# Patient Record
Sex: FEMALE | Race: BLACK OR AFRICAN AMERICAN | Employment: OTHER | URBAN - METROPOLITAN AREA
[De-identification: names, ages, dates, MRNs, and addresses within clinical notes are randomized per-mention and may not be internally consistent; named-entity substitution may affect disease eponyms.]

---

## 2017-01-06 RX ORDER — OXYCODONE AND ACETAMINOPHEN 7.5; 325 MG/1; MG/1
TABLET ORAL
Qty: 120 TAB | Refills: 0 | Status: SHIPPED | OUTPATIENT
Start: 2017-01-06 | End: 2017-03-01 | Stop reason: SDUPTHER

## 2017-01-06 RX ORDER — ZOLPIDEM TARTRATE 10 MG/1
TABLET ORAL
Qty: 30 TAB | Refills: 0 | Status: SHIPPED | OUTPATIENT
Start: 2017-01-06 | End: 2017-02-08 | Stop reason: SDUPTHER

## 2017-01-11 ENCOUNTER — HOSPITAL ENCOUNTER (OUTPATIENT)
Dept: LAB | Age: 67
Discharge: HOME | End: 2017-01-11
Payer: MEDICARE

## 2017-01-11 ENCOUNTER — OFFICE VISIT (OUTPATIENT)
Dept: ONCOLOGY | Age: 67
End: 2017-01-11

## 2017-01-11 ENCOUNTER — HOSPITAL ENCOUNTER (OUTPATIENT)
Dept: ONCOLOGY | Age: 67
Discharge: HOME | End: 2017-01-11

## 2017-01-11 VITALS
TEMPERATURE: 97.6 F | HEART RATE: 59 BPM | WEIGHT: 155 LBS | BODY MASS INDEX: 24.91 KG/M2 | DIASTOLIC BLOOD PRESSURE: 79 MMHG | SYSTOLIC BLOOD PRESSURE: 130 MMHG | HEIGHT: 66 IN

## 2017-01-11 DIAGNOSIS — D69.3 CHRONIC ITP (IDIOPATHIC THROMBOCYTOPENIA) (HCC): ICD-10-CM

## 2017-01-11 DIAGNOSIS — E55.9 VITAMIN D DEFICIENCY: ICD-10-CM

## 2017-01-11 DIAGNOSIS — C50.912 BREAST CANCER METASTASIZED TO AXILLARY LYMPH NODE, LEFT (HCC): ICD-10-CM

## 2017-01-11 DIAGNOSIS — R29.898 MUSCULAR DECONDITIONING: ICD-10-CM

## 2017-01-11 DIAGNOSIS — C77.3 BREAST CANCER METASTASIZED TO AXILLARY LYMPH NODE, LEFT (HCC): ICD-10-CM

## 2017-01-11 DIAGNOSIS — D69.3 CHRONIC ITP (IDIOPATHIC THROMBOCYTOPENIA) (HCC): Primary | ICD-10-CM

## 2017-01-11 DIAGNOSIS — R64 CACHEXIA (HCC): ICD-10-CM

## 2017-01-11 DIAGNOSIS — D50.8 OTHER IRON DEFICIENCY ANEMIA: ICD-10-CM

## 2017-01-11 LAB
ALBUMIN SERPL BCP-MCNC: 3.7 G/DL (ref 3.4–5)
ALBUMIN/GLOB SERPL: 1.1 {RATIO} (ref 0.8–1.7)
ALP SERPL-CCNC: 67 U/L (ref 45–117)
ALT SERPL-CCNC: 24 U/L (ref 13–56)
ANION GAP BLD CALC-SCNC: 9 MMOL/L (ref 3–18)
AST SERPL W P-5'-P-CCNC: 23 U/L (ref 15–37)
BASO+EOS+MONOS # BLD AUTO: 0.9 K/UL (ref 0–2.3)
BASO+EOS+MONOS # BLD AUTO: 13 % (ref 0.1–17)
BILIRUB SERPL-MCNC: 0.4 MG/DL (ref 0.2–1)
BUN SERPL-MCNC: 26 MG/DL (ref 7–18)
BUN/CREAT SERPL: 32 (ref 12–20)
CALCIUM SERPL-MCNC: 9.5 MG/DL (ref 8.5–10.1)
CHLORIDE SERPL-SCNC: 102 MMOL/L (ref 100–108)
CO2 SERPL-SCNC: 29 MMOL/L (ref 21–32)
CREAT SERPL-MCNC: 0.81 MG/DL (ref 0.6–1.3)
DIFFERENTIAL METHOD BLD: ABNORMAL
ERYTHROCYTE [DISTWIDTH] IN BLOOD BY AUTOMATED COUNT: 16.4 % (ref 11.5–14.5)
FERRITIN SERPL-MCNC: 677 NG/ML (ref 8–388)
GLOBULIN SER CALC-MCNC: 3.5 G/DL (ref 2–4)
GLUCOSE SERPL-MCNC: 81 MG/DL (ref 74–99)
HCT VFR BLD AUTO: 30.9 % (ref 36–48)
HGB BLD-MCNC: 10.1 G/DL (ref 12–16)
IRON SATN MFR SERPL: 30 %
IRON SERPL-MCNC: 63 UG/DL (ref 50–175)
LYMPHOCYTES # BLD AUTO: 25 % (ref 14–44)
LYMPHOCYTES # BLD: 1.7 K/UL (ref 1.1–5.9)
MCH RBC QN AUTO: 28.6 PG (ref 25–35)
MCHC RBC AUTO-ENTMCNC: 32.7 G/DL (ref 31–37)
MCV RBC AUTO: 87.5 FL (ref 78–102)
NEUTS SEG # BLD: 4.3 K/UL (ref 1.8–9.5)
NEUTS SEG NFR BLD AUTO: 62 % (ref 40–70)
PLATELET # BLD AUTO: 27 K/UL (ref 135–420)
POTASSIUM SERPL-SCNC: 3.8 MMOL/L (ref 3.5–5.5)
PROT SERPL-MCNC: 7.2 G/DL (ref 6.4–8.2)
RBC # BLD AUTO: 3.53 M/UL (ref 4.1–5.1)
SODIUM SERPL-SCNC: 140 MMOL/L (ref 136–145)
TIBC SERPL-MCNC: 207 UG/DL (ref 250–450)
WBC # BLD AUTO: 6.9 K/UL (ref 4.5–13)

## 2017-01-11 PROCEDURE — 82728 ASSAY OF FERRITIN: CPT | Performed by: INTERNAL MEDICINE

## 2017-01-11 PROCEDURE — 80053 COMPREHEN METABOLIC PANEL: CPT | Performed by: INTERNAL MEDICINE

## 2017-01-11 PROCEDURE — 36415 COLL VENOUS BLD VENIPUNCTURE: CPT | Performed by: INTERNAL MEDICINE

## 2017-01-11 PROCEDURE — 83540 ASSAY OF IRON: CPT | Performed by: INTERNAL MEDICINE

## 2017-01-11 PROCEDURE — 86300 IMMUNOASSAY TUMOR CA 15-3: CPT | Performed by: INTERNAL MEDICINE

## 2017-01-11 RX ORDER — PREDNISONE 10 MG/1
20 TABLET ORAL 2 TIMES DAILY
Qty: 50 TAB | Refills: 0 | Status: ON HOLD | OUTPATIENT
Start: 2017-01-11 | End: 2017-06-14

## 2017-01-11 NOTE — PROGRESS NOTES
Hematology/Oncology  Progress Note    Name: Christophe Lama  Date: 2017  : 1950    PCP: Ashley Avery MD     Ms. Jefferson Huynh is a 61 y.o. female who was seen for management of her slowly progressive ITP and metastatic breast cancer with associated iron deficiency anemia. Current therapy: Active surveillance, no active therapy  Subjective:     Ms. Jefferson Huynh is a 79-year-old -American woman with history of metastatic breast cancer. The patient reports that she has been doing reasonably well. She has gained about 20 pounds weight over the past few months. She denies having any unexplained bruising or bleeding. She continues to have arthritic discomfort in her joints and is continuing to use her walker or cane for mobility support    Past medical history, family history, and social history: these were reviewed and remains unchanged. Past Medical History   Diagnosis Date    Antineoplastic chemotherapy induced anemia     Arthritis     [facility]     Cancer ([facility]      Breast    Chronic ITP (idiopathic thrombocytopenia) (HCC) 10/31/2016    Diabetes ([facility]     Esophageal cancer ([facility] [address]      treated with chemo     Past Surgical History   Procedure Laterality Date    Pr breast surgery procedure unlisted      Hx vascular access      Hx appendectomy      Hx orthopaedic      Colonoscopy N/A 2016     COLONOSCOPY performed by Deloris Dennis MD at [facility] ENDOSCOPY     Social History     Social History    Marital status:      Spouse name: N/A    Number of children: N/A    Years of education: N/A     Occupational History    Not on file. Social History Main Topics    Smoking status: Never Smoker    Smokeless tobacco: Not on file    Alcohol use No    Drug use: No    Sexual activity: Not on file     Other Topics Concern    Not on file     Social History Narrative     No family history on file.   Current Outpatient Prescriptions   Medication Sig Dispense Refill    predniSONE (DELTASONE) 10 mg tablet Take 2 Tabs by mouth two (2) times a day. Tapered dose over 3 weeks beginning 1/12/2016 50 Tab 0    oxyCODONE-acetaminophen (PERCOCET) 7.5-325 mg per tablet Take 1 tablet every 6 hours as needed for pain 120 Tab 0    zolpidem (AMBIEN) 10 mg tablet TAKE 1 TABLET BY MOUTH NIGHTLY AS NEEDED FOR SLEEP *MAX DAILY AMOUNT 10MG* 30 Tab 0    lidocaine-prilocaine (EMLA) topical cream APPLY OVER PORT 1 HOUR PRIOR TO CHEMOTHERAPY THAN COVER WITH SARAN WRAP 30 g 6    magic mouthwash solution Take 5 mL by mouth three (3) times daily as needed for Stomatitis. Magic mouth wash   Maalox  Lidocaine 2% viscous   Diphenhydramine oral solution     Pharmacy to mix equal portions of ingredients to a total volume as indicated in the dispense amount. 236 mL 0    celecoxib (CELEBREX) 200 mg capsule Take  by mouth two (2) times a day.  furosemide (LASIX) 40 mg tablet Take  by mouth daily.  dronabinol (MARINOL) 5 mg capsule Take 1 Cap by mouth two (2) times a day. 60 Cap 1    gabapentin (NEURONTIN) 300 mg capsule Take 300 mg by mouth three (3) times daily.  warfarin (COUMADIN) 1 mg tablet TAKE 1 TABLET (1 MG) BY ORAL ROUTE ONCE DAILY 30 Tab 6    KLOR-CON M20 20 mEq tablet TAKE ONE TABLET BY MOUTH ONCE A DAY 30 Tab 3    triamterene-hydrochlorothiazide (DYAZIDE) 37.5-25 mg per capsule TAKE 1 CAPSULE BY ORAL ROUTE ONCE DAILY 30 Cap 5    VITAMIN D2 50,000 unit capsule TAKE 1 CAPSULE BY MOUTH EVERY SEVEN (7) DAYS. 4 Cap 3    aluminum-magnesium hydroxide 200-200 mg/5 mL susp 5 mL, diphenhydrAMINE 12.5 mg/5 mL liqd 12.5 mg, lidocaine 2 % soln 5 mL 5 mL by Swish and Spit route two (2) times a day. Magic mouth wash   Maalox  Lidocaine 2% viscous   Diphenhydramine oral solution     Pharmacy to mix equal portions of ingredients to a total volume as indicated in the dispense amount. 240 mL 1    potassium chloride (K-DUR, KLOR-CON) 20 mEq tablet Take 2 Tabs by mouth daily.  30 Tab 3    albuterol (PROAIR HFA) 90 mcg/actuation inhaler 1-2 puffs every 4-6 hrs. 1 Inhaler 1    senna (SENNA) 8.6 mg tablet Take 1 Tab by mouth daily.  cyclobenzaprine (FLEXERIL) 5 mg tablet       nabumetone (RELAFEN) 750 mg tablet       ondansetron hcl (ZOFRAN) 8 mg tablet Take 1 Tab by mouth every eight (8) hours as needed for Nausea. 30 Tab 3    IRON AG&FUM/C/FA/MV CMB11/CA-T (PRUVATE 21-7 PO) Take  by mouth. Review of Systems  Constitutional: The patient has no acute distress or discomfort. HEENT: The patient denies recent head trauma, eye pain, blurred vision,  hearing deficit, oropharyngeal mucosal pain or lesions, and the patient denies throat pain or discomfort. Lymphatics: The patient denies palpable peripheral lymphadenopathy. Hematologic: The patient denies having bruising, bleeding, or progressive fatigue. Respiratory: Patient denies having shortness of breath, cough, sputum production, fever, or dyspnea on exertion. Cardiovascular: The patient denies having leg pain, leg swelling, heart palpitations, chest permit, chest pain, or lightheadedness. The patient denies having dyspnea on exertion. Gastrointestinal: The patient denies having nausea, emesis, or diarrhea. The patient denies having any hematemesis or blood in the stool. Genitourinary: Patient denies having urinary urgency, frequency, or dysuria. The patient denies having blood in the urine. Psychological: The patient denies having symptoms of nervousness, anxiety, depression, or thoughts of harming self. Skin: Patient denies having skin rashes, skin, ulcerations, or unexplained itching or pruritus. Musculoskeletal: The patient used to complain of arthritic discomfort in her joints particularly the knees, hips, and shoulders. Objective:     Visit Vitals    /79    Pulse (!) 59    Temp 97.6 °F (36.4 °C) (Oral)    Ht 5' 6\" (1.676 m)    Wt 70.3 kg (155 lb)    BMI 25.02 kg/m2     ECOG PS=1, pain score=0/10  Physical Exam:   Gen. Appearance: The patient is in no acute distress. Skin: There is no bruise or rash. HEENT: The exam is unremarkable. Neck: Supple without lymphadenopathy or thyromegaly. Lungs: Clear to auscultation and percussion; there are no wheezes or rhonchi. Heart: Regular rate and rhythm; there are no murmurs, gallops, or rubs. Abdomen: Bowel sounds are present and normal.  There is no guarding, tenderness, or hepatosplenomegaly. Extremities: There is no clubbing, cyanosis, or edema. Neurologic: There are no focal neurologic deficits. Lymphatics: There is no palpable peripheral lymphadenopathy. Musculoskeletal: The patient has full range of motion at all joints. There is no evidence of joint deformity or effusions. There is no focal joint tenderness. Psychological/psychiatric: There is no clinical evidence of anxiety, depression, or melancholy. Lab data:      Results for orders placed or performed during the hospital encounter of 01/11/17   CBC WITH 3 PART DIFF     Status: Abnormal   Result Value Ref Range Status    WBC 6.9 4.5 - 13.0 K/uL Final    RBC 3.53 (L) 4.10 - 5.10 M/uL Final    HGB 10.1 (L) 12.0 - 16.0 g/dL Final    HCT 30.9 (L) 36 - 48 % Final    MCV 87.5 78 - 102 FL Final    MCH 28.6 25.0 - 35.0 PG Final    MCHC 32.7 31 - 37 g/dL Final    RDW 16.4 (H) 11.5 - 14.5 % Final    NEUTROPHILS 62 40 - 70 % Final    MIXED CELLS 13 0.1 - 17 % Final    LYMPHOCYTES 25 14 - 44 % Final    ABS. NEUTROPHILS 4.3 1.8 - 9.5 K/UL Final    ABS. MIXED CELLS 0.9 0.0 - 2.3 K/uL Final    ABS. LYMPHOCYTES 1.7 1.1 - 5.9 K/UL Final     Comment: Test performed at [place] [address] Location. Results Reviewed by Medical Director. DF AUTOMATED   Final      The preliminary platelet count is 582,340. Assessment:     1. Chronic ITP (idiopathic thrombocytopenia) (HCC)    2. Breast cancer metastasized to axillary lymph node, left ([address]    3. Vitamin D deficiency    4.  Cachexia (HCC)    5. Other iron deficiency anemia 6. Muscular deconditioning          Plan:   Chronic ITP/thrombocytopenia (progression of disease): I have informed the patient that her CBC today shows that her preliminary platelet count is 48,557. At this time I will restart her on prednisone 20 mg twice daily with plans to taper her off the medication over 3 weeks. Subsequent to the completion of her prednisone she will start on gammaglobulin or Nplate an effort to avoid having her platelet counts decline to such low levels and we will check her CBC and platelet count weekly and if the platelet counts decline below 10,000 she will be offered a transfusion of platelets. I will see her back in clinic in 4 weeks. Vitamin D deficiency: The patient is currently on vitamin D 50,000 units weekly. The most recent vitamin D level from 10/31/2016 was 49.9 mg/mL. Iron deficiency anemia: The current CBC shows a WBC count of 6.9, the hemoglobin is 10.1 g/dL with hematocrit of 30.9%. Her most recent ferritin level from 10/31/2016 was 842 ng/mL. Metastatic breast cancer, left breast metastasized to lymph nodes and other sites: At this time I will check a CA-27-29 level. The most recent CA-27-29 level from 10/31/2016 was normal at 34.9 U/mL. Cachexia and muscular deconditioning: I am recommending that he continue physical therapy 4 times weekly. She will continue to use a walker or cane as needed for mobility support. I will see her back in clinic for complete assessment again in 4 weeks.   Orders Placed This Encounter    COMPLETE CBC & AUTO DIFF WBC    InHouse CBC ([employer])     Standing Status:   Future     Number of Occurrences:   1     Standing Expiration Date:   1/18/2017    FERRITIN     Standing Status:   Future     Number of Occurrences:   1     Standing Expiration Date:   5/24/2987    METABOLIC PANEL, COMPREHENSIVE     Standing Status:   Future     Number of Occurrences:   1     Standing Expiration Date:   1/12/2018    IRON PROFILE     Standing Status:   Future     Number of Occurrences:   1     Standing Expiration Date:   1/12/2018    CA 27.29     Standing Status:   Future     Number of Occurrences:   1     Standing Expiration Date:   1/12/2018    predniSONE (DELTASONE) 10 mg tablet     Sig: Take 2 Tabs by mouth two (2) times a day. Tapered dose over 3 weeks beginning 1/12/2016     Dispense:  50 Tab     Refill:  0       Davis Bunch MD  1/11/2017      Please note: This document has been produced using voice recognition software. Unrecognized errors in transcription may be present.

## 2017-01-12 LAB — CANCER AG27-29 SERPL-ACNC: 48.7 U/ML (ref 0–38.6)

## 2017-01-13 RX ORDER — SODIUM CHLORIDE 0.9 % (FLUSH) 0.9 %
10-40 SYRINGE (ML) INJECTION AS NEEDED
Status: DISCONTINUED | OUTPATIENT
Start: 2017-01-16 | End: 2017-01-20 | Stop reason: HOSPADM

## 2017-01-13 RX ORDER — HEPARIN SODIUM (PORCINE) LOCK FLUSH IV SOLN 100 UNIT/ML 100 UNIT/ML
500 SOLUTION INTRAVENOUS AS NEEDED
Status: DISCONTINUED | OUTPATIENT
Start: 2017-01-16 | End: 2017-01-20 | Stop reason: HOSPADM

## 2017-01-16 ENCOUNTER — HOSPITAL ENCOUNTER (OUTPATIENT)
Dept: INFUSION THERAPY | Age: 67
Discharge: HOME | End: 2017-01-16
Payer: MEDICARE

## 2017-01-16 LAB — ABO + RH BLD: NORMAL

## 2017-01-16 PROCEDURE — 36415 COLL VENOUS BLD VENIPUNCTURE: CPT

## 2017-01-16 PROCEDURE — 86900 BLOOD TYPING SEROLOGIC ABO: CPT | Performed by: INTERNAL MEDICINE

## 2017-01-16 RX ORDER — FAMOTIDINE 10 MG/ML
20 INJECTION INTRAVENOUS
Status: ACTIVE | OUTPATIENT
Start: 2017-01-16 | End: 2017-01-16

## 2017-01-16 RX ORDER — HEPARIN SODIUM (PORCINE) LOCK FLUSH IV SOLN 100 UNIT/ML 100 UNIT/ML
500 SOLUTION INTRAVENOUS AS NEEDED
Status: DISCONTINUED | OUTPATIENT
Start: 2017-01-17 | End: 2017-01-21 | Stop reason: HOSPADM

## 2017-01-16 RX ORDER — SODIUM CHLORIDE 0.9 % (FLUSH) 0.9 %
10-40 SYRINGE (ML) INJECTION AS NEEDED
Status: DISCONTINUED | OUTPATIENT
Start: 2017-01-17 | End: 2017-01-21 | Stop reason: HOSPADM

## 2017-01-16 RX ORDER — DIPHENHYDRAMINE HCL 25 MG
25 CAPSULE ORAL ONCE
Status: ACTIVE | OUTPATIENT
Start: 2017-01-16 | End: 2017-01-16

## 2017-01-16 RX ORDER — DIPHENHYDRAMINE HYDROCHLORIDE 50 MG/ML
25 INJECTION, SOLUTION INTRAMUSCULAR; INTRAVENOUS
Status: ACTIVE | OUTPATIENT
Start: 2017-01-16 | End: 2017-01-16

## 2017-01-16 RX ORDER — ACETAMINOPHEN 325 MG/1
650 TABLET ORAL ONCE
Status: ACTIVE | OUTPATIENT
Start: 2017-01-16 | End: 2017-01-16

## 2017-01-16 RX ORDER — SODIUM CHLORIDE 9 MG/ML
250 INJECTION, SOLUTION INTRAVENOUS AS NEEDED
Status: DISCONTINUED | OUTPATIENT
Start: 2017-01-16 | End: 2017-01-20 | Stop reason: HOSPADM

## 2017-01-16 RX ORDER — SODIUM CHLORIDE 9 MG/ML
250 INJECTION, SOLUTION INTRAVENOUS AS NEEDED
Status: DISCONTINUED | OUTPATIENT
Start: 2017-01-16 | End: 2017-01-21 | Stop reason: HOSPADM

## 2017-01-17 ENCOUNTER — HOSPITAL ENCOUNTER (OUTPATIENT)
Dept: INFUSION THERAPY | Age: 67
Discharge: HOME | End: 2017-01-17
Payer: MEDICARE

## 2017-01-17 VITALS
OXYGEN SATURATION: 100 % | HEART RATE: 73 BPM | DIASTOLIC BLOOD PRESSURE: 79 MMHG | SYSTOLIC BLOOD PRESSURE: 123 MMHG | TEMPERATURE: 97.4 F | RESPIRATION RATE: 18 BRPM

## 2017-01-17 LAB
BASO+EOS+MONOS # BLD AUTO: 1.2 K/UL (ref 0–2.3)
BASO+EOS+MONOS # BLD AUTO: 15 % (ref 0.1–17)
DIFFERENTIAL METHOD BLD: ABNORMAL
ERYTHROCYTE [DISTWIDTH] IN BLOOD BY AUTOMATED COUNT: 15.9 % (ref 11.5–14.5)
HCT VFR BLD AUTO: 30.6 % (ref 36–48)
HGB BLD-MCNC: 10.3 G/DL (ref 12–16)
LYMPHOCYTES # BLD AUTO: 36 % (ref 14–44)
LYMPHOCYTES # BLD: 2.9 K/UL (ref 1.1–5.9)
MCH RBC QN AUTO: 28.8 PG (ref 25–35)
MCHC RBC AUTO-ENTMCNC: 33.7 G/DL (ref 31–37)
MCV RBC AUTO: 85.5 FL (ref 78–102)
NEUTS SEG # BLD: 4 K/UL (ref 1.8–9.5)
NEUTS SEG NFR BLD AUTO: 49 % (ref 40–70)
PLATELET # BLD AUTO: 33 K/UL (ref 135–420)
RBC # BLD AUTO: 3.58 M/UL (ref 4.1–5.1)
WBC # BLD AUTO: 8.1 K/UL (ref 4.5–13)

## 2017-01-17 PROCEDURE — P9035 PLATELET PHERES LEUKOREDUCED: HCPCS | Performed by: INTERNAL MEDICINE

## 2017-01-17 PROCEDURE — 36430 TRANSFUSION BLD/BLD COMPNT: CPT

## 2017-01-17 PROCEDURE — 74011250636 HC RX REV CODE- 250/636

## 2017-01-17 PROCEDURE — 74011250637 HC RX REV CODE- 250/637: Performed by: INTERNAL MEDICINE

## 2017-01-17 PROCEDURE — 77030012965 HC NDL HUBR BBMI -A

## 2017-01-17 PROCEDURE — 85049 AUTOMATED PLATELET COUNT: CPT | Performed by: INTERNAL MEDICINE

## 2017-01-17 PROCEDURE — 85025 COMPLETE CBC W/AUTO DIFF WBC: CPT | Performed by: INTERNAL MEDICINE

## 2017-01-17 RX ORDER — HEPARIN SODIUM (PORCINE) LOCK FLUSH IV SOLN 100 UNIT/ML 100 UNIT/ML
SOLUTION INTRAVENOUS
Status: COMPLETED
Start: 2017-01-17 | End: 2017-01-17

## 2017-01-17 RX ORDER — SODIUM CHLORIDE 9 MG/ML
250 INJECTION, SOLUTION INTRAVENOUS AS NEEDED
Status: DISCONTINUED | OUTPATIENT
Start: 2017-01-17 | End: 2017-01-21 | Stop reason: HOSPADM

## 2017-01-17 RX ORDER — ACETAMINOPHEN 325 MG/1
650 TABLET ORAL ONCE
Status: COMPLETED | OUTPATIENT
Start: 2017-01-17 | End: 2017-01-17

## 2017-01-17 RX ORDER — DIPHENHYDRAMINE HCL 25 MG
25 CAPSULE ORAL ONCE
Status: COMPLETED | OUTPATIENT
Start: 2017-01-17 | End: 2017-01-17

## 2017-01-17 RX ORDER — DIPHENHYDRAMINE HYDROCHLORIDE 50 MG/ML
25 INJECTION, SOLUTION INTRAMUSCULAR; INTRAVENOUS
Status: ACTIVE | OUTPATIENT
Start: 2017-01-17 | End: 2017-01-17

## 2017-01-17 RX ORDER — FAMOTIDINE 10 MG/ML
20 INJECTION INTRAVENOUS
Status: ACTIVE | OUTPATIENT
Start: 2017-01-17 | End: 2017-01-17

## 2017-01-17 RX ADMIN — ACETAMINOPHEN 650 MG: 325 TABLET ORAL at 08:36

## 2017-01-17 RX ADMIN — DIPHENHYDRAMINE HYDROCHLORIDE 25 MG: 25 CAPSULE ORAL at 08:36

## 2017-01-17 RX ADMIN — HEPARIN SODIUM (PORCINE) LOCK FLUSH IV SOLN 100 UNIT/ML 500 UNITS: 100 SOLUTION at 11:05

## 2017-01-17 RX ADMIN — Medication 20 ML: at 11:00

## 2017-01-17 RX ADMIN — Medication 30 ML: at 08:30

## 2017-01-17 RX ADMIN — Medication 10 ML: at 11:04

## 2017-01-17 RX ADMIN — HEPARIN SODIUM (PORCINE) LOCK FLUSH IV SOLN 100 UNIT/ML 500 UNITS: 100 SOLUTION at 11:01

## 2017-01-17 NOTE — PROGRESS NOTES
[facility] Progress Note    Date: 2017    Name: Adriana Flores    MRN: [id number]         : 1950      Patient assessed and education was provided. Patient vitals were reviewed. Visit Vitals    /79 (BP 1 Location: Right arm, BP Patient Position: At rest)    Pulse 73    Temp 97.4 °F (36.3 °C)    Resp 18    SpO2 100%    Breastfeeding No       Lateral port to right chest accessed. Brisk flush/blood returns noted. Blood sample obtained for cbc      Lab results were obtained and reviewed. Recent Results (from the past 12 hour(s))   CBC WITH 3 PART DIFF    Collection Time: 17  8:30 AM   Result Value Ref Range    WBC 8.1 4.5 - 13.0 K/uL    RBC 3.58 (L) 4.10 - 5.10 M/uL    HGB 10.3 (L) 12.0 - 16 g/dL    HCT 30.6 (L) 36 - 48 %    MCV 85.5 78 - 102 FL    MCH 28.8 25.0 - 35.0 PG    MCHC 33.7 31 - 37 g/dL    RDW 15.9 (H) 11.5 - 14.5 %    NEUTROPHILS 49 40 - 70 %    MIXED CELLS 15 0.1 - 17 %    LYMPHOCYTES 36 14 - 44 %    ABS. NEUTROPHILS 4.0 1.8 - 9.5 K/UL    ABS. MIXED CELLS 1.2 0.0 - 2.3 K/uL    ABS. LYMPHOCYTES 2.9 1.1 - 5.9 K/UL    DF AUTOMATED         Procrit held per parameters      Pre-medications of tylenol and benadryl po were administered as ordered and platelet infusion was initiated. 2 units platelets was infused per protocol  . Patient  tolerated infusion, and had no complaints. Slightly sluggish flush/blood returns noted from lateral port after infusion. Heparinized per protocol, then de-accessed. Site s signs of infection or infiltration. Right chest Medial port accessed. Slightly sluggish flush/blood returns noted. Heparinized per protocol. Site s signs of infection.       Gauze/tegadern applied to sites    Patient Vitals for the past 8 hrs:   Temp Pulse Resp BP SpO2   17 1055 97.4 °F (36.3 °C) 73 18 123/79 100 %   17 1022 97.5 °F (36.4 °C) 80 18 115/80 -   17 1007 97.5 °F (36.4 °C) 71 18 111/72 -   17 0950 97.5 °F (36.4 °C) 65 18 111/78 -   01/17/17 0931 97.5 °F (36.4 °C) 74 18 123/81 -   01/17/17 0915 97.4 °F (36.3 °C) 64 18 110/75 -   01/17/17 0900 97.8 °F (36.6 °C) (!) 54 18 111/74 -   01/17/17 0816 97.6 °F (36.4 °C) 60 18 117/76 100 %          Patient was discharged from [place] [address] in stable condition at 1115. She is to return on 2/14/17 at 1000 am for her next cbc/procrit/port flush appointment.     Olya Samuel RN  January 17, 2017  1:45 PM

## 2017-01-18 ENCOUNTER — CLINICAL SUPPORT (OUTPATIENT)
Dept: ONCOLOGY | Age: 67
End: 2017-01-18

## 2017-01-18 DIAGNOSIS — D64.9 CHRONIC ANEMIA: Primary | ICD-10-CM

## 2017-01-18 LAB
BLD PROD TYP BPU: NORMAL
BLD PROD TYP BPU: NORMAL
BPU ID: NORMAL
BPU ID: NORMAL
STATUS OF UNIT,%ST: NORMAL
STATUS OF UNIT,%ST: NORMAL
UNIT DIVISION, %UDIV: 0
UNIT DIVISION, %UDIV: 0

## 2017-01-19 ENCOUNTER — APPOINTMENT (OUTPATIENT)
Dept: INFUSION THERAPY | Age: 67
End: 2017-01-19
Payer: MEDICARE

## 2017-02-01 ENCOUNTER — HOSPITAL ENCOUNTER (OUTPATIENT)
Dept: ONCOLOGY | Age: 67
Discharge: HOME | End: 2017-02-01

## 2017-02-01 ENCOUNTER — OFFICE VISIT (OUTPATIENT)
Dept: ONCOLOGY | Age: 67
End: 2017-02-01

## 2017-02-01 ENCOUNTER — CLINICAL SUPPORT (OUTPATIENT)
Dept: ONCOLOGY | Age: 67
End: 2017-02-01

## 2017-02-01 DIAGNOSIS — D64.9 CHRONIC ANEMIA: Primary | ICD-10-CM

## 2017-02-01 DIAGNOSIS — C50.912 BREAST CANCER METASTASIZED TO AXILLARY LYMPH NODE, LEFT (HCC): ICD-10-CM

## 2017-02-01 DIAGNOSIS — C77.3 BREAST CANCER METASTASIZED TO AXILLARY LYMPH NODE, LEFT (HCC): ICD-10-CM

## 2017-02-01 DIAGNOSIS — D64.9 CHRONIC ANEMIA: ICD-10-CM

## 2017-02-01 LAB
BASO+EOS+MONOS # BLD AUTO: 0.9 K/UL (ref 0–2.3)
BASO+EOS+MONOS # BLD AUTO: 13 % (ref 0.1–17)
DIFFERENTIAL METHOD BLD: ABNORMAL
ERYTHROCYTE [DISTWIDTH] IN BLOOD BY AUTOMATED COUNT: 15.8 % (ref 11.5–14.5)
HCT VFR BLD AUTO: 31.6 % (ref 36–48)
HGB BLD-MCNC: 10.3 G/DL (ref 12–16)
LYMPHOCYTES # BLD AUTO: 36 % (ref 14–44)
LYMPHOCYTES # BLD: 2.4 K/UL (ref 1.1–5.9)
MCH RBC QN AUTO: 28.3 PG (ref 25–35)
MCHC RBC AUTO-ENTMCNC: 32.6 G/DL (ref 31–37)
MCV RBC AUTO: 86.8 FL (ref 78–102)
NEUTS SEG # BLD: 3.4 K/UL (ref 1.8–9.5)
NEUTS SEG NFR BLD AUTO: 51 % (ref 40–70)
PLATELET # BLD AUTO: 39 K/UL (ref 135–420)
RBC # BLD AUTO: 3.64 M/UL (ref 4.1–5.1)
WBC # BLD AUTO: 6.7 K/UL (ref 4.5–13)

## 2017-02-08 ENCOUNTER — OFFICE VISIT (OUTPATIENT)
Dept: ONCOLOGY | Age: 67
End: 2017-02-08

## 2017-02-08 ENCOUNTER — HOSPITAL ENCOUNTER (OUTPATIENT)
Dept: ONCOLOGY | Age: 67
Discharge: HOME | End: 2017-02-08

## 2017-02-08 ENCOUNTER — HOSPITAL ENCOUNTER (OUTPATIENT)
Dept: LAB | Age: 67
Discharge: HOME | End: 2017-02-08
Payer: MEDICARE

## 2017-02-08 VITALS
TEMPERATURE: 97 F | HEART RATE: 61 BPM | SYSTOLIC BLOOD PRESSURE: 120 MMHG | WEIGHT: 152 LBS | DIASTOLIC BLOOD PRESSURE: 65 MMHG | BODY MASS INDEX: 24.43 KG/M2 | HEIGHT: 66 IN

## 2017-02-08 DIAGNOSIS — R29.898 MUSCULAR DECONDITIONING: ICD-10-CM

## 2017-02-08 DIAGNOSIS — D64.9 CHRONIC ANEMIA: ICD-10-CM

## 2017-02-08 DIAGNOSIS — F51.01 PRIMARY INSOMNIA: ICD-10-CM

## 2017-02-08 DIAGNOSIS — C77.3 BREAST CANCER METASTASIZED TO AXILLARY LYMPH NODE, RIGHT (HCC): ICD-10-CM

## 2017-02-08 DIAGNOSIS — C50.911 BREAST CANCER METASTASIZED TO AXILLARY LYMPH NODE, RIGHT (HCC): ICD-10-CM

## 2017-02-08 DIAGNOSIS — R64 CACHEXIA (HCC): ICD-10-CM

## 2017-02-08 DIAGNOSIS — D69.6 THROMBOCYTOPENIA (HCC): ICD-10-CM

## 2017-02-08 DIAGNOSIS — D69.3 CHRONIC ITP (IDIOPATHIC THROMBOCYTOPENIA) (HCC): ICD-10-CM

## 2017-02-08 DIAGNOSIS — E55.9 VITAMIN D DEFICIENCY: ICD-10-CM

## 2017-02-08 DIAGNOSIS — D64.9 CHRONIC ANEMIA: Primary | ICD-10-CM

## 2017-02-08 PROBLEM — G47.00 INSOMNIA: Status: ACTIVE | Noted: 2017-02-08

## 2017-02-08 LAB
ALBUMIN SERPL BCP-MCNC: 3.4 G/DL (ref 3.4–5)
ALBUMIN/GLOB SERPL: 1 {RATIO} (ref 0.8–1.7)
ALP SERPL-CCNC: 65 U/L (ref 45–117)
ALT SERPL-CCNC: 18 U/L (ref 13–56)
ANION GAP BLD CALC-SCNC: 11 MMOL/L (ref 3–18)
AST SERPL W P-5'-P-CCNC: 13 U/L (ref 15–37)
BASO+EOS+MONOS # BLD AUTO: 1.2 K/UL (ref 0–2.3)
BASO+EOS+MONOS # BLD AUTO: 20 % (ref 0.1–17)
BILIRUB SERPL-MCNC: 0.4 MG/DL (ref 0.2–1)
BUN SERPL-MCNC: 26 MG/DL (ref 7–18)
BUN/CREAT SERPL: 25 (ref 12–20)
CALCIUM SERPL-MCNC: 8.8 MG/DL (ref 8.5–10.1)
CHLORIDE SERPL-SCNC: 95 MMOL/L (ref 100–108)
CO2 SERPL-SCNC: 32 MMOL/L (ref 21–32)
CREAT SERPL-MCNC: 1.06 MG/DL (ref 0.6–1.3)
DIFFERENTIAL METHOD BLD: ABNORMAL
ERYTHROCYTE [DISTWIDTH] IN BLOOD BY AUTOMATED COUNT: 15.3 % (ref 11.5–14.5)
FERRITIN SERPL-MCNC: 589 NG/ML (ref 8–388)
GLOBULIN SER CALC-MCNC: 3.5 G/DL (ref 2–4)
GLUCOSE SERPL-MCNC: 83 MG/DL (ref 74–99)
HCT VFR BLD AUTO: 31.5 % (ref 36–48)
HGB BLD-MCNC: 10.8 G/DL (ref 12–16)
IRON SATN MFR SERPL: 30 %
IRON SERPL-MCNC: 63 UG/DL (ref 50–175)
LYMPHOCYTES # BLD AUTO: 26 % (ref 14–44)
LYMPHOCYTES # BLD: 1.6 K/UL (ref 1.1–5.9)
MCH RBC QN AUTO: 29.7 PG (ref 25–35)
MCHC RBC AUTO-ENTMCNC: 34.3 G/DL (ref 31–37)
MCV RBC AUTO: 86.5 FL (ref 78–102)
NEUTS SEG # BLD: 3.3 K/UL (ref 1.8–9.5)
NEUTS SEG NFR BLD AUTO: 55 % (ref 40–70)
PLATELET # BLD AUTO: 46 K/UL (ref 135–420)
POTASSIUM SERPL-SCNC: 3.1 MMOL/L (ref 3.5–5.5)
PROT SERPL-MCNC: 6.9 G/DL (ref 6.4–8.2)
RBC # BLD AUTO: 3.64 M/UL (ref 4.1–5.1)
SODIUM SERPL-SCNC: 138 MMOL/L (ref 136–145)
TIBC SERPL-MCNC: 210 UG/DL (ref 250–450)
WBC # BLD AUTO: 6.1 K/UL (ref 4.5–13)

## 2017-02-08 PROCEDURE — 36415 COLL VENOUS BLD VENIPUNCTURE: CPT | Performed by: INTERNAL MEDICINE

## 2017-02-08 PROCEDURE — 80053 COMPREHEN METABOLIC PANEL: CPT | Performed by: INTERNAL MEDICINE

## 2017-02-08 PROCEDURE — 86300 IMMUNOASSAY TUMOR CA 15-3: CPT | Performed by: INTERNAL MEDICINE

## 2017-02-08 PROCEDURE — 83540 ASSAY OF IRON: CPT | Performed by: INTERNAL MEDICINE

## 2017-02-08 PROCEDURE — 82728 ASSAY OF FERRITIN: CPT | Performed by: INTERNAL MEDICINE

## 2017-02-08 RX ORDER — ZOLPIDEM TARTRATE 10 MG/1
TABLET ORAL
Qty: 30 TAB | Refills: 3 | Status: SHIPPED | OUTPATIENT
Start: 2017-02-08 | End: 2017-05-25 | Stop reason: SDUPTHER

## 2017-02-08 NOTE — PROGRESS NOTES
Hematology/Oncology  Progress Note    Name: Wally Draft  Date: 2017  : 1950    PCP: Tejal Laureano MD     Ms. Ulysses Hudson is a 61 y.o. female who was seen for management of her slowly progressive ITP and metastatic breast cancer with associated iron deficiency anemia. Current therapy: Active surveillance, no active therapy  Subjective:     Ms. Ulysses Hudson is a 72-year-old -American woman with history of metastatic breast cancer. The patient reports that she has been doing reasonably well. She has gained about 20 pounds weight over the past few months, due to the steroid that she has to take for her ITP. Bethany Riser She denies having any unexplained bruising or bleeding. She continues to have arthritic discomfort in her joints and is continuing to use her cane for mobility support. Today she is requesting a new prescription for Ambien as a sleep aid. Past medical history, family history, and social history: these were reviewed and remains unchanged. Past Medical History   Diagnosis Date    Antineoplastic chemotherapy induced anemia     Arthritis     [facility]     Cancer ([facility]      Breast    Chronic ITP (idiopathic thrombocytopenia) (HCC) 10/31/2016    Diabetes ([facility]     Esophageal cancer ([facility] [address]      treated with chemo     Past Surgical History   Procedure Laterality Date    Pr breast surgery procedure unlisted      Hx vascular access      Hx appendectomy      Hx orthopaedic      Colonoscopy N/A 2016     COLONOSCOPY performed by Ankur Hayden MD at [facility] ENDOSCOPY     Social History     Social History    Marital status:      Spouse name: N/A    Number of children: N/A    Years of education: N/A     Occupational History    Not on file.      Social History Main Topics    Smoking status: Never Smoker    Smokeless tobacco: Not on file    Alcohol use No    Drug use: No    Sexual activity: Not on file     Other Topics Concern    Not on file     Social History Narrative No family history on file. Current Outpatient Prescriptions   Medication Sig Dispense Refill    zolpidem (AMBIEN) 10 mg tablet TAKE 1 TABLET BY MOUTH NIGHTLY AS NEEDED FOR SLEEP *MAX DAILY AMOUNT 10MG* 30 Tab 3    predniSONE (DELTASONE) 10 mg tablet Take 2 Tabs by mouth two (2) times a day. Tapered dose over 3 weeks beginning 1/12/2016 50 Tab 0    oxyCODONE-acetaminophen (PERCOCET) 7.5-325 mg per tablet Take 1 tablet every 6 hours as needed for pain 120 Tab 0    lidocaine-prilocaine (EMLA) topical cream APPLY OVER PORT 1 HOUR PRIOR TO CHEMOTHERAPY THAN COVER WITH SARAN WRAP 30 g 6    magic mouthwash solution Take 5 mL by mouth three (3) times daily as needed for Stomatitis. Magic mouth wash   Maalox  Lidocaine 2% viscous   Diphenhydramine oral solution     Pharmacy to mix equal portions of ingredients to a total volume as indicated in the dispense amount. 236 mL 0    celecoxib (CELEBREX) 200 mg capsule Take  by mouth two (2) times a day.  furosemide (LASIX) 40 mg tablet Take  by mouth daily.  dronabinol (MARINOL) 5 mg capsule Take 1 Cap by mouth two (2) times a day. 60 Cap 1    gabapentin (NEURONTIN) 300 mg capsule Take 300 mg by mouth three (3) times daily.  warfarin (COUMADIN) 1 mg tablet TAKE 1 TABLET (1 MG) BY ORAL ROUTE ONCE DAILY 30 Tab 6    KLOR-CON M20 20 mEq tablet TAKE ONE TABLET BY MOUTH ONCE A DAY 30 Tab 3    triamterene-hydrochlorothiazide (DYAZIDE) 37.5-25 mg per capsule TAKE 1 CAPSULE BY ORAL ROUTE ONCE DAILY 30 Cap 5    VITAMIN D2 50,000 unit capsule TAKE 1 CAPSULE BY MOUTH EVERY SEVEN (7) DAYS. 4 Cap 3    aluminum-magnesium hydroxide 200-200 mg/5 mL susp 5 mL, diphenhydrAMINE 12.5 mg/5 mL liqd 12.5 mg, lidocaine 2 % soln 5 mL 5 mL by Swish and Spit route two (2) times a day. Magic mouth wash   Maalox  Lidocaine 2% viscous   Diphenhydramine oral solution     Pharmacy to mix equal portions of ingredients to a total volume as indicated in the dispense amount.  240 mL 1    potassium chloride (K-DUR, KLOR-CON) 20 mEq tablet Take 2 Tabs by mouth daily. 30 Tab 3    albuterol (PROAIR HFA) 90 mcg/actuation inhaler 1-2 puffs every 4-6 hrs. 1 Inhaler 1    senna (SENNA) 8.6 mg tablet Take 1 Tab by mouth daily.  cyclobenzaprine (FLEXERIL) 5 mg tablet       nabumetone (RELAFEN) 750 mg tablet       ondansetron hcl (ZOFRAN) 8 mg tablet Take 1 Tab by mouth every eight (8) hours as needed for Nausea. 30 Tab 3    IRON AG&FUM/C/FA/MV CMB11/CA-T (PRUVATE 21-7 PO) Take  by mouth. Review of Systems  Constitutional: The patient has no acute distress or discomfort. HEENT: The patient denies recent head trauma, eye pain, blurred vision,  hearing deficit, oropharyngeal mucosal pain or lesions, and the patient denies throat pain or discomfort. Lymphatics: The patient denies palpable peripheral lymphadenopathy. Hematologic: The patient denies having bruising, bleeding, or progressive fatigue. Respiratory: Patient denies having shortness of breath, cough, sputum production, fever, or dyspnea on exertion. Cardiovascular: The patient denies having leg pain, leg swelling, heart palpitations, chest permit, chest pain, or lightheadedness. The patient denies having dyspnea on exertion. Gastrointestinal: The patient denies having nausea, emesis, or diarrhea. The patient denies having any hematemesis or blood in the stool. Genitourinary: Patient denies having urinary urgency, frequency, or dysuria. The patient denies having blood in the urine. Psychological: The patient denies having symptoms of nervousness, anxiety, depression, or thoughts of harming self. Skin: Patient denies having skin rashes, skin, ulcerations, or unexplained itching or pruritus. Musculoskeletal: The patient used to complain of arthritic discomfort in her joints particularly the knees, hips, and shoulders.       Objective:     Visit Vitals    /65    Pulse 61    Temp 97 °F (36.1 °C)    Ht 5' 6\" (1.676 m)    Wt 68.9 kg (152 lb)    BMI 24.53 kg/m2     ECOG PS=1, pain score=0/10  Physical Exam:   Gen. Appearance: The patient is in no acute distress. Skin: There is no bruise or rash. HEENT: The exam is unremarkable. Neck: Supple without lymphadenopathy or thyromegaly. Lungs: Clear to auscultation and percussion; there are no wheezes or rhonchi. Heart: Regular rate and rhythm; there are no murmurs, gallops, or rubs. Abdomen: Bowel sounds are present and normal.  There is no guarding, tenderness, or hepatosplenomegaly. Extremities: There is no clubbing, cyanosis, or edema. Neurologic: There are no focal neurologic deficits. Lymphatics: There is no palpable peripheral lymphadenopathy. Musculoskeletal: The patient has full range of motion at all joints. There is no evidence of joint deformity or effusions. There is no focal joint tenderness. Psychological/psychiatric: There is no clinical evidence of anxiety, depression, or melancholy. Lab data:      Results for orders placed or performed during the hospital encounter of 02/08/17   CBC WITH 3 PART DIFF     Status: Abnormal   Result Value Ref Range Status    WBC 6.1 4.5 - 13.0 K/uL Final    RBC 3.64 (L) 4.10 - 5.10 M/uL Final    HGB 10.8 (L) 12.0 - 16 g/dL Final    HCT 31.5 (L) 36 - 48 % Final    MCV 86.5 78 - 102 FL Final    MCH 29.7 25.0 - 35.0 PG Final    MCHC 34.3 31 - 37 g/dL Final    RDW 15.3 (H) 11.5 - 14.5 % Final    NEUTROPHILS 55 40 - 70 % Final    MIXED CELLS 20 (H) 0.1 - 17 % Final    LYMPHOCYTES 26 14 - 44 % Final    ABS. NEUTROPHILS 3.3 1.8 - 9.5 K/UL Final    ABS. MIXED CELLS 1.2 0.0 - 2.3 K/uL Final    ABS. LYMPHOCYTES 1.6 1.1 - 5.9 K/UL Final     Comment: Test performed at [place] [address] Location. Results Reviewed by Medical Director. DF AUTOMATED   Final      The preliminary platelet count is 03,460. Assessment:     1. Chronic anemia    2. Breast cancer metastasized to axillary lymph node, right ([address]    3.  Vitamin D deficiency    4. Cachexia ([address]    5. Chronic ITP (idiopathic thrombocytopenia) (HCC)    6. Muscular deconditioning    7. Thrombocytopenia ([address]    8. Primary insomnia          Plan:   Chronic ITP/thrombocytopenia (progression of disease): I have informed the patient that her CBC today shows that her preliminary platelet count is 32,564. I have explained to the patient that she has failed steroid therapy since her platelet counts have declined rapidly following her second round of high-dose prednisone. At this time I am recommending starting N-Plate at a dose of 1 mcg/kg subcutaneous weekly beginning tomorrow, 2/9/2017, at 2 PM.  I have reviewed the risk, benefits, and side effects of the medication with the patient and she has expressed her willingness to comply. A CBC will be checked weekly at the time of the therapeutic intervention. Vitamin D deficiency: The patient recently completed vitamin D 50,000 units for 12 weeks. At this time I will recheck a vitamin D level. If her vitamin D 25 is low she will be restarted on vitamin D 50,000 units weekly for an additional 12 weeks. Iron deficiency anemia: The current CBC shows a WBC count of 6.1, the hemoglobin is 10.8 g/dL, hematocrit is 31.5%. I have recommended that the patient continue taking the ferrous sulfate 1 tablet twice daily. At this time I will recheck her iron profile and ferritin levels. The most recent iron level from 1/11/2017 was 63 mcg/dL with an iron saturation of 30%. Her ferritin level was 677 ng/mL. Metastatic breast cancer, left breast metastasized to lymph nodes and other sites: At this time I will check a CA-27-29 level. The most recent CA-27-29 level from 1/11/2017 was  48.7 U/mL. Clinically the patient has no evidence of disease progression. .    Cachexia and muscular deconditioning: I am recommending that she continue physical therapy 4 times weekly.   She will continue to use a walker or cane as needed for mobility support. Primary insomnia: A new prescription for Ambien 10 mg at bedtime was provided. I will see her back in clinic for complete assessment again in 6 weeks. Orders Placed This Encounter    COMPLETE CBC & AUTO DIFF WBC    InHouse CBC ([employer])     Standing Status:   Future     Number of Occurrences:   1     Standing Expiration Date:   2/15/2017    FERRITIN     Standing Status:   Future     Standing Expiration Date:   5/3/3063    METABOLIC PANEL, COMPREHENSIVE     Standing Status:   Future     Standing Expiration Date:   2/9/2018    IRON PROFILE     Standing Status:   Future     Standing Expiration Date:   2/9/2018    CA 27.29     Standing Status:   Future     Standing Expiration Date:   2/9/2018    zolpidem (AMBIEN) 10 mg tablet     Sig: TAKE 1 TABLET BY MOUTH NIGHTLY AS NEEDED FOR SLEEP *MAX DAILY AMOUNT 10MG*     Dispense:  30 Tab     Refill:  3       Lenin Gonzalez MD  2/8/2017      Please note: This document has been produced using voice recognition software. Unrecognized errors in transcription may be present.

## 2017-02-09 LAB — CANCER AG27-29 SERPL-ACNC: 45.9 U/ML (ref 0–38.6)

## 2017-02-10 ENCOUNTER — HOSPITAL ENCOUNTER (OUTPATIENT)
Dept: INFUSION THERAPY | Age: 67
Discharge: HOME | End: 2017-02-10
Payer: MEDICARE

## 2017-02-10 VITALS
TEMPERATURE: 97.4 F | RESPIRATION RATE: 18 BRPM | HEART RATE: 90 BPM | SYSTOLIC BLOOD PRESSURE: 99 MMHG | DIASTOLIC BLOOD PRESSURE: 64 MMHG

## 2017-02-10 PROCEDURE — 74011250636 HC RX REV CODE- 250/636: Performed by: INTERNAL MEDICINE

## 2017-02-10 PROCEDURE — 74011250636 HC RX REV CODE- 250/636

## 2017-02-10 PROCEDURE — 96372 THER/PROPH/DIAG INJ SC/IM: CPT

## 2017-02-10 RX ADMIN — ROMIPLOSTIM 69 MCG: 250 INJECTION, POWDER, LYOPHILIZED, FOR SOLUTION SUBCUTANEOUS at 13:23

## 2017-02-10 NOTE — PROGRESS NOTES
[address] [facility] Progress Note    Date: February 10, 2017    Name: Mariama Kessler    MRN: [id number]         : 1950    Nplate     Ms. Pagan to [facility], ambulatory at 1310. Pt was assessed and education was provided. Printed care notes given to patient and signed. Ms. Renee Nance vitals were reviewed and patient was observed for 5 minutes prior to treatment. Visit Vitals    BP 99/64 (BP 1 Location: Right arm, BP Patient Position: Sitting)    Pulse 90    Temp 97.4 °F (36.3 °C)    Resp 18    Breastfeeding No     Lab results reviewed from 2017 and platelet count = 46. Nplate dose calculated to be 1 mcg/kg for initial weight of 69 kg. Nplate dose today is 69 mcg = 0.14 ML     Nplate 5.57 mL administered SQ in the back of the right arm. No irritation or bleeding noted at site. Gauze and coban applied. Pt was observed for 30 minutes post injection for reaction. No reaction noted. Ms. Jose Pemberton tolerated well, and had no complaints. Patient armband removed and shredded. Ms. Jose Pemberton was discharged from [place] [address] in stable condition at 1400. She is to return on 2017 at 1500 for her next port flush appointment.     Cristóbal Martinez RN  February 10, 2017

## 2017-02-16 ENCOUNTER — HOSPITAL ENCOUNTER (OUTPATIENT)
Dept: INFUSION THERAPY | Age: 67
Discharge: HOME | End: 2017-02-16
Payer: MEDICARE

## 2017-02-16 ENCOUNTER — APPOINTMENT (OUTPATIENT)
Dept: INFUSION THERAPY | Age: 67
End: 2017-02-16
Payer: MEDICARE

## 2017-02-16 VITALS
TEMPERATURE: 97.7 F | SYSTOLIC BLOOD PRESSURE: 107 MMHG | RESPIRATION RATE: 18 BRPM | DIASTOLIC BLOOD PRESSURE: 71 MMHG | HEART RATE: 75 BPM

## 2017-02-16 LAB
BASO+EOS+MONOS # BLD AUTO: 0.8 K/UL (ref 0–2.3)
BASO+EOS+MONOS # BLD AUTO: 15 % (ref 0.1–17)
DIFFERENTIAL METHOD BLD: ABNORMAL
ERYTHROCYTE [DISTWIDTH] IN BLOOD BY AUTOMATED COUNT: 15 % (ref 11.5–14.5)
HCT VFR BLD AUTO: 30.9 % (ref 36–48)
HGB BLD-MCNC: 10.1 G/DL (ref 12–16)
LYMPHOCYTES # BLD AUTO: 29 % (ref 14–44)
LYMPHOCYTES # BLD: 1.5 K/UL (ref 1.1–5.9)
MCH RBC QN AUTO: 28.3 PG (ref 25–35)
MCHC RBC AUTO-ENTMCNC: 32.7 G/DL (ref 31–37)
MCV RBC AUTO: 86.6 FL (ref 78–102)
NEUTS SEG # BLD: 2.9 K/UL (ref 1.8–9.5)
NEUTS SEG NFR BLD AUTO: 56 % (ref 40–70)
PLATELET # BLD AUTO: 47 K/UL (ref 135–420)
RBC # BLD AUTO: 3.57 M/UL (ref 4.1–5.1)
WBC # BLD AUTO: 5.2 K/UL (ref 4.5–13)

## 2017-02-16 PROCEDURE — 96372 THER/PROPH/DIAG INJ SC/IM: CPT

## 2017-02-16 PROCEDURE — 74011250636 HC RX REV CODE- 250/636: Performed by: INTERNAL MEDICINE

## 2017-02-16 PROCEDURE — 77030012965 HC NDL HUBR BBMI -A

## 2017-02-16 PROCEDURE — 74011250636 HC RX REV CODE- 250/636: Performed by: NURSE PRACTITIONER

## 2017-02-16 PROCEDURE — 85025 COMPLETE CBC W/AUTO DIFF WBC: CPT | Performed by: NURSE PRACTITIONER

## 2017-02-16 PROCEDURE — 85049 AUTOMATED PLATELET COUNT: CPT | Performed by: INTERNAL MEDICINE

## 2017-02-16 PROCEDURE — 74011250636 HC RX REV CODE- 250/636

## 2017-02-16 RX ORDER — SODIUM CHLORIDE 0.9 % (FLUSH) 0.9 %
10-40 SYRINGE (ML) INJECTION AS NEEDED
Status: DISCONTINUED | OUTPATIENT
Start: 2017-02-16 | End: 2017-02-20 | Stop reason: HOSPADM

## 2017-02-16 RX ORDER — HEPARIN SODIUM (PORCINE) LOCK FLUSH IV SOLN 100 UNIT/ML 100 UNIT/ML
500 SOLUTION INTRAVENOUS AS NEEDED
Status: DISPENSED | OUTPATIENT
Start: 2017-02-16 | End: 2017-02-17

## 2017-02-16 RX ADMIN — HEPARIN SODIUM (PORCINE) LOCK FLUSH IV SOLN 100 UNIT/ML 500 UNITS: 100 SOLUTION at 15:56

## 2017-02-16 RX ADMIN — ROMIPLOSTIM 138 MCG: 250 INJECTION, POWDER, LYOPHILIZED, FOR SOLUTION SUBCUTANEOUS at 16:22

## 2017-02-16 RX ADMIN — HEPARIN SODIUM (PORCINE) LOCK FLUSH IV SOLN 100 UNIT/ML 500 UNITS: 100 SOLUTION at 15:55

## 2017-02-16 RX ADMIN — Medication 40 ML: at 15:55

## 2017-02-16 NOTE — PROGRESS NOTES
[facility] Progress Note    Date: 2017    Name: Tima Silva    MRN: [id number]         : 1950      Ms. Josselyn Birmingham arrived to [facility] at 0410. Ms. Josselyn Birmingham was assessed and education was provided. Today is week #2 Nplate. Ms. Viki Vega vitals were reviewed. Visit Vitals    /71 (BP 1 Location: Right arm, BP Patient Position: Sitting)    Pulse 75    Temp 97.7 °F (36.5 °C)    Resp 18       Pt was observed for 5 minutes after obtaining vital signs prior to initiating treatment. Each lumen of patient's upper right double lumen chest port accessed without difficulty. Each lumen positive for blood return/ flushes without difficulty. Blood drawn for labs via lateral lumen. Flushed each lumen with normal saline followed by heparin per order. De-accessed each lumen and gauze/ tegaderm applied to site. Ms. Josselyn Birmingham tolerated well without complaints. Lab results obtained and reviewed. Recent Results (from the past 12 hour(s))   CBC WITH 3 PART DIFF    Collection Time: 17  3:55 PM   Result Value Ref Range    WBC 5.2 4.5 - 13.0 K/uL    RBC 3.57 (L) 4.10 - 5.10 M/uL    HGB 10.1 (L) 12.0 - 16 g/dL    HCT 30.9 (L) 36 - 48 %    MCV 86.6 78 - 102 FL    MCH 28.3 25.0 - 35.0 PG    MCHC 32.7 31 - 37 g/dL    RDW 15.0 (H) 11.5 - 14.5 %    NEUTROPHILS 56 40 - 70 %    MIXED CELLS 15 0.1 - 17 %    LYMPHOCYTES 29 14 - 44 %    ABS. NEUTROPHILS 2.9 1.8 - 9.5 K/UL    ABS. MIXED CELLS 0.8 0.0 - 2.3 K/uL    ABS. LYMPHOCYTES 1.5 1.1 - 5.9 K/UL    DF AUTOMATED         Procrit held at this time per order. Per Nplate protocol, since pt's platelet count is <05985 today, pt's dose will increase from 1mcg/kg to 2mcg/kg which is 2mcg x 69kg = 138mcg. Nplate 511KJS (7.08OS) administered as ordered SQ in patient's right upper arm. Ms. Josselyn Birmingham was discharged from [place] [address] in stable condition at 1625. She is to return on 17 at 1300 for her next appointment.     Kalen Yu RN  February 16, 2017

## 2017-02-24 ENCOUNTER — HOSPITAL ENCOUNTER (OUTPATIENT)
Dept: INFUSION THERAPY | Age: 67
Discharge: HOME | End: 2017-02-24
Payer: MEDICARE

## 2017-02-24 VITALS
RESPIRATION RATE: 18 BRPM | DIASTOLIC BLOOD PRESSURE: 73 MMHG | SYSTOLIC BLOOD PRESSURE: 115 MMHG | HEART RATE: 77 BPM | TEMPERATURE: 97.8 F

## 2017-02-24 LAB
BASO+EOS+MONOS # BLD AUTO: 0.7 K/UL (ref 0–2.3)
BASO+EOS+MONOS # BLD AUTO: 14 % (ref 0.1–17)
DIFFERENTIAL METHOD BLD: ABNORMAL
ERYTHROCYTE [DISTWIDTH] IN BLOOD BY AUTOMATED COUNT: 14.6 % (ref 11.5–14.5)
HCT VFR BLD AUTO: 32.9 % (ref 36–48)
HGB BLD-MCNC: 10.7 G/DL (ref 12–16)
LYMPHOCYTES # BLD AUTO: 28 % (ref 14–44)
LYMPHOCYTES # BLD: 1.5 K/UL (ref 1.1–5.9)
MCH RBC QN AUTO: 27.9 PG (ref 25–35)
MCHC RBC AUTO-ENTMCNC: 32.5 G/DL (ref 31–37)
MCV RBC AUTO: 85.7 FL (ref 78–102)
NEUTS SEG # BLD: 3.3 K/UL (ref 1.8–9.5)
NEUTS SEG NFR BLD AUTO: 59 % (ref 40–70)
PLATELET # BLD AUTO: 96 K/UL (ref 135–420)
RBC # BLD AUTO: 3.84 M/UL (ref 4.1–5.1)
WBC # BLD AUTO: 5.5 K/UL (ref 4.5–13)

## 2017-02-24 PROCEDURE — 85025 COMPLETE CBC W/AUTO DIFF WBC: CPT | Performed by: INTERNAL MEDICINE

## 2017-02-24 PROCEDURE — 96372 THER/PROPH/DIAG INJ SC/IM: CPT

## 2017-02-24 PROCEDURE — 85049 AUTOMATED PLATELET COUNT: CPT | Performed by: INTERNAL MEDICINE

## 2017-02-24 PROCEDURE — 36415 COLL VENOUS BLD VENIPUNCTURE: CPT

## 2017-02-24 PROCEDURE — 74011250636 HC RX REV CODE- 250/636

## 2017-02-24 NOTE — PROGRESS NOTES
[facility] Progress Note    Date: 2017    Name: Elsy Arango    MRN: [id number]         : 1950     Treatment: Nplate      Ms. Maria Teresa Padilla arrived to [facility] at [employer]. Ms. Maria Teresa Padilla was assessed and education was provided. Today is week #3 Nplate. Ms. Meryle Hawking vitals were reviewed. Visit Vitals    /73 (BP 1 Location: Right arm, BP Patient Position: Sitting)    Pulse 77    Temp 97.8 °F (36.6 °C)    Resp 18       Pt was observed for 5 minutes after obtaining vital signs prior to initiating treatment. Ms. Maria Teresa Padilla tolerated well without complaints. Lab results obtained and reviewed. Recent Results (from the past 12 hour(s))   CBC WITH 3 PART DIFF    Collection Time: 17  1:30 PM   Result Value Ref Range    WBC 5.5 4.5 - 13.0 K/uL    RBC 3.84 (L) 4.10 - 5.10 M/uL    HGB 10.7 (L) 12.0 - 16 g/dL    HCT 32.9 (L) 36 - 48 %    MCV 85.7 78 - 102 FL    MCH 27.9 25.0 - 35.0 PG    MCHC 32.5 31 - 37 g/dL    RDW 14.6 (H) 11.5 - 14.5 %    NEUTROPHILS 59 40 - 70 %    MIXED CELLS 14 0.1 - 17 %    LYMPHOCYTES 28 14 - 44 %    ABS. NEUTROPHILS 3.3 1.8 - 9.5 K/UL    ABS. MIXED CELLS 0.7 0.0 - 2.3 K/uL    ABS. LYMPHOCYTES 1.5 1.1 - 5.9 K/UL    DF AUTOMATED             Per Nplate protocol, since pt's platelet count is >35,768 today (PLT 85) pt's dose will remains the same as the previous week  which is 2mcg x 69kg = 138mcg. Nplate 724WOJ (0.47GL) administered as ordered SQ in patient's right upper arm. Ms. Maria Teresa Padilla was discharged from [place] [address] in stable condition at [phone]. She is to return on 3/1/17 at 1330 for her next appointment.     Lydia Shankar RN  2017

## 2017-03-01 NOTE — TELEPHONE ENCOUNTER
Pt requesting Rx for Oxycodone to be picked up at [facility] on Friday when she comes in for treatment.

## 2017-03-02 RX ORDER — OXYCODONE AND ACETAMINOPHEN 7.5; 325 MG/1; MG/1
TABLET ORAL
Qty: 120 TAB | Refills: 0 | Status: SHIPPED | OUTPATIENT
Start: 2017-03-02 | End: 2017-03-02 | Stop reason: SDUPTHER

## 2017-03-03 ENCOUNTER — HOSPITAL ENCOUNTER (OUTPATIENT)
Dept: INFUSION THERAPY | Age: 67
Discharge: HOME | End: 2017-03-03
Payer: MEDICARE

## 2017-03-03 VITALS
DIASTOLIC BLOOD PRESSURE: 70 MMHG | OXYGEN SATURATION: 100 % | TEMPERATURE: 97.7 F | SYSTOLIC BLOOD PRESSURE: 105 MMHG | HEART RATE: 84 BPM | RESPIRATION RATE: 18 BRPM

## 2017-03-03 LAB
BASO+EOS+MONOS # BLD AUTO: 1.1 K/UL (ref 0–2.3)
BASO+EOS+MONOS # BLD AUTO: 20 % (ref 0.1–17)
DIFFERENTIAL METHOD BLD: ABNORMAL
ERYTHROCYTE [DISTWIDTH] IN BLOOD BY AUTOMATED COUNT: 14.6 % (ref 11.5–14.5)
HCT VFR BLD AUTO: 32.5 % (ref 36–48)
HGB BLD-MCNC: 10.6 G/DL (ref 12–16)
LYMPHOCYTES # BLD AUTO: 29 % (ref 14–44)
LYMPHOCYTES # BLD: 1.5 K/UL (ref 1.1–5.9)
MCH RBC QN AUTO: 28 PG (ref 25–35)
MCHC RBC AUTO-ENTMCNC: 32.6 G/DL (ref 31–37)
MCV RBC AUTO: 85.8 FL (ref 78–102)
NEUTS SEG # BLD: 2.8 K/UL (ref 1.8–9.5)
NEUTS SEG NFR BLD AUTO: 51 % (ref 40–70)
PLATELET # BLD AUTO: 96 K/UL (ref 135–420)
RBC # BLD AUTO: 3.79 M/UL (ref 4.1–5.1)
WBC # BLD AUTO: 5.4 K/UL (ref 4.5–13)

## 2017-03-03 PROCEDURE — 74011250636 HC RX REV CODE- 250/636: Performed by: NURSE PRACTITIONER

## 2017-03-03 PROCEDURE — 36415 COLL VENOUS BLD VENIPUNCTURE: CPT

## 2017-03-03 PROCEDURE — 85025 COMPLETE CBC W/AUTO DIFF WBC: CPT | Performed by: NURSE PRACTITIONER

## 2017-03-03 PROCEDURE — 96372 THER/PROPH/DIAG INJ SC/IM: CPT

## 2017-03-03 PROCEDURE — 85049 AUTOMATED PLATELET COUNT: CPT | Performed by: INTERNAL MEDICINE

## 2017-03-03 PROCEDURE — 74011250636 HC RX REV CODE- 250/636

## 2017-03-03 RX ADMIN — ROMIPLOSTIM 138 MCG: 250 INJECTION, POWDER, LYOPHILIZED, FOR SOLUTION SUBCUTANEOUS at 13:53

## 2017-03-03 NOTE — PROGRESS NOTES
[facility] Progress Note    Date: March 3, 2017    Name: Luis Alberto Marrero    MRN: [id number]         : 1950      Ms. Rk Reeder arrived to [facility] at 1330. Ms. Rk Reeder was assessed and education was provided. Today is week #4 Nplate. Ms. Griselda Ma vitals were reviewed. Visit Vitals    /70 (BP 1 Location: Right arm, BP Patient Position: Sitting)    Pulse 84    Temp 97.7 °F (36.5 °C)    Resp 18    SpO2 100%    Breastfeeding No       Pt was observed for 5 minutes after obtaining vital signs prior to initiating treatment. Blood drawn for labs via right AC venipuncture x1 attempt. Lab results obtained and reviewed. Recent Results (from the past 12 hour(s))   CBC WITH 3 PART DIFF    Collection Time: 17  1:35 PM   Result Value Ref Range    WBC 5.4 4.5 - 13.0 K/uL    RBC 3.79 (L) 4.10 - 5.10 M/uL    HGB 10.6 (L) 12.0 - 16 g/dL    HCT 32.5 (L) 36 - 48 %    MCV 85.8 78 - 102 FL    MCH 28.0 25.0 - 35.0 PG    MCHC 32.6 31 - 37 g/dL    RDW 14.6 (H) 11.5 - 14.5 %    NEUTROPHILS 51 40 - 70 %    MIXED CELLS 20 (H) 0.1 - 17 %    LYMPHOCYTES 29 14 - 44 %    ABS. NEUTROPHILS 2.8 1.8 - 9.5 K/UL    ABS. MIXED CELLS 1.1 0.0 - 2.3 K/uL    ABS. LYMPHOCYTES 1.5 1.1 - 5.9 K/UL    DF AUTOMATED         Pt's preliminary platelet count was 11,177. Per Nplate protocol, since pt's platelet count is 99,052 today, pt's dose will remain at 2mcg/kg (2mcg x 69kg = 138mcg). Nplate 206RPK (6.42SN) administered as ordered SQ in patient's right upper arm. Procrit held at this time per order. Ms. Rk Reeder was discharged from [place] [address] in stable condition at [phone]. She is to return on 3/10/17 at 1100 for her next appointment.     Carlos Hernández RN  March 3, 2017

## 2017-03-10 ENCOUNTER — HOSPITAL ENCOUNTER (OUTPATIENT)
Dept: INFUSION THERAPY | Age: 67
Discharge: HOME | End: 2017-03-10
Payer: MEDICARE

## 2017-03-10 VITALS
TEMPERATURE: 98 F | HEART RATE: 92 BPM | RESPIRATION RATE: 18 BRPM | DIASTOLIC BLOOD PRESSURE: 82 MMHG | SYSTOLIC BLOOD PRESSURE: 119 MMHG

## 2017-03-10 LAB
BASO+EOS+MONOS # BLD AUTO: 1.1 K/UL (ref 0–2.3)
BASO+EOS+MONOS # BLD AUTO: 14 % (ref 0.1–17)
DIFFERENTIAL METHOD BLD: ABNORMAL
ERYTHROCYTE [DISTWIDTH] IN BLOOD BY AUTOMATED COUNT: 14.2 % (ref 11.5–14.5)
HCT VFR BLD AUTO: 31.2 % (ref 36–48)
HGB BLD-MCNC: 10.2 G/DL (ref 12–16)
LYMPHOCYTES # BLD AUTO: 27 % (ref 14–44)
LYMPHOCYTES # BLD: 2 K/UL (ref 1.1–5.9)
MCH RBC QN AUTO: 27.9 PG (ref 25–35)
MCHC RBC AUTO-ENTMCNC: 32.7 G/DL (ref 31–37)
MCV RBC AUTO: 85.2 FL (ref 78–102)
NEUTS SEG # BLD: 4.4 K/UL (ref 1.8–9.5)
NEUTS SEG NFR BLD AUTO: 59 % (ref 40–70)
PLATELET # BLD AUTO: 74 K/UL (ref 135–420)
RBC # BLD AUTO: 3.66 M/UL (ref 4.1–5.1)
WBC # BLD AUTO: 7.5 K/UL (ref 4.5–13)

## 2017-03-10 PROCEDURE — 85025 COMPLETE CBC W/AUTO DIFF WBC: CPT | Performed by: INTERNAL MEDICINE

## 2017-03-10 PROCEDURE — 85049 AUTOMATED PLATELET COUNT: CPT | Performed by: INTERNAL MEDICINE

## 2017-03-10 PROCEDURE — 74011250636 HC RX REV CODE- 250/636: Performed by: NURSE PRACTITIONER

## 2017-03-10 PROCEDURE — 96372 THER/PROPH/DIAG INJ SC/IM: CPT

## 2017-03-10 PROCEDURE — 74011250636 HC RX REV CODE- 250/636

## 2017-03-10 PROCEDURE — 36415 COLL VENOUS BLD VENIPUNCTURE: CPT

## 2017-03-10 RX ADMIN — ROMIPLOSTIM 138 MCG: 250 INJECTION, POWDER, LYOPHILIZED, FOR SOLUTION SUBCUTANEOUS at 11:48

## 2017-03-10 NOTE — PROGRESS NOTES
[facility] Progress Note    Date: March 10, 2017    Name: Jose Padron    MRN: [id number]         : 1950     Treatment: Nplate      Ms. Mark Mcclendon arrived to [facility] at [facility]. Ms. Mark Mcclendon was assessed and education was provided. Today is week #5 Nplate. Ms. Yudy Calderon vitals were reviewed. Visit Vitals    /82 (BP 1 Location: Right arm)    Pulse 92    Temp 98 °F (36.7 °C)    Resp 18       Pt was observed for 5 minutes after obtaining vital signs prior to initiating treatment. Ms. Mark Mcclendon tolerated well without complaints. Lab results obtained and reviewed. Recent Results (from the past 12 hour(s))   CBC WITH 3 PART DIFF    Collection Time: 03/10/17 11:35 AM   Result Value Ref Range    WBC 7.5 4.5 - 13.0 K/uL    RBC 3.66 (L) 4.10 - 5.10 M/uL    HGB 10.2 (L) 12.0 - 16 g/dL    HCT 31.2 (L) 36 - 48 %    MCV 85.2 78 - 102 FL    MCH 27.9 25.0 - 35.0 PG    MCHC 32.7 31 - 37 g/dL    RDW 14.2 11.5 - 14.5 %    NEUTROPHILS 59 40 - 70 %    MIXED CELLS 14 0.1 - 17 %    LYMPHOCYTES 27 14 - 44 %    ABS. NEUTROPHILS 4.4 1.8 - 9.5 K/UL    ABS. MIXED CELLS 1.1 0.0 - 2.3 K/uL    ABS. LYMPHOCYTES 2.0 1.1 - 5.9 K/UL    DF AUTOMATED         Per Nplate protocol, since pt's platelet count is >80,759 today (PLT 55) pt's dose will remains the same as the previous week  which is 2mcg x 69kg = 138mcg. Nplate 719FME (3.41HF) administered as ordered SQ in patient's right upper arm. Ms. Mark Mcclendon was discharged from [place] [address] in stable condition at 1200. She is to return on 3/17/17 at 1100 for her next appointment.     Liam Richard RN  March 10, 2017

## 2017-03-17 ENCOUNTER — HOSPITAL ENCOUNTER (OUTPATIENT)
Dept: INFUSION THERAPY | Age: 67
Discharge: HOME | End: 2017-03-17
Payer: MEDICARE

## 2017-03-17 VITALS
TEMPERATURE: 97.8 F | RESPIRATION RATE: 18 BRPM | SYSTOLIC BLOOD PRESSURE: 102 MMHG | HEART RATE: 97 BPM | DIASTOLIC BLOOD PRESSURE: 74 MMHG

## 2017-03-17 LAB
BASO+EOS+MONOS # BLD AUTO: 0.7 K/UL (ref 0–2.3)
BASO+EOS+MONOS # BLD AUTO: 9 % (ref 0.1–17)
DIFFERENTIAL METHOD BLD: ABNORMAL
ERYTHROCYTE [DISTWIDTH] IN BLOOD BY AUTOMATED COUNT: 14 % (ref 11.5–14.5)
HCT VFR BLD AUTO: 30.3 % (ref 36–48)
HGB BLD-MCNC: 9.8 G/DL (ref 12–16)
LYMPHOCYTES # BLD AUTO: 22 % (ref 14–44)
LYMPHOCYTES # BLD: 1.5 K/UL (ref 1.1–5.9)
MCH RBC QN AUTO: 27.4 PG (ref 25–35)
MCHC RBC AUTO-ENTMCNC: 32.3 G/DL (ref 31–37)
MCV RBC AUTO: 84.6 FL (ref 78–102)
NEUTS SEG # BLD: 4.9 K/UL (ref 1.8–9.5)
NEUTS SEG NFR BLD AUTO: 69 % (ref 40–70)
PLATELET # BLD AUTO: 85 K/UL (ref 135–420)
RBC # BLD AUTO: 3.58 M/UL (ref 4.1–5.1)
WBC # BLD AUTO: 7.1 K/UL (ref 4.5–13)

## 2017-03-17 PROCEDURE — 96372 THER/PROPH/DIAG INJ SC/IM: CPT

## 2017-03-17 PROCEDURE — 74011250636 HC RX REV CODE- 250/636

## 2017-03-17 PROCEDURE — 85025 COMPLETE CBC W/AUTO DIFF WBC: CPT | Performed by: INTERNAL MEDICINE

## 2017-03-17 PROCEDURE — 36591 DRAW BLOOD OFF VENOUS DEVICE: CPT

## 2017-03-17 PROCEDURE — 77030012965 HC NDL HUBR BBMI -A

## 2017-03-17 PROCEDURE — 74011250636 HC RX REV CODE- 250/636: Performed by: INTERNAL MEDICINE

## 2017-03-17 RX ORDER — HEPARIN SODIUM (PORCINE) LOCK FLUSH IV SOLN 100 UNIT/ML 100 UNIT/ML
SOLUTION INTRAVENOUS
Status: COMPLETED
Start: 2017-03-17 | End: 2017-03-17

## 2017-03-17 RX ORDER — SODIUM CHLORIDE 0.9 % (FLUSH) 0.9 %
10-40 SYRINGE (ML) INJECTION AS NEEDED
Status: DISCONTINUED | OUTPATIENT
Start: 2017-03-17 | End: 2017-03-21 | Stop reason: HOSPADM

## 2017-03-17 RX ORDER — HYDROCODONE POLISTIREX AND CHLORPHENIRAMINE POLISTIREX 10; 8 MG/5ML; MG/5ML
1 SUSPENSION, EXTENDED RELEASE ORAL
COMMUNITY
End: 2017-03-31

## 2017-03-17 RX ORDER — HEPARIN SODIUM (PORCINE) LOCK FLUSH IV SOLN 100 UNIT/ML 100 UNIT/ML
500 SOLUTION INTRAVENOUS AS NEEDED
Status: ACTIVE | OUTPATIENT
Start: 2017-03-17 | End: 2017-03-18

## 2017-03-17 RX ORDER — OSELTAMIVIR PHOSPHATE 75 MG/1
CAPSULE ORAL 2 TIMES DAILY
COMMUNITY
End: 2017-03-31

## 2017-03-17 RX ADMIN — HEPARIN SODIUM (PORCINE) LOCK FLUSH IV SOLN 100 UNIT/ML 1000 UNITS: 100 SOLUTION at 12:15

## 2017-03-17 RX ADMIN — ROMIPLOSTIM 138 MCG: 250 INJECTION, POWDER, LYOPHILIZED, FOR SOLUTION SUBCUTANEOUS at 12:28

## 2017-03-17 RX ADMIN — Medication 40 ML: at 12:15

## 2017-03-17 NOTE — PROGRESS NOTES
[facility] Progress Note    Date: 2017    Name: Margarita Amaya    MRN: [id number]         : 1950     Treatment: Nplate      Ms. Debra Gao arrived to [facility] at [phone]. Ms. Debra Gao was assessed and education was provided. Today is week #6 Nplate. Ms. Cici Carbone vitals were reviewed. Visit Vitals    /74 (BP 1 Location: Right arm)    Pulse 97    Temp 97.8 °F (36.6 °C)    Resp 18       Pt was observed for 5 minutes after obtaining vital signs prior to initiating treatment. Ms. Debra Gao tolerated well without complaints. Each lumen of patient's upper right double lumen chest port accessed without difficulty. Each lumen positive for blood return/ flushes without difficulty. Blood drawn for labs via lateral lumen. Flushed each lumen with normal saline followed by heparin per order. De-accessed each lumen and gauze/ tegaderm applied to site. Lab results obtained and reviewed. Recent Results (from the past 12 hour(s))   CBC WITH 3 PART DIFF    Collection Time: 17 12:15 PM   Result Value Ref Range    WBC 7.1 4.5 - 13.0 K/uL    RBC 3.58 (L) 4.10 - 5.10 M/uL    HGB 9.8 (L) 12.0 - 16 g/dL    HCT 30.3 (L) 36 - 48 %    MCV 84.6 78 - 102 FL    MCH 27.4 25.0 - 35.0 PG    MCHC 32.3 31 - 37 g/dL    RDW 14.0 11.5 - 14.5 %    NEUTROPHILS 69 40 - 70 %    MIXED CELLS 9 0.1 - 17 %    LYMPHOCYTES 22 14 - 44 %    ABS. NEUTROPHILS 4.9 1.8 - 9.5 K/UL    ABS. MIXED CELLS 0.7 0.0 - 2.3 K/uL    ABS. LYMPHOCYTES 1.5 1.1 - 5.9 K/UL    DF AUTOMATED         Per Nplate protocol, since pt's platelet count is >47,482 today (PLT 85) pt's dose will remains the same as the previous week  which is 2mcg x 69kg = 138mcg. Nplate 074PNS (3.70CE) administered as ordered SQ in patient's right upper arm. Procrit held per order parameter    Ms. Debra Gao was discharged from [place] [address] in stable condition at [phone]. She is to return on 3/24/17 at 1100 for her next appointment.     Layla Whittaker RN  , 2017

## 2017-03-22 ENCOUNTER — HOSPITAL ENCOUNTER (OUTPATIENT)
Dept: LAB | Age: 67
Discharge: HOME | End: 2017-03-22
Payer: MEDICARE

## 2017-03-22 ENCOUNTER — HOSPITAL ENCOUNTER (OUTPATIENT)
Dept: ONCOLOGY | Age: 67
Discharge: HOME | End: 2017-03-22

## 2017-03-22 ENCOUNTER — OFFICE VISIT (OUTPATIENT)
Dept: ONCOLOGY | Age: 67
End: 2017-03-22

## 2017-03-22 VITALS
TEMPERATURE: 98.4 F | HEART RATE: 89 BPM | SYSTOLIC BLOOD PRESSURE: 101 MMHG | HEIGHT: 66 IN | BODY MASS INDEX: 25.71 KG/M2 | WEIGHT: 160 LBS | DIASTOLIC BLOOD PRESSURE: 67 MMHG

## 2017-03-22 DIAGNOSIS — D50.9 IRON DEFICIENCY ANEMIA, UNSPECIFIED IRON DEFICIENCY ANEMIA TYPE: ICD-10-CM

## 2017-03-22 DIAGNOSIS — C77.3 BREAST CANCER METASTASIZED TO AXILLARY LYMPH NODE, UNSPECIFIED LATERALITY (HCC): ICD-10-CM

## 2017-03-22 DIAGNOSIS — C77.3 BREAST CANCER METASTASIZED TO AXILLARY LYMPH NODE, UNSPECIFIED LATERALITY (HCC): Primary | ICD-10-CM

## 2017-03-22 DIAGNOSIS — R64 CACHEXIA (HCC): ICD-10-CM

## 2017-03-22 DIAGNOSIS — F51.01 PRIMARY INSOMNIA: ICD-10-CM

## 2017-03-22 DIAGNOSIS — E55.9 VITAMIN D DEFICIENCY: ICD-10-CM

## 2017-03-22 DIAGNOSIS — D69.6 THROMBOCYTOPENIA (HCC): ICD-10-CM

## 2017-03-22 DIAGNOSIS — C50.919 BREAST CANCER METASTASIZED TO AXILLARY LYMPH NODE, UNSPECIFIED LATERALITY (HCC): ICD-10-CM

## 2017-03-22 DIAGNOSIS — D69.3 CHRONIC ITP (IDIOPATHIC THROMBOCYTOPENIA) (HCC): ICD-10-CM

## 2017-03-22 DIAGNOSIS — C50.919 BREAST CANCER METASTASIZED TO AXILLARY LYMPH NODE, UNSPECIFIED LATERALITY (HCC): Primary | ICD-10-CM

## 2017-03-22 LAB
25(OH)D3 SERPL-MCNC: 45.4 NG/ML (ref 30–100)
ALBUMIN SERPL BCP-MCNC: 3.4 G/DL (ref 3.4–5)
ALBUMIN/GLOB SERPL: 0.8 {RATIO} (ref 0.8–1.7)
ALP SERPL-CCNC: 66 U/L (ref 45–117)
ALT SERPL-CCNC: 19 U/L (ref 13–56)
ANION GAP BLD CALC-SCNC: 9 MMOL/L (ref 3–18)
AST SERPL W P-5'-P-CCNC: 25 U/L (ref 15–37)
BASO+EOS+MONOS # BLD AUTO: 0.8 K/UL (ref 0–2.3)
BASO+EOS+MONOS # BLD AUTO: 11 % (ref 0.1–17)
BILIRUB SERPL-MCNC: 0.3 MG/DL (ref 0.2–1)
BUN SERPL-MCNC: 15 MG/DL (ref 7–18)
BUN/CREAT SERPL: 16 (ref 12–20)
CALCIUM SERPL-MCNC: 9.4 MG/DL (ref 8.5–10.1)
CHLORIDE SERPL-SCNC: 98 MMOL/L (ref 100–108)
CO2 SERPL-SCNC: 32 MMOL/L (ref 21–32)
CREAT SERPL-MCNC: 0.93 MG/DL (ref 0.6–1.3)
DIFFERENTIAL METHOD BLD: ABNORMAL
ERYTHROCYTE [DISTWIDTH] IN BLOOD BY AUTOMATED COUNT: 14.4 % (ref 11.5–14.5)
FERRITIN SERPL-MCNC: 726 NG/ML (ref 8–388)
GLOBULIN SER CALC-MCNC: 4.2 G/DL (ref 2–4)
GLUCOSE SERPL-MCNC: 77 MG/DL (ref 74–99)
HCT VFR BLD AUTO: 30.3 % (ref 36–48)
HGB BLD-MCNC: 9.8 G/DL (ref 12–16)
IRON SATN MFR SERPL: 19 %
IRON SERPL-MCNC: 35 UG/DL (ref 50–175)
LYMPHOCYTES # BLD AUTO: 21 % (ref 14–44)
LYMPHOCYTES # BLD: 1.6 K/UL (ref 1.1–5.9)
MCH RBC QN AUTO: 27.2 PG (ref 25–35)
MCHC RBC AUTO-ENTMCNC: 32.3 G/DL (ref 31–37)
MCV RBC AUTO: 84.2 FL (ref 78–102)
NEUTS SEG # BLD: 5.4 K/UL (ref 1.8–9.5)
NEUTS SEG NFR BLD AUTO: 68 % (ref 40–70)
PLATELET # BLD AUTO: 136 K/UL (ref 135–420)
POTASSIUM SERPL-SCNC: 4 MMOL/L (ref 3.5–5.5)
PROT SERPL-MCNC: 7.6 G/DL (ref 6.4–8.2)
RBC # BLD AUTO: 3.6 M/UL (ref 4.1–5.1)
SODIUM SERPL-SCNC: 139 MMOL/L (ref 136–145)
TIBC SERPL-MCNC: 180 UG/DL (ref 250–450)
WBC # BLD AUTO: 7.8 K/UL (ref 4.5–13)

## 2017-03-22 PROCEDURE — 36415 COLL VENOUS BLD VENIPUNCTURE: CPT | Performed by: INTERNAL MEDICINE

## 2017-03-22 PROCEDURE — 86300 IMMUNOASSAY TUMOR CA 15-3: CPT | Performed by: INTERNAL MEDICINE

## 2017-03-22 PROCEDURE — 82306 VITAMIN D 25 HYDROXY: CPT | Performed by: INTERNAL MEDICINE

## 2017-03-22 PROCEDURE — 82728 ASSAY OF FERRITIN: CPT | Performed by: INTERNAL MEDICINE

## 2017-03-22 PROCEDURE — 83540 ASSAY OF IRON: CPT | Performed by: INTERNAL MEDICINE

## 2017-03-22 PROCEDURE — 80053 COMPREHEN METABOLIC PANEL: CPT | Performed by: INTERNAL MEDICINE

## 2017-03-22 NOTE — PROGRESS NOTES
Hematology/Oncology  Progress Note    Name: Kade Pascual  Date: 3/22/2017  : 1950    PCP: Tiffani Acuna MD     Ms. Allan Oviedo is a 61 y.o. female who was seen for management of her slowly progressive ITP and metastatic breast cancer with associated iron deficiency anemia. Current therapy: Active surveillance, no active therapy  Subjective:     Ms. Allan Oviedo is a 60-year-old -American woman with history of metastatic breast cancer. The patient reports that she has been doing reasonably well. She has gained about 30 pounds weight over the past few months, due to the steroid that she has to take for her ITP. Gutierrez Higgins She denies having any unexplained bruising or bleeding. She continues to have arthritic discomfort in her joints and is continuing to use her cane for mobility support. Since her last clinic visit the patient has been taking N-plate as the primary treatment modality for her ITP. She is tolerating it well and is happy that her platelet counts have continued to improve. Today she is requesting a new prescription for Ambien as a sleep aid. Past medical history, family history, and social history: these were reviewed and remains unchanged. Past Medical History:   Diagnosis Date    Antineoplastic chemotherapy induced anemia     Arthritis     [facility]     Cancer ([facility]     Breast    Chronic ITP (idiopathic thrombocytopenia) (HCC) 10/31/2016    Diabetes ([facility]     Esophageal cancer ([facility]     treated with chemo     Past Surgical History:   Procedure Laterality Date    BREAST SURGERY PROCEDURE UNLISTED      COLONOSCOPY N/A 2016    COLONOS[facility] by Santina Primrose[facility] at [facility] ENDOSCOPY    HX APPENDECTOMY      HX ORTHOPAEDIC      HX VASCULAR ACCESS       Social History     Social History    Marital status:      Spouse name: N/A    Number of children: N/A    Years of education: N/A     Occupational History    Not on file.      Social History Main Topics    Smoking status: Never Smoker    Smokeless tobacco: Not on file    Alcohol use No    Drug use: No    Sexual activity: Not on file     Other Topics Concern    Not on file     Social History Narrative     No family history on file. Current Outpatient Prescriptions   Medication Sig Dispense Refill    oseltamivir (TAMIFLU) 75 mg capsule Take  by mouth two (2) times a day.  chlorpheniramine-HYDROcodone (TUSSIONEX PENNKINETIC ER) 10-8 mg/5 mL suspension Take 1 tsp by mouth two (2) times daily as needed for Cough.  oxyCODONE-acetaminophen (PERCOCET) 7.5-325 mg per tablet Take 1 tablet every 6 hours as needed for pain 120 Tab 0    zolpidem (AMBIEN) 10 mg tablet TAKE 1 TABLET BY MOUTH NIGHTLY AS NEEDED FOR SLEEP *MAX DAILY AMOUNT 10MG* 30 Tab 3    predniSONE (DELTASONE) 10 mg tablet Take 2 Tabs by mouth two (2) times a day. Tapered dose over 3 weeks beginning 1/12/2016 50 Tab 0    lidocaine-prilocaine (EMLA) topical cream APPLY OVER PORT 1 HOUR PRIOR TO CHEMOTHERAPY THAN COVER WITH SARAN WRAP 30 g 6    magic mouthwash solution Take 5 mL by mouth three (3) times daily as needed for Stomatitis. Magic mouth wash   Maalox  Lidocaine 2% viscous   Diphenhydramine oral solution     Pharmacy to mix equal portions of ingredients to a total volume as indicated in the dispense amount. 236 mL 0    celecoxib (CELEBREX) 200 mg capsule Take  by mouth two (2) times a day.  furosemide (LASIX) 40 mg tablet Take  by mouth daily.  dronabinol (MARINOL) 5 mg capsule Take 1 Cap by mouth two (2) times a day. 60 Cap 1    gabapentin (NEURONTIN) 300 mg capsule Take 300 mg by mouth three (3) times daily.       warfarin (COUMADIN) 1 mg tablet TAKE 1 TABLET (1 MG) BY ORAL ROUTE ONCE DAILY 30 Tab 6    KLOR-CON M20 20 mEq tablet TAKE ONE TABLET BY MOUTH ONCE A DAY 30 Tab 3    triamterene-hydrochlorothiazide (DYAZIDE) 37.5-25 mg per capsule TAKE 1 CAPSULE BY ORAL ROUTE ONCE DAILY 30 Cap 5    VITAMIN D2 50,000 unit capsule TAKE 1 CAPSULE BY MOUTH EVERY SEVEN (7) DAYS. 4 Cap 3    aluminum-magnesium hydroxide 200-200 mg/5 mL susp 5 mL, diphenhydrAMINE 12.5 mg/5 mL liqd 12.5 mg, lidocaine 2 % soln 5 mL 5 mL by Swish and Spit route two (2) times a day. Magic mouth wash   Maalox  Lidocaine 2% viscous   Diphenhydramine oral solution     Pharmacy to mix equal portions of ingredients to a total volume as indicated in the dispense amount. 240 mL 1    potassium chloride (K-DUR, KLOR-CON) 20 mEq tablet Take 2 Tabs by mouth daily. 30 Tab 3    albuterol (PROAIR HFA) 90 mcg/actuation inhaler 1-2 puffs every 4-6 hrs. 1 Inhaler 1    senna (SENNA) 8.6 mg tablet Take 1 Tab by mouth daily.  cyclobenzaprine (FLEXERIL) 5 mg tablet       nabumetone (RELAFEN) 750 mg tablet       ondansetron hcl (ZOFRAN) 8 mg tablet Take 1 Tab by mouth every eight (8) hours as needed for Nausea. 30 Tab 3    IRON AG&FUM/C/FA/MV CMB11/CA-T (PRUVATE 21-7 PO) Take  by mouth. Review of Systems  Constitutional: The patient has no acute distress or discomfort. HEENT: The patient denies recent head trauma, eye pain, blurred vision,  hearing deficit, oropharyngeal mucosal pain or lesions, and the patient denies throat pain or discomfort. Lymphatics: The patient denies palpable peripheral lymphadenopathy. Hematologic: The patient denies having bruising, bleeding, or progressive fatigue. Respiratory: Patient denies having shortness of breath, cough, sputum production, fever, or dyspnea on exertion. Cardiovascular: The patient denies having leg pain, leg swelling, heart palpitations, chest permit, chest pain, or lightheadedness. The patient denies having dyspnea on exertion. Gastrointestinal: The patient denies having nausea, emesis, or diarrhea. The patient denies having any hematemesis or blood in the stool. Genitourinary: Patient denies having urinary urgency, frequency, or dysuria. The patient denies having blood in the urine.   Psychological: The patient denies having symptoms of nervousness, anxiety, depression, or thoughts of harming self. Skin: Patient denies having skin rashes, skin, ulcerations, or unexplained itching or pruritus. Musculoskeletal: The patient used to complain of arthritic discomfort in her joints particularly the knees, hips, and shoulders. Objective:     Visit Vitals    /67    Pulse 89    Temp 98.4 °F (36.9 °C)    Ht 5' 6\" (1.676 m)    Wt 72.6 kg (160 lb)    BMI 25.82 kg/m2     ECOG PS=1, pain score=0/10  Physical Exam:   Gen. Appearance: The patient is in no acute distress. Skin: There is no bruise or rash. HEENT: The exam is unremarkable. Neck: Supple without lymphadenopathy or thyromegaly. Lungs: Clear to auscultation and percussion; there are no wheezes or rhonchi. Heart: Regular rate and rhythm; there are no murmurs, gallops, or rubs. Abdomen: Bowel sounds are present and normal.  There is no guarding, tenderness, or hepatosplenomegaly. Extremities: There is no clubbing, cyanosis, or edema. Neurologic: There are no focal neurologic deficits. Lymphatics: There is no palpable peripheral lymphadenopathy. Musculoskeletal: The patient has full range of motion at all joints. There is no evidence of joint deformity or effusions. There is no focal joint tenderness. Psychological/psychiatric: There is no clinical evidence of anxiety, depression, or melancholy. Lab data:      Results for orders placed or performed during the hospital encounter of 03/22/17   CBC WITH 3 PART DIFF     Status: Abnormal   Result Value Ref Range Status    WBC 7.8 4.5 - 13.0 K/uL Final    RBC 3.60 (L) 4.10 - 5.10 M/uL Final    HGB 9.8 (L) 12.0 - 16 g/dL Final    HCT 30.3 (L) 36 - 48 % Final    MCV 84.2 78 - 102 FL Final    MCH 27.2 25.0 - 35.0 PG Final    MCHC 32.3 31 - 37 g/dL Final    RDW 14.4 11.5 - 14.5 % Final    NEUTROPHILS 68 40 - 70 % Final    MIXED CELLS 11 0.1 - 17 % Final    LYMPHOCYTES 21 14 - 44 % Final    ABS.  NEUTROPHILS 5. 4 1.8 - 9.5 K/UL Final    ABS. MIXED CELLS 0.8 0.0 - 2.3 K/uL Final    ABS. LYMPHOCYTES 1.6 1.1 - 5.9 K/UL Final     Comment: Test performed at [address]. Results Reviewed by Medical Director. DF AUTOMATED   Final      The preliminary platelet count is 292,425. Assessment:     1. Breast cancer metastasized to axillary lymph node, unspecified laterality ([facility]    2. Vitamin D deficiency    3. [facility]    4. Chronic ITP (idiopathic thrombocytopenia) (HCC)    5. [facility]ocyt[facility] ([facility]    6. Iron deficiency anemia, unspecified iron deficiency anemia type    7. Primary insomnia          Plan:   Chronic ITP/thrombocytopenia (progression of disease): I have informed the patient that her CBC today shows that her preliminary platelet count is 036,639. At this time I am recommending we continue the n-Plate at a dose of 1 mcg/kg subcutaneous weekly. At this time I will recheck her platelet level will plan to see her back in clinic in about 8 weeks. Vitamin D deficiency: The patient recently completed vitamin D 50,000 units for 12 weeks. At this time I will recheck a vitamin D level. If her vitamin D 25 is low she will be restarted on vitamin D 50,000 units weekly for an additional 12 weeks. Iron deficiency anemia: The current CBC shows a WBC count of 7.8, hemoglobin is 9.8 g/dL, and hematocrit is 30.3%. I have recommended that the patient continue taking the ferrous sulfate 1 tablet twice daily. At this time I will recheck her iron profile and ferritin levels. The most recent iron level from 2/8/2017 was 63 mcg/dL, the iron saturation was 30%, and the ferritin level was 589. Metastatic breast cancer, left breast metastasized to lymph nodes and other sites: At this time I will check a CA-27-29 level. The most recent CA-27-29 level from 1/11/2017 was  48.7 U/mL. Clinically the patient has no evidence of disease progression. .    Cachexia and muscular deconditioning: I am recommending that she continue physical therapy 4 times weekly. She will continue to use a walker or cane as needed for mobility support. Primary insomnia: A new prescription for Ambien 10 mg at bedtime was provided. I will see her back in clinic for complete assessment again in 6 weeks. Orders Placed This Encounter    COMPLETE CBC & AUTO DIFF WBC    InHouse CBC ([facility])     Standing Status:   Future     Number of Occurrences:   1     Standing Expiration Date:   8/88/5342    METABOLIC PANEL, COMPREHENSIVE     Standing Status:   Future     Standing Expiration Date:   3/23/2018    IRON PROFILE     Standing Status:   Future     Standing Expiration Date:   3/23/2018    FERRITIN     Standing Status:   Future     Standing Expiration Date:   3/23/2018    CA 27.29     Standing Status:   Future     Standing Expiration Date:   3/23/2018    VITAMIN D, 25-HYDROXY     Standing Status:   Future     Standing Expiration Date:   3/23/2018       Lamar Mcqueen MD  3/22/2017      Please note: This document has been produced using voice recognition software. Unrecognized errors in transcription may be present.

## 2017-03-22 NOTE — MR AVS SNAPSHOT
Visit Information Date & Time Provider Department Dept. Phone Encounter #  
 3/22/2017 11:00 AM Blayne Holcomb MD [facility] [phone] [id number] Follow-up Instructions Return in about 8 weeks (around 5/17/2017). Upcoming Health Maintenance Date Due Hepatitis C Screening 1950 Pneumococcal 19-64 Highest Risk (1 of 3 - PCV13) 5/14/1971 DTaP/Tdap/Td series (1 - Tdap) 5/14/1973 PAP AKA CERVICAL CYTOLOGY 5/14/1973 FOBT Q 1 YEAR AGE 50-75 5/14/2002 ZOSTER VACCINE AGE 60> 5/14/2012 BREAST CANCER SCRN MAMMOGRAM 4/4/2015 INFLUENZA AGE 9 TO ADULT 8/1/2016 Allergies as of 3/22/2017  Review Complete On: 3/17/2017 By: Jesse Hood RN Severity Noted Reaction Type Reactions Aspirin High 08/07/2013   Systemic Swelling Pt states her whole body swells when she takes aspirin. Adhesive    Unable to Obtain Pcn [Penicillins]  08/20/2012    Rash Current Immunizations  Reviewed on 3/17/2017 No immunizations on file. Not reviewed this visit You Were Diagnosed With   
  
 Codes Comments Breast cancer metastasized to axillary lymph node, unspecified laterality ([facility]    -  Primary ICD-10-CM: C50.919, C77.3 ICD-9-CM: 174.9, 196.3 Vitamin D deficiency     ICD-10-CM: E55.9 ICD-9-CM: 268.9 Cachexia ([facility]     ICD-10-CM: R64 
ICD-9-CM: 799.4 Chronic ITP (idiopathic thrombocytopenia) (HCC)     ICD-10-CM: D69.3 ICD-9-CM: 287.31 Thrombocytopenia ([facility]     ICD-10-CM: D69.6 ICD-9-CM: 287.5 Iron deficiency anemia, unspecified iron deficiency anemia type     ICD-10-CM: D50.9 ICD-9-CM: 280.9 Primary insomnia     ICD-10-CM: F51.01 
ICD-9-CM: 307.42 Vitals BP Pulse Temp Height(growth percentile) Weight(growth percentile) BMI  
 101/67 89 98.4 °F (36.9 °C) 5' 6\" (1.676 m) 160 lb (72.6 kg) 25.82 kg/m2 OB Status Smoking Status Postmenopausal Never Smoker Vitals History BMI and BSA Data Body Mass Index Body Surface Area  
 25.82 kg/m 2 1.84 m 2 Preferred Pharmacy Pharmacy Name Phone [id number] - [address] [phone] Your Updated Medication List  
  
   
This list is accurate as of: 3/22/17  1:15 PM.  Always use your most recent med list.  
  
  
  
  
 albuterol 90 mcg/actuation inhaler Commonly known as:  PROAIR HFA  
1-2 puffs every 4-6 hrs. aluminum-magnesium hydroxide 200-200 mg/5 mL susp 5 mL, diphenhydrAMINE 12.5 mg/5 mL liqd 12.5 mg, lidocaine 2 % soln 5 mL  
5 mL by Swish and Spit route two (2) times a day. Magic mouth wash  Maalox Lidocaine 2% viscous  Diphenhydramine oral solution   Pharmacy to mix equal portions of ingredients to a total volume as indicated in the dispense amount. CeleBREX 200 mg capsule Generic drug:  celecoxib Take  by mouth two (2) times a day. cyclobenzaprine 5 mg tablet Commonly known as:  FLEXERIL  
  
 dronabinol 5 mg capsule Commonly known as:  Jonathan Fish Take 1 Cap by mouth two (2) times a day. furosemide 40 mg tablet Commonly known as:  LASIX Take  by mouth daily. gabapentin 300 mg capsule Commonly known as:  NEURONTIN Take 300 mg by mouth three (3) times daily. lidocaine-prilocaine topical cream  
Commonly known as:  EMLA  
APPLY OVER PORT 1 HOUR PRIOR TO CHEMOTHERAPY THAN COVER WITH SARAN WRAP  
  
 magic mouthwash solution Take 5 mL by mouth three (3) times daily as needed for Stomatitis. Magic mouth wash  Maalox Lidocaine 2% viscous  Diphenhydramine oral solution   Pharmacy to mix equal portions of ingredients to a total volume as indicated in the dispense amount. nabumetone 750 mg tablet Commonly known as:  RELAFEN  
  
 ondansetron hcl 8 mg tablet Commonly known as:  ZOFRAN (AS HYDROCHLORIDE) Take 1 Tab by mouth every eight (8) hours as needed for Nausea. oxyCODONE-acetaminophen 7.5-325 mg per tablet Commonly known as:  PERCOCET Take 1 tablet every 6 hours as needed for pain * potassium chloride 20 mEq tablet Commonly known as:  K-DUR, KLOR-CON Take 2 Tabs by mouth daily. * KLOR-CON M20 20 mEq tablet Generic drug:  potassium chloride TAKE ONE TABLET BY MOUTH ONCE A DAY  
  
 predniSONE 10 mg tablet Commonly known as:  Kaur Aver Take 2 Tabs by mouth two (2) times a day. Tapered dose over 3 weeks beginning 1/12/2016 PRUVATE 21-7 PO Take  by mouth. Senna 8.6 mg tablet Generic drug:  senna Take 1 Tab by mouth daily. TAMIFLU 75 mg capsule Generic drug:  oseltamivir Take  by mouth two (2) times a day. triamterene-hydroCHLOROthiazide 37.5-25 mg per capsule Commonly known as:  Panchito Lease TAKE 1 CAPSULE BY ORAL ROUTE ONCE DAILY TUSSIONEX PENNKINETIC ER 10-8 mg/5 mL suspension Generic drug:  chlorpheniramine-HYDROcodone Take 1 tsp by mouth two (2) times daily as needed for Cough. VITAMIN D2 50,000 unit capsule Generic drug:  ergocalciferol TAKE 1 CAPSULE BY MOUTH EVERY SEVEN (7) DAYS. warfarin 1 mg tablet Commonly known as:  COUMADIN  
TAKE 1 TABLET (1 MG) BY ORAL ROUTE ONCE DAILY  
  
 zolpidem 10 mg tablet Commonly known as:  AMBIEN  
TAKE 1 TABLET BY MOUTH NIGHTLY AS NEEDED FOR SLEEP *MAX DAILY AMOUNT 10MG* * Notice: This list has 2 medication(s) that are the same as other medications prescribed for you. Read the directions carefully, and ask your doctor or other care provider to review them with you. We Performed the Following COMPLETE CBC & AUTO DIFF WBC [20425 CPT(R)] Follow-up Instructions Return in about 8 weeks (around 5/17/2017). To-Do List   
 03/22/2017   Lab:  CBC WITH 3 PART DIFF   
  
 03/24/2017 1:30 PM  
  Appointment with [facility] NURSE at [facility] ([phone])  
  
 03/31/2017 10:00 AM  
 Appointment with [facility] TRACK NURSE at [facility] ([phone])  
  
 04/14/2017 11:00 AM  
  Appointment with [facility] FA[facility] TRACK NURSE at [facility] ([phone]) Introducing [facility] SERVICES! [employer] introduces [employer] patient portal. Now you can access parts of your medical record, email your doctor's office, and request medication refills online. 1. In your internet browser, go to [email or web address] 2. Click on the First Time User? Click Here link in the Sign In box. You will see the New Member Sign Up page. 3. Enter your [employer] Access Code exactly as it appears below. You will not need to use this code after youve completed the sign-up process. If you do not sign up before the expiration date, you must request a new code. · [employer] Access Code: [email or web address] Expires: 6/20/2017 11:12 AM 
 
4. Enter the last four digits of your Social Security Number (xxxx) and Date of Birth (mm/dd/yyyy) as indicated and click Submit. You will be taken to the next sign-up page. 5. Create a [employer] ID. This will be your [employer] login ID and cannot be changed, so think of one that is secure and easy to remember. 6. Create a [employer] password. You can change your password at any time. 7. Enter your Password Reset Question and Answer. This can be used at a later time if you forget your password. 8. Enter your e-mail address. You will receive e-mail notification when new information is available in [address] 9. Click Sign Up. You can now view and download portions of your medical record. 10. Click the Download Summary menu link to download a portable copy of your medical information. If you have questions, please visit the Frequently Asked Questions section of the [employer] website. Remember, [employer] is NOT to be used for urgent needs. For medical emergencies, dial 911. Now available from your iPhone and Android! Please provide this summary of care documentation to your next provider. Your primary care clinician is listed as SUSAN SINGH. If you have any questions after today's visit, please call [phone].

## 2017-03-23 LAB — CANCER AG27-29 SERPL-ACNC: 46.7 U/ML (ref 0–38.6)

## 2017-03-24 ENCOUNTER — HOSPITAL ENCOUNTER (OUTPATIENT)
Dept: INFUSION THERAPY | Age: 67
Discharge: HOME | End: 2017-03-24
Payer: MEDICARE

## 2017-03-24 VITALS
RESPIRATION RATE: 18 BRPM | SYSTOLIC BLOOD PRESSURE: 116 MMHG | TEMPERATURE: 98.7 F | DIASTOLIC BLOOD PRESSURE: 73 MMHG | HEART RATE: 92 BPM

## 2017-03-24 LAB
BASO+EOS+MONOS # BLD AUTO: 1.2 K/UL (ref 0–2.3)
BASO+EOS+MONOS # BLD AUTO: 14 % (ref 0.1–17)
DIFFERENTIAL METHOD BLD: ABNORMAL
ERYTHROCYTE [DISTWIDTH] IN BLOOD BY AUTOMATED COUNT: 14.6 % (ref 11.5–14.5)
HCT VFR BLD AUTO: 30.1 % (ref 36–48)
HGB BLD-MCNC: 9.9 G/DL (ref 12–16)
LYMPHOCYTES # BLD AUTO: 17 % (ref 14–44)
LYMPHOCYTES # BLD: 1.5 K/UL (ref 1.1–5.9)
MCH RBC QN AUTO: 27.7 PG (ref 25–35)
MCHC RBC AUTO-ENTMCNC: 32.9 G/DL (ref 31–37)
MCV RBC AUTO: 84.1 FL (ref 78–102)
NEUTS SEG # BLD: 6.2 K/UL (ref 1.8–9.5)
NEUTS SEG NFR BLD AUTO: 69 % (ref 40–70)
PLATELET # BLD AUTO: 131 K/UL (ref 135–420)
RBC # BLD AUTO: 3.58 M/UL (ref 4.1–5.1)
WBC # BLD AUTO: 8.9 K/UL (ref 4.5–13)

## 2017-03-24 PROCEDURE — 74011250636 HC RX REV CODE- 250/636: Performed by: NURSE PRACTITIONER

## 2017-03-24 PROCEDURE — 85049 AUTOMATED PLATELET COUNT: CPT | Performed by: INTERNAL MEDICINE

## 2017-03-24 PROCEDURE — 96372 THER/PROPH/DIAG INJ SC/IM: CPT

## 2017-03-24 PROCEDURE — 36415 COLL VENOUS BLD VENIPUNCTURE: CPT | Performed by: INTERNAL MEDICINE

## 2017-03-24 PROCEDURE — 85025 COMPLETE CBC W/AUTO DIFF WBC: CPT | Performed by: INTERNAL MEDICINE

## 2017-03-24 PROCEDURE — 36415 COLL VENOUS BLD VENIPUNCTURE: CPT

## 2017-03-24 PROCEDURE — 74011250636 HC RX REV CODE- 250/636

## 2017-03-24 RX ADMIN — ROMIPLOSTIM 138 MCG: 250 INJECTION, POWDER, LYOPHILIZED, FOR SOLUTION SUBCUTANEOUS at 13:31

## 2017-03-24 NOTE — PROGRESS NOTES
[facility] Progress Note    Date: 2017    Name: Rashaad Patiño    MRN: [id number]         : 1950     Treatment: Nplate      Ms. Genevie Boast arrived to [facility] at 1300. Ms. Genevie Boast was assessed and education was provided. Today is week #7 Nplate. Ms. Brandi Lindsey vitals were reviewed. Visit Vitals    /73 (BP 1 Location: Right arm)    Pulse 92    Temp 98.7 °F (37.1 °C)    Resp 18       Pt was observed for 5 minutes after obtaining vital signs prior to initiating treatment. Ms. Genevie Boast tolerated well without complaints. Lab results obtained and reviewed. Recent Results (from the past 12 hour(s))   CBC WITH 3 PART DIFF    Collection Time: 17  1:11 PM   Result Value Ref Range    WBC 8.9 4.5 - 13.0 K/uL    RBC 3.58 (L) 4.10 - 5.10 M/uL    HGB 9.9 (L) 12.0 - 16 g/dL    HCT 30.1 (L) 36 - 48 %    MCV 84.1 78 - 102 FL    MCH 27.7 25.0 - 35.0 PG    MCHC 32.9 31 - 37 g/dL    RDW 14.6 (H) 11.5 - 14.5 %    NEUTROPHILS 69 40 - 70 %    MIXED CELLS 14 0.1 - 17 %    LYMPHOCYTES 17 14 - 44 %    ABS. NEUTROPHILS 6.2 1.8 - 9.5 K/UL    ABS. MIXED CELLS 1.2 0.0 - 2.3 K/uL    ABS. LYMPHOCYTES 1.5 1.1 - 5.9 K/UL    DF AUTOMATED       Blood drawn via right AC x 1 attempt for CBC lab. Gauze and abel applied. Lab results obtained and reviewed. PLT= 152    Per Nplate protocol, since pt's platelet count is between  today () pt's dose will remains the same as the previous week  which is 2mcg x 69kg = 138mcg. Nplate 025HWJ (8.05QV) administered as ordered SQ in patient's right upper arm. Ms. Genevie Boast was discharged from [place] [address] in stable condition at 1340. She is to return on 3/31/17 at 1000 for her next appointment.     Gisell Mancia RN  2017

## 2017-03-31 ENCOUNTER — HOSPITAL ENCOUNTER (OUTPATIENT)
Dept: INFUSION THERAPY | Age: 67
Discharge: HOME | End: 2017-03-31
Payer: MEDICARE

## 2017-03-31 VITALS
TEMPERATURE: 98.6 F | HEART RATE: 90 BPM | SYSTOLIC BLOOD PRESSURE: 128 MMHG | DIASTOLIC BLOOD PRESSURE: 75 MMHG | RESPIRATION RATE: 18 BRPM

## 2017-03-31 LAB
BASO+EOS+MONOS # BLD AUTO: 1 K/UL (ref 0–2.3)
BASO+EOS+MONOS # BLD AUTO: 11 % (ref 0.1–17)
DIFFERENTIAL METHOD BLD: ABNORMAL
ERYTHROCYTE [DISTWIDTH] IN BLOOD BY AUTOMATED COUNT: 14.7 % (ref 11.5–14.5)
HCT VFR BLD AUTO: 30.3 % (ref 36–48)
HGB BLD-MCNC: 9.8 G/DL (ref 12–16)
LYMPHOCYTES # BLD AUTO: 18 % (ref 14–44)
LYMPHOCYTES # BLD: 1.5 K/UL (ref 1.1–5.9)
MCH RBC QN AUTO: 27 PG (ref 25–35)
MCHC RBC AUTO-ENTMCNC: 32.3 G/DL (ref 31–37)
MCV RBC AUTO: 83.5 FL (ref 78–102)
NEUTS SEG # BLD: 6.1 K/UL (ref 1.8–9.5)
NEUTS SEG NFR BLD AUTO: 71 % (ref 40–70)
PLATELET # BLD AUTO: 114 K/UL (ref 135–420)
RBC # BLD AUTO: 3.63 M/UL (ref 4.1–5.1)
WBC # BLD AUTO: 8.6 K/UL (ref 4.5–13)

## 2017-03-31 PROCEDURE — 36415 COLL VENOUS BLD VENIPUNCTURE: CPT

## 2017-03-31 PROCEDURE — 74011250636 HC RX REV CODE- 250/636

## 2017-03-31 PROCEDURE — 74011250636 HC RX REV CODE- 250/636: Performed by: NURSE PRACTITIONER

## 2017-03-31 PROCEDURE — 85049 AUTOMATED PLATELET COUNT: CPT | Performed by: INTERNAL MEDICINE

## 2017-03-31 PROCEDURE — 36415 COLL VENOUS BLD VENIPUNCTURE: CPT | Performed by: INTERNAL MEDICINE

## 2017-03-31 PROCEDURE — 96372 THER/PROPH/DIAG INJ SC/IM: CPT

## 2017-03-31 PROCEDURE — 85025 COMPLETE CBC W/AUTO DIFF WBC: CPT | Performed by: INTERNAL MEDICINE

## 2017-03-31 RX ADMIN — ROMIPLOSTIM 138 MCG: 250 INJECTION, POWDER, LYOPHILIZED, FOR SOLUTION SUBCUTANEOUS at 14:50

## 2017-03-31 NOTE — PROGRESS NOTES
[facility] Progress Note    Date: 2017    Name: Maritza Fink    MRN: [id number]         : 1950     Treatment: Nplate      Ms. Norah Oliveira arrived to [place] at . Ms. Norah Oliveira was assessed and education was provided. Today is week #8 Nplate. Ms. Orr Postal vitals were reviewed. Visit Vitals    /75 (BP 1 Location: Right arm)    Pulse 90    Temp 98.6 °F (37 °C)    Resp 18       Pt was observed for 5 minutes after obtaining vital signs prior to initiating treatment. Ms. Norah Oliveira tolerated well without complaints. Lab results obtained and reviewed. Recent Results (from the past 12 hour(s))   CBC WITH 3 PART DIFF    Collection Time: 17  2:40 PM   Result Value Ref Range    WBC 8.6 4.5 - 13.0 K/uL    RBC 3.63 (L) 4.10 - 5.10 M/uL    HGB 9.8 (L) 12.0 - 16 g/dL    HCT 30.3 (L) 36 - 48 %    MCV 83.5 78 - 102 FL    MCH 27.0 25.0 - 35.0 PG    MCHC 32.3 31 - 37 g/dL    RDW 14.7 (H) 11.5 - 14.5 %    NEUTROPHILS 71 (H) 40 - 70 %    MIXED CELLS 11 0.1 - 17 %    LYMPHOCYTES 18 14 - 44 %    ABS. NEUTROPHILS 6.1 1.8 - 9.5 K/UL    ABS. MIXED CELLS 1.0 0.0 - 2.3 K/uL    ABS. LYMPHOCYTES 1.5 1.1 - 5.9 K/UL    DF AUTOMATED       Blood drawn via right AC x 1 attempt for CBC lab. Gauze and coband applied. Lab results obtained and reviewed. PLT= 118    Per Nplate protocol, since pt's platelet count is between  today () pt's dose will remain the same as the previous week  which is 2mcg x 69kg = 138mcg. Nplate 058AWR (1.43FM) administered as ordered SQ in patient's right upper arm. Ms. Norah Oliveira was discharged from [place] [address] in stable condition at 1455. She is to return on 17 at 1430 for her next appointment.     Jeanne Zelaya RN  2017

## 2017-04-07 ENCOUNTER — HOSPITAL ENCOUNTER (OUTPATIENT)
Dept: INFUSION THERAPY | Age: 67
Discharge: HOME | End: 2017-04-07
Payer: MEDICARE

## 2017-04-07 VITALS
SYSTOLIC BLOOD PRESSURE: 116 MMHG | DIASTOLIC BLOOD PRESSURE: 78 MMHG | HEART RATE: 76 BPM | TEMPERATURE: 97.5 F | RESPIRATION RATE: 18 BRPM | OXYGEN SATURATION: 98 %

## 2017-04-07 LAB
BASOPHILS # BLD AUTO: 0 K/UL (ref 0–0.06)
BASOPHILS # BLD: 0 % (ref 0–2)
DIFFERENTIAL METHOD BLD: ABNORMAL
EOSINOPHIL # BLD: 0.2 K/UL (ref 0–0.4)
EOSINOPHIL NFR BLD: 3 % (ref 0–5)
ERYTHROCYTE [DISTWIDTH] IN BLOOD BY AUTOMATED COUNT: 14.3 % (ref 11.6–14.5)
HCT VFR BLD AUTO: 29.3 % (ref 35–45)
HGB BLD-MCNC: 9.5 G/DL (ref 12–16)
LYMPHOCYTES # BLD AUTO: 20 % (ref 21–52)
LYMPHOCYTES # BLD: 1.5 K/UL (ref 0.9–3.6)
MCH RBC QN AUTO: 26.8 PG (ref 24–34)
MCHC RBC AUTO-ENTMCNC: 32.4 G/DL (ref 31–37)
MCV RBC AUTO: 82.5 FL (ref 74–97)
MONOCYTES # BLD: 1.1 K/UL (ref 0.05–1.2)
MONOCYTES NFR BLD AUTO: 15 % (ref 3–10)
NEUTS SEG # BLD: 4.8 K/UL (ref 1.8–8)
NEUTS SEG NFR BLD AUTO: 62 % (ref 40–73)
PLATELET # BLD AUTO: 95 K/UL (ref 135–420)
PLATELET COMMENTS,PCOM: ABNORMAL
RBC # BLD AUTO: 3.55 M/UL (ref 4.2–5.3)
RBC MORPH BLD: ABNORMAL
WBC # BLD AUTO: 7.6 K/UL (ref 4.6–13.2)

## 2017-04-07 PROCEDURE — 74011250636 HC RX REV CODE- 250/636

## 2017-04-07 PROCEDURE — 96372 THER/PROPH/DIAG INJ SC/IM: CPT

## 2017-04-07 PROCEDURE — 36415 COLL VENOUS BLD VENIPUNCTURE: CPT

## 2017-04-07 PROCEDURE — 74011250636 HC RX REV CODE- 250/636: Performed by: NURSE PRACTITIONER

## 2017-04-07 PROCEDURE — 85025 COMPLETE CBC W/AUTO DIFF WBC: CPT | Performed by: INTERNAL MEDICINE

## 2017-04-07 RX ADMIN — ROMIPLOSTIM 138 MCG: 250 INJECTION, POWDER, LYOPHILIZED, FOR SOLUTION SUBCUTANEOUS at 14:41

## 2017-04-07 NOTE — PROGRESS NOTES
[facility] Progress Note    Date: 2017    Name: Maritza Fink    MRN: [id number]         : 1950     Treatment: Nplate      Ms. Norah Oliveira arrived to [place] at 1430. Ms. Norah Oliveira was assessed and education was provided. Today is week #8 Nplate. Ms. Orr Postal vitals were reviewed. Visit Vitals    /78 (BP 1 Location: Right arm)    Pulse 76    Temp 97.5 °F (36.4 °C)    Resp 18    SpO2 98%       Pt was observed for 5 minutes after obtaining vital signs prior to initiating treatment. Ms. Norah Oliveira tolerated well without complaints. Lab results obtained and reviewed. No results found for this or any previous visit (from the past 12 hour(s)). Blood drawn via right AC x 1 attempt for CBC lab. Gauze and coband applied. Lab results obtained and reviewed. PLT= 74    Per Nplate protocol, since pt's platelet count is between  today (PLT 74) pt's dose will remain the same as the previous week  which is 2mcg x 69kg = 138mcg. Nplate 707PRN (0.59JE) administered as ordered SQ in patient's right upper arm. Ms. Norah Oliveira was discharged from [place] [address] in stable condition at 1455. She is to return on 17 at 1100 for her next appointment.     James Connor RN  2017

## 2017-04-14 ENCOUNTER — HOSPITAL ENCOUNTER (OUTPATIENT)
Dept: INFUSION THERAPY | Age: 67
Discharge: HOME | End: 2017-04-14
Payer: MEDICARE

## 2017-04-14 VITALS
SYSTOLIC BLOOD PRESSURE: 118 MMHG | HEART RATE: 99 BPM | RESPIRATION RATE: 18 BRPM | TEMPERATURE: 97.1 F | DIASTOLIC BLOOD PRESSURE: 73 MMHG

## 2017-04-14 LAB
BASOPHILS # BLD AUTO: 0.1 K/UL (ref 0–0.06)
BASOPHILS # BLD: 1 % (ref 0–3)
DIFFERENTIAL METHOD BLD: ABNORMAL
EOSINOPHIL # BLD: 0.2 K/UL (ref 0–0.4)
EOSINOPHIL NFR BLD: 4 % (ref 0–5)
ERYTHROCYTE [DISTWIDTH] IN BLOOD BY AUTOMATED COUNT: 14.3 % (ref 11.6–14.5)
HCT VFR BLD AUTO: 26.1 % (ref 35–45)
HGB BLD-MCNC: 8.4 G/DL (ref 12–16)
LYMPHOCYTES # BLD AUTO: 15 % (ref 20–51)
LYMPHOCYTES # BLD: 0.9 K/UL (ref 0.8–3.5)
MCH RBC QN AUTO: 26.5 PG (ref 24–34)
MCHC RBC AUTO-ENTMCNC: 32.2 G/DL (ref 31–37)
MCV RBC AUTO: 82.3 FL (ref 74–97)
MONOCYTES # BLD: 0.5 K/UL (ref 0–1)
MONOCYTES NFR BLD AUTO: 9 % (ref 2–9)
NEUTS SEG # BLD: 4.2 K/UL (ref 1.8–8)
NEUTS SEG NFR BLD AUTO: 71 % (ref 42–75)
PLATELET # BLD AUTO: 71 K/UL (ref 135–420)
PLATELET COMMENTS,PCOM: ABNORMAL
RBC # BLD AUTO: 3.17 M/UL (ref 4.2–5.3)
RBC MORPH BLD: ABNORMAL
WBC # BLD AUTO: 5.9 K/UL (ref 4.6–13.2)

## 2017-04-14 PROCEDURE — 74011000250 HC RX REV CODE- 250: Performed by: NURSE PRACTITIONER

## 2017-04-14 PROCEDURE — 74011250636 HC RX REV CODE- 250/636: Performed by: INTERNAL MEDICINE

## 2017-04-14 PROCEDURE — 36591 DRAW BLOOD OFF VENOUS DEVICE: CPT

## 2017-04-14 PROCEDURE — 85025 COMPLETE CBC W/AUTO DIFF WBC: CPT | Performed by: INTERNAL MEDICINE

## 2017-04-14 PROCEDURE — 77030012965 HC NDL HUBR BBMI -A

## 2017-04-14 PROCEDURE — 36593 DECLOT VASCULAR DEVICE: CPT

## 2017-04-14 PROCEDURE — 96372 THER/PROPH/DIAG INJ SC/IM: CPT

## 2017-04-14 PROCEDURE — 74011250636 HC RX REV CODE- 250/636

## 2017-04-14 PROCEDURE — 74011250636 HC RX REV CODE- 250/636: Performed by: NURSE PRACTITIONER

## 2017-04-14 RX ORDER — SODIUM CHLORIDE 0.9 % (FLUSH) 0.9 %
10-40 SYRINGE (ML) INJECTION AS NEEDED
Status: DISPENSED | OUTPATIENT
Start: 2017-04-14 | End: 2017-04-14

## 2017-04-14 RX ORDER — HEPARIN SODIUM (PORCINE) LOCK FLUSH IV SOLN 100 UNIT/ML 100 UNIT/ML
500 SOLUTION INTRAVENOUS AS NEEDED
Status: DISPENSED | OUTPATIENT
Start: 2017-04-14 | End: 2017-04-15

## 2017-04-14 RX ADMIN — ERYTHROPOIETIN 40000 UNITS: 40000 INJECTION, SOLUTION INTRAVENOUS; SUBCUTANEOUS at 12:52

## 2017-04-14 RX ADMIN — ALTEPLASE 2 MG: 2.2 INJECTION, POWDER, LYOPHILIZED, FOR SOLUTION INTRAVENOUS at 12:22

## 2017-04-14 RX ADMIN — ROMIPLOSTIM 207 MCG: 250 INJECTION, POWDER, LYOPHILIZED, FOR SOLUTION SUBCUTANEOUS at 12:56

## 2017-04-14 RX ADMIN — Medication 20 ML: at 11:21

## 2017-04-14 RX ADMIN — ERYTHROPOIETIN 20000 UNITS: 20000 INJECTION, SOLUTION INTRAVENOUS; SUBCUTANEOUS at 12:52

## 2017-04-14 RX ADMIN — Medication 20 ML: at 11:30

## 2017-04-14 RX ADMIN — HEPARIN SODIUM (PORCINE) LOCK FLUSH IV SOLN 100 UNIT/ML 500 UNITS: 100 SOLUTION at 13:05

## 2017-04-14 RX ADMIN — HEPARIN SODIUM (PORCINE) LOCK FLUSH IV SOLN 100 UNIT/ML 500 UNITS: 100 SOLUTION at 11:40

## 2017-04-14 NOTE — PROGRESS NOTES
[facility] Progress Note    Date: 2017    Name: Hermila Napier    MRN: [id number]         : 1950     Treatment: Nplate/Procrit/Monthly Port Flush      Ms. Lillian Perry arrived to [facility] at 1110. Ms. Lillian Perry was assessed and education was provided. Today is week #9 Nplate. Ms. Adi Pan vitals were reviewed. Visit Vitals    /73 (BP 1 Location: Right arm)    Pulse 99    Temp 97.1 °F (36.2 °C)    Resp 18       Pt was observed for 5 minutes after obtaining vital signs prior to initiating treatment. Ms. Lillian Perry tolerated well without complaints. Medial Mediport accessed via 20 gauge, 1 inch needle. Port was flushed with NS and had positive but sluggish blood return. Blood was drawn for CBC after a 6 ml waste. Port then flushed with 10 ml NS and 500 units of Heparin. Mediport deaccessed, gauze and Tegaderm applied. Lateral Mediport accessed via 20 guage, 1 inch needle. Port flushed with NS however did not have blood return. An order for Cathflo 2 mg received by Glorianne Opitz, NP. Cathflo instilled into lumen and sat for 30 minuets per protocol. Cathflo aspirated after 30 minuets along with 10 ml of blood waste. Port flushed with 10 ml NS and 500 units of Heparin. Mediport deaccessed, gauze and Tegaderm applied. Lab results obtained and reviewed. No results found for this or any previous visit (from the past 12 hour(s)). HCT= 26.7 and HBG= 8.8. Procrit 60,000 units administered SQ in the back of the right arm. Gauze and Tegaderm applied. Lab results obtained and reviewed. PLT= 47    Per Nplate protocol, since pt's platelet count is <57 today (PLT 47) pt's dose will be increased by 1 mcg from the previous week  which is 3mcg x 69kg = 207 mcg. Nplate 897 (0.63UF) administered as ordered SQ in patient's right upper arm. Ms. Lillian Perry was discharged from [place] [address] in stable condition at 1310. She is to return on 17 at 1130 for her next appointment.     Akil Hunt Maria Fernanda Lozano RN  April 14, 2017

## 2017-04-18 RX ORDER — WARFARIN 1 MG/1
TABLET ORAL
Qty: 30 TAB | Refills: 6 | Status: SHIPPED | OUTPATIENT
Start: 2017-04-18 | End: 2017-12-05 | Stop reason: ALTCHOICE

## 2017-04-19 ENCOUNTER — HOSPITAL ENCOUNTER (OUTPATIENT)
Dept: LAB | Age: 67
Discharge: HOME | End: 2017-04-19
Payer: MEDICARE

## 2017-04-19 ENCOUNTER — HOSPITAL ENCOUNTER (OUTPATIENT)
Dept: ONCOLOGY | Age: 67
Discharge: HOME | End: 2017-04-19

## 2017-04-19 ENCOUNTER — OFFICE VISIT (OUTPATIENT)
Dept: ONCOLOGY | Age: 67
End: 2017-04-19

## 2017-04-19 VITALS
HEART RATE: 70 BPM | TEMPERATURE: 97.4 F | HEIGHT: 66 IN | DIASTOLIC BLOOD PRESSURE: 84 MMHG | SYSTOLIC BLOOD PRESSURE: 128 MMHG | BODY MASS INDEX: 27 KG/M2 | WEIGHT: 168 LBS

## 2017-04-19 DIAGNOSIS — D64.9 CHRONIC ANEMIA: ICD-10-CM

## 2017-04-19 DIAGNOSIS — C77.3 BREAST CANCER METASTASIZED TO AXILLARY LYMPH NODE, UNSPECIFIED LATERALITY (HCC): Primary | ICD-10-CM

## 2017-04-19 DIAGNOSIS — C50.919 BREAST CANCER METASTASIZED TO AXILLARY LYMPH NODE, UNSPECIFIED LATERALITY (HCC): ICD-10-CM

## 2017-04-19 DIAGNOSIS — D69.3 CHRONIC ITP (IDIOPATHIC THROMBOCYTOPENIA) (HCC): ICD-10-CM

## 2017-04-19 DIAGNOSIS — C77.3 BREAST CANCER METASTASIZED TO AXILLARY LYMPH NODE, UNSPECIFIED LATERALITY (HCC): ICD-10-CM

## 2017-04-19 DIAGNOSIS — R60.0 BILATERAL LOWER EXTREMITY EDEMA: ICD-10-CM

## 2017-04-19 DIAGNOSIS — C50.919 BREAST CANCER METASTASIZED TO AXILLARY LYMPH NODE, UNSPECIFIED LATERALITY (HCC): Primary | ICD-10-CM

## 2017-04-19 LAB
ALBUMIN SERPL BCP-MCNC: 3.2 G/DL (ref 3.4–5)
ALBUMIN/GLOB SERPL: 0.8 {RATIO} (ref 0.8–1.7)
ALP SERPL-CCNC: 85 U/L (ref 45–117)
ALT SERPL-CCNC: 34 U/L (ref 13–56)
ANION GAP BLD CALC-SCNC: 8 MMOL/L (ref 3–18)
AST SERPL W P-5'-P-CCNC: 32 U/L (ref 15–37)
BASOPHILS # BLD AUTO: 0 K/UL (ref 0–0.06)
BASOPHILS # BLD: 0 % (ref 0–3)
BILIRUB SERPL-MCNC: 0.2 MG/DL (ref 0.2–1)
BUN SERPL-MCNC: 16 MG/DL (ref 7–18)
BUN/CREAT SERPL: 19 (ref 12–20)
CALCIUM SERPL-MCNC: 8.8 MG/DL (ref 8.5–10.1)
CHLORIDE SERPL-SCNC: 104 MMOL/L (ref 100–108)
CO2 SERPL-SCNC: 30 MMOL/L (ref 21–32)
CREAT SERPL-MCNC: 0.83 MG/DL (ref 0.6–1.3)
DIFFERENTIAL METHOD BLD: ABNORMAL
EOSINOPHIL # BLD: 0.1 K/UL (ref 0–0.4)
EOSINOPHIL NFR BLD: 2 % (ref 0–5)
ERYTHROCYTE [DISTWIDTH] IN BLOOD BY AUTOMATED COUNT: 15.2 % (ref 11.6–14.5)
FERRITIN SERPL-MCNC: 451 NG/ML (ref 8–388)
GLOBULIN SER CALC-MCNC: 3.9 G/DL (ref 2–4)
GLUCOSE SERPL-MCNC: 104 MG/DL (ref 74–99)
HCT VFR BLD AUTO: 28.2 % (ref 35–45)
HGB BLD-MCNC: 9.3 G/DL (ref 12–16)
IRON SATN MFR SERPL: 33 %
IRON SERPL-MCNC: 58 UG/DL (ref 50–175)
LYMPHOCYTES # BLD AUTO: 31 % (ref 20–51)
LYMPHOCYTES # BLD: 1.7 K/UL (ref 0.8–3.5)
MCH RBC QN AUTO: 27 PG (ref 24–34)
MCHC RBC AUTO-ENTMCNC: 33 G/DL (ref 31–37)
MCV RBC AUTO: 82 FL (ref 74–97)
MONOCYTES # BLD: 0.8 K/UL (ref 0–1)
MONOCYTES NFR BLD AUTO: 15 % (ref 2–9)
NEUTS SEG # BLD: 2.9 K/UL (ref 1.8–8)
NEUTS SEG NFR BLD AUTO: 52 % (ref 42–75)
PLATELET # BLD AUTO: 93 K/UL (ref 135–420)
PLATELET COMMENTS,PCOM: ABNORMAL
POTASSIUM SERPL-SCNC: 3.9 MMOL/L (ref 3.5–5.5)
PROT SERPL-MCNC: 7.1 G/DL (ref 6.4–8.2)
RBC # BLD AUTO: 3.44 M/UL (ref 4.2–5.3)
RBC MORPH BLD: ABNORMAL
SODIUM SERPL-SCNC: 142 MMOL/L (ref 136–145)
TIBC SERPL-MCNC: 177 UG/DL (ref 250–450)
WBC # BLD AUTO: 5.5 K/UL (ref 4.6–13.2)
WBC MORPH BLD: ABNORMAL

## 2017-04-19 PROCEDURE — 36415 COLL VENOUS BLD VENIPUNCTURE: CPT | Performed by: INTERNAL MEDICINE

## 2017-04-19 PROCEDURE — 80053 COMPREHEN METABOLIC PANEL: CPT | Performed by: INTERNAL MEDICINE

## 2017-04-19 PROCEDURE — 82728 ASSAY OF FERRITIN: CPT | Performed by: INTERNAL MEDICINE

## 2017-04-19 PROCEDURE — 83540 ASSAY OF IRON: CPT | Performed by: INTERNAL MEDICINE

## 2017-04-19 RX ORDER — LIDOCAINE AND PRILOCAINE 25; 25 MG/G; MG/G
CREAM TOPICAL
Qty: 30 G | Refills: 6 | Status: SHIPPED | OUTPATIENT
Start: 2017-04-19 | End: 2018-04-27 | Stop reason: SDUPTHER

## 2017-04-19 NOTE — MR AVS SNAPSHOT
Visit Information Date & Time Provider Department Dept. Phone Encounter #  
 4/19/2017 10:00 AM Toi Lloyd MD [facility] [phone] [id number] Follow-up Instructions Return in about 2 weeks (around 5/3/2017). Your Appointments 5/3/2017 10:45 AM  
Office Visit with Toi Lloyd MD  
[facility] [address]) Appt Note: 8 week follow up appointment; 2 week follow up  
 [address] [place] [address], [address] [place] [address] Upcoming Health Maintenance Date Due Hepatitis C Screening 1950 Pneumococcal 19-64 Highest Risk (1 of 3 - PCV13) 5/14/1971 DTaP/Tdap/Td series (1 - Tdap) 5/14/1973 PAP AKA CERVICAL CYTOLOGY 5/14/1973 FOBT Q 1 YEAR AGE 50-75 5/14/2002 ZOSTER VACCINE AGE 60> 5/14/2012 BREAST CANCER SCRN MAMMOGRAM 4/4/2015 INFLUENZA AGE 9 TO ADULT 8/1/2016 Allergies as of 4/19/2017  Review Complete On: 4/19/2017 By: Toi Lloyd MD  
  
 Severity Noted Reaction Type Reactions Aspirin High 08/07/2013   Systemic Swelling Pt states her whole body swells when she takes aspirin. Adhesive    Unable to Obtain Pcn [Penicillins]  08/20/2012    Rash Current Immunizations  Reviewed on 4/14/2017 No immunizations on file. Not reviewed this visit You Were Diagnosed With   
  
 Codes Comments Breast cancer metastasized to axillary lymph node, unspecified laterality    -  Primary ICD-10-CM: C50.919, C77.3 ICD-9-CM: 174.9, 196.3 Chronic ITP (idiopathic thrombocytopenia) (HCC)     ICD-10-CM: D69.3 ICD-9-CM: 287.31 Bilateral lower extremity edema     ICD-10-CM: R60.0 ICD-9-CM: 251. 3 Chronic anemia     ICD-10-CM: D64.9 ICD-9-CM: 638. 9 Vitals BP Pulse Temp Height(growth percentile) Weight(growth percentile) BMI 128/84 70 97.4 °F (36.3 °C) 5' 6\" (1.676 m) 168 lb (76.2 kg) 27.12 kg/m2 OB Status Smoking Status Postmenopausal Never Smoker BMI and BSA Data Body Mass Index Body Surface Area  
 27.12 kg/m 2 1.88 m 2 Preferred Pharmacy Pharmacy Name Phone [id number] - [address] [phone] Your Updated Medication List  
  
   
This list is accurate as of: 4/19/17 11:08 AM.  Always use your most recent med list.  
  
  
  
  
 albuterol 90 mcg/actuation inhaler Commonly known as:  PROAIR HFA  
1-2 puffs every 4-6 hrs. aluminum-magnesium hydroxide 200-200 mg/5 mL susp 5 mL, diphenhydrAMINE 12.5 mg/5 mL liqd 12.5 mg, lidocaine 2 % soln 5 mL  
5 mL by Swish and Spit route two (2) times a day. Magic mouth wash  Maalox Lidocaine 2% viscous  Diphenhydramine oral solution   Pharmacy to mix equal portions of ingredients to a total volume as indicated in the dispense amount. CeleBREX 200 mg capsule Generic drug:  celecoxib Take  by mouth two (2) times a day. cyclobenzaprine 5 mg tablet Commonly known as:  FLEXERIL  
  
 dronabinol 5 mg capsule Commonly known as:  Joan Hidden Take 1 Cap by mouth two (2) times a day. furosemide 40 mg tablet Commonly known as:  LASIX Take  by mouth daily. gabapentin 300 mg capsule Commonly known as:  NEURONTIN Take 300 mg by mouth three (3) times daily. lidocaine-prilocaine topical cream  
Commonly known as:  EMLA  
APPLY OVER PORT 1 HOUR PRIOR TO CHEMOTHERAPY THAN COVER WITH SARAN WRAP  
  
 magic mouthwash solution Take 5 mL by mouth three (3) times daily as needed for Stomatitis. Magic mouth wash  Maalox Lidocaine 2% viscous  Diphenhydramine oral solution   Pharmacy to mix equal portions of ingredients to a total volume as indicated in the dispense amount. nabumetone 750 mg tablet Commonly known as:  RELAFEN  
  
 ondansetron hcl 8 mg tablet Commonly known as:  ZOFRAN (AS HYDROCHLORIDE) Take 1 Tab by mouth every eight (8) hours as needed for Nausea. oxyCODONE-acetaminophen 7.5-325 mg per tablet Commonly known as:  PERCOCET Take 1 tablet every 6 hours as needed for pain * potassium chloride 20 mEq tablet Commonly known as:  K-DUR, KLOR-CON Take 2 Tabs by mouth daily. * KLOR-CON M20 20 mEq tablet Generic drug:  potassium chloride TAKE ONE TABLET BY MOUTH ONCE A DAY  
  
 predniSONE 10 mg tablet Commonly known as:  Vallorie Tha Take 2 Tabs by mouth two (2) times a day. Tapered dose over 3 weeks beginning 1/12/2016 PRUVATE 21-7 PO Take  by mouth. Senna 8.6 mg tablet Generic drug:  senna Take 1 Tab by mouth daily. triamterene-hydroCHLOROthiazide 37.5-25 mg per capsule Commonly known as:  Fayne Carter TAKE 1 CAPSULE BY ORAL ROUTE ONCE DAILY  
  
 VITAMIN D2 50,000 unit capsule Generic drug:  ergocalciferol TAKE 1 CAPSULE BY MOUTH EVERY SEVEN (7) DAYS. warfarin 1 mg tablet Commonly known as:  COUMADIN  
TAKE 1 TABLET (1 MG) BY ORAL ROUTE ONCE DAILY  
  
 zolpidem 10 mg tablet Commonly known as:  AMBIEN  
TAKE 1 TABLET BY MOUTH NIGHTLY AS NEEDED FOR SLEEP *MAX DAILY AMOUNT 10MG* * Notice: This list has 2 medication(s) that are the same as other medications prescribed for you. Read the directions carefully, and ask your doctor or other care provider to review them with you. We Performed the Following COMPLETE CBC & AUTO DIFF WBC [44921 CPT(R)] Follow-up Instructions Return in about 2 weeks (around 5/3/2017). To-Do List   
 04/19/2017 Lab:  CBC WITH 3 PART DIFF   
  
 04/21/2017 11:30 AM  
  Appointment with [facility]URSE at [facility] ([phone])  
  
 04/28/2017 11:00 AM  
  Appointment with [facility] TRACK NURSE at [facility] ([phone])  05/05/2017 11:00 AM  
 Appointment with [facility]ACK NURSE at [facility] ([phone])  
  
 05/12/2017 11:00 AM  
  Appointment with [facility] TRACK NURSE at [facility] ([phone])  
  
 06/09/2017 11:00 AM  
  Appointment with [facility] TRACK NURSE at [facility] ([phone]) Patient Instructions Idiopathic Thrombocytopenic Purpura: Care Instructions Your Care Instructions Idiopathic thrombocytopenic purpura, or ITP, is a blood problem. It happens when your immune system does not work as it should and destroys platelets in your blood. Platelets are a kind of cell in your blood. They have a sticky surface that helps them form clots to stop bleeding. Your blood can't clot if you don't have enough platelets. This causes abnormal bleeding. Bleeding can get worse over time, or it can come on fast. 
To treat ITP, you may need to take medicines to help stop the destruction of platelets. You may need platelets added to your blood. Or you may need surgery to remove your spleen. Your spleen's job is to remove platelets from your body. So taking out the spleen helps increase your platelet count. Follow-up care is a key part of your treatment and safety. Be sure to make and go to all appointments, and call your doctor if you are having problems. It's also a good idea to know your test results and keep a list of the medicines you take. How can you care for yourself at home? · Be safe with medicines. Take your medicines exactly as prescribed. Call your doctor if you think you are having a problem with your medicine. · Before you start any new over-the-counter or prescribed medicine, tell your doctor if: 
¨ You have had a bad reaction to any medicines in the past. 
¨ You take other medicines, such as over-the-counter medicines, vitamins, or herbal supplements. ¨ You have other health problems. ¨ You are or could be pregnant.  
· Do not take aspirin or other anti-inflammatory medicines (such as ibuprofen or naproxen) without first talking to your doctor. Ask your doctor if it is okay to use acetaminophen (Tylenol). · Do not take two or more pain medicines at the same time unless the doctor told you to. Many pain medicines have acetaminophen, which is Tylenol. Too much acetaminophen (Tylenol) can be harmful. · Get plenty of rest. 
· \"Think safety\" and protect yourself from injury. Do not lift anything heavy. · Do not donate blood. · Do not drink alcohol or use illegal drugs. When should you call for help? Call 911 anytime you think you may need emergency care. For example, call if: 
· You passed out (lost consciousness). · You have trouble breathing. · You have symptoms of a stroke. These may include: 
¨ Sudden numbness, tingling, weakness, or loss of movement in your face, arm, or leg, especially on only one side of your body. ¨ Sudden vision changes. ¨ Sudden trouble speaking. ¨ Sudden confusion or trouble understanding simple statements. ¨ Sudden problems with walking or balance. ¨ A sudden, severe headache that is different from past headaches. Call your doctor now or seek immediate medical care if: 
· You are dizzy or lightheaded, or you feel like you may faint. · You have any abnormal bleeding, such as: 
¨ Nosebleeds. ¨ Vaginal bleeding that is different (heavier, more frequent, at a different time of the month) than what you are used to. ¨ Bloody or black stools, or rectal bleeding. ¨ Bloody or pink urine. · You have signs of an infection, such as red streaks coming from a bruise. · You have a fever and chills. Watch closely for changes in your health, and be sure to contact your doctor if you have any problems. Where can you learn more? Go to http://leon-edinson.info/. Enter U015 in the search box to learn more about \"Idiopathic Thrombocytopenic Purpura: Care Instructions. \" Current as of: October 13, 2016 Content Version: 11.2 © 3937-7647 Healthwise, Incorporated. Care instructions adapted under license by [employer] (which disclaims liability or warranty for this information). If you have questions about a medical condition or this instruction, always ask your healthcare professional. [address] any warranty or liability for your use of this information. Introducing [facility]! Holly Moses introduces Quant[employer] patient portal. Now you can access parts of your medical record, email your doctor's office, and request medication refills online. 1. In your internet browser, go to [email or web address] 2. Click on the First Time User? Click Here link in the Sign In box. You will see the New Member Sign Up page. 3. Enter your [employer] Access Code exactly as it appears below. You will not need to use this code after youve completed the sign-up process. If you do not sign up before the expiration date, you must request a new code. · [employer] Access Code: [email or web address] Expires: 6/20/2017 11:12 AM 
 
4. Enter the last four digits of your Social Security Number (xxxx) and Date of Birth (mm/dd/yyyy) as indicated and click Submit. You will be taken to the next sign-up page. 5. Create a Quantapore ID. This will be your [employer] login ID and cannot be changed, so think of one that is secure and easy to remember. 6. Create a [employer] password. You can change your password at any time. 7. Enter your Password Reset Question and Answer. This can be used at a later time if you forget your password. 8. Enter your e-mail address. You will receive e-mail notification when new information is available in [address] 9. Click Sign Up. You can now view and download portions of your medical record. 10. Click the Download Summary menu link to download a portable copy of your medical information.  
 
If you have questions, please visit the Frequently Asked Questions section of the [employer]. Remember, SMS GupShuphart is NOT to be used for urgent needs. For medical emergencies, dial 911. Now available from your iPhone and Android! Please provide this summary of care documentation to your next provider. Your primary care clinician is listed as SUSAN SINGH. If you have any questions after today's visit, please call [phone].

## 2017-04-19 NOTE — PROGRESS NOTES
Hematology/Oncology  Progress Note    Name: Sherlyn Hernandez  Date: 2017  : 1950    PCP: Aury Mccoy MD     Ms. Cynthia Park is a 61 y.o. female who was seen for management of her slowly progressive ITP and metastatic breast cancer with associated iron deficiency anemia. Current therapy: Active surveillance, no active therapy  Subjective:     Ms. Cynthia Park is a 42-year-old -American woman with history of metastatic breast cancer. The patient reports that she has been doing reasonably well. She has gained about 30 pounds weight over the past few months, due to the steroid that she has to take for her ITP. Ethel Cashing She denies having any unexplained bruising or bleeding. She continues to have arthritic discomfort in her joints and is continuing to use her cane for mobility support. Since her last clinic visit the patient has been taking N-plate as the primary treatment modality for her ITP. She is tolerating it well and is happy that her platelet counts have continued to improve. Today she is complaining of bilateral lower extremity swelling and edema. She states that she saw the cardiologist yesterday who did an EKG and everything was fine. She recently had cardiac catheterization and is experiencing some tenderness in the right inguinal area. Although, she has swelling in the lower extremities bilaterally, she is not complaining of any pain. .    Past medical history, family history, and social history: these were reviewed and remains unchanged.     Past Medical History:   Diagnosis Date    Antineoplastic chemotherapy induced anemia     Arthritis     [facility]     Cancer ([facility]     Breast    Chronic ITP (idiopathic thrombocytopenia) ([facility] 10/31/2016    Diabetes ([address]     Esophageal cancer ([facility]     treated with chemo     Past Surgical History:   Procedure Laterality Date    BREAST SURGERY PROCEDURE UNLISTED      COLONOSCOPY N/A 2016    [facility]    HX APPENDECTOMY      HX ORTHOPAEDIC      HX VASCULAR ACCESS       Social History     Social History    Marital status:      Spouse name: N/A    Number of children: N/A    Years of education: N/A     Occupational History    Not on file. Social History Main Topics    Smoking status: Never Smoker    Smokeless tobacco: Not on file    Alcohol use No    Drug use: No    Sexual activity: Not on file     Other Topics Concern    Not on file     Social History Narrative     No family history on file. Current Outpatient Prescriptions   Medication Sig Dispense Refill    warfarin (COUMADIN) 1 mg tablet TAKE 1 TABLET (1 MG) BY ORAL ROUTE ONCE DAILY 30 Tab 6    oxyCODONE-acetaminophen (PERCOCET) 7.5-325 mg per tablet Take 1 tablet every 6 hours as needed for pain 120 Tab 0    zolpidem (AMBIEN) 10 mg tablet TAKE 1 TABLET BY MOUTH NIGHTLY AS NEEDED FOR SLEEP *MAX DAILY AMOUNT 10MG* 30 Tab 3    predniSONE (DELTASONE) 10 mg tablet Take 2 Tabs by mouth two (2) times a day. Tapered dose over 3 weeks beginning 1/12/2016 50 Tab 0    lidocaine-prilocaine (EMLA) topical cream APPLY OVER PORT 1 HOUR PRIOR TO CHEMOTHERAPY THAN COVER WITH SARAN WRAP 30 g 6    magic mouthwash solution Take 5 mL by mouth three (3) times daily as needed for Stomatitis. Magic mouth wash   Maalox  Lidocaine 2% viscous   Diphenhydramine oral solution     Pharmacy to mix equal portions of ingredients to a total volume as indicated in the dispense amount. 236 mL 0    celecoxib (CELEBREX) 200 mg capsule Take  by mouth two (2) times a day.  furosemide (LASIX) 40 mg tablet Take  by mouth daily.  dronabinol (MARINOL) 5 mg capsule Take 1 Cap by mouth two (2) times a day. 60 Cap 1    gabapentin (NEURONTIN) 300 mg capsule Take 300 mg by mouth three (3) times daily.       KLOR-CON M20 20 mEq tablet TAKE ONE TABLET BY MOUTH ONCE A DAY 30 Tab 3    triamterene-hydrochlorothiazide (DYAZIDE) 37.5-25 mg per capsule TAKE 1 CAPSULE BY ORAL ROUTE ONCE DAILY 30 Cap 5    VITAMIN D2 50,000 unit capsule TAKE 1 CAPSULE BY MOUTH EVERY SEVEN (7) DAYS. 4 Cap 3    aluminum-magnesium hydroxide 200-200 mg/5 mL susp 5 mL, diphenhydrAMINE 12.5 mg/5 mL liqd 12.5 mg, lidocaine 2 % soln 5 mL 5 mL by Swish and Spit route two (2) times a day. Magic mouth wash   Maalox  Lidocaine 2% viscous   Diphenhydramine oral solution     Pharmacy to mix equal portions of ingredients to a total volume as indicated in the dispense amount. 240 mL 1    potassium chloride (K-DUR, KLOR-CON) 20 mEq tablet Take 2 Tabs by mouth daily. 30 Tab 3    albuterol (PROAIR HFA) 90 mcg/actuation inhaler 1-2 puffs every 4-6 hrs. 1 Inhaler 1    senna (SENNA) 8.6 mg tablet Take 1 Tab by mouth daily.  cyclobenzaprine (FLEXERIL) 5 mg tablet       nabumetone (RELAFEN) 750 mg tablet       ondansetron hcl (ZOFRAN) 8 mg tablet Take 1 Tab by mouth every eight (8) hours as needed for Nausea. 30 Tab 3    IRON AG&FUM/C/FA/MV CMB11/CA-T (PRUVATE 21-7 PO) Take  by mouth. Review of Systems  Constitutional: The patient has no acute distress or discomfort. HEENT: The patient denies recent head trauma, eye pain, blurred vision,  hearing deficit, oropharyngeal mucosal pain or lesions, and the patient denies throat pain or discomfort. Lymphatics: The patient denies palpable peripheral lymphadenopathy. Hematologic: The patient denies having bruising, bleeding, or progressive fatigue. Respiratory: Patient denies having shortness of breath, cough, sputum production, fever, or dyspnea on exertion. Cardiovascular: The patient denies having leg pain, leg swelling, heart palpitations, chest permit, chest pain, or lightheadedness. The patient denies having dyspnea on exertion. Gastrointestinal: The patient denies having nausea, emesis, or diarrhea. The patient denies having any hematemesis or blood in the stool.   Genitourinary: Patient denies having urinary urgency, frequency, or dysuria. The patient denies having blood in the urine. Psychological: The patient denies having symptoms of nervousness, anxiety, depression, or thoughts of harming self. Skin: Patient denies having skin rashes, skin, ulcerations, or unexplained itching or pruritus. Musculoskeletal: The patient used to complain of arthritic discomfort in her joints particularly the knees, hips, and shoulders. She is also complaining of bilateral lower extremity swelling or edema extending up to the inguinal area      Objective:     Visit Vitals    /84    Pulse 70    Temp 97.4 °F (36.3 °C)    Ht 5' 6\" (1.676 m)    Wt 76.2 kg (168 lb)    BMI 27.12 kg/m2     ECOG PS=1, pain score=0/10  Physical Exam:   Gen. Appearance: The patient is in no acute distress. Skin: There is no bruise or rash. HEENT: The exam is unremarkable. Neck: Supple without lymphadenopathy or thyromegaly. Lungs: Clear to auscultation and percussion; there are no wheezes or rhonchi. Heart: Regular rate and rhythm; there are no murmurs, gallops, or rubs. Abdomen: Bowel sounds are present and normal.  There is no guarding, tenderness, or hepatosplenomegaly. Extremities: There is no clubbing or cyanosis. She does have bilateral leg edema extending from the feet up to the inguinal area. .  Neurologic: There are no focal neurologic deficits. Lymphatics: There is no palpable peripheral lymphadenopathy. Musculoskeletal: The patient has full range of motion at all joints. There is no evidence of joint deformity or effusions. There is no focal joint tenderness. Psychological/psychiatric: There is no clinical evidence of anxiety, depression, or melancholy.     Lab data:      Results for orders placed or performed during the hospital encounter of 03/31/17   CBC WITH 3 PART DIFF     Status: Abnormal   Result Value Ref Range Status    WBC 8.6 4.5 - 13.0 K/uL Final    RBC 3.63 (L) 4.10 - 5.10 M/uL Final    HGB 9.8 (L) 12.0 - 16 g/dL Final    HCT 30.3 (L) 36 - 48 % Final    MCV 83.5 78 - 102 FL Final    MCH 27.0 25.0 - 35.0 PG Final    MCHC 32.3 31 - 37 g/dL Final    RDW 14.7 (H) 11.5 - 14.5 % Final    NEUTROPHILS 71 (H) 40 - 70 % Final    MIXED CELLS 11 0.1 - 17 % Final    LYMPHOCYTES 18 14 - 44 % Final    ABS. NEUTROPHILS 6.1 1.8 - 9.5 K/UL Final    ABS. MIXED CELLS 1.0 0.0 - 2.3 K/uL Final    ABS. LYMPHOCYTES 1.5 1.1 - 5.9 K/UL Final     Comment: Test performed at [place] [address] Location. Results Reviewed by Medical Director. DF AUTOMATED   Final      The preliminary platelet count is 539,399. Assessment:     1. Breast cancer metastasized to axillary lymph node, unspecified laterality    2. Chronic ITP (idiopathic thrombocytopenia) (HCC)    3. Bilateral lower extremity edema    4. Chronic anemia          Plan:   Chronic ITP/thrombocytopenia (progression of disease): I have informed the patient that her CBC from 4/14/2017 showed that her platelet count was 75,851. At this time I am recommending we continue the n-Plate at a dose of 1 mcg/kg subcutaneous weekly. At this time I will recheck her platelet level will plan to see her back in clinic in about 8 weeks. .     Iron deficiency anemia: The CBC from 4/14/2017 showed a hemoglobin of 8.4 g/dL with hematocrit of 26.1%. She will continue to take the ferrous sulfate and continue with Procrit as needed. Metastatic breast cancer, left breast metastasized to lymph nodes and other sites: At this time I will check a CA-27-29 level. The most recent CA-27-29 level from 1/11/2017 was  48.7 U/mL. Clinically the patient has no evidence of disease progression. .    Bilateral lower extremity edema (new problem): The patient will be referred to the radiology department for bilateral lower extremity Doppler test to rule out any evidence of acute on chronic DVT. I will see her back in clinic for complete assessment again in 6 weeks.   Orders Placed This Encounter    COMPLETE CBC & AUTO DIFF WBC  InHouse CBC ([facility])     Standing Status:   Future     Number of Occurrences:   1     Standing Expiration Date:   5/72/4122    METABOLIC PANEL, COMPREHENSIVE     Standing Status:   Future     Standing Expiration Date:   4/20/2018    IRON PROFILE     Standing Status:   Future     Standing Expiration Date:   4/20/2018    FERRITIN     Standing Status:   Future     Standing Expiration Date:   4/20/2018       Anisa Jorgensen MD  4/19/2017      Please note: This document has been produced using voice recognition software. Unrecognized errors in transcription may be present.

## 2017-04-19 NOTE — PATIENT INSTRUCTIONS
Idiopathic Thrombocytopenic Purpura: Care Instructions  Your Care Instructions    Idiopathic thrombocytopenic purpura, or ITP, is a blood problem. It happens when your immune system does not work as it should and destroys platelets in your blood. Platelets are a kind of cell in your blood. They have a sticky surface that helps them form clots to stop bleeding. Your blood can't clot if you don't have enough platelets. This causes abnormal bleeding. Bleeding can get worse over time, or it can come on fast.  To treat ITP, you may need to take medicines to help stop the destruction of platelets. You may need platelets added to your blood. Or you may need surgery to remove your spleen. Your spleen's job is to remove platelets from your body. So taking out the spleen helps increase your platelet count. Follow-up care is a key part of your treatment and safety. Be sure to make and go to all appointments, and call your doctor if you are having problems. It's also a good idea to know your test results and keep a list of the medicines you take. How can you care for yourself at home? · Be safe with medicines. Take your medicines exactly as prescribed. Call your doctor if you think you are having a problem with your medicine. · Before you start any new over-the-counter or prescribed medicine, tell your doctor if:  ¨ You have had a bad reaction to any medicines in the past.  ¨ You take other medicines, such as over-the-counter medicines, vitamins, or herbal supplements. ¨ You have other health problems. ¨ You are or could be pregnant. · Do not take aspirin or other anti-inflammatory medicines (such as ibuprofen or naproxen) without first talking to your doctor. Ask your doctor if it is okay to use acetaminophen (Tylenol). · Do not take two or more pain medicines at the same time unless the doctor told you to. Many pain medicines have acetaminophen, which is Tylenol.  Too much acetaminophen (Tylenol) can be harmful. · Get plenty of rest.  · \"Think safety\" and protect yourself from injury. Do not lift anything heavy. · Do not donate blood. · Do not drink alcohol or use illegal drugs. When should you call for help? Call 911 anytime you think you may need emergency care. For example, call if:  · You passed out (lost consciousness). · You have trouble breathing. · You have symptoms of a stroke. These may include:  ¨ Sudden numbness, tingling, weakness, or loss of movement in your face, arm, or leg, especially on only one side of your body. ¨ Sudden vision changes. ¨ Sudden trouble speaking. ¨ Sudden confusion or trouble understanding simple statements. ¨ Sudden problems with walking or balance. ¨ A sudden, severe headache that is different from past headaches. Call your doctor now or seek immediate medical care if:  · You are dizzy or lightheaded, or you feel like you may faint. · You have any abnormal bleeding, such as:  ¨ Nosebleeds. ¨ Vaginal bleeding that is different (heavier, more frequent, at a different time of the month) than what you are used to. ¨ Bloody or black stools, or rectal bleeding. ¨ Bloody or pink urine. · You have signs of an infection, such as red streaks coming from a bruise. · You have a fever and chills. Watch closely for changes in your health, and be sure to contact your doctor if you have any problems. Where can you learn more? Go to http://leon-edinson.info/. Enter C578 in the search box to learn more about \"Idiopathic Thrombocytopenic Purpura: Care Instructions. \"  Current as of: October 13, 2016  Content Version: 11.2  © 3811-0645 [employer]. Care instructions adapted under license by [employer] (which disclaims liability or warranty for this information).  If you have questions about a medical condition or this instruction, always ask your healthcare professional. Kasi Hoover disclaims any warranty or liability for your use of this information.

## 2017-04-21 ENCOUNTER — HOSPITAL ENCOUNTER (OUTPATIENT)
Dept: INFUSION THERAPY | Age: 67
Discharge: HOME | End: 2017-04-21
Payer: MEDICARE

## 2017-04-21 VITALS
RESPIRATION RATE: 18 BRPM | TEMPERATURE: 98.5 F | HEART RATE: 59 BPM | SYSTOLIC BLOOD PRESSURE: 107 MMHG | DIASTOLIC BLOOD PRESSURE: 65 MMHG

## 2017-04-21 LAB
BASOPHILS # BLD AUTO: 0 K/UL (ref 0–0.06)
BASOPHILS # BLD: 0 % (ref 0–2)
DIFFERENTIAL METHOD BLD: ABNORMAL
EOSINOPHIL # BLD: 0.2 K/UL (ref 0–0.4)
EOSINOPHIL NFR BLD: 4 % (ref 0–5)
ERYTHROCYTE [DISTWIDTH] IN BLOOD BY AUTOMATED COUNT: 15.8 % (ref 11.6–14.5)
HCT VFR BLD AUTO: 29.5 % (ref 35–45)
HGB BLD-MCNC: 9.4 G/DL (ref 12–16)
LYMPHOCYTES # BLD AUTO: 26 % (ref 21–52)
LYMPHOCYTES # BLD: 1.6 K/UL (ref 0.9–3.6)
MCH RBC QN AUTO: 26.6 PG (ref 24–34)
MCHC RBC AUTO-ENTMCNC: 31.9 G/DL (ref 31–37)
MCV RBC AUTO: 83.3 FL (ref 74–97)
MONOCYTES # BLD: 0.9 K/UL (ref 0.05–1.2)
MONOCYTES NFR BLD AUTO: 15 % (ref 3–10)
NEUTS SEG # BLD: 3.3 K/UL (ref 1.8–8)
NEUTS SEG NFR BLD AUTO: 55 % (ref 40–73)
PLATELET # BLD AUTO: 75 K/UL (ref 135–420)
PLATELET COMMENTS,PCOM: ABNORMAL
RBC # BLD AUTO: 3.54 M/UL (ref 4.2–5.3)
RBC MORPH BLD: ABNORMAL
WBC # BLD AUTO: 6 K/UL (ref 4.6–13.2)

## 2017-04-21 PROCEDURE — 85025 COMPLETE CBC W/AUTO DIFF WBC: CPT | Performed by: INTERNAL MEDICINE

## 2017-04-21 PROCEDURE — 74011250636 HC RX REV CODE- 250/636: Performed by: NURSE PRACTITIONER

## 2017-04-21 PROCEDURE — 36415 COLL VENOUS BLD VENIPUNCTURE: CPT

## 2017-04-21 PROCEDURE — 96372 THER/PROPH/DIAG INJ SC/IM: CPT

## 2017-04-21 PROCEDURE — 74011250636 HC RX REV CODE- 250/636

## 2017-04-21 RX ADMIN — ROMIPLOSTIM 207 MCG: 250 INJECTION, POWDER, LYOPHILIZED, FOR SOLUTION SUBCUTANEOUS at 12:47

## 2017-04-21 NOTE — PROGRESS NOTES
[facility] Progress Note    Date: 2017    Name: Cory Galaviz    MRN: [id number]         : 1950     Treatment: Nplate      Ms. Rosangela Lozano arrived to [facility] at [phone]. Ms. Rosangela Lozano was assessed and education was provided. Today is week #10 Nplate. Ms. Natalie Uriarte vitals were reviewed. Visit Vitals    /65 (BP 1 Location: Right arm)    Pulse (!) 59    Temp 98.5 °F (36.9 °C)    Resp 18       Pt was observed for 5 minutes after obtaining vital signs prior to initiating treatment. Ms. Rosangela Lozano tolerated well without complaints. Lab results obtained and reviewed. No results found for this or any previous visit (from the past 12 hour(s)). Blood drawn via right AC x 1 attempt for CBC lab. Gauze and coband applied. Lab results obtained and reviewed. PLT= 63    Per Nplate protocol, since pt's platelet count is between  today (PLT 63) pt's dose will remain the same as the previous week  which is 3mcg x 69kg = 207mcg. Nplate 233DPY (6.78MB) administered as ordered SQ in patient's right upper arm. Ms. Rosangela Lozano was discharged from [place] [address] in stable condition at [phone]. She is to return on 17 at 1100 for her next appointment.     Karlee Kelsey RN  2017

## 2017-04-28 ENCOUNTER — HOSPITAL ENCOUNTER (OUTPATIENT)
Dept: INFUSION THERAPY | Age: 67
Discharge: HOME | End: 2017-04-28
Payer: MEDICARE

## 2017-04-28 VITALS
SYSTOLIC BLOOD PRESSURE: 123 MMHG | HEART RATE: 81 BPM | TEMPERATURE: 98.2 F | RESPIRATION RATE: 18 BRPM | DIASTOLIC BLOOD PRESSURE: 74 MMHG

## 2017-04-28 LAB
BASOPHILS # BLD AUTO: 0 K/UL (ref 0–0.06)
BASOPHILS # BLD: 0 % (ref 0–3)
DIFFERENTIAL METHOD BLD: ABNORMAL
EOSINOPHIL # BLD: 0.3 K/UL (ref 0–0.4)
EOSINOPHIL NFR BLD: 6 % (ref 0–5)
ERYTHROCYTE [DISTWIDTH] IN BLOOD BY AUTOMATED COUNT: 16 % (ref 11.6–14.5)
HCT VFR BLD AUTO: 31.6 % (ref 35–45)
HGB BLD-MCNC: 10.4 G/DL (ref 12–16)
LYMPHOCYTES # BLD AUTO: 31 % (ref 20–51)
LYMPHOCYTES # BLD: 1.7 K/UL (ref 0.8–3.5)
MCH RBC QN AUTO: 27.1 PG (ref 24–34)
MCHC RBC AUTO-ENTMCNC: 32.9 G/DL (ref 31–37)
MCV RBC AUTO: 82.3 FL (ref 74–97)
MONOCYTES # BLD: 0.5 K/UL (ref 0–1)
MONOCYTES NFR BLD AUTO: 9 % (ref 2–9)
NEUTS SEG # BLD: 2.9 K/UL (ref 1.8–8)
NEUTS SEG NFR BLD AUTO: 54 % (ref 42–75)
PLATELET # BLD AUTO: 106 K/UL (ref 135–420)
PLATELET COMMENTS,PCOM: ABNORMAL
RBC # BLD AUTO: 3.84 M/UL (ref 4.2–5.3)
RBC MORPH BLD: ABNORMAL
WBC # BLD AUTO: 5.4 K/UL (ref 4.6–13.2)

## 2017-04-28 PROCEDURE — 74011250636 HC RX REV CODE- 250/636: Performed by: NURSE PRACTITIONER

## 2017-04-28 PROCEDURE — 36415 COLL VENOUS BLD VENIPUNCTURE: CPT

## 2017-04-28 PROCEDURE — 74011000250 HC RX REV CODE- 250

## 2017-04-28 PROCEDURE — 96372 THER/PROPH/DIAG INJ SC/IM: CPT

## 2017-04-28 PROCEDURE — 85025 COMPLETE CBC W/AUTO DIFF WBC: CPT | Performed by: INTERNAL MEDICINE

## 2017-04-28 PROCEDURE — 74011250636 HC RX REV CODE- 250/636

## 2017-04-28 RX ORDER — WATER FOR INJECTION,STERILE
VIAL (ML) INJECTION
Status: COMPLETED
Start: 2017-04-28 | End: 2017-04-28

## 2017-04-28 RX ADMIN — WATER 0.72 ML: 1 INJECTION INTRAMUSCULAR; INTRAVENOUS; SUBCUTANEOUS at 11:36

## 2017-04-28 RX ADMIN — ROMIPLOSTIM 207 MCG: 250 INJECTION, POWDER, LYOPHILIZED, FOR SOLUTION SUBCUTANEOUS at 11:36

## 2017-04-28 NOTE — PROGRESS NOTES
[facility] Progress Note    Date: 2017    Name: Margarita Amaya    MRN: [id number]         : 1950      Ms. Debra Gao was assessed and education was provided. Ms. Cici Carbone vitals were reviewed and patient was observed for 5 minutes prior to treatment. Visit Vitals    /74 (BP 1 Location: Right arm)    Pulse 81    Temp 98.2 °F (36.8 °C)    Resp 18     CBC drawn from left AC X 1 attempt. Preliminary platelet count 510. No change in N plate dose at this time. N plate 811 mcg (0.36 ml)  was administered subcutaneous in  Left arm. .       Ms. Debra Gao tolerated well, and had no complaints. Patient armband removed and shredded. Ms. Debra Gao was discharged from [place] [address] in stable condition at 1140. She is to return on 2017 at 1100 for her next appointment.     Rachael Rahman RN  2017  11:52 AM

## 2017-05-03 ENCOUNTER — OFFICE VISIT (OUTPATIENT)
Dept: ONCOLOGY | Age: 67
End: 2017-05-03

## 2017-05-03 ENCOUNTER — HOSPITAL ENCOUNTER (OUTPATIENT)
Dept: ONCOLOGY | Age: 67
Discharge: HOME | End: 2017-05-03

## 2017-05-03 VITALS
HEART RATE: 58 BPM | TEMPERATURE: 97.6 F | SYSTOLIC BLOOD PRESSURE: 117 MMHG | HEIGHT: 66 IN | BODY MASS INDEX: 25.55 KG/M2 | WEIGHT: 159 LBS | DIASTOLIC BLOOD PRESSURE: 74 MMHG

## 2017-05-03 DIAGNOSIS — R60.0 BILATERAL LOWER EXTREMITY EDEMA: ICD-10-CM

## 2017-05-03 DIAGNOSIS — C77.3 BREAST CANCER METASTASIZED TO AXILLARY LYMPH NODE, UNSPECIFIED LATERALITY (HCC): ICD-10-CM

## 2017-05-03 DIAGNOSIS — C50.919 BREAST CANCER METASTASIZED TO AXILLARY LYMPH NODE, UNSPECIFIED LATERALITY (HCC): Primary | ICD-10-CM

## 2017-05-03 DIAGNOSIS — C77.3 BREAST CANCER METASTASIZED TO AXILLARY LYMPH NODE, UNSPECIFIED LATERALITY (HCC): Primary | ICD-10-CM

## 2017-05-03 DIAGNOSIS — C50.919 BREAST CANCER METASTASIZED TO AXILLARY LYMPH NODE, UNSPECIFIED LATERALITY (HCC): ICD-10-CM

## 2017-05-03 RX ORDER — OXYCODONE AND ACETAMINOPHEN 7.5; 325 MG/1; MG/1
TABLET ORAL
Qty: 120 TAB | Refills: 0 | Status: SHIPPED | OUTPATIENT
Start: 2017-05-03 | End: 2017-07-06 | Stop reason: SDUPTHER

## 2017-05-03 NOTE — PATIENT INSTRUCTIONS
Breast Cancer: Care Instructions  Your Care Instructions  Breast cancer occurs when abnormal cells grow out of control in the breast. These cancer cells can spread within the breast, to nearby lymph nodes and other tissues, and to other parts of the body. Being treated for cancer can weaken your body, and you may feel very tired. Get the rest your body needs so you can feel better. Finding out that you have cancer is scary. You may feel many emotions and may need some help coping. Seek out family, friends, and counselors for support. You also can do things at home to make yourself feel better while you go through treatment. Call the [facility] (7-369.639.5682) or visit its website at [email or web address] for more information. Follow-up care is a key part of your treatment and safety. Be sure to make and go to all appointments, and call your doctor if you are having problems. It's also a good idea to know your test results and keep a list of the medicines you take. How can you care for yourself at home? · Take your medicines exactly as prescribed. Call your doctor if you think you are having a problem with your medicine. You may get medicine for nausea and vomiting if you have these side effects. · Follow your doctor's instructions to relieve pain. Pain from cancer and surgery can almost always be controlled. Use pain medicine when you first notice pain, before it becomes severe. · Eat healthy food. If you do not feel like eating, try to eat food that has protein and extra calories to keep up your strength and prevent weight loss. Drink liquid meal replacements for extra calories and protein. Try to eat your main meal early. · Get some physical activity every day, but do not get too tired. Keep doing the hobbies you enjoy as your energy allows. · Do not smoke. Smoking can make your cancer worse. If you need help quitting, talk to your doctor about stop-smoking programs and medicines.  These can increase your chances of quitting for good. · Take steps to control your stress and workload. Learn relaxation techniques. ¨ Share your feelings. Stress and tension affect our emotions. By expressing your feelings to others, you may be able to understand and cope with them. ¨ Consider joining a support group. Talking about a problem with your spouse, a good friend, or other people with similar problems is a good way to reduce tension and stress. ¨ Express yourself through art. Try writing, crafts, dance, or art to relieve stress. Some dance, writing, or art groups may be available just for people who have cancer. ¨ Be kind to your body and mind. Getting enough sleep, eating a healthy diet, and taking time to do things you enjoy can contribute to an overall feeling of balance in your life and can help reduce stress. ¨ Get help if you need it. Discuss your concerns with your doctor or counselor. · If you are vomiting or have diarrhea:  ¨ Drink plenty of fluids (enough so that your urine is light yellow or clear like water) to prevent dehydration. Choose water and other caffeine-free clear liquids. If you have kidney, heart, or liver disease and have to limit fluids, talk with your doctor before you increase the amount of fluids you drink. ¨ When you are able to eat, try clear soups, mild foods, and liquids until all symptoms are gone for 12 to 48 hours. Other good choices include dry toast, crackers, cooked cereal, and gelatin dessert, such as Jell-O.  · If you have not already done so, prepare a list of advance directives. Advance directives are instructions to your doctor and family members about what kind of care you want if you become unable to speak or express yourself. When should you call for help? Call your doctor now or seek immediate medical care if:  · You have a fever. · Any part of your breast becomes red, tender, swollen, or hot.   · You have pain, redness, or swelling in the arm on the same side as your breast cancer. Watch closely for changes in your health, and be sure to contact your doctor if:  · You have pain that is not controlled by medicine. · You have nausea or vomiting. · You are constipated or have diarrhea. Where can you learn more? Go to http://leon-edinson.info/. Enter V321 in the search box to learn more about \"Breast Cancer: Care Instructions. \"  Current as of: July 26, 2016  Content Version: 11.2  © 0082-8513 [employer]. Care instructions adapted under license by [employer] (which disclaims liability or warranty for this information). If you have questions about a medical condition or this instruction, always ask your healthcare professional. [address] any warranty or liability for your use of this information.

## 2017-05-03 NOTE — PROGRESS NOTES
Hematology/medical oncology progress note    5/3/2017  Lashon Keys  YOB: 1950    PCP: Dipika Mishra MD    Diagnosis: Follicular non-Hodgkin's lymphoma    Mrs. Gigi York is a 79-year-old -American woman who has a history of metastatic breast cancer. She recently presented with complaints of bilateral lower extremity edema and swelling. I have explained to the patient that the PVL venous Doppler test that we did on her lower extremities bilaterally on 5/1/2017 showed no evidence of deep vein thrombosis in the bilateral lower extremities. Hemodynamics in the lower extremity veins are consistent with increased central venous pressure. There was no evidence of venous reflux. The patient is scheduled for additional follow-up therapy with a cardiologist which may include an ablation procedure for cardiac dysrhythmia. She is continue her diuretic therapy. No further diagnostics with regards to her leg edema is recommended from the hematologic standpoint. She will continue with her usual outpatient medications. She did request a reorder of her pain medication, Percocet 7.5 mg and this was provided. The patient had her questions answered to her satisfaction regarding the negative PVL studies of the lower extremities. Total time 25 minutes, greater than 50% of the time was in counseling and coordination of care. Zachary Kaur MD, [address]

## 2017-05-03 NOTE — MR AVS SNAPSHOT
Visit Information Date & Time Provider Department Dept. Phone Encounter #  
 5/3/2017 10:45 AM Woody Clark MD [address] [place] [address] Oncology [phone] [id number] Follow-up Instructions Return in about 3 months (around 8/3/2017). Upcoming Health Maintenance Date Due Hepatitis C Screening 1950 Pneumococcal 19-64 Highest Risk (1 of 3 - PCV13) 5/14/1971 DTaP/Tdap/Td series (1 - Tdap) 5/14/1973 PAP AKA CERVICAL CYTOLOGY 5/14/1973 FOBT Q 1 YEAR AGE 50-75 5/14/2002 ZOSTER VACCINE AGE 60> 5/14/2012 BREAST CANCER SCRN MAMMOGRAM 4/4/2015 INFLUENZA AGE 9 TO ADULT 8/1/2017 Allergies as of 5/3/2017  Review Complete On: 5/3/2017 By: Woody Clark MD  
  
 Severity Noted Reaction Type Reactions Aspirin High 08/07/2013   Systemic Swelling Pt states her whole body swells when she takes aspirin. Adhesive    Unable to Obtain Pcn [Penicillins]  08/20/2012    Rash Current Immunizations  Reviewed on 4/28/2017 No immunizations on file. Not reviewed this visit You Were Diagnosed With   
  
 Codes Comments Breast cancer metastasized to axillary lymph node, unspecified laterality    -  Primary ICD-10-CM: C50.919, C77.3 ICD-9-CM: 174.9, 196.3 Bilateral lower extremity edema     ICD-10-CM: R60.0 ICD-9-CM: 673. 3 Vitals BP Pulse Temp Height(growth percentile) Weight(growth percentile) BMI  
 117/74 (!) 58 97.6 °F (36.4 °C) 5' 6\" (1.676 m) 159 lb (72.1 kg) 25.66 kg/m2 OB Status Smoking Status Postmenopausal Never Smoker Vitals History BMI and BSA Data Body Mass Index Body Surface Area  
 25.66 kg/m 2 1.83 m 2 Preferred Pharmacy Pharmacy Name Phone FARM [id number] - [address] [phone] Your Updated Medication List  
  
   
This list is accurate as of: 5/3/17 11:33 AM.  Always use your most recent med list.  
  
  
 albuterol 90 mcg/actuation inhaler Commonly known as:  PROAIR HFA  
1-2 puffs every 4-6 hrs. aluminum-magnesium hydroxide 200-200 mg/5 mL susp 5 mL, diphenhydrAMINE 12.5 mg/5 mL liqd 12.5 mg, lidocaine 2 % soln 5 mL  
5 mL by Swish and Spit route two (2) times a day. Magic mouth wash  Maalox Lidocaine 2% viscous  Diphenhydramine oral solution   Pharmacy to mix equal portions of ingredients to a total volume as indicated in the dispense amount. CeleBREX 200 mg capsule Generic drug:  celecoxib Take  by mouth two (2) times a day. cyclobenzaprine 5 mg tablet Commonly known as:  FLEXERIL  
  
 dronabinol 5 mg capsule Commonly known as:  Yvette Picket Take 1 Cap by mouth two (2) times a day. furosemide 40 mg tablet Commonly known as:  LASIX Take  by mouth daily. gabapentin 300 mg capsule Commonly known as:  NEURONTIN Take 300 mg by mouth three (3) times daily. lidocaine-prilocaine topical cream  
Commonly known as:  EMLA  
APPLY OVER PORT 1 HOUR PRIOR TO CHEMOTHERAPY THAN COVER WITH SARAN WRAP  
  
 magic mouthwash solution Take 5 mL by mouth three (3) times daily as needed for Stomatitis. Magic mouth wash  Maalox Lidocaine 2% viscous  Diphenhydramine oral solution   Pharmacy to mix equal portions of ingredients to a total volume as indicated in the dispense amount. nabumetone 750 mg tablet Commonly known as:  RELAFEN  
  
 ondansetron hcl 8 mg tablet Commonly known as:  ZOFRAN (AS HYDROCHLORIDE) Take 1 Tab by mouth every eight (8) hours as needed for Nausea. oxyCODONE-acetaminophen 7.5-325 mg per tablet Commonly known as:  PERCOCET Take 1 tablet every 6 hours as needed for pain * potassium chloride 20 mEq tablet Commonly known as:  K-DUR, KLOR-CON Take 2 Tabs by mouth daily. * KLOR-CON M20 20 mEq tablet Generic drug:  potassium chloride TAKE ONE TABLET BY MOUTH ONCE A DAY  
  
 predniSONE 10 mg tablet Commonly known as:  Rolene Ply Take 2 Tabs by mouth two (2) times a day. Tapered dose over 3 weeks beginning 1/12/2016 PRUVATE 21-7 PO Take  by mouth. Senna 8.6 mg tablet Generic drug:  senna Take 1 Tab by mouth daily. triamterene-hydroCHLOROthiazide 37.5-25 mg per capsule Commonly known as:  Pelon Manuel TAKE 1 CAPSULE BY ORAL ROUTE ONCE DAILY  
  
 VITAMIN D2 50,000 unit capsule Generic drug:  ergocalciferol TAKE 1 CAPSULE BY MOUTH EVERY SEVEN (7) DAYS. warfarin 1 mg tablet Commonly known as:  COUMADIN  
TAKE 1 TABLET (1 MG) BY ORAL ROUTE ONCE DAILY  
  
 zolpidem 10 mg tablet Commonly known as:  AMBIEN  
TAKE 1 TABLET BY MOUTH NIGHTLY AS NEEDED FOR SLEEP *MAX DAILY AMOUNT 10MG* * Notice: This list has 2 medication(s) that are the same as other medications prescribed for you. Read the directions carefully, and ask your doctor or other care provider to review them with you. Prescriptions Printed Refills  
 oxyCODONE-acetaminophen (PERCOCET) 7.5-325 mg per tablet 0 Sig: Take 1 tablet every 6 hours as needed for pain  
 Class: Print We Performed the Following COMPLETE CBC & AUTO DIFF WBC [90413 CPT(R)] Follow-up Instructions Return in about 3 months (around 8/3/2017). To-Do List   
 05/03/2017 Lab:  CBC WITH 3 PART DIFF   
  
 05/05/2017 11:00 AM  
  Appointment with [facility] TRACK NURSE at [facility] ([phone])  
  
 05/12/2017 11:00 AM  
  Appointment with [facility] FA[facility] TRACK NURSE at [facility] ([phone])  
  
 05/19/2017 11:00 AM  
  Appointment with [facility] FA[facility] TRACK NURSE at [facility] ([phone])  
  
 06/09/2017 11:00 AM  
  Appointment with [facility] FA[facility] TRACK NURSE at [facility] ([phone]) Patient Instructions Breast Cancer: Care Instructions Your Care Instructions Breast cancer occurs when abnormal cells grow out of control in the breast. These cancer cells can spread within the breast, to nearby lymph nodes and other tissues, and to other parts of the body. Being treated for cancer can weaken your body, and you may feel very tired. Get the rest your body needs so you can feel better. Finding out that you have cancer is scary. You may feel many emotions and may need some help coping. Seek out family, friends, and counselors for support. You also can do things at home to make yourself feel better while you go through treatment. Call the [employer] (1-643.137.3556) or visit its website at [address]. SyMynd for more information. Follow-up care is a key part of your treatment and safety. Be sure to make and go to all appointments, and call your doctor if you are having problems. It's also a good idea to know your test results and keep a list of the medicines you take. How can you care for yourself at home? · Take your medicines exactly as prescribed. Call your doctor if you think you are having a problem with your medicine. You may get medicine for nausea and vomiting if you have these side effects. · Follow your doctor's instructions to relieve pain. Pain from cancer and surgery can almost always be controlled. Use pain medicine when you first notice pain, before it becomes severe. · Eat healthy food. If you do not feel like eating, try to eat food that has protein and extra calories to keep up your strength and prevent weight loss. Drink liquid meal replacements for extra calories and protein. Try to eat your main meal early. · Get some physical activity every day, but do not get too tired. Keep doing the hobbies you enjoy as your energy allows. · Do not smoke. Smoking can make your cancer worse. If you need help quitting, talk to your doctor about stop-smoking programs and medicines. These can increase your chances of quitting for good. · Take steps to control your stress and workload. Learn relaxation techniques. ¨ Share your feelings. Stress and tension affect our emotions. By expressing your feelings to others, you may be able to understand and cope with them. ¨ Consider joining a support group. Talking about a problem with your spouse, a good friend, or other people with similar problems is a good way to reduce tension and stress. ¨ Express yourself through art. Try writing, crafts, dance, or art to relieve stress. Some dance, writing, or art groups may be available just for people who have cancer. ¨ Be kind to your body and mind. Getting enough sleep, eating a healthy diet, and taking time to do things you enjoy can contribute to an overall feeling of balance in your life and can help reduce stress. ¨ Get help if you need it. Discuss your concerns with your doctor or counselor. · If you are vomiting or have diarrhea: ¨ Drink plenty of fluids (enough so that your urine is light yellow or clear like water) to prevent dehydration. Choose water and other caffeine-free clear liquids. If you have kidney, heart, or liver disease and have to limit fluids, talk with your doctor before you increase the amount of fluids you drink. ¨ When you are able to eat, try clear soups, mild foods, and liquids until all symptoms are gone for 12 to 48 hours. Other good choices include dry toast, crackers, cooked cereal, and gelatin dessert, such as Jell-O. · If you have not already done so, prepare a list of advance directives. Advance directives are instructions to your doctor and family members about what kind of care you want if you become unable to speak or express yourself. When should you call for help? Call your doctor now or seek immediate medical care if: 
· You have a fever. · Any part of your breast becomes red, tender, swollen, or hot. · You have pain, redness, or swelling in the arm on the same side as your breast cancer. Watch closely for changes in your health, and be sure to contact your doctor if: · You have pain that is not controlled by medicine. · You have nausea or vomiting. · You are constipated or have diarrhea. Where can you learn more? Go to http://leon-edinson.info/. Enter V321 in the search box to learn more about \"Breast Cancer: Care Instructions. \" Current as of: July 26, 2016 Content Version: 11.2 © 9525-1296 [employer]. Care instructions adapted under license by [employer] (which disclaims liability or warranty for this information). If you have questions about a medical condition or this instruction, always ask your healthcare professional. [address] any warranty or liability for your use of this information. Introducing \[facility] [place] [facility]\"" & HEALTH SERVICES! Azucena Wilson introduces [email or web address] patient portal. Now you can access parts of your medical record, email your doctor's office, and request medication refills online. 1. In your internet browser, go to [email or web address] 2. Click on the First Time User? Click Here link in the Sign In box. You will see the New Member Sign Up page. 3. Enter your [email or web address] Access Code exactly as it appears below. You will not need to use this code after youve completed the sign-up process. If you do not sign up before the expiration date, you must request a new code. · [email or web address] Access Code: [email or web address] Expires: 6/20/2017 11:12 AM 
 
4. Enter the last four digits of your Social Security Number (xxxx) and Date of Birth (mm/dd/yyyy) as indicated and click Submit. You will be taken to the next sign-up page. 5. Create a [email or web address] ID. This will be your [email or web address] login ID and cannot be changed, so think of one that is secure and easy to remember. 6. Create a [email or web address] password. You can change your password at any time. 7. Enter your Password Reset Question and Answer. This can be used at a later time if you forget your password. 8. Enter your e-mail address. You will receive e-mail notification when new information is available in [address] 9. Click Sign Up. You can now view and download portions of your medical record. 10. Click the Download Summary menu link to download a portable copy of your medical information. If you have questions, please visit the Frequently Asked Questions section of the [employer] website. Remember, [employer] is NOT to be used for urgent needs. For medical emergencies, dial 911. Now available from your iPhone and Android! Please provide this summary of care documentation to your next provider. Your primary care clinician is listed as SUSAN SINGH. If you have any questions after today's visit, please call [phone].

## 2017-05-05 ENCOUNTER — HOSPITAL ENCOUNTER (OUTPATIENT)
Dept: INFUSION THERAPY | Age: 67
Discharge: HOME | End: 2017-05-05
Payer: MEDICARE

## 2017-05-05 VITALS
SYSTOLIC BLOOD PRESSURE: 113 MMHG | TEMPERATURE: 97.6 F | DIASTOLIC BLOOD PRESSURE: 68 MMHG | HEART RATE: 75 BPM | RESPIRATION RATE: 18 BRPM | OXYGEN SATURATION: 95 %

## 2017-05-05 LAB
BASOPHILS # BLD AUTO: 0 K/UL (ref 0–0.06)
BASOPHILS # BLD: 0 % (ref 0–3)
DIFFERENTIAL METHOD BLD: ABNORMAL
EOSINOPHIL # BLD: 0.4 K/UL (ref 0–0.4)
EOSINOPHIL NFR BLD: 7 % (ref 0–5)
ERYTHROCYTE [DISTWIDTH] IN BLOOD BY AUTOMATED COUNT: 15.5 % (ref 11.6–14.5)
HCT VFR BLD AUTO: 30.6 % (ref 35–45)
HGB BLD-MCNC: 10.1 G/DL (ref 12–16)
LYMPHOCYTES # BLD AUTO: 33 % (ref 20–51)
LYMPHOCYTES # BLD: 1.7 K/UL (ref 0.8–3.5)
MCH RBC QN AUTO: 26.6 PG (ref 24–34)
MCHC RBC AUTO-ENTMCNC: 33 G/DL (ref 31–37)
MCV RBC AUTO: 80.7 FL (ref 74–97)
MONOCYTES # BLD: 0.9 K/UL (ref 0–1)
MONOCYTES NFR BLD AUTO: 17 % (ref 2–9)
NEUTS BAND NFR BLD MANUAL: 1 % (ref 0–5)
NEUTS SEG # BLD: 2.1 K/UL (ref 1.8–8)
NEUTS SEG NFR BLD AUTO: 42 % (ref 42–75)
PLATELET # BLD AUTO: 126 K/UL (ref 135–420)
PLATELET COMMENTS,PCOM: ABNORMAL
RBC # BLD AUTO: 3.79 M/UL (ref 4.2–5.3)
RBC MORPH BLD: ABNORMAL
WBC # BLD AUTO: 5.1 K/UL (ref 4.6–13.2)

## 2017-05-05 PROCEDURE — 85025 COMPLETE CBC W/AUTO DIFF WBC: CPT | Performed by: INTERNAL MEDICINE

## 2017-05-05 PROCEDURE — 96372 THER/PROPH/DIAG INJ SC/IM: CPT

## 2017-05-05 PROCEDURE — 74011250636 HC RX REV CODE- 250/636

## 2017-05-05 PROCEDURE — 74011000250 HC RX REV CODE- 250

## 2017-05-05 PROCEDURE — 74011250636 HC RX REV CODE- 250/636: Performed by: NURSE PRACTITIONER

## 2017-05-05 PROCEDURE — 36415 COLL VENOUS BLD VENIPUNCTURE: CPT

## 2017-05-05 RX ORDER — WATER FOR INJECTION,STERILE
VIAL (ML) INJECTION
Status: COMPLETED
Start: 2017-05-05 | End: 2017-05-05

## 2017-05-05 RX ADMIN — WATER 10 ML: 1 INJECTION INTRAMUSCULAR; INTRAVENOUS; SUBCUTANEOUS at 11:26

## 2017-05-05 RX ADMIN — ROMIPLOSTIM 207 MCG: 250 INJECTION, POWDER, LYOPHILIZED, FOR SOLUTION SUBCUTANEOUS at 11:26

## 2017-05-05 NOTE — PROGRESS NOTES
[facility] Progress Note    Date: May 5, 2017    Name: Charmian Duane    MRN: [id number]         : 1950     Treatment: Nplate      Ms. Albertina Epps arrived to [facility] at 1100. Ms. Albertina Epps was assessed and education was provided. Today is week #11 Nplate. Ms. Dee Mock vitals were reviewed. Visit Vitals    /68 (BP 1 Location: Right arm, BP Patient Position: Sitting)    Pulse 75    Temp 97.6 °F (36.4 °C)    Resp 18    SpO2 95%    Breastfeeding No       Pt was observed for 5 minutes after obtaining vital signs prior to initiating treatment. Blood drawn via right AC x 1 attempt for CBC lab. Gauze and coband applied. Ms. Albertina Epps tolerated well without complaints. Lab results obtained and reviewed. Scanned into media tab. No results found for this or any previous visit (from the past 12 hour(s)). Lab results obtained and reviewed. PLT= 96    Per Nplate protocol, since pt's platelet count is between  today (PLT 96) pt's dose will remain the same as the previous week  which is 3mcg x 69kg = 207mcg. Nplate 267LCE (3.47XG) administered as ordered SQ in patient's right upper arm. Ms. Albertina Epps was discharged from [place] [address] in stable condition at 1130. She is to return on 17 at 1000 for her next appointment.     Jayne Noland RN  May 5, 2017

## 2017-05-11 ENCOUNTER — HOSPITAL ENCOUNTER (OUTPATIENT)
Dept: INFUSION THERAPY | Age: 67
Discharge: HOME | End: 2017-05-11
Payer: MEDICARE

## 2017-05-11 VITALS
RESPIRATION RATE: 18 BRPM | WEIGHT: 152.12 LBS | HEART RATE: 58 BPM | DIASTOLIC BLOOD PRESSURE: 73 MMHG | HEIGHT: 66 IN | SYSTOLIC BLOOD PRESSURE: 134 MMHG | TEMPERATURE: 97.3 F | BODY MASS INDEX: 24.45 KG/M2

## 2017-05-11 LAB
BASOPHILS # BLD AUTO: 0.1 K/UL (ref 0–0.1)
BASOPHILS # BLD: 1 % (ref 0–2)
DIFFERENTIAL METHOD BLD: ABNORMAL
EOSINOPHIL # BLD: 0.4 K/UL (ref 0–0.4)
EOSINOPHIL NFR BLD: 6 % (ref 0–5)
ERYTHROCYTE [DISTWIDTH] IN BLOOD BY AUTOMATED COUNT: 15.8 % (ref 11.6–14.5)
HCT VFR BLD AUTO: 31.3 % (ref 35–45)
HGB BLD-MCNC: 10.4 G/DL (ref 12–16)
LYMPHOCYTES # BLD AUTO: 30 % (ref 21–52)
LYMPHOCYTES # BLD: 1.8 K/UL (ref 0.9–3.6)
MCH RBC QN AUTO: 26.9 PG (ref 24–34)
MCHC RBC AUTO-ENTMCNC: 33.2 G/DL (ref 31–37)
MCV RBC AUTO: 81.1 FL (ref 74–97)
MONOCYTES # BLD: 0.9 K/UL (ref 0.05–1.2)
MONOCYTES NFR BLD AUTO: 15 % (ref 3–10)
NEUTS SEG # BLD: 2.8 K/UL (ref 1.8–8)
NEUTS SEG NFR BLD AUTO: 48 % (ref 40–73)
PLATELET # BLD AUTO: 118 K/UL (ref 135–420)
PLATELET COMMENTS,PCOM: ABNORMAL
RBC # BLD AUTO: 3.86 M/UL (ref 4.2–5.3)
RBC MORPH BLD: ABNORMAL
RBC MORPH BLD: ABNORMAL
WBC # BLD AUTO: 6 K/UL (ref 4.6–13.2)

## 2017-05-11 PROCEDURE — 74011250636 HC RX REV CODE- 250/636

## 2017-05-11 PROCEDURE — 36415 COLL VENOUS BLD VENIPUNCTURE: CPT

## 2017-05-11 PROCEDURE — 74011250636 HC RX REV CODE- 250/636: Performed by: NURSE PRACTITIONER

## 2017-05-11 PROCEDURE — 85025 COMPLETE CBC W/AUTO DIFF WBC: CPT | Performed by: INTERNAL MEDICINE

## 2017-05-11 PROCEDURE — 96372 THER/PROPH/DIAG INJ SC/IM: CPT

## 2017-05-11 RX ORDER — HEPARIN SODIUM (PORCINE) LOCK FLUSH IV SOLN 100 UNIT/ML 100 UNIT/ML
500 SOLUTION INTRAVENOUS AS NEEDED
Status: DISPENSED | OUTPATIENT
Start: 2017-05-11 | End: 2017-05-12

## 2017-05-11 RX ORDER — WATER FOR INJECTION,STERILE
VIAL (ML) INJECTION
Status: DISPENSED
Start: 2017-05-11 | End: 2017-05-11

## 2017-05-11 RX ORDER — SODIUM CHLORIDE 0.9 % (FLUSH) 0.9 %
10-40 SYRINGE (ML) INJECTION AS NEEDED
Status: DISCONTINUED | OUTPATIENT
Start: 2017-05-11 | End: 2017-05-15 | Stop reason: HOSPADM

## 2017-05-11 RX ADMIN — ROMIPLOSTIM 207 MCG: 250 INJECTION, POWDER, LYOPHILIZED, FOR SOLUTION SUBCUTANEOUS at 10:57

## 2017-05-11 NOTE — PROGRESS NOTES
[facility] Progress Note    Date: May 11, 2017    Name: Ashley Fernandes    MRN: [id number]         : 1950      Ms. Tracy Garcia arrived to [facility] at 1020. Ms. Tracy Garcia was assessed and education was provided. Today is week #12 Nplate. Ms. Kaycee Jaeger vitals were reviewed. Visit Vitals    /73 (BP 1 Location: Right arm, BP Patient Position: Sitting)    Pulse (!) 58    Temp 97.3 °F (36.3 °C)    Resp 18    Ht 5' 6\" (1.676 m)    Wt 69 kg (152 lb 1.9 oz)    BMI 24.55 kg/m2       Pt was observed for 5 minutes after obtaining vital signs prior to initiating treatment. Pt states she was not aware she was due for her monthly port flush today. Stated she wasn't prepared and requested to reschedule port flush to next visit (next Friday). Blood drawn for labs via right AC venipuncture x1 attempt. Lab results obtained and reviewed. (Preliminary results scanned into media tab.)    Pt's preliminary platelet count was 68,233. Per Nplate protocol, since pt's platelet count is 38,026 today, pt's dose will remain at 3mcg/kg (3mcg x 69kg = 207mcg). Nplate 372HCS (5.86GZ) administered as ordered SQ in patient's right upper arm. Procrit held at this time per order. Ms. Tracy Garcia was discharged from [place] [address] in stable condition at 1100. She is to return on 17 at 1100 for her next appointment.     Carissa Lomax RN  May 11, 2017

## 2017-05-19 ENCOUNTER — HOSPITAL ENCOUNTER (OUTPATIENT)
Dept: INFUSION THERAPY | Age: 67
Discharge: HOME | End: 2017-05-19
Payer: MEDICARE

## 2017-05-19 VITALS
TEMPERATURE: 97.5 F | SYSTOLIC BLOOD PRESSURE: 108 MMHG | DIASTOLIC BLOOD PRESSURE: 71 MMHG | HEART RATE: 52 BPM | RESPIRATION RATE: 18 BRPM

## 2017-05-19 LAB
BASOPHILS # BLD AUTO: 0 K/UL (ref 0–0.06)
BASOPHILS # BLD: 0 % (ref 0–3)
DIFFERENTIAL METHOD BLD: ABNORMAL
EOSINOPHIL # BLD: 0.4 K/UL (ref 0–0.4)
EOSINOPHIL NFR BLD: 8 % (ref 0–5)
ERYTHROCYTE [DISTWIDTH] IN BLOOD BY AUTOMATED COUNT: 15.6 % (ref 11.6–14.5)
HCT VFR BLD AUTO: 30.5 % (ref 35–45)
HGB BLD-MCNC: 10.3 G/DL (ref 12–16)
LYMPHOCYTES # BLD AUTO: 28 % (ref 20–51)
LYMPHOCYTES # BLD: 1.5 K/UL (ref 0.8–3.5)
MCH RBC QN AUTO: 26.7 PG (ref 24–34)
MCHC RBC AUTO-ENTMCNC: 33.8 G/DL (ref 31–37)
MCV RBC AUTO: 79 FL (ref 74–97)
MONOCYTES # BLD: 0.7 K/UL (ref 0–1)
MONOCYTES NFR BLD AUTO: 14 % (ref 2–9)
NEUTS SEG # BLD: 2.6 K/UL (ref 1.8–8)
NEUTS SEG NFR BLD AUTO: 50 % (ref 42–75)
PLATELET # BLD AUTO: 115 K/UL (ref 135–420)
PLATELET COMMENTS,PCOM: ABNORMAL
RBC # BLD AUTO: 3.86 M/UL (ref 4.2–5.3)
RBC MORPH BLD: ABNORMAL
WBC # BLD AUTO: 5.2 K/UL (ref 4.6–13.2)
WBC MORPH BLD: ABNORMAL

## 2017-05-19 PROCEDURE — 74011250636 HC RX REV CODE- 250/636

## 2017-05-19 PROCEDURE — 96372 THER/PROPH/DIAG INJ SC/IM: CPT

## 2017-05-19 PROCEDURE — 74011000250 HC RX REV CODE- 250

## 2017-05-19 PROCEDURE — 74011250636 HC RX REV CODE- 250/636: Performed by: INTERNAL MEDICINE

## 2017-05-19 PROCEDURE — 36415 COLL VENOUS BLD VENIPUNCTURE: CPT

## 2017-05-19 PROCEDURE — 85025 COMPLETE CBC W/AUTO DIFF WBC: CPT | Performed by: INTERNAL MEDICINE

## 2017-05-19 RX ORDER — WATER FOR INJECTION,STERILE
VIAL (ML) INJECTION
Status: COMPLETED
Start: 2017-05-19 | End: 2017-05-19

## 2017-05-19 RX ORDER — HEPARIN SODIUM (PORCINE) LOCK FLUSH IV SOLN 100 UNIT/ML 100 UNIT/ML
500 SOLUTION INTRAVENOUS AS NEEDED
Status: ACTIVE | OUTPATIENT
Start: 2017-05-19 | End: 2017-05-20

## 2017-05-19 RX ORDER — SODIUM CHLORIDE 0.9 % (FLUSH) 0.9 %
10-40 SYRINGE (ML) INJECTION AS NEEDED
Status: DISCONTINUED | OUTPATIENT
Start: 2017-05-19 | End: 2017-05-23 | Stop reason: HOSPADM

## 2017-05-19 RX ADMIN — ROMIPLOSTIM 207 MCG: 250 INJECTION, POWDER, LYOPHILIZED, FOR SOLUTION SUBCUTANEOUS at 11:58

## 2017-05-19 RX ADMIN — WATER 10 ML: 1 INJECTION INTRAMUSCULAR; INTRAVENOUS; SUBCUTANEOUS at 11:58

## 2017-05-19 NOTE — PROGRESS NOTES
[facility] Progress Note    Date: May 19, 2017    Name: Rashaad Patiño    MRN: [id number]         : 1950      Ms. Genevie Boast arrived to [facility] at [phone]. Ms. Genevie Boast was assessed and education was provided. Nplate. Ms. Barndi Lindsey vitals were reviewed. Visit Vitals    /71 (BP 1 Location: Right arm, BP Patient Position: Sitting)    Pulse (!) 52    Temp 97.5 °F (36.4 °C)    Resp 18       Pt was observed for 5 minutes after obtaining vital signs prior to initiating treatment. Pt states she forgot to put her lidocaine cream on today. Stated she wasn't prepared and requested to reschedule port flush to next visit (next Friday). Blood drawn for labs via right AC venipuncture x1 attempt. Lab results obtained and reviewed. (Preliminary results scanned into media tab.)    Pt's preliminary platelet count was 27,710. Per Nplate protocol, since pt's platelet count is 85,720 today, pt's dose will remain at 3mcg/kg (3mcg x 69kg = 207mcg). Nplate 738VUY (1.16KH) administered as ordered SQ in patient's right upper arm. Ms. Genevie Boast was discharged from [place] [address] in stable condition at 1200. She is to return on 17 at 1100 for her next appointment.     Morris Cramer RN  May 19, 2017

## 2017-05-19 NOTE — PROGRESS NOTES
[facility] Progress Note    Date: May 19, 2017    Name: Luis Alberto Marrero    MRN: [id number]         : 1950     Treatment: Nplate      Ms. Rk Reeder arrived to [facility] at [phone]. Ms. Rk Reeder was assessed and education was provided. Today is week #** Nplate. Ms. Griselda Ma vitals were reviewed. Visit Vitals    /71 (BP 1 Location: Right arm, BP Patient Position: Sitting)    Pulse (!) 52    Temp 97.5 °F (36.4 °C)    Resp 18       Pt was observed for 5 minutes after obtaining vital signs prior to initiating treatment. Blood drawn via right AC x 1 attempt for CBC lab. Gauze and coband applied. Ms. Rk Reeder tolerated well without complaints. Lab results obtained and reviewed. Scanned into media tab. No results found for this or any previous visit (from the past 12 hour(s)). Lab results obtained and reviewed. PLT= 97    Per Nplate protocol, since pt's platelet count is between  today (PLT 97) pt's dose will remain the same as the previous week  which is 3mcg x 69kg = 207mcg. Nplate 136JEX (6.64RX) administered as ordered SQ in patient's right upper arm. Patient politely refused to have her port flushed today because she forgot her lidocaine cream.    Ms. Rk Reeder was discharged from [place] [address] in stable condition at ***. She is to return on 17 at 56 for her next appointment.     Debbie Late  May 19, 2017

## 2017-05-25 ENCOUNTER — HOSPITAL ENCOUNTER (OUTPATIENT)
Dept: INFUSION THERAPY | Age: 67
Discharge: HOME | End: 2017-05-25
Payer: MEDICARE

## 2017-05-25 VITALS
RESPIRATION RATE: 18 BRPM | TEMPERATURE: 97.4 F | HEART RATE: 58 BPM | DIASTOLIC BLOOD PRESSURE: 70 MMHG | SYSTOLIC BLOOD PRESSURE: 105 MMHG

## 2017-05-25 LAB
BASOPHILS # BLD AUTO: 0 K/UL (ref 0–0.1)
BASOPHILS # BLD: 0 % (ref 0–2)
DIFFERENTIAL METHOD BLD: ABNORMAL
EOSINOPHIL # BLD: 0.3 K/UL (ref 0–0.4)
EOSINOPHIL NFR BLD: 7 % (ref 0–5)
ERYTHROCYTE [DISTWIDTH] IN BLOOD BY AUTOMATED COUNT: 15.9 % (ref 11.6–14.5)
HCT VFR BLD AUTO: 27.7 % (ref 35–45)
HGB BLD-MCNC: 9.5 G/DL (ref 12–16)
LYMPHOCYTES # BLD AUTO: 31 % (ref 21–52)
LYMPHOCYTES # BLD: 1.4 K/UL (ref 0.9–3.6)
MCH RBC QN AUTO: 27.1 PG (ref 24–34)
MCHC RBC AUTO-ENTMCNC: 34.3 G/DL (ref 31–37)
MCV RBC AUTO: 78.9 FL (ref 74–97)
MONOCYTES # BLD: 0.6 K/UL (ref 0.05–1.2)
MONOCYTES NFR BLD AUTO: 13 % (ref 3–10)
NEUTS SEG # BLD: 2.2 K/UL (ref 1.8–8)
NEUTS SEG NFR BLD AUTO: 49 % (ref 40–73)
PLATELET # BLD AUTO: 89 K/UL (ref 135–420)
PLATELET COMMENTS,PCOM: ABNORMAL
RBC # BLD AUTO: 3.51 M/UL (ref 4.2–5.3)
RBC MORPH BLD: ABNORMAL
RBC MORPH BLD: ABNORMAL
WBC # BLD AUTO: 4.5 K/UL (ref 4.6–13.2)

## 2017-05-25 PROCEDURE — 74011250636 HC RX REV CODE- 250/636: Performed by: INTERNAL MEDICINE

## 2017-05-25 PROCEDURE — 77030012965 HC NDL HUBR BBMI -A

## 2017-05-25 PROCEDURE — 85025 COMPLETE CBC W/AUTO DIFF WBC: CPT | Performed by: INTERNAL MEDICINE

## 2017-05-25 PROCEDURE — 74011250636 HC RX REV CODE- 250/636

## 2017-05-25 PROCEDURE — 74011000250 HC RX REV CODE- 250

## 2017-05-25 PROCEDURE — 96523 IRRIG DRUG DELIVERY DEVICE: CPT

## 2017-05-25 PROCEDURE — 96372 THER/PROPH/DIAG INJ SC/IM: CPT

## 2017-05-25 RX ORDER — WATER FOR INJECTION,STERILE
VIAL (ML) INJECTION
Status: COMPLETED
Start: 2017-05-25 | End: 2017-05-25

## 2017-05-25 RX ORDER — HEPARIN SODIUM (PORCINE) LOCK FLUSH IV SOLN 100 UNIT/ML 100 UNIT/ML
500 SOLUTION INTRAVENOUS AS NEEDED
Status: ACTIVE | OUTPATIENT
Start: 2017-05-25 | End: 2017-05-26

## 2017-05-25 RX ORDER — SODIUM CHLORIDE 0.9 % (FLUSH) 0.9 %
10-40 SYRINGE (ML) INJECTION AS NEEDED
Status: DISCONTINUED | OUTPATIENT
Start: 2017-05-25 | End: 2017-05-29 | Stop reason: HOSPADM

## 2017-05-25 RX ORDER — ZOLPIDEM TARTRATE 10 MG/1
TABLET ORAL
Qty: 30 TAB | Refills: 3 | Status: SHIPPED | OUTPATIENT
Start: 2017-05-25 | End: 2017-12-18 | Stop reason: SDUPTHER

## 2017-05-25 RX ORDER — HEPARIN SODIUM (PORCINE) LOCK FLUSH IV SOLN 100 UNIT/ML 100 UNIT/ML
SOLUTION INTRAVENOUS
Status: COMPLETED
Start: 2017-05-25 | End: 2017-05-25

## 2017-05-25 RX ADMIN — ROMIPLOSTIM 207 MCG: 250 INJECTION, POWDER, LYOPHILIZED, FOR SOLUTION SUBCUTANEOUS at 08:53

## 2017-05-25 RX ADMIN — HEPARIN SODIUM (PORCINE) LOCK FLUSH IV SOLN 100 UNIT/ML 500 UNITS: 100 SOLUTION at 08:54

## 2017-05-25 RX ADMIN — WATER 10 ML: 1 INJECTION INTRAMUSCULAR; INTRAVENOUS; SUBCUTANEOUS at 09:01

## 2017-05-25 RX ADMIN — Medication 40 ML: at 08:38

## 2017-05-25 NOTE — PROGRESS NOTES
[facility] Progress Note    Date: May 25, 2017    Name: Caprice Jackson    MRN: [id number]         : 1950      Ms. Aster Castro arrived to [facility] at [employer]. Ms. Aster Castro was assessed and education was provided. Nplate. Ms. Roosevelt Valle vitals were reviewed. Visit Vitals    /70 (BP 1 Location: Right arm, BP Patient Position: Sitting)    Pulse (!) 58    Temp 97.4 °F (36.3 °C)    Resp 18       Pt was observed for 5 minutes after obtaining vital signs prior to initiating treatment. Right chest mediport accessed with 20 g 1 inch haas needle using sterile technique, medial and lateral port.      Port flushed easily and had brisk blood return. Blood drawn from medial port.     Ports flushed with 30 ml NS and 500 units of Heparin after blood draw for CBC per protocol then de-accessed.      No irritation or bleeding noted. Gauze and transparent adhesive applied to site. Lab results obtained and reviewed. (Preliminary results scanned into media tab.)    Pt's preliminary platelet count was 96,959. Per Nplate protocol, since pt's platelet count is 64,208 today, pt's dose will remain at 3mcg/kg (3mcg x 69kg = 207mcg). Nplate 771CHF (5.27MO) administered as ordered SQ in patient's right upper arm. Ms. Aster Castro was discharged from [place] [address] in stable condition at 0900. She is to return on 17 at 1100 for her next appointment.     Luzmaria Nieto RN  May 25, 2017

## 2017-06-02 ENCOUNTER — HOSPITAL ENCOUNTER (OUTPATIENT)
Dept: INFUSION THERAPY | Age: 67
Discharge: HOME | End: 2017-06-02
Payer: MEDICARE

## 2017-06-02 VITALS
RESPIRATION RATE: 18 BRPM | TEMPERATURE: 98.6 F | DIASTOLIC BLOOD PRESSURE: 67 MMHG | OXYGEN SATURATION: 100 % | SYSTOLIC BLOOD PRESSURE: 108 MMHG | HEART RATE: 65 BPM

## 2017-06-02 LAB
BASO+EOS+MONOS # BLD AUTO: 0.9 K/UL (ref 0–2.3)
BASO+EOS+MONOS # BLD AUTO: 15 % (ref 0.1–17)
DIFFERENTIAL METHOD BLD: ABNORMAL
ERYTHROCYTE [DISTWIDTH] IN BLOOD BY AUTOMATED COUNT: 16.8 % (ref 11.5–14.5)
HCT VFR BLD AUTO: 31.8 % (ref 36–48)
HGB BLD-MCNC: 10.4 G/DL (ref 12–16)
LYMPHOCYTES # BLD AUTO: 24 % (ref 14–44)
LYMPHOCYTES # BLD: 1.5 K/UL (ref 1.1–5.9)
MCH RBC QN AUTO: 27 PG (ref 25–35)
MCHC RBC AUTO-ENTMCNC: 32.7 G/DL (ref 31–37)
MCV RBC AUTO: 82.6 FL (ref 78–102)
NEUTS SEG # BLD: 3.6 K/UL (ref 1.8–9.5)
NEUTS SEG NFR BLD AUTO: 60 % (ref 40–70)
PLATELET # BLD AUTO: 66 K/UL (ref 135–420)
RBC # BLD AUTO: 3.85 M/UL (ref 4.1–5.1)
WBC # BLD AUTO: 6 K/UL (ref 4.5–13)

## 2017-06-02 PROCEDURE — 96372 THER/PROPH/DIAG INJ SC/IM: CPT

## 2017-06-02 PROCEDURE — 36415 COLL VENOUS BLD VENIPUNCTURE: CPT

## 2017-06-02 PROCEDURE — 85049 AUTOMATED PLATELET COUNT: CPT | Performed by: INTERNAL MEDICINE

## 2017-06-02 PROCEDURE — 85025 COMPLETE CBC W/AUTO DIFF WBC: CPT | Performed by: INTERNAL MEDICINE

## 2017-06-02 PROCEDURE — 74011250636 HC RX REV CODE- 250/636

## 2017-06-02 PROCEDURE — 74011250636 HC RX REV CODE- 250/636: Performed by: NURSE PRACTITIONER

## 2017-06-02 RX ORDER — WATER FOR INJECTION,STERILE
VIAL (ML) INJECTION
Status: DISPENSED
Start: 2017-06-02 | End: 2017-06-03

## 2017-06-02 RX ADMIN — ROMIPLOSTIM 207 MCG: 250 INJECTION, POWDER, LYOPHILIZED, FOR SOLUTION SUBCUTANEOUS at 14:17

## 2017-06-02 NOTE — PROGRESS NOTES
[facility] Progress Note    Date: 2017    Name: Geovanny Coleman    MRN: [id number]         : 1950      Ms. Angella Silva arrived to [place] at [phone]. Ms. Angella Silva was assessed and education was provided. Nplate. Ms. Bouchra Whitt vitals were reviewed. Visit Vitals    /67 (BP 1 Location: Right arm, BP Patient Position: Sitting)    Pulse 65    Temp 98.6 °F (37 °C)    Resp 18    SpO2 100%       Pt was observed for 5 minutes after obtaining vital signs prior to initiating treatment. Blood drawn for labs via right AC venipuncture x1 attempt. Lab results obtained and reviewed. (Preliminary results scanned into media tab.)    Pt's preliminary platelet count was 70,355. Per Nplate protocol, since pt's platelet count is 15,430 today, pt's dose will remain at 3mcg/kg (3mcg x 69kg = 207mcg). Nplate 271PSD (6.11GQ) administered as ordered SQ in patient's right upper arm. Ms. Angella Silva was discharged from [place] [address] in stable condition at [phone]. She is to return on 17 at 1100 for her next appointment.     Juancho Ruiz RN  2017

## 2017-06-06 ENCOUNTER — OFFICE VISIT (OUTPATIENT)
Dept: ORTHOPEDIC SURGERY | Age: 67
End: 2017-06-06

## 2017-06-06 VITALS
HEART RATE: 53 BPM | SYSTOLIC BLOOD PRESSURE: 125 MMHG | DIASTOLIC BLOOD PRESSURE: 68 MMHG | WEIGHT: 159 LBS | BODY MASS INDEX: 25.55 KG/M2 | HEIGHT: 66 IN

## 2017-06-06 DIAGNOSIS — M54.2 CERVICAL PAIN: Primary | ICD-10-CM

## 2017-06-06 DIAGNOSIS — M54.5 ACUTE LOW BACK PAIN, UNSPECIFIED BACK PAIN LATERALITY, WITH SCIATICA PRESENCE UNSPECIFIED: ICD-10-CM

## 2017-06-06 DIAGNOSIS — M54.6 ACUTE THORACIC BACK PAIN, UNSPECIFIED BACK PAIN LATERALITY: ICD-10-CM

## 2017-06-06 DIAGNOSIS — M46.1 SACROILIITIS (HCC): ICD-10-CM

## 2017-06-06 NOTE — PROGRESS NOTES
Jeannette Blanca [facility] [address].  [address], [address]  [place], [address] [place] [address] Street  Phone: [phone]  Fax: [phone]        Miri Hector  : 1950  PCP: Nati Dsuoza MD  2017    NEW PATIENT      ASSESSMENT AND PLAN     Laurita Law comes in to the office today c/o primarily pain in the right side of her lumbar region. She has a history of metastatic breast cancer without any obviously related mets. She has had chemotherapy and resultant neuropathy. Based upon the positive bilateral AAKASH test and SI tenderness I am lead to believe her pain is most likely due to sacroiliitis. There are no obvious lesions on her x-rays that would suggest bony mets. I referred her to PT for further evaluation and treatment. I also referred her to get bilateral SI joint injections. Pt will f/u in 3 weeks or sooner if needed. Serena was seen today for back pain. Diagnoses and all orders for this visit:    Cervical pain  -     [03267] C Spine 2-3V    Acute thoracic back pain, unspecified back pain laterality  -     [99917] T Spine 2V    Acute low back pain, unspecified back pain laterality, with sciatica presence unspecified  -     [30131] L/S 2-3 views    Sacroiliitis ([facility] [address]  -     REFERRAL TO PHYSICAL THERAPY  -     SCHEDULE SURGERY         Follow-up Disposition:  Return in about 3 weeks (around 2017), or if symptoms worsen or fail to improve. CHIEF COMPLAINT  Serena Gonzalez is seen today in consultation at the request of Nati Dsouza MD for complaints of pain in the lumbar region. HISTORY OF PRESENT ILLNESS  Laurita Law is a 74 y.o. female c/o pain in the cervical and lumbar region, bilateral hips and knees. Her pain is currently at a 10/10. She has issues with her balance while walking and bending over to tie her shoes. Sitting, lying, changing positions, lifting, and bending forward increase her pain. Standing and walking help relieve her pain.      Pt denies any fevers, chills, nausea, vomiting. Pt denies any chest pain and shortness of breath. Pt denies any ear, nose, and throat problems. Pt denies any fecal or urinary incontinence. She is currently not working. PAST MEDICAL HISTORY   Past Medical History:   Diagnosis Date    Antineoplastic chemotherapy induced anemia     Arthritis     Breast cancer ([facility] [address]     [facility]     Breast    Chronic [facility] thrombocytopenia) [facility] 10/31/2016    Diabetes [facility]     Esophageal cancer ([facility]     treated with chemo       Past Surgical History:   Procedure Laterality Date    BREAST SURGERY PROCEDURE UNLISTED      COLONOSCOPY N/A 7/27/2016    COLONOSCOPY performed [facility] Lemus [facility] ENDOSCOPY    HX APPENDECTOMY      HX ORTHOPAEDIC      HX VASCULAR ACCESS         MEDICATIONS      Current Outpatient Prescriptions   Medication Sig Dispense Refill    zolpidem (AMBIEN) 10 mg tablet TAKE 1 TABLET BY MOUTH NIGHTLY AS NEEDED FOR SLEEP *MAX DAILY AMOUNT 10MG* 30 Tab 3    oxyCODONE-acetaminophen (PERCOCET) 7.5-325 mg per tablet Take 1 tablet every 6 hours as needed for pain 120 Tab 0    lidocaine-prilocaine (EMLA) topical cream APPLY OVER PORT 1 HOUR PRIOR TO CHEMOTHERAPY THAN COVER WITH SARAN WRAP 30 g 6    magic mouthwash solution Take 5 mL by mouth three (3) times daily as needed for Stomatitis. Magic mouth wash   Maalox  Lidocaine 2% viscous   Diphenhydramine oral solution     Pharmacy to mix equal portions of ingredients to a total volume as indicated in the dispense amount. 236 mL 0    celecoxib (CELEBREX) 200 mg capsule Take  by mouth two (2) times a day.  furosemide (LASIX) 40 mg tablet Take  by mouth daily.  dronabinol (MARINOL) 5 mg capsule Take 1 Cap by mouth two (2) times a day. 60 Cap 1    gabapentin (NEURONTIN) 300 mg capsule Take 300 mg by mouth three (3) times daily.       KLOR-CON M20 20 mEq tablet TAKE ONE TABLET BY MOUTH ONCE A DAY 30 Tab 3    aluminum-magnesium hydroxide 200-200 mg/5 mL susp 5 mL, diphenhydrAMINE 12.5 mg/5 mL liqd 12.5 mg, lidocaine 2 % soln 5 mL 5 mL by Swish and Spit route two (2) times a day. Magic mouth wash   Maalox  Lidocaine 2% viscous   Diphenhydramine oral solution     Pharmacy to mix equal portions of ingredients to a total volume as indicated in the dispense amount. 240 mL 1    potassium chloride (K-DUR, KLOR-CON) 20 mEq tablet Take 2 Tabs by mouth daily. 30 Tab 3    albuterol (PROAIR HFA) 90 mcg/actuation inhaler 1-2 puffs every 4-6 hrs. 1 Inhaler 1    senna (SENNA) 8.6 mg tablet Take 1 Tab by mouth daily.  ondansetron hcl (ZOFRAN) 8 mg tablet Take 1 Tab by mouth every eight (8) hours as needed for Nausea. 30 Tab 3    IRON AG&FUM/C/FA/MV CMB11/CA-T (PRUVATE 21-7 PO) Take  by mouth.  warfarin (COUMADIN) 1 mg tablet TAKE 1 TABLET (1 MG) BY ORAL ROUTE ONCE DAILY 30 Tab 6    predniSONE (DELTASONE) 10 mg tablet Take 2 Tabs by mouth two (2) times a day. Tapered dose over 3 weeks beginning 1/12/2016 50 Tab 0    triamterene-hydrochlorothiazide (DYAZIDE) 37.5-25 mg per capsule TAKE 1 CAPSULE BY ORAL ROUTE ONCE DAILY 30 Cap 5    VITAMIN D2 50,000 unit capsule TAKE 1 CAPSULE BY MOUTH EVERY SEVEN (7) DAYS. 4 Cap 3    cyclobenzaprine (FLEXERIL) 5 mg tablet       nabumetone (RELAFEN) 750 mg tablet          ALLERGIES    Allergies   Allergen Reactions    Aspirin Swelling     Pt states her whole body swells when she takes aspirin.  Adhesive Unable to Obtain    Pcn [Penicillins] Rash          SOCIAL HISTORY    Social History     Social History    Marital status:      Spouse name: N/A    Number of children: N/A    Years of education: N/A     Social History Main Topics    Smoking status: Never Smoker    Smokeless tobacco: None    Alcohol use No    Drug use: No    Sexual activity: Not Asked     Other Topics Concern    None     Social History Narrative       FAMILY HISTORY  History reviewed. No pertinent family history.       REVIEW OF SYSTEMS  Review of Systems   Constitutional: Negative for chills, diaphoresis, fever, malaise/fatigue and weight loss. Respiratory: Negative for shortness of breath. Cardiovascular: Negative for chest pain and leg swelling. Gastrointestinal: Negative for constipation, nausea and vomiting. Neurological: Negative for dizziness, tingling, seizures, loss of consciousness and headaches. Psychiatric/Behavioral: The patient does not have insomnia. PHYSICAL EXAMINATION  Visit Vitals    /68    Pulse (!) 53    Ht 5' 6\" (1.676 m)    Wt 159 lb (72.1 kg)    BMI 25.66 kg/m2         Pain Assessment  6/6/2017   Location of Pain Back;Neck;Hip;Leg   Location Modifiers Left;Right   Severity of Pain 10   Quality of Pain Aching   Duration of Pain Persistent   Frequency of Pain Constant   Limiting Behavior Some   Relieving Factors Nothing   Result of Injury No         Constitutional:  Well developed, well nourished, in no acute distress. Psychiatric: Affect and mood are appropriate. HEENT: Normocephalic, atraumatic. Extraocular movements intact. Integumentary: No rashes or abrasions noted on exposed areas. Cardiovascular: Regular rate and rhythm. Pulmonary: Clear to auscultation bilaterally. SPINE/MUSCULOSKELETAL EXAM    Cervical spine:  Neck is midline. Normal muscle tone. No focal atrophy is noted. ROM pain free. Shoulder ROM intact. No tenderness to palpation. Negative Spurling's sign. Negative Tinel's sign. Negative Cottrell's sign. Sensation in the bilateral arms grossly intact to light touch. Lumbar spine:  No rash, ecchymosis, or gross obliquity. No fasciculations. No focal atrophy is noted. No pain with hip ROM. Full range of motion. No tenderness to palpation. No tenderness to palpation at the sciatic notch. Bilateral SI joints tender. Trochanters non tender. Pain with back extension.    Positive bilateral florence's test  Negative bilateral P4 test.       Sensation in the bilateral legs grossly intact to light touch. MOTOR:      Biceps  Triceps Deltoids Wrist Ext Wrist Flex Hand Intrin   Right 5/5 5/5 5/5 5/5 5/5 5/5   Left 5/5 5/5 5/5 5/5 5/5 5/5             Hip Flex  Quads Hamstrings Ankle DF EHL Ankle PF   Right 5/5 5/5 5/5 5/5 5/5 5/5   Left 5/5 5/5 5/5 5/5 5/5 5/5     DTRs are 2+ biceps, triceps, brachioradialis, patella, and Achilles. Negative Straight Leg raise. Squat not tested. No difficulty with tandem gait. Ambulation in a wheelchair. RADIOGRAPHS  Cervical, Thoracic, and Lumbar XR images taken on 06/06/2017 personally reviewed with patient:  1) Disc space narrowing   2) Facet sclerosis   3) Anterolisthesis   4) No obvious fractures      reviewed    Ms. Heidy Garcia has a reminder for a \"due or due soon\" health maintenance. I have asked that she contact her primary care provider for follow-up on this health maintenance. This plan was reviewed with the patient and patient agrees. All questions were answered. More than half of this visit today was spent on counseling. Written by Aleen Babinski, as dictated by Dr. Nicole Mehta. I, Dr. Nicole Mehta, confirm that all documentation is accurate.

## 2017-06-06 NOTE — PATIENT INSTRUCTIONS
AlgEvolve Activation    Thank you for requesting access to AlgEvolve. Please follow the instructions below to securely access and download your online medical record. AlgEvolve allows you to send messages to your doctor, view your test results, renew your prescriptions, schedule appointments, and more. How Do I Sign Up? 1. In your internet browser, go to [email or web address].[email or web address]  2. Click on the First Time User? Click Here link in the Sign In box. You will be redirect to the New Member Sign Up page. 3. Enter your AlgEvolve Access Code exactly as it appears below. You will not need to use this code after youve completed the sign-up process. If you do not sign up before the expiration date, you must request a new code. AlgEvolve Access Code: [email or web address]  Expires: 2017 11:12 AM (This is the date your AlgEvolve access code will )    4. Enter the last four digits of your Social Security Number (xxxx) and Date of Birth (mm/dd/yyyy) as indicated and click Submit. You will be taken to the next sign-up page. 5. Create a AlgEvolve ID. This will be your AlgEvolve login ID and cannot be changed, so think of one that is secure and easy to remember. 6. Create a AlgEvolve password. You can change your password at any time. 7. Enter your Password Reset Question and Answer. This can be used at a later time if you forget your password. 8. Enter your e-mail address. You will receive e-mail notification when new information is available in [address]. 9. Click Sign Up. You can now view and download portions of your medical record. 10. Click the Download Summary menu link to download a portable copy of your medical information. Additional Information    If you have questions, please visit the Frequently Asked Questions section of the AlgEvolve website at [email or web address] Remember, AlgEvolve is NOT to be used for urgent needs. For medical emergencies, dial 911.          Sacroiliac Pain: Exercises  Your Care Instructions  Here are some examples of typical rehabilitation exercises for your condition. Start each exercise slowly. Ease off the exercise if you start to have pain. Your doctor or physical therapist will tell you when you can start these exercises and which ones will work best for you. How to do the exercises  Knee-to-chest stretch    Do not do the knee-to-chest exercise if it causes or increases back or leg pain. 1. Lie on your back with your knees bent and your feet flat on the floor. You can put a small pillow under your head and neck if it is more comfortable. 2. Grasp your hands under one knee and bring the knee to your chest, keeping the other foot flat on the floor. 3. Keep your lower back pressed to the floor. Hold for at least 15 to 30 seconds. 4. Relax and lower the knee to the starting position. Repeat with the other leg. 5. Repeat 2 to 4 times with each leg. 6. To get more stretch, keep your other leg flat on the floor while pulling your knee to your chest.  Bridging    1. Lie on your back with both knees bent. Your knees should be bent about 90 degrees. 2. Tighten your belly muscles by pulling in your belly button toward your spine. Then push your feet into the floor, squeeze your buttocks, and lift your hips off the floor until your shoulders, hips, and knees are all in a straight line. 3. Hold for about 6 seconds as you continue to breathe normally, and then slowly lower your hips back down to the floor and rest for up to 10 seconds. 4. Repeat 8 to 12 times. Hip extension    1. Get down on your hands and knees on the floor. 2. Keeping your back and neck straight, lift one leg straight out behind you. When you lift your leg, keep your hips level. Don't let your back twist, and don't let your hip drop toward the floor. 3. Hold for 6 seconds. Repeat 8 to 12 times with each leg. 4. If you feel steady and strong when you do this exercise, you can make it more difficult.  To do this, when you lift your leg, also lift the opposite arm straight out in front of you. For example, lift the left leg and the right arm at the same time. (This is sometimes called the \"bird dog exercise. \") Hold for 6 seconds, and repeat 8 to 12 times on each side. Clamshell    1. Lie on your side with a pillow under your head. Keep your feet and knees together and your knees bent. 2. Raise your top knee, but keep your feet together. Do not let your hips roll back. Your legs should open up like a clamshell. 3. Hold for 6 seconds. 4. Slowly lower your knee back down. Rest for 10 seconds. 5. Repeat 8 to 12 times. 6. Switch to your other side and repeat steps 1 through 5. Hamstring wall stretch    1. Lie on your back in a doorway, with one leg through the open door. 2. Slide your affected leg up the wall to straighten your knee. You should feel a gentle stretch down the back of your leg. ¨ Do not arch your back. ¨ Do not bend either knee. ¨ Keep one heel touching the floor and the other heel touching the wall. Do not point your toes. 3. Hold the stretch for at least 1 minute to begin. Then try to lengthen the time you hold the stretch to as long as 6 minutes. 4. Switch legs, and repeat steps 1 through 3.  5. Repeat 2 to 4 times. If you do not have a place to do this exercise in a doorway, there is another way to do it:  1. Lie on your back, and bend one knee. 2. Loop a towel under the ball and toes of that foot, and hold the ends of the towel in your hands. 3. Straighten your knee, and slowly pull back on the towel. You should feel a gentle stretch down the back of your leg. 4. Switch legs, and repeat steps 1 through 3.  5. Repeat 2 to 4 times. Lower abdominal strengthening    1. Lie on your back with your knees bent and your feet flat on the floor. 2. Tighten your belly muscles by pulling your belly button in toward your spine.   3. Lift one foot off the floor and bring your knee toward your chest, so that your knee is straight above your hip and your leg is bent like the letter \"L. \"  4. Lift the other knee up to the same position. 5. Lower one leg at a time to the starting position. 6. Keep alternating legs until you have lifted each leg 8 to 12 times. 7. Be sure to keep your belly muscles tight and your back still as you are moving your legs. Be sure to breathe normally. Piriformis stretch    1. Lie on your back with your legs straight. 2. Lift your affected leg, and bend your knee. With your opposite hand, reach across your body, and then gently pull your knee toward your opposite shoulder. 3. Hold the stretch for 15 to 30 seconds. 4. Switch legs and repeat steps 1 through 3.  5. Repeat 2 to 4 times. Follow-up care is a key part of your treatment and safety. Be sure to make and go to all appointments, and call your doctor if you are having problems. It's also a good idea to know your test results and keep a list of the medicines you take. Where can you learn more? Go to http://leon-edinson.info/. Enter E444 in the search box to learn more about \"Sacroiliac Pain: Exercises. \"  Current as of: May 23, 2016  Content Version: 11.2  © 7539-2879 [employer]. Care instructions adapted under license by [employer] (which disclaims liability or warranty for this information). If you have questions about a medical condition or this instruction, always ask your healthcare professional. [place] [address] any warranty or liability for your use of this information. Neck: Exercises  Your Care Instructions  Here are some examples of typical rehabilitation exercises for your condition. Start each exercise slowly. Ease off the exercise if you start to have pain. Your doctor or physical therapist will tell you when you can start these exercises and which ones will work best for you.   How to do the exercises  Note: Stretching should make you feel a gentle stretch, but no pain. Stop any strengthening exercise that makes pain worse. Neck stretch    7. This stretch works best if you keep your shoulder down as you lean away from it. To help you remember to do this, start by relaxing your shoulders and lightly holding on to your thighs or your chair. 8. Tilt your head toward your shoulder and hold for 15 to 30 seconds. Let the weight of your head stretch your muscles. 9. If you would like a little added stretch, use your hand to gently and steadily pull your head toward your shoulder. For example, keeping your right shoulder down, lean your head to the left. 10. Repeat 2 to 4 times toward each shoulder. Diagonal neck stretch    5. Turn your head slightly toward the direction you will be stretching, and tilt your head diagonally toward your chest and hold for 15 to 30 seconds. 6. If you would like a little added stretch, use your hand to gently and steadily pull your head forward on the diagonal.  7. Repeat 2 to 4 times toward each side. Dorsal glide stretch    5. Sit or stand tall and look straight ahead. 6. Slowly tuck your chin as you glide your head backward over your body  7. Hold for a count of 6, and then relax for up to 10 seconds. 8. Repeat 8 to 12 times. Note: The dorsal glide stretches the back of the neck. If you feel pain, do not glide so far back. Some people find this exercise easier to do while lying on their backs with an ice pack on the neck. Chest and shoulder stretch    7. Sit or stand tall and glide your head backward as in the dorsal glide stretch. 8. Raise both arms so that your hands are next to your ears. 9. Take a deep breath, and as you breathe out, lower your elbows down and behind your back. You will feel your shoulder blades slide down and together, and at the same time you will feel a stretch across your chest and the front of your shoulders. 10. Hold for about 6 seconds, and then relax for up to 10 seconds.   11. Repeat 8 to 12 times.  Strengthening: Hands on head    1. Move your head backward, forward, and side to side against gentle pressure from your hands, holding each position for about 6 seconds. 2. Repeat 8 to 12 times. Follow-up care is a key part of your treatment and safety. Be sure to make and go to all appointments, and call your doctor if you are having problems. It's also a good idea to know your test results and keep a list of the medicines you take. Where can you learn more? Go to http://leon-edinson.info/. Enter P975 in the search box to learn more about \"Neck: Exercises. \"  Current as of: May 23, 2016  Content Version: 11.2  © 3862-7001 [employer]. Care instructions adapted under license by [employer] (which disclaims liability or warranty for this information). If you have questions about a medical condition or this instruction, always ask your healthcare professional. [address] any warranty or liability for your use of this information. Low Back Pain: Exercises  Your Care Instructions  Here are some examples of typical rehabilitation exercises for your condition. Start each exercise slowly. Ease off the exercise if you start to have pain. Your doctor or physical therapist will tell you when you can start these exercises and which ones will work best for you. How to do the exercises  Press-up    11. Lie on your stomach, supporting your body with your forearms. 12. Press your elbows down into the floor to raise your upper back. As you do this, relax your stomach muscles and allow your back to arch without using your back muscles. As your press up, do not let your hips or pelvis come off the floor. 13. Hold for 15 to 30 seconds, then relax. 14. Repeat 2 to 4 times. Alternate arm and leg (bird dog) exercise    Note: Do this exercise slowly.  Try to keep your body straight at all times, and do not let one hip drop lower than the other.  8. Start on the floor, on your hands and knees. 9. Tighten your belly muscles. 10. Raise one leg off the floor, and hold it straight out behind you. Be careful not to let your hip drop down, because that will twist your trunk. 11. Hold for about 6 seconds, then lower your leg and switch to the other leg. 12. Repeat 8 to 12 times on each leg. 13. Over time, work up to holding for 10 to 30 seconds each time. 14. If you feel stable and secure with your leg raised, try raising the opposite arm straight out in front of you at the same time. Knee-to-chest exercise    9. Lie on your back with your knees bent and your feet flat on the floor. 10. Bring one knee to your chest, keeping the other foot flat on the floor (or keeping the other leg straight, whichever feels better on your lower back). 11. Keep your lower back pressed to the floor. Hold for at least 15 to 30 seconds. 12. Relax, and lower the knee to the starting position. 13. Repeat with the other leg. Repeat 2 to 4 times with each leg. 14. To get more stretch, put your other leg flat on the floor while pulling your knee to your chest.  Curl-ups    12. Lie on the floor on your back with your knees bent at a 90-degree angle. Your feet should be flat on the floor, about 12 inches from your buttocks. 15. Cross your arms over your chest. If this bothers your neck, try putting your hands behind your neck (not your head), with your elbows spread apart. 14. Slowly tighten your belly muscles and raise your shoulder blades off the floor. 15. Keep your head in line with your body, and do not press your chin to your chest.  16. Hold this position for 1 or 2 seconds, then slowly lower yourself back down to the floor. 17. Repeat 8 to 12 times. Pelvic tilt exercise    3. Lie on your back with your knees bent. 4. \"Brace\" your stomach. This means to tighten your muscles by pulling in and imagining your belly button moving toward your spine.  You should feel like your back is pressing to the floor and your hips and pelvis are rocking back. 5. Hold for about 6 seconds while you breathe smoothly. 6. Repeat 8 to 12 times. Heel dig bridging    6. Lie on your back with both knees bent and your ankles bent so that only your heels are digging into the floor. Your knees should be bent about 90 degrees. 7. Then push your heels into the floor, squeeze your buttocks, and lift your hips off the floor until your shoulders, hips, and knees are all in a straight line. 8. Hold for about 6 seconds as you continue to breathe normally, and then slowly lower your hips back down to the floor and rest for up to 10 seconds. 9. Do 8 to 12 repetitions. Hamstring stretch in doorway    8. Lie on your back in a doorway, with one leg through the open door. 9. Slide your leg up the wall to straighten your knee. You should feel a gentle stretch down the back of your leg. 10. Hold the stretch for at least 15 to 30 seconds. Do not arch your back, point your toes, or bend either knee. Keep one heel touching the floor and the other heel touching the wall. 11. Repeat with your other leg. 12. Do 2 to 4 times for each leg. Hip flexor stretch    6. Kneel on the floor with one knee bent and one leg behind you. Place your forward knee over your foot. Keep your other knee touching the floor. 7. Slowly push your hips forward until you feel a stretch in the upper thigh of your rear leg. 8. Hold the stretch for at least 15 to 30 seconds. Repeat with your other leg. 9. Do 2 to 4 times on each side. Wall sit    1. Stand with your back 10 to 12 inches away from a wall. 2. Lean into the wall until your back is flat against it. 3. Slowly slide down until your knees are slightly bent, pressing your lower back into the wall. 4. Hold for about 6 seconds, then slide back up the wall. 5. Repeat 8 to 12 times. Follow-up care is a key part of your treatment and safety.  Be sure to make and go to all appointments, and call your doctor if you are having problems. It's also a good idea to know your test results and keep a list of the medicines you take. Where can you learn more? Go to http://leon-edinson.info/. Enter Y839 in the search box to learn more about \"Low Back Pain: Exercises. \"  Current as of: May 23, 2016  Content Version: 11.2  © 3737-0025 [employer]. Care instructions adapted under license by [employer] (which disclaims liability or warranty for this information). If you have questions about a medical condition or this instruction, always ask your healthcare professional. [address] any warranty or liability for your use of this information.

## 2017-06-09 ENCOUNTER — HOSPITAL ENCOUNTER (OUTPATIENT)
Dept: INFUSION THERAPY | Age: 67
Discharge: HOME | End: 2017-06-09
Payer: MEDICARE

## 2017-06-09 VITALS
RESPIRATION RATE: 18 BRPM | DIASTOLIC BLOOD PRESSURE: 76 MMHG | OXYGEN SATURATION: 95 % | SYSTOLIC BLOOD PRESSURE: 126 MMHG | HEART RATE: 61 BPM | TEMPERATURE: 98.1 F

## 2017-06-09 LAB
BASO+EOS+MONOS # BLD AUTO: 0.9 K/UL (ref 0–2.3)
BASO+EOS+MONOS # BLD AUTO: 13 % (ref 0.1–17)
DIFFERENTIAL METHOD BLD: ABNORMAL
ERYTHROCYTE [DISTWIDTH] IN BLOOD BY AUTOMATED COUNT: 16.7 % (ref 11.5–14.5)
HCT VFR BLD AUTO: 34.4 % (ref 36–48)
HGB BLD-MCNC: 11.4 G/DL (ref 12–16)
LYMPHOCYTES # BLD AUTO: 22 % (ref 14–44)
LYMPHOCYTES # BLD: 1.5 K/UL (ref 1.1–5.9)
MCH RBC QN AUTO: 27.2 PG (ref 25–35)
MCHC RBC AUTO-ENTMCNC: 33.1 G/DL (ref 31–37)
MCV RBC AUTO: 82.1 FL (ref 78–102)
NEUTS SEG # BLD: 4.7 K/UL (ref 1.8–9.5)
NEUTS SEG NFR BLD AUTO: 66 % (ref 40–70)
PLATELET # BLD AUTO: 88 K/UL (ref 135–420)
RBC # BLD AUTO: 4.19 M/UL (ref 4.1–5.1)
WBC # BLD AUTO: 7.1 K/UL (ref 4.5–13)

## 2017-06-09 PROCEDURE — 74011250636 HC RX REV CODE- 250/636

## 2017-06-09 PROCEDURE — 96372 THER/PROPH/DIAG INJ SC/IM: CPT

## 2017-06-09 PROCEDURE — 36415 COLL VENOUS BLD VENIPUNCTURE: CPT

## 2017-06-09 PROCEDURE — 74011250636 HC RX REV CODE- 250/636: Performed by: INTERNAL MEDICINE

## 2017-06-09 PROCEDURE — 85025 COMPLETE CBC W/AUTO DIFF WBC: CPT | Performed by: INTERNAL MEDICINE

## 2017-06-09 PROCEDURE — 85049 AUTOMATED PLATELET COUNT: CPT | Performed by: INTERNAL MEDICINE

## 2017-06-09 PROCEDURE — 74011000250 HC RX REV CODE- 250

## 2017-06-09 RX ORDER — WATER FOR INJECTION,STERILE
VIAL (ML) INJECTION
Status: COMPLETED
Start: 2017-06-09 | End: 2017-06-09

## 2017-06-09 RX ADMIN — ROMIPLOSTIM 207 MCG: 500 INJECTION, POWDER, LYOPHILIZED, FOR SOLUTION SUBCUTANEOUS at 11:39

## 2017-06-09 RX ADMIN — WATER 10 ML: 1 INJECTION INTRAMUSCULAR; INTRAVENOUS; SUBCUTANEOUS at 11:39

## 2017-06-09 NOTE — PROGRESS NOTES
[facility] Progress Note    Date: 2017    Name: Ana María Callahan    MRN: [id number]         : 1950      Ms. Izola Bamberger arrived to [facility] at [id number]. Ms. Izola Bamberger was assessed and education was provided. Nplate. Ms. Serafin Lopez vitals were reviewed. Visit Vitals    /76 (BP 1 Location: Right arm, BP Patient Position: At rest)    Pulse 61    Temp 98.1 °F (36.7 °C)    Resp 18    SpO2 95%       Pt was observed for 5 minutes after obtaining vital signs prior to initiating treatment. Blood drawn for labs via right AC venipuncture x1 attempt. Lab results obtained and reviewed. (Preliminary results scanned into media tab.)    Pt's preliminary platelet count was 22,305. Per Nplate protocol, since pt's platelet count is 35,582 today, pt's dose will remain at 3mcg/kg (3mcg x 69kg = 207mcg). Nplate 051FLT (8.58SZ) administered as ordered SQ in patient's right upper arm. Ms. Izola Bamberger was discharged from [place] [address] in stable condition at 1145. She is to return on 17 at 0800 for her next appointment.     Sondra Mehta RN  2017

## 2017-06-14 ENCOUNTER — APPOINTMENT (OUTPATIENT)
Dept: GENERAL RADIOLOGY | Age: 67
End: 2017-06-14
Attending: PHYSICAL MEDICINE & REHABILITATION
Payer: MEDICARE

## 2017-06-14 ENCOUNTER — HOSPITAL ENCOUNTER (OUTPATIENT)
Age: 67
Setting detail: OUTPATIENT SURGERY
Discharge: HOME | End: 2017-06-14
Attending: PHYSICAL MEDICINE & REHABILITATION | Admitting: PHYSICAL MEDICINE & REHABILITATION
Payer: MEDICARE

## 2017-06-14 VITALS
DIASTOLIC BLOOD PRESSURE: 69 MMHG | HEIGHT: 66 IN | RESPIRATION RATE: 16 BRPM | OXYGEN SATURATION: 100 % | HEART RATE: 77 BPM | WEIGHT: 159 LBS | BODY MASS INDEX: 25.55 KG/M2 | TEMPERATURE: 98.3 F | SYSTOLIC BLOOD PRESSURE: 124 MMHG

## 2017-06-14 PROCEDURE — 74011000250 HC RX REV CODE- 250

## 2017-06-14 PROCEDURE — 74011250636 HC RX REV CODE- 250/636

## 2017-06-14 PROCEDURE — 74011636320 HC RX REV CODE- 636/320

## 2017-06-14 PROCEDURE — 76010000009 HC PAIN MGT 0 TO 30 MIN PROC: Performed by: PHYSICAL MEDICINE & REHABILITATION

## 2017-06-14 PROCEDURE — 74011250637 HC RX REV CODE- 250/637: Performed by: PHYSICAL MEDICINE & REHABILITATION

## 2017-06-14 RX ORDER — DEXAMETHASONE SODIUM PHOSPHATE 100 MG/10ML
INJECTION INTRAMUSCULAR; INTRAVENOUS AS NEEDED
Status: DISCONTINUED | OUTPATIENT
Start: 2017-06-14 | End: 2017-06-14 | Stop reason: HOSPADM

## 2017-06-14 RX ORDER — DIAZEPAM 5 MG/1
5-20 TABLET ORAL ONCE
Status: COMPLETED | OUTPATIENT
Start: 2017-06-14 | End: 2017-06-14

## 2017-06-14 RX ORDER — LIDOCAINE HYDROCHLORIDE 10 MG/ML
INJECTION, SOLUTION EPIDURAL; INFILTRATION; INTRACAUDAL; PERINEURAL AS NEEDED
Status: DISCONTINUED | OUTPATIENT
Start: 2017-06-14 | End: 2017-06-14 | Stop reason: HOSPADM

## 2017-06-14 RX ORDER — SODIUM CHLORIDE 0.9 % (FLUSH) 0.9 %
5-10 SYRINGE (ML) INJECTION AS NEEDED
Status: DISCONTINUED | OUTPATIENT
Start: 2017-06-14 | End: 2017-06-14 | Stop reason: HOSPADM

## 2017-06-14 RX ADMIN — DIAZEPAM 10 MG: 5 TABLET ORAL at 12:38

## 2017-06-14 NOTE — DISCHARGE INSTRUCTIONS
[facility]      Post Procedures Instructions    *Resume Diet and Activity as tolerated. Rest for the remainder of the day. *You may fell worse before you feel better as the numbing medications wear off before the steroids take effect if used for your procedures. *Do not use affected extremity until numbness or loss of sensation has completely resolved without assistance. *DO NOT DRIVE, operate machinery/heavey equipment for 24 hours. *DO NOT DRINK ALCOHOL for 24 hours as it may interact with the sedation if you received it and also thins your blood and may cause you to bleed. *WAIT 24 hours before starting back ANY Blood thinning medications:   (Heparin, Coumadin, Warfarin, Lovenox, Plavix, Aggrenox)    *Resume Pre-Procedure Medications as prescribed except Blood Thinners unless directed by your Physician or Cardiologist.     *Avoid Hot tubs and Heating pad for 24 hours to prevent dissipation of medications, you may shower to remove bandages and remaining prep residue on the skin. * If you develop a Headache, drink plenty of fluids including beverages with caffeine (Coffee, Mt. Dew etc.) and rest.  If the headache persists longer than 24 hoursor intensifies - Please call [facility] ([facility]) [phone]      * If you are DIABETIC, check your blood sugar three times a day for the next three days, the steroids will increase your blood sugar. If your blood sugar is greater than 400 have someone drive you to the nearest [address]. * If you experience any of the following problems, call the [facility] [phone] between 8:00 am - 4:30pm or After Hours [phone].     Shortness of breath    Fever of 101 F or higher    Nausea / Vomiting (not normal to you)    Increasing stiffness in the neck    Weakness or numbness in the arms or legs that is not resolving    Prolonged and increasing pain > than 4 days    ANYTHING OUT of the ORDINARY TO YOU    If YOU are experiencing a severe reaction / complication that you have never had before post procedure, call 911 or go to the nearest emergency room! All patients must have a  for transportation [place] regardless if you do or do not receive sedation. DISCHARGE SUMMARY from Nurse      PATIENT INSTRUCTIONS:    After Oral  or intravenous sedation, for 24 hours or while taking prescription Narcotics:  · Limit your activities  · Do not drive and operate hazardous machinery  · Do not make important personal or business decisions  · Do  not drink alcoholic beverages  · If you have not urinated within 8 hours after discharge, please contact your surgeon on call. Report the following to your surgeon:  · Excessive pain, swelling, redness or odor of or around the surgical area  · Temperature over 101  · Nausea and vomiting lasting longer than 4 hours or if unable to take medications  · Any signs of decreased circulation or nerve impairment to extremity: change in color, persistent  numbness, tingling, coldness or increase pain  · Any questions        What to do at Home:  Recommended activity: Activity as tolerated, NO DRIVING FOR 24 Hours post injection          *  Please give a list of your current medications to your Primary Care Provider. *  Please update this list whenever your medications are discontinued, doses are      changed, or new medications (including over-the-counter products) are added. *  Please carry medication information at all times in case of emergency situations. These are general instructions for a healthy lifestyle:    No smoking/ No tobacco products/ Avoid exposure to second hand smoke    Surgeon General's Warning:  Quitting smoking now greatly reduces serious risk to your health.     Obesity, smoking, and sedentary lifestyle greatly increases your risk for illness    A healthy diet, regular physical exercise & weight monitoring are important for maintaining a healthy lifestyle    You may be retaining fluid if you have a history of heart failure or if you experience any of the following symptoms:  Weight gain of 3 pounds or more overnight or 5 pounds in a week, increased swelling in our hands or feet or shortness of breath while lying flat in bed. Please call your doctor as soon as you notice any of these symptoms; do not wait until your next office visit. Recognize signs and symptoms of STROKE:    F-face looks uneven    A-arms unable to move or move unevenly    S-speech slurred or non-existent    T-time-call 911 as soon as signs and symptoms begin-DO NOT go       Back to bed or wait to see if you get better-TIME IS BRAIN. [employer] Activation    Thank you for requesting access to [employer]. Please follow the instructions below to securely access and download your online medical record. [employer] allows you to send messages to your doctor, view your test results, renew your prescriptions, schedule appointments, and more. How Do I Sign Up? 1. In your internet browser, go to [email or web address]  2. Click on the First Time User? Click Here link in the Sign In box. You will be redirect to the New Member Sign Up page. 3. Enter your [employer] Access Code exactly as it appears below. You will not need to use this code after youve completed the sign-up process. If you do not sign up before the expiration date, you must request a new code. [employer] Access Code: [email or web address]  Expires: 2017 11:12 AM (This is the date your [employer] access code will )    4. Enter the last four digits of your Social Security Number (xxxx) and Date of Birth (mm/dd/yyyy) as indicated and click Submit. You will be taken to the next sign-up page. 5. Create a [employer] ID. This will be your [employer] login ID and cannot be changed, so think of one that is secure and easy to remember. 6. Create a [employer] password.  You can change your password at any time. 7. Enter your Password Reset Question and Answer. This can be used at a later time if you forget your password. 8. Enter your e-mail address. You will receive e-mail notification when new information is available in [address] 9. Click Sign Up. You can now view and download portions of your medical record. 10. Click the Download Summary menu link to download a portable copy of your medical information. Additional Information    If you have questions, please visit the Frequently Asked Questions section of the [employer] website at [email or web address] Remember, [employer] is NOT to be used for urgent needs. For medical emergencies, dial 911.

## 2017-06-14 NOTE — PROCEDURES
Bilateral Sacroiliac Joint Procedure Note    Patient Name: Courtney Mueller    Date of Procedure: June 14, 2017    Preoperative Diagnosis: Bilateral Sacroiliac Joint Pain    Post Operative Diagnosis: same    Procedure: SI Joint Injection bilateral      Consent: Informed consent was obtained prior to the procedure. The patient was given the opportunity to ask questions regarding the procedure and its associated risks. In addition to the potential risks associated with the procedure itself, the patient was informed both verbally and in writing of potential side effects of the use of glucocorticoids. The patient appeared to comprehend the informed consent and desired to have the procedure performed. Procedure: The patient was placed in the prone position on the flouroscopy table and the back was prepped and draped in the usual sterile manner. A #22 gauge spinal needle was then advanced to lie within the SI joint after local Lidocaine 1% injection. The procedure was repeated for the contralateral SI joint. A total of 30 mg of preservative free dexamethasone  and 5 ml of Lidocaine was introduced into SI joints. The injection area was cleaned and bandaids applied. No excessive bleeding was noted. Patient dressed and was discharged to home with instructions. Discussion:  (x) The patient tolerated the procedure well.     ( )       Dl Blanco MD  June 14, 2017

## 2017-06-14 NOTE — H&P
Date of Surgery Update:  Abilio Guerrero was seen and examined. History and physical has been reviewed. The patient has been examined. There have been no significant clinical changes since the completion of the last office visit.       Signed By: Abhishek Chau MD     June 14, 2017 12:45 PM

## 2017-06-16 ENCOUNTER — HOSPITAL ENCOUNTER (OUTPATIENT)
Dept: INFUSION THERAPY | Age: 67
Discharge: HOME | End: 2017-06-16
Payer: MEDICARE

## 2017-06-16 VITALS
RESPIRATION RATE: 18 BRPM | HEART RATE: 9 BPM | SYSTOLIC BLOOD PRESSURE: 109 MMHG | TEMPERATURE: 97.5 F | DIASTOLIC BLOOD PRESSURE: 71 MMHG

## 2017-06-16 LAB
BASOPHILS # BLD AUTO: 0 K/UL (ref 0–0.06)
BASOPHILS # BLD: 0 % (ref 0–3)
DIFFERENTIAL METHOD BLD: ABNORMAL
EOSINOPHIL # BLD: 0 K/UL (ref 0–0.4)
EOSINOPHIL NFR BLD: 0 % (ref 0–5)
ERYTHROCYTE [DISTWIDTH] IN BLOOD BY AUTOMATED COUNT: 16 % (ref 11.6–14.5)
HCT VFR BLD AUTO: 33 % (ref 35–45)
HGB BLD-MCNC: 11.5 G/DL (ref 12–16)
LYMPHOCYTES # BLD AUTO: 32 % (ref 20–51)
LYMPHOCYTES # BLD: 2.7 K/UL (ref 0.8–3.5)
MCH RBC QN AUTO: 27.1 PG (ref 24–34)
MCHC RBC AUTO-ENTMCNC: 34.8 G/DL (ref 31–37)
MCV RBC AUTO: 77.6 FL (ref 74–97)
MONOCYTES # BLD: 0.6 K/UL (ref 0–1)
MONOCYTES NFR BLD AUTO: 7 % (ref 2–9)
NEUTS BAND NFR BLD MANUAL: 1 % (ref 0–5)
NEUTS SEG # BLD: 5.2 K/UL (ref 1.8–8)
NEUTS SEG NFR BLD AUTO: 60 % (ref 42–75)
PLATELET # BLD AUTO: 98 K/UL (ref 135–420)
PLATELET COMMENTS,PCOM: ABNORMAL
RBC # BLD AUTO: 4.25 M/UL (ref 4.2–5.3)
RBC MORPH BLD: ABNORMAL
WBC # BLD AUTO: 8.5 K/UL (ref 4.6–13.2)

## 2017-06-16 PROCEDURE — 74011000250 HC RX REV CODE- 250

## 2017-06-16 PROCEDURE — 36415 COLL VENOUS BLD VENIPUNCTURE: CPT

## 2017-06-16 PROCEDURE — 85025 COMPLETE CBC W/AUTO DIFF WBC: CPT | Performed by: INTERNAL MEDICINE

## 2017-06-16 PROCEDURE — 74011250636 HC RX REV CODE- 250/636

## 2017-06-16 PROCEDURE — 96372 THER/PROPH/DIAG INJ SC/IM: CPT

## 2017-06-16 PROCEDURE — 74011250636 HC RX REV CODE- 250/636: Performed by: NURSE PRACTITIONER

## 2017-06-16 RX ORDER — WATER FOR INJECTION,STERILE
VIAL (ML) INJECTION
Status: COMPLETED
Start: 2017-06-16 | End: 2017-06-16

## 2017-06-16 RX ADMIN — ROMIPLOSTIM 207 MCG: 250 INJECTION, POWDER, LYOPHILIZED, FOR SOLUTION SUBCUTANEOUS at 08:31

## 2017-06-16 RX ADMIN — WATER 0.72 ML: 1 INJECTION INTRAMUSCULAR; INTRAVENOUS; SUBCUTANEOUS at 08:31

## 2017-06-16 NOTE — PROGRESS NOTES
[facility] Progress Note    Date: 2017    Name: Tima Silva    MRN: [id number]         : 1950      Ms. Josselyn Birmingham arrived to [facility] at 0101. Ms. Josselyn Birmingham was assessed and education was provided. Nplate. Ms. Viki Vega vitals were reviewed. Visit Vitals    /71 (BP 1 Location: Right arm, BP Patient Position: Sitting)    Pulse (!) 9    Temp 97.5 °F (36.4 °C)    Resp 18       Pt was observed for 5 minutes after obtaining vital signs prior to initiating treatment. Blood drawn for labs via right AC venipuncture x1 attempt. Lab results obtained and reviewed. (Preliminary results scanned into media tab.)    Pt's preliminary platelet count was 33,485. Per Nplate protocol, since pt's platelet count is 06,451 today, pt's dose will remain at 3mcg/kg (3mcg x 69kg = 207mcg). Nplate 377XMX (1.31AC) administered as ordered SQ in patient's right upper arm. Ms. Josselyn Birmingham was discharged from [place] [address] in stable condition at 0830. She is to return on 17 at 0800 for her next appointment.     Dorothy Linda RN  2017

## 2017-06-23 ENCOUNTER — HOSPITAL ENCOUNTER (OUTPATIENT)
Dept: INFUSION THERAPY | Age: 67
Discharge: HOME | End: 2017-06-23
Payer: MEDICARE

## 2017-06-23 VITALS
SYSTOLIC BLOOD PRESSURE: 107 MMHG | RESPIRATION RATE: 20 BRPM | HEART RATE: 57 BPM | DIASTOLIC BLOOD PRESSURE: 71 MMHG | TEMPERATURE: 98.4 F

## 2017-06-23 LAB
BASO+EOS+MONOS # BLD AUTO: 1.2 K/UL (ref 0–2.3)
BASO+EOS+MONOS # BLD AUTO: 22 % (ref 0.1–17)
DIFFERENTIAL METHOD BLD: ABNORMAL
ERYTHROCYTE [DISTWIDTH] IN BLOOD BY AUTOMATED COUNT: 16.8 % (ref 11.5–14.5)
HCT VFR BLD AUTO: 32.2 % (ref 36–48)
HGB BLD-MCNC: 10.6 G/DL (ref 12–16)
LYMPHOCYTES # BLD AUTO: 36 % (ref 14–44)
LYMPHOCYTES # BLD: 2.1 K/UL (ref 1.1–5.9)
MCH RBC QN AUTO: 26.9 PG (ref 25–35)
MCHC RBC AUTO-ENTMCNC: 32.9 G/DL (ref 31–37)
MCV RBC AUTO: 81.7 FL (ref 78–102)
NEUTS SEG # BLD: 2.4 K/UL (ref 1.8–9.5)
NEUTS SEG NFR BLD AUTO: 42 % (ref 40–70)
PLATELET # BLD AUTO: 132 K/UL (ref 135–420)
RBC # BLD AUTO: 3.94 M/UL (ref 4.1–5.1)
WBC # BLD AUTO: 5.7 K/UL (ref 4.5–13)

## 2017-06-23 PROCEDURE — 85025 COMPLETE CBC W/AUTO DIFF WBC: CPT | Performed by: INTERNAL MEDICINE

## 2017-06-23 PROCEDURE — 36415 COLL VENOUS BLD VENIPUNCTURE: CPT

## 2017-06-23 PROCEDURE — 74011250636 HC RX REV CODE- 250/636: Performed by: INTERNAL MEDICINE

## 2017-06-23 PROCEDURE — 74011000250 HC RX REV CODE- 250

## 2017-06-23 PROCEDURE — 96372 THER/PROPH/DIAG INJ SC/IM: CPT

## 2017-06-23 PROCEDURE — 85049 AUTOMATED PLATELET COUNT: CPT | Performed by: INTERNAL MEDICINE

## 2017-06-23 PROCEDURE — 74011250636 HC RX REV CODE- 250/636

## 2017-06-23 RX ORDER — WATER FOR INJECTION,STERILE
VIAL (ML) INJECTION
Status: COMPLETED
Start: 2017-06-23 | End: 2017-06-23

## 2017-06-23 RX ADMIN — ROMIPLOSTIM 207 MCG: 250 INJECTION, POWDER, LYOPHILIZED, FOR SOLUTION SUBCUTANEOUS at 09:12

## 2017-06-23 RX ADMIN — WATER 10 ML: 1 INJECTION INTRAMUSCULAR; INTRAVENOUS; SUBCUTANEOUS at 09:12

## 2017-06-23 NOTE — PROGRESS NOTES
[facility] Progress Note    Date: 2017    Name: Geovanny Coleman    MRN: [id number]         : 1950      Ms. Angella Silva arrived to [facility] at 9073. Ms. Angella Silva was assessed and education was provided. Nplate. Ms. Bouchra Whitt vitals were reviewed. Visit Vitals    /71 (BP 1 Location: Right arm, BP Patient Position: Sitting)    Pulse (!) 57    Temp 98.4 °F (36.9 °C)    Resp 20       Pt was observed for 5 minutes after obtaining vital signs prior to initiating treatment. Blood drawn for labs via right AC venipuncture x1 attempt. Lab results obtained and reviewed. (Preliminary results scanned into media tab.)    Pt's preliminary platelet count was 223,248. Per Nplate protocol, since pt's platelet count is 906,013 today, pt's dose will remain at 3mcg/kg (3mcg x 69kg = 207mcg). Nplate 412WNA (0.55AN) administered as ordered SQ in patient's right upper arm. Ms. Angella Silva was discharged from [place] [address] in stable condition at [facility].. She is to return on 17 at 0830 for her next appointment.     Mario Lizarraga RN  2017

## 2017-06-29 RX ORDER — HEPARIN SODIUM (PORCINE) LOCK FLUSH IV SOLN 100 UNIT/ML 100 UNIT/ML
500 SOLUTION INTRAVENOUS AS NEEDED
Status: DISCONTINUED | OUTPATIENT
Start: 2017-06-30 | End: 2017-07-04 | Stop reason: HOSPADM

## 2017-06-29 RX ORDER — SODIUM CHLORIDE 0.9 % (FLUSH) 0.9 %
10-40 SYRINGE (ML) INJECTION AS NEEDED
Status: DISCONTINUED | OUTPATIENT
Start: 2017-06-30 | End: 2017-07-04 | Stop reason: HOSPADM

## 2017-06-30 ENCOUNTER — HOSPITAL ENCOUNTER (OUTPATIENT)
Dept: INFUSION THERAPY | Age: 67
Discharge: HOME | End: 2017-06-30
Payer: MEDICARE

## 2017-06-30 VITALS — RESPIRATION RATE: 20 BRPM | DIASTOLIC BLOOD PRESSURE: 75 MMHG | TEMPERATURE: 97.5 F | SYSTOLIC BLOOD PRESSURE: 111 MMHG

## 2017-06-30 LAB
BASO+EOS+MONOS # BLD AUTO: 1 K/UL (ref 0–2.3)
BASO+EOS+MONOS # BLD AUTO: 15 % (ref 0.1–17)
DIFFERENTIAL METHOD BLD: ABNORMAL
ERYTHROCYTE [DISTWIDTH] IN BLOOD BY AUTOMATED COUNT: 17 % (ref 11.5–14.5)
HCT VFR BLD AUTO: 32.4 % (ref 36–48)
HGB BLD-MCNC: 10.7 G/DL (ref 12–16)
LYMPHOCYTES # BLD AUTO: 25 % (ref 14–44)
LYMPHOCYTES # BLD: 1.7 K/UL (ref 1.1–5.9)
MCH RBC QN AUTO: 27 PG (ref 25–35)
MCHC RBC AUTO-ENTMCNC: 33 G/DL (ref 31–37)
MCV RBC AUTO: 81.8 FL (ref 78–102)
NEUTS SEG # BLD: 4.2 K/UL (ref 1.8–9.5)
NEUTS SEG NFR BLD AUTO: 60 % (ref 40–70)
PLATELET # BLD AUTO: 145 K/UL (ref 135–420)
RBC # BLD AUTO: 3.96 M/UL (ref 4.1–5.1)
WBC # BLD AUTO: 6.9 K/UL (ref 4.5–13)

## 2017-06-30 PROCEDURE — 74011250636 HC RX REV CODE- 250/636: Performed by: INTERNAL MEDICINE

## 2017-06-30 PROCEDURE — 74011250636 HC RX REV CODE- 250/636

## 2017-06-30 PROCEDURE — 85025 COMPLETE CBC W/AUTO DIFF WBC: CPT | Performed by: INTERNAL MEDICINE

## 2017-06-30 PROCEDURE — 85049 AUTOMATED PLATELET COUNT: CPT | Performed by: INTERNAL MEDICINE

## 2017-06-30 PROCEDURE — 96372 THER/PROPH/DIAG INJ SC/IM: CPT

## 2017-06-30 PROCEDURE — 36415 COLL VENOUS BLD VENIPUNCTURE: CPT

## 2017-06-30 RX ORDER — WATER FOR INJECTION,STERILE
VIAL (ML) INJECTION
Status: DISPENSED
Start: 2017-06-30 | End: 2017-06-30

## 2017-06-30 RX ADMIN — ROMIPLOSTIM 207 MCG: 250 INJECTION, POWDER, LYOPHILIZED, FOR SOLUTION SUBCUTANEOUS at 09:42

## 2017-07-06 RX ORDER — OXYCODONE AND ACETAMINOPHEN 7.5; 325 MG/1; MG/1
TABLET ORAL
Qty: 120 TAB | Refills: 0 | Status: SHIPPED | OUTPATIENT
Start: 2017-07-06 | End: 2017-09-13 | Stop reason: SDUPTHER

## 2017-07-06 RX ORDER — OXYCODONE AND ACETAMINOPHEN 7.5; 325 MG/1; MG/1
TABLET ORAL
Qty: 120 TAB | Refills: 0 | Status: SHIPPED | OUTPATIENT
Start: 2017-07-06 | End: 2017-07-06 | Stop reason: SDUPTHER

## 2017-07-07 ENCOUNTER — OFFICE VISIT (OUTPATIENT)
Dept: ORTHOPEDIC SURGERY | Age: 67
End: 2017-07-07

## 2017-07-07 ENCOUNTER — HOSPITAL ENCOUNTER (OUTPATIENT)
Dept: INFUSION THERAPY | Age: 67
Discharge: HOME | End: 2017-07-07
Payer: MEDICARE

## 2017-07-07 VITALS
WEIGHT: 149 LBS | SYSTOLIC BLOOD PRESSURE: 111 MMHG | RESPIRATION RATE: 18 BRPM | TEMPERATURE: 98.1 F | HEART RATE: 94 BPM | BODY MASS INDEX: 23.95 KG/M2 | HEIGHT: 66 IN | DIASTOLIC BLOOD PRESSURE: 59 MMHG

## 2017-07-07 VITALS
SYSTOLIC BLOOD PRESSURE: 114 MMHG | TEMPERATURE: 97.6 F | RESPIRATION RATE: 20 BRPM | HEART RATE: 69 BPM | DIASTOLIC BLOOD PRESSURE: 70 MMHG

## 2017-07-07 DIAGNOSIS — M79.2 NEURITIS: ICD-10-CM

## 2017-07-07 DIAGNOSIS — R29.898 RIGHT LEG WEAKNESS: ICD-10-CM

## 2017-07-07 DIAGNOSIS — M62.830 MUSCLE SPASM OF BACK: ICD-10-CM

## 2017-07-07 DIAGNOSIS — M51.36 DDD (DEGENERATIVE DISC DISEASE), LUMBAR: Primary | ICD-10-CM

## 2017-07-07 LAB
BASO+EOS+MONOS # BLD AUTO: 0.4 K/UL (ref 0–2.3)
BASO+EOS+MONOS # BLD AUTO: 8 % (ref 0.1–17)
DIFFERENTIAL METHOD BLD: ABNORMAL
ERYTHROCYTE [DISTWIDTH] IN BLOOD BY AUTOMATED COUNT: 17 % (ref 11.5–14.5)
HCT VFR BLD AUTO: 31.4 % (ref 36–48)
HGB BLD-MCNC: 10 G/DL (ref 12–16)
LYMPHOCYTES # BLD AUTO: 29 % (ref 14–44)
LYMPHOCYTES # BLD: 1.5 K/UL (ref 1.1–5.9)
MCH RBC QN AUTO: 26.3 PG (ref 25–35)
MCHC RBC AUTO-ENTMCNC: 31.8 G/DL (ref 31–37)
MCV RBC AUTO: 82.6 FL (ref 78–102)
NEUTS SEG # BLD: 3.4 K/UL (ref 1.8–9.5)
NEUTS SEG NFR BLD AUTO: 63 % (ref 40–70)
PLATELET # BLD AUTO: 132 K/UL (ref 135–420)
RBC # BLD AUTO: 3.8 M/UL (ref 4.1–5.1)
WBC # BLD AUTO: 5.3 K/UL (ref 4.5–13)

## 2017-07-07 PROCEDURE — 96523 IRRIG DRUG DELIVERY DEVICE: CPT

## 2017-07-07 PROCEDURE — 74011250636 HC RX REV CODE- 250/636: Performed by: INTERNAL MEDICINE

## 2017-07-07 PROCEDURE — 85049 AUTOMATED PLATELET COUNT: CPT | Performed by: INTERNAL MEDICINE

## 2017-07-07 PROCEDURE — 77030012965 HC NDL HUBR BBMI -A

## 2017-07-07 PROCEDURE — 96372 THER/PROPH/DIAG INJ SC/IM: CPT

## 2017-07-07 PROCEDURE — 74011250636 HC RX REV CODE- 250/636

## 2017-07-07 PROCEDURE — 85025 COMPLETE CBC W/AUTO DIFF WBC: CPT | Performed by: INTERNAL MEDICINE

## 2017-07-07 PROCEDURE — 36415 COLL VENOUS BLD VENIPUNCTURE: CPT | Performed by: INTERNAL MEDICINE

## 2017-07-07 RX ORDER — GABAPENTIN 300 MG/1
CAPSULE ORAL
Qty: 90 CAP | Refills: 1 | Status: SHIPPED | OUTPATIENT
Start: 2017-07-07 | End: 2018-09-17 | Stop reason: SDUPTHER

## 2017-07-07 RX ORDER — CHOLECALCIFEROL (VITAMIN D3) 125 MCG
2000 CAPSULE ORAL DAILY
Status: ON HOLD | COMMUNITY
End: 2017-08-09

## 2017-07-07 RX ORDER — WATER FOR INJECTION,STERILE
VIAL (ML) INJECTION
Status: DISPENSED
Start: 2017-07-07 | End: 2017-07-07

## 2017-07-07 RX ORDER — HEPARIN SODIUM (PORCINE) LOCK FLUSH IV SOLN 100 UNIT/ML 100 UNIT/ML
500 SOLUTION INTRAVENOUS AS NEEDED
Status: ACTIVE | OUTPATIENT
Start: 2017-07-07 | End: 2017-07-08

## 2017-07-07 RX ORDER — SODIUM CHLORIDE 0.9 % (FLUSH) 0.9 %
10-40 SYRINGE (ML) INJECTION AS NEEDED
Status: DISCONTINUED | OUTPATIENT
Start: 2017-07-07 | End: 2017-07-11 | Stop reason: HOSPADM

## 2017-07-07 RX ORDER — HEPARIN SODIUM (PORCINE) LOCK FLUSH IV SOLN 100 UNIT/ML 100 UNIT/ML
SOLUTION INTRAVENOUS
Status: COMPLETED
Start: 2017-07-07 | End: 2017-07-07

## 2017-07-07 RX ADMIN — ROMIPLOSTIM 207 MCG: 250 INJECTION, POWDER, LYOPHILIZED, FOR SOLUTION SUBCUTANEOUS at 11:01

## 2017-07-07 RX ADMIN — HEPARIN SODIUM (PORCINE) LOCK FLUSH IV SOLN 100 UNIT/ML 500 UNITS: 100 SOLUTION at 11:01

## 2017-07-07 RX ADMIN — Medication 40 ML: at 11:01

## 2017-07-07 NOTE — PROGRESS NOTES
[facility]  [address] [place], [facility] [address]  Phone: [phone]  Fax: [phone]      Nacho Rashid was seen today for back pain. Diagnoses and all orders for this visit:    DDD (degenerative disc disease), lumbar  -     MRI LUMB SPINE W WO CONT; Future    Neuritis  -     MRI LUMB SPINE W WO CONT; Future    Right leg weakness  -     MRI LUMB SPINE W WO CONT; Future    Muscle spasm of back  -     MRI LUMB SPINE W WO CONT; Future    Other orders  -     gabapentin (NEURONTIN) 300 mg capsule; Take 1 po q am and 2 po q pm         IMPRESSION AND PLAN:  Franchesca Maya is a 74 y.o. female with history of lumbar pain. She tried a bilateral SI joint injection on 06/14/2017 and experienced relief of her left sided pain. Pt continues to experience pain in the right lower back and reports that her pain radiates down the anterior and posterior right thigh. 1) Pt was given information on lumbar arthritis and sacroiliac exercises. 2) An open lumbar MRI was ordered. She c/o progressive lower back pain with right radicular symptoms. She has weakness and has had prior surgery. 3) She will increase her Neurontin 300 mg from 1 tab QHS to 1 tab QAM and 2 tabs QHS, to better manage her symptoms. 4) Ms. Joey Hart has a reminder for a \"due or due soon\" health maintenance. I have asked that she contact her primary care provider, Sarah Najera MD, for follow-up on this health maintenance. 5)  demonstrated consistency with prescribing. 6) Pt will follow-up in 3-4 weeks. HISTORY OF PRESENT ILLNESS:  Franchesca Maya is a 74 y.o. female with history of lumbar pain. She tried a bilateral SI joint injection on 06/14/2017 and experienced relief of her left sided pain. Pt continues to experience pain in the right lower back and reports that her pain radiates down the anterior and posterior right thigh. She has had chemotherapy and resultant neuropathy.  Pt has been taking Neurontin 300 mg 1 tab QHS without sedation. She admits that she does not sleep well at night and also takes sleep medication. Pt at this time desires to proceed with a lumbar MRI and medication evaluation. Pain Scale: 8/10    PCP: Tenisha Raya MD       Past Medical History:   Diagnosis Date    Antineoplastic chemotherapy induced anemia     Arthritis     Breast cancer ([facility]     Cancer ([facility]     Breast    Chronic ITP (idiopathic thrombocytopenia) (HCC) 10/31/2016    Diabetes ([facility]     borderline, no meds    Esophageal cancer ([facility]     treated with chemo        Social History     Social History    Marital status:      Spouse name: N/A    Number of children: N/A    Years of education: N/A     Occupational History    Not on file. Social History Main Topics    Smoking status: Never Smoker    Smokeless tobacco: Never Used    Alcohol use No    Drug use: No    Sexual activity: Not on file     Other Topics Concern    Not on file     Social History Narrative       Current Outpatient Prescriptions   Medication Sig Dispense Refill    cholecalciferol, vitamin D3, (VITAMIN D3) 2,000 unit tab Take 2,000 Units by mouth daily.  gabapentin (NEURONTIN) 300 mg capsule Take 1 po q am and 2 po q pm 90 Cap 1    oxyCODONE-acetaminophen (PERCOCET) 7.5-325 mg per tablet Take 1 tablet every 6 hours as needed for pain 120 Tab 0    rivaroxaban (XARELTO) 10 mg tablet Take 10 mg by mouth daily.  zolpidem (AMBIEN) 10 mg tablet TAKE 1 TABLET BY MOUTH NIGHTLY AS NEEDED FOR SLEEP *MAX DAILY AMOUNT 10MG* 30 Tab 3    lidocaine-prilocaine (EMLA) topical cream APPLY OVER PORT 1 HOUR PRIOR TO CHEMOTHERAPY THAN COVER WITH SARAN WRAP 30 g 6    magic mouthwash solution Take 5 mL by mouth three (3) times daily as needed for Stomatitis.  Magic mouth wash   Maalox  Lidocaine 2% viscous   Diphenhydramine oral solution     Pharmacy to mix equal portions of ingredients to a total volume as indicated in the dispense amount. 236 mL 0    celecoxib (CELEBREX) 200 mg capsule Take  by mouth two (2) times a day.  furosemide (LASIX) 40 mg tablet Take  by mouth daily.  dronabinol (MARINOL) 5 mg capsule Take 1 Cap by mouth two (2) times a day. 60 Cap 1    gabapentin (NEURONTIN) 300 mg capsule Take 300 mg by mouth three (3) times daily.  KLOR-CON M20 20 mEq tablet TAKE ONE TABLET BY MOUTH ONCE A DAY 30 Tab 3    triamterene-hydrochlorothiazide (DYAZIDE) 37.5-25 mg per capsule TAKE 1 CAPSULE BY ORAL ROUTE ONCE DAILY 30 Cap 5    aluminum-magnesium hydroxide 200-200 mg/5 mL susp 5 mL, diphenhydrAMINE 12.5 mg/5 mL liqd 12.5 mg, lidocaine 2 % soln 5 mL 5 mL by Swish and Spit route two (2) times a day. Magic mouth wash   Maalox  Lidocaine 2% viscous   Diphenhydramine oral solution     Pharmacy to mix equal portions of ingredients to a total volume as indicated in the dispense amount. 240 mL 1    potassium chloride (K-DUR, KLOR-CON) 20 mEq tablet Take 2 Tabs by mouth daily. 30 Tab 3    albuterol (PROAIR HFA) 90 mcg/actuation inhaler 1-2 puffs every 4-6 hrs. 1 Inhaler 1    senna (SENNA) 8.6 mg tablet Take 1 Tab by mouth daily.  ondansetron hcl (ZOFRAN) 8 mg tablet Take 1 Tab by mouth every eight (8) hours as needed for Nausea. 30 Tab 3    IRON AG&FUM/C/FA/MV CMB11/CA-T (PRUVATE 21-7 PO) Take  by mouth.       warfarin (COUMADIN) 1 mg tablet TAKE 1 TABLET (1 MG) BY ORAL ROUTE ONCE DAILY 30 Tab 6    VITAMIN D2 50,000 unit capsule TAKE 1 CAPSULE BY MOUTH EVERY SEVEN (7) DAYS. 4 Cap 3    cyclobenzaprine (FLEXERIL) 5 mg tablet       nabumetone (RELAFEN) 750 mg tablet        Facility-Administered Medications Ordered in Other Visits   Medication Dose Route Frequency Provider Last Rate Last Dose    sodium chloride (NS) flush 10-40 mL  10-40 mL IntraVENous PRN Shraddha Jama MD   40 mL at 07/07/17 1101       Allergies   Allergen Reactions    Aspirin Swelling     Pt states her whole body swells when she takes aspirin.  Adhesive Unable to Obtain    Pcn [Penicillins] Rash         REVIEW OF SYSTEMS    Constitutional: Negative for fever, chills, or weight change. Respiratory: Negative for cough or shortness of breath. Cardiovascular: Negative for chest pain or palpitations. Gastrointestinal: Negative for acid reflux, change in bowel habits, or constipation. Genitourinary: Negative for dysuria and flank pain. Musculoskeletal: Positive for lumbar pain. Skin: Negative for rash. Neurological: Negative for headaches, dizziness, or numbness. Endo/Heme/Allergies: Negative for increased bruising. Psychiatric/Behavioral: Positive for difficulty with sleep. PHYSICAL EXAMINATION  Visit Vitals    /59    Pulse 94    Temp 98.1 °F (36.7 °C) (Oral)    Resp 18    Ht 5' 6\" (1.676 m)    Wt 149 lb (67.6 kg)    BMI 24.05 kg/m2       Constitutional: Awake, alert, and in no acute distress  Neurological: 1+ symmetrical DTRs in the lower extremities. Sensation to light touch is intact. Skin: warm, dry, and intact. Musculoskeletal: Tight across the upper trapezius, R>L. Tenderness to palpation in the lower lumbar region. Moderate pain with extension, lateral bending, and axial loading. Better with forward flexion. Pain and limited ROM with internal rotation of her hips, L>R. Positive straight leg raise on the right. Pt ambulates with the assistance of a single point cane. Hip Flex  Quads Hamstrings Ankle DF EHL Ankle PF   Right 4/5 4/5 4/5 +4/5 +4/5 +4/5   Left +4/5 +4/5 +4/5 +4/5 +4/5 +4/5     IMAGING:    Lumbar x-rays from 06/06/2017 were personally reviewed with the Pt and demonstrated:  1) Degenerative findings in the left hip. 2) Degenerative disc at L3 and L4. Written by Brittney Oneill, as dictated by Krystal Pino MD.  I, Dr. Krystal Pino confirm that all documentation is accurate.

## 2017-07-07 NOTE — PROGRESS NOTES
[facility] Progress Note    Date: 2017    Name: Aayush Reyes    MRN: [id number]         : 1950      Ms. Harry Ibarra arrived to [facility] at 1010. Ms. Harry Ibarra was assessed and education was provided. Nplate. Ms. Belita Curling vitals were reviewed. Visit Vitals    /70 (BP 1 Location: Right arm, BP Patient Position: Sitting)    Pulse 69    Temp 97.6 °F (36.4 °C)    Resp 20       Pt was observed for 5 minutes after obtaining vital signs prior to initiating treatment. Right chest mediport accessed with 20 g 1 inch haas needle using sterile technique, medial and lateral port.      Port flushed easily and had brisk blood return. Blood drawn from lateral port.     Ports flushed with 30 ml NS and 500 units of Heparin after blood draw for CBC per protocol then de-accessed.      No irritation or bleeding noted. Gauze and transparent adhesive applied to site. Lab results obtained and reviewed. (Preliminary results scanned into media tab.)    Pt's preliminary platelet count was 702,970. Per Nplate protocol, since pt's platelet count is 991,065 today, pt's dose will remain at 3mcg/kg (3mcg x 69kg = 207mcg). Nplate 897XVA (0.86TN) administered as ordered SQ in patient's right upper arm. Ms. Harry Ibarra was discharged from [place] [address] in stable condition at 12. She is to return on 17 at 1100 for her next appointment.     Adelfo Bowles RN  2017

## 2017-07-07 NOTE — MR AVS SNAPSHOT
Visit Information Date & Time Provider Department Dept. Phone Encounter #  
 7/7/2017  1:45 PM Kranthi Maria MD [facility] [id number] Follow-up Instructions Return in about 3 weeks (around 7/28/2017) for Medication follow up, Diagnostic Test follow up. Your Appointments 7/18/2017 11:30 AM  
Follow Up with Siddhartha Patterson MD  
[facility] [address] [facility]) Appt Note: 6 wk f/u  
 [address] [facility] [address]  
[phone]  
  
   
 [address] [facility],[address] [place] [address]  
  
    
 8/2/2017 10:15 AM  
Office Visit with Bela Talley MD  
[facility] [address]) Appt Note: 3 month [facility] [address] [facility] [address], [address] [place] [address] Upcoming Health Maintenance Date Due Hepatitis C Screening 1950 DTaP/Tdap/Td series (1 - Tdap) 5/14/1973 FOBT Q 1 YEAR AGE 50-75 5/14/2002 ZOSTER VACCINE AGE 60> 5/14/2012 BREAST CANCER SCRN MAMMOGRAM 4/4/2015 GLAUCOMA SCREENING Q2Y 5/14/2017 OSTEOPOROSIS SCREENING (DEXA) 5/14/2017 Pneumococcal 65+ High/Highest Risk (1 of 2 - PCV13) 5/14/2017 MEDICARE YEARLY EXAM 5/14/2017 INFLUENZA AGE 9 TO ADULT 8/1/2017 Allergies as of 7/7/2017  Review Complete On: 7/7/2017 By: Kranthi Maria MD  
  
 Severity Noted Reaction Type Reactions Aspirin High 08/07/2013   Systemic Swelling Pt states her whole body swells when she takes aspirin. Adhesive    Unable to Obtain Pcn [Penicillins]  08/20/2012    Rash Current Immunizations  Reviewed on 7/7/2017 No immunizations on file. Reviewed by Siddhartha Patterson RN on 7/7/2017 at 12:03 PM  
You Were Diagnosed With   
  
 Codes Comments DDD (degenerative disc disease), lumbar    -  Primary ICD-10-CM: M51.36 
ICD-9-CM: 722.52 Neuritis     ICD-10-CM: M79.2 ICD-9-CM: 729.2 Right leg weakness     ICD-10-CM: R29.898 ICD-9-CM: 729.89 Muscle spasm of back     ICD-10-CM: Z87.114 ICD-9-CM: 724.8 Vitals BP Pulse Temp Resp Height(growth percentile) Weight(growth percentile) 111/59 94 98.1 °F (36.7 °C) (Oral) 18 5' 6\" (1.676 m) 149 lb (67.6 kg) BMI OB Status Smoking Status 24.05 kg/m2 Postmenopausal Never Smoker BMI and BSA Data Body Mass Index Body Surface Area 24.05 kg/m 2 1.77 m 2 Preferred Pharmacy Pharmacy Name Phone [id number] - [address] [phone] Your Updated Medication List  
  
   
This list is accurate as of: 7/7/17  2:51 PM.  Always use your most recent med list.  
  
  
  
  
 albuterol 90 mcg/actuation inhaler Commonly known as:  PROAIR HFA  
1-2 puffs every 4-6 hrs. aluminum-magnesium hydroxide 200-200 mg/5 mL susp 5 mL, diphenhydrAMINE 12.5 mg/5 mL liqd 12.5 mg, lidocaine 2 % soln 5 mL  
5 mL by Swish and Spit route two (2) times a day. Magic mouth wash  Maalox Lidocaine 2% viscous  Diphenhydramine oral solution   Pharmacy to mix equal portions of ingredients to a total volume as indicated in the dispense amount. CeleBREX 200 mg capsule Generic drug:  celecoxib Take  by mouth two (2) times a day. cyclobenzaprine 5 mg tablet Commonly known as:  FLEXERIL  
  
 dronabinol 5 mg capsule Commonly known as:  Media Sonia Take 1 Cap by mouth two (2) times a day. furosemide 40 mg tablet Commonly known as:  LASIX Take  by mouth daily. * gabapentin 300 mg capsule Commonly known as:  NEURONTIN Take 300 mg by mouth three (3) times daily. * gabapentin 300 mg capsule Commonly known as:  NEURONTIN Take 1 po q am and 2 po q pm  
  
 lidocaine-prilocaine topical cream  
 Commonly known as:  EMLA  
APPLY OVER PORT 1 HOUR PRIOR TO CHEMOTHERAPY THAN COVER WITH SARAN WRAP  
  
 magic mouthwash solution Take 5 mL by mouth three (3) times daily as needed for Stomatitis. Magic mouth wash  Maalox Lidocaine 2% viscous  Diphenhydramine oral solution   Pharmacy to mix equal portions of ingredients to a total volume as indicated in the dispense amount. nabumetone 750 mg tablet Commonly known as:  RELAFEN  
  
 ondansetron hcl 8 mg tablet Commonly known as:  ZOFRAN (AS HYDROCHLORIDE) Take 1 Tab by mouth every eight (8) hours as needed for Nausea. oxyCODONE-acetaminophen 7.5-325 mg per tablet Commonly known as:  PERCOCET Take 1 tablet every 6 hours as needed for pain * potassium chloride 20 mEq tablet Commonly known as:  K-DUR, KLOR-CON Take 2 Tabs by mouth daily. * KLOR-CON M20 20 mEq tablet Generic drug:  potassium chloride TAKE ONE TABLET BY MOUTH ONCE A DAY  
  
 PRUVATE 21-7 PO Take  by mouth. Senna 8.6 mg tablet Generic drug:  senna Take 1 Tab by mouth daily. triamterene-hydroCHLOROthiazide 37.5-25 mg per capsule Commonly known as:  Velia Serene TAKE 1 CAPSULE BY ORAL ROUTE ONCE DAILY  
  
 VITAMIN D2 50,000 unit capsule Generic drug:  ergocalciferol TAKE 1 CAPSULE BY MOUTH EVERY SEVEN (7) DAYS. VITAMIN D3 2,000 unit Tab Generic drug:  cholecalciferol (vitamin D3) Take 2,000 Units by mouth daily. warfarin 1 mg tablet Commonly known as:  COUMADIN  
TAKE 1 TABLET (1 MG) BY ORAL ROUTE ONCE DAILY XARELTO 10 mg tablet Generic drug:  rivaroxaban Take 10 mg by mouth daily. zolpidem 10 mg tablet Commonly known as:  AMBIEN  
TAKE 1 TABLET BY MOUTH NIGHTLY AS NEEDED FOR SLEEP *MAX DAILY AMOUNT 10MG* * Notice: This list has 4 medication(s) that are the same as other medications prescribed for you.  Read the directions carefully, and ask your doctor or other care provider to review them with you. Prescriptions Sent to Pharmacy Refills  
 gabapentin (NEURONTIN) 300 mg capsule 1 Sig: Take 1 po q am and 2 po q pm  
 Class: Normal  
 Pharmacy: [facility], [address] [place] [address] #: [phone] Follow-up Instructions Return in about 3 weeks (around 7/28/2017) for Medication follow up, Diagnostic Test follow up. To-Do List   
 07/12/2017 9:00 PM  
  Appointment with [facility] MRI RM 2 at [address] [place] [address] ([phone]) GENERAL INSTRUCTIONS  Bring information (ID card) if you have any medically implanted devices. You will be required to lie still while the procedure is being performed. Remove any jewelry (including body piercing, hairpins) prior to MRI. If you have had a creatinine level drawn within the past 30 days, please bring most recent results to your appt. Bring any films, CD's, and reports related to your study with you on the day of your exam.  This only includes studies done outside of [address] [place] [address], [facility], [place], and [place]. Bring a complete list of all medications you are currently taking to include prescriptions, over-the-counter meds, herbals, vitamins & any dietary supplements. If you were given medications for claustrophobia or anxiety, please arrange to have someone drive you to your appointment. QUESTIONS  Notify the MRI Department if you have any questions concerning your study. [place] - [phone] [address] [phone]  
  
 07/14/2017 Imaging:  MRI LUMB SPINE W WO CONT   
  
 07/14/2017 8:00 AM  
  Appointment with [facility] NURSE at [facility] ([phone])  
  
 07/21/2017 8:00 AM  
  Appointment with [facility] FA[facility] TRACK NURSE at [facility] ([phone])  
  
 07/28/2017 8:00 AM  
  Appointment with [facility] FA[facility] TRACK NURSE at [facility] ([phone]) 2017 8:00 AM  
  Appointment with [facility] FA[facility] TRACK NURSE at [facility] ([phone]) Patient Instructions MyChart Activation Thank you for requesting access to [employer]. Please follow the instructions below to securely access and download your online medical record. [employer] allows you to send messages to your doctor, view your test results, renew your prescriptions, schedule appointments, and more. How Do I Sign Up? 1. In your internet browser, go to [email or web address] 
2. Click on the First Time User? Click Here link in the Sign In box. You will be redirect to the New Member Sign Up page. 3. Enter your [employer] Access Code exactly as it appears below. You will not need to use this code after youve completed the sign-up process. If you do not sign up before the expiration date, you must request a new code. [employer] Access Code: [email or web address] Expires: 2017  8:03 AM (This is the date your [employer] access code will ) 4. Enter the last four digits of your Social Security Number (xxxx) and Date of Birth (mm/dd/yyyy) as indicated and click Submit. You will be taken to the next sign-up page. 5. Create a [employer] Software ID. This will be your [employer] Software login ID and cannot be changed, so think of one that is secure and easy to remember. 6. Create a [employer] password. You can change your password at any time. 7. Enter your Password Reset Question and Answer. This can be used at a later time if you forget your password. 8. Enter your e-mail address. You will receive e-mail notification when new information is available in [address] 9. Click Sign Up. You can now view and download portions of your medical record. 10. Click the Download Summary menu link to download a portable copy of your medical information. Additional Information If you have questions, please visit the Frequently Asked Questions section of the [employer] website at [email or web address] Remember, MyChart is NOT to be used for urgent needs. For medical emergencies, dial 911. Low Back Arthritis: Exercises Your Care Instructions Here are some examples of typical rehabilitation exercises for your condition. Start each exercise slowly. Ease off the exercise if you start to have pain. Your doctor or physical therapist will tell you when you can start these exercises and which ones will work best for you. When you are not being active, find a comfortable position for rest. Some people are comfortable on the floor or a medium-firm bed with a small pillow under their head and another under their knees. Some people prefer to lie on their side with a pillow between their knees. Don't stay in one position for too long. Take short walks (10 to 20 minutes) every 2 to 3 hours. Avoid slopes, hills, and stairs until you feel better. Walk only distances you can manage without pain, especially leg pain. How to do the exercises Pelvic tilt 1. Lie on your back with your knees bent. 2. \"Brace\" your stomachtighten your muscles by pulling in and imagining your belly button moving toward your spine. 3. Press your lower back into the floor. You should feel your hips and pelvis rock back. 4. Hold for 6 seconds while breathing smoothly. 5. Relax and allow your pelvis and hips to rock forward. 6. Repeat 8 to 12 times. Back stretches 1. Get down on your hands and knees on the floor. 2. Relax your head and allow it to droop. Round your back up toward the ceiling until you feel a nice stretch in your upper, middle, and lower back. Hold this stretch for as long as it feels comfortable, or about 15 to 30 seconds. 3. Return to the starting position with a flat back while you are on your hands and knees. 4. Let your back sway by pressing your stomach toward the floor. Lift your buttocks toward the ceiling. 5. Hold this position for 15 to 30 seconds. 6. Repeat 2 to 4 times. Follow-up care is a key part of your treatment and safety. Be sure to make and go to all appointments, and call your doctor if you are having problems. It's also a good idea to know your test results and keep a list of the medicines you take. Where can you learn more? Go to http://leon-edinson.info/. Enter I492 in the search box to learn more about \"Low Back Arthritis: Exercises. \" Current as of: March 21, 2017 Content Version: 11.3 © 4869-7802 [employer]. Care instructions adapted under license by [employer] (which disclaims liability or warranty for this information). If you have questions about a medical condition or this instruction, always ask your healthcare professional. [address] any warranty or liability for your use of this information. Sacroiliac Pain: Exercises Your Care Instructions Here are some examples of typical rehabilitation exercises for your condition. Start each exercise slowly. Ease off the exercise if you start to have pain. Your doctor or physical therapist will tell you when you can start these exercises and which ones will work best for you. How to do the exercises Knee-to-chest stretch Do not do the knee-to-chest exercise if it causes or increases back or leg pain. 7. Lie on your back with your knees bent and your feet flat on the floor. You can put a small pillow under your head and neck if it is more comfortable. 8. Grasp your hands under one knee and bring the knee to your chest, keeping the other foot flat on the floor. 9. Keep your lower back pressed to the floor. Hold for at least 15 to 30 seconds. 10. Relax and lower the knee to the starting position. Repeat with the other leg. 11. Repeat 2 to 4 times with each leg. 12. To get more stretch, keep your other leg flat on the floor while pulling your knee to your chest. 
Bridging 7. Lie on your back with both knees bent. Your knees should be bent about 90 degrees. 8. Tighten your belly muscles by pulling in your belly button toward your spine. Then push your feet into the floor, squeeze your buttocks, and lift your hips off the floor until your shoulders, hips, and knees are all in a straight line. 9. Hold for about 6 seconds as you continue to breathe normally, and then slowly lower your hips back down to the floor and rest for up to 10 seconds. 10. Repeat 8 to 12 times. Hip extension 1. Get down on your hands and knees on the floor. 2. Keeping your back and neck straight, lift one leg straight out behind you. When you lift your leg, keep your hips level. Don't let your back twist, and don't let your hip drop toward the floor. 3. Hold for 6 seconds. Repeat 8 to 12 times with each leg. 4. If you feel steady and strong when you do this exercise, you can make it more difficult. To do this, when you lift your leg, also lift the opposite arm straight out in front of you. For example, lift the left leg and the right arm at the same time. (This is sometimes called the \"bird dog exercise. \") Hold for 6 seconds, and repeat 8 to 12 times on each side. Clamshell 1. Lie on your side with a pillow under your head. Keep your feet and knees together and your knees bent. 2. Raise your top knee, but keep your feet together. Do not let your hips roll back. Your legs should open up like a clamshell. 3. Hold for 6 seconds. 4. Slowly lower your knee back down. Rest for 10 seconds. 5. Repeat 8 to 12 times. 6. Switch to your other side and repeat steps 1 through 5. Hamstring wall stretch 1. Lie on your back in a doorway, with one leg through the open door. 2. Slide your affected leg up the wall to straighten your knee. You should feel a gentle stretch down the back of your leg. ¨ Do not arch your back. ¨ Do not bend either knee. ¨ Keep one heel touching the floor and the other heel touching the wall. Do not point your toes. 3. Hold the stretch for at least 1 minute to begin. Then try to lengthen the time you hold the stretch to as long as 6 minutes. 4. Switch legs, and repeat steps 1 through 3. 
5. Repeat 2 to 4 times. If you do not have a place to do this exercise in a doorway, there is another way to do it: 1. Lie on your back, and bend one knee. 2. Loop a towel under the ball and toes of that foot, and hold the ends of the towel in your hands. 3. Straighten your knee, and slowly pull back on the towel. You should feel a gentle stretch down the back of your leg. 4. Switch legs, and repeat steps 1 through 3. 
5. Repeat 2 to 4 times. Lower abdominal strengthening 1. Lie on your back with your knees bent and your feet flat on the floor. 2. Tighten your belly muscles by pulling your belly button in toward your spine. 3. Lift one foot off the floor and bring your knee toward your chest, so that your knee is straight above your hip and your leg is bent like the letter \"L. \" 
4. Lift the other knee up to the same position. 5. Lower one leg at a time to the starting position. 6. Keep alternating legs until you have lifted each leg 8 to 12 times. 7. Be sure to keep your belly muscles tight and your back still as you are moving your legs. Be sure to breathe normally. Piriformis stretch 1. Lie on your back with your legs straight. 2. Lift your affected leg, and bend your knee. With your opposite hand, reach across your body, and then gently pull your knee toward your opposite shoulder. 3. Hold the stretch for 15 to 30 seconds. 4. Switch legs and repeat steps 1 through 3. 
5. Repeat 2 to 4 times. Follow-up care is a key part of your treatment and safety. Be sure to make and go to all appointments, and call your doctor if you are having problems.  It's also a good idea to know your test results and keep a list of the medicines you take. Where can you learn more? Go to http://leon-edinson.info/. Enter T302 in the search box to learn more about \"Sacroiliac Pain: Exercises. \" Current as of: March 21, 2017 Content Version: 11.3 © 0962-9098 [employer]. Care instructions adapted under license by [employer] (which disclaims liability or warranty for this information). If you have questions about a medical condition or this instruction, always ask your healthcare professional. [address] any warranty or liability for your use of this information. Introducing [facility]! Heather Escalona introduces [employer] patient portal. Now you can access parts of your medical record, email your doctor's office, and request medication refills online. 1. In your internet browser, go to [email or web address] 2. Click on the First Time User? Click Here link in the Sign In box. You will see the New Member Sign Up page. 3. Enter your [employer] Access Code exactly as it appears below. You will not need to use this code after youve completed the sign-up process. If you do not sign up before the expiration date, you must request a new code. · [employer] Access Code: [email or web address] Expires: 9/21/2017  8:03 AM 
 
4. Enter the last four digits of your Social Security Number (xxxx) and Date of Birth (mm/dd/yyyy) as indicated and click Submit. You will be taken to the next sign-up page. 5. Create a [employer] ID. This will be your [employer] login ID and cannot be changed, so think of one that is secure and easy to remember. 6. Create a [employer] password. You can change your password at any time. 7. Enter your Password Reset Question and Answer. This can be used at a later time if you forget your password. 8. Enter your e-mail address. You will receive e-mail notification when new information is available in [address] 9. Click Sign Up. You can now view and download portions of your medical record. 10. Click the Download Summary menu link to download a portable copy of your medical information. If you have questions, please visit the Frequently Asked Questions section of the P[employer] website. Remember, [employer] is NOT to be used for urgent needs. For medical emergencies, dial 911. Now available from your iPhone and Android! Please provide this summary of care documentation to your next provider. Your primary care clinician is listed as SUSAN SINGH. If you have any questions after today's visit, please call [phone].

## 2017-07-07 NOTE — PATIENT INSTRUCTIONS
[facility] Activation    Thank you for requesting access to [facility]. Please follow the instructions below to securely access and download your online medical record. [facility] allows you to send messages to your doctor, view your test results, renew your prescriptions, schedule appointments, and more. How Do I Sign Up? 1. In your internet browser, go to [email or web address]  2. Click on the First Time User? Click Here link in the Sign In box. You will be redirect to the New Member Sign Up page. 3. Enter your [facility] [facility] Access Code exactly as it appears below. You will not need to use this code after youve completed the sign-up process. If you do not sign up before the expiration date, you must request a new code. [facility] Access Code: [email or web address]  Expires: 2017  8:03 AM (This is the date your [facility] access code will )    4. Enter the last four digits of your Social Security Number (xxxx) and Date of Birth (mm/dd/yyyy) as indicated and click Submit. You will be taken to the next sign-up page. 5. Create a [facility] ID. This will be your [facility] login ID and cannot be changed, so think of one that is secure and easy to remember. 6. Create a [facility] password. You can change your password at any time. 7. Enter your Password Reset Question and Answer. This can be used at a later time if you forget your password. 8. Enter your e-mail address. You will receive e-mail notification when new information is available in [address]. 9. Click Sign Up. You can now view and download portions of your medical record. 10. Click the Download Summary menu link to download a portable copy of your medical information. Additional Information    If you have questions, please visit the Frequently Asked Questions section of the Roving Planet website at [email or web address] Remember, R[facility] is NOT to be used for urgent needs. For medical emergencies, dial 911.          Low Back Arthritis: Exercises  Your Care Instructions  Here are some examples of typical rehabilitation exercises for your condition. Start each exercise slowly. Ease off the exercise if you start to have pain. Your doctor or physical therapist will tell you when you can start these exercises and which ones will work best for you. When you are not being active, find a comfortable position for rest. Some people are comfortable on the floor or a medium-firm bed with a small pillow under their head and another under their knees. Some people prefer to lie on their side with a pillow between their knees. Don't stay in one position for too long. Take short walks (10 to 20 minutes) every 2 to 3 hours. Avoid slopes, hills, and stairs until you feel better. Walk only distances you can manage without pain, especially leg pain. How to do the exercises  Pelvic tilt    1. Lie on your back with your knees bent. 2. \"Brace\" your stomach--tighten your muscles by pulling in and imagining your belly button moving toward your spine. 3. Press your lower back into the floor. You should feel your hips and pelvis rock back. 4. Hold for 6 seconds while breathing smoothly. 5. Relax and allow your pelvis and hips to rock forward. 6. Repeat 8 to 12 times. Back stretches    1. Get down on your hands and knees on the floor. 2. Relax your head and allow it to droop. Round your back up toward the ceiling until you feel a nice stretch in your upper, middle, and lower back. Hold this stretch for as long as it feels comfortable, or about 15 to 30 seconds. 3. Return to the starting position with a flat back while you are on your hands and knees. 4. Let your back sway by pressing your stomach toward the floor. Lift your buttocks toward the ceiling. 5. Hold this position for 15 to 30 seconds. 6. Repeat 2 to 4 times. Follow-up care is a key part of your treatment and safety.  Be sure to make and go to all appointments, and call your doctor if you are having problems. It's also a good idea to know your test results and keep a list of the medicines you take. Where can you learn more? Go to http://leon-edinson.info/. Enter M335 in the search box to learn more about \"Low Back Arthritis: Exercises. \"  Current as of: March 21, 2017  Content Version: 11.3  © 4683-1268 [employer]. Care instructions adapted under license by [employer] (which disclaims liability or warranty for this information). If you have questions about a medical condition or this instruction, always ask your healthcare professional. [place] [address] any warranty or liability for your use of this information. Sacroiliac Pain: Exercises  Your Care Instructions  Here are some examples of typical rehabilitation exercises for your condition. Start each exercise slowly. Ease off the exercise if you start to have pain. Your doctor or physical therapist will tell you when you can start these exercises and which ones will work best for you. How to do the exercises  Knee-to-chest stretch    Do not do the knee-to-chest exercise if it causes or increases back or leg pain. 7. Lie on your back with your knees bent and your feet flat on the floor. You can put a small pillow under your head and neck if it is more comfortable. 8. Grasp your hands under one knee and bring the knee to your chest, keeping the other foot flat on the floor. 9. Keep your lower back pressed to the floor. Hold for at least 15 to 30 seconds. 10. Relax and lower the knee to the starting position. Repeat with the other leg. 11. Repeat 2 to 4 times with each leg. 12. To get more stretch, keep your other leg flat on the floor while pulling your knee to your chest.  Bridging    7. Lie on your back with both knees bent. Your knees should be bent about 90 degrees. 8. Tighten your belly muscles by pulling in your belly button toward your spine.  Then push your feet into the floor, squeeze your buttocks, and lift your hips off the floor until your shoulders, hips, and knees are all in a straight line. 9. Hold for about 6 seconds as you continue to breathe normally, and then slowly lower your hips back down to the floor and rest for up to 10 seconds. 10. Repeat 8 to 12 times. Hip extension    1. Get down on your hands and knees on the floor. 2. Keeping your back and neck straight, lift one leg straight out behind you. When you lift your leg, keep your hips level. Don't let your back twist, and don't let your hip drop toward the floor. 3. Hold for 6 seconds. Repeat 8 to 12 times with each leg. 4. If you feel steady and strong when you do this exercise, you can make it more difficult. To do this, when you lift your leg, also lift the opposite arm straight out in front of you. For example, lift the left leg and the right arm at the same time. (This is sometimes called the \"bird dog exercise. \") Hold for 6 seconds, and repeat 8 to 12 times on each side. Clamshell    1. Lie on your side with a pillow under your head. Keep your feet and knees together and your knees bent. 2. Raise your top knee, but keep your feet together. Do not let your hips roll back. Your legs should open up like a clamshell. 3. Hold for 6 seconds. 4. Slowly lower your knee back down. Rest for 10 seconds. 5. Repeat 8 to 12 times. 6. Switch to your other side and repeat steps 1 through 5. Hamstring wall stretch    1. Lie on your back in a doorway, with one leg through the open door. 2. Slide your affected leg up the wall to straighten your knee. You should feel a gentle stretch down the back of your leg. ¨ Do not arch your back. ¨ Do not bend either knee. ¨ Keep one heel touching the floor and the other heel touching the wall. Do not point your toes. 3. Hold the stretch for at least 1 minute to begin. Then try to lengthen the time you hold the stretch to as long as 6 minutes.   4. Switch legs, and repeat steps 1 through 3.  5. Repeat 2 to 4 times. If you do not have a place to do this exercise in a doorway, there is another way to do it:  1. Lie on your back, and bend one knee. 2. Loop a towel under the ball and toes of that foot, and hold the ends of the towel in your hands. 3. Straighten your knee, and slowly pull back on the towel. You should feel a gentle stretch down the back of your leg. 4. Switch legs, and repeat steps 1 through 3.  5. Repeat 2 to 4 times. Lower abdominal strengthening    1. Lie on your back with your knees bent and your feet flat on the floor. 2. Tighten your belly muscles by pulling your belly button in toward your spine. 3. Lift one foot off the floor and bring your knee toward your chest, so that your knee is straight above your hip and your leg is bent like the letter \"L. \"  4. Lift the other knee up to the same position. 5. Lower one leg at a time to the starting position. 6. Keep alternating legs until you have lifted each leg 8 to 12 times. 7. Be sure to keep your belly muscles tight and your back still as you are moving your legs. Be sure to breathe normally. Piriformis stretch    1. Lie on your back with your legs straight. 2. Lift your affected leg, and bend your knee. With your opposite hand, reach across your body, and then gently pull your knee toward your opposite shoulder. 3. Hold the stretch for 15 to 30 seconds. 4. Switch legs and repeat steps 1 through 3.  5. Repeat 2 to 4 times. Follow-up care is a key part of your treatment and safety. Be sure to make and go to all appointments, and call your doctor if you are having problems. It's also a good idea to know your test results and keep a list of the medicines you take. Where can you learn more? Go to http://leon-edinson.info/. Enter K850 in the search box to learn more about \"Sacroiliac Pain: Exercises. \"  Current as of: March 21, 2017  Content Version: 11.3  © 3518-8126 [employer]. Care instructions adapted under license by [employer] (which disclaims liability or warranty for this information). If you have questions about a medical condition or this instruction, always ask your healthcare professional. [address] any warranty or liability for your use of this information.

## 2017-07-12 ENCOUNTER — HOSPITAL ENCOUNTER (OUTPATIENT)
Age: 67
Discharge: HOME | End: 2017-07-12
Attending: PHYSICAL MEDICINE & REHABILITATION
Payer: MEDICARE

## 2017-07-12 DIAGNOSIS — M62.830 MUSCLE SPASM OF BACK: ICD-10-CM

## 2017-07-12 DIAGNOSIS — R29.898 RIGHT LEG WEAKNESS: ICD-10-CM

## 2017-07-12 DIAGNOSIS — M79.2 NEURITIS: ICD-10-CM

## 2017-07-12 DIAGNOSIS — M51.36 DDD (DEGENERATIVE DISC DISEASE), LUMBAR: ICD-10-CM

## 2017-07-12 PROCEDURE — 72148 MRI LUMBAR SPINE W/O DYE: CPT

## 2017-07-14 ENCOUNTER — HOSPITAL ENCOUNTER (OUTPATIENT)
Dept: INFUSION THERAPY | Age: 67
Discharge: HOME | End: 2017-07-14
Payer: MEDICARE

## 2017-07-14 VITALS
RESPIRATION RATE: 18 BRPM | SYSTOLIC BLOOD PRESSURE: 117 MMHG | TEMPERATURE: 97.6 F | DIASTOLIC BLOOD PRESSURE: 70 MMHG | HEART RATE: 62 BPM

## 2017-07-14 LAB
BASO+EOS+MONOS # BLD AUTO: 0.8 K/UL (ref 0–2.3)
BASO+EOS+MONOS # BLD AUTO: 15 % (ref 0.1–17)
DIFFERENTIAL METHOD BLD: ABNORMAL
ERYTHROCYTE [DISTWIDTH] IN BLOOD BY AUTOMATED COUNT: 17.3 % (ref 11.5–14.5)
HCT VFR BLD AUTO: 32.6 % (ref 36–48)
HGB BLD-MCNC: 10.6 G/DL (ref 12–16)
LYMPHOCYTES # BLD AUTO: 35 % (ref 14–44)
LYMPHOCYTES # BLD: 1.8 K/UL (ref 1.1–5.9)
MCH RBC QN AUTO: 26.8 PG (ref 25–35)
MCHC RBC AUTO-ENTMCNC: 32.5 G/DL (ref 31–37)
MCV RBC AUTO: 82.3 FL (ref 78–102)
NEUTS SEG # BLD: 2.5 K/UL (ref 1.8–9.5)
NEUTS SEG NFR BLD AUTO: 50 % (ref 40–70)
PLATELET # BLD AUTO: 150 K/UL (ref 135–420)
RBC # BLD AUTO: 3.96 M/UL (ref 4.1–5.1)
WBC # BLD AUTO: 5.1 K/UL (ref 4.5–13)

## 2017-07-14 PROCEDURE — 85025 COMPLETE CBC W/AUTO DIFF WBC: CPT | Performed by: INTERNAL MEDICINE

## 2017-07-14 PROCEDURE — 74011250636 HC RX REV CODE- 250/636

## 2017-07-14 PROCEDURE — 85049 AUTOMATED PLATELET COUNT: CPT | Performed by: INTERNAL MEDICINE

## 2017-07-14 PROCEDURE — 36415 COLL VENOUS BLD VENIPUNCTURE: CPT

## 2017-07-14 PROCEDURE — 74011250636 HC RX REV CODE- 250/636: Performed by: INTERNAL MEDICINE

## 2017-07-14 PROCEDURE — 96372 THER/PROPH/DIAG INJ SC/IM: CPT

## 2017-07-14 RX ORDER — WATER FOR INJECTION,STERILE
VIAL (ML) INJECTION
Status: DISPENSED
Start: 2017-07-14 | End: 2017-07-14

## 2017-07-14 RX ADMIN — ROMIPLOSTIM 207 MCG: 250 INJECTION, POWDER, LYOPHILIZED, FOR SOLUTION SUBCUTANEOUS at 08:33

## 2017-07-14 NOTE — PROGRESS NOTES
[facility] Progress Note    Date: 2017    Name: Jaci Gomes    MRN: [id number]         : 1950      Ms. Tamia Gómez arrived to [facility] at 2759. Ms. Tamia Gómez was assessed and education was provided. Nplate. Ms. Nafisa Stinson vitals were reviewed. Visit Vitals    /70 (BP 1 Location: Right arm, BP Patient Position: Sitting)    Pulse 62    Temp 97.6 °F (36.4 °C)    Resp 18       Pt was observed for 5 minutes after obtaining vital signs prior to initiating treatment. Blood drawn for labs via right AC venipuncture x1 attempt. Lab results obtained and reviewed. (Preliminary results scanned into media tab.)    Pt's preliminary platelet count was [phone]. Per Nplate protocol, since pt's platelet count is [phone] today, pt's dose will remain at 3mcg/kg (3mcg x 69kg = 207mcg). Nplate 008WYE (2.20OP) administered as ordered SQ in patient's right upper arm. Ms. Tamia Gómez was discharged from [place] [address] in stable condition at [address] [facility]. She is to return on 17 at 0800 for her next appointment.     Ish Clemente RN  2017

## 2017-07-20 ENCOUNTER — TELEPHONE (OUTPATIENT)
Dept: ORTHOPEDIC SURGERY | Age: 67
End: 2017-07-20

## 2017-07-20 ENCOUNTER — OFFICE VISIT (OUTPATIENT)
Dept: ORTHOPEDIC SURGERY | Age: 67
End: 2017-07-20

## 2017-07-20 VITALS
WEIGHT: 154 LBS | BODY MASS INDEX: 24.75 KG/M2 | HEART RATE: 78 BPM | SYSTOLIC BLOOD PRESSURE: 92 MMHG | TEMPERATURE: 98.7 F | HEIGHT: 66 IN | DIASTOLIC BLOOD PRESSURE: 51 MMHG

## 2017-07-20 DIAGNOSIS — M48.061 LUMBAR STENOSIS: Primary | ICD-10-CM

## 2017-07-20 NOTE — TELEPHONE ENCOUNTER
Patient states she wants more information about what Dr. Denise Awan talked about in the visit. It was something to do with her spine. \"Something is mashing on something else\". I saw \"Facet Sclerosis\" but she said that didn't sound quite like it. With me not being clinical here is this message. She wants more information and she said it sounded like \"spinal difing-something\". Patient can be reached at [phone].

## 2017-07-20 NOTE — PATIENT INSTRUCTIONS
[email or web address] Activation    Thank you for requesting access to [email or web address]. Please follow the instructions below to securely access and download your online medical record. [email or web address] allows you to send messages to your doctor, view your test results, renew your prescriptions, schedule appointments, and more. How Do I Sign Up? 1. In your internet browser, go to [email or web address]  2. Click on the First Time User? Click Here link in the Sign In box. You will be redirect to the New Member Sign Up page. 3. Enter your [email or web address].com Access Code exactly as it appears below. You will not need to use this code after youve completed the sign-up process. If you do not sign up before the expiration date, you must request a new code. [email or web address].com Access Code: [email or web address]  Expires: 2017  8:03 AM (This is the date your [email or web address][employer]com access code will )    4. Enter the last four digits of your Social Security Number (xxxx) and Date of Birth (mm/dd/yyyy) as indicated and click Submit. You will be taken to the next sign-up page. 5. Create a [email or web address] ID. This will be your [email or web address] login ID and cannot be changed, so think of one that is secure and easy to remember. 6. Create a [email or web address].com password. You can change your password at any time. 7. Enter your Password Reset Question and Answer. This can be used at a later time if you forget your password. 8. Enter your e-mail address. You will receive e-mail notification when new information is available in [address]. 9. Click Sign Up. You can now view and download portions of your medical record. 10. Click the Download Summary menu link to download a portable copy of your medical information. Additional Information    If you have questions, please visit the Frequently Asked Questions section of the [email or web address] website at [email or web address] Remember, [email or web address] is NOT to be used for urgent needs. For medical emergencies, dial 911.

## 2017-07-20 NOTE — TELEPHONE ENCOUNTER
Call pt and tell her it is spinal stenosis where the canal where the nerves live are getting smaller and pressing on them which causes pain.   The lumbar epidural should help with that

## 2017-07-20 NOTE — MR AVS SNAPSHOT
Visit Information Date & Time Provider Department Dept. Phone Encounter #  
 7/20/2017  2:30 PM Lina Clements MD [facility] MAST [phone] [id number] Follow-up Instructions Return in about 3 weeks (around 8/10/2017), or if symptoms worsen or fail to improve. Follow-up and Disposition History Your Appointments 8/2/2017 10:15 AM  
Office Visit with Lisa Coronado MD  
[facility] [facility]  On[facility] [address] [place] [address] Road) Appt Note: 3 month follow up appointment [facility] 207, [place] 848 [facility] 250 [facility]  
  
   
 [facility], [address]  
  
    
 8/22/2017  2:15 PM  
DIAG TEST F/U with Orin Hamilton MD  
[address] [place] [address], [address] [facility] MAST ONE [address] [place], [place] [address]er Road) Appt Note: MRI F/U [facility] DISC  
 [address] [facility] [address]  
[phone]  
  
   
 [address] [facility],[address] [place]  
  
    
 9/25/2017  2:15 PM  
Follow Up with Orin Hamilton MD  
[address] [place] [address], [address] [facility] MAST ONE [address]) Appt Note: 1 MO MELITON MONK; VERN 8/9/17  
 [address] [facility] [address]  
[phone]  
  
   
 [address] [facility] [address] Upcoming Health Maintenance Date Due Hepatitis C Screening 1950 DTaP/Tdap/Td series (1 - Tdap) 5/14/1973 FOBT Q 1 YEAR AGE 50-75 5/14/2002 ZOSTER VACCINE AGE 60> 3/14/2012 BREAST CANCER SCRN MAMMOGRAM 4/4/2015 GLAUCOMA SCREENING Q2Y 5/14/2017 OSTEOPOROSIS SCREENING (DEXA) 5/14/2017 Pneumococcal 65+ High/Highest Risk (1 of 2 - PCV13) 5/14/2017 MEDICARE YEARLY EXAM 5/14/2017 INFLUENZA AGE 9 TO ADULT 8/1/2017 Allergies as of 7/20/2017  Review Complete On: 7/20/2017 By: Lina Clements MD  
  
 Severity Noted Reaction Type Reactions Aspirin High 08/07/2013   Systemic Swelling Pt states her whole body swells when she takes aspirin. Adhesive    Unable to Obtain Pcn [Penicillins]  08/20/2012    Rash Current Immunizations  Reviewed on 7/7/2017 No immunizations on file. Not reviewed this visit You Were Diagnosed With   
  
 Codes Comments Lumbar stenosis    -  Primary ICD-10-CM: M48.06 
ICD-9-CM: 724.02 Vitals BP Pulse Temp Height(growth percentile) Weight(growth percentile) BMI  
 92/51 78 98.7 °F (37.1 °C) (Oral) 5' 6\" (1.676 m) 154 lb (69.9 kg) 24.86 kg/m2 OB Status Smoking Status Postmenopausal Never Smoker BMI and BSA Data Body Mass Index Body Surface Area  
 24.86 kg/m 2 1.8 m 2 Preferred Pharmacy Pharmacy Name [id number] [place] [phone] Your Updated Medication List  
  
   
This list is accurate as of: 7/20/17  3:35 PM.  Always use your most recent med list.  
  
  
  
  
 albuterol 90 mcg/actuation inhaler Commonly known as:  PROAIR HFA  
1-2 puffs every 4-6 hrs. aluminum-magnesium hydroxide 200-200 mg/5 mL susp 5 mL, diphenhydrAMINE 12.5 mg/5 mL liqd 12.5 mg, lidocaine 2 % soln 5 mL  
5 mL by Swish and Spit route two (2) times a day. Magic mouth wash  Maalox Lidocaine 2% viscous  Diphenhydramine oral solution   Pharmacy to mix equal portions of ingredients to a total volume as indicated in the dispense amount. CeleBREX 200 mg capsule Generic drug:  celecoxib Take  by mouth two (2) times a day. cyclobenzaprine 5 mg tablet Commonly known as:  FLEXERIL  
  
 dronabinol 5 mg capsule Commonly known as:  Angelika Fulling Take 1 Cap by mouth two (2) times a day. furosemide 40 mg tablet Commonly known as:  LASIX Take  by mouth daily. * gabapentin 300 mg capsule Commonly known as:  NEURONTIN Take 300 mg by mouth three (3) times daily. * gabapentin 300 mg capsule Commonly known as:  NEURONTIN Take 1 po q am and 2 po q pm  
  
 lidocaine-prilocaine topical cream  
Commonly known as:  EMLA  
APPLY OVER PORT 1 HOUR PRIOR TO CHEMOTHERAPY THAN COVER WITH SARAN WRAP  
  
 magic mouthwash solution Take 5 mL by mouth three (3) times daily as needed for Stomatitis. Magic mouth wash  Maalox Lidocaine 2% viscous  Diphenhydramine oral solution   Pharmacy to mix equal portions of ingredients to a total volume as indicated in the dispense amount. nabumetone 750 mg tablet Commonly known as:  RELAFEN  
  
 ondansetron hcl 8 mg tablet Commonly known as:  ZOFRAN (AS HYDROCHLORIDE) Take 1 Tab by mouth every eight (8) hours as needed for Nausea. oxyCODONE-acetaminophen 7.5-325 mg per tablet Commonly known as:  PERCOCET Take 1 tablet every 6 hours as needed for pain * potassium chloride 20 mEq tablet Commonly known as:  K-DUR, KLOR-CON Take 2 Tabs by mouth daily. * KLOR-CON M20 20 mEq tablet Generic drug:  potassium chloride TAKE ONE TABLET BY MOUTH ONCE A DAY  
  
 PRUVATE 21-7 PO Take  by mouth. Senna 8.6 mg tablet Generic drug:  senna Take 1 Tab by mouth daily. triamterene-hydroCHLOROthiazide 37.5-25 mg per capsule Commonly known as:  Jessica Javedeling TAKE 1 CAPSULE BY ORAL ROUTE ONCE DAILY  
  
 VITAMIN D2 50,000 unit capsule Generic drug:  ergocalciferol TAKE 1 CAPSULE BY MOUTH EVERY SEVEN (7) DAYS. VITAMIN D3 2,000 unit Tab Generic drug:  cholecalciferol (vitamin D3) Take 2,000 Units by mouth daily. warfarin 1 mg tablet Commonly known as:  COUMADIN  
TAKE 1 TABLET (1 MG) BY ORAL ROUTE ONCE DAILY XARELTO 10 mg tablet Generic drug:  rivaroxaban Take 10 mg by mouth daily. zolpidem 10 mg tablet Commonly known as:  AMBIEN  
TAKE 1 TABLET BY MOUTH NIGHTLY AS NEEDED FOR SLEEP *MAX DAILY AMOUNT 10MG*  
  
 * Notice: This list has 4 medication(s) that are the same as other medications prescribed for you. Read the directions carefully, and ask your doctor or other care provider to review them with you. We Performed the Following SCHEDULE SURGERY [ERE6020 Custom] Follow-up Instructions Return in about 3 weeks (around 8/10/2017), or if symptoms worsen or fail to improve. To-Do List   
 2017  8:00 AM  
  Appointment with [facility] TR[facility] NURSE at [facility] ([phone])  
  
 2017 8:00 AM  
  Appointment with [facility] TRACK NURSE at [facility] ([phone])  
  
 2017 8:00 AM  
  Appointment with [facility] TRACK NURSE at [facility] ([phone]) Patient Instructions MyChart Activation Thank you for requesting access to [employer]. Please follow the instructions below to securely access and download your online medical record. [employer] allows you to send messages to your doctor, view your test results, renew your prescriptions, schedule appointments, and more. How Do I Sign Up? 1. In your internet browser, go to [email or web address] 
2. Click on the First Time User? Click Here link in the Sign In box. You will be redirect to the New Member Sign Up page. 3. Enter your [employer] Access Code exactly as it appears below. You will not need to use this code after youve completed the sign-up process. If you do not sign up before the expiration date, you must request a new code. [employer] Access Code: [email or web address] Expires: 2017  8:03 AM (This is the date your [employer] access code will ) 4. Enter the last four digits of your Social Security Number (xxxx) and Date of Birth (mm/dd/yyyy) as indicated and click Submit. You will be taken to the next sign-up page. 5. Create a [employer] ID. This will be your [employer] login ID and cannot be changed, so think of one that is secure and easy to remember. 6. Create a [employer] password. You can change your password at any time. 7. Enter your Password Reset Question and Answer. This can be used at a later time if you forget your password. 8. Enter your e-mail address. You will receive e-mail notification when new information is available in [address] 9. Click Sign Up. You can now view and download portions of your medical record. 10. Click the Download Summary menu link to download a portable copy of your medical information. Additional Information If you have questions, please visit the Frequently Asked Questions section of the [employer] website at [email or web address] Remember, [employer] is NOT to be used for urgent needs. For medical emergencies, dial 911. Introducing [facility] SERVICES! Lemus Kenn introduces [employer] patient portal. Now you can access parts of your medical record, email your doctor's office, and request medication refills online. 1. In your internet browser, go to [email or web address] 2. Click on the First Time User? Click Here link in the Sign In box. You will see the New Member Sign Up page. 3. Enter your [employer] Access Code exactly as it appears below. You will not need to use this code after youve completed the sign-up process. If you do not sign up before the expiration date, you must request a new code. · [employer] Access Code: [email or web address] Expires: 9/21/2017  8:03 AM 
 
4. Enter the last four digits of your Social Security Number (xxxx) and Date of Birth (mm/dd/yyyy) as indicated and click Submit. You will be taken to the next sign-up page. 5. Create a [employer] ID. This will be your [employer] login ID and cannot be changed, so think of one that is secure and easy to remember. 6. Create a [employer] password. You can change your password at any time. 7. Enter your Password Reset Question and Answer. This can be used at a later time if you forget your password. 8. Enter your e-mail address. You will receive e-mail notification when new information is available in [address] 9. Click Sign Up. You can now view and download portions of your medical record. 10. Click the Download Summary menu link to download a portable copy of your medical information. If you have questions, please visit the Frequently Asked Questions section of the [employer] website. Remember, [employer] is NOT to be used for urgent needs. For medical emergencies, dial 911. Now available from your iPhone and Android! Please provide this summary of care documentation to your next provider. Your primary care clinician is listed as SUSAN SINGH. If you have any questions after today's visit, please call [phone].

## 2017-07-20 NOTE — PROGRESS NOTES
[address]  [place] [address]  Phone: [phone]  Fax: [phone]        Ashley Barraza  : 1950  PCP: Dulce Mclain MD  2017    PROGRESS NOTE      ASSESSMENT AND PLAN    Nadia Kennedy comes in to the office today for a lumbar MRI, which showed spinal stenosis more prominent at L2-3. These findings correlate well with her current symptoms. She previously had bilateral SI joint injection, which improved her low back pain. She continues to have radicular symptoms in the lower extremity. She will have an L3-4 interlaminar injection. Pt will f/u in 3 weeks or sooner as needed. Serena was seen today for back pain. Diagnoses and all orders for this visit:    Lumbar stenosis  -     SCHEDULE SURGERY       Follow-up Disposition:  Return in about 3 weeks (around 8/10/2017), or if symptoms worsen or fail to improve. HISTORY OF PRESENT ILLNESS  Nadia Kennedy is a 74 y.o. female. A&P / HPI from 2017:  Nadia Kennedy comes in to the office today c/o primarily pain in the right side of her lumbar region. She has a history of metastatic breast cancer without any obviously related mets. She has had chemotherapy and resultant neuropathy. Based upon the positive bilateral AAKASH test and SI tenderness I am lead to believe her pain is most likely due to sacroiliitis. There are no obvious lesions on her x-rays that would suggest bony mets.      I referred her to PT for further evaluation and treatment. I also referred her to get bilateral SI joint injections. Updates from 17:  Pt presents for continued pain in the lumbar region.  At the last visit she was referred for a lumbar MRI, which showed significant evidence to prove lumbar stenosis that is more prominent at L2-3       PAST MEDICAL HISTORY   Past Medical History:   Diagnosis Date    Antineoplastic chemotherapy induced anemia     Arthritis     [facility]     [facility]     Breast    Chronic ITP (idiopathic thrombocytopenia) (HCC) 10/31/2016    Diabetes (HCC)     borderline, no meds    Esophageal cancer [facility]     treated with chemo       Past Surgical History:   Procedure Laterality Date    BREAST SURGERY PROCEDURE UNLISTED      COLONOSCOPY N/A 7/27/2016    COLON[facility] at [facility] END[facility]COPY    HX APPENDECTOMY      HX ORTHOPAEDIC      HX VASCULAR ACCESS     . MEDICATIONS      Current Outpatient Prescriptions   Medication Sig Dispense Refill    cholecalciferol, vitamin D3, (VITAMIN D3) 2,000 unit tab Take 2,000 Units by mouth daily.  gabapentin (NEURONTIN) 300 mg capsule Take 1 po q am and 2 po q pm 90 Cap 1    oxyCODONE-acetaminophen (PERCOCET) 7.5-325 mg per tablet Take 1 tablet every 6 hours as needed for pain 120 Tab 0    rivaroxaban (XARELTO) 10 mg tablet Take 10 mg by mouth daily.  zolpidem (AMBIEN) 10 mg tablet TAKE 1 TABLET BY MOUTH NIGHTLY AS NEEDED FOR SLEEP *MAX DAILY AMOUNT 10MG* 30 Tab 3    lidocaine-prilocaine (EMLA) topical cream APPLY OVER PORT 1 HOUR PRIOR TO CHEMOTHERAPY THAN COVER WITH SARAN WRAP 30 g 6    magic mouthwash solution Take 5 mL by mouth three (3) times daily as needed for Stomatitis. Magic mouth wash   Maalox  Lidocaine 2% viscous   Diphenhydramine oral solution     Pharmacy to mix equal portions of ingredients to a total volume as indicated in the dispense amount. 236 mL 0    celecoxib (CELEBREX) 200 mg capsule Take  by mouth two (2) times a day.  furosemide (LASIX) 40 mg tablet Take  by mouth daily.  dronabinol (MARINOL) 5 mg capsule Take 1 Cap by mouth two (2) times a day. 60 Cap 1    gabapentin (NEURONTIN) 300 mg capsule Take 300 mg by mouth three (3) times daily.       KLOR-CON M20 20 mEq tablet TAKE ONE TABLET BY MOUTH ONCE A DAY 30 Tab 3    triamterene-hydrochlorothiazide (DYAZIDE) 37.5-25 mg per capsule TAKE 1 CAPSULE BY ORAL ROUTE ONCE DAILY 30 Cap 5    VITAMIN D2 50,000 unit capsule TAKE 1 CAPSULE BY MOUTH EVERY SEVEN (7) DAYS. 4 Cap 3    aluminum-magnesium hydroxide 200-200 mg/5 mL susp 5 mL, diphenhydrAMINE 12.5 mg/5 mL liqd 12.5 mg, lidocaine 2 % soln 5 mL 5 mL by Swish and Spit route two (2) times a day. Magic mouth wash   Maalox  Lidocaine 2% viscous   Diphenhydramine oral solution     Pharmacy to mix equal portions of ingredients to a total volume as indicated in the dispense amount. 240 mL 1    potassium chloride (K-DUR, KLOR-CON) 20 mEq tablet Take 2 Tabs by mouth daily. 30 Tab 3    albuterol (PROAIR HFA) 90 mcg/actuation inhaler 1-2 puffs every 4-6 hrs. 1 Inhaler 1    senna (SENNA) 8.6 mg tablet Take 1 Tab by mouth daily.  cyclobenzaprine (FLEXERIL) 5 mg tablet       nabumetone (RELAFEN) 750 mg tablet       ondansetron hcl (ZOFRAN) 8 mg tablet Take 1 Tab by mouth every eight (8) hours as needed for Nausea. 30 Tab 3    IRON AG&FUM/C/FA/MV CMB11/CA-T (PRUVATE 21-7 PO) Take  by mouth.  warfarin (COUMADIN) 1 mg tablet TAKE 1 TABLET (1 MG) BY ORAL ROUTE ONCE DAILY 30 Tab 6        ALLERGIES    Allergies   Allergen Reactions    Aspirin Swelling     Pt states her whole body swells when she takes aspirin.  Adhesive Unable to Obtain    Pcn [Penicillins] Rash          SOCIAL HISTORY    Social History     Social History    Marital status:      Spouse name: N/A    Number of children: N/A    Years of education: N/A     Social History Main Topics    Smoking status: Never Smoker    Smokeless tobacco: Never Used    Alcohol use No    Drug use: No    Sexual activity: Not Asked     Other Topics Concern    None     Social History Narrative       FAMILY HISTORY  History reviewed. No pertinent family history. REVIEW OF SYSTEMS  Review of Systems   Constitutional: Negative for chills, diaphoresis, fever, malaise/fatigue and weight loss. Respiratory: Negative for shortness of breath.     Cardiovascular: Negative for chest pain and leg swelling. Gastrointestinal: Negative for constipation, nausea and vomiting. Neurological: Negative for dizziness, tingling, seizures, loss of consciousness, weakness and headaches. Psychiatric/Behavioral: The patient does not have insomnia. PHYSICAL EXAMINATION  Visit Vitals    BP 92/51    Pulse 78    Temp 98.7 °F (37.1 °C) (Oral)    Ht 5' 6\" (1.676 m)    Wt 154 lb (69.9 kg)    BMI 24.86 kg/m2       Pain Assessment  6/6/2017   Location of Pain Back;Neck;Hip;Leg   Location Modifiers Left;Right   Severity of Pain 10   Quality of Pain Aching   Duration of Pain Persistent   Frequency of Pain Constant   Limiting Behavior Some   Relieving Factors Nothing   Result of Injury No   Some recent data might be hidden           Constitutional:  Well developed, well nourished, in no acute distress. Psychiatric: Affect and mood are appropriate. Integumentary: No rashes or abrasions noted on exposed areas. SPINE/MUSCULOSKELETAL EXAM    Cervical spine:  Neck is midline. Normal muscle tone. No focal atrophy is noted. ROM pain free. Shoulder ROM intact. No tenderness to palpation. Negative Spurling's sign. Negative Tinel's sign. Negative Cottrell's sign.       Sensation in the bilateral arms grossly intact to light touch.      Lumbar spine:  No rash, ecchymosis, or gross obliquity. No fasciculations. No focal atrophy is noted. No pain with hip ROM. Full range of motion. No tenderness to palpation. No tenderness to palpation at the sciatic notch. Bilateral SI joints tender. Trochanters non tender. Pain with back extension.    Positive bilateral florence's test  Negative bilateral P4 test.        Sensation in the bilateral legs grossly intact to light touch.       MOTOR:      Biceps  Triceps Deltoids Wrist Ext Wrist Flex Hand Intrin   Right 5/5 5/5 5/5 5/5 5/5 5/5   Left 5/5 5/5 5/5 5/5 5/5 5/5             Hip Flex  Quads Hamstrings Ankle DF EHL Ankle PF   Right 5/5 5/5 5/5 5/5 5/5 5/5   Left 5/5 5/5 5/5 5/5 5/5 5/5     DTRs are 2+ biceps, triceps, brachioradialis, patella, and Achilles.     Negative Straight Leg raise. Squat not tested. No difficulty with tandem gait.      Ambulation in a wheelchair.       RADIOGRAPHS  Lumbar MRI images taken on 07/12/2017 personally reviewed with patient:     FINDINGS:     General: Vertebral body heights preserved. Advanced disc space loss at L3-L4 and  L4-L5 with chronic-appearing endplate changes. Endplate edema at J5-E6, likely  degenerative. Trace L3 on L4 anterolisthesis. Lumbar lordosis maintained. Conus  ends at level L1. No abnormal signal in the distal cord. No abnormal epidural  collection identified. No suspicious marrow signal.     Miscellaneous: Surrounding soft tissues unremarkable.          RADIOGRAPHS  Cervical, Thoracic, and Lumbar XR images taken on 06/06/2017 personally reviewed with patient:  1) Disc space narrowing   2) Facet sclerosis   3) Anterolisthesis   4) No obvious fractures       reviewed     Ms. Jessica Castaneda has a reminder for a \"due or due soon\" health maintenance. I have asked that she contact her primary care provider for follow-up on this health maintenance.      This plan was reviewed with the patient and patient agrees. All questions were answered. More than half of this visit today was spent on counseling. Cervical, Thoracic, and Lumbar XR images taken on 06/06/2017 personally reviewed with patient:  1) Disc space narrowing   2) Facet sclerosis   3) Anterolisthesis   4) No obvious fractures       reviewed     Ms. Jessica Castaneda has a reminder for a \"due or due soon\" health maintenance. I have asked that she contact her primary care provider for follow-up on this health maintenance.      This plan was reviewed with the patient and patient agrees. All questions were answered. More than half of this visit today was spent on counseling. Written by Faustino Mohr, as dictated by Dr. Jocelyne Leach. I, Dr. Ibeth Sharpe, confirm that all documentation is accurate.

## 2017-07-21 ENCOUNTER — HOSPITAL ENCOUNTER (OUTPATIENT)
Dept: INFUSION THERAPY | Age: 67
Discharge: HOME | End: 2017-07-21
Payer: MEDICARE

## 2017-07-21 VITALS
TEMPERATURE: 97.6 F | SYSTOLIC BLOOD PRESSURE: 113 MMHG | DIASTOLIC BLOOD PRESSURE: 70 MMHG | HEART RATE: 70 BPM | RESPIRATION RATE: 18 BRPM

## 2017-07-21 LAB
BASO+EOS+MONOS # BLD AUTO: 1 K/UL (ref 0–2.3)
BASO+EOS+MONOS # BLD AUTO: 17 % (ref 0.1–17)
DIFFERENTIAL METHOD BLD: ABNORMAL
ERYTHROCYTE [DISTWIDTH] IN BLOOD BY AUTOMATED COUNT: 17.4 % (ref 11.5–14.5)
HCT VFR BLD AUTO: 31 % (ref 36–48)
HGB BLD-MCNC: 10.2 G/DL (ref 12–16)
LYMPHOCYTES # BLD AUTO: 30 % (ref 14–44)
LYMPHOCYTES # BLD: 1.8 K/UL (ref 1.1–5.9)
MCH RBC QN AUTO: 27 PG (ref 25–35)
MCHC RBC AUTO-ENTMCNC: 32.9 G/DL (ref 31–37)
MCV RBC AUTO: 82 FL (ref 78–102)
NEUTS SEG # BLD: 3.3 K/UL (ref 1.8–9.5)
NEUTS SEG NFR BLD AUTO: 53 % (ref 40–70)
PLATELET # BLD AUTO: 117 K/UL (ref 135–420)
RBC # BLD AUTO: 3.78 M/UL (ref 4.1–5.1)
WBC # BLD AUTO: 6.1 K/UL (ref 4.5–13)

## 2017-07-21 PROCEDURE — 74011250636 HC RX REV CODE- 250/636: Performed by: NURSE PRACTITIONER

## 2017-07-21 PROCEDURE — 96372 THER/PROPH/DIAG INJ SC/IM: CPT

## 2017-07-21 PROCEDURE — 36415 COLL VENOUS BLD VENIPUNCTURE: CPT

## 2017-07-21 PROCEDURE — 74011000250 HC RX REV CODE- 250

## 2017-07-21 PROCEDURE — 85049 AUTOMATED PLATELET COUNT: CPT | Performed by: INTERNAL MEDICINE

## 2017-07-21 PROCEDURE — 85025 COMPLETE CBC W/AUTO DIFF WBC: CPT | Performed by: INTERNAL MEDICINE

## 2017-07-21 RX ORDER — WATER FOR INJECTION,STERILE
VIAL (ML) INJECTION
Status: COMPLETED
Start: 2017-07-21 | End: 2017-07-21

## 2017-07-21 RX ADMIN — WATER 10 ML: 1 INJECTION INTRAMUSCULAR; INTRAVENOUS; SUBCUTANEOUS at 09:05

## 2017-07-21 RX ADMIN — ROMIPLOSTIM 207 MCG: 250 INJECTION, POWDER, LYOPHILIZED, FOR SOLUTION SUBCUTANEOUS at 09:05

## 2017-07-21 NOTE — TELEPHONE ENCOUNTER
I called the patient and explained to her , patient stated she wanted to explain it to her children.

## 2017-07-21 NOTE — PROGRESS NOTES
[facility] Progress Note    Date: 2017    Name: Dennis Burnette    MRN: [id number]         : 1950      Ms. Boby Martinez arrived to [facility]. Ms. Boby Martinez was assessed and education was provided. Nplate. Ms. David Brown vitals were reviewed. Visit Vitals    /70 (BP 1 Location: Right arm, BP Patient Position: Sitting)    Pulse 70    Temp 97.6 °F (36.4 °C)    Resp 18       Pt was observed for 5 minutes after obtaining vital signs prior to initiating treatment. Blood drawn for labs via right hand x 4 attempts. Sent to lab for CBC. Lab results obtained and reviewed. (Preliminary results scanned into media tab.)    Pt's preliminary platelet count was 653,004. Per Nplate protocol, since pt's platelet count is 779,573 today, pt's dose will remain at 3mcg/kg (3mcg x 69kg = 207mcg). Nplate 105WXW (8.40IG) administered as ordered SQ in patient's right upper arm. Ms. Boby Martinez was discharged from [place] [address] in stable condition at 0910. She is to return on 17 at 0800 for her next appointment.     Eddie Barnhart RN  2017

## 2017-07-28 ENCOUNTER — HOSPITAL ENCOUNTER (OUTPATIENT)
Dept: INFUSION THERAPY | Age: 67
Discharge: HOME | End: 2017-07-28
Payer: MEDICARE

## 2017-07-28 VITALS
HEART RATE: 57 BPM | SYSTOLIC BLOOD PRESSURE: 116 MMHG | DIASTOLIC BLOOD PRESSURE: 70 MMHG | RESPIRATION RATE: 18 BRPM | TEMPERATURE: 97.7 F

## 2017-07-28 LAB
BASO+EOS+MONOS # BLD AUTO: 0.8 K/UL (ref 0–2.3)
BASO+EOS+MONOS # BLD AUTO: 14 % (ref 0.1–17)
DIFFERENTIAL METHOD BLD: ABNORMAL
ERYTHROCYTE [DISTWIDTH] IN BLOOD BY AUTOMATED COUNT: 17.1 % (ref 11.5–14.5)
HCT VFR BLD AUTO: 32.4 % (ref 36–48)
HGB BLD-MCNC: 10.6 G/DL (ref 12–16)
LYMPHOCYTES # BLD AUTO: 39 % (ref 14–44)
LYMPHOCYTES # BLD: 2.2 K/UL (ref 1.1–5.9)
MCH RBC QN AUTO: 27.1 PG (ref 25–35)
MCHC RBC AUTO-ENTMCNC: 32.7 G/DL (ref 31–37)
MCV RBC AUTO: 82.9 FL (ref 78–102)
NEUTS SEG # BLD: 2.6 K/UL (ref 1.8–9.5)
NEUTS SEG NFR BLD AUTO: 47 % (ref 40–70)
PLATELET # BLD AUTO: 178 K/UL (ref 135–420)
RBC # BLD AUTO: 3.91 M/UL (ref 4.1–5.1)
WBC # BLD AUTO: 5.6 K/UL (ref 4.5–13)

## 2017-07-28 PROCEDURE — 74011000250 HC RX REV CODE- 250

## 2017-07-28 PROCEDURE — 96372 THER/PROPH/DIAG INJ SC/IM: CPT

## 2017-07-28 PROCEDURE — 74011250636 HC RX REV CODE- 250/636

## 2017-07-28 PROCEDURE — 36415 COLL VENOUS BLD VENIPUNCTURE: CPT

## 2017-07-28 PROCEDURE — 85025 COMPLETE CBC W/AUTO DIFF WBC: CPT | Performed by: INTERNAL MEDICINE

## 2017-07-28 PROCEDURE — 74011250636 HC RX REV CODE- 250/636: Performed by: NURSE PRACTITIONER

## 2017-07-28 RX ORDER — WATER FOR INJECTION,STERILE
VIAL (ML) INJECTION
Status: COMPLETED
Start: 2017-07-28 | End: 2017-07-28

## 2017-07-28 RX ADMIN — ROMIPLOSTIM 207 MCG: 250 INJECTION, POWDER, LYOPHILIZED, FOR SOLUTION SUBCUTANEOUS at 08:53

## 2017-07-28 RX ADMIN — WATER 10 ML: 1 INJECTION INTRAMUSCULAR; INTRAVENOUS; SUBCUTANEOUS at 08:53

## 2017-07-28 NOTE — PROGRESS NOTES
[facility] Progress Note    Date: 2017    Name: Charli Amanda    MRN: [id number]         : 1950      Ms. Royer Harris arrived to [facility] at 0818. Ms. Royer Harris was assessed and education was provided. Nplate. Ms. Chase Reid vitals were reviewed. Visit Vitals    /70 (BP 1 Location: Right arm, BP Patient Position: Sitting)    Pulse (!) 57    Temp 97.7 °F (36.5 °C)    Resp 18       Pt was observed for 5 minutes after obtaining vital signs prior to initiating treatment. Blood drawn for labs via right hand x 1 attempt. Sent to lab for CBC. Lab results obtained and reviewed. (Preliminary results scanned into media tab.)    Pt's preliminary platelet count was 809,037. Per Nplate protocol, since pt's platelet count is 140,421 today, pt's dose will remain at 3mcg/kg (3mcg x 69kg = 207mcg). Nplate 476NOE (8.96AL) administered as ordered SQ in patient's right upper arm. Ms. Royer Harris was discharged from [place] [address] in stable condition at 0900. She is to return on 17 at 0800 for her next appointment.     Elda Mauricio RN  2017

## 2017-08-02 ENCOUNTER — HOSPITAL ENCOUNTER (OUTPATIENT)
Dept: LAB | Age: 67
Discharge: HOME | End: 2017-08-02
Payer: MEDICARE

## 2017-08-02 ENCOUNTER — OFFICE VISIT (OUTPATIENT)
Dept: ONCOLOGY | Age: 67
End: 2017-08-02

## 2017-08-02 ENCOUNTER — HOSPITAL ENCOUNTER (OUTPATIENT)
Dept: ONCOLOGY | Age: 67
Discharge: HOME | End: 2017-08-02

## 2017-08-02 VITALS
TEMPERATURE: 98 F | HEIGHT: 66 IN | WEIGHT: 151 LBS | HEART RATE: 67 BPM | SYSTOLIC BLOOD PRESSURE: 109 MMHG | DIASTOLIC BLOOD PRESSURE: 63 MMHG | BODY MASS INDEX: 24.27 KG/M2

## 2017-08-02 DIAGNOSIS — D50.8 IRON DEFICIENCY ANEMIA SECONDARY TO INADEQUATE DIETARY IRON INTAKE: ICD-10-CM

## 2017-08-02 DIAGNOSIS — R29.898 MUSCULAR DECONDITIONING: ICD-10-CM

## 2017-08-02 DIAGNOSIS — D69.6 THROMBOCYTOPENIA (HCC): ICD-10-CM

## 2017-08-02 DIAGNOSIS — E55.9 VITAMIN D DEFICIENCY: ICD-10-CM

## 2017-08-02 DIAGNOSIS — C50.911 BREAST CANCER METASTASIZED TO AXILLARY LYMPH NODE, RIGHT (HCC): ICD-10-CM

## 2017-08-02 DIAGNOSIS — C77.3 BREAST CANCER METASTASIZED TO AXILLARY LYMPH NODE, RIGHT (HCC): Primary | ICD-10-CM

## 2017-08-02 DIAGNOSIS — D69.3 CHRONIC ITP (IDIOPATHIC THROMBOCYTOPENIA) (HCC): ICD-10-CM

## 2017-08-02 DIAGNOSIS — R53.1 WEAKNESS: ICD-10-CM

## 2017-08-02 DIAGNOSIS — D64.9 CHRONIC ANEMIA: ICD-10-CM

## 2017-08-02 DIAGNOSIS — R64 CACHEXIA (HCC): ICD-10-CM

## 2017-08-02 DIAGNOSIS — R42 VERTIGO: ICD-10-CM

## 2017-08-02 DIAGNOSIS — R60.0 BILATERAL LOWER EXTREMITY EDEMA: ICD-10-CM

## 2017-08-02 DIAGNOSIS — C50.911 BREAST CANCER METASTASIZED TO AXILLARY LYMPH NODE, RIGHT (HCC): Primary | ICD-10-CM

## 2017-08-02 DIAGNOSIS — C77.3 BREAST CANCER METASTASIZED TO AXILLARY LYMPH NODE, RIGHT (HCC): ICD-10-CM

## 2017-08-02 LAB
ALBUMIN SERPL BCP-MCNC: 3.8 G/DL (ref 3.4–5)
ALBUMIN/GLOB SERPL: 1 {RATIO} (ref 0.8–1.7)
ALP SERPL-CCNC: 86 U/L (ref 45–117)
ALT SERPL-CCNC: 17 U/L (ref 13–56)
ANION GAP BLD CALC-SCNC: 8 MMOL/L (ref 3–18)
AST SERPL W P-5'-P-CCNC: 20 U/L (ref 15–37)
BASO+EOS+MONOS # BLD AUTO: 1.5 K/UL (ref 0–2.3)
BASO+EOS+MONOS # BLD AUTO: 21 % (ref 0.1–17)
BILIRUB SERPL-MCNC: 0.2 MG/DL (ref 0.2–1)
BUN SERPL-MCNC: 24 MG/DL (ref 7–18)
BUN/CREAT SERPL: 22 (ref 12–20)
CALCIUM SERPL-MCNC: 9.6 MG/DL (ref 8.5–10.1)
CHLORIDE SERPL-SCNC: 99 MMOL/L (ref 100–108)
CO2 SERPL-SCNC: 35 MMOL/L (ref 21–32)
CREAT SERPL-MCNC: 1.1 MG/DL (ref 0.6–1.3)
DIFFERENTIAL METHOD BLD: ABNORMAL
ERYTHROCYTE [DISTWIDTH] IN BLOOD BY AUTOMATED COUNT: 17.5 % (ref 11.5–14.5)
FERRITIN SERPL-MCNC: 623 NG/ML (ref 8–388)
GLOBULIN SER CALC-MCNC: 3.7 G/DL (ref 2–4)
GLUCOSE SERPL-MCNC: 82 MG/DL (ref 74–99)
HCT VFR BLD AUTO: 31.9 % (ref 36–48)
HGB BLD-MCNC: 10.5 G/DL (ref 12–16)
IRON SATN MFR SERPL: 39 %
IRON SERPL-MCNC: 70 UG/DL (ref 50–175)
LYMPHOCYTES # BLD AUTO: 21 % (ref 14–44)
LYMPHOCYTES # BLD: 1.5 K/UL (ref 1.1–5.9)
MCH RBC QN AUTO: 27.1 PG (ref 25–35)
MCHC RBC AUTO-ENTMCNC: 32.9 G/DL (ref 31–37)
MCV RBC AUTO: 82.4 FL (ref 78–102)
NEUTS SEG # BLD: 4.2 K/UL (ref 1.8–9.5)
NEUTS SEG NFR BLD AUTO: 58 % (ref 40–70)
PLATELET # BLD AUTO: 205 K/UL (ref 135–420)
POTASSIUM SERPL-SCNC: 4.3 MMOL/L (ref 3.5–5.5)
PROT SERPL-MCNC: 7.5 G/DL (ref 6.4–8.2)
RBC # BLD AUTO: 3.87 M/UL (ref 4.1–5.1)
SODIUM SERPL-SCNC: 142 MMOL/L (ref 136–145)
TIBC SERPL-MCNC: 179 UG/DL (ref 250–450)
WBC # BLD AUTO: 7.2 K/UL (ref 4.5–13)

## 2017-08-02 PROCEDURE — 83540 ASSAY OF IRON: CPT | Performed by: INTERNAL MEDICINE

## 2017-08-02 PROCEDURE — 80053 COMPREHEN METABOLIC PANEL: CPT | Performed by: INTERNAL MEDICINE

## 2017-08-02 PROCEDURE — 82306 VITAMIN D 25 HYDROXY: CPT | Performed by: INTERNAL MEDICINE

## 2017-08-02 PROCEDURE — 86300 IMMUNOASSAY TUMOR CA 15-3: CPT | Performed by: INTERNAL MEDICINE

## 2017-08-02 PROCEDURE — 36415 COLL VENOUS BLD VENIPUNCTURE: CPT | Performed by: INTERNAL MEDICINE

## 2017-08-02 PROCEDURE — 82728 ASSAY OF FERRITIN: CPT | Performed by: INTERNAL MEDICINE

## 2017-08-02 NOTE — PROGRESS NOTES
Hematology/Oncology  Progress Note    Name: Jorgito Guevara  Date: 2017  : 1950    PCP: Bridgette Cole MD     Ms. Jayson Rodriguez is a 74 y.o. female who was seen for management of her slowly progressive ITP and metastatic breast cancer with associated iron deficiency anemia. Current therapy: Active surveillance, no active therapy  Subjective:     Ms. Jayson Rodriguez is a 51-year-old -American woman with history of metastatic breast cancer. The patient reports that she has been doing reasonably well. She has gained about 30 pounds weight over the past few months, due to the steroid that she has to take for her ITP. She is now actively trying to lose some weight. She denies having any unexplained bruising or bleeding. She continues to have arthritic discomfort in her joints and is continuing to use her cane for mobility support. The patient informed me that she is currently seeing a spine specialist and may need to get steroid injections or subdural injection to control the pain. Since her last clinic visit the patient has been taking N-plate as the primary treatment modality for her ITP. She is tolerating it well and is happy that her platelet counts have continued to improve. Today she is complaining of some vertigo dizziness and is requesting a prescription for Antivert. Past medical history, family history, and social history: these were reviewed and remains unchanged.     Past Medical History:   Diagnosis Date    Antineoplastic chemotherapy induced anemia     Arthritis     [facility]     Cancer ([facility]     Breast    Chronic ITP (idiopathic thrombocytopenia) (HCC) 10/31/2016    Diabetes ([facility] [address]     borderline, no meds    Esophageal cancer ([facility] [address]     treated with chemo     Past Surgical History:   Procedure Laterality Date    BREAST SURGERY PROCEDURE UNLISTED      COLONOSCOPY N/A 2016    COLONOS[facility] performed by Galileo King [facility] at [facility] HX APPENDECTOMY      HX ORTHOPAEDIC      HX VASCULAR ACCESS       Social History     Social History    Marital status:      Spouse name: N/A    Number of children: N/A    Years of education: N/A     Occupational History    Not on file. Social History Main Topics    Smoking status: Never Smoker    Smokeless tobacco: Never Used    Alcohol use No    Drug use: No    Sexual activity: Not on file     Other Topics Concern    Not on file     Social History Narrative     No family history on file. Current Outpatient Prescriptions   Medication Sig Dispense Refill    cholecalciferol, vitamin D3, (VITAMIN D3) 2,000 unit tab Take 2,000 Units by mouth daily.  gabapentin (NEURONTIN) 300 mg capsule Take 1 po q am and 2 po q pm 90 Cap 1    oxyCODONE-acetaminophen (PERCOCET) 7.5-325 mg per tablet Take 1 tablet every 6 hours as needed for pain 120 Tab 0    rivaroxaban (XARELTO) 10 mg tablet Take 10 mg by mouth daily.  zolpidem (AMBIEN) 10 mg tablet TAKE 1 TABLET BY MOUTH NIGHTLY AS NEEDED FOR SLEEP *MAX DAILY AMOUNT 10MG* 30 Tab 3    lidocaine-prilocaine (EMLA) topical cream APPLY OVER PORT 1 HOUR PRIOR TO CHEMOTHERAPY THAN COVER WITH SARAN WRAP 30 g 6    warfarin (COUMADIN) 1 mg tablet TAKE 1 TABLET (1 MG) BY ORAL ROUTE ONCE DAILY 30 Tab 6    magic mouthwash solution Take 5 mL by mouth three (3) times daily as needed for Stomatitis. Magic mouth wash   Maalox  Lidocaine 2% viscous   Diphenhydramine oral solution     Pharmacy to mix equal portions of ingredients to a total volume as indicated in the dispense amount. 236 mL 0    celecoxib (CELEBREX) 200 mg capsule Take  by mouth two (2) times a day.  furosemide (LASIX) 40 mg tablet Take  by mouth daily.  dronabinol (MARINOL) 5 mg capsule Take 1 Cap by mouth two (2) times a day. 60 Cap 1    gabapentin (NEURONTIN) 300 mg capsule Take 300 mg by mouth three (3) times daily.       KLOR-CON M20 20 mEq tablet TAKE ONE TABLET BY MOUTH ONCE A DAY 30 Tab 3    triamterene-hydrochlorothiazide (DYAZIDE) 37.5-25 mg per capsule TAKE 1 CAPSULE BY ORAL ROUTE ONCE DAILY 30 Cap 5    VITAMIN D2 50,000 unit capsule TAKE 1 CAPSULE BY MOUTH EVERY SEVEN (7) DAYS. 4 Cap 3    aluminum-magnesium hydroxide 200-200 mg/5 mL susp 5 mL, diphenhydrAMINE 12.5 mg/5 mL liqd 12.5 mg, lidocaine 2 % soln 5 mL 5 mL by Swish and Spit route two (2) times a day. Magic mouth wash   Maalox  Lidocaine 2% viscous   Diphenhydramine oral solution     Pharmacy to mix equal portions of ingredients to a total volume as indicated in the dispense amount. 240 mL 1    potassium chloride (K-DUR, KLOR-CON) 20 mEq tablet Take 2 Tabs by mouth daily. 30 Tab 3    albuterol (PROAIR HFA) 90 mcg/actuation inhaler 1-2 puffs every 4-6 hrs. 1 Inhaler 1    senna (SENNA) 8.6 mg tablet Take 1 Tab by mouth daily.  cyclobenzaprine (FLEXERIL) 5 mg tablet       nabumetone (RELAFEN) 750 mg tablet       ondansetron hcl (ZOFRAN) 8 mg tablet Take 1 Tab by mouth every eight (8) hours as needed for Nausea. 30 Tab 3    IRON AG&FUM/C/FA/MV CMB11/CA-T (PRUVATE 21-7 PO) Take  by mouth. Review of Systems  Constitutional: The patient has no acute distress or discomfort. HEENT: The patient denies recent head trauma, eye pain, blurred vision,  hearing deficit, oropharyngeal mucosal pain or lesions, and the patient denies throat pain or discomfort. Lymphatics: The patient denies palpable peripheral lymphadenopathy. Hematologic: The patient denies having bruising, bleeding, or progressive fatigue. Respiratory: Patient denies having shortness of breath, cough, sputum production, fever, or dyspnea on exertion. Cardiovascular: The patient denies having leg pain, leg swelling, heart palpitations, chest permit, chest pain, or lightheadedness. The patient denies having dyspnea on exertion. Gastrointestinal: The patient denies having nausea, emesis, or diarrhea.  The patient denies having any hematemesis or blood in the stool.  Genitourinary: Patient denies having urinary urgency, frequency, or dysuria. The patient denies having blood in the urine. Psychological: The patient denies having symptoms of nervousness, anxiety, depression, or thoughts of harming self. Skin: Patient denies having skin rashes, skin, ulcerations, or unexplained itching or pruritus. Musculoskeletal: The patient used to complain of arthritic discomfort in her joints particularly the knees, hips, and shoulders. Objective:     Visit Vitals    /63 (BP 1 Location: Right arm, BP Patient Position: Sitting)    Pulse 67    Temp 98 °F (36.7 °C) (Oral)    Ht 5' 6\" (1.676 m)    Wt 68.5 kg (151 lb)    BMI 24.37 kg/m2     ECOG PS=1, pain score=0/10  Physical Exam:   Gen. Appearance: The patient is in no acute distress. Skin: There is no bruise or rash. HEENT: The exam is unremarkable. Neck: Supple without lymphadenopathy or thyromegaly. Lungs: Clear to auscultation and percussion; there are no wheezes or rhonchi. Heart: Regular rate and rhythm; there are no murmurs, gallops, or rubs. Abdomen: Bowel sounds are present and normal.  There is no guarding, tenderness, or hepatosplenomegaly. Extremities: There is no clubbing, cyanosis, or edema. Neurologic: There are no focal neurologic deficits. Lymphatics: There is no palpable peripheral lymphadenopathy. Musculoskeletal: The patient has full range of motion at all joints. There is no evidence of joint deformity or effusions. There is no focal joint tenderness. Psychological/psychiatric: There is no clinical evidence of anxiety, depression, or melancholy.     Lab data:      Results for orders placed or performed during the hospital encounter of 08/02/17   CBC WITH 3 PART DIFF     Status: Abnormal   Result Value Ref Range Status    WBC 7.2 4.5 - 13.0 K/uL Final    RBC 3.87 (L) 4.10 - 5.10 M/uL Final    HGB 10.5 (L) 12.0 - 16 g/dL Final    HCT 31.9 (L) 36 - 48 % Final    MCV 82.4 78 - 102 FL Final MCH 27.1 25.0 - 35.0 PG Final    MCHC 32.9 31 - 37 g/dL Final    RDW 17.5 (H) 11.5 - 14.5 % Final    NEUTROPHILS 58 40 - 70 % Final    MIXED CELLS 21 (H) 0.1 - 17 % Final    LYMPHOCYTES 21 14 - 44 % Final    ABS. NEUTROPHILS 4.2 1.8 - 9.5 K/UL Final    ABS. MIXED CELLS 1.5 0.0 - 2.3 K/uL Final    ABS. LYMPHOCYTES 1.5 1.1 - 5.9 K/UL Final     Comment: Test performed at [place] [id number] Location. Results Reviewed by Medical Director. DF AUTOMATED   Final      The preliminary platelet count is 204,550. Assessment:     1. Breast cancer metastasized to axillary lymph node, right ([address]    2. Vitamin D deficiency    3. Cachexia ([address]    4. Weakness    5. Bilateral lower extremity edema    6. Chronic ITP (idiopathic thrombocytopenia) (HCC)    7. Muscular deconditioning    8. Thrombocytopenia ([facility] [address]    9. Chronic anemia    10. Iron deficiency anemia secondary to inadequate dietary iron intake    11. Vertigo          Plan:   Chronic ITP/thrombocytopenia (progression of disease): I have informed the patient that her CBC today shows that her preliminary platelet count is [phone]. At this time I am recommending we continue the n-Plate at a dose of 1 mcg/kg subcutaneous weekly. At this time I will recheck her platelet level will plan to see her back in clinic in about 8 weeks. Vitamin D deficiency: The patient recently completed vitamin D 50,000 units for 12 weeks. At this time I will recheck a vitamin D level. If her vitamin D 25 is low she will be restarted on vitamin D 50,000 units weekly for an additional 12 weeks. Iron deficiency anemia/chronic anemia: The current CBC shows a WBC count of 7.2, hemoglobin is 10.5 g/dL with hematocrit of 31.9%. I have recommended that the patient continue taking the ferrous sulfate 1 tablet twice daily. At this time I will recheck her iron profile and ferritin levels. Bilateral lower extremity edema: The leg edema has significantly improved.     Metastatic breast cancer, left breast metastasized to lymph nodes and other sites: At this time I will check a CA-27-29 level. The most recent CA-27-29 level from from 3/23/2017 was 46.7 u/mL. Clinically the patient has no evidence of disease progression. .    Cachexia, weakness, and muscular deconditioning: I am recommending that she continue physical therapy 4 times weekly. She will continue to use a walker or cane as needed for mobility support. Primary insomnia: A new prescription for Ambien 10 mg at bedtime was provided. Vertigo (new problem): At this time I will order Antivert 12.5 mg every 8 hours. I have advised the patient to take this for 3-5 days as needed. I will see her back in clinic for complete assessment again in 12 weeks. Orders Placed This Encounter    COMPLETE CBC & AUTO DIFF WBC    METABOLIC PANEL, COMPREHENSIVE     Standing Status:   Future     Number of Occurrences:   1     Standing Expiration Date:   8/3/2018    IRON PROFILE     Standing Status:   Future     Number of Occurrences:   1     Standing Expiration Date:   8/3/2018    FERRITIN     Standing Status:   Future     Number of Occurrences:   1     Standing Expiration Date:   8/3/2018    CA 27.29     Standing Status:   Future     Number of Occurrences:   1     Standing Expiration Date:   8/3/2018    VITAMIN D, 25 HYDROXY     Standing Status:   Future     Number of Occurrences:   1     Standing Expiration Date:   8/3/2018    InHouse CBC ([employer])     Standing Status:   Future     Number of Occurrences:   1     Standing Expiration Date:   8/9/2017       Bela Talley MD  8/2/2017      Please note: This document has been produced using voice recognition software. Unrecognized errors in transcription may be present.

## 2017-08-02 NOTE — MR AVS SNAPSHOT
Visit Information Date & Time Provider Department Dept. Phone Encounter #  
 8/2/2017 10:15 AM Ho Amaya MD [facility] [phone] [id number] Follow-up Instructions Return in about 3 months (around 11/2/2017). Your Appointments 8/22/2017  2:15 PM  
DIAG TEST F/U with Gavin Russell MD  
[facility] [address] [place], [place] [address]) Appt Note: MRI F/U [facility] DISC  
 [address] [facility] [address]  
[phone]  
  
   
 [address] [facility],[address]  
  
    
 9/25/2017  2:15 PM  
Follow Up with Gavin Russell MD  
[facility] [address] [facility] [address]) Appt Note: 1 MO BLOCK FU; VERN 8/9/17  
 [address] [facility] [address]  
[phone]  
  
   
 [address] [facility],[address]  
  
    
 11/1/2017 11:00 AM  
Office Visit with Ho Amaya MD  
[place] [address] [facility] [address]) Appt Note: OV  
 [address] [facility] [address] [facility], [address] [place] [address] Upcoming Health Maintenance Date Due Hepatitis C Screening 1950 DTaP/Tdap/Td series (1 - Tdap) 5/14/1973 FOBT Q 1 YEAR AGE 50-75 5/14/2002 ZOSTER VACCINE AGE 60> 3/14/2012 BREAST CANCER SCRN MAMMOGRAM 4/4/2015 GLAUCOMA SCREENING Q2Y 5/14/2017 OSTEOPOROSIS SCREENING (DEXA) 5/14/2017 Pneumococcal 65+ High/Highest Risk (1 of 2 - PCV13) 5/14/2017 MEDICARE YEARLY EXAM 5/14/2017 INFLUENZA AGE 9 TO ADULT 8/1/2017 Allergies as of 8/2/2017  Review Complete On: 7/28/2017 By: Mitul Casiano RN Severity Noted Reaction Type Reactions Aspirin High 08/07/2013   Systemic Swelling Pt states her whole body swells when she takes aspirin. Adhesive    Unable to Obtain Pcn [Penicillins]  08/20/2012    Rash Current Immunizations  Reviewed on 7/7/2017 No immunizations on file. Not reviewed this visit You Were Diagnosed With   
  
 Codes Comments Breast cancer metastasized to axillary lymph node, right ([facility]    -  Primary ICD-10-CM: C50.911, C77.3 ICD-9-CM: 174.9, 196.3 Vitamin D deficiency     ICD-10-CM: E55.9 ICD-9-CM: 268.9 Cachexia ([facility].)     ICD-10-CM: R64 
ICD-9-CM: 799.4 Weakness     ICD-10-CM: R53.1 ICD-9-CM: 780.79 Bilateral lower extremity edema     ICD-10-CM: R60.0 ICD-9-CM: 976. 3 Chronic ITP (idiopathic thrombocytopenia) (HCC)     ICD-10-CM: D69.3 ICD-9-CM: 287.31 Muscular deconditioning     ICD-10-CM: R29.898 ICD-9-CM: 781.99 Thrombocytopenia ([facility]     ICD-10-CM: D69.6 ICD-9-CM: 287.5 Chronic anemia     ICD-10-CM: D64.9 ICD-9-CM: 285.9 Iron deficiency anemia secondary to inadequate dietary iron intake     ICD-10-CM: D50.8 ICD-9-CM: 280.1 Vitals BP Pulse Temp Height(growth percentile) Weight(growth percentile) BMI  
 109/63 (BP 1 Location: Right arm, BP Patient Position: Sitting) 67 98 °F (36.7 °C) (Oral) 5' 6\" (1.676 m) 151 lb (68.5 kg) 24.37 kg/m2 OB Status Smoking Status Postmenopausal Never Smoker BMI and BSA Data Body Mass Index Body Surface Area  
 24.37 kg/m 2 1.79 m 2 Preferred Pharmacy Pharmacy Name Phone [facility] #[id number] [address] [phone] Your Updated Medication List  
  
   
This list is accurate as of: 8/2/17 11:06 AM.  Always use your most recent med list.  
  
  
  
  
 albuterol 90 mcg/actuation inhaler Commonly known as:  PROAIR HFA  
1-2 puffs every 4-6 hrs. aluminum-magnesium hydroxide 200-200 mg/5 mL susp 5 mL, diphenhydrAMINE 12.5 mg/5 mL liqd 12.5 mg, lidocaine 2 % soln 5 mL  
5 mL by Swish and Spit route two (2) times a day.  Magic mouth wash  Maalox Lidocaine 2% viscous  Diphenhydramine oral solution   Pharmacy to mix equal portions of ingredients to a total volume as indicated in the dispense amount. CeleBREX 200 mg capsule Generic drug:  celecoxib Take  by mouth two (2) times a day. cyclobenzaprine 5 mg tablet Commonly known as:  FLEXERIL  
  
 dronabinol 5 mg capsule Commonly known as:  Juancarlos Fly Take 1 Cap by mouth two (2) times a day. furosemide 40 mg tablet Commonly known as:  LASIX Take  by mouth daily. * gabapentin 300 mg capsule Commonly known as:  NEURONTIN Take 300 mg by mouth three (3) times daily. * gabapentin 300 mg capsule Commonly known as:  NEURONTIN Take 1 po q am and 2 po q pm  
  
 lidocaine-prilocaine topical cream  
Commonly known as:  EMLA  
APPLY OVER PORT 1 HOUR PRIOR TO CHEMOTHERAPY THAN COVER WITH SARAN WRAP  
  
 magic mouthwash solution Take 5 mL by mouth three (3) times daily as needed for Stomatitis. Magic mouth wash  Maalox Lidocaine 2% viscous  Diphenhydramine oral solution   Pharmacy to mix equal portions of ingredients to a total volume as indicated in the dispense amount. nabumetone 750 mg tablet Commonly known as:  RELAFEN  
  
 ondansetron hcl 8 mg tablet Commonly known as:  ZOFRAN (AS HYDROCHLORIDE) Take 1 Tab by mouth every eight (8) hours as needed for Nausea. oxyCODONE-acetaminophen 7.5-325 mg per tablet Commonly known as:  PERCOCET Take 1 tablet every 6 hours as needed for pain * potassium chloride 20 mEq tablet Commonly known as:  K-DUR, KLOR-CON Take 2 Tabs by mouth daily. * KLOR-CON M20 20 mEq tablet Generic drug:  potassium chloride TAKE ONE TABLET BY MOUTH ONCE A DAY  
  
 PRUVATE 21-7 PO Take  by mouth. Senna 8.6 mg tablet Generic drug:  senna Take 1 Tab by mouth daily. triamterene-hydroCHLOROthiazide 37.5-25 mg per capsule Commonly known as:  [place]anks TAKE 1 CAPSULE BY ORAL ROUTE ONCE DAILY VITAMIN D2 50,000 unit capsule Generic drug:  ergocalciferol TAKE 1 CAPSULE BY MOUTH EVERY SEVEN (7) DAYS. VITAMIN D3 2,000 unit Tab Generic drug:  cholecalciferol (vitamin D3) Take 2,000 Units by mouth daily. warfarin 1 mg tablet Commonly known as:  COUMADIN  
TAKE 1 TABLET (1 MG) BY ORAL ROUTE ONCE DAILY XARELTO 10 mg tablet Generic drug:  rivaroxaban Take 10 mg by mouth daily. zolpidem 10 mg tablet Commonly known as:  AMBIEN  
TAKE 1 TABLET BY MOUTH NIGHTLY AS NEEDED FOR SLEEP *MAX DAILY AMOUNT 10MG* * Notice: This list has 4 medication(s) that are the same as other medications prescribed for you. Read the directions carefully, and ask your doctor or other care provider to review them with you. We Performed the Following CA 27.29 [33976 CPT(R)] COMPLETE CBC & AUTO DIFF WBC [41116 CPT(R)] FERRITIN [90818 CPT(R)] IRON PROFILE [id number] CPT(R)] METABOLIC PANEL, COMPREHENSIVE [87207 CPT(R)] VITAMIN D, 25 HYDROXY [id number] CPT(R)] Follow-up Instructions Return in about 3 months (around 11/2/2017). To-Do List   
 08/02/2017 Lab:  CBC WITH 3 PART DIFF   
  
 08/04/2017  8:00 AM  
  Appointment with [facility] NURSE at [facility] ([phone])  
  
 08/11/2017 8:00 AM  
  Appointment with [facility] TRACK NURSE at [facility] ([phone]) Patient Instructions Breast Cancer: Care Instructions Your Care Instructions Breast cancer occurs when abnormal cells grow out of control in the breast. These cancer cells can spread within the breast, to nearby lymph nodes and other tissues, and to other parts of the body. Being treated for cancer can weaken your body, and you may feel very tired. Get the rest your body needs so you can feel better. Finding out that you have cancer is scary. You may feel many emotions and may need some help coping.  Seek out family, friends, and counselors for support. You also can do things at home to make yourself feel better while you go through treatment. Call the [facility] ([phone]) or visit its website at 7059 e994. [employer] for more information. Follow-up care is a key part of your treatment and safety. Be sure to make and go to all appointments, and call your doctor if you are having problems. It's also a good idea to know your test results and keep a list of the medicines you take. How can you care for yourself at home? · Take your medicines exactly as prescribed. Call your doctor if you think you are having a problem with your medicine. You may get medicine for nausea and vomiting if you have these side effects. · Follow your doctor's instructions to relieve pain. Pain from cancer and surgery can almost always be controlled. Use pain medicine when you first notice pain, before it becomes severe. · Eat healthy food. If you do not feel like eating, try to eat food that has protein and extra calories to keep up your strength and prevent weight loss. Drink liquid meal replacements for extra calories and protein. Try to eat your main meal early. · Get some physical activity every day, but do not get too tired. Keep doing the hobbies you enjoy as your energy allows. · Do not smoke. Smoking can make your cancer worse. If you need help quitting, talk to your doctor about stop-smoking programs and medicines. These can increase your chances of quitting for good. · Take steps to control your stress and workload. Learn relaxation techniques. ¨ Share your feelings. Stress and tension affect our emotions. By expressing your feelings to others, you may be able to understand and cope with them. ¨ Consider joining a support group. Talking about a problem with your spouse, a good friend, or other people with similar problems is a good way to reduce tension and stress. ¨ Express yourself through art.  Try writing, crafts, dance, or art to relieve stress. Some dance, writing, or art groups may be available just for people who have cancer. ¨ Be kind to your body and mind. Getting enough sleep, eating a healthy diet, and taking time to do things you enjoy can contribute to an overall feeling of balance in your life and can help reduce stress. ¨ Get help if you need it. Discuss your concerns with your doctor or counselor. · If you are vomiting or have diarrhea: ¨ Drink plenty of fluids (enough so that your urine is light yellow or clear like water) to prevent dehydration. Choose water and other caffeine-free clear liquids. If you have kidney, heart, or liver disease and have to limit fluids, talk with your doctor before you increase the amount of fluids you drink. ¨ When you are able to eat, try clear soups, mild foods, and liquids until all symptoms are gone for 12 to 48 hours. Other good choices include dry toast, crackers, cooked cereal, and gelatin dessert, such as Jell-O. · If you have not already done so, prepare a list of advance directives. Advance directives are instructions to your doctor and family members about what kind of care you want if you become unable to speak or express yourself. When should you call for help? Call your doctor now or seek immediate medical care if: 
· You have a fever. · Any part of your breast becomes red, tender, swollen, or hot. · You have pain, redness, or swelling in the arm on the same side as your breast cancer. Watch closely for changes in your health, and be sure to contact your doctor if: 
· You have pain that is not controlled by medicine. · You have nausea or vomiting. · You are constipated or have diarrhea. Where can you learn more? Go to http://leon-edinson.info/. Enter V321 in the search box to learn more about \"Breast Cancer: Care Instructions. \" Current as of: July 26, 2016 Content Version: 11.3 © 9408-7900 [employer].  Care instructions adapted under license by [address] (which disclaims liability or warranty for this information). If you have questions about a medical condition or this instruction, always ask your healthcare professional. [address] any warranty or liability for your use of this information. Thrombocytopenia: Care Instructions Your Care Instructions Thrombocytopenia is a low number of platelets in the blood. Platelets are the cells that help blood clot. If you don't have enough of them, your blood cannot clot well. So it is harder to stop bleeding. You may have low platelets because your bone marrow does not make them. Or your body's defenses (immune system) may destroy them. Having an enlarged spleen can also reduce the number of platelets in your blood. This is because they can get trapped in the enlarged spleen. Some diseases or medicines may also cause low platelets. But platelets may go back to normal levels if the disease is treated or the medicine is stopped. You may not need treatment if your problem is mild. If you do need treatment, you may have platelets added to your blood. Or you may get medicine to stop the loss of platelets or help your body make them. Follow-up care is a key part of your treatment and safety. Be sure to make and go to all appointments, and call your doctor if you are having problems. It's also a good idea to know your test results and keep a list of the medicines you take. How can you care for yourself at home? · Be safe with medicines. Take your medicines exactly as prescribed. Call your doctor if you think you are having a problem with your medicine. · Do not take aspirin or anti-inflammatory medicines unless your doctor says it is okay. Examples are ibuprofen (Advil, Motrin) and naproxen (Aleve). They may increase the risk of bleeding. · Avoid contact sports or activities that could cause you to fall. When should you call for help? Call 911 anytime you think you may need emergency care. For example, call if: 
· You have symptoms of a stroke. These may include: 
¨ Sudden numbness, tingling, weakness, or loss of movement in your face, arm, or leg, especially on only one side of your body. ¨ Sudden vision changes. ¨ Sudden trouble speaking. ¨ Sudden confusion or trouble understanding simple statements. ¨ Sudden problems with walking or balance. ¨ A sudden, severe headache that is different from past headaches. Call your doctor now or seek immediate medical care if: 
· You have any abnormal bleeding, such as: 
¨ Nosebleeds. ¨ Vaginal bleeding that is different (heavier, more frequent, at a different time of the month) than what you are used to. ¨ Bloody or black stools, or rectal bleeding. ¨ Bloody or pink urine. · You have severe pain that does not get better. Watch closely for changes in your health, and be sure to contact your doctor if: 
· You do not get better as expected. Where can you learn more? Go to http://leon-edinson.info/. Enter V927 in the search box to learn more about \"Thrombocytopenia: Care Instructions. \" Current as of: October 13, 2016 Content Version: 11.3 © 4812-9288 [employer]. Care instructions adapted under license by [employer] (which disclaims liability or warranty for this information). If you have questions about a medical condition or this instruction, always ask your healthcare professional. [place] [address] any warranty or liability for your use of this information. Introducing [facility]! Heather Escalona introduces [employer] patient portal. Now you can access parts of your medical record, email your doctor's office, and request medication refills online. 1. In your internet browser, go to [email or web address] 2. Click on the First Time User? Click Here link in the Sign In box.  You will see the New Member Sign Up page. 3. Enter your Foldees Access Code exactly as it appears below. You will not need to use this code after youve completed the sign-up process. If you do not sign up before the expiration date, you must request a new code. · Foldees Access Code: [email or web address] Expires: 9/21/2017  8:03 AM 
 
4. Enter the last four digits of your Social Security Number (xxxx) and Date of Birth (mm/dd/yyyy) as indicated and click Submit. You will be taken to the next sign-up page. 5. Create a Foldees ID. This will be your Foldees login ID and cannot be changed, so think of one that is secure and easy to remember. 6. Create a Foldees password. You can change your password at any time. 7. Enter your Password Reset Question and Answer. This can be used at a later time if you forget your password. 8. Enter your e-mail address. You will receive e-mail notification when new information is available in [address]. 9. Click Sign Up. You can now view and download portions of your medical record. 10. Click the Download Summary menu link to download a portable copy of your medical information. If you have questions, please visit the Frequently Asked Questions section of the Foldees website. Remember, [employer]dees is NOT to be used for urgent needs. For medical emergencies, dial 911. Now available from your iPhone and Android! Please provide this summary of care documentation to your next provider. Your primary care clinician is listed as SUSAN SINGH. If you have any questions after today's visit, please call [phone].

## 2017-08-02 NOTE — PATIENT INSTRUCTIONS
Breast Cancer: Care Instructions  Your Care Instructions  Breast cancer occurs when abnormal cells grow out of control in the breast. These cancer cells can spread within the breast, to nearby lymph nodes and other tissues, and to other parts of the body. Being treated for cancer can weaken your body, and you may feel very tired. Get the rest your body needs so you can feel better. Finding out that you have cancer is scary. You may feel many emotions and may need some help coping. Seek out family, friends, and counselors for support. You also can do things at home to make yourself feel better while you go through treatment. Call the [employer] (2-771.210.1380) or visit its website at [employer] for more information. Follow-up care is a key part of your treatment and safety. Be sure to make and go to all appointments, and call your doctor if you are having problems. It's also a good idea to know your test results and keep a list of the medicines you take. How can you care for yourself at home? · Take your medicines exactly as prescribed. Call your doctor if you think you are having a problem with your medicine. You may get medicine for nausea and vomiting if you have these side effects. · Follow your doctor's instructions to relieve pain. Pain from cancer and surgery can almost always be controlled. Use pain medicine when you first notice pain, before it becomes severe. · Eat healthy food. If you do not feel like eating, try to eat food that has protein and extra calories to keep up your strength and prevent weight loss. Drink liquid meal replacements for extra calories and protein. Try to eat your main meal early. · Get some physical activity every day, but do not get too tired. Keep doing the hobbies you enjoy as your energy allows. · Do not smoke. Smoking can make your cancer worse. If you need help quitting, talk to your doctor about stop-smoking programs and medicines.  These can increase your chances of quitting for good. · Take steps to control your stress and workload. Learn relaxation techniques. ¨ Share your feelings. Stress and tension affect our emotions. By expressing your feelings to others, you may be able to understand and cope with them. ¨ Consider joining a support group. Talking about a problem with your spouse, a good friend, or other people with similar problems is a good way to reduce tension and stress. ¨ Express yourself through art. Try writing, crafts, dance, or art to relieve stress. Some dance, writing, or art groups may be available just for people who have cancer. ¨ Be kind to your body and mind. Getting enough sleep, eating a healthy diet, and taking time to do things you enjoy can contribute to an overall feeling of balance in your life and can help reduce stress. ¨ Get help if you need it. Discuss your concerns with your doctor or counselor. · If you are vomiting or have diarrhea:  ¨ Drink plenty of fluids (enough so that your urine is light yellow or clear like water) to prevent dehydration. Choose water and other caffeine-free clear liquids. If you have kidney, heart, or liver disease and have to limit fluids, talk with your doctor before you increase the amount of fluids you drink. ¨ When you are able to eat, try clear soups, mild foods, and liquids until all symptoms are gone for 12 to 48 hours. Other good choices include dry toast, crackers, cooked cereal, and gelatin dessert, such as Jell-O.  · If you have not already done so, prepare a list of advance directives. Advance directives are instructions to your doctor and family members about what kind of care you want if you become unable to speak or express yourself. When should you call for help? Call your doctor now or seek immediate medical care if:  · You have a fever. · Any part of your breast becomes red, tender, swollen, or hot.   · You have pain, redness, or swelling in the arm on the same side as your breast cancer. Watch closely for changes in your health, and be sure to contact your doctor if:  · You have pain that is not controlled by medicine. · You have nausea or vomiting. · You are constipated or have diarrhea. Where can you learn more? Go to http://leon-edinson.info/. Enter V321 in the search box to learn more about \"Breast Cancer: Care Instructions. \"  Current as of: July 26, 2016  Content Version: 11.3  © 7899-1302 [employer]. Care instructions adapted under license by [employer] (which disclaims liability or warranty for this information). If you have questions about a medical condition or this instruction, always ask your healthcare professional. [address] any warranty or liability for your use of this information. Thrombocytopenia: Care Instructions  Your Care Instructions    Thrombocytopenia is a low number of platelets in the blood. Platelets are the cells that help blood clot. If you don't have enough of them, your blood cannot clot well. So it is harder to stop bleeding. You may have low platelets because your bone marrow does not make them. Or your body's defenses (immune system) may destroy them. Having an enlarged spleen can also reduce the number of platelets in your blood. This is because they can get trapped in the enlarged spleen. Some diseases or medicines may also cause low platelets. But platelets may go back to normal levels if the disease is treated or the medicine is stopped. You may not need treatment if your problem is mild. If you do need treatment, you may have platelets added to your blood. Or you may get medicine to stop the loss of platelets or help your body make them. Follow-up care is a key part of your treatment and safety. Be sure to make and go to all appointments, and call your doctor if you are having problems.  It's also a good idea to know your test results and keep a list of the medicines you take. How can you care for yourself at home? · Be safe with medicines. Take your medicines exactly as prescribed. Call your doctor if you think you are having a problem with your medicine. · Do not take aspirin or anti-inflammatory medicines unless your doctor says it is okay. Examples are ibuprofen (Advil, Motrin) and naproxen (Aleve). They may increase the risk of bleeding. · Avoid contact sports or activities that could cause you to fall. When should you call for help? Call 911 anytime you think you may need emergency care. For example, call if:  · You have symptoms of a stroke. These may include:  ¨ Sudden numbness, tingling, weakness, or loss of movement in your face, arm, or leg, especially on only one side of your body. ¨ Sudden vision changes. ¨ Sudden trouble speaking. ¨ Sudden confusion or trouble understanding simple statements. ¨ Sudden problems with walking or balance. ¨ A sudden, severe headache that is different from past headaches. Call your doctor now or seek immediate medical care if:  · You have any abnormal bleeding, such as:  ¨ Nosebleeds. ¨ Vaginal bleeding that is different (heavier, more frequent, at a different time of the month) than what you are used to. ¨ Bloody or black stools, or rectal bleeding. ¨ Bloody or pink urine. · You have severe pain that does not get better. Watch closely for changes in your health, and be sure to contact your doctor if:  · You do not get better as expected. Where can you learn more? Go to http://leon-edinson.info/. Enter E159 in the search box to learn more about \"Thrombocytopenia: Care Instructions. \"  Current as of: October 13, 2016  Content Version: 11.3  © 9705-5868 [employer]. Care instructions adapted under license by [employer] (which disclaims liability or warranty for this information).  If you have questions about a medical condition or this instruction, always ask your healthcare professional. [place] [address] any warranty or liability for your use of this information.

## 2017-08-03 LAB
25(OH)D3 SERPL-MCNC: 31 NG/ML (ref 30–100)
CANCER AG27-29 SERPL-ACNC: 38.4 U/ML (ref 0–38.6)

## 2017-08-04 ENCOUNTER — HOSPITAL ENCOUNTER (OUTPATIENT)
Dept: INFUSION THERAPY | Age: 67
Discharge: HOME | End: 2017-08-04
Payer: MEDICARE

## 2017-08-04 VITALS
TEMPERATURE: 97.6 F | DIASTOLIC BLOOD PRESSURE: 64 MMHG | HEART RATE: 62 BPM | RESPIRATION RATE: 18 BRPM | SYSTOLIC BLOOD PRESSURE: 106 MMHG

## 2017-08-04 PROCEDURE — 96372 THER/PROPH/DIAG INJ SC/IM: CPT

## 2017-08-04 PROCEDURE — 74011250636 HC RX REV CODE- 250/636

## 2017-08-04 PROCEDURE — 74011250636 HC RX REV CODE- 250/636: Performed by: INTERNAL MEDICINE

## 2017-08-04 RX ORDER — HEPARIN SODIUM (PORCINE) LOCK FLUSH IV SOLN 100 UNIT/ML 100 UNIT/ML
SOLUTION INTRAVENOUS
Status: DISCONTINUED
Start: 2017-08-04 | End: 2017-08-04

## 2017-08-04 RX ORDER — HEPARIN SODIUM (PORCINE) LOCK FLUSH IV SOLN 100 UNIT/ML 100 UNIT/ML
500 SOLUTION INTRAVENOUS AS NEEDED
Status: DISCONTINUED | OUTPATIENT
Start: 2017-08-04 | End: 2017-08-04

## 2017-08-04 RX ORDER — MELOXICAM 7.5 MG/1
7.5 TABLET ORAL DAILY
COMMUNITY
End: 2018-01-01

## 2017-08-04 RX ORDER — LISINOPRIL 10 MG/1
10 TABLET ORAL DAILY
Status: ON HOLD | COMMUNITY
End: 2018-01-08

## 2017-08-04 RX ORDER — WATER FOR INJECTION,STERILE
VIAL (ML) INJECTION
Status: DISPENSED
Start: 2017-08-04 | End: 2017-08-04

## 2017-08-04 RX ORDER — AMIODARONE HYDROCHLORIDE 200 MG/1
200 TABLET ORAL DAILY
COMMUNITY
End: 2018-01-01

## 2017-08-04 RX ORDER — SODIUM CHLORIDE 0.9 % (FLUSH) 0.9 %
10-40 SYRINGE (ML) INJECTION AS NEEDED
Status: DISCONTINUED | OUTPATIENT
Start: 2017-08-04 | End: 2017-08-04

## 2017-08-04 RX ORDER — DIGOXIN 125 MCG
0.12 TABLET ORAL DAILY
COMMUNITY

## 2017-08-04 RX ORDER — CARVEDILOL 3.12 MG/1
3.12 TABLET ORAL 2 TIMES DAILY WITH MEALS
COMMUNITY
End: 2018-01-01

## 2017-08-04 RX ORDER — MECLIZINE HCL 12.5 MG 12.5 MG/1
12.5 TABLET ORAL
Qty: 30 TAB | Refills: 6 | Status: SHIPPED | OUTPATIENT
Start: 2017-08-04 | End: 2017-08-14

## 2017-08-04 RX ADMIN — ROMIPLOSTIM 207 MCG: 250 INJECTION, POWDER, LYOPHILIZED, FOR SOLUTION SUBCUTANEOUS at 08:26

## 2017-08-04 NOTE — PROGRESS NOTES
[facility] Progress Note    Date: 2017    Name: Celine Mcmahon    MRN: [id number]         : 1950     Treatment: Nplate      Ms. Donaldo Pak arrived to [place] at [facility]. Ms. Donaldo Pak was assessed and education was provided. Ms. Vesta Herman vitals were reviewed. Visit Vitals    /64 (BP 1 Location: Right arm, BP Patient Position: Sitting)    Pulse 62    Temp 97.6 °F (36.4 °C)    Resp 18    Breastfeeding No       Pt was observed for 5 minutes after obtaining vital signs prior to initiating treatment. Patient had labs drawn on 17. Patient refused port flush today. Ms. Donaldo Pak tolerated well without complaints. Lab results obtained and reviewed. No results found for this or any previous visit (from the past 12 hour(s)). Per Nplate protocol, since pt's platelet count is >542,676 today for the first week () pt's dose will remain at 3mcg/kg (3mcg x 69kg = 207mcg).    Nplate 431KFW (3.46QU) administered as ordered SQ in patient's right upper arm. Ms. Donaldo Pak was discharged from [place] [address] in stable condition at 0070. She is to return on 17 at 1330 for her next appointment.     Kirt Carl RN  2017

## 2017-08-09 ENCOUNTER — HOSPITAL ENCOUNTER (OUTPATIENT)
Age: 67
Setting detail: OUTPATIENT SURGERY
Discharge: HOME | End: 2017-08-09
Attending: PHYSICAL MEDICINE & REHABILITATION | Admitting: PHYSICAL MEDICINE & REHABILITATION
Payer: MEDICARE

## 2017-08-09 ENCOUNTER — APPOINTMENT (OUTPATIENT)
Dept: GENERAL RADIOLOGY | Age: 67
End: 2017-08-09
Attending: PHYSICAL MEDICINE & REHABILITATION
Payer: MEDICARE

## 2017-08-09 VITALS
WEIGHT: 151 LBS | DIASTOLIC BLOOD PRESSURE: 59 MMHG | TEMPERATURE: 98 F | RESPIRATION RATE: 18 BRPM | HEIGHT: 66 IN | BODY MASS INDEX: 24.27 KG/M2 | OXYGEN SATURATION: 100 % | HEART RATE: 100 BPM | SYSTOLIC BLOOD PRESSURE: 133 MMHG

## 2017-08-09 PROCEDURE — 74011250636 HC RX REV CODE- 250/636

## 2017-08-09 PROCEDURE — 74011250637 HC RX REV CODE- 250/637: Performed by: PHYSICAL MEDICINE & REHABILITATION

## 2017-08-09 PROCEDURE — 77030014124 HC TY EPDRL BBMI -A: Performed by: PHYSICAL MEDICINE & REHABILITATION

## 2017-08-09 PROCEDURE — 74011636320 HC RX REV CODE- 636/320

## 2017-08-09 PROCEDURE — 76010000009 HC PAIN MGT 0 TO 30 MIN PROC: Performed by: PHYSICAL MEDICINE & REHABILITATION

## 2017-08-09 PROCEDURE — 74011000250 HC RX REV CODE- 250

## 2017-08-09 RX ORDER — LIDOCAINE HYDROCHLORIDE 10 MG/ML
INJECTION, SOLUTION EPIDURAL; INFILTRATION; INTRACAUDAL; PERINEURAL AS NEEDED
Status: DISCONTINUED | OUTPATIENT
Start: 2017-08-09 | End: 2017-08-09 | Stop reason: HOSPADM

## 2017-08-09 RX ORDER — DEXAMETHASONE SODIUM PHOSPHATE 100 MG/10ML
INJECTION INTRAMUSCULAR; INTRAVENOUS AS NEEDED
Status: DISCONTINUED | OUTPATIENT
Start: 2017-08-09 | End: 2017-08-09 | Stop reason: HOSPADM

## 2017-08-09 RX ORDER — DIAZEPAM 5 MG/1
5-20 TABLET ORAL ONCE
Status: COMPLETED | OUTPATIENT
Start: 2017-08-09 | End: 2017-08-09

## 2017-08-09 RX ADMIN — DIAZEPAM 10 MG: 5 TABLET ORAL at 12:55

## 2017-08-09 NOTE — PROCEDURES
Intralaminar Epidural Steroid Block Procedure Note      Patient Name: Nadia Kennedy    Date of Procedure: August 9, 2017    Preoperative Diagnosis: Bilateral stenosis of lateral recess of lumbar spine [M48.06]    Post Operative Diagnosis: Bilateral stenosis of lateral recess of lumbar spine [M48.06]    Procedure: L3-L4 Epidural Block     PROCEDURE:  Lumbar Epidural Block    Consent:  Informed  Consent was obtained prior to the procedure. The patient was given the opportunity to ask questions regarding the procedure and its associated risks. In addition to the potential risks associated with the procedure itself, the patient was informed both verbally and in writing of potential side effects of the use glucocorticoids. The patient appeared to comprehend the informed consent and desired to have the procedure performed. The patient was placed in the prone position on the fluoroscopy table and the back prepped and draped in the usual sterile manner. The interlaminar space was identified using fluoroscopy and the skin anesthetized with 1% Lidocaine. A #18 Tuohy Epidural needle was advanced under fluoroscopic control from a paramedian approach to the  epidural space as noted above, using the loss of resistance to fluid technique. Then, 30 mg of preservative free Dexamethasone and 2 cc of Lidocaine was slowly injected. The patient tolerated the procedure well. The injection area was cleaned and band aids applied. No excessive bleeding was noted. Patient dressed and was discharged to home with instructions.

## 2017-08-09 NOTE — DISCHARGE INSTRUCTIONS
[facility]   ([place]ED[place])  Dr. Donnell Zeng, Dr. [facility], Dr. Travis Lower not drive a car, operate heavy machinery or dangerous equipment for 24 hours. * Activity as tolerated; rest for the remainder of the day. * Resume pre-block medications including those for your family doctor. * Do not drink alcoholic beverages for 24 hours. Alcohol and the medications you have received may interact and cause an adverse reaction. * You may feel better this evening and worse tomorrow, as the numbing medications wears off and the steroid has yet to begin to work. After 48 hrs the steroid should begin to release bringing you relief. * You may shower this evening and remove any bandages. * Avoid hot tubs and heating pads for 24 hours. You may use cold packs on the procedure site as tolerated for the first 24 hours. * If a headache develops, drink plenty of fluids and rest.  Take over the counter medications for headache if needed. If the headache continues longer than 24 hours, call MD at the [address] [place] [address]. [phone]    * Continue taking pain medications as needed. * You may resume your regular diet if tolerated. Otherwise, start with sips of water and advance slowly. * If Diabetic: check your blood sugar three times a day for the next 3 days. If your sugar is greater than 300 call your family doctor. If your sugar is greater than 400, have someone transport you to the nearest Emergency Room. * If you experience any of the following problems, Please Call the [address] [place] [address] at [phone].         * Shortness of Breath    * Fever of 101 or higher    * Nausea / Vomiting    * Severe Headache    * Weakness or numbness in arms or legs that is not      resolving    * Prolonged increase in pain greater than 4 days      DISCHARGE SUMMARY from Nurse      PATIENT INSTRUCTIONS:    After oral sedation, for 24 hours or while taking prescription Narcotics:  · Limit your activities  · Do not drive and operate hazardous machinery  · Do not make important personal or business decisions  · Do  not drink alcoholic beverages  · If you have not urinated within 8 hours after discharge, please contact your surgeon on call. Report the following to your surgeon:  · Excessive pain, swelling, redness or odor of or around the surgical area  · Temperature over 101  · Nausea and vomiting lasting longer than 4 hours or if unable to take medications  · Any signs of decreased circulation or nerve impairment to extremity: change in color, persistent  numbness, tingling, coldness or increase pain  · Any questions            What to do at Home:  Recommended activity: Activity as tolerated, NO DRIVING FOR 12 Hours post injection          *  Please give a list of your current medications to your Primary Care Provider. *  Please update this list whenever your medications are discontinued, doses are      changed, or new medications (including over-the-counter products) are added. *  Please carry medication information at all times in case of emergency situations. These are general instructions for a healthy lifestyle:    No smoking/ No tobacco products/ Avoid exposure to second hand smoke    Surgeon General's Warning:  Quitting smoking now greatly reduces serious risk to your health. Obesity, smoking, and sedentary lifestyle greatly increases your risk for illness    A healthy diet, regular physical exercise & weight monitoring are important for maintaining a healthy lifestyle    You may be retaining fluid if you have a history of heart failure or if you experience any of the following symptoms:  Weight gain of 3 pounds or more overnight or 5 pounds in a week, increased swelling in our hands or feet or shortness of breath while lying flat in bed.   Please call your doctor as soon as you notice any of these symptoms; do not wait until your next office visit. Recognize signs and symptoms of STROKE:    F-face looks uneven    A-arms unable to move or move unevenly    S-speech slurred or non-existent    T-time-call 911 as soon as signs and symptoms begin-DO NOT go       Back to bed or wait to see if you get better-TIME IS BRAIN.

## 2017-08-09 NOTE — H&P
Date of Surgery Update:  Edyta Rondon was seen and examined. History and physical has been reviewed. The patient has been examined. There have been no significant clinical changes since the completion of the last office visit.       Signed By: Poncho Ivan MD     August 9, 2017 1:04 PM

## 2017-08-11 ENCOUNTER — HOSPITAL ENCOUNTER (OUTPATIENT)
Dept: INFUSION THERAPY | Age: 67
Discharge: HOME | End: 2017-08-11
Payer: MEDICARE

## 2017-08-11 VITALS
HEART RATE: 61 BPM | SYSTOLIC BLOOD PRESSURE: 106 MMHG | DIASTOLIC BLOOD PRESSURE: 67 MMHG | RESPIRATION RATE: 18 BRPM | TEMPERATURE: 97.3 F

## 2017-08-11 LAB
BASO+EOS+MONOS # BLD AUTO: 1 K/UL (ref 0–2.3)
BASO+EOS+MONOS # BLD AUTO: 12 % (ref 0.1–17)
DIFFERENTIAL METHOD BLD: ABNORMAL
ERYTHROCYTE [DISTWIDTH] IN BLOOD BY AUTOMATED COUNT: 18 % (ref 11.5–14.5)
HCT VFR BLD AUTO: 30.9 % (ref 36–48)
HGB BLD-MCNC: 10.5 G/DL (ref 12–16)
LYMPHOCYTES # BLD AUTO: 33 % (ref 14–44)
LYMPHOCYTES # BLD: 2.8 K/UL (ref 1.1–5.9)
MCH RBC QN AUTO: 27.8 PG (ref 25–35)
MCHC RBC AUTO-ENTMCNC: 34 G/DL (ref 31–37)
MCV RBC AUTO: 81.7 FL (ref 78–102)
NEUTS SEG # BLD: 4.9 K/UL (ref 1.8–9.5)
NEUTS SEG NFR BLD AUTO: 55 % (ref 40–70)
PLATELET # BLD AUTO: 142 K/UL (ref 135–420)
RBC # BLD AUTO: 3.78 M/UL (ref 4.1–5.1)
WBC # BLD AUTO: 8.7 K/UL (ref 4.5–13)

## 2017-08-11 PROCEDURE — 74011250636 HC RX REV CODE- 250/636

## 2017-08-11 PROCEDURE — 36415 COLL VENOUS BLD VENIPUNCTURE: CPT

## 2017-08-11 PROCEDURE — 85049 AUTOMATED PLATELET COUNT: CPT | Performed by: INTERNAL MEDICINE

## 2017-08-11 PROCEDURE — 85025 COMPLETE CBC W/AUTO DIFF WBC: CPT | Performed by: INTERNAL MEDICINE

## 2017-08-11 PROCEDURE — 74011000250 HC RX REV CODE- 250

## 2017-08-11 PROCEDURE — 74011250636 HC RX REV CODE- 250/636: Performed by: INTERNAL MEDICINE

## 2017-08-11 PROCEDURE — 96372 THER/PROPH/DIAG INJ SC/IM: CPT

## 2017-08-11 RX ORDER — HEPARIN SODIUM (PORCINE) LOCK FLUSH IV SOLN 100 UNIT/ML 100 UNIT/ML
500 SOLUTION INTRAVENOUS AS NEEDED
Status: DISCONTINUED | OUTPATIENT
Start: 2017-08-11 | End: 2017-08-11

## 2017-08-11 RX ORDER — WATER FOR INJECTION,STERILE
VIAL (ML) INJECTION
Status: COMPLETED
Start: 2017-08-11 | End: 2017-08-11

## 2017-08-11 RX ORDER — SODIUM CHLORIDE 0.9 % (FLUSH) 0.9 %
10-40 SYRINGE (ML) INJECTION AS NEEDED
Status: DISCONTINUED | OUTPATIENT
Start: 2017-08-11 | End: 2017-08-11

## 2017-08-11 RX ADMIN — WATER: 1 INJECTION INTRAMUSCULAR; INTRAVENOUS; SUBCUTANEOUS at 08:57

## 2017-08-11 RX ADMIN — ROMIPLOSTIM 207 MCG: 250 INJECTION, POWDER, LYOPHILIZED, FOR SOLUTION SUBCUTANEOUS at 08:53

## 2017-08-11 NOTE — PROGRESS NOTES
[facility] Progress Note    Date: 2017    Name: Brooke Fatima    MRN: [id number]         : 1950      Ms. Camilo Almeida arrived to [facility] at [employer]. Ms. Camilo Almeida was assessed and education was provided. Nplate. Ms. Jacinto Mijares vitals were reviewed. Visit Vitals    /67 (BP 1 Location: Right arm, BP Patient Position: Sitting)    Pulse 61    Temp 97.3 °F (36.3 °C)    Resp 18       Pt was observed for 5 minutes after obtaining vital signs prior to initiating treatment. Blood drawn for labs via right AC venipuncture x1 attempt. Patient forgot she will be needing a port flush today and refused it because she didn't have her lidocaine cream on. Patient informed she will be getting her port flush at her next scheduled appointment. Full understanding noted. Lab results obtained and reviewed. Recent Results (from the past 12 hour(s))   CBC WITH 3 PART DIFF    Collection Time: 17  8:35 AM   Result Value Ref Range    WBC 8.7 4.5 - 13.0 K/uL    RBC 3.78 (L) 4.10 - 5.10 M/uL    HGB 10.5 (L) 12.0 - 16 g/dL    HCT 30.9 (L) 36 - 48 %    MCV 81.7 78 - 102 FL    MCH 27.8 25.0 - 35.0 PG    MCHC 34.0 31 - 37 g/dL    RDW 18.0 (H) 11.5 - 14.5 %    NEUTROPHILS 55 40 - 70 %    MIXED CELLS 12 0.1 - 17 %    LYMPHOCYTES 33 14 - 44 %    ABS. NEUTROPHILS 4.9 1.8 - 9.5 K/UL    ABS. MIXED CELLS 1.0 0.0 - 2.3 K/uL    ABS. LYMPHOCYTES 2.8 1.1 - 5.9 K/UL    DF AUTOMATED         Pt's preliminary platelet count was 850,974. Per Nplate protocol, since pt's platelet count is 923,277 today, pt's dose will remain at 3mcg/kg (3mcg x 69kg = 207mcg). Nplate 230KGZ (0.01SU) administered as ordered SQ in patient's right upper arm. Ms. Camilo Almeida was discharged from [address] in stable condition at 0900. She is to return on 17 at 0800 for her next appointment for Nplate and port flush.     Xochilt Ann  2017

## 2017-08-18 ENCOUNTER — HOSPITAL ENCOUNTER (OUTPATIENT)
Dept: INFUSION THERAPY | Age: 67
Discharge: HOME | End: 2017-08-18
Payer: MEDICARE

## 2017-08-18 VITALS
DIASTOLIC BLOOD PRESSURE: 77 MMHG | HEART RATE: 66 BPM | TEMPERATURE: 97.6 F | SYSTOLIC BLOOD PRESSURE: 116 MMHG | RESPIRATION RATE: 18 BRPM

## 2017-08-18 LAB
BASO+EOS+MONOS # BLD AUTO: 1.3 K/UL (ref 0–2.3)
BASO+EOS+MONOS # BLD AUTO: 20 % (ref 0.1–17)
DIFFERENTIAL METHOD BLD: ABNORMAL
ERYTHROCYTE [DISTWIDTH] IN BLOOD BY AUTOMATED COUNT: 17.7 % (ref 11.5–14.5)
HCT VFR BLD AUTO: 29.6 % (ref 36–48)
HGB BLD-MCNC: 10 G/DL (ref 12–16)
LYMPHOCYTES # BLD: 1.6 K/UL (ref 1.1–5.9)
LYMPHOCYTES NFR BLD: 25 % (ref 14–44)
MCH RBC QN AUTO: 27.9 PG (ref 25–35)
MCHC RBC AUTO-ENTMCNC: 33.8 G/DL (ref 31–37)
MCV RBC AUTO: 82.7 FL (ref 78–102)
NEUTS SEG # BLD: 3.6 K/UL (ref 1.8–9.5)
NEUTS SEG NFR BLD: 55 % (ref 40–70)
PLATELET # BLD AUTO: 131 K/UL (ref 135–420)
RBC # BLD AUTO: 3.58 M/UL (ref 4.1–5.1)
WBC # BLD AUTO: 6.5 K/UL (ref 4.5–13)

## 2017-08-18 PROCEDURE — 74011250636 HC RX REV CODE- 250/636: Performed by: NURSE PRACTITIONER

## 2017-08-18 PROCEDURE — 85049 AUTOMATED PLATELET COUNT: CPT | Performed by: INTERNAL MEDICINE

## 2017-08-18 PROCEDURE — 36591 DRAW BLOOD OFF VENOUS DEVICE: CPT

## 2017-08-18 PROCEDURE — 77030012965 HC NDL HUBR BBMI -A

## 2017-08-18 PROCEDURE — 36593 DECLOT VASCULAR DEVICE: CPT

## 2017-08-18 PROCEDURE — 74011000250 HC RX REV CODE- 250

## 2017-08-18 PROCEDURE — 96372 THER/PROPH/DIAG INJ SC/IM: CPT

## 2017-08-18 PROCEDURE — 74011250636 HC RX REV CODE- 250/636: Performed by: INTERNAL MEDICINE

## 2017-08-18 PROCEDURE — 74011000250 HC RX REV CODE- 250: Performed by: INTERNAL MEDICINE

## 2017-08-18 PROCEDURE — 85025 COMPLETE CBC W/AUTO DIFF WBC: CPT | Performed by: INTERNAL MEDICINE

## 2017-08-18 RX ORDER — WATER FOR INJECTION,STERILE
VIAL (ML) INJECTION
Status: COMPLETED
Start: 2017-08-18 | End: 2017-08-18

## 2017-08-18 RX ORDER — SODIUM CHLORIDE 0.9 % (FLUSH) 0.9 %
10-40 SYRINGE (ML) INJECTION AS NEEDED
Status: DISCONTINUED | OUTPATIENT
Start: 2017-08-18 | End: 2017-08-22 | Stop reason: HOSPADM

## 2017-08-18 RX ORDER — HEPARIN SODIUM (PORCINE) LOCK FLUSH IV SOLN 100 UNIT/ML 100 UNIT/ML
500 SOLUTION INTRAVENOUS AS NEEDED
Status: DISPENSED | OUTPATIENT
Start: 2017-08-18 | End: 2017-08-19

## 2017-08-18 RX ADMIN — ROMIPLOSTIM 207 MCG: 250 INJECTION, POWDER, LYOPHILIZED, FOR SOLUTION SUBCUTANEOUS at 09:02

## 2017-08-18 RX ADMIN — ALTEPLASE 2 MG: 2.2 INJECTION, POWDER, LYOPHILIZED, FOR SOLUTION INTRAVENOUS at 08:44

## 2017-08-18 RX ADMIN — WATER 10 ML: 1 INJECTION INTRAMUSCULAR; INTRAVENOUS; SUBCUTANEOUS at 09:02

## 2017-08-18 RX ADMIN — HEPARIN SODIUM (PORCINE) LOCK FLUSH IV SOLN 100 UNIT/ML 500 UNITS: 100 SOLUTION at 08:13

## 2017-08-18 RX ADMIN — Medication 40 ML: at 08:13

## 2017-08-18 NOTE — PROGRESS NOTES
[facility] Progress Note    Date: 2017    Name: Laurita Fuchs    MRN: [id number]         : 1950      Ms. Valentina Lyles arrived to [facility] at [address] [facility]. Ms. Valentina Lyles was assessed and education was provided. Nplate/Port flush. Ms. Araceli Wright vitals were reviewed. Visit Vitals    /77 (BP 1 Location: Right arm, BP Patient Position: At rest)    Pulse 66    Temp 97.6 °F (36.4 °C)    Resp 18       Pt was observed for 5 minutes after obtaining vital signs prior to initiating treatment. Right chest mediport accessed with 20 g 1 inch haas needle using sterile technique, medial and lateral port.      Medial port flushed easily and had brisk blood return. Blood drawn from lateral port. Port flushed with 30 ml NS and 500 units of Heparin after blood draw for CBC per protocol then de-accessed. Lateral port flushed sluggish with no blood return. Alteplase 2mg IV push administered. Port was then flushed with 10ml NS 30 minutes after Alteplase was pushed. Port gave brisk blood return at this time. Port was then flushed with 20ml NS and 500 Units Heparin then deaccessed.     No irritation or bleeding noted. Gauze and transparent adhesive applied to site. Lab results obtained and reviewed. (Preliminary results scanned into media tab.)    Pt's preliminary platelet count was 734,071. Per Nplate protocol, since pt's platelet count is 962,576 today, pt's dose will remain at 3mcg/kg (3mcg x 69kg = 207mcg). Nplate 463XHI (6.95NS) administered as ordered SQ in patient's right upper arm. Ms. Valentina Lyles was discharged from [place] [address] in stable condition at 7408. She is to return on 17 at 0800 for her next appointment.     Michelle Terrazas RN  2017

## 2017-08-22 ENCOUNTER — OFFICE VISIT (OUTPATIENT)
Dept: ORTHOPEDIC SURGERY | Age: 67
End: 2017-08-22

## 2017-08-22 VITALS
HEIGHT: 66 IN | WEIGHT: 150 LBS | BODY MASS INDEX: 24.11 KG/M2 | DIASTOLIC BLOOD PRESSURE: 52 MMHG | HEART RATE: 67 BPM | SYSTOLIC BLOOD PRESSURE: 94 MMHG

## 2017-08-22 DIAGNOSIS — M79.2 NEURITIS: ICD-10-CM

## 2017-08-22 DIAGNOSIS — R26.9 GAIT ABNORMALITY: ICD-10-CM

## 2017-08-22 DIAGNOSIS — M48.061 LUMBAR SPINAL STENOSIS: Primary | ICD-10-CM

## 2017-08-22 DIAGNOSIS — M47.816 LUMBAR FACET ARTHROPATHY: ICD-10-CM

## 2017-08-22 DIAGNOSIS — R29.898 RIGHT LEG WEAKNESS: ICD-10-CM

## 2017-08-22 NOTE — PATIENT INSTRUCTIONS

## 2017-08-22 NOTE — MR AVS SNAPSHOT
Visit Information Date & Time Provider Department Dept. Phone Encounter #  
 8/22/2017  2:15 PM Kranthi Maria MD [facility] [phone] [id number] Follow-up Instructions Return in about 1 month (around 9/22/2017) for Injection follow up. Your Appointments 9/25/2017  2:15 PM  
Follow Up with Kranthi Maria MD  
[facility] [address]) Appt Note: 1 MO BLOCK FU; VERN 8/9/17  
 [address] [facility] [address]  
[phone]  
  
   
 [address] [facility],[address]  35 [facility]  
  
    
 10/23/2017  2:45 PM  
Follow Up with Kranthi Maria MD  
[address] [facility] [address] [facility] [address]) Appt Note: 1MO BLOCK FU; Ted Mention 9/27/17  
 [address] [place] [address]  
[phone]  
  
    
 11/1/2017 11:00 AM  
Office Visit with Bela Talley MD  
[facility] [address]) Appt Note: OV  
 [address] [facility] Arlin Villasenor [address], [address] [place] [address] Upcoming Health Maintenance Date Due Hepatitis C Screening 1950 DTaP/Tdap/Td series (1 - Tdap) 5/14/1973 FOBT Q 1 YEAR AGE 50-75 5/14/2002 ZOSTER VACCINE AGE 60> 3/14/2012 BREAST CANCER SCRN MAMMOGRAM 4/4/2015 GLAUCOMA SCREENING Q2Y 5/14/2017 OSTEOPOROSIS SCREENING (DEXA) 5/14/2017 Pneumococcal 65+ High/Highest Risk (1 of 2 - PCV13) 5/14/2017 MEDICARE YEARLY EXAM 5/14/2017 INFLUENZA AGE 9 TO ADULT 8/1/2017 Allergies as of 8/22/2017  Review Complete On: 8/22/2017 By: Jl Jimenes Severity Noted Reaction Type Reactions Aspirin High 08/07/2013   Systemic Swelling Pt states her whole body swells when she takes aspirin. Adhesive    Unable to Obtain Pcn [Penicillins]  08/20/2012    Rash Current Immunizations  Reviewed on 8/4/2017 No immunizations on file. Not reviewed this visit You Were Diagnosed With   
  
 Codes Comments Lumbar spinal stenosis    -  Primary ICD-10-CM: M48.06 
ICD-9-CM: 724.02 Neuritis     ICD-10-CM: M79.2 ICD-9-CM: 729.2 Lumbar facet arthropathy     ICD-10-CM: M12.88 ICD-9-CM: 721.3 Gait abnormality     ICD-10-CM: R26.9 ICD-9-CM: 159. 2 Right leg weakness     ICD-10-CM: R29.898 ICD-9-CM: 729.89 Vitals BP Pulse Height(growth percentile) Weight(growth percentile) BMI OB Status 94/52 67 5' 6\" (1.676 m) 150 lb (68 kg) 24.21 kg/m2 Postmenopausal  
 Smoking Status Never Smoker Vitals History BMI and BSA Data Body Mass Index Body Surface Area  
 24.21 kg/m 2 1.78 m 2 Preferred Pharmacy Pharmacy Name Phone [id number] [address] [phone] Your Updated Medication List  
  
   
This list is accurate as of: 8/22/17  4:00 PM.  Always use your most recent med list.  
  
  
  
  
 albuterol 90 mcg/actuation inhaler Commonly known as:  PROAIR HFA  
1-2 puffs every 4-6 hrs. aluminum-magnesium hydroxide 200-200 mg/5 mL susp 5 mL, diphenhydrAMINE 12.5 mg/5 mL liqd 12.5 mg, lidocaine 2 % soln 5 mL  
5 mL by Swish and Spit route two (2) times a day. Magic mouth wash  Maalox Lidocaine 2% viscous  Diphenhydramine oral solution   Pharmacy to mix equal portions of ingredients to a total volume as indicated in the dispense amount. amiodarone 200 mg tablet Commonly known as:  CORDARONE Take 200 mg by mouth. CeleBREX 200 mg capsule Generic drug:  celecoxib Take  by mouth two (2) times a day. COREG 3.125 mg tablet Generic drug:  carvedilol Take 3.125 mg by mouth two (2) times daily (with meals). cyclobenzaprine 5 mg tablet Commonly known as:  FLEXERIL  
  
 digoxin 0.125 mg tablet Commonly known as:  LANOXIN  
 Take 0.125 mg by mouth daily. dronabinol 5 mg capsule Commonly known as:  Luisa Eth Take 1 Cap by mouth two (2) times a day. furosemide 40 mg tablet Commonly known as:  LASIX Take  by mouth daily. * gabapentin 300 mg capsule Commonly known as:  NEURONTIN Take 300 mg by mouth three (3) times daily. * gabapentin 300 mg capsule Commonly known as:  NEURONTIN Take 1 po q am and 2 po q pm  
  
 lidocaine-prilocaine topical cream  
Commonly known as:  EMLA  
APPLY OVER PORT 1 HOUR PRIOR TO CHEMOTHERAPY THAN COVER WITH SARAN WRAP  
  
 lisinopril 10 mg tablet Commonly known as:  Joycelyn Stevenson Take 10 mg by mouth daily. loratadine 10 mg Cap Take 10 mg by mouth daily. magic mouthwash solution Take 5 mL by mouth three (3) times daily as needed for Stomatitis. Magic mouth wash  Maalox Lidocaine 2% viscous  Diphenhydramine oral solution   Pharmacy to mix equal portions of ingredients to a total volume as indicated in the dispense amount. meloxicam 7.5 mg tablet Commonly known as:  MOBIC Take 7.5 mg by mouth daily. nabumetone 750 mg tablet Commonly known as:  RELAFEN  
  
 ondansetron hcl 8 mg tablet Commonly known as:  ZOFRAN (AS HYDROCHLORIDE) Take 1 Tab by mouth every eight (8) hours as needed for Nausea. oxyCODONE-acetaminophen 7.5-325 mg per tablet Commonly known as:  PERCOCET Take 1 tablet every 6 hours as needed for pain * potassium chloride 20 mEq tablet Commonly known as:  K-DUR, KLOR-CON Take 2 Tabs by mouth daily. * KLOR-CON M20 20 mEq tablet Generic drug:  potassium chloride TAKE ONE TABLET BY MOUTH ONCE A DAY  
  
 PRUVATE 21-7 PO Take  by mouth. Senna 8.6 mg tablet Generic drug:  senna Take 1 Tab by mouth daily. warfarin 1 mg tablet Commonly known as:  COUMADIN  
TAKE 1 TABLET (1 MG) BY ORAL ROUTE ONCE DAILY XARELTO 10 mg tablet Generic drug:  rivaroxaban Take 10 mg by mouth daily. zolpidem 10 mg tablet Commonly known as:  AMBIEN  
TAKE 1 TABLET BY MOUTH NIGHTLY AS NEEDED FOR SLEEP *MAX DAILY AMOUNT 10MG* * Notice: This list has 4 medication(s) that are the same as other medications prescribed for you. Read the directions carefully, and ask your doctor or other care provider to review them with you. We Performed the Following AMB SUPPLY ORDER [[id number] Custom] Comments:  
 ROLLING WALKER WITH A SEAT  
 SCHEDULE SURGERY [MQF7150 Custom] Follow-up Instructions Return in about 1 month (around 9/22/2017) for Injection follow up. To-Do List   
 08/25/2017  8:00 AM  
  Appointment with [facility] TRACK NURSE at [facility] ([phone])  
  
 09/01/2017 8:00 AM  
  Appointment with [facility] FA[facility] TRACK NURSE at [facility] ([phone])  
  
 09/08/2017 8:00 AM  
  Appointment with [facility] FA[facility] TRACK NURSE at [facility] ([phone])  
  
 09/15/2017 8:00 AM  
  Appointment with [facility] FA[facility] TRACK NURSE at [facility] ([phone])  
  
 09/22/2017 8:00 AM  
  Appointment with [facility] TRACK NURSE at [facility] ([phone])  
  
 09/29/2017 8:00 AM  
  Appointment with [facility] FA[facility] TRACK NURSE at [facility] ([phone]) 10/13/2017 8:00 AM  
  Appointment with [facility] FA[facility] TRACK NURSE at [facility] ([phone]) Patient Instructions Low Back Arthritis: Exercises Your Care Instructions Here are some examples of typical rehabilitation exercises for your condition. Start each exercise slowly. Ease off the exercise if you start to have pain. Your doctor or physical therapist will tell you when you can start these exercises and which ones will work best for you. When you are not being active, find a comfortable position for rest. Some people are comfortable on the floor or a medium-firm bed with a small pillow under their head and another under their knees.  Some people prefer to lie on their side with a pillow between their knees. Don't stay in one position for too long. Take short walks (10 to 20 minutes) every 2 to 3 hours. Avoid slopes, hills, and stairs until you feel better. Walk only distances you can manage without pain, especially leg pain. How to do the exercises Pelvic tilt 1. Lie on your back with your knees bent. 2. \"Brace\" your stomachtighten your muscles by pulling in and imagining your belly button moving toward your spine. 3. Press your lower back into the floor. You should feel your hips and pelvis rock back. 4. Hold for 6 seconds while breathing smoothly. 5. Relax and allow your pelvis and hips to rock forward. 6. Repeat 8 to 12 times. Back stretches 1. Get down on your hands and knees on the floor. 2. Relax your head and allow it to droop. Round your back up toward the ceiling until you feel a nice stretch in your upper, middle, and lower back. Hold this stretch for as long as it feels comfortable, or about 15 to 30 seconds. 3. Return to the starting position with a flat back while you are on your hands and knees. 4. Let your back sway by pressing your stomach toward the floor. Lift your buttocks toward the ceiling. 5. Hold this position for 15 to 30 seconds. 6. Repeat 2 to 4 times. Follow-up care is a key part of your treatment and safety. Be sure to make and go to all appointments, and call your doctor if you are having problems. It's also a good idea to know your test results and keep a list of the medicines you take. Where can you learn more? Go to http://leon-edinson.info/. Enter Z304 in the search box to learn more about \"Low Back Arthritis: Exercises. \" Current as of: March 21, 2017 Content Version: 11.3 © 5319-9182 [employer], [employer]. Care instructions adapted under license by [employer] (which disclaims liability or warranty for this information).  If you have questions about a medical condition or this instruction, always ask your healthcare professional. [address] any warranty or liability for your use of this information. Introducing \[facility]\"" & HEALTH SERVICES! [facility] introduces Last Size patient portal. Now you can access parts of your medical record, email your doctor's office, and request medication refills online. 1. In your internet browser, go to [email or web address] 2. Click on the First Time User? Click Here link in the Sign In box. You will see the New Member Sign Up page. 3. Enter your Last Size Access Code exactly as it appears below. You will not need to use this code after youve completed the sign-up process. If you do not sign up before the expiration date, you must request a new code. · Last Size Access Code: [email or web address] Expires: 9/21/2017  8:03 AM 
 
4. Enter the last four digits of your Social Security Number (xxxx) and Date of Birth (mm/dd/yyyy) as indicated and click Submit. You will be taken to the next sign-up page. 5. Create a Last Size ID. This will be your Last Size login ID and cannot be changed, so think of one that is secure and easy to remember. 6. Create a Last Size password. You can change your password at any time. 7. Enter your Password Reset Question and Answer. This can be used at a later time if you forget your password. 8. Enter your e-mail address. You will receive e-mail notification when new information is available in [address] 9. Click Sign Up. You can now view and download portions of your medical record. 10. Click the Download Summary menu link to download a portable copy of your medical information. If you have questions, please visit the Frequently Asked Questions section of the Last Size website. Remember, Last Size is NOT to be used for urgent needs. For medical emergencies, dial 911. Now available from your iPhone and Android! Please provide this summary of care documentation to your next provider. Your primary care clinician is listed as Maisha Perez. If you have any questions after today's visit, please call [phone].

## 2017-08-25 ENCOUNTER — HOSPITAL ENCOUNTER (OUTPATIENT)
Dept: INFUSION THERAPY | Age: 67
Discharge: HOME | End: 2017-08-25
Payer: MEDICARE

## 2017-08-25 VITALS
HEART RATE: 58 BPM | OXYGEN SATURATION: 100 % | RESPIRATION RATE: 18 BRPM | SYSTOLIC BLOOD PRESSURE: 100 MMHG | TEMPERATURE: 97.7 F | DIASTOLIC BLOOD PRESSURE: 65 MMHG

## 2017-08-25 LAB
BASO+EOS+MONOS # BLD AUTO: 1 K/UL (ref 0–2.3)
BASO+EOS+MONOS # BLD AUTO: 17 % (ref 0.1–17)
DIFFERENTIAL METHOD BLD: ABNORMAL
ERYTHROCYTE [DISTWIDTH] IN BLOOD BY AUTOMATED COUNT: 17.4 % (ref 11.5–14.5)
HCT VFR BLD AUTO: 30.2 % (ref 36–48)
HGB BLD-MCNC: 10.1 G/DL (ref 12–16)
LYMPHOCYTES # BLD: 1.7 K/UL (ref 1.1–5.9)
LYMPHOCYTES NFR BLD: 30 % (ref 14–44)
MCH RBC QN AUTO: 27.6 PG (ref 25–35)
MCHC RBC AUTO-ENTMCNC: 33.4 G/DL (ref 31–37)
MCV RBC AUTO: 82.5 FL (ref 78–102)
NEUTS SEG # BLD: 3 K/UL (ref 1.8–9.5)
NEUTS SEG NFR BLD: 52 % (ref 40–70)
PLATELET # BLD AUTO: 139 K/UL (ref 135–420)
RBC # BLD AUTO: 3.66 M/UL (ref 4.1–5.1)
WBC # BLD AUTO: 5.7 K/UL (ref 4.5–13)

## 2017-08-25 PROCEDURE — 96372 THER/PROPH/DIAG INJ SC/IM: CPT

## 2017-08-25 PROCEDURE — 74011250636 HC RX REV CODE- 250/636

## 2017-08-25 PROCEDURE — 36415 COLL VENOUS BLD VENIPUNCTURE: CPT

## 2017-08-25 PROCEDURE — 74011000250 HC RX REV CODE- 250

## 2017-08-25 PROCEDURE — 74011250636 HC RX REV CODE- 250/636: Performed by: NURSE PRACTITIONER

## 2017-08-25 PROCEDURE — 85025 COMPLETE CBC W/AUTO DIFF WBC: CPT | Performed by: INTERNAL MEDICINE

## 2017-08-25 PROCEDURE — 85049 AUTOMATED PLATELET COUNT: CPT | Performed by: INTERNAL MEDICINE

## 2017-08-25 RX ORDER — WATER FOR INJECTION,STERILE
VIAL (ML) INJECTION
Status: COMPLETED
Start: 2017-08-25 | End: 2017-08-25

## 2017-08-25 RX ADMIN — WATER 10 ML: 1 INJECTION INTRAMUSCULAR; INTRAVENOUS; SUBCUTANEOUS at 08:50

## 2017-08-25 RX ADMIN — ROMIPLOSTIM 207 MCG: 250 INJECTION, POWDER, LYOPHILIZED, FOR SOLUTION SUBCUTANEOUS at 08:49

## 2017-08-25 NOTE — PROGRESS NOTES
[facility] Progress Note    Date: 2017    Name: Dennis Burnette    MRN: [id number]         : 1950      Ms. Boby Martinez arrived to [facility] at 3205. Ms. Boby Martinez was assessed and education was provided. Nplate. Ms. David Brown vitals were reviewed. Visit Vitals    /65 (BP 1 Location: Right arm, BP Patient Position: Sitting)    Pulse (!) 58    Temp 97.7 °F (36.5 °C)    Resp 18    SpO2 100%       Pt was observed for 5 minutes after obtaining vital signs prior to initiating treatment. Blood drawn for labs via right a/c x 1 attempt. Sent to lab for CBC. Lab results obtained and reviewed. (Preliminary results scanned into media tab.)    Pt's preliminary platelet count was 591,375. Per Nplate protocol, since pt's platelet count is 751,576 today, pt's dose will remain at 3mcg/kg (3mcg x 69kg = 207mcg). Nplate 926ERE (8.11LL) administered as ordered SQ in patient's right upper arm. Ms. Boby Martinez was discharged from [place] [address] in stable condition at [id number]. She is to return on 17 at 0800 for her next appointment.     Jordan Low RN  2017

## 2017-09-01 ENCOUNTER — HOSPITAL ENCOUNTER (OUTPATIENT)
Dept: INFUSION THERAPY | Age: 67
Discharge: HOME | End: 2017-09-01
Payer: MEDICARE

## 2017-09-01 VITALS
SYSTOLIC BLOOD PRESSURE: 115 MMHG | DIASTOLIC BLOOD PRESSURE: 70 MMHG | HEART RATE: 62 BPM | TEMPERATURE: 97.8 F | RESPIRATION RATE: 18 BRPM

## 2017-09-01 LAB
BASO+EOS+MONOS # BLD AUTO: 0.9 K/UL (ref 0–2.3)
BASO+EOS+MONOS # BLD AUTO: 14 % (ref 0.1–17)
DIFFERENTIAL METHOD BLD: ABNORMAL
ERYTHROCYTE [DISTWIDTH] IN BLOOD BY AUTOMATED COUNT: 17.1 % (ref 11.5–14.5)
HCT VFR BLD AUTO: 29.4 % (ref 36–48)
HGB BLD-MCNC: 9.7 G/DL (ref 12–16)
LYMPHOCYTES # BLD: 1.7 K/UL (ref 1.1–5.9)
LYMPHOCYTES NFR BLD: 26 % (ref 14–44)
MCH RBC QN AUTO: 27.8 PG (ref 25–35)
MCHC RBC AUTO-ENTMCNC: 33 G/DL (ref 31–37)
MCV RBC AUTO: 84.2 FL (ref 78–102)
NEUTS SEG # BLD: 3.9 K/UL (ref 1.8–9.5)
NEUTS SEG NFR BLD: 60 % (ref 40–70)
PLATELET # BLD AUTO: 124 K/UL (ref 135–420)
RBC # BLD AUTO: 3.49 M/UL (ref 4.1–5.1)
WBC # BLD AUTO: 6.5 K/UL (ref 4.5–13)

## 2017-09-01 PROCEDURE — 74011250636 HC RX REV CODE- 250/636: Performed by: NURSE PRACTITIONER

## 2017-09-01 PROCEDURE — 96372 THER/PROPH/DIAG INJ SC/IM: CPT

## 2017-09-01 PROCEDURE — 74011000250 HC RX REV CODE- 250

## 2017-09-01 PROCEDURE — 36415 COLL VENOUS BLD VENIPUNCTURE: CPT

## 2017-09-01 PROCEDURE — 85049 AUTOMATED PLATELET COUNT: CPT | Performed by: INTERNAL MEDICINE

## 2017-09-01 PROCEDURE — 36415 COLL VENOUS BLD VENIPUNCTURE: CPT | Performed by: INTERNAL MEDICINE

## 2017-09-01 PROCEDURE — 74011250636 HC RX REV CODE- 250/636

## 2017-09-01 PROCEDURE — 85025 COMPLETE CBC W/AUTO DIFF WBC: CPT | Performed by: INTERNAL MEDICINE

## 2017-09-01 RX ORDER — WATER FOR INJECTION,STERILE
VIAL (ML) INJECTION
Status: COMPLETED
Start: 2017-09-01 | End: 2017-09-01

## 2017-09-01 RX ADMIN — ROMIPLOSTIM 207 MCG: 250 INJECTION, POWDER, LYOPHILIZED, FOR SOLUTION SUBCUTANEOUS at 14:15

## 2017-09-01 RX ADMIN — ERYTHROPOIETIN 20000 UNITS: 20000 INJECTION, SOLUTION INTRAVENOUS; SUBCUTANEOUS at 14:20

## 2017-09-01 RX ADMIN — ERYTHROPOIETIN 40000 UNITS: 40000 INJECTION, SOLUTION INTRAVENOUS; SUBCUTANEOUS at 14:20

## 2017-09-01 RX ADMIN — WATER 10 ML: 1 INJECTION INTRAMUSCULAR; INTRAVENOUS; SUBCUTANEOUS at 14:15

## 2017-09-01 NOTE — PROGRESS NOTES
[facility] Progress Note    Date: 2017    Name: Ward Larson    MRN: [id number]         : 1950      Nplate/Procrit Injection     Ms. Susanna Orourke arrived to [facility] at [phone]. Ms. Susanna Orourke was assessed and education was provided. Ms. Crystal Bills vitals were reviewed. Visit Vitals    /70 (BP 1 Location: Right arm, BP Patient Position: Sitting)    Pulse 62    Temp 97.8 °F (36.6 °C)    Resp 18    Breastfeeding No       Pt was observed for 5 minutes after obtaining vital signs prior to initiating treatment. Blood drawn for CBC via right a/c x 1 attempt per written orders. Guaze and coban applied to the site. Lab results obtained and reviewed. (Preliminary results scanned into media tab.)  Recent Results (from the past 12 hour(s))   CBC WITH 3 PART DIFF    Collection Time: 17  1:49 PM   Result Value Ref Range    WBC 6.5 4.5 - 13.0 K/uL    RBC 3.49 (L) 4.10 - 5.10 M/uL    HGB 9.7 (L) 12.0 - 16 g/dL    HCT 29.4 (L) 36 - 48 %    MCV 84.2 78 - 102 FL    MCH 27.8 25.0 - 35.0 PG    MCHC 33.0 31 - 37 g/dL    RDW 17.1 (H) 11.5 - 14.5 %    NEUTROPHILS 60 40 - 70 %    MIXED CELLS 14 0.1 - 17 %    LYMPHOCYTES 26 14 - 44 %    ABS. NEUTROPHILS 3.9 1.8 - 9.5 K/UL    ABS. MIXED CELLS 0.9 0.0 - 2.3 K/uL    ABS. LYMPHOCYTES 1.7 1.1 - 5.9 K/UL    DF AUTOMATED       Preliminary platelet count= 395,277. Per Nplate protocol, since pt's platelet count is 178,929 today, pt's dose will remain at 3mcg/kg (3mcg x 69kg = 207mcg). Nplate 827XJE (6.53OA) administered as ordered SQ in patient's right upper arm. No redness or bleeding noted. HGB= 9.7 and HCT= 29.4    Procrit 60,000 administered SQ in the back of the right arm per written order. No redness or bleeding noted. Ms. Susanna Orourke was discharged from [place] [address] in stable condition at 1430. She is to return on 17 at 0800 for her next appointment.     Jossue Florian RN  2017

## 2017-09-08 ENCOUNTER — HOSPITAL ENCOUNTER (OUTPATIENT)
Dept: INFUSION THERAPY | Age: 67
Discharge: HOME | End: 2017-09-08
Payer: MEDICARE

## 2017-09-08 VITALS
HEART RATE: 58 BPM | TEMPERATURE: 97.6 F | DIASTOLIC BLOOD PRESSURE: 72 MMHG | RESPIRATION RATE: 18 BRPM | SYSTOLIC BLOOD PRESSURE: 116 MMHG

## 2017-09-08 LAB
BASO+EOS+MONOS # BLD AUTO: 1 K/UL (ref 0–2.3)
BASO+EOS+MONOS # BLD AUTO: 17 % (ref 0.1–17)
DIFFERENTIAL METHOD BLD: ABNORMAL
ERYTHROCYTE [DISTWIDTH] IN BLOOD BY AUTOMATED COUNT: 18.8 % (ref 11.5–14.5)
HCT VFR BLD AUTO: 32 % (ref 36–48)
HGB BLD-MCNC: 10.5 G/DL (ref 12–16)
LYMPHOCYTES # BLD: 1.7 K/UL (ref 1.1–5.9)
LYMPHOCYTES NFR BLD: 28 % (ref 14–44)
MCH RBC QN AUTO: 28 PG (ref 25–35)
MCHC RBC AUTO-ENTMCNC: 32.8 G/DL (ref 31–37)
MCV RBC AUTO: 85.3 FL (ref 78–102)
NEUTS SEG # BLD: 3.2 K/UL (ref 1.8–9.5)
NEUTS SEG NFR BLD: 55 % (ref 40–70)
PLATELET # BLD AUTO: 81 K/UL (ref 135–420)
RBC # BLD AUTO: 3.75 M/UL (ref 4.1–5.1)
WBC # BLD AUTO: 5.9 K/UL (ref 4.5–13)

## 2017-09-08 PROCEDURE — 85025 COMPLETE CBC W/AUTO DIFF WBC: CPT | Performed by: INTERNAL MEDICINE

## 2017-09-08 PROCEDURE — 96372 THER/PROPH/DIAG INJ SC/IM: CPT

## 2017-09-08 PROCEDURE — 85049 AUTOMATED PLATELET COUNT: CPT | Performed by: INTERNAL MEDICINE

## 2017-09-08 PROCEDURE — 74011000250 HC RX REV CODE- 250

## 2017-09-08 PROCEDURE — 36415 COLL VENOUS BLD VENIPUNCTURE: CPT

## 2017-09-08 PROCEDURE — 74011250636 HC RX REV CODE- 250/636: Performed by: NURSE PRACTITIONER

## 2017-09-08 PROCEDURE — 74011250636 HC RX REV CODE- 250/636

## 2017-09-08 RX ORDER — WATER FOR INJECTION,STERILE
VIAL (ML) INJECTION
Status: COMPLETED
Start: 2017-09-08 | End: 2017-09-08

## 2017-09-08 RX ADMIN — WATER 10 ML: 1 INJECTION INTRAMUSCULAR; INTRAVENOUS; SUBCUTANEOUS at 09:35

## 2017-09-08 RX ADMIN — ROMIPLOSTIM 207 MCG: 250 INJECTION, POWDER, LYOPHILIZED, FOR SOLUTION SUBCUTANEOUS at 09:35

## 2017-09-08 NOTE — PROGRESS NOTES
[facility] Progress Note    Date: 2017    Name: Trista Sharpe    MRN: [id number]         : 1950      Nplate Injection     Ms. Enmanuel Vang arrived to [facility] at 0900. Ms. Enmanuel Vang was assessed and education was provided. Ms. Arminda Mancia vitals were reviewed. Visit Vitals    /72 (BP 1 Location: Right arm, BP Patient Position: Sitting)    Pulse (!) 58    Temp 97.6 °F (36.4 °C)    Resp 18       Pt was observed for 5 minutes after obtaining vital signs prior to initiating treatment. Blood drawn for CBC via right wrist x 1 attempt per written orders. Guaze and coban applied to the site. Lab results obtained and reviewed. (Preliminary results scanned into media tab.)  Recent Results (from the past 12 hour(s))   CBC WITH 3 PART DIFF    Collection Time: 17  9:10 AM   Result Value Ref Range    WBC 5.9 4.5 - 13.0 K/uL    RBC 3.75 (L) 4.10 - 5.10 M/uL    HGB 10.5 (L) 12.0 - 16 g/dL    HCT 32.0 (L) 36 - 48 %    MCV 85.3 78 - 102 FL    MCH 28.0 25.0 - 35.0 PG    MCHC 32.8 31 - 37 g/dL    RDW 18.8 (H) 11.5 - 14.5 %    NEUTROPHILS 55 40 - 70 %    MIXED CELLS 17 0.1 - 17 %    LYMPHOCYTES 28 14 - 44 %    ABS. NEUTROPHILS 3.2 1.8 - 9.5 K/UL    ABS. MIXED CELLS 1.0 0.0 - 2.3 K/uL    ABS. LYMPHOCYTES 1.7 1.1 - 5.9 K/UL    DF AUTOMATED       Preliminary platelet count= 04,740. Per Nplate protocol, since pt's platelet count is 21,755 today, pt's dose will remain at 3mcg/kg (3mcg x 69kg = 207mcg). Nplate 775BQM (5.67FA) administered as ordered SQ in patient's right upper arm. No redness or bleeding noted. Ms. Enmanuel Vang was discharged from [place] [address] in stable condition at [address] She is to return on 09/15/17 at 0800 for her next appointment.     Daren Saint, RN  2017

## 2017-09-14 RX ORDER — OXYCODONE AND ACETAMINOPHEN 7.5; 325 MG/1; MG/1
TABLET ORAL
Qty: 120 TAB | Refills: 0 | Status: SHIPPED | OUTPATIENT
Start: 2017-09-14 | End: 2017-12-18 | Stop reason: SDUPTHER

## 2017-09-15 ENCOUNTER — HOSPITAL ENCOUNTER (OUTPATIENT)
Dept: INFUSION THERAPY | Age: 67
Discharge: HOME | End: 2017-09-15
Payer: MEDICARE

## 2017-09-15 VITALS
TEMPERATURE: 98 F | HEART RATE: 69 BPM | SYSTOLIC BLOOD PRESSURE: 111 MMHG | DIASTOLIC BLOOD PRESSURE: 74 MMHG | RESPIRATION RATE: 18 BRPM

## 2017-09-15 LAB
BASOPHILS # BLD: 0 K/UL (ref 0–0.1)
BASOPHILS NFR BLD: 1 % (ref 0–3)
DIFFERENTIAL METHOD BLD: ABNORMAL
EOSINOPHIL # BLD: 0.4 K/UL (ref 0–0.4)
EOSINOPHIL NFR BLD: 9 % (ref 0–5)
ERYTHROCYTE [DISTWIDTH] IN BLOOD BY AUTOMATED COUNT: 17.9 % (ref 11.6–14.5)
HCT VFR BLD AUTO: 29.2 % (ref 35–45)
HGB BLD-MCNC: 9.8 G/DL (ref 12–16)
LYMPHOCYTES # BLD: 1.4 K/UL (ref 0.8–3.5)
LYMPHOCYTES NFR BLD: 30 % (ref 20–51)
MCH RBC QN AUTO: 27.6 PG (ref 24–34)
MCHC RBC AUTO-ENTMCNC: 33.6 G/DL (ref 31–37)
MCV RBC AUTO: 82.3 FL (ref 74–97)
MONOCYTES # BLD: 0.9 K/UL (ref 0–1)
MONOCYTES NFR BLD: 20 % (ref 2–9)
NEUTS SEG # BLD: 2 K/UL (ref 1.8–8)
NEUTS SEG NFR BLD: 40 % (ref 42–75)
PLATELET # BLD AUTO: 132 K/UL (ref 135–420)
PLATELET COMMENTS,PCOM: ABNORMAL
RBC # BLD AUTO: 3.55 M/UL (ref 4.2–5.3)
RBC MORPH BLD: ABNORMAL
WBC # BLD AUTO: 4.7 K/UL (ref 4.6–13.2)

## 2017-09-15 PROCEDURE — 36591 DRAW BLOOD OFF VENOUS DEVICE: CPT

## 2017-09-15 PROCEDURE — 74011250636 HC RX REV CODE- 250/636

## 2017-09-15 PROCEDURE — 77030012965 HC NDL HUBR BBMI -A

## 2017-09-15 PROCEDURE — 96372 THER/PROPH/DIAG INJ SC/IM: CPT

## 2017-09-15 PROCEDURE — 85025 COMPLETE CBC W/AUTO DIFF WBC: CPT | Performed by: INTERNAL MEDICINE

## 2017-09-15 PROCEDURE — 74011250636 HC RX REV CODE- 250/636: Performed by: INTERNAL MEDICINE

## 2017-09-15 RX ORDER — SODIUM CHLORIDE 0.9 % (FLUSH) 0.9 %
10-40 SYRINGE (ML) INJECTION AS NEEDED
Status: DISCONTINUED | OUTPATIENT
Start: 2017-09-15 | End: 2017-09-19 | Stop reason: HOSPADM

## 2017-09-15 RX ORDER — HEPARIN SODIUM (PORCINE) LOCK FLUSH IV SOLN 100 UNIT/ML 100 UNIT/ML
500 SOLUTION INTRAVENOUS AS NEEDED
Status: DISCONTINUED | OUTPATIENT
Start: 2017-09-15 | End: 2017-09-19 | Stop reason: HOSPADM

## 2017-09-15 RX ORDER — WATER FOR INJECTION,STERILE
VIAL (ML) INJECTION
Status: DISPENSED
Start: 2017-09-15 | End: 2017-09-15

## 2017-09-15 RX ADMIN — Medication 30 ML: at 08:40

## 2017-09-15 RX ADMIN — HEPARIN SODIUM (PORCINE) LOCK FLUSH IV SOLN 100 UNIT/ML 500 UNITS: 100 SOLUTION at 08:46

## 2017-09-15 RX ADMIN — ROMIPLOSTIM 207 MCG: 250 INJECTION, POWDER, LYOPHILIZED, FOR SOLUTION SUBCUTANEOUS at 09:11

## 2017-09-15 RX ADMIN — HEPARIN SODIUM (PORCINE) LOCK FLUSH IV SOLN 100 UNIT/ML 500 UNITS: 100 SOLUTION at 08:42

## 2017-09-15 RX ADMIN — Medication 20 ML: at 08:44

## 2017-09-15 NOTE — PROGRESS NOTES
[facility] Progress Note    Date: September 15, 2017    Name: Ana María Callahan    MRN: [id number]         : 1950      Ms. Izola Bamberger arrived to [facility] at [address] [place] [address]. Generalized pain 6/10. Took meds prior opic admit. Positioned for comfort and reassured. Ms. Izola Bamberger was assessed and education was provided. Nplate. Ms. Serafin Lopez vitals were reviewed. Visit Vitals    /74 (BP 1 Location: Left arm, BP Patient Position: Sitting)    Pulse 69    Temp 98 °F (36.7 °C)    Resp 18       Pt was observed for 5 minutes after obtaining vital signs prior to initiating treatment. Right chest mediport accessed with 20 g 1 inch haas needles using sterile technique to medial and lateral ports      Ports flushed with some difficulty, and brisk blood return from medial port noted. Blood drawn from medial port for cbc     Lateral port with small amt blood returns. both ports flushed with 20 ml NS and 500 units of Heparin each, then de-accessed.      No irritation or bleeding noted or infiltration noted at site. Gauze and transparent adhesive applied to site. Lab results obtained and reviewed. (Preliminary results scanned into media tab.)    Pt's preliminary platelet count was 64,473    Per Nplate protocol, since pt's platelet count is 67,064 today, pt's dose will remain at 3mcg/kg (3mcg x 69kg = 207mcg). Nplate 923YKJ (3.49WV) administered as ordered SQ in patient's right upper arm. Declined gauze at site. No bleeding or irritation noted. Ms. Izola Bamberger was discharged from [place] [address] in stable condition at [address] [place] [address]. She is to return on 17 at 0800 for her next appointment.     Gerber Mchugh RN  September 15, 2017

## 2017-09-22 ENCOUNTER — HOSPITAL ENCOUNTER (OUTPATIENT)
Dept: INFUSION THERAPY | Age: 67
Discharge: HOME | End: 2017-09-22
Payer: MEDICARE

## 2017-09-22 VITALS
OXYGEN SATURATION: 100 % | RESPIRATION RATE: 18 BRPM | HEART RATE: 60 BPM | SYSTOLIC BLOOD PRESSURE: 115 MMHG | TEMPERATURE: 98.1 F | DIASTOLIC BLOOD PRESSURE: 72 MMHG

## 2017-09-22 LAB
BASO+EOS+MONOS # BLD AUTO: 0.9 K/UL (ref 0–2.3)
BASO+EOS+MONOS # BLD AUTO: 23 % (ref 0.1–17)
DIFFERENTIAL METHOD BLD: ABNORMAL
ERYTHROCYTE [DISTWIDTH] IN BLOOD BY AUTOMATED COUNT: 16.7 % (ref 11.5–14.5)
HCT VFR BLD AUTO: 32.6 % (ref 36–48)
HGB BLD-MCNC: 10.3 G/DL (ref 12–16)
LYMPHOCYTES # BLD: 1.5 K/UL (ref 1.1–5.9)
LYMPHOCYTES NFR BLD: 38 % (ref 14–44)
MCH RBC QN AUTO: 27.5 PG (ref 25–35)
MCHC RBC AUTO-ENTMCNC: 31.6 G/DL (ref 31–37)
MCV RBC AUTO: 87.2 FL (ref 78–102)
NEUTS SEG # BLD: 1.6 K/UL (ref 1.8–9.5)
NEUTS SEG NFR BLD: 40 % (ref 40–70)
PLATELET # BLD AUTO: 127 K/UL (ref 135–420)
RBC # BLD AUTO: 3.74 M/UL (ref 4.1–5.1)
WBC # BLD AUTO: 4 K/UL (ref 4.5–13)

## 2017-09-22 PROCEDURE — 74011250636 HC RX REV CODE- 250/636: Performed by: INTERNAL MEDICINE

## 2017-09-22 PROCEDURE — 74011250636 HC RX REV CODE- 250/636

## 2017-09-22 PROCEDURE — 36415 COLL VENOUS BLD VENIPUNCTURE: CPT

## 2017-09-22 PROCEDURE — 85049 AUTOMATED PLATELET COUNT: CPT | Performed by: INTERNAL MEDICINE

## 2017-09-22 PROCEDURE — 85025 COMPLETE CBC W/AUTO DIFF WBC: CPT | Performed by: INTERNAL MEDICINE

## 2017-09-22 PROCEDURE — 96372 THER/PROPH/DIAG INJ SC/IM: CPT

## 2017-09-22 PROCEDURE — 74011000250 HC RX REV CODE- 250

## 2017-09-22 RX ORDER — WATER FOR INJECTION,STERILE
VIAL (ML) INJECTION
Status: COMPLETED
Start: 2017-09-22 | End: 2017-09-22

## 2017-09-22 RX ADMIN — WATER 0.72 ML: 1 INJECTION INTRAMUSCULAR; INTRAVENOUS; SUBCUTANEOUS at 09:06

## 2017-09-22 RX ADMIN — ROMIPLOSTIM 207 MCG: 250 INJECTION, POWDER, LYOPHILIZED, FOR SOLUTION SUBCUTANEOUS at 09:06

## 2017-09-22 NOTE — PROGRESS NOTES
[facility] Progress Note    Date: 2017    Name: Irineo Plascencia    MRN: [id number]         : 1950      Ms. Willim Brunner arrived to [facility] at [facility]. Ms. Willim Brunner was assessed and education was provided. States generalized pain 6/10. States she does not take pain meds because they do not help. Declined to have pain issues addressed. reassured and positioned for comfort. Nplate. Ms. Lashae Truong vitals were reviewed. Visit Vitals    /72 (BP 1 Location: Right arm, BP Patient Position: Sitting)    Pulse 60    Temp 98.1 °F (36.7 °C)    Resp 18    SpO2 100%       Pt was observed for 5 minutes after obtaining vital signs prior to initiating treatment. Blood drawn for cbc via right hand venipuncture on 1st attempt       Lab results obtained and reviewed. (Preliminary results scanned into media tab.)    Pt's preliminary platelet count was 462,552. Per Nplate protocol, since pt's platelet count is 764,985 today, pt's dose will remain at 3mcg/kg (3mcg x 69kg = 207mcg). Nplate 266NYM (9.59XH) administered as ordered SQ in patient's right upper arm. Declined bandaid. No irritation or bleeding at site          Ms. Willim Brunner was discharged from [place] [address] in stable condition at 1227. She is to return on 2017 at 0800 for her next appointment.     Noe Etienne RN  2017

## 2017-09-27 ENCOUNTER — HOSPITAL ENCOUNTER (OUTPATIENT)
Dept: LAB | Age: 67
Discharge: HOME | End: 2017-09-27
Payer: MEDICARE

## 2017-09-27 ENCOUNTER — OFFICE VISIT (OUTPATIENT)
Dept: ONCOLOGY | Age: 67
End: 2017-09-27

## 2017-09-27 ENCOUNTER — HOSPITAL ENCOUNTER (OUTPATIENT)
Dept: ONCOLOGY | Age: 67
Discharge: HOME | End: 2017-09-27

## 2017-09-27 VITALS
BODY MASS INDEX: 25.34 KG/M2 | WEIGHT: 157 LBS | DIASTOLIC BLOOD PRESSURE: 57 MMHG | TEMPERATURE: 96.6 F | HEART RATE: 65 BPM | SYSTOLIC BLOOD PRESSURE: 94 MMHG

## 2017-09-27 DIAGNOSIS — E55.9 VITAMIN D DEFICIENCY: ICD-10-CM

## 2017-09-27 DIAGNOSIS — D69.3 CHRONIC ITP (IDIOPATHIC THROMBOCYTOPENIA) (HCC): Primary | ICD-10-CM

## 2017-09-27 DIAGNOSIS — D64.9 CHRONIC ANEMIA: ICD-10-CM

## 2017-09-27 DIAGNOSIS — R60.0 BILATERAL LOWER EXTREMITY EDEMA: ICD-10-CM

## 2017-09-27 DIAGNOSIS — C50.919 BREAST CANCER METASTASIZED TO AXILLARY LYMPH NODE, UNSPECIFIED LATERALITY (HCC): ICD-10-CM

## 2017-09-27 DIAGNOSIS — C77.3 BREAST CANCER METASTASIZED TO AXILLARY LYMPH NODE, UNSPECIFIED LATERALITY (HCC): ICD-10-CM

## 2017-09-27 DIAGNOSIS — F51.01 PRIMARY INSOMNIA: ICD-10-CM

## 2017-09-27 DIAGNOSIS — D50.8 IRON DEFICIENCY ANEMIA SECONDARY TO INADEQUATE DIETARY IRON INTAKE: ICD-10-CM

## 2017-09-27 DIAGNOSIS — R42 VERTIGO: ICD-10-CM

## 2017-09-27 DIAGNOSIS — R64 CACHEXIA (HCC): ICD-10-CM

## 2017-09-27 LAB
ALBUMIN SERPL-MCNC: 3.8 G/DL (ref 3.4–5)
ALBUMIN/GLOB SERPL: 1.1 {RATIO} (ref 0.8–1.7)
ALP SERPL-CCNC: 95 U/L (ref 45–117)
ALT SERPL-CCNC: 18 U/L (ref 13–56)
ANION GAP SERPL CALC-SCNC: 6 MMOL/L (ref 3–18)
AST SERPL-CCNC: 24 U/L (ref 15–37)
BASOPHILS # BLD: 0 K/UL (ref 0–0.1)
BASOPHILS NFR BLD: 0 % (ref 0–2)
BILIRUB SERPL-MCNC: 0.2 MG/DL (ref 0.2–1)
BUN SERPL-MCNC: 29 MG/DL (ref 7–18)
BUN/CREAT SERPL: 25 (ref 12–20)
CALCIUM SERPL-MCNC: 9.2 MG/DL (ref 8.5–10.1)
CHLORIDE SERPL-SCNC: 103 MMOL/L (ref 100–108)
CO2 SERPL-SCNC: 33 MMOL/L (ref 21–32)
CREAT SERPL-MCNC: 1.14 MG/DL (ref 0.6–1.3)
DIFFERENTIAL METHOD BLD: ABNORMAL
EOSINOPHIL # BLD: 0.3 K/UL (ref 0–0.4)
EOSINOPHIL NFR BLD: 4 % (ref 0–5)
ERYTHROCYTE [DISTWIDTH] IN BLOOD BY AUTOMATED COUNT: 16.9 % (ref 11.6–14.5)
FERRITIN SERPL-MCNC: 506 NG/ML (ref 8–388)
GLOBULIN SER CALC-MCNC: 3.6 G/DL (ref 2–4)
GLUCOSE SERPL-MCNC: 79 MG/DL (ref 74–99)
HCT VFR BLD AUTO: 30.9 % (ref 35–45)
HGB BLD-MCNC: 10.1 G/DL (ref 12–16)
IRON SATN MFR SERPL: 37 %
IRON SERPL-MCNC: 71 UG/DL (ref 50–175)
LYMPHOCYTES # BLD: 1.5 K/UL (ref 0.9–3.6)
LYMPHOCYTES NFR BLD: 23 % (ref 21–52)
MCH RBC QN AUTO: 27.5 PG (ref 24–34)
MCHC RBC AUTO-ENTMCNC: 32.7 G/DL (ref 31–37)
MCV RBC AUTO: 84.2 FL (ref 74–97)
MONOCYTES # BLD: 0.9 K/UL (ref 0.05–1.2)
MONOCYTES NFR BLD: 14 % (ref 3–10)
NEUTS SEG # BLD: 3.7 K/UL (ref 1.8–8)
NEUTS SEG NFR BLD: 59 % (ref 40–73)
PLATELET # BLD AUTO: 133 K/UL (ref 135–420)
POTASSIUM SERPL-SCNC: 4.4 MMOL/L (ref 3.5–5.5)
PROT SERPL-MCNC: 7.4 G/DL (ref 6.4–8.2)
RBC # BLD AUTO: 3.67 M/UL (ref 4.2–5.3)
SODIUM SERPL-SCNC: 142 MMOL/L (ref 136–145)
TIBC SERPL-MCNC: 192 UG/DL (ref 250–450)
WBC # BLD AUTO: 6.3 K/UL (ref 4.6–13.2)

## 2017-09-27 PROCEDURE — 80053 COMPREHEN METABOLIC PANEL: CPT | Performed by: INTERNAL MEDICINE

## 2017-09-27 PROCEDURE — 86300 IMMUNOASSAY TUMOR CA 15-3: CPT | Performed by: INTERNAL MEDICINE

## 2017-09-27 PROCEDURE — 36415 COLL VENOUS BLD VENIPUNCTURE: CPT | Performed by: INTERNAL MEDICINE

## 2017-09-27 PROCEDURE — 82728 ASSAY OF FERRITIN: CPT | Performed by: INTERNAL MEDICINE

## 2017-09-27 PROCEDURE — 83540 ASSAY OF IRON: CPT | Performed by: INTERNAL MEDICINE

## 2017-09-27 NOTE — PATIENT INSTRUCTIONS
Anemia: Care Instructions  Your Care Instructions    Anemia is a low level of red blood cells, which carry oxygen throughout your body. Many things can cause anemia. Lack of iron is one of the most common causes. Your body needs iron to make hemoglobin, a substance in red blood cells that carries oxygen from the lungs to your body's cells. Without enough iron, the body produces fewer and smaller red blood cells. As a result, your body's cells do not get enough oxygen, and you feel tired and weak. And you may have trouble concentrating. Bleeding is the most common cause of a lack of iron. You may have heavy menstrual bleeding or bleeding caused by conditions such as ulcers, hemorrhoids, or cancer. Regular use of aspirin or other anti-inflammatory medicines (such as ibuprofen) also can cause bleeding in some people. A lack of iron in your diet also can cause anemia, especially at times when the body needs more iron, such as during pregnancy, infancy, and the teen years. Your doctor may have prescribed iron pills. It may take several months of treatment for your iron levels to return to normal. Your doctor also may suggest that you eat foods that are rich in iron, such as meat and beans. There are many other causes of anemia. It is not always due to a lack of iron. Finding the specific cause of your anemia will help your doctor find the right treatment for you. Follow-up care is a key part of your treatment and safety. Be sure to make and go to all appointments, and call your doctor if you are having problems. It's also a good idea to know your test results and keep a list of the medicines you take. How can you care for yourself at home? · Take your medicines exactly as prescribed. Call your doctor if you think you are having a problem with your medicine. · If your doctor recommends iron pills, take them as directed:  ¨ Try to take the pills on an empty stomach about 1 hour before or 2 hours after meals. But you may need to take iron with food to avoid an upset stomach. ¨ Do not take antacids or drink milk or caffeine drinks (such as coffee, tea, or cola) at the same time or within 2 hours of the time that you take your iron. They can make it hard for your body to absorb the iron. ¨ Vitamin C (from food or supplements) helps your body absorb iron. Try taking iron pills with a glass of orange juice or some other food that is high in vitamin C, such as citrus fruits. ¨ Iron pills may cause stomach problems, such as heartburn, nausea, diarrhea, constipation, and cramps. Be sure to drink plenty of fluids, and include fruits, vegetables, and fiber in your diet each day. Iron pills often make your bowel movements dark or green. ¨ If you forget to take an iron pill, do not take a double dose of iron the next time you take a pill. ¨ Keep iron pills out of the reach of small children. An overdose of iron can be very dangerous. · Follow your doctor's advice about eating iron-rich foods. These include red meat, shellfish, poultry, eggs, beans, raisins, whole-grain bread, and leafy green vegetables. · Steam vegetables to help them keep their iron content. When should you call for help? Call 911 anytime you think you may need emergency care. For example, call if:  · You have symptoms of a heart attack. These may include:  ¨ Chest pain or pressure, or a strange feeling in the chest.  ¨ Sweating. ¨ Shortness of breath. ¨ Nausea or vomiting. ¨ Pain, pressure, or a strange feeling in the back, neck, jaw, or upper belly or in one or both shoulders or arms. ¨ Lightheadedness or sudden weakness. ¨ A fast or irregular heartbeat. After you call 911, the  may tell you to chew 1 adult-strength or 2 to 4 low-dose aspirin. Wait for an ambulance. Do not try to drive yourself. · You passed out (lost consciousness).   Call your doctor now or seek immediate medical care if:  · You have new or increased shortness of breath. · You are dizzy or lightheaded, or you feel like you may faint. · Your fatigue and weakness continue or get worse. · You have any abnormal bleeding, such as:  ¨ Nosebleeds. ¨ Vaginal bleeding that is different (heavier, more frequent, at a different time of the month) than what you are used to. ¨ Bloody or black stools, or rectal bleeding. ¨ Bloody or pink urine. Watch closely for changes in your health, and be sure to contact your doctor if:  · You do not get better as expected. Where can you learn more? Go to http://leon-edinson.info/. Enter R301 in the search box to learn more about \"Anemia: Care Instructions. \"  Current as of: October 13, 2016  Content Version: 11.3  © 6671-6793 [employer]. Care instructions adapted under license by [employer] (which disclaims liability or warranty for this information). If you have questions about a medical condition or this instruction, always ask your healthcare professional. [place] [address] any warranty or liability for your use of this information. Breast Cancer: Care Instructions  Your Care Instructions  Breast cancer occurs when abnormal cells grow out of control in the breast. These cancer cells can spread within the breast, to nearby lymph nodes and other tissues, and to other parts of the body. Being treated for cancer can weaken your body, and you may feel very tired. Get the rest your body needs so you can feel better. Finding out that you have cancer is scary. You may feel many emotions and may need some help coping. Seek out family, friends, and counselors for support. You also can do things at home to make yourself feel better while you go through treatment. Call the [facility] ([phone]) or visit its website at [employer]. org for more information. Follow-up care is a key part of your treatment and safety.  Be sure to make and go to all appointments, and call your doctor if you are having problems. It's also a good idea to know your test results and keep a list of the medicines you take. How can you care for yourself at home? · Take your medicines exactly as prescribed. Call your doctor if you think you are having a problem with your medicine. You may get medicine for nausea and vomiting if you have these side effects. · Follow your doctor's instructions to relieve pain. Pain from cancer and surgery can almost always be controlled. Use pain medicine when you first notice pain, before it becomes severe. · Eat healthy food. If you do not feel like eating, try to eat food that has protein and extra calories to keep up your strength and prevent weight loss. Drink liquid meal replacements for extra calories and protein. Try to eat your main meal early. · Get some physical activity every day, but do not get too tired. Keep doing the hobbies you enjoy as your energy allows. · Do not smoke. Smoking can make your cancer worse. If you need help quitting, talk to your doctor about stop-smoking programs and medicines. These can increase your chances of quitting for good. · Take steps to control your stress and workload. Learn relaxation techniques. ¨ Share your feelings. Stress and tension affect our emotions. By expressing your feelings to others, you may be able to understand and cope with them. ¨ Consider joining a support group. Talking about a problem with your spouse, a good friend, or other people with similar problems is a good way to reduce tension and stress. ¨ Express yourself through art. Try writing, crafts, dance, or art to relieve stress. Some dance, writing, or art groups may be available just for people who have cancer. ¨ Be kind to your body and mind. Getting enough sleep, eating a healthy diet, and taking time to do things you enjoy can contribute to an overall feeling of balance in your life and can help reduce stress. ¨ Get help if you need it. Discuss your concerns with your doctor or counselor. · If you are vomiting or have diarrhea:  ¨ Drink plenty of fluids (enough so that your urine is light yellow or clear like water) to prevent dehydration. Choose water and other caffeine-free clear liquids. If you have kidney, heart, or liver disease and have to limit fluids, talk with your doctor before you increase the amount of fluids you drink. ¨ When you are able to eat, try clear soups, mild foods, and liquids until all symptoms are gone for 12 to 48 hours. Other good choices include dry toast, crackers, cooked cereal, and gelatin dessert, such as Jell-O.  · If you have not already done so, prepare a list of advance directives. Advance directives are instructions to your doctor and family members about what kind of care you want if you become unable to speak or express yourself. When should you call for help? Call your doctor now or seek immediate medical care if:  · You have a fever. · Any part of your breast becomes red, tender, swollen, or hot. · You have pain, redness, or swelling in the arm on the same side as your breast cancer. Watch closely for changes in your health, and be sure to contact your doctor if:  · You have pain that is not controlled by medicine. · You have nausea or vomiting. · You are constipated or have diarrhea. Where can you learn more? Go to http://leon-edinson.info/. Enter V321 in the search box to learn more about \"Breast Cancer: Care Instructions. \"  Current as of: July 26, 2016  Content Version: 11.3  © 7070-2193 [employer]. Care instructions adapted under license by [employer] (which disclaims liability or warranty for this information). If you have questions about a medical condition or this instruction, always ask your healthcare professional. [place] [address] any warranty or liability for your use of this information.        Idiopathic Thrombocytopenic Purpura: Care Instructions  Your Care Instructions    Idiopathic thrombocytopenic purpura, or ITP, is a blood problem. It happens when your immune system does not work as it should and destroys platelets in your blood. Platelets are a kind of cell in your blood. They have a sticky surface that helps them form clots to stop bleeding. Your blood can't clot if you don't have enough platelets. This causes abnormal bleeding. Bleeding can get worse over time, or it can come on fast.  To treat ITP, you may need to take medicines to help stop the destruction of platelets. You may need platelets added to your blood. Or you may need surgery to remove your spleen. Your spleen's job is to remove platelets from your body. So taking out the spleen helps increase your platelet count. Follow-up care is a key part of your treatment and safety. Be sure to make and go to all appointments, and call your doctor if you are having problems. It's also a good idea to know your test results and keep a list of the medicines you take. How can you care for yourself at home? · Be safe with medicines. Take your medicines exactly as prescribed. Call your doctor if you think you are having a problem with your medicine. · Before you start any new over-the-counter or prescribed medicine, tell your doctor if:  ¨ You have had a bad reaction to any medicines in the past.  ¨ You take other medicines, such as over-the-counter medicines, vitamins, or herbal supplements. ¨ You have other health problems. ¨ You are or could be pregnant. · Do not take aspirin or other anti-inflammatory medicines (such as ibuprofen or naproxen) without first talking to your doctor. Ask your doctor if it is okay to use acetaminophen (Tylenol). · Do not take two or more pain medicines at the same time unless the doctor told you to. Many pain medicines have acetaminophen, which is Tylenol. Too much acetaminophen (Tylenol) can be harmful.   · Get plenty of rest.  · \"Think safety\" and protect yourself from injury. Do not lift anything heavy. · Do not donate blood. · Do not drink alcohol or use illegal drugs. When should you call for help? Call 911 anytime you think you may need emergency care. For example, call if:  · You passed out (lost consciousness). · You have trouble breathing. · You have symptoms of a stroke. These may include:  ¨ Sudden numbness, tingling, weakness, or loss of movement in your face, arm, or leg, especially on only one side of your body. ¨ Sudden vision changes. ¨ Sudden trouble speaking. ¨ Sudden confusion or trouble understanding simple statements. ¨ Sudden problems with walking or balance. ¨ A sudden, severe headache that is different from past headaches. Call your doctor now or seek immediate medical care if:  · You are dizzy or lightheaded, or you feel like you may faint. · You have any abnormal bleeding, such as:  ¨ Nosebleeds. ¨ Vaginal bleeding that is different (heavier, more frequent, at a different time of the month) than what you are used to. ¨ Bloody or black stools, or rectal bleeding. ¨ Bloody or pink urine. · You have signs of an infection, such as red streaks coming from a bruise. · You have a fever and chills. Watch closely for changes in your health, and be sure to contact your doctor if you have any problems. Where can you learn more? Go to http://leon-edinson.info/. Enter H739 in the search box to learn more about \"Idiopathic Thrombocytopenic Purpura: Care Instructions. \"  Current as of: October 13, 2016  Content Version: 11.3  © 0996-2920 [employer]. Care instructions adapted under license by [employer] (which disclaims liability or warranty for this information). If you have questions about a medical condition or this instruction, always ask your healthcare professional. [address] any warranty or liability for your use of this information.

## 2017-09-27 NOTE — PROGRESS NOTES
Hematology/Oncology  Progress Note    Name: Nadia Kennedy  Date: 2017  : 1950    PCP: Dulce Mclain MD     Ms. Gemini Newell is a 74 y.o. female who was seen for management of her slowly progressive ITP and metastatic breast cancer with associated iron deficiency anemia. Current therapy: Active surveillance regarding breast cancer: N-plate as needed as a primary treatment for ITP  Subjective:     Ms. Gemini Newell is a 79-year-old -American woman with history of metastatic breast cancer. The patient reports that she has been doing reasonably well. She has gained about 30 pounds weight over the past few months, due to the steroid that she has to take for her ITP. She is now actively trying to lose some weight. She denies having any unexplained bruising or bleeding. She continues to have arthritic discomfort in her joints and is continuing to use her cane for mobility support. The patient informed me that she is currently seeing a spine specialist and may need to get steroid injections or subdural injection to control the pain. She is continuing to take the n-plate as the primary treatment modality for her ITP. She is tolerating it well and is happy that her platelet counts have continued to improve. At her last appointment she was complaining of dizziness or vertigo and got a very good response to the use of Antivert. Today she reports that she does have a bruise on her right lower leg. She cannot recall any trauma however. Past medical history, family history, and social history: these were reviewed and remains unchanged.     Past Medical History:   Diagnosis Date    Antineoplastic chemotherapy induced anemia     Arthritis     [facility] ([facility] [address]     Cancer ([facility] [address]     Breast    Chronic ITP (idiopathic thrombocytopenia) (HCC) 10/31/2016    Diabetes ([facility] [address]     borderline, no meds    Esophageal cancer ([facility] [address]     treated with chemo     Past Surgical History:   Procedure Laterality Date    BREAST SURGERY PROCEDURE UNLISTED      COLONOSCOPY N/A 7/27/2016    COLONOSCOPY performed by Karina Montilla [facility] ENDOSCOPY    HX APPENDECTOMY      HX ORTHOPAEDIC      HX VASCULAR ACCESS       Social History     Social History    Marital status:      Spouse name: N/A    Number of children: N/A    Years of education: N/A     Occupational History    Not on file. Social History Main Topics    Smoking status: Never Smoker    Smokeless tobacco: Never Used    Alcohol use No    Drug use: No    Sexual activity: Not on file     Other Topics Concern    Not on file     Social History Narrative     No family history on file. Current Outpatient Prescriptions   Medication Sig Dispense Refill    oxyCODONE-acetaminophen (PERCOCET) 7.5-325 mg per tablet Take 1 tablet every 6 hours as needed for pain 120 Tab 0    loratadine 10 mg cap Take 10 mg by mouth daily.  digoxin (LANOXIN) 0.125 mg tablet Take 0.125 mg by mouth daily.  carvedilol (COREG) 3.125 mg tablet Take 3.125 mg by mouth two (2) times daily (with meals).  meloxicam (MOBIC) 7.5 mg tablet Take 7.5 mg by mouth daily.  amiodarone (CORDARONE) 200 mg tablet Take 200 mg by mouth.  lisinopril (PRINIVIL, ZESTRIL) 10 mg tablet Take 10 mg by mouth daily.  gabapentin (NEURONTIN) 300 mg capsule Take 1 po q am and 2 po q pm 90 Cap 1    rivaroxaban (XARELTO) 10 mg tablet Take 10 mg by mouth daily.  zolpidem (AMBIEN) 10 mg tablet TAKE 1 TABLET BY MOUTH NIGHTLY AS NEEDED FOR SLEEP *MAX DAILY AMOUNT 10MG* 30 Tab 3    lidocaine-prilocaine (EMLA) topical cream APPLY OVER PORT 1 HOUR PRIOR TO CHEMOTHERAPY THAN COVER WITH SARAN WRAP 30 g 6    warfarin (COUMADIN) 1 mg tablet TAKE 1 TABLET (1 MG) BY ORAL ROUTE ONCE DAILY 30 Tab 6    magic mouthwash solution Take 5 mL by mouth three (3) times daily as needed for Stomatitis.  Magic mouth wash   Maalox  Lidocaine 2% viscous   Diphenhydramine oral solution     Pharmacy to mix equal portions of ingredients to a total volume as indicated in the dispense amount. 236 mL 0    celecoxib (CELEBREX) 200 mg capsule Take  by mouth two (2) times a day.  furosemide (LASIX) 40 mg tablet Take  by mouth daily.  dronabinol (MARINOL) 5 mg capsule Take 1 Cap by mouth two (2) times a day. 60 Cap 1    gabapentin (NEURONTIN) 300 mg capsule Take 300 mg by mouth three (3) times daily.  KLOR-CON M20 20 mEq tablet TAKE ONE TABLET BY MOUTH ONCE A DAY 30 Tab 3    aluminum-magnesium hydroxide 200-200 mg/5 mL susp 5 mL, diphenhydrAMINE 12.5 mg/5 mL liqd 12.5 mg, lidocaine 2 % soln 5 mL 5 mL by Swish and Spit route two (2) times a day. Magic mouth wash   Maalox  Lidocaine 2% viscous   Diphenhydramine oral solution     Pharmacy to mix equal portions of ingredients to a total volume as indicated in the dispense amount. 240 mL 1    potassium chloride (K-DUR, KLOR-CON) 20 mEq tablet Take 2 Tabs by mouth daily. 30 Tab 3    albuterol (PROAIR HFA) 90 mcg/actuation inhaler 1-2 puffs every 4-6 hrs. 1 Inhaler 1    senna (SENNA) 8.6 mg tablet Take 1 Tab by mouth daily.  cyclobenzaprine (FLEXERIL) 5 mg tablet       nabumetone (RELAFEN) 750 mg tablet       ondansetron hcl (ZOFRAN) 8 mg tablet Take 1 Tab by mouth every eight (8) hours as needed for Nausea. 30 Tab 3    IRON AG&FUM/C/FA/MV CMB11/CA-T (PRUVATE 21-7 PO) Take  by mouth. Review of Systems  Constitutional: The patient has no acute distress or discomfort. HEENT: The patient denies recent head trauma, eye pain, blurred vision,  hearing deficit, oropharyngeal mucosal pain or lesions, and the patient denies throat pain or discomfort. Lymphatics: The patient denies palpable peripheral lymphadenopathy. Hematologic: The patient denies having bruising, bleeding, or progressive fatigue. Respiratory: Patient denies having shortness of breath, cough, sputum production, fever, or dyspnea on exertion. Cardiovascular:  The patient denies having leg pain, leg swelling, heart palpitations, chest permit, chest pain, or lightheadedness. The patient denies having dyspnea on exertion. Gastrointestinal: The patient denies having nausea, emesis, or diarrhea. The patient denies having any hematemesis or blood in the stool. Genitourinary: Patient denies having urinary urgency, frequency, or dysuria. The patient denies having blood in the urine. Psychological: The patient denies having symptoms of nervousness, anxiety, depression, or thoughts of harming self. Skin: Patient denies having skin rashes, skin, ulcerations, or unexplained itching or pruritus. Musculoskeletal: The patient used to complain of arthritic discomfort in her joints particularly the knees, hips, and shoulders. The patient reports that she has a large bruise on her right lower extremity. Objective:     Visit Vitals    BP 94/57 (BP 1 Location: Right arm, BP Patient Position: Sitting)    Pulse 65    Temp 96.6 °F (35.9 °C) (Oral)    Wt 71.2 kg (157 lb)    BMI 25.34 kg/m2     ECOG PS=1, pain score=0/10  Physical Exam:   Gen. Appearance: The patient is in no acute distress. Skin: There is no bruise or rash. HEENT: The exam is unremarkable. Neck: Supple without lymphadenopathy or thyromegaly. Lungs: Clear to auscultation and percussion; there are no wheezes or rhonchi. Heart: Regular rate and rhythm; there are no murmurs, gallops, or rubs. Abdomen: Bowel sounds are present and normal.  There is no guarding, tenderness, or hepatosplenomegaly. Extremities: There is no clubbing, cyanosis, or edema. There is a 6 cm area of bruising on the right anterior lateral calf. There is no evidence of skin breakdown or ulceration. Neurologic: There are no focal neurologic deficits. Lymphatics: There is no palpable peripheral lymphadenopathy. Musculoskeletal: The patient has full range of motion at all joints. There is no evidence of joint deformity or effusions.   There is no focal joint tenderness. Psychological/psychiatric: There is no clinical evidence of anxiety, depression, or melancholy. Lab data:      Results for orders placed or performed during the hospital encounter of 09/22/17   CBC WITH 3 PART DIFF     Status: Abnormal   Result Value Ref Range Status    WBC 4.0 (L) 4.5 - 13.0 K/uL Final    RBC 3.74 (L) 4.10 - 5.10 M/uL Final    HGB 10.3 (L) 12.0 - 16 g/dL Final    HCT 32.6 (L) 36 - 48 % Final    MCV 87.2 78 - 102 FL Final    MCH 27.5 25.0 - 35.0 PG Final    MCHC 31.6 31 - 37 g/dL Final    RDW 16.7 (H) 11.5 - 14.5 % Final    NEUTROPHILS 40 40 - 70 % Final    MIXED CELLS 23 (H) 0.1 - 17 % Final    LYMPHOCYTES 38 14 - 44 % Final    ABS. NEUTROPHILS 1.6 (L) 1.8 - 9.5 K/UL Final    ABS. MIXED CELLS 0.9 0.0 - 2.3 K/uL Final    ABS. LYMPHOCYTES 1.5 1.1 - 5.9 K/UL Final     Comment: Test performed at [address] Results Reviewed by Medical Director. DF AUTOMATED   Final      The preliminary CBC from today, 9/27/2017, shows that her platelet count is currently 128,000. WBC is 6, hemoglobin is 10.1 g/dL, and hematocrit is 32.1%. .        Assessment:     1. Chronic ITP (idiopathic thrombocytopenia) (HCC)    2. Breast cancer metastasized to axillary lymph node, unspecified laterality ([facility] [address]    3. Vitamin D deficiency    4. Cachexia (HCC)    5. Vertigo    6. Bilateral lower extremity edema    7. Primary insomnia    8. Iron deficiency anemia secondary to inadequate dietary iron intake    9. Chronic anemia          Plan:   Chronic ITP/thrombocytopenia (progression of disease): I have informed the patient that her CBC today shows that her preliminary platelet count is [phone],647. At this time I am recommending we continue the n-Plate at a dose of 1 mcg/kg subcutaneous weekly. At this time I will recheck her platelet level will plan to see her back in clinic in about 8 weeks. Vitamin D deficiency: The patient recently completed vitamin D 50,000 units for 12 weeks.   At this time I will recheck a vitamin D level. If her vitamin D 25 is low she will be restarted on vitamin D 50,000 units weekly for an additional 12 weeks. Iron deficiency anemia/chronic anemia: The current CBC shows a WBC count of, the hemoglobin is 10.1 g/dL, and the hematocrit is 32.1%. I have recommended that the patient continue taking the ferrous sulfate 1 tablet twice daily. At this time I will recheck her iron profile and ferritin levels. Bilateral lower extremity edema: The leg edema has significantly improved. Metastatic breast cancer, left breast metastasized to lymph nodes and other sites: At this time I will check a CA-27-29 level. The most recent CA-35-27 level was normal.  Clinically the patient has no evidence of disease progression. .    Cachexia, weakness, and muscular deconditioning: I am recommending that she continue physical therapy 4 times weekly. She will continue to use a walker or cane as needed for mobility support. Today, she is using a cane and is not reporting any instability. Primary insomnia: A new prescription for Ambien 10 mg at bedtime was provided. Vertigo (new problem): At this time I will order Antivert 12.5 mg every 8 hours. I have advised the patient to take this for 3-5 days as needed. I will see her back in clinic for complete assessment again in 12 weeks.   Orders Placed This Encounter    COMPLETE CBC & AUTO DIFF WBC    InHouse CBC ([employer])     Standing Status:   Future     Number of Occurrences:   1     Standing Expiration Date:   75/5/2646    METABOLIC PANEL, COMPREHENSIVE     Standing Status:   Future     Standing Expiration Date:   9/28/2018    IRON PROFILE     Standing Status:   Future     Standing Expiration Date:   9/28/2018    FERRITIN     Standing Status:   Future     Standing Expiration Date:   9/28/2018    CA 27.29     Standing Status:   Future     Standing Expiration Date:   9/28/2018       Yogi Mg MD  9/27/2017      Please note: This document has been produced using voice recognition software. Unrecognized errors in transcription may be present.

## 2017-09-27 NOTE — MR AVS SNAPSHOT
Visit Information Date & Time Provider Department Dept. Phone Encounter #  
 9/27/2017  9:00 AM Dorsey Sandhoff, MD [facility]cology [phone] [id number] Follow-up Instructions Return in about 3 months (around 12/27/2017). Your Appointments 10/23/2017  2:45 PM  
Follow Up with Goerge Donaldson MD  
[facility]) Appt Note: 1MO BLOCK FU; Sally Achilles 9/27/17  
 [address] [facility] [id number] [phone]  
  
   
 [address] [facility] [address] Upcoming Health Maintenance Date Due Hepatitis C Screening 1950 DTaP/Tdap/Td series (1 - Tdap) 5/14/1973 FOBT Q 1 YEAR AGE 50-75 5/14/2002 ZOSTER VACCINE AGE 60> 3/14/2012 BREAST CANCER SCRN MAMMOGRAM 4/4/2015 GLAUCOMA SCREENING Q2Y 5/14/2017 OSTEOPOROSIS SCREENING (DEXA) 5/14/2017 Pneumococcal 65+ High/Highest Risk (1 of 2 - PCV13) 5/14/2017 MEDICARE YEARLY EXAM 5/14/2017 INFLUENZA AGE 9 TO ADULT 8/1/2017 Allergies as of 9/27/2017  Review Complete On: 9/22/2017 By: Hunter Tatum RN Severity Noted Reaction Type Reactions Aspirin High 08/07/2013   Systemic Swelling Pt states her whole body swells when she takes aspirin. Adhesive    Unable to Obtain Pcn [Penicillins]  08/20/2012    Rash Current Immunizations  Reviewed on 9/22/2017 No immunizations on file. Not reviewed this visit You Were Diagnosed With   
  
 Codes Comments Chronic ITP (idiopathic thrombocytopenia) (HCC)    -  Primary ICD-10-CM: U01.9 ICD-9-CM: 287.31 Breast cancer metastasized to axillary lymph node, unspecified laterality ([facility] [address]     ICD-10-CM: C50.919, C77.3 ICD-9-CM: 174.9, 196.3 Vitamin D deficiency     ICD-10-CM: E55.9 ICD-9-CM: 268.9 Cachexia ([facility] [address]     ICD-10-CM: R64 
ICD-9-CM: 799.4 Vertigo     ICD-10-CM: A61 ICD-9-CM: 780.4 Bilateral lower extremity edema     ICD-10-CM: R60.0 ICD-9-CM: 782.3 Primary insomnia     ICD-10-CM: F51.01 
ICD-9-CM: 307.42 Iron deficiency anemia secondary to inadequate dietary iron intake     ICD-10-CM: D50.8 ICD-9-CM: 280. 1 Chronic anemia     ICD-10-CM: D64.9 ICD-9-CM: 289. 9 Vitals BP Pulse Temp Weight(growth percentile) BMI OB Status 94/57 (BP 1 Location: Right arm, BP Patient Position: Sitting) 65 96.6 °F (35.9 °C) (Oral) 157 lb (71.2 kg) 25.34 kg/m2 Postmenopausal  
 Smoking Status Never Smoker BMI and BSA Data Body Mass Index Body Surface Area  
 25.34 kg/m 2 1.82 m 2 Preferred Pharmacy Pharmacy Name [id number] [place] [phone] Your Updated Medication List  
  
   
This list is accurate as of: 9/27/17  9:53 AM.  Always use your most recent med list.  
  
  
  
  
 albuterol 90 mcg/actuation inhaler Commonly known as:  PROAIR HFA  
1-2 puffs every 4-6 hrs. aluminum-magnesium hydroxide 200-200 mg/5 mL susp 5 mL, diphenhydrAMINE 12.5 mg/5 mL liqd 12.5 mg, lidocaine 2 % soln 5 mL  
5 mL by Swish and Spit route two (2) times a day. Magic mouth wash  Maalox Lidocaine 2% viscous  Diphenhydramine oral solution   Pharmacy to mix equal portions of ingredients to a total volume as indicated in the dispense amount. amiodarone 200 mg tablet Commonly known as:  CORDARONE Take 200 mg by mouth. CeleBREX 200 mg capsule Generic drug:  celecoxib Take  by mouth two (2) times a day. COREG 3.125 mg tablet Generic drug:  carvedilol Take 3.125 mg by mouth two (2) times daily (with meals). cyclobenzaprine 5 mg tablet Commonly known as:  FLEXERIL  
  
 digoxin 0.125 mg tablet Commonly known as:  LANOXIN Take 0.125 mg by mouth daily. dronabinol 5 mg capsule Commonly known as:  Xiomara Litten Take 1 Cap by mouth two (2) times a day. furosemide 40 mg tablet Commonly known as:  LASIX Take  by mouth daily. * gabapentin 300 mg capsule Commonly known as:  NEURONTIN Take 300 mg by mouth three (3) times daily. * gabapentin 300 mg capsule Commonly known as:  NEURONTIN Take 1 po q am and 2 po q pm  
  
 lidocaine-prilocaine topical cream  
Commonly known as:  EMLA  
APPLY OVER PORT 1 HOUR PRIOR TO CHEMOTHERAPY THAN COVER WITH SARAN WRAP  
  
 lisinopril 10 mg tablet Commonly known as:  [place] Elizabeth Take 10 mg by mouth daily. loratadine 10 mg Cap Take 10 mg by mouth daily. magic mouthwash solution Take 5 mL by mouth three (3) times daily as needed for Stomatitis. Magic mouth wash  Maalox Lidocaine 2% viscous  Diphenhydramine oral solution   Pharmacy to mix equal portions of ingredients to a total volume as indicated in the dispense amount. meloxicam 7.5 mg tablet Commonly known as:  MOBIC Take 7.5 mg by mouth daily. nabumetone 750 mg tablet Commonly known as:  RELAFEN  
  
 ondansetron hcl 8 mg tablet Commonly known as:  ZOFRAN (AS HYDROCHLORIDE) Take 1 Tab by mouth every eight (8) hours as needed for Nausea. oxyCODONE-acetaminophen 7.5-325 mg per tablet Commonly known as:  PERCOCET Take 1 tablet every 6 hours as needed for pain * potassium chloride 20 mEq tablet Commonly known as:  K-DUR, KLOR-CON Take 2 Tabs by mouth daily. * KLOR-CON M20 20 mEq tablet Generic drug:  potassium chloride TAKE ONE TABLET BY MOUTH ONCE A DAY  
  
 PRUVATE 21-7 PO Take  by mouth. Senna 8.6 mg tablet Generic drug:  senna Take 1 Tab by mouth daily. warfarin 1 mg tablet Commonly known as:  COUMADIN  
TAKE 1 TABLET (1 MG) BY ORAL ROUTE ONCE DAILY XARELTO 10 mg tablet Generic drug:  rivaroxaban Take 10 mg by mouth daily. zolpidem 10 mg tablet Commonly known as:  AMBIEN  
 TAKE 1 TABLET BY MOUTH NIGHTLY AS NEEDED FOR SLEEP *MAX DAILY AMOUNT 10MG* * Notice: This list has 4 medication(s) that are the same as other medications prescribed for you. Read the directions carefully, and ask your doctor or other care provider to review them with you. We Performed the Following COMPLETE CBC & AUTO DIFF WBC [02921 CPT(R)] Follow-up Instructions Return in about 3 months (around 12/27/2017). To-Do List   
 09/27/2017 Lab:  CBC WITH 3 PART DIFF   
  
 09/29/2017  8:00 AM  
  Appointment with [facility] TR[facility] NURSE at [facility] ([phone]) 10/13/2017 8:00 AM  
  Appointment with [facility] TRACK NURSE at [facility] ([phone]) Patient Instructions Anemia: Care Instructions Your Care Instructions Anemia is a low level of red blood cells, which carry oxygen throughout your body. Many things can cause anemia. Lack of iron is one of the most common causes. Your body needs iron to make hemoglobin, a substance in red blood cells that carries oxygen from the lungs to your body's cells. Without enough iron, the body produces fewer and smaller red blood cells. As a result, your body's cells do not get enough oxygen, and you feel tired and weak. And you may have trouble concentrating. Bleeding is the most common cause of a lack of iron. You may have heavy menstrual bleeding or bleeding caused by conditions such as ulcers, hemorrhoids, or cancer. Regular use of aspirin or other anti-inflammatory medicines (such as ibuprofen) also can cause bleeding in some people. A lack of iron in your diet also can cause anemia, especially at times when the body needs more iron, such as during pregnancy, infancy, and the teen years. Your doctor may have prescribed iron pills.  It may take several months of treatment for your iron levels to return to normal. Your doctor also may suggest that you eat foods that are rich in iron, such as meat and beans. There are many other causes of anemia. It is not always due to a lack of iron. Finding the specific cause of your anemia will help your doctor find the right treatment for you. Follow-up care is a key part of your treatment and safety. Be sure to make and go to all appointments, and call your doctor if you are having problems. It's also a good idea to know your test results and keep a list of the medicines you take. How can you care for yourself at home? · Take your medicines exactly as prescribed. Call your doctor if you think you are having a problem with your medicine. · If your doctor recommends iron pills, take them as directed: ¨ Try to take the pills on an empty stomach about 1 hour before or 2 hours after meals. But you may need to take iron with food to avoid an upset stomach. ¨ Do not take antacids or drink milk or caffeine drinks (such as coffee, tea, or cola) at the same time or within 2 hours of the time that you take your iron. They can make it hard for your body to absorb the iron. ¨ Vitamin C (from food or supplements) helps your body absorb iron. Try taking iron pills with a glass of orange juice or some other food that is high in vitamin C, such as citrus fruits. ¨ Iron pills may cause stomach problems, such as heartburn, nausea, diarrhea, constipation, and cramps. Be sure to drink plenty of fluids, and include fruits, vegetables, and fiber in your diet each day. Iron pills often make your bowel movements dark or green. ¨ If you forget to take an iron pill, do not take a double dose of iron the next time you take a pill. ¨ Keep iron pills out of the reach of small children. An overdose of iron can be very dangerous. · Follow your doctor's advice about eating iron-rich foods. These include red meat, shellfish, poultry, eggs, beans, raisins, whole-grain bread, and leafy green vegetables. · Steam vegetables to help them keep their iron content. When should you call for help? Call 911 anytime you think you may need emergency care. For example, call if: 
· You have symptoms of a heart attack. These may include: ¨ Chest pain or pressure, or a strange feeling in the chest. 
¨ Sweating. ¨ Shortness of breath. ¨ Nausea or vomiting. ¨ Pain, pressure, or a strange feeling in the back, neck, jaw, or upper belly or in one or both shoulders or arms. ¨ Lightheadedness or sudden weakness. ¨ A fast or irregular heartbeat. After you call 911, the  may tell you to chew 1 adult-strength or 2 to 4 low-dose aspirin. Wait for an ambulance. Do not try to drive yourself. · You passed out (lost consciousness). Call your doctor now or seek immediate medical care if: 
· You have new or increased shortness of breath. · You are dizzy or lightheaded, or you feel like you may faint. · Your fatigue and weakness continue or get worse. · You have any abnormal bleeding, such as: 
¨ Nosebleeds. ¨ Vaginal bleeding that is different (heavier, more frequent, at a different time of the month) than what you are used to. ¨ Bloody or black stools, or rectal bleeding. ¨ Bloody or pink urine. Watch closely for changes in your health, and be sure to contact your doctor if: 
· You do not get better as expected. Where can you learn more? Go to http://leon-edinson.info/. Enter R301 in the search box to learn more about \"Anemia: Care Instructions. \" Current as of: October 13, 2016 Content Version: 11.3 © 2204-7508 [employer]. Care instructions adapted under license by [employer] (which disclaims liability or warranty for this information). If you have questions about a medical condition or this instruction, always ask your healthcare professional. [place] [address] any warranty or liability for your use of this information. Breast Cancer: Care Instructions Your Care Instructions Breast cancer occurs when abnormal cells grow out of control in the breast. These cancer cells can spread within the breast, to nearby lymph nodes and other tissues, and to other parts of the body. Being treated for cancer can weaken your body, and you may feel very tired. Get the rest your body needs so you can feel better. Finding out that you have cancer is scary. You may feel many emotions and may need some help coping. Seek out family, friends, and counselors for support. You also can do things at home to make yourself feel better while you go through treatment. Call the [employer]. (2-347.944.9259) or visit its website at [employer] for more information. Follow-up care is a key part of your treatment and safety. Be sure to make and go to all appointments, and call your doctor if you are having problems. It's also a good idea to know your test results and keep a list of the medicines you take. How can you care for yourself at home? · Take your medicines exactly as prescribed. Call your doctor if you think you are having a problem with your medicine. You may get medicine for nausea and vomiting if you have these side effects. · Follow your doctor's instructions to relieve pain. Pain from cancer and surgery can almost always be controlled. Use pain medicine when you first notice pain, before it becomes severe. · Eat healthy food. If you do not feel like eating, try to eat food that has protein and extra calories to keep up your strength and prevent weight loss. Drink liquid meal replacements for extra calories and protein. Try to eat your main meal early. · Get some physical activity every day, but do not get too tired. Keep doing the hobbies you enjoy as your energy allows. · Do not smoke. Smoking can make your cancer worse. If you need help quitting, talk to your doctor about stop-smoking programs and medicines. These can increase your chances of quitting for good. · Take steps to control your stress and workload. Learn relaxation techniques. ¨ Share your feelings. Stress and tension affect our emotions. By expressing your feelings to others, you may be able to understand and cope with them. ¨ Consider joining a support group. Talking about a problem with your spouse, a good friend, or other people with similar problems is a good way to reduce tension and stress. ¨ Express yourself through art. Try writing, crafts, dance, or art to relieve stress. Some dance, writing, or art groups may be available just for people who have cancer. ¨ Be kind to your body and mind. Getting enough sleep, eating a healthy diet, and taking time to do things you enjoy can contribute to an overall feeling of balance in your life and can help reduce stress. ¨ Get help if you need it. Discuss your concerns with your doctor or counselor. · If you are vomiting or have diarrhea: ¨ Drink plenty of fluids (enough so that your urine is light yellow or clear like water) to prevent dehydration. Choose water and other caffeine-free clear liquids. If you have kidney, heart, or liver disease and have to limit fluids, talk with your doctor before you increase the amount of fluids you drink. ¨ When you are able to eat, try clear soups, mild foods, and liquids until all symptoms are gone for 12 to 48 hours. Other good choices include dry toast, crackers, cooked cereal, and gelatin dessert, such as Jell-O. · If you have not already done so, prepare a list of advance directives. Advance directives are instructions to your doctor and family members about what kind of care you want if you become unable to speak or express yourself. When should you call for help? Call your doctor now or seek immediate medical care if: 
· You have a fever. · Any part of your breast becomes red, tender, swollen, or hot. · You have pain, redness, or swelling in the arm on the same side as your breast cancer. Watch closely for changes in your health, and be sure to contact your doctor if: 
· You have pain that is not controlled by medicine. · You have nausea or vomiting. · You are constipated or have diarrhea. Where can you learn more? Go to http://leon-edinson.info/. Enter V321 in the search box to learn more about \"Breast Cancer: Care Instructions. \" Current as of: July 26, 2016 Content Version: 11.3 © 6611-4033 [employer]. Care instructions adapted under license by [email or web address] (which disclaims liability or warranty for this information). If you have questions about a medical condition or this instruction, always ask your healthcare professional. [address] any warranty or liability for your use of this information. Idiopathic Thrombocytopenic Purpura: Care Instructions Your Care Instructions Idiopathic thrombocytopenic purpura, or ITP, is a blood problem. It happens when your immune system does not work as it should and destroys platelets in your blood. Platelets are a kind of cell in your blood. They have a sticky surface that helps them form clots to stop bleeding. Your blood can't clot if you don't have enough platelets. This causes abnormal bleeding. Bleeding can get worse over time, or it can come on fast. 
To treat ITP, you may need to take medicines to help stop the destruction of platelets. You may need platelets added to your blood. Or you may need surgery to remove your spleen. Your spleen's job is to remove platelets from your body. So taking out the spleen helps increase your platelet count. Follow-up care is a key part of your treatment and safety. Be sure to make and go to all appointments, and call your doctor if you are having problems. It's also a good idea to know your test results and keep a list of the medicines you take. How can you care for yourself at home? · Be safe with medicines. Take your medicines exactly as prescribed. Call your doctor if you think you are having a problem with your medicine. · Before you start any new over-the-counter or prescribed medicine, tell your doctor if: 
¨ You have had a bad reaction to any medicines in the past. 
¨ You take other medicines, such as over-the-counter medicines, vitamins, or herbal supplements. ¨ You have other health problems. ¨ You are or could be pregnant. · Do not take aspirin or other anti-inflammatory medicines (such as ibuprofen or naproxen) without first talking to your doctor. Ask your doctor if it is okay to use acetaminophen (Tylenol). · Do not take two or more pain medicines at the same time unless the doctor told you to. Many pain medicines have acetaminophen, which is Tylenol. Too much acetaminophen (Tylenol) can be harmful. · Get plenty of rest. 
· \"Think safety\" and protect yourself from injury. Do not lift anything heavy. · Do not donate blood. · Do not drink alcohol or use illegal drugs. When should you call for help? Call 911 anytime you think you may need emergency care. For example, call if: 
· You passed out (lost consciousness). · You have trouble breathing. · You have symptoms of a stroke. These may include: 
¨ Sudden numbness, tingling, weakness, or loss of movement in your face, arm, or leg, especially on only one side of your body. ¨ Sudden vision changes. ¨ Sudden trouble speaking. ¨ Sudden confusion or trouble understanding simple statements. ¨ Sudden problems with walking or balance. ¨ A sudden, severe headache that is different from past headaches. Call your doctor now or seek immediate medical care if: 
· You are dizzy or lightheaded, or you feel like you may faint. · You have any abnormal bleeding, such as: 
¨ Nosebleeds.  
¨ Vaginal bleeding that is different (heavier, more frequent, at a different time of the month) than what you are used to. ¨ Bloody or black stools, or rectal bleeding. ¨ Bloody or pink urine. · You have signs of an infection, such as red streaks coming from a bruise. · You have a fever and chills. Watch closely for changes in your health, and be sure to contact your doctor if you have any problems. Where can you learn more? Go to http://leon-edinson.info/. Enter M134 in the search box to learn more about \"Idiopathic Thrombocytopenic Purpura: Care Instructions. \" Current as of: October 13, 2016 Content Version: 11.3 © 3253-7887 [employer]. Care instructions adapted under license by [employer] (which disclaims liability or warranty for this information). If you have questions about a medical condition or this instruction, always ask your healthcare professional. [address] any warranty or liability for your use of this information. Introducing [facility]! Carolyn Chowdhury introduces Evince patient portal. Now you can access parts of your medical record, email your doctor's office, and request medication refills online. 1. In your internet browser, go to [email or web address] 2. Click on the First Time User? Click Here link in the Sign In box. You will see the New Member Sign Up page. 3. Enter your Evince Access Code exactly as it appears below. You will not need to use this code after youve completed the sign-up process. If you do not sign up before the expiration date, you must request a new code. · Evince Access Code: [email or web address] Expires: 12/20/2017 10:36 AM 
 
4. Enter the last four digits of your Social Security Number (xxxx) and Date of Birth (mm/dd/yyyy) as indicated and click Submit. You will be taken to the next sign-up page. 5. Create a Evince ID.  This will be your Evince login ID and cannot be changed, so think of one that is secure and easy to remember. 6. Create a [email or web address] password. You can change your password at any time. 7. Enter your Password Reset Question and Answer. This can be used at a later time if you forget your password. 8. Enter your e-mail address. You will receive e-mail notification when new information is available in [address] 9. Click Sign Up. You can now view and download portions of your medical record. 10. Click the Download Summary menu link to download a portable copy of your medical information. If you have questions, please visit the Frequently Asked Questions section of the [email or web address] website. Remember, [email or web address] is NOT to be used for urgent needs. For medical emergencies, dial 911. Now available from your iPhone and Android! Please provide this summary of care documentation to your next provider. Your primary care clinician is listed as Laura Harris. If you have any questions after today's visit, please call [phone].

## 2017-09-28 LAB — CANCER AG27-29 SERPL-ACNC: 34.1 U/ML (ref 0–38.6)

## 2017-09-29 ENCOUNTER — HOSPITAL ENCOUNTER (OUTPATIENT)
Dept: INFUSION THERAPY | Age: 67
Discharge: HOME | End: 2017-09-29
Payer: MEDICARE

## 2017-09-29 VITALS
DIASTOLIC BLOOD PRESSURE: 69 MMHG | RESPIRATION RATE: 18 BRPM | TEMPERATURE: 97.6 F | SYSTOLIC BLOOD PRESSURE: 110 MMHG | HEART RATE: 61 BPM

## 2017-09-29 LAB
BASO+EOS+MONOS # BLD AUTO: 1 K/UL (ref 0–2.3)
BASO+EOS+MONOS # BLD AUTO: 19 % (ref 0.1–17)
DIFFERENTIAL METHOD BLD: ABNORMAL
ERYTHROCYTE [DISTWIDTH] IN BLOOD BY AUTOMATED COUNT: 16.5 % (ref 11.5–14.5)
HCT VFR BLD AUTO: 32.5 % (ref 36–48)
HGB BLD-MCNC: 10.5 G/DL (ref 12–16)
LYMPHOCYTES # BLD: 1.7 K/UL (ref 1.1–5.9)
LYMPHOCYTES NFR BLD: 33 % (ref 14–44)
MCH RBC QN AUTO: 28.2 PG (ref 25–35)
MCHC RBC AUTO-ENTMCNC: 32.3 G/DL (ref 31–37)
MCV RBC AUTO: 87.4 FL (ref 78–102)
NEUTS SEG # BLD: 2.6 K/UL (ref 1.8–9.5)
NEUTS SEG NFR BLD: 48 % (ref 40–70)
PLATELET # BLD AUTO: 130 K/UL (ref 135–420)
RBC # BLD AUTO: 3.72 M/UL (ref 4.1–5.1)
WBC # BLD AUTO: 5.3 K/UL (ref 4.5–13)

## 2017-09-29 PROCEDURE — 85049 AUTOMATED PLATELET COUNT: CPT | Performed by: INTERNAL MEDICINE

## 2017-09-29 PROCEDURE — 74011250636 HC RX REV CODE- 250/636

## 2017-09-29 PROCEDURE — 74011000250 HC RX REV CODE- 250

## 2017-09-29 PROCEDURE — 85025 COMPLETE CBC W/AUTO DIFF WBC: CPT | Performed by: INTERNAL MEDICINE

## 2017-09-29 PROCEDURE — 96372 THER/PROPH/DIAG INJ SC/IM: CPT

## 2017-09-29 PROCEDURE — 36415 COLL VENOUS BLD VENIPUNCTURE: CPT

## 2017-09-29 RX ORDER — WATER FOR INJECTION,STERILE
VIAL (ML) INJECTION
Status: COMPLETED
Start: 2017-09-29 | End: 2017-09-29

## 2017-09-29 RX ORDER — SODIUM CHLORIDE 0.9 % (FLUSH) 0.9 %
10-40 SYRINGE (ML) INJECTION AS NEEDED
Status: DISCONTINUED | OUTPATIENT
Start: 2017-09-29 | End: 2017-09-29

## 2017-09-29 RX ORDER — HEPARIN SODIUM (PORCINE) LOCK FLUSH IV SOLN 100 UNIT/ML 100 UNIT/ML
500 SOLUTION INTRAVENOUS AS NEEDED
Status: DISCONTINUED | OUTPATIENT
Start: 2017-09-29 | End: 2017-09-29

## 2017-09-29 RX ADMIN — WATER 1.2 ML: 1 INJECTION INTRAMUSCULAR; INTRAVENOUS; SUBCUTANEOUS at 09:08

## 2017-09-29 NOTE — PROGRESS NOTES
[facility] Progress Note    Date: 2017    Name: Jose Padron    MRN: [id number]         : 1950     Nplate injection weekly/Procrti injection every 4 weeks      Ms. Mark Mcclendon was assessed and education was provided. States that she is doing well at this time. Ms. Yudy Calderon vitals were reviewed and patient was observed for 5 minutes prior to treatment. Visit Vitals    /69 (BP 1 Location: Left arm, BP Patient Position: Sitting)    Pulse 61    Temp 97.6 °F (36.4 °C)    Resp 18     Blood drawn for labs with butterfly needle by JUAN Sanford RN from right wrist.  No irritation noted at site, 2x2 gauge and Coban applied. Lab results were obtained and reviewed. Recent Results (from the past 12 hour(s))   CBC WITH 3 PART DIFF    Collection Time: 17  8:45 AM   Result Value Ref Range    WBC 5.3 4.5 - 13.0 K/uL    RBC 3.72 (L) 4.10 - 5.10 M/uL    HGB 10.5 (L) 12.0 - 16 g/dL    HCT 32.5 (L) 36 - 48 %    MCV 87.4 78 - 102 FL    MCH 28.2 25.0 - 35.0 PG    MCHC 32.3 31 - 37 g/dL    RDW 16.5 (H) 11.5 - 14.5 %    NEUTROPHILS 48 40 - 70 %    MIXED CELLS 19 (H) 0.1 - 17 %    LYMPHOCYTES 33 14 - 44 %    ABS. NEUTROPHILS 2.6 1.8 - 9.5 K/UL    ABS. MIXED CELLS 1.0 0.0 - 2.3 K/uL    ABS. LYMPHOCYTES 1.7 1.1 - 5.9 K/UL    DF AUTOMATED     PLATELET COUNT    Collection Time: 17  8:45 AM   Result Value Ref Range    PLATELET 988 (L) 677 - 420 K/uL       Nplate 549 mg (diluted with 1.2 ml Sterile water  was administered subcutaneous in posterior left upper arm. No irritation noted at site, patient refused band aid. Procrit parameters are to hold if Hgb is greater than 10 or Hct is greater than 30. Procrit held for Hgb 10.5/ Hct 32.5. Ms. Mark Mcclendon tolerated well, and had no complaints. Patient armband removed and shredded. Ms. Mark Mcclendon was discharged from [place] [address] in stable condition at [address] [place] [address].  She is to return on 10/6/2017 at 0830 for her next appointment for weekly Nplate.     Bela Momin RN  September 29, 2017  11:54 AM

## 2017-10-06 ENCOUNTER — HOSPITAL ENCOUNTER (OUTPATIENT)
Dept: INFUSION THERAPY | Age: 67
Discharge: HOME | End: 2017-10-06
Payer: MEDICARE

## 2017-10-06 VITALS
HEART RATE: 61 BPM | SYSTOLIC BLOOD PRESSURE: 119 MMHG | RESPIRATION RATE: 18 BRPM | TEMPERATURE: 97.6 F | DIASTOLIC BLOOD PRESSURE: 77 MMHG

## 2017-10-06 LAB
BASO+EOS+MONOS # BLD AUTO: 1.2 K/UL (ref 0–2.3)
BASO+EOS+MONOS # BLD AUTO: 18 % (ref 0.1–17)
DIFFERENTIAL METHOD BLD: ABNORMAL
ERYTHROCYTE [DISTWIDTH] IN BLOOD BY AUTOMATED COUNT: 16.3 % (ref 11.5–14.5)
HCT VFR BLD AUTO: 31.7 % (ref 36–48)
HGB BLD-MCNC: 10.1 G/DL (ref 12–16)
LYMPHOCYTES # BLD: 1.4 K/UL (ref 1.1–5.9)
LYMPHOCYTES NFR BLD: 22 % (ref 14–44)
MCH RBC QN AUTO: 27.7 PG (ref 25–35)
MCHC RBC AUTO-ENTMCNC: 31.9 G/DL (ref 31–37)
MCV RBC AUTO: 86.8 FL (ref 78–102)
NEUTS SEG # BLD: 4 K/UL (ref 1.8–9.5)
NEUTS SEG NFR BLD: 60 % (ref 40–70)
PLATELET # BLD AUTO: 126 K/UL (ref 135–420)
RBC # BLD AUTO: 3.65 M/UL (ref 4.1–5.1)
WBC # BLD AUTO: 6.6 K/UL (ref 4.5–13)

## 2017-10-06 PROCEDURE — 96372 THER/PROPH/DIAG INJ SC/IM: CPT

## 2017-10-06 PROCEDURE — 74011250636 HC RX REV CODE- 250/636

## 2017-10-06 PROCEDURE — 85049 AUTOMATED PLATELET COUNT: CPT | Performed by: INTERNAL MEDICINE

## 2017-10-06 PROCEDURE — 74011000250 HC RX REV CODE- 250

## 2017-10-06 PROCEDURE — 85025 COMPLETE CBC W/AUTO DIFF WBC: CPT | Performed by: INTERNAL MEDICINE

## 2017-10-06 PROCEDURE — 74011250636 HC RX REV CODE- 250/636: Performed by: NURSE PRACTITIONER

## 2017-10-06 PROCEDURE — 36415 COLL VENOUS BLD VENIPUNCTURE: CPT

## 2017-10-06 RX ORDER — WATER FOR INJECTION,STERILE
VIAL (ML) INJECTION
Status: COMPLETED
Start: 2017-10-06 | End: 2017-10-06

## 2017-10-06 RX ADMIN — ROMIPLOSTIM 207 MCG: 250 INJECTION, POWDER, LYOPHILIZED, FOR SOLUTION SUBCUTANEOUS at 09:09

## 2017-10-06 RX ADMIN — WATER 10 ML: 1 INJECTION INTRAMUSCULAR; INTRAVENOUS; SUBCUTANEOUS at 09:09

## 2017-10-06 NOTE — PROGRESS NOTES
[facility] Progress Note    Date: 2017    Name: Jose Padron    MRN: [id number]         : 1950      Nplate Injection     Ms. Mark Mcclendon arrived to [facility] at 6235. Ms. Mark Mcclendon was assessed and education was provided. Ms. Yudy Calderon vitals were reviewed. Visit Vitals    /77 (BP 1 Location: Right arm, BP Patient Position: Sitting)    Pulse 61    Temp 97.6 °F (36.4 °C)    Resp 18    Breastfeeding No       Pt was observed for 5 minutes after obtaining vital signs prior to initiating treatment. Blood drawn for CBC via right AC x 1 attempt per written orders. Guaze and coban applied to the site. Lab results obtained and reviewed. (Preliminary results scanned into media tab.)  Recent Results (from the past 12 hour(s))   CBC WITH 3 PART DIFF    Collection Time: 10/06/17  8:46 AM   Result Value Ref Range    WBC 6.6 4.5 - 13.0 K/uL    RBC 3.65 (L) 4.10 - 5.10 M/uL    HGB 10.1 (L) 12.0 - 16 g/dL    HCT 31.7 (L) 36 - 48 %    MCV 86.8 78 - 102 FL    MCH 27.7 25.0 - 35.0 PG    MCHC 31.9 31 - 37 g/dL    RDW 16.3 (H) 11.5 - 14.5 %    NEUTROPHILS 60 40 - 70 %    MIXED CELLS 18 (H) 0.1 - 17 %    LYMPHOCYTES 22 14 - 44 %    ABS. NEUTROPHILS 4.0 1.8 - 9.5 K/UL    ABS. MIXED CELLS 1.2 0.0 - 2.3 K/uL    ABS. LYMPHOCYTES 1.4 1.1 - 5.9 K/UL    DF AUTOMATED       Preliminary platelet count= 252,871. Preliminary CBC results scanned into the media tab. Per Nplate protocol, since pt's platelet count is 932,696 today, pt's dose will remain at 3mcg/kg (3mcg x 69kg = 207mcg). Nplate 925CRK (3.57YZ) administered as ordered SQ in patient's right upper arm. No redness or bleeding noted. Ms. Mark Mcclendon was discharged from [place] [address] in stable condition at [address] [place] [address]. She is to return on 10/13/2017 at 0800 for her next appointment.     Aidee Fonseca RN  2017

## 2017-10-13 ENCOUNTER — HOSPITAL ENCOUNTER (OUTPATIENT)
Dept: INFUSION THERAPY | Age: 67
Discharge: HOME | End: 2017-10-13
Payer: MEDICARE

## 2017-10-13 ENCOUNTER — HOSPITAL ENCOUNTER (OUTPATIENT)
Dept: INFUSION THERAPY | Age: 67
End: 2017-10-13
Payer: MEDICARE

## 2017-10-13 VITALS
HEART RATE: 66 BPM | SYSTOLIC BLOOD PRESSURE: 114 MMHG | DIASTOLIC BLOOD PRESSURE: 74 MMHG | RESPIRATION RATE: 18 BRPM | TEMPERATURE: 97.8 F

## 2017-10-13 LAB
BASOPHILS # BLD: 0.1 K/UL (ref 0–0.1)
BASOPHILS NFR BLD: 1 % (ref 0–2)
DIFFERENTIAL METHOD BLD: ABNORMAL
EOSINOPHIL # BLD: 0.4 K/UL (ref 0–0.4)
EOSINOPHIL NFR BLD: 8 % (ref 0–5)
ERYTHROCYTE [DISTWIDTH] IN BLOOD BY AUTOMATED COUNT: 16 % (ref 11.6–14.5)
HCT VFR BLD AUTO: 29.8 % (ref 35–45)
HGB BLD-MCNC: 10 G/DL (ref 12–16)
LYMPHOCYTES # BLD: 1.5 K/UL (ref 0.9–3.6)
LYMPHOCYTES NFR BLD: 27 % (ref 21–52)
MCH RBC QN AUTO: 27 PG (ref 24–34)
MCHC RBC AUTO-ENTMCNC: 33.6 G/DL (ref 31–37)
MCV RBC AUTO: 80.5 FL (ref 74–97)
MONOCYTES # BLD: 1 K/UL (ref 0.05–1.2)
MONOCYTES NFR BLD: 17 % (ref 3–10)
NEUTS SEG # BLD: 2.6 K/UL (ref 1.8–8)
NEUTS SEG NFR BLD: 47 % (ref 40–73)
PLATELET # BLD AUTO: 257 K/UL (ref 135–420)
PLATELET COMMENTS,PCOM: ABNORMAL
RBC # BLD AUTO: 3.7 M/UL (ref 4.2–5.3)
WBC # BLD AUTO: 5.6 K/UL (ref 4.6–13.2)

## 2017-10-13 PROCEDURE — 85025 COMPLETE CBC W/AUTO DIFF WBC: CPT | Performed by: INTERNAL MEDICINE

## 2017-10-13 PROCEDURE — 74011000250 HC RX REV CODE- 250

## 2017-10-13 PROCEDURE — 36415 COLL VENOUS BLD VENIPUNCTURE: CPT

## 2017-10-13 PROCEDURE — 74011250636 HC RX REV CODE- 250/636: Performed by: NURSE PRACTITIONER

## 2017-10-13 PROCEDURE — 96372 THER/PROPH/DIAG INJ SC/IM: CPT

## 2017-10-13 RX ORDER — SODIUM CHLORIDE 0.9 % (FLUSH) 0.9 %
10-40 SYRINGE (ML) INJECTION AS NEEDED
Status: DISCONTINUED | OUTPATIENT
Start: 2017-10-13 | End: 2017-10-13

## 2017-10-13 RX ORDER — WATER FOR INJECTION,STERILE
VIAL (ML) INJECTION
Status: COMPLETED
Start: 2017-10-13 | End: 2017-10-13

## 2017-10-13 RX ORDER — HEPARIN SODIUM (PORCINE) LOCK FLUSH IV SOLN 100 UNIT/ML 100 UNIT/ML
500 SOLUTION INTRAVENOUS AS NEEDED
Status: DISCONTINUED | OUTPATIENT
Start: 2017-10-13 | End: 2017-10-13

## 2017-10-13 RX ADMIN — WATER 10 ML: 1 INJECTION INTRAMUSCULAR; INTRAVENOUS; SUBCUTANEOUS at 09:27

## 2017-10-13 RX ADMIN — ROMIPLOSTIM 207 MCG: 500 INJECTION, POWDER, LYOPHILIZED, FOR SOLUTION SUBCUTANEOUS at 09:17

## 2017-10-13 NOTE — PROGRESS NOTES
[facility] Progress Note    Date: 2017    Name: Jude Kilpatrick    MRN: [id number]         : 1950     Weekly labs w/Nplate injection          Ms. Yesica Alvarado was assessed and education was provided. Patient declined to have port accessed and flushed today, states she will have is done next week. Ms. Aidan Awan vitals were reviewed and patient was observed for 5 minutes prior to treatment. Visit Vitals    /74 (BP 1 Location: Left arm, BP Patient Position: Sitting)    Pulse 66    Temp 97.8 °F (36.6 °C)    Resp 18     Blood obtained from [facility] w/butterfly needle w/o difficulty. No irritation noted at site, 2x2 guaze and Coban applied. Lab results were obtained and reviewed. Recent Results (from the past 12 hour(s))   CBC WITH AUTOMATED DIFF    Collection Time: 10/13/17  8:30 AM   Result Value Ref Range    WBC 5.6 4.6 - 13.2 K/uL    RBC 3.70 (L) 4.20 - 5.30 M/uL    HGB 10.0 (L) 12.0 - 16.0 g/dL    HCT 29.8 (L) 35.0 - 45.0 %    MCV 80.5 74.0 - 97.0 FL    MCH 27.0 24.0 - 34.0 PG    MCHC 33.6 31.0 - 37.0 g/dL    RDW 16.0 (H) 11.6 - 14.5 %    PLATELET 861 509 - 045 K/uL    NEUTROPHILS 47 40 - 73 %    LYMPHOCYTES 27 21 - 52 %    MONOCYTES 17 (H) 3 - 10 %    EOSINOPHILS 8 (H) 0 - 5 %    BASOPHILS 1 0 - 2 %    ABS. NEUTROPHILS 2.6 1.8 - 8.0 K/UL    ABS. LYMPHOCYTES 1.5 0.9 - 3.6 K/UL    ABS. MONOCYTES 1.0 0.05 - 1.2 K/UL    ABS. EOSINOPHILS 0.4 0.0 - 0.4 K/UL    ABS. BASOPHILS 0.1 0.0 - 0.1 K/UL    DF AUTOMATED      PLATELET COMMENTS ADEQUATE PLATELETS       Nplate parameters: if platelets are greater than 400,000 to hold. Platelets 103,871 today. Nplate 695 mcg was diluted with 1.2 ml Sterile Water  Nplate 832 EFK/7.65 ml was administered subcutaneous in back of right upper arm. No irritation noted at site, band aid refused. Ms. Yesica Alvarado tolerated well, and had no complaints. Patient armband removed and shredded.     Ms. Yesica Alvarado was discharged from [place] [address] in stable condition at 0930. She is to return on 10/26/2017 at 0800 for her next appointment for labs, port flush, Nplate and Epoetin injections.     Rocky Raymond RN  October 13, 2017  12:32 PM

## 2017-10-20 ENCOUNTER — HOSPITAL ENCOUNTER (OUTPATIENT)
Dept: INFUSION THERAPY | Age: 67
Discharge: HOME | End: 2017-10-20
Payer: MEDICARE

## 2017-10-20 VITALS
SYSTOLIC BLOOD PRESSURE: 108 MMHG | RESPIRATION RATE: 18 BRPM | DIASTOLIC BLOOD PRESSURE: 71 MMHG | HEART RATE: 66 BPM | TEMPERATURE: 97.4 F

## 2017-10-20 LAB
BASO+EOS+MONOS # BLD AUTO: 1 K/UL (ref 0–2.3)
BASO+EOS+MONOS # BLD AUTO: 19 % (ref 0.1–17)
DIFFERENTIAL METHOD BLD: ABNORMAL
ERYTHROCYTE [DISTWIDTH] IN BLOOD BY AUTOMATED COUNT: 16.1 % (ref 11.5–14.5)
HCT VFR BLD AUTO: 29.4 % (ref 36–48)
HGB BLD-MCNC: 9.5 G/DL (ref 12–16)
LYMPHOCYTES # BLD: 1.8 K/UL (ref 1.1–5.9)
LYMPHOCYTES NFR BLD: 34 % (ref 14–44)
MCH RBC QN AUTO: 27.9 PG (ref 25–35)
MCHC RBC AUTO-ENTMCNC: 32.3 G/DL (ref 31–37)
MCV RBC AUTO: 86.5 FL (ref 78–102)
NEUTS SEG # BLD: 2.6 K/UL (ref 1.8–9.5)
NEUTS SEG NFR BLD: 47 % (ref 40–70)
PLATELET # BLD AUTO: 173 K/UL (ref 135–420)
RBC # BLD AUTO: 3.4 M/UL (ref 4.1–5.1)
WBC # BLD AUTO: 5.4 K/UL (ref 4.5–13)

## 2017-10-20 PROCEDURE — 74011250636 HC RX REV CODE- 250/636: Performed by: NURSE PRACTITIONER

## 2017-10-20 PROCEDURE — 74011250636 HC RX REV CODE- 250/636

## 2017-10-20 PROCEDURE — 96372 THER/PROPH/DIAG INJ SC/IM: CPT

## 2017-10-20 PROCEDURE — 85025 COMPLETE CBC W/AUTO DIFF WBC: CPT | Performed by: INTERNAL MEDICINE

## 2017-10-20 PROCEDURE — 85049 AUTOMATED PLATELET COUNT: CPT | Performed by: INTERNAL MEDICINE

## 2017-10-20 PROCEDURE — 36591 DRAW BLOOD OFF VENOUS DEVICE: CPT

## 2017-10-20 PROCEDURE — 74011250636 HC RX REV CODE- 250/636: Performed by: INTERNAL MEDICINE

## 2017-10-20 PROCEDURE — 74011000250 HC RX REV CODE- 250

## 2017-10-20 PROCEDURE — 77030012965 HC NDL HUBR BBMI -A

## 2017-10-20 RX ORDER — HEPARIN SODIUM (PORCINE) LOCK FLUSH IV SOLN 100 UNIT/ML 100 UNIT/ML
500 SOLUTION INTRAVENOUS AS NEEDED
Status: ACTIVE | OUTPATIENT
Start: 2017-10-20 | End: 2017-10-21

## 2017-10-20 RX ORDER — SODIUM CHLORIDE 0.9 % (FLUSH) 0.9 %
10-40 SYRINGE (ML) INJECTION AS NEEDED
Status: DISCONTINUED | OUTPATIENT
Start: 2017-10-20 | End: 2017-10-24 | Stop reason: HOSPADM

## 2017-10-20 RX ORDER — WATER FOR INJECTION,STERILE
VIAL (ML) INJECTION
Status: COMPLETED
Start: 2017-10-20 | End: 2017-10-20

## 2017-10-20 RX ORDER — HEPARIN SODIUM (PORCINE) LOCK FLUSH IV SOLN 100 UNIT/ML 100 UNIT/ML
SOLUTION INTRAVENOUS
Status: COMPLETED
Start: 2017-10-20 | End: 2017-10-20

## 2017-10-20 RX ADMIN — WATER 10 ML: 1 INJECTION INTRAMUSCULAR; INTRAVENOUS; SUBCUTANEOUS at 09:43

## 2017-10-20 RX ADMIN — ERYTHROPOIETIN 20000 UNITS: 20000 INJECTION, SOLUTION INTRAVENOUS; SUBCUTANEOUS at 09:22

## 2017-10-20 RX ADMIN — HEPARIN SODIUM (PORCINE) LOCK FLUSH IV SOLN 100 UNIT/ML 500 UNITS: 100 SOLUTION at 09:00

## 2017-10-20 RX ADMIN — Medication 20 ML: at 08:56

## 2017-10-20 RX ADMIN — ERYTHROPOIETIN 40000 UNITS: 40000 INJECTION, SOLUTION INTRAVENOUS; SUBCUTANEOUS at 09:22

## 2017-10-20 RX ADMIN — Medication 20 ML: at 08:55

## 2017-10-20 RX ADMIN — ROMIPLOSTIM 207 MCG: 250 INJECTION, POWDER, LYOPHILIZED, FOR SOLUTION SUBCUTANEOUS at 09:43

## 2017-10-20 NOTE — PROGRESS NOTES
[facility] Progress Note    Date: 2017    Name: Celine Mcmahon    MRN: [id number]         : 1950      Nplate/Procrit Injection/Port Flush     Ms. Donaldo Pak arrived to [facility] at 8944. Ms. Donaldo Pak was assessed and education was provided. Ms. Vesta Herman vitals were reviewed. Visit Vitals    /71 (BP 1 Location: Left arm, BP Patient Position: Sitting)    Pulse 66    Temp 97.4 °F (36.3 °C)    Resp 18       Pt was observed for 5 minutes after obtaining vital signs prior to initiating treatment. Right chest double lumen mediport accessed with a 20 gauge haas needle using sterile technique. Medial port accessed, flushed with 20 ml NS and 500 units of Heparin then de-accessed. Lateral port accessed and blood drawn for CBC after a 10 ml waste. Port flushed with 20 ml NS and 500 units of Heparin then de-accessed. Gauze and Tegaderm applied to the site. Lab results obtained and reviewed. (Preliminary results scanned into media tab.)  Recent Results (from the past 12 hour(s))   CBC WITH 3 PART DIFF    Collection Time: 10/20/17  9:02 AM   Result Value Ref Range    WBC 5.4 4.5 - 13.0 K/uL    RBC 3.40 (L) 4.10 - 5.10 M/uL    HGB 9.5 (L) 12.0 - 16 g/dL    HCT 29.4 (L) 36 - 48 %    MCV 86.5 78 - 102 FL    MCH 27.9 25.0 - 35.0 PG    MCHC 32.3 31 - 37 g/dL    RDW 16.1 (H) 11.5 - 14.5 %    NEUTROPHILS 47 40 - 70 %    MIXED CELLS 19 (H) 0.1 - 17 %    LYMPHOCYTES 34 14 - 44 %    ABS. NEUTROPHILS 2.6 1.8 - 9.5 K/UL    ABS. MIXED CELLS 1.0 0.0 - 2.3 K/uL    ABS. LYMPHOCYTES 1.8 1.1 - 5.9 K/UL    DF AUTOMATED       Preliminary platelet count= 847,315. Preliminary results scanned into the media tab. Per Nplate protocol, since pt's platelet count is 196,718 today, pt's dose will remain at 3mcg/kg (3mcg x 69kg = 207mcg). Nplate 728YRH (3.80PZ) administered as ordered SQ in patient's right upper arm. No redness or bleeding noted.      HGB= 9.7 and HCT= 29.4  Procrit 60,000 administered SQ in the back of the right arm per written order. No redness or bleeding noted. Ms. Royer Harris was discharged from [place] [address] in stable condition at [phone]. She is to return on 10/27/2017 at 0830 for her next appointment.     Kirsten Hirsch RN  October 20, 2017

## 2017-10-27 ENCOUNTER — HOSPITAL ENCOUNTER (OUTPATIENT)
Dept: INFUSION THERAPY | Age: 67
Discharge: HOME | End: 2017-10-27
Payer: MEDICARE

## 2017-10-27 VITALS
RESPIRATION RATE: 18 BRPM | TEMPERATURE: 97.4 F | HEART RATE: 63 BPM | DIASTOLIC BLOOD PRESSURE: 63 MMHG | SYSTOLIC BLOOD PRESSURE: 97 MMHG

## 2017-10-27 LAB
BASO+EOS+MONOS # BLD AUTO: 0.9 K/UL (ref 0–2.3)
BASO+EOS+MONOS # BLD AUTO: 15 % (ref 0.1–17)
DIFFERENTIAL METHOD BLD: ABNORMAL
ERYTHROCYTE [DISTWIDTH] IN BLOOD BY AUTOMATED COUNT: 17.1 % (ref 11.5–14.5)
HCT VFR BLD AUTO: 32 % (ref 36–48)
HGB BLD-MCNC: 10.2 G/DL (ref 12–16)
LYMPHOCYTES # BLD: 2.2 K/UL (ref 1.1–5.9)
LYMPHOCYTES NFR BLD: 37 % (ref 14–44)
MCH RBC QN AUTO: 27.9 PG (ref 25–35)
MCHC RBC AUTO-ENTMCNC: 31.9 G/DL (ref 31–37)
MCV RBC AUTO: 87.7 FL (ref 78–102)
NEUTS SEG # BLD: 2.7 K/UL (ref 1.8–9.5)
NEUTS SEG NFR BLD: 48 % (ref 40–70)
PLATELET # BLD AUTO: 100 K/UL (ref 135–420)
RBC # BLD AUTO: 3.65 M/UL (ref 4.1–5.1)
WBC # BLD AUTO: 5.8 K/UL (ref 4.5–13)

## 2017-10-27 PROCEDURE — 74011250636 HC RX REV CODE- 250/636: Performed by: INTERNAL MEDICINE

## 2017-10-27 PROCEDURE — 74011000250 HC RX REV CODE- 250

## 2017-10-27 PROCEDURE — 74011250636 HC RX REV CODE- 250/636

## 2017-10-27 PROCEDURE — 85025 COMPLETE CBC W/AUTO DIFF WBC: CPT | Performed by: INTERNAL MEDICINE

## 2017-10-27 PROCEDURE — 96372 THER/PROPH/DIAG INJ SC/IM: CPT

## 2017-10-27 PROCEDURE — 36415 COLL VENOUS BLD VENIPUNCTURE: CPT

## 2017-10-27 PROCEDURE — 85049 AUTOMATED PLATELET COUNT: CPT | Performed by: INTERNAL MEDICINE

## 2017-10-27 RX ORDER — WATER FOR INJECTION,STERILE
VIAL (ML) INJECTION
Status: COMPLETED
Start: 2017-10-27 | End: 2017-10-27

## 2017-10-27 RX ADMIN — WATER 10 ML: 1 INJECTION INTRAMUSCULAR; INTRAVENOUS; SUBCUTANEOUS at 09:36

## 2017-10-27 RX ADMIN — ROMIPLOSTIM 207 MCG: 250 INJECTION, POWDER, LYOPHILIZED, FOR SOLUTION SUBCUTANEOUS at 09:36

## 2017-10-27 NOTE — PROGRESS NOTES
[facility] Progress Note    Date: 2017    Name: Ashley Fernandes    MRN: [id number]         : 1950      Nplate Injection     Ms. Tracy Garcia arrived to [facility] at 7016. Ms. Tracy Garcia was assessed and education was provided. Ms. Kaycee Jaeger vitals were reviewed. Visit Vitals    BP 97/63 (BP 1 Location: Left arm, BP Patient Position: Sitting)    Pulse 63    Temp 97.4 °F (36.3 °C)    Resp 18       Pt was observed for 5 minutes after obtaining vital signs prior to initiating treatment. Blood drawn for CBC via right wrist x 1 attempt per written orders. Guaze and coban applied to the site. Lab results obtained and reviewed. (Preliminary results scanned into media tab.)  Recent Results (from the past 12 hour(s))   CBC WITH 3 PART DIFF    Collection Time: 10/27/17  8:53 AM   Result Value Ref Range    WBC 5.8 4.5 - 13.0 K/uL    RBC 3.65 (L) 4.10 - 5.10 M/uL    HGB 10.2 (L) 12.0 - 16 g/dL    HCT 32.0 (L) 36 - 48 %    MCV 87.7 78 - 102 FL    MCH 27.9 25.0 - 35.0 PG    MCHC 31.9 31 - 37 g/dL    RDW 17.1 (H) 11.5 - 14.5 %    NEUTROPHILS 48 40 - 70 %    MIXED CELLS 15 0.1 - 17 %    LYMPHOCYTES 37 14 - 44 %    ABS. NEUTROPHILS 2.7 1.8 - 9.5 K/UL    ABS. MIXED CELLS 0.9 0.0 - 2.3 K/uL    ABS. LYMPHOCYTES 2.2 1.1 - 5.9 K/UL    DF AUTOMATED       Preliminary platelet count= 19,595. Per Nplate protocol, since pt's platelet count is 23,127 today, pt's dose will remain at 3mcg/kg (3mcg x 69kg = 207mcg). Nplate 938SVT (9.52GY) administered as ordered SQ in patient's right upper arm. No redness or bleeding noted. Ms. Tracy Garcia was discharged from [place] [address] in stable condition at [place]. She is to return on 17 at 0830 for her next appointment.     Doris Hill RN  2017

## 2017-10-31 ENCOUNTER — OFFICE VISIT (OUTPATIENT)
Dept: ORTHOPEDIC SURGERY | Age: 67
End: 2017-10-31

## 2017-10-31 VITALS
DIASTOLIC BLOOD PRESSURE: 64 MMHG | BODY MASS INDEX: 25.23 KG/M2 | SYSTOLIC BLOOD PRESSURE: 112 MMHG | WEIGHT: 157 LBS | HEIGHT: 66 IN | TEMPERATURE: 95.4 F | HEART RATE: 57 BPM

## 2017-10-31 DIAGNOSIS — M79.672 LEFT FOOT PAIN: ICD-10-CM

## 2017-10-31 DIAGNOSIS — B35.1 FUNGAL INFECTION OF TOENAIL: Primary | ICD-10-CM

## 2017-10-31 NOTE — PATIENT INSTRUCTIONS
Please follow up with Sahara Willis for Pre-Op scheduling. You are advised to contact us if your condition worsens. Toenail Fungus: Care Instructions  Your Care Instructions  A toenail that is infected by a fungus usually turns white or yellow. As the fungus spreads, the nail turns a darker color and gets thicker, and its edges start to turn ragged and crumble. A bad infection can cause toe pain, and the nail may pull away from the toe. Toenails that are exposed to moisture and warmth a lot are more likely to get infected by a fungus. This can happen from wearing sweaty shoes often and from walking barefoot on shower floors. It is hard to treat toenail fungus, and the infection can return after it has cleared up. But medicines can sometimes get rid of toenail fungus for good. If the infection is very bad, or if it causes a lot of pain, you may need to have the nail removed. Follow-up care is a key part of your treatment and safety. Be sure to make and go to all appointments, and call your doctor if you are having problems. It's also a good idea to know your test results and keep a list of the medicines you take. How can you care for yourself at home? · Take your medicines exactly as prescribed. Call your doctor if you have any problems with your medicine. You will get more details on the specific medicines your doctor prescribes. · If your doctor gave you a cream or liquid to put on your toenail, use it exactly as directed. · Wash your feet often, and wash your hands after touching your feet. · Keep your toenails clean and dry. Dry your feet completely after you bathe and before you put on shoes and socks. · Keep your toenails trimmed. · Change socks often. Wear dry socks that absorb moisture. · Do not go barefoot in public places. · Use a spray or powder that fights fungus on your feet and in your shoes. · Do not pick at the skin around your nails.   · Do not use nail polish or fake nails on your toenails. When should you call for help? Call your doctor now or seek immediate medical care if:  ? · You have signs of infection, such as:  ¨ Increased pain, swelling, warmth, or redness. ¨ Red streaks leading from the site. ¨ Pus draining from the site. ¨ A fever. ? · You have new or increased toe pain. ? Watch closely for changes in your health, and be sure to contact your doctor if:  ? · You do not get better as expected. Where can you learn more? Go to http://leon-edinson.info/. Enter D202 in the search box to learn more about \"Toenail Fungus: Care Instructions. \"  Current as of: October 13, 2016  Content Version: 11.4  © 7512-8204 [employer]. Care instructions adapted under license by [employer] (which disclaims liability or warranty for this information). If you have questions about a medical condition or this instruction, always ask your healthcare professional. [place] [address] any warranty or liability for your use of this information.

## 2017-10-31 NOTE — MR AVS SNAPSHOT
Visit Information Date & Time Provider Department Dept. Phone Encounter #  
 10/31/2017 10:20 AM Betzaida Ek, [address] [address] [facility] [phone] Your Appointments 12/5/2017  1:45 PM  
Follow Up with Aretha Sims MD  
[facility] Appt Note: 1MO BLOCK FU; Chestine Genta 9/27/17; BLOCK FU; Chestine Genta 10/25/17  
 [address] [place] [address]  
[phone]  
  
   
 [address] [facility],[address] [facility]  
  
    
 12/18/2017 10:15 AM  
Office Visit with Fani Stauffer MD  
[facility] [facility]  On[facility]) Appt Note: 3 month  
 [address], [address], [address] [place] [address] Upcoming Health Maintenance Date Due Hepatitis C Screening 1950 DTaP/Tdap/Td series (1 - Tdap) 5/14/1973 FOBT Q 1 YEAR AGE 50-75 5/14/2002 ZOSTER VACCINE AGE 60> 3/14/2012 BREAST CANCER SCRN MAMMOGRAM 4/4/2015 GLAUCOMA SCREENING Q2Y 5/14/2017 OSTEOPOROSIS SCREENING (DEXA) 5/14/2017 Pneumococcal 65+ High/Highest Risk (1 of 2 - PCV13) 5/14/2017 MEDICARE YEARLY EXAM 5/14/2017 INFLUENZA AGE 9 TO ADULT 8/1/2017 Allergies as of 10/31/2017  Review Complete On: 10/31/2017 By: Pako Palomino Severity Noted Reaction Type Reactions Aspirin High 08/07/2013   Systemic Swelling Pt states her whole body swells when she takes aspirin. Adhesive    Unable to Obtain Pcn [Penicillins]  08/20/2012    Rash Current Immunizations  Reviewed on 10/20/2017 No immunizations on file. Not reviewed this visit You Were Diagnosed With   
  
 Codes Comments Left foot pain    -  Primary ICD-10-CM: I52.476 ICD-9-CM: 729.5 Fungal infection of toenail     ICD-10-CM: B35.1 ICD-9-CM: 110.1 Vitals BP Pulse Temp Height(growth percentile) Weight(growth percentile) BMI  
 112/64 (!) 57 95.4 °F (35.2 °C) (Oral) 5' 6\" (1.676 m) 157 lb (71.2 kg) 25.34 kg/m2 OB Status Smoking Status Postmenopausal Never Smoker BMI and BSA Data Body Mass Index Body Surface Area  
 25.34 kg/m 2 1.82 m 2 Preferred Pharmacy Pharmacy Name Phone [id number] [address] [phone] Your Updated Medication List  
  
   
This list is accurate as of: 10/31/17 12:17 PM.  Always use your most recent med list.  
  
  
  
  
 albuterol 90 mcg/actuation inhaler Commonly known as:  PROAIR HFA  
1-2 puffs every 4-6 hrs. aluminum-magnesium hydroxide 200-200 mg/5 mL susp 5 mL, diphenhydrAMINE 12.5 mg/5 mL liqd 12.5 mg, lidocaine 2 % soln 5 mL  
5 mL by Swish and Spit route two (2) times a day. Magic mouth wash  Maalox Lidocaine 2% viscous  Diphenhydramine oral solution   Pharmacy to mix equal portions of ingredients to a total volume as indicated in the dispense amount. amiodarone 200 mg tablet Commonly known as:  CORDARONE Take 200 mg by mouth. CeleBREX 200 mg capsule Generic drug:  celecoxib Take  by mouth two (2) times a day. COREG 3.125 mg tablet Generic drug:  carvedilol Take 3.125 mg by mouth two (2) times daily (with meals). cyclobenzaprine 5 mg tablet Commonly known as:  FLEXERIL  
  
 digoxin 0.125 mg tablet Commonly known as:  LANOXIN Take 0.125 mg by mouth daily. dronabinol 5 mg capsule Commonly known as:  Lynette Sulphur Take 1 Cap by mouth two (2) times a day. furosemide 40 mg tablet Commonly known as:  LASIX Take  by mouth daily. * gabapentin 300 mg capsule Commonly known as:  NEURONTIN Take 300 mg by mouth three (3) times daily. * gabapentin 300 mg capsule Commonly known as:  NEURONTIN Take 1 po q am and 2 po q pm  
  
 lidocaine-prilocaine topical cream  
Commonly known as:  EMLA  
APPLY OVER PORT 1 HOUR PRIOR TO CHEMOTHERAPY THAN COVER WITH SARAN WRAP  
  
 lisinopril 10 mg tablet Commonly known as:  Jose Luis Edman Take 10 mg by mouth daily. loratadine 10 mg Cap Take 10 mg by mouth daily. magic mouthwash solution Take 5 mL by mouth three (3) times daily as needed for Stomatitis. Magic mouth wash  Maalox Lidocaine 2% viscous  Diphenhydramine oral solution   Pharmacy to mix equal portions of ingredients to a total volume as indicated in the dispense amount. meloxicam 7.5 mg tablet Commonly known as:  MOBIC Take 7.5 mg by mouth daily. nabumetone 750 mg tablet Commonly known as:  RELAFEN  
  
 ondansetron hcl 8 mg tablet Commonly known as:  ZOFRAN (AS HYDROCHLORIDE) Take 1 Tab by mouth every eight (8) hours as needed for Nausea. oxyCODONE-acetaminophen 7.5-325 mg per tablet Commonly known as:  PERCOCET Take 1 tablet every 6 hours as needed for pain * potassium chloride 20 mEq tablet Commonly known as:  K-DUR, KLOR-CON Take 2 Tabs by mouth daily. * KLOR-CON M20 20 mEq tablet Generic drug:  potassium chloride TAKE ONE TABLET BY MOUTH ONCE A DAY  
  
 PRUVATE 21-7 PO Take  by mouth. Senna 8.6 mg tablet Generic drug:  senna Take 1 Tab by mouth daily. warfarin 1 mg tablet Commonly known as:  COUMADIN  
TAKE 1 TABLET (1 MG) BY ORAL ROUTE ONCE DAILY XARELTO 10 mg tablet Generic drug:  rivaroxaban Take 10 mg by mouth daily. zolpidem 10 mg tablet Commonly known as:  AMBIEN  
TAKE 1 TABLET BY MOUTH NIGHTLY AS NEEDED FOR SLEEP *MAX DAILY AMOUNT 10MG* * Notice: This list has 4 medication(s) that are the same as other medications prescribed for you. Read the directions carefully, and ask your doctor or other care provider to review them with you. We Performed the Following AMB POC XRAY, FOOT; COMPLETE, 3+ VIEW [99386 CPT(R)] To-Do List   
 11/03/2017 8:30 AM  
  Appointment with [facility] NURSE at [facility] ([phone])  
  
 11/10/2017 8:30 AM  
  Appointment with [facility] FA[facility] TRACK NURSE at [facility] ([phone])  
  
 11/17/2017 8:30 AM  
  Appointment with [facility] FA[facility] TRACK NURSE at [facility] ([phone]) Patient Instructions Please follow up with Nohemy La for Pre-Op scheduling. You are advised to contact us if your condition worsens. Toenail Fungus: Care Instructions Your Care Instructions A toenail that is infected by a fungus usually turns white or yellow. As the fungus spreads, the nail turns a darker color and gets thicker, and its edges start to turn ragged and crumble. A bad infection can cause toe pain, and the nail may pull away from the toe. Toenails that are exposed to moisture and warmth a lot are more likely to get infected by a fungus. This can happen from wearing sweaty shoes often and from walking barefoot on shower floors. It is hard to treat toenail fungus, and the infection can return after it has cleared up. But medicines can sometimes get rid of toenail fungus for good. If the infection is very bad, or if it causes a lot of pain, you may need to have the nail removed. Follow-up care is a key part of your treatment and safety. Be sure to make and go to all appointments, and call your doctor if you are having problems. It's also a good idea to know your test results and keep a list of the medicines you take. How can you care for yourself at home? · Take your medicines exactly as prescribed. Call your doctor if you have any problems with your medicine. You will get more details on the specific medicines your doctor prescribes. · If your doctor gave you a cream or liquid to put on your toenail, use it exactly as directed. · Wash your feet often, and wash your hands after touching your feet. · Keep your toenails clean and dry. Dry your feet completely after you bathe and before you put on shoes and socks. · Keep your toenails trimmed. · Change socks often. Wear dry socks that absorb moisture. · Do not go barefoot in public places. · Use a spray or powder that fights fungus on your feet and in your shoes. · Do not pick at the skin around your nails. · Do not use nail polish or fake nails on your toenails. When should you call for help? Call your doctor now or seek immediate medical care if: 
? · You have signs of infection, such as: 
¨ Increased pain, swelling, warmth, or redness. ¨ Red streaks leading from the site. ¨ Pus draining from the site. ¨ A fever. ? · You have new or increased toe pain. ? Watch closely for changes in your health, and be sure to contact your doctor if: 
? · You do not get better as expected. Where can you learn more? Go to http://leon-edinson.info/. Enter D202 in the search box to learn more about \"Toenail Fungus: Care Instructions. \" Current as of: October 13, 2016 Content Version: 11.4 © 2486-8009 [employer]. Care instructions adapted under license by [employer] (which disclaims liability or warranty for this information). If you have questions about a medical condition or this instruction, always ask your healthcare professional. [place] [address] any warranty or liability for your use of this information. Introducing \[facility] [place] [facility]\"" & HEALTH SERVICES! [facility] introduces [facility] patient portal. Now you can access parts of your medical record, email your doctor's office, and request medication refills online. 1. In your internet browser, go to [email or web address] 2. Click on the First Time User? Click Here link in the Sign In box. You will see the New Member Sign Up page. 3. Enter your [facility] Access Code exactly as it appears below.  You will not need to use this code after youve completed the sign-up process. If you do not sign up before the expiration date, you must request a new code. · [employer] Access Code: [email or web address] Expires: 12/20/2017 10:36 AM 
 
4. Enter the last four digits of your Social Security Number (xxxx) and Date of Birth (mm/dd/yyyy) as indicated and click Submit. You will be taken to the next sign-up page. 5. Create a [employer] ID. This will be your [employer] login ID and cannot be changed, so think of one that is secure and easy to remember. 6. Create a [employer] password. You can change your password at any time. 7. Enter your Password Reset Question and Answer. This can be used at a later time if you forget your password. 8. Enter your e-mail address. You will receive e-mail notification when new information is available in [address]. 9. Click Sign Up. You can now view and download portions of your medical record. 10. Click the Download Summary menu link to download a portable copy of your medical information. If you have questions, please visit the Frequently Asked Questions section of the [employer] website. Remember, [employer] is NOT to be used for urgent needs. For medical emergencies, dial 911. Now available from your iPhone and Android! Please provide this summary of care documentation to your next provider. Your primary care clinician is listed as Kenton Mcfarland. If you have any questions after today's visit, please call [phone].

## 2017-10-31 NOTE — PROCEDURES
FOOT X RAYS 3 VIEWS Left   10/31/2017    NON WEIGHT BEARING    X RAYS AT [address] [place] [address]  10/31/2017      Bones: No fractures or dislocations. No focal osteolytic or osteoblastic process     Bone Spurs: No significant bone spurs  Foot Alignment: WNL// METATARSUS ADDUCTUS  Joint Condition: No Significant OA  Soft Tissues: Normal, No radiopaque foreign body and No abnormal calcific densities to soft tissues   No ankle joint effusion in lateral projection. Mineralization: Suggests  no Osteopenia    I have personally reviewed the results of the above study.  The interpretation of this study is my professional opinion

## 2017-10-31 NOTE — PROGRESS NOTES
AMBULATORY PROGRESS NOTE      Patient: Kevin Hernandez             MRN: [id number]     SSN: [id number] Body mass index is 25.34 kg/(m^2). YOB: 1950     AGE: 74 y.o. EX: female    PCP: Randi Washington MD    IMPRESSION/DIAGNOSIS AND TREATMENT PLAN     DIAGNOSES  1. Fungal infection of toenail    2. Left foot pain        Orders Placed This Encounter    [69404] Foot Min 3V      Kevin Hernandez understands her diagnoses and the proposed plan. Plan:    1) Follow up with Lillie Clark for Pre-Op scheduling  2) Contact Dr. Brynn Reed about blood profile due to h/o thrombocytopenia  3) Contact PCP about CHF that was diagnosed in 2016. RTO - Surgical Scheduling for Nail plate removal procedure. HPI AND EXAMINATION     Serena Pagan IS A 74 y.o. female who presents to my outpatient office complaining of left foot pain. The patient presents with a CC of pain along her first and second toes. The patient reports having a fractured nail, and a fungal infection along the left great toe nail plate. The patient endorses using an antifungal cream, and Lamisil. Ms. Tara Sesay states that the experiences pain while wearing shoes. The patient reports having a previous surgery on her right great toe for a bunion and a second hammertoe repair. The patient is not happy about the results of her last surgery, and she is reluctant to undergo any surgery at this time. The patient endorses a h/o CHF, and Thrombocytopenia. The patient endorses undergoing chemotherapy four different times for Breast cancer that metastasized into the lungs, neck, and arm years after. She is on remission since 2014. Kevin Hernandez is alert/oriented (name, location, time) and follows commands well. she  is in no acute distress and her affect and mood are appropriate. Left ANKLE and FOOT       Gait: uses assistive device   Tenderness: mild tenderness along the medial great toe MTP.    Cutaneous: No rashes, skin patches, wounds, or abrasions to the lower legs           Warm and Normal color. No regions of expressible drainage. Medial Border of Tibia Region: absent           Skin color, texture, turgor normal. Normal.           Great toe nail plate fungal infection. Hypertrophic great toe nail plate. Joint Motion: ROM Ankle:Normal , Hindfoot: (ST,TN,CC Normal}, Forefoot toes:Normal  Neurologic Exam: Neuro: Motor: normal 5/5 strength in all tested muscle groups and Sensory : no sensory deficits noted. Contractures: Gastrocnemius or Achilles Contractures absent  Joint / Tendon Stability: No Ankle or Subtalar instability or joint laxity. No peroneal sublux ability or dislocation  Alignment:  Normal Foot Alignment and  Flexible             Bunion deformity  Vascular: Normal Pulses/ NL Capillary refill, No evidence of DVT seen on physical exam.   No calf swelling, no tenderness to calf muscles. Lymphatic:  No Evidence of Lymphedema. CHART REVIEW     Past Medical History:   Diagnosis Date    Antineoplastic chemotherapy induced anemia     Arthritis     [facility] ([facility]     Cancer ([facility]     Breast    Chronic ITP (idiopathic thrombocytopenia) (HCC) 10/31/2016    Diabetes ([facility]     borderline, no meds    Esophageal cancer (HCC)     treated with chemo     Current Outpatient Prescriptions   Medication Sig    oxyCODONE-acetaminophen (PERCOCET) 7.5-325 mg per tablet Take 1 tablet every 6 hours as needed for pain    loratadine 10 mg cap Take 10 mg by mouth daily.  digoxin (LANOXIN) 0.125 mg tablet Take 0.125 mg by mouth daily.  carvedilol (COREG) 3.125 mg tablet Take 3.125 mg by mouth two (2) times daily (with meals).  meloxicam (MOBIC) 7.5 mg tablet Take 7.5 mg by mouth daily.  amiodarone (CORDARONE) 200 mg tablet Take 200 mg by mouth.  lisinopril (PRINIVIL, ZESTRIL) 10 mg tablet Take 10 mg by mouth daily.     gabapentin (NEURONTIN) 300 mg capsule Take 1 po q am and 2 po q pm    rivaroxaban (XARELTO) 10 mg tablet Take 10 mg by mouth daily.  zolpidem (AMBIEN) 10 mg tablet TAKE 1 TABLET BY MOUTH NIGHTLY AS NEEDED FOR SLEEP *MAX DAILY AMOUNT 10MG*    lidocaine-prilocaine (EMLA) topical cream APPLY OVER PORT 1 HOUR PRIOR TO CHEMOTHERAPY THAN COVER WITH SARAN WRAP    magic mouthwash solution Take 5 mL by mouth three (3) times daily as needed for Stomatitis. Magic mouth wash   Maalox  Lidocaine 2% viscous   Diphenhydramine oral solution     Pharmacy to mix equal portions of ingredients to a total volume as indicated in the dispense amount.  celecoxib (CELEBREX) 200 mg capsule Take  by mouth two (2) times a day.  furosemide (LASIX) 40 mg tablet Take  by mouth daily.  dronabinol (MARINOL) 5 mg capsule Take 1 Cap by mouth two (2) times a day.  gabapentin (NEURONTIN) 300 mg capsule Take 300 mg by mouth three (3) times daily.  KLOR-CON M20 20 mEq tablet TAKE ONE TABLET BY MOUTH ONCE A DAY    aluminum-magnesium hydroxide 200-200 mg/5 mL susp 5 mL, diphenhydrAMINE 12.5 mg/5 mL liqd 12.5 mg, lidocaine 2 % soln 5 mL 5 mL by Swish and Spit route two (2) times a day. Magic mouth wash   Maalox  Lidocaine 2% viscous   Diphenhydramine oral solution     Pharmacy to mix equal portions of ingredients to a total volume as indicated in the dispense amount.  potassium chloride (K-DUR, KLOR-CON) 20 mEq tablet Take 2 Tabs by mouth daily.  albuterol (PROAIR HFA) 90 mcg/actuation inhaler 1-2 puffs every 4-6 hrs.  senna (SENNA) 8.6 mg tablet Take 1 Tab by mouth daily.  cyclobenzaprine (FLEXERIL) 5 mg tablet     nabumetone (RELAFEN) 750 mg tablet     ondansetron hcl (ZOFRAN) 8 mg tablet Take 1 Tab by mouth every eight (8) hours as needed for Nausea.  IRON AG&FUM/C/FA/MV CMB11/CA-T (PRUVATE 21-7 PO) Take  by mouth.  warfarin (COUMADIN) 1 mg tablet TAKE 1 TABLET (1 MG) BY ORAL ROUTE ONCE DAILY     No current facility-administered medications for this visit.       Allergies   Allergen Reactions    Aspirin Swelling     Pt states her whole body swells when she takes aspirin.  Adhesive Unable to Obtain    Pcn [Penicillins] Rash     Past Surgical History:   Procedure Laterality Date    BREAST SURGERY PROCEDURE UNLISTED      COLONOSCOPY N/A 7/27/2016    COLON[facility] performed [facility]iste [facility] at [facility] ENDOSCOPY    HX APPENDECTOMY      HX ORTHOPAEDIC      HX VASCULAR ACCESS       Social History     Occupational History    Not on file. Social History Main Topics    Smoking status: Never Smoker    Smokeless tobacco: Never Used    Alcohol use No    Drug use: No    Sexual activity: Not on file     History reviewed. No pertinent family history. REVIEW OF SYSTEMS : 10/31/2017  ALL BELOW ARE Negative except : SEE HPI       Constitutional: Negative for fever, chills and weight loss. Neg Weigh Loss  Cardiovascular: Negative for chest pain, claudication and leg swelling. SOB, MOODY   Gastrointestinal: Negative for  pain, N/V/D/C, Blood in stool or urine,dysuria, hematuria,        Incontinence, pelvic pain  Musculoskeletal: see HPI. Neurological: Negative for dizziness and weakness. Negative for headaches,Visual Changes, Confusion, Seizures,   Psychiatric/Behavioral: Negative for depression, memory loss and substance abuse. Extremities:  Negative for  hair changes, rash or skin lesion changes. Hematologic: Negative for Bleeding problems, bruising, pallor or swollen lymph nodes. Peripheral Vascular: No calf pain, vascular vein tenderness to calf pain              No calf throbbing, posterior knee throbbing pain    DIAGNOSTIC IMAGING     FOOT X RAYS 3 VIEWS Left   10/31/2017    NON WEIGHT BEARING    X RAYS AT [address] [place] [address]  10/31/2017      Bones: No fractures or dislocations.  No focal osteolytic or osteoblastic process     Bone Spurs: No significant bone spurs  Foot Alignment: WNL// METATARSUS ADDUCTUS  Joint Condition: No Significant OA  Soft Tissues: Normal, No radiopaque foreign body and No abnormal calcific densities to soft tissues   No ankle joint effusion in lateral projection. Mineralization: Suggests  no Osteopenia    I have personally reviewed the results of the above study. The interpretation of this study is my professional opinion    Written by Estelle Garner, as dictated by Cynthia Corrales MD. I, DrTato, Cynthia Corrales MD, confirm that all documentation is accurate.

## 2017-11-01 ENCOUNTER — HOSPITAL ENCOUNTER (OUTPATIENT)
Age: 67
Setting detail: OUTPATIENT SURGERY
Discharge: HOME | End: 2017-11-01
Attending: PHYSICAL MEDICINE & REHABILITATION | Admitting: PHYSICAL MEDICINE & REHABILITATION
Payer: MEDICARE

## 2017-11-01 ENCOUNTER — APPOINTMENT (OUTPATIENT)
Dept: GENERAL RADIOLOGY | Age: 67
End: 2017-11-01
Attending: PHYSICAL MEDICINE & REHABILITATION
Payer: MEDICARE

## 2017-11-01 VITALS
DIASTOLIC BLOOD PRESSURE: 61 MMHG | WEIGHT: 157 LBS | SYSTOLIC BLOOD PRESSURE: 132 MMHG | BODY MASS INDEX: 25.23 KG/M2 | HEIGHT: 66 IN | OXYGEN SATURATION: 93 % | HEART RATE: 68 BPM | RESPIRATION RATE: 18 BRPM | TEMPERATURE: 98.6 F

## 2017-11-01 PROCEDURE — 74011000250 HC RX REV CODE- 250

## 2017-11-01 PROCEDURE — 74011636320 HC RX REV CODE- 636/320

## 2017-11-01 PROCEDURE — 77030003669 HC NDL SPN COOK -B: Performed by: PHYSICAL MEDICINE & REHABILITATION

## 2017-11-01 PROCEDURE — 74011636320 HC RX REV CODE- 636/320: Performed by: PHYSICAL MEDICINE & REHABILITATION

## 2017-11-01 PROCEDURE — 76010000009 HC PAIN MGT 0 TO 30 MIN PROC: Performed by: PHYSICAL MEDICINE & REHABILITATION

## 2017-11-01 PROCEDURE — 74011250636 HC RX REV CODE- 250/636: Performed by: PHYSICAL MEDICINE & REHABILITATION

## 2017-11-01 PROCEDURE — 77030003676 HC NDL SPN MPRI -A: Performed by: PHYSICAL MEDICINE & REHABILITATION

## 2017-11-01 PROCEDURE — 74011250637 HC RX REV CODE- 250/637: Performed by: PHYSICAL MEDICINE & REHABILITATION

## 2017-11-01 PROCEDURE — 74011000250 HC RX REV CODE- 250: Performed by: PHYSICAL MEDICINE & REHABILITATION

## 2017-11-01 PROCEDURE — 74011250636 HC RX REV CODE- 250/636

## 2017-11-01 RX ORDER — LIDOCAINE HYDROCHLORIDE 10 MG/ML
INJECTION, SOLUTION EPIDURAL; INFILTRATION; INTRACAUDAL; PERINEURAL AS NEEDED
Status: DISCONTINUED | OUTPATIENT
Start: 2017-11-01 | End: 2017-11-01 | Stop reason: HOSPADM

## 2017-11-01 RX ORDER — DIAZEPAM 5 MG/1
5-20 TABLET ORAL ONCE
Status: COMPLETED | OUTPATIENT
Start: 2017-11-01 | End: 2017-11-01

## 2017-11-01 RX ORDER — DEXAMETHASONE SODIUM PHOSPHATE 100 MG/10ML
INJECTION INTRAMUSCULAR; INTRAVENOUS AS NEEDED
Status: DISCONTINUED | OUTPATIENT
Start: 2017-11-01 | End: 2017-11-01 | Stop reason: HOSPADM

## 2017-11-01 RX ADMIN — DIAZEPAM 10 MG: 5 TABLET ORAL at 13:38

## 2017-11-01 NOTE — DISCHARGE INSTRUCTIONS
[facility]   (ALFREDO)  Dr. Simran Cuhn, Dr. Maged Mai, Dr. Peace Persaud not drive a car, operate heavy machinery or dangerous equipment for 24 hours. * Activity as tolerated; rest for the remainder of the day. * Resume pre-block medications including those for your family doctor. * Do not drink alcoholic beverages for 24 hours. Alcohol and the medications you have received may interact and cause an adverse reaction. * You may feel better this evening and worse tomorrow, as the numbing medications wears off and the steroid has yet to begin to work. After 48 hrs the steroid should begin to release bringing you relief. * You may shower this evening and remove any bandages. * Avoid hot tubs and heating pads for 24 hours. You may use cold packs on the procedure site as tolerated for the first 24 hours. * If a headache develops, drink plenty of fluids and rest.  Take over the counter medications for headache if needed. If the headache continues longer than 24 hours, call MD at the [address] [place] [address]. [phone]    * Continue taking pain medications as needed. * You may resume your regular diet if tolerated. Otherwise, start with sips of water and advance slowly. * If Diabetic: check your blood sugar three times a day for the next 3 days. If your sugar is greater than 300 call your family doctor. If your sugar is greater than 400, have someone transport you to the nearest Emergency Room. * If you experience any of the following problems, Please Call the [address] [place] [address] at [phone].         * Shortness of Breath    * Fever of 101 or higher    * Nausea / Vomiting    * Severe Headache    * Weakness or numbness in arms or legs that is not      resolving    * Prolonged increase in pain greater than 4 days      DISCHARGE SUMMARY from Nurse      PATIENT INSTRUCTIONS:    After oral sedation, for 24 hours or while taking prescription Narcotics:  · Limit your activities  · Do not drive and operate hazardous machinery  · Do not make important personal or business decisions  · Do  not drink alcoholic beverages  · If you have not urinated within 8 hours after discharge, please contact your surgeon on call. Report the following to your surgeon:  · Excessive pain, swelling, redness or odor of or around the surgical area  · Temperature over 101  · Nausea and vomiting lasting longer than 4 hours or if unable to take medications  · Any signs of decreased circulation or nerve impairment to extremity: change in color, persistent  numbness, tingling, coldness or increase pain  · Any questions            What to do at Home:  Recommended activity: Activity as tolerated, NO DRIVING FOR 12 Hours post injection          *  Please give a list of your current medications to your Primary Care Provider. *  Please update this list whenever your medications are discontinued, doses are      changed, or new medications (including over-the-counter products) are added. *  Please carry medication information at all times in case of emergency situations. These are general instructions for a healthy lifestyle:    No smoking/ No tobacco products/ Avoid exposure to second hand smoke    Surgeon General's Warning:  Quitting smoking now greatly reduces serious risk to your health. Obesity, smoking, and sedentary lifestyle greatly increases your risk for illness    A healthy diet, regular physical exercise & weight monitoring are important for maintaining a healthy lifestyle    You may be retaining fluid if you have a history of heart failure or if you experience any of the following symptoms:  Weight gain of 3 pounds or more overnight or 5 pounds in a week, increased swelling in our hands or feet or shortness of breath while lying flat in bed.   Please call your doctor as soon as you notice any of these symptoms; do not wait until your next office visit. Recognize signs and symptoms of STROKE:    F-face looks uneven    A-arms unable to move or move unevenly    S-speech slurred or non-existent    T-time-call 911 as soon as signs and symptoms begin-DO NOT go       Back to bed or wait to see if you get better-TIME IS BRAIN. MyChart Activation    Thank you for requesting access to [employer]. Please follow the instructions below to securely access and download your online medical record. [employer]ma allows you to send messages to your doctor, view your test results, renew your prescriptions, schedule appointments, and more. How Do I Sign Up? 1. In your internet browser, go to [email or web address]  2. Click on the First Time User? Click Here link in the Sign In box. You will be redirect to the New Member Sign Up page. 3. Enter your App[employer]ma Access Code exactly as it appears below. You will not need to use this code after youve completed the sign-up process. If you do not sign up before the expiration date, you must request a new code. Apprema Access Code: [email or web address]  Expires: 2017 10:36 AM (This is the date your Apprema access code will )    4. Enter the last four digits of your Social Security Number (xxxx) and Date of Birth (mm/dd/yyyy) as indicated and click Submit. You will be taken to the next sign-up page. 5. Create a Apprema ID. This will be your Apprema login ID and cannot be changed, so think of one that is secure and easy to remember. 6. Create a App[employer]ma password. You can change your password at any time. 7. Enter your Password Reset Question and Answer. This can be used at a later time if you forget your password. 8. Enter your e-mail address. You will receive e-mail notification when new information is available in [address] 9. Click Sign Up. You can now view and download portions of your medical record.   10. Click the Download Summary menu link to download a portable copy of your medical information. Additional Information    If you have questions, please visit the Frequently Asked Questions section of the [employer] website at [email or web address] Remember, [employer]out is NOT to be used for urgent needs. For medical emergencies, dial 911.

## 2017-11-01 NOTE — PROCEDURES
PROCEDURE NOTE      Patient Name: Chin Murphy    Date of Procedure: November 1, 2017    Preoperative Diagnosis:  Lumbar spinal stenosis [M48.061]    Post Operative Diagnosis:  Lumbar spinal stenosis [M48.061]    Procedure :    right L3 Selective Nerve Root Block    Consent:  Informed consent was obtained prior to the procedure. The patient was given the opportunity to ask questions regarding the procedure and its associated risks. In addition to the potential risks associated with the procedure itself, the patient was informed both verbally and in writing of the potential side effects of the use of glucocorticoid. The patient appeared to comprehend the informed consent and desired to have the procedure performed. Procedure: The patient was placed in the prone position on the fluoroscopy table and the back was prepped and draped in the usual sterile manner. The exact spinal level was  identified using fluoroscopy, and Lidocaine 1 % was injected locally, a # 22 gauge spinal needle was passed to the transverse process. The depth was noted and the needle redirected to pass inferior and approximately one cm anterior to the transverse process. YES  1 cc of Isovue M-200 was used to verify positioning in the epidural and paravertebral space and outlined the course of the spinal nerve into the epidural space. The same procedure was repeated at each spinal level indicated above. A total of 30 mg of preservative free Dexamethasone and 5 cc of Lidocaine was slowly injected. The patient tolerated the procedure well. The injection area was cleaned and bandaids applied. Not excessive bleeding was noted. Patient dressed and discharged to home with instructions. Discussion: The patient tolerated the procedure well.                                               Fatemeh Lopez MD  November 1, 2017

## 2017-11-01 NOTE — H&P
Date of Surgery Update:  Wally Javier was seen and examined. History and physical has been reviewed. The patient has been examined. There have been no significant clinical changes since the completion of the last office visit.       Signed By: Odilon Rutherford MD     November 1, 2017 1:26 PM

## 2017-11-03 ENCOUNTER — HOSPITAL ENCOUNTER (OUTPATIENT)
Dept: INFUSION THERAPY | Age: 67
End: 2017-11-03
Payer: MEDICARE

## 2017-11-03 ENCOUNTER — HOSPITAL ENCOUNTER (OUTPATIENT)
Dept: INFUSION THERAPY | Age: 67
Discharge: HOME | End: 2017-11-03
Payer: MEDICARE

## 2017-11-03 VITALS
RESPIRATION RATE: 18 BRPM | HEART RATE: 60 BPM | DIASTOLIC BLOOD PRESSURE: 67 MMHG | SYSTOLIC BLOOD PRESSURE: 103 MMHG | TEMPERATURE: 97.7 F

## 2017-11-03 LAB
BASO+EOS+MONOS # BLD AUTO: 1.2 K/UL (ref 0–2.3)
BASO+EOS+MONOS # BLD AUTO: 14 % (ref 0.1–17)
DIFFERENTIAL METHOD BLD: ABNORMAL
ERYTHROCYTE [DISTWIDTH] IN BLOOD BY AUTOMATED COUNT: 17.8 % (ref 11.5–14.5)
HCT VFR BLD AUTO: 34.4 % (ref 36–48)
HGB BLD-MCNC: 11.4 G/DL (ref 12–16)
LYMPHOCYTES # BLD: 2.2 K/UL (ref 1.1–5.9)
LYMPHOCYTES NFR BLD: 28 % (ref 14–44)
MCH RBC QN AUTO: 28.9 PG (ref 25–35)
MCHC RBC AUTO-ENTMCNC: 33.1 G/DL (ref 31–37)
MCV RBC AUTO: 87.1 FL (ref 78–102)
NEUTS SEG # BLD: 4.6 K/UL (ref 1.8–9.5)
NEUTS SEG NFR BLD: 58 % (ref 40–70)
PLATELET # BLD AUTO: 179 K/UL (ref 135–420)
RBC # BLD AUTO: 3.95 M/UL (ref 4.1–5.1)
WBC # BLD AUTO: 8 K/UL (ref 4.5–13)

## 2017-11-03 PROCEDURE — 74011000250 HC RX REV CODE- 250

## 2017-11-03 PROCEDURE — 74011250636 HC RX REV CODE- 250/636: Performed by: NURSE PRACTITIONER

## 2017-11-03 PROCEDURE — 85025 COMPLETE CBC W/AUTO DIFF WBC: CPT | Performed by: INTERNAL MEDICINE

## 2017-11-03 PROCEDURE — 74011250636 HC RX REV CODE- 250/636

## 2017-11-03 PROCEDURE — 85049 AUTOMATED PLATELET COUNT: CPT | Performed by: INTERNAL MEDICINE

## 2017-11-03 PROCEDURE — 36415 COLL VENOUS BLD VENIPUNCTURE: CPT

## 2017-11-03 PROCEDURE — 36415 COLL VENOUS BLD VENIPUNCTURE: CPT | Performed by: INTERNAL MEDICINE

## 2017-11-03 PROCEDURE — 96372 THER/PROPH/DIAG INJ SC/IM: CPT

## 2017-11-03 RX ORDER — WATER FOR INJECTION,STERILE
VIAL (ML) INJECTION
Status: COMPLETED
Start: 2017-11-03 | End: 2017-11-03

## 2017-11-03 RX ADMIN — ROMIPLOSTIM 207 MCG: 250 INJECTION, POWDER, LYOPHILIZED, FOR SOLUTION SUBCUTANEOUS at 12:45

## 2017-11-03 RX ADMIN — WATER 10 ML: 1 INJECTION INTRAMUSCULAR; INTRAVENOUS; SUBCUTANEOUS at 12:45

## 2017-11-03 NOTE — PROGRESS NOTES
[facility] Progress Note    Date: November 3, 2017    Name: Hermila Napier    MRN: [id number]         : 1950      Nplate Injection     Ms. Lillian Perry arrived to [facility] at [phone]. Ms. Lillian Perry was assessed and education was provided. Ms. Adi Pan vitals were reviewed. Visit Vitals    /67 (BP 1 Location: Right arm, BP Patient Position: At rest;Sitting)    Pulse 60    Temp 97.7 °F (36.5 °C)    Resp 18    Breastfeeding No       Pt was observed for 5 minutes after obtaining vital signs prior to initiating treatment. Blood drawn for CBC via right AC x 1 attempt per written orders. Guaze and coban applied to the site. Lab results obtained and reviewed. (Preliminary results scanned into media tab.)  Recent Results (from the past 12 hour(s))   CBC WITH 3 PART DIFF    Collection Time: 17 12:25 PM   Result Value Ref Range    WBC 8.0 4.5 - 13.0 K/uL    RBC 3.95 (L) 4.10 - 5.10 M/uL    HGB 11.4 (L) 12.0 - 16 g/dL    HCT 34.4 (L) 36 - 48 %    MCV 87.1 78 - 102 FL    MCH 28.9 25.0 - 35.0 PG    MCHC 33.1 31 - 37 g/dL    RDW 17.8 (H) 11.5 - 14.5 %    NEUTROPHILS 58 40 - 70 %    MIXED CELLS 14 0.1 - 17 %    LYMPHOCYTES 28 14 - 44 %    ABS. NEUTROPHILS 4.6 1.8 - 9.5 K/UL    ABS. MIXED CELLS 1.2 0.0 - 2.3 K/uL    ABS. LYMPHOCYTES 2.2 1.1 - 5.9 K/UL    DF AUTOMATED       Preliminary platelet TZJHJ=027,705. Per Nplate protocol, since pt's platelet count is 241,273 today, pt's dose will remain at 3mcg/kg (3mcg x 69kg = 207mcg). Nplate 874WIA (6.37XV) administered as ordered SQ in patient's right upper arm. No redness or bleeding noted. Ms. Lillian Perry was discharged from [place] [address] in stable condition at 1250. She is to return on 11/10/17 at 0830 for her next appointment.     Carly Lama RN  November 3, 2017   [id number]

## 2017-11-10 ENCOUNTER — HOSPITAL ENCOUNTER (OUTPATIENT)
Dept: INFUSION THERAPY | Age: 67
Discharge: HOME | End: 2017-11-10
Payer: MEDICARE

## 2017-11-10 VITALS — DIASTOLIC BLOOD PRESSURE: 61 MMHG | RESPIRATION RATE: 18 BRPM | TEMPERATURE: 97.5 F | SYSTOLIC BLOOD PRESSURE: 93 MMHG

## 2017-11-10 LAB
BASO+EOS+MONOS # BLD AUTO: 1.1 K/UL (ref 0–2.3)
BASO+EOS+MONOS # BLD AUTO: 24 % (ref 0.1–17)
DIFFERENTIAL METHOD BLD: ABNORMAL
ERYTHROCYTE [DISTWIDTH] IN BLOOD BY AUTOMATED COUNT: 17.1 % (ref 11.5–14.5)
HCT VFR BLD AUTO: 31.5 % (ref 36–48)
HGB BLD-MCNC: 10.4 G/DL (ref 12–16)
LYMPHOCYTES # BLD: 1.4 K/UL (ref 1.1–5.9)
LYMPHOCYTES NFR BLD: 32 % (ref 14–44)
MCH RBC QN AUTO: 28.7 PG (ref 25–35)
MCHC RBC AUTO-ENTMCNC: 33 G/DL (ref 31–37)
MCV RBC AUTO: 86.8 FL (ref 78–102)
NEUTS SEG # BLD: 2 K/UL (ref 1.8–9.5)
NEUTS SEG NFR BLD: 45 % (ref 40–70)
PLATELET # BLD AUTO: 169 K/UL (ref 135–420)
RBC # BLD AUTO: 3.63 M/UL (ref 4.1–5.1)
WBC # BLD AUTO: 4.5 K/UL (ref 4.5–13)

## 2017-11-10 PROCEDURE — 74011250636 HC RX REV CODE- 250/636

## 2017-11-10 PROCEDURE — 36415 COLL VENOUS BLD VENIPUNCTURE: CPT

## 2017-11-10 PROCEDURE — 85049 AUTOMATED PLATELET COUNT: CPT | Performed by: INTERNAL MEDICINE

## 2017-11-10 PROCEDURE — 74011250636 HC RX REV CODE- 250/636: Performed by: INTERNAL MEDICINE

## 2017-11-10 PROCEDURE — 96372 THER/PROPH/DIAG INJ SC/IM: CPT

## 2017-11-10 PROCEDURE — 85025 COMPLETE CBC W/AUTO DIFF WBC: CPT | Performed by: INTERNAL MEDICINE

## 2017-11-10 RX ORDER — WATER FOR INJECTION,STERILE
VIAL (ML) INJECTION
Status: DISPENSED
Start: 2017-11-10 | End: 2017-11-10

## 2017-11-10 RX ADMIN — ROMIPLOSTIM 207 MCG: 250 INJECTION, POWDER, LYOPHILIZED, FOR SOLUTION SUBCUTANEOUS at 09:08

## 2017-11-10 NOTE — PROGRESS NOTES
[facility] Progress Note    Date: November 10, 2017    Name: Ashley Fernandes    MRN: [id number]         : 1950      Ms. Tracy Garcia arrived to [facility] at [facility]. Ms. Tracy Garcia was assessed and education was provided. States generalized pain 8/10. States she does not take pain meds because they do not help. Declined to have pain issues addressed. reassured and positioned for comfort. Nplate. Ms. Kaycee Jaeger vitals were reviewed. Visit Vitals    BP 93/61 (BP 1 Location: Right arm, BP Patient Position: Sitting)    Temp 97.5 °F (36.4 °C)    Resp 18       Pt was observed for 5 minutes after obtaining vital signs prior to initiating treatment. Blood drawn for cbc via left hand venipuncture on 1st attempt       Lab results obtained and reviewed. (Preliminary results scanned into media tab.)    Pt's preliminary platelet count was 952,052. Per Nplate protocol, since pt's platelet count is 403,833 today, pt's dose will remain at 3mcg/kg (3mcg x 69kg = 207mcg). Nplate 642IOG (8.56FJ) administered as ordered SQ in patient's right upper arm. Declined bandaid. No irritation or bleeding at site      Ms. Tracy Garcia was discharged from [place] [address] in stable condition at 4238. She is to return on 2017 at 0830 for her next appointment.     Niharika Tucker RN  November 10, 2017

## 2017-11-17 ENCOUNTER — HOSPITAL ENCOUNTER (OUTPATIENT)
Dept: INFUSION THERAPY | Age: 67
Discharge: HOME | End: 2017-11-17
Payer: MEDICARE

## 2017-11-17 VITALS
OXYGEN SATURATION: 100 % | DIASTOLIC BLOOD PRESSURE: 61 MMHG | HEART RATE: 62 BPM | RESPIRATION RATE: 18 BRPM | SYSTOLIC BLOOD PRESSURE: 97 MMHG

## 2017-11-17 LAB
BASO+EOS+MONOS # BLD AUTO: 0.9 K/UL (ref 0–2.3)
BASO+EOS+MONOS # BLD AUTO: 20 % (ref 0.1–17)
DIFFERENTIAL METHOD BLD: ABNORMAL
ERYTHROCYTE [DISTWIDTH] IN BLOOD BY AUTOMATED COUNT: 16.8 % (ref 11.5–14.5)
HCT VFR BLD AUTO: 32.5 % (ref 36–48)
HGB BLD-MCNC: 10.7 G/DL (ref 12–16)
LYMPHOCYTES # BLD: 1.6 K/UL (ref 1.1–5.9)
LYMPHOCYTES NFR BLD: 35 % (ref 14–44)
MCH RBC QN AUTO: 28.8 PG (ref 25–35)
MCHC RBC AUTO-ENTMCNC: 32.9 G/DL (ref 31–37)
MCV RBC AUTO: 87.4 FL (ref 78–102)
NEUTS SEG # BLD: 2.1 K/UL (ref 1.8–9.5)
NEUTS SEG NFR BLD: 45 % (ref 40–70)
PLATELET # BLD AUTO: 113 K/UL (ref 135–420)
RBC # BLD AUTO: 3.72 M/UL (ref 4.1–5.1)
WBC # BLD AUTO: 4.6 K/UL (ref 4.5–13)

## 2017-11-17 PROCEDURE — 74011250636 HC RX REV CODE- 250/636: Performed by: INTERNAL MEDICINE

## 2017-11-17 PROCEDURE — 74011250636 HC RX REV CODE- 250/636

## 2017-11-17 PROCEDURE — 85025 COMPLETE CBC W/AUTO DIFF WBC: CPT | Performed by: INTERNAL MEDICINE

## 2017-11-17 PROCEDURE — 85049 AUTOMATED PLATELET COUNT: CPT | Performed by: INTERNAL MEDICINE

## 2017-11-17 PROCEDURE — 96372 THER/PROPH/DIAG INJ SC/IM: CPT

## 2017-11-17 PROCEDURE — 74011000250 HC RX REV CODE- 250

## 2017-11-17 PROCEDURE — 36415 COLL VENOUS BLD VENIPUNCTURE: CPT

## 2017-11-17 RX ORDER — SODIUM CHLORIDE 0.9 % (FLUSH) 0.9 %
10-40 SYRINGE (ML) INJECTION AS NEEDED
Status: DISCONTINUED | OUTPATIENT
Start: 2017-11-17 | End: 2017-11-21 | Stop reason: HOSPADM

## 2017-11-17 RX ORDER — HEPARIN SODIUM (PORCINE) LOCK FLUSH IV SOLN 100 UNIT/ML 100 UNIT/ML
500 SOLUTION INTRAVENOUS AS NEEDED
Status: ACTIVE | OUTPATIENT
Start: 2017-11-17 | End: 2017-11-18

## 2017-11-17 RX ORDER — WATER FOR INJECTION,STERILE
VIAL (ML) INJECTION
Status: COMPLETED
Start: 2017-11-17 | End: 2017-11-17

## 2017-11-17 RX ORDER — HEPARIN SODIUM (PORCINE) LOCK FLUSH IV SOLN 100 UNIT/ML 100 UNIT/ML
SOLUTION INTRAVENOUS
Status: DISPENSED
Start: 2017-11-17 | End: 2017-11-17

## 2017-11-17 RX ADMIN — ROMIPLOSTIM 207 MCG: 250 INJECTION, POWDER, LYOPHILIZED, FOR SOLUTION SUBCUTANEOUS at 09:01

## 2017-11-17 RX ADMIN — WATER 10 ML: 1 INJECTION INTRAMUSCULAR; INTRAVENOUS; SUBCUTANEOUS at 09:01

## 2017-11-17 NOTE — PROGRESS NOTES
[facility] Progress Note    Date: 2017    Name: Cory Galaviz    MRN: [id number]         : 1950      Nplate Injection     Ms. Rosangela Lozano arrived to [facility] at [facility]. Patient refused to have port flush today because she did not use her numbing cream.    Ms. Rosangela Lozano was assessed and education was provided. Ms. Natalie Uriarte vitals were reviewed. Visit Vitals    BP 97/61 (BP 1 Location: Right arm, BP Patient Position: Sitting)    Pulse 62    Resp 18    SpO2 100%       Pt was observed for 5 minutes after obtaining vital signs prior to initiating treatment. Blood drawn for CBC via right wrist x 2 attempt per written orders. Guaze and coban applied to the site. Lab results obtained and reviewed. (Preliminary results scanned into media tab.)  Recent Results (from the past 12 hour(s))   CBC WITH 3 PART DIFF    Collection Time: 17  8:45 AM   Result Value Ref Range    WBC 4.6 4.5 - 13.0 K/uL    RBC 3.72 (L) 4.10 - 5.10 M/uL    HGB 10.7 (L) 12.0 - 16 g/dL    HCT 32.5 (L) 36 - 48 %    MCV 87.4 78 - 102 FL    MCH 28.8 25.0 - 35.0 PG    MCHC 32.9 31 - 37 g/dL    RDW 16.8 (H) 11.5 - 14.5 %    NEUTROPHILS 45 40 - 70 %    MIXED CELLS 20 (H) 0.1 - 17 %    LYMPHOCYTES 35 14 - 44 %    ABS. NEUTROPHILS 2.1 1.8 - 9.5 K/UL    ABS. MIXED CELLS 0.9 0.0 - 2.3 K/uL    ABS. LYMPHOCYTES 1.6 1.1 - 5.9 K/UL    DF AUTOMATED       Preliminary platelet HMEAG=270,642. Per Nplate protocol, since pt's platelet count is 439,601 today, pt's dose will remain at 3mcg/kg (3mcg x 69kg = 207mcg). Nplate 459VJR (6.21DE) administered as ordered SQ in patient's right upper arm. No redness or bleeding noted. Ms. Rosangela Lozano was discharged from [place] [address] in stable condition at 0910. She is to return on 17 at 1300 for her next appointment.     Tino Padilla RN  2017

## 2017-11-27 ENCOUNTER — HOSPITAL ENCOUNTER (OUTPATIENT)
Dept: INFUSION THERAPY | Age: 67
Discharge: HOME | End: 2017-11-27
Payer: MEDICARE

## 2017-11-27 VITALS
DIASTOLIC BLOOD PRESSURE: 67 MMHG | TEMPERATURE: 97.2 F | SYSTOLIC BLOOD PRESSURE: 105 MMHG | HEART RATE: 67 BPM | RESPIRATION RATE: 18 BRPM

## 2017-11-27 LAB
BASO+EOS+MONOS # BLD AUTO: 0.5 K/UL (ref 0–2.3)
BASO+EOS+MONOS # BLD AUTO: 8 % (ref 0.1–17)
DIFFERENTIAL METHOD BLD: ABNORMAL
ERYTHROCYTE [DISTWIDTH] IN BLOOD BY AUTOMATED COUNT: 16.7 % (ref 11.5–14.5)
HCT VFR BLD AUTO: 30.7 % (ref 36–48)
HGB BLD-MCNC: 10.4 G/DL (ref 12–16)
LYMPHOCYTES # BLD: 1.3 K/UL (ref 1.1–5.9)
LYMPHOCYTES NFR BLD: 20 % (ref 14–44)
MCH RBC QN AUTO: 29.3 PG (ref 25–35)
MCHC RBC AUTO-ENTMCNC: 33.9 G/DL (ref 31–37)
MCV RBC AUTO: 86.5 FL (ref 78–102)
NEUTS SEG # BLD: 4.7 K/UL (ref 1.8–9.5)
NEUTS SEG NFR BLD: 72 % (ref 40–70)
PLATELET # BLD AUTO: 140 K/UL (ref 135–420)
RBC # BLD AUTO: 3.55 M/UL (ref 4.1–5.1)
WBC # BLD AUTO: 6.5 K/UL (ref 4.5–13)

## 2017-11-27 PROCEDURE — 96523 IRRIG DRUG DELIVERY DEVICE: CPT

## 2017-11-27 PROCEDURE — 74011250636 HC RX REV CODE- 250/636

## 2017-11-27 PROCEDURE — 77030012965 HC NDL HUBR BBMI -A

## 2017-11-27 PROCEDURE — 85049 AUTOMATED PLATELET COUNT: CPT | Performed by: INTERNAL MEDICINE

## 2017-11-27 PROCEDURE — 74011250636 HC RX REV CODE- 250/636: Performed by: INTERNAL MEDICINE

## 2017-11-27 PROCEDURE — 74011000250 HC RX REV CODE- 250

## 2017-11-27 PROCEDURE — 96372 THER/PROPH/DIAG INJ SC/IM: CPT

## 2017-11-27 PROCEDURE — 85025 COMPLETE CBC W/AUTO DIFF WBC: CPT | Performed by: INTERNAL MEDICINE

## 2017-11-27 RX ORDER — WATER FOR INJECTION,STERILE
VIAL (ML) INJECTION
Status: COMPLETED
Start: 2017-11-27 | End: 2017-11-27

## 2017-11-27 RX ORDER — SODIUM CHLORIDE 0.9 % (FLUSH) 0.9 %
10-40 SYRINGE (ML) INJECTION AS NEEDED
Status: DISCONTINUED | OUTPATIENT
Start: 2017-11-27 | End: 2017-12-01 | Stop reason: HOSPADM

## 2017-11-27 RX ORDER — HEPARIN SODIUM (PORCINE) LOCK FLUSH IV SOLN 100 UNIT/ML 100 UNIT/ML
500 SOLUTION INTRAVENOUS AS NEEDED
Status: DISPENSED | OUTPATIENT
Start: 2017-11-27 | End: 2017-11-28

## 2017-11-27 RX ADMIN — HEPARIN SODIUM (PORCINE) LOCK FLUSH IV SOLN 100 UNIT/ML 500 UNITS: 100 SOLUTION at 13:52

## 2017-11-27 RX ADMIN — ROMIPLOSTIM 207 MCG: 250 INJECTION, POWDER, LYOPHILIZED, FOR SOLUTION SUBCUTANEOUS at 13:53

## 2017-11-27 RX ADMIN — Medication 40 ML: at 13:54

## 2017-11-27 RX ADMIN — WATER 10 ML: 1 INJECTION INTRAMUSCULAR; INTRAVENOUS; SUBCUTANEOUS at 13:53

## 2017-11-27 RX ADMIN — HEPARIN SODIUM (PORCINE) LOCK FLUSH IV SOLN 100 UNIT/ML 500 UNITS: 100 SOLUTION at 13:53

## 2017-11-27 NOTE — PROGRESS NOTES
[facility] Progress Note    Date: 2017    Name: Margarita Amaya    MRN: [id number]         : 1950      Nplate/Procrit Injection/Port Flush     Ms. Debra Gao arrived to [facility] at . Ms. Debra Gao was assessed and education was provided. Ms. Cici Carbone vitals were reviewed. Visit Vitals    /67 (BP 1 Location: Right arm, BP Patient Position: Sitting)    Pulse 67    Temp 97.2 °F (36.2 °C)    Resp 18       Pt was observed for 5 minutes after obtaining vital signs prior to initiating treatment. Right chest double lumen mediport accessed with a 20 gauge haas needle using sterile technique. Medial port accessed, flushed with 20 ml NS and 500 units of Heparin then de-accessed. Medial port appeared to be tilted inward upon palpation. Lateral port accessed and blood drawn for CBC after a 10 ml waste. Port flushed with 20 ml NS and 500 units of Heparin then de-accessed. Gauze and Tegaderm applied to the site. Lab results obtained and reviewed. (Preliminary results scanned into media tab.)  Recent Results (from the past 12 hour(s))   CBC WITH 3 PART DIFF    Collection Time: 17  1:35 PM   Result Value Ref Range    WBC 6.5 4.5 - 13.0 K/uL    RBC 3.55 (L) 4.10 - 5.10 M/uL    HGB 10.4 (L) 12.0 - 16 g/dL    HCT 30.7 (L) 36 - 48 %    MCV 86.5 78 - 102 FL    MCH 29.3 25.0 - 35.0 PG    MCHC 33.9 31 - 37 g/dL    RDW 16.7 (H) 11.5 - 14.5 %    NEUTROPHILS 72 (H) 40 - 70 %    MIXED CELLS 8 0.1 - 17 %    LYMPHOCYTES 20 14 - 44 %    ABS. NEUTROPHILS 4.7 1.8 - 9.5 K/UL    ABS. MIXED CELLS 0.5 0.0 - 2.3 K/uL    ABS. LYMPHOCYTES 1.3 1.1 - 5.9 K/UL    DF AUTOMATED       Preliminary platelet count= 884,024. Preliminary results scanned into the media tab. Per Nplate protocol, since pt's platelet count is 411,794 today, pt's dose will remain at 3mcg/kg (3mcg x 69kg = 207mcg). Nplate 191KLG (5.76HL) administered as ordered SQ in patient's right upper arm. No redness or bleeding noted.      Ms. Harry Ibarra was discharged from [place] [address] in stable condition at 1400. She is to return on 12/08/2017 at 0830 for her next appointment.     Adelfo Bowles RN  November 27, 2017

## 2017-12-05 ENCOUNTER — OFFICE VISIT (OUTPATIENT)
Dept: ORTHOPEDIC SURGERY | Age: 67
End: 2017-12-05

## 2017-12-05 VITALS
RESPIRATION RATE: 18 BRPM | SYSTOLIC BLOOD PRESSURE: 96 MMHG | TEMPERATURE: 97.7 F | HEIGHT: 66 IN | HEART RATE: 59 BPM | DIASTOLIC BLOOD PRESSURE: 52 MMHG

## 2017-12-05 DIAGNOSIS — C50.919 CARCINOMA OF BREAST METASTATIC TO AXILLARY LYMPH NODE, UNSPECIFIED LATERALITY (HCC): ICD-10-CM

## 2017-12-05 DIAGNOSIS — R29.898 WEAKNESS OF BOTH LEGS: ICD-10-CM

## 2017-12-05 DIAGNOSIS — Z79.01 CHRONIC ANTICOAGULATION: ICD-10-CM

## 2017-12-05 DIAGNOSIS — M79.2 NEURITIS: ICD-10-CM

## 2017-12-05 DIAGNOSIS — D69.3 CHRONIC ITP (IDIOPATHIC THROMBOCYTOPENIA) (HCC): ICD-10-CM

## 2017-12-05 DIAGNOSIS — C77.3 CARCINOMA OF BREAST METASTATIC TO AXILLARY LYMPH NODE, UNSPECIFIED LATERALITY (HCC): ICD-10-CM

## 2017-12-05 DIAGNOSIS — M48.062 SPINAL STENOSIS OF LUMBAR REGION WITH NEUROGENIC CLAUDICATION: Primary | ICD-10-CM

## 2017-12-05 NOTE — PATIENT INSTRUCTIONS
Low Back Arthritis: Exercises  Your Care Instructions  Here are some examples of typical rehabilitation exercises for your condition. Start each exercise slowly. Ease off the exercise if you start to have pain. Your doctor or physical therapist will tell you when you can start these exercises and which ones will work best for you. When you are not being active, find a comfortable position for rest. Some people are comfortable on the floor or a medium-firm bed with a small pillow under their head and another under their knees. Some people prefer to lie on their side with a pillow between their knees. Don't stay in one position for too long. Take short walks (10 to 20 minutes) every 2 to 3 hours. Avoid slopes, hills, and stairs until you feel better. Walk only distances you can manage without pain, especially leg pain. How to do the exercises  Pelvic tilt    1. Lie on your back with your knees bent. 2. \"Brace\" your stomach-tighten your muscles by pulling in and imagining your belly button moving toward your spine. 3. Press your lower back into the floor. You should feel your hips and pelvis rock back. 4. Hold for 6 seconds while breathing smoothly. 5. Relax and allow your pelvis and hips to rock forward. 6. Repeat 8 to 12 times. Back stretches    1. Get down on your hands and knees on the floor. 2. Relax your head and allow it to droop. Round your back up toward the ceiling until you feel a nice stretch in your upper, middle, and lower back. Hold this stretch for as long as it feels comfortable, or about 15 to 30 seconds. 3. Return to the starting position with a flat back while you are on your hands and knees. 4. Let your back sway by pressing your stomach toward the floor. Lift your buttocks toward the ceiling. 5. Hold this position for 15 to 30 seconds. 6. Repeat 2 to 4 times. Follow-up care is a key part of your treatment and safety.  Be sure to make and go to all appointments, and call your doctor if you are having problems. It's also a good idea to know your test results and keep a list of the medicines you take. Where can you learn more? Go to http://leon-edinson.info/. Enter C919 in the search box to learn more about \"Low Back Arthritis: Exercises. \"  Current as of: March 21, 2017  Content Version: 11.4  © 9841-5015 [employer]. Care instructions adapted under license by [employer] (which disclaims liability or warranty for this information). If you have questions about a medical condition or this instruction, always ask your healthcare professional. [place] [address] any warranty or liability for your use of this information. Deciding About Surgery for Lumbar Spinal Stenosis  Deciding About Surgery for Lumbar Spinal Stenosis  What is lumbar spinal stenosis? Lumbar spinal stenosis is the narrowing of the spinal canal in the lower back. This occurs when bone and other tissues grow inside the openings in the spinal bones. This can squeeze the nerves that branch out from the spinal cord. The squeezing can cause pain, numbness, or weakness. It happens most often in the legs, feet, or buttocks. You may choose to have surgery to ease your symptoms. Or you may try other treatments instead. These include medicines, exercise, and physical therapy. What are key points about this decision? · If your symptoms are mild to moderate, then medicine, physical therapy, and exercise may be all you need. · You may want surgery if you have tried other treatment for a while and your symptoms are still so bad that you can't do your normal activities. · Your symptoms may come back after a few years. You may need surgery again. · Surgery will most likely help leg pain. But it may not help back pain as much. Why might you choose to have surgery? · Surgery can relieve pain. It can also improve how well you can walk.   · You have tried other treatment for a while and your symptoms are still so bad that you can't do your normal activities. · Without surgery, your symptoms may still bother you. If symptoms are very painful, they most often will not improve on their own. · Your doctor may advise surgery if you can't control your bladder or bowels as well as you used to. Surgery is also an option if you notice sudden changes in how well you can walk or you are more clumsy than before. Why might you choose not to have surgery? · You may try other treatments to help your symptoms. These include medicine, exercise, and physical therapy. These other treatments work best for people with mild to moderate symptoms. · Risks from surgery include nerve injury and tissue tears. And you could have chronic pain, trouble passing urine, and a spine that is not stable. · All surgery has some risks. These include bleeding, infection, and risks from anesthesia. · You may not be able to return to all of your normal activities for many months. · Your symptoms may come back in a few years. You may need surgery again. Your decision  Thinking about the facts and your feelings can help you make a decision that is right for you. Be sure you understand the benefits and risks of your options, and think about what else you need to do before you make the decision. Where can you learn more? Go to http://leon-edinson.info/. Enter K111 in the search box to learn more about \"Deciding About Surgery for Lumbar Spinal Stenosis. \"  Current as of: March 21, 2017  Content Version: 11.4  © 0969-6061 Healthwise, Incorporated. Care instructions adapted under license by [employer] (which disclaims liability or warranty for this information). If you have questions about a medical condition or this instruction, always ask your healthcare professional. [place] [address] any warranty or liability for your use of this information.

## 2017-12-05 NOTE — MR AVS SNAPSHOT
Visit Information Date & Time Provider Department Dept. Phone Encounter #  
 12/5/2017  1:45 PM Yennifer Whitfield MD [facility] [phone] [id number] Follow-up Instructions Return in about 2 weeks (around 12/19/2017) for surgical evaluation with Dr. Pedro Pablo Rubalcava. Your Appointments 12/8/2017  9:30 AM  
SURGERY CONSULT with Amor Obrien MD  
[facility] ONE [address] [place] [address]) Appt Note: ref from dr. Nii Casas. severe lumbar stenosis [address] [place] [id number]  
[phone]  
  
   
 [address] [facility],[address] [facility]  
  
    
 12/18/2017 10:15 AM  
Office Visit with Vinicius Alejandra MD  
[facility]  On[facility]) Appt Note: 3 month  
 [address], [address] [place] [address] Upcoming Health Maintenance Date Due Hepatitis C Screening 1950 DTaP/Tdap/Td series (1 - Tdap) 5/14/1973 FOBT Q 1 YEAR AGE 50-75 5/14/2002 ZOSTER VACCINE AGE 60> 3/14/2012 BREAST CANCER SCRN MAMMOGRAM 4/4/2015 GLAUCOMA SCREENING Q2Y 5/14/2017 OSTEOPOROSIS SCREENING (DEXA) 5/14/2017 Pneumococcal 65+ High/Highest Risk (1 of 2 - PCV13) 5/14/2017 MEDICARE YEARLY EXAM 5/14/2017 Influenza Age 5 to Adult 8/1/2017 Allergies as of 12/5/2017  Review Complete On: 12/5/2017 By: Yennifer Whitfield MD  
  
 Severity Noted Reaction Type Reactions Aspirin High 08/07/2013   Systemic Swelling Pt states her whole body swells when she takes aspirin. Adhesive    Unable to Obtain Pcn [Penicillins]  08/20/2012    Rash Current Immunizations  Reviewed on 11/27/2017 No immunizations on file. Not reviewed this visit You Were Diagnosed With   
  
 Codes Comments Spinal stenosis of lumbar region with neurogenic claudication    -  Primary ICD-10-CM: R18.242 
ICD-9-CM: 724.03 Weakness of both legs     ICD-10-CM: R29.898 ICD-9-CM: 729.89 Chronic anticoagulation     ICD-10-CM: Z79.01 
ICD-9-CM: V58.61 Neuritis     ICD-10-CM: M79.2 ICD-9-CM: 729.2 Vitals BP Pulse Temp Resp Height(growth percentile) OB Status 96/52 (!) 59 97.7 °F (36.5 °C) (Oral) 18 5' 6\" (1.676 m) Postmenopausal  
 Smoking Status Never Smoker Vitals History Preferred Pharmacy Pharmacy Name Phone [id number] [address] [phone] Your Updated Medication List  
  
   
This list is accurate as of: 12/5/17  2:08 PM.  Always use your most recent med list.  
  
  
  
  
 albuterol 90 mcg/actuation inhaler Commonly known as:  PROAIR HFA  
1-2 puffs every 4-6 hrs. aluminum-magnesium hydroxide 200-200 mg/5 mL susp 5 mL, diphenhydrAMINE 12.5 mg/5 mL liqd 12.5 mg, lidocaine 2 % soln 5 mL  
5 mL by Swish and Spit route two (2) times a day. Magic mouth wash  Maalox Lidocaine 2% viscous  Diphenhydramine oral solution   Pharmacy to mix equal portions of ingredients to a total volume as indicated in the dispense amount. amiodarone 200 mg tablet Commonly known as:  CORDARONE Take 200 mg by mouth. CeleBREX 200 mg capsule Generic drug:  celecoxib Take  by mouth two (2) times a day. COREG 3.125 mg tablet Generic drug:  carvedilol Take 3.125 mg by mouth two (2) times daily (with meals). cyclobenzaprine 5 mg tablet Commonly known as:  FLEXERIL  
  
 digoxin 0.125 mg tablet Commonly known as:  LANOXIN Take 0.125 mg by mouth daily. dronabinol 5 mg capsule Commonly known as:  Merrianne Artie Take 1 Cap by mouth two (2) times a day. furosemide 40 mg tablet Commonly known as:  LASIX Take  by mouth daily. * gabapentin 300 mg capsule Commonly known as:  NEURONTIN Take 300 mg by mouth three (3) times daily. * gabapentin 300 mg capsule Commonly known as:  NEURONTIN Take 1 po q am and 2 po q pm  
  
 lidocaine-prilocaine topical cream  
Commonly known as:  EMLA  
APPLY OVER PORT 1 HOUR PRIOR TO CHEMOTHERAPY THAN COVER WITH SARAN WRAP  
  
 lisinopril 10 mg tablet Commonly known as:  Jackson Bridges Take 10 mg by mouth daily. loratadine 10 mg Cap Take 10 mg by mouth daily. magic mouthwash solution Take 5 mL by mouth three (3) times daily as needed for Stomatitis. Magic mouth wash  Maalox Lidocaine 2% viscous  Diphenhydramine oral solution   Pharmacy to mix equal portions of ingredients to a total volume as indicated in the dispense amount. meloxicam 7.5 mg tablet Commonly known as:  MOBIC Take 7.5 mg by mouth daily. nabumetone 750 mg tablet Commonly known as:  RELAFEN  
  
 ondansetron hcl 8 mg tablet Commonly known as:  ZOFRAN (AS HYDROCHLORIDE) Take 1 Tab by mouth every eight (8) hours as needed for Nausea. oxyCODONE-acetaminophen 7.5-325 mg per tablet Commonly known as:  PERCOCET Take 1 tablet every 6 hours as needed for pain * potassium chloride 20 mEq tablet Commonly known as:  K-DUR, KLOR-CON Take 2 Tabs by mouth daily. * KLOR-CON M20 20 mEq tablet Generic drug:  potassium chloride TAKE ONE TABLET BY MOUTH ONCE A DAY  
  
 PRUVATE 21-7 PO Take  by mouth. Senna 8.6 mg tablet Generic drug:  senna Take 1 Tab by mouth daily. XARELTO 10 mg tablet Generic drug:  rivaroxaban Take 10 mg by mouth daily. zolpidem 10 mg tablet Commonly known as:  AMBIEN  
TAKE 1 TABLET BY MOUTH NIGHTLY AS NEEDED FOR SLEEP *MAX DAILY AMOUNT 10MG* * Notice: This list has 4 medication(s) that are the same as other medications prescribed for you. Read the directions carefully, and ask your doctor or other care provider to review them with you. We Performed the Following AMB SUPPLY ORDER [[id number] Custom] Comments:  
 ROLLING WALKER WITH A SEAT Follow-up Instructions Return in about 2 weeks (around 12/19/2017) for surgical evaluation with Dr. Andreina Milan. To-Do List   
 12/08/2017  8:30 AM  
  Appointment with [facility] at [facility] ([phone])  
  
 12/15/2017 8:30 AM  
  Appointment with [facility] NURSE at [facility] ([phone]) Patient Instructions Low Back Arthritis: Exercises Your Care Instructions Here are some examples of typical rehabilitation exercises for your condition. Start each exercise slowly. Ease off the exercise if you start to have pain. Your doctor or physical therapist will tell you when you can start these exercises and which ones will work best for you. When you are not being active, find a comfortable position for rest. Some people are comfortable on the floor or a medium-firm bed with a small pillow under their head and another under their knees. Some people prefer to lie on their side with a pillow between their knees. Don't stay in one position for too long. Take short walks (10 to 20 minutes) every 2 to 3 hours. Avoid slopes, hills, and stairs until you feel better. Walk only distances you can manage without pain, especially leg pain. How to do the exercises Pelvic tilt 1. Lie on your back with your knees bent. 2. \"Brace\" your stomach-tighten your muscles by pulling in and imagining your belly button moving toward your spine. 3. Press your lower back into the floor. You should feel your hips and pelvis rock back. 4. Hold for 6 seconds while breathing smoothly. 5. Relax and allow your pelvis and hips to rock forward. 6. Repeat 8 to 12 times. Back stretches 1. Get down on your hands and knees on the floor. 2. Relax your head and allow it to droop.  Round your back up toward the ceiling until you feel a nice stretch in your upper, middle, and lower back. Hold this stretch for as long as it feels comfortable, or about 15 to 30 seconds. 3. Return to the starting position with a flat back while you are on your hands and knees. 4. Let your back sway by pressing your stomach toward the floor. Lift your buttocks toward the ceiling. 5. Hold this position for 15 to 30 seconds. 6. Repeat 2 to 4 times. Follow-up care is a key part of your treatment and safety. Be sure to make and go to all appointments, and call your doctor if you are having problems. It's also a good idea to know your test results and keep a list of the medicines you take. Where can you learn more? Go to http://leon-edinson.info/. Enter A171 in the search box to learn more about \"Low Back Arthritis: Exercises. \" Current as of: March 21, 2017 Content Version: 11.4 © 6488-0699 [employer]. Care instructions adapted under license by [employer] (which disclaims liability or warranty for this information). If you have questions about a medical condition or this instruction, always ask your healthcare professional. [address] any warranty or liability for your use of this information. Deciding About Surgery for Lumbar Spinal Stenosis Deciding About Surgery for Lumbar Spinal Stenosis What is lumbar spinal stenosis? Lumbar spinal stenosis is the narrowing of the spinal canal in the lower back. This occurs when bone and other tissues grow inside the openings in the spinal bones. This can squeeze the nerves that branch out from the spinal cord. The squeezing can cause pain, numbness, or weakness. It happens most often in the legs, feet, or buttocks. You may choose to have surgery to ease your symptoms. Or you may try other treatments instead. These include medicines, exercise, and physical therapy. What are key points about this decision? · If your symptoms are mild to moderate, then medicine, physical therapy, and exercise may be all you need. · You may want surgery if you have tried other treatment for a while and your symptoms are still so bad that you can't do your normal activities. · Your symptoms may come back after a few years. You may need surgery again. · Surgery will most likely help leg pain. But it may not help back pain as much. Why might you choose to have surgery? · Surgery can relieve pain. It can also improve how well you can walk. · You have tried other treatment for a while and your symptoms are still so bad that you can't do your normal activities. · Without surgery, your symptoms may still bother you. If symptoms are very painful, they most often will not improve on their own. · Your doctor may advise surgery if you can't control your bladder or bowels as well as you used to. Surgery is also an option if you notice sudden changes in how well you can walk or you are more clumsy than before. Why might you choose not to have surgery? · You may try other treatments to help your symptoms. These include medicine, exercise, and physical therapy. These other treatments work best for people with mild to moderate symptoms. · Risks from surgery include nerve injury and tissue tears. And you could have chronic pain, trouble passing urine, and a spine that is not stable. · All surgery has some risks. These include bleeding, infection, and risks from anesthesia. · You may not be able to return to all of your normal activities for many months. · Your symptoms may come back in a few years. You may need surgery again. Your decision Thinking about the facts and your feelings can help you make a decision that is right for you. Be sure you understand the benefits and risks of your options, and think about what else you need to do before you make the decision. Where can you learn more? Go to http://leon-edinson.info/. Enter Z329 in the search box to learn more about \"Deciding About Surgery for Lumbar Spinal Stenosis. \" Current as of: March 21, 2017 Content Version: 11.4 © 2853-7146 Healthwise, Incorporated. Care instructions adapted under license by [employer] (which disclaims liability or warranty for this information). If you have questions about a medical condition or this instruction, always ask your healthcare professional. [address] any warranty or liability for your use of this information. Introducing [facility]! Sarita Andre introduces [facility] patient portal. Now you can access parts of your medical record, email your doctor's office, and request medication refills online. 1. In your internet browser, go to [email or web address]. [email or web address] 2. Click on the First Time User? Click Here link in the Sign In box. You will see the New Member Sign Up page. 3. Enter your [facility] [facility] Access Code exactly as it appears below. You will not need to use this code after youve completed the sign-up process. If you do not sign up before the expiration date, you must request a new code. · [facility] [facility] Access Code: [email or web address] Expires: 12/20/2017  9:36 AM 
 
4. Enter the last four digits of your Social Security Number (xxxx) and Date of Birth (mm/dd/yyyy) as indicated and click Submit. You will be taken to the next sign-up page. 5. Create a [facility] [facility] ID. This will be your [facility] [facility] login ID and cannot be changed, so think of one that is secure and easy to remember. 6. Create a [facility] [facility] password. You can change your password at any time. 7. Enter your Password Reset Question and Answer. This can be used at a later time if you forget your password. 8. Enter your e-mail address. You will receive e-mail notification when new information is available in [address] 9. Click Sign Up. You can now view and download portions of your medical record. 10. Click the Download Summary menu link to download a portable copy of your medical information. If you have questions, please visit the Frequently Asked Questions section of the [employer] website. Remember, [employer] is NOT to be used for urgent needs. For medical emergencies, dial 911. Now available from your iPhone and Android! Please provide this summary of care documentation to your next provider. Your primary care clinician is listed as Palomo Schmidt. If you have any questions after today's visit, please call [phone].

## 2017-12-05 NOTE — PROGRESS NOTES
[facility]  [address] [place], [facility] [address]  Phone: [phone]  Fax: [phone]      ASSESSMENT   Diagnoses and all orders for this visit:    1. Spinal stenosis of lumbar region with neurogenic claudication  -     Generic Supply Order    2. Weakness of both legs  -     Generic Supply Order    3. Chronic anticoagulation  -     Generic Supply Order    4. Neuritis  -     Generic Supply Order    5. Chronic ITP (idiopathic thrombocytopenia) (HCC)    6. Carcinoma of breast metastatic to axillary lymph node, unspecified laterality ([facility] [address]         IMPRESSION AND PLAN:  Kevin Hernandez is a 74 y.o. female with history of lumbar pain. She complains of pain in the lower back that extends to the right hip, warps around the anterior groin, and extends down the anterior right thigh. Pt experiences weakness in the legs, has tried a right L3 SNRB, epidural injections, and sacroiliac injection with slight relief. Pt continues to take Neurontin 300 mg 1 tab QAM and 2 tabs QHS. 1) Pt was given information on lumbar arthritis exercises. 2) She was given information on lumbar spinal stenosis and deciding about surgery. 3) Pt will follow up with Dr. Cristo Guardado for surgical evaluation regarding her lumbar spinal stenosis. 4) She was prescribed a rollator. 5) Ms. Tara Sesay has a reminder for a \"due or due soon\" health maintenance. I have asked that she contact her primary care provider, Randi Washington MD, for follow-up on this health maintenance. 6)  demonstrated consistency with prescribing. 7) Pt will follow-up with Dr. Cristo Guardado. HISTORY OF PRESENT ILLNESS:  Kevin Hernandez is a 74 y.o. female with history of lumbar pain. She complains of pain in the lower back that extends to the right hip, warps around the anterior groin, and extends down the anterior right thigh. Pt reports that occasionally she experiences severe burning pain in the right anterior thigh.  She admits to weakness in the right leg and difficulty standing and walking. Pt tried a right L3 SNRB on 11/01/2017 and experienced moderate relief lasting a few days. Of note, she experienced slight improvement with a previous lumbar epidural injection and a sacroiliac joint injection. Pt has been prescribed Neurontin 300 mg and takes 1 tab QAM and 2 tabs QHS. She denies any sedation with the Neurontin and also takes Percocet (as prescribed by Dr. Alin Bosch) as her pain warrants. Of note, she is currently on Xarelto. Pt states that she discontinued Celebrex when she started the Neurontin and she no longer takes Relafen. She has a history of breast cancer and has not received any active treatments since 2014. Pt continues to have regular mammograms and reports they have all demonstrated normal results. Of note, she is followed by Dr. Alin Bosch and states that she has not had a recent PET scan. Pt at this time desires to proceed with surgical evaluation with Dr. Dayanna Phillips regarding her lumbar spinal stenosis. Pain Scale: 10 - Worst pain ever/10    PCP: Noah Ash MD       Past Medical History:   Diagnosis Date    Antineoplastic chemotherapy induced anemia     Arthritis     Breast cancer ([facility] [address]     Cancer ([facility] [address]     Breast    Chronic ITP (idiopathic thrombocytopenia) (HCC) 10/31/2016    Diabetes ([facility] [address]     borderline, no meds    Esophageal cancer ([facility] [address]     treated with chemo        Social History     Social History    Marital status:      Spouse name: N/A    Number of children: N/A    Years of education: N/A     Occupational History    Not on file.      Social History Main Topics    Smoking status: Never Smoker    Smokeless tobacco: Never Used    Alcohol use No    Drug use: No    Sexual activity: Not on file     Other Topics Concern    Not on file     Social History Narrative       Current Outpatient Prescriptions   Medication Sig Dispense Refill    oxyCODONE-acetaminophen (PERCOCET) 7.5-325 mg per tablet Take 1 tablet every 6 hours as needed for pain 120 Tab 0    loratadine 10 mg cap Take 10 mg by mouth daily.  digoxin (LANOXIN) 0.125 mg tablet Take 0.125 mg by mouth daily.  carvedilol (COREG) 3.125 mg tablet Take 3.125 mg by mouth two (2) times daily (with meals).  meloxicam (MOBIC) 7.5 mg tablet Take 7.5 mg by mouth daily.  amiodarone (CORDARONE) 200 mg tablet Take 200 mg by mouth.  lisinopril (PRINIVIL, ZESTRIL) 10 mg tablet Take 10 mg by mouth daily.  gabapentin (NEURONTIN) 300 mg capsule Take 1 po q am and 2 po q pm 90 Cap 1    rivaroxaban (XARELTO) 10 mg tablet Take 10 mg by mouth daily.  zolpidem (AMBIEN) 10 mg tablet TAKE 1 TABLET BY MOUTH NIGHTLY AS NEEDED FOR SLEEP *MAX DAILY AMOUNT 10MG* 30 Tab 3    lidocaine-prilocaine (EMLA) topical cream APPLY OVER PORT 1 HOUR PRIOR TO CHEMOTHERAPY THAN COVER WITH SARAN WRAP 30 g 6    magic mouthwash solution Take 5 mL by mouth three (3) times daily as needed for Stomatitis. Magic mouth wash   Maalox  Lidocaine 2% viscous   Diphenhydramine oral solution     Pharmacy to mix equal portions of ingredients to a total volume as indicated in the dispense amount. 236 mL 0    celecoxib (CELEBREX) 200 mg capsule Take  by mouth two (2) times a day.  furosemide (LASIX) 40 mg tablet Take  by mouth daily.  dronabinol (MARINOL) 5 mg capsule Take 1 Cap by mouth two (2) times a day. 60 Cap 1    gabapentin (NEURONTIN) 300 mg capsule Take 300 mg by mouth three (3) times daily.  KLOR-CON M20 20 mEq tablet TAKE ONE TABLET BY MOUTH ONCE A DAY 30 Tab 3    aluminum-magnesium hydroxide 200-200 mg/5 mL susp 5 mL, diphenhydrAMINE 12.5 mg/5 mL liqd 12.5 mg, lidocaine 2 % soln 5 mL 5 mL by Swish and Spit route two (2) times a day. Magic mouth wash   Maalox  Lidocaine 2% viscous   Diphenhydramine oral solution     Pharmacy to mix equal portions of ingredients to a total volume as indicated in the dispense amount.  240 mL 1    potassium chloride (K-DUR, KLOR-CON) 20 mEq tablet Take 2 Tabs by mouth daily. 30 Tab 3    albuterol (PROAIR HFA) 90 mcg/actuation inhaler 1-2 puffs every 4-6 hrs. 1 Inhaler 1    senna (SENNA) 8.6 mg tablet Take 1 Tab by mouth daily.  cyclobenzaprine (FLEXERIL) 5 mg tablet       nabumetone (RELAFEN) 750 mg tablet       ondansetron hcl (ZOFRAN) 8 mg tablet Take 1 Tab by mouth every eight (8) hours as needed for Nausea. 30 Tab 3    IRON AG&FUM/C/FA/MV CMB11/CA-T (PRUVATE 21-7 PO) Take  by mouth. Allergies   Allergen Reactions    Aspirin Swelling     Pt states her whole body swells when she takes aspirin.  Adhesive Unable to Obtain    Pcn [Penicillins] Rash       REVIEW OF SYSTEMS    Constitutional: Negative for fever, chills, or weight change. Respiratory: Negative for cough or shortness of breath. Cardiovascular: Negative for chest pain or palpitations. Gastrointestinal: Negative for acid reflux, change in bowel habits, or constipation. Genitourinary: Negative for dysuria and flank pain. Musculoskeletal: Positive for lumbar pain and bilateral leg weakness. Skin: Negative for rash. Neurological: Negative for headaches, dizziness, or numbness. Endo/Heme/Allergies: Negative for increased bruising. Psychiatric/Behavioral: Negative for difficulty with sleep. PHYSICAL EXAMINATION  Visit Vitals    BP 96/52    Pulse (!) 59    Temp 97.7 °F (36.5 °C) (Oral)    Resp 18    Ht 5' 6\" (1.676 m)       Constitutional: Awake, alert, and in no acute distress. Neurological: 1+ symmetrical DTRs in the lower extremities. Sensation to light touch is intact. Skin: warm, dry, and intact. Musculoskeletal: Very tight across the upper trapezius. No pain with extension, axial loading, or forward flexion. No pain with internal or external rotation of her hips. Negative straight leg raise bilaterally. Patient ambulates with the assistance of a single point cane.      Hip Flex  Quads Hamstrings Ankle DF EHL Ankle PF   Right +3/5 +3/5 +3/5 +4/5 +4/5 +4/5   Left -4/5 -4/5 -4/5 +4/5 +4/5 +4/5     IMAGING:    Lumbar spine MRI from 07/12/2017 was personally reviewed with the patient and demonstrated:    Results from [place] encounter on 07/12/17   MRI LUMB SPINE WO CONT   Narrative EXAMINATION: MRI lumbar spine without contrast    INDICATION: Worsening lumbar pain, right leg pain, weakness    COMPARISON: None    TECHNIQUE: Sagittal and axial multiecho sequences of the lumbar spine performed  without contrast.    FINDINGS:    General: Vertebral body heights preserved. Advanced disc space loss at L3-L4 and  L4-L5 with chronic-appearing endplate changes. Endplate edema at R8-Y3, likely  degenerative. Trace L3 on L4 anterolisthesis. Lumbar lordosis maintained. Conus  ends at level L1. No abnormal signal in the distal cord. No abnormal epidural  collection identified. No suspicious marrow signal.    Miscellaneous: Surrounding soft tissues unremarkable. Levels:    T10-T11, T11-T12, T12-L1: Evaluated sagittal plane only. No significant  stenosis. Minimal disc bulge at T11-T12. L1-L2: No significant degenerative change or stenosis. L2-L3: Mild disc bulge. Mild facet arthropathy. Mild spinal canal stenosis with  perhaps mild abutment upon the right L3 nerve root in the lateral recess. Mild  bilateral foraminal stenoses. L3-L4: Grade 1 anterolisthesis with uncovering of disc and moderate disc bulge. Moderate facet arthropathy with ligamentum flavum bulge. Severe spinal canal  stenosis. Severe right and mild left foraminal stenosis. L4-L5: Disc space loss with mild disc bulge. Suggestion of laminotomy and  partial solid facet fusion. Spinal canal patent. Mild bilateral foraminal  stenoses. L5-S1: Minimal disc bulge. Mild facet arthropathy. Spinal canal patent. Minimal  foraminal narrowing bilaterally. Imaged sacrum: Unremarkable. Impression IMPRESSION:      1.  L3-L4 degenerative changes characterized by anterolisthesis, disc bulge, and  facet arthropathy results in severe spinal canal stenosis and severe right  foraminal stenosis. -L2-L3 mild spinal canal stenosis and mild foraminal stenoses. -L4-L5 postsurgical changes with patent spinal canal and mild foraminal  stenoses.    -Lesser degree degenerative changes and stenosis elsewhere. See level by level  details described above. Written by Kathleen Abdi, as dictated by Danette Officer, MD.  I, Dr. Samaniego Officer confirm that all documentation is accurate.

## 2017-12-08 ENCOUNTER — OFFICE VISIT (OUTPATIENT)
Dept: ORTHOPEDIC SURGERY | Age: 67
End: 2017-12-08

## 2017-12-08 ENCOUNTER — HOSPITAL ENCOUNTER (OUTPATIENT)
Dept: INFUSION THERAPY | Age: 67
Discharge: HOME | End: 2017-12-08
Payer: MEDICARE

## 2017-12-08 VITALS
HEIGHT: 66 IN | DIASTOLIC BLOOD PRESSURE: 55 MMHG | TEMPERATURE: 97.4 F | HEART RATE: 64 BPM | RESPIRATION RATE: 17 BRPM | SYSTOLIC BLOOD PRESSURE: 110 MMHG | OXYGEN SATURATION: 87 % | WEIGHT: 167.6 LBS | BODY MASS INDEX: 26.93 KG/M2

## 2017-12-08 VITALS
TEMPERATURE: 97.9 F | RESPIRATION RATE: 18 BRPM | SYSTOLIC BLOOD PRESSURE: 124 MMHG | DIASTOLIC BLOOD PRESSURE: 72 MMHG | HEART RATE: 68 BPM

## 2017-12-08 DIAGNOSIS — M48.062 SPINAL STENOSIS OF LUMBAR REGION WITH NEUROGENIC CLAUDICATION: Primary | ICD-10-CM

## 2017-12-08 LAB
BASO+EOS+MONOS # BLD AUTO: 0.7 K/UL (ref 0–2.3)
BASO+EOS+MONOS # BLD AUTO: 14 % (ref 0.1–17)
DIFFERENTIAL METHOD BLD: ABNORMAL
ERYTHROCYTE [DISTWIDTH] IN BLOOD BY AUTOMATED COUNT: 16.4 % (ref 11.5–14.5)
HCT VFR BLD AUTO: 32.9 % (ref 36–48)
HGB BLD-MCNC: 11 G/DL (ref 12–16)
LYMPHOCYTES # BLD: 1.6 K/UL (ref 1.1–5.9)
LYMPHOCYTES NFR BLD: 33 % (ref 14–44)
MCH RBC QN AUTO: 28.6 PG (ref 25–35)
MCHC RBC AUTO-ENTMCNC: 33.4 G/DL (ref 31–37)
MCV RBC AUTO: 85.5 FL (ref 78–102)
NEUTS SEG # BLD: 2.5 K/UL (ref 1.8–9.5)
NEUTS SEG NFR BLD: 53 % (ref 40–70)
PLATELET # BLD AUTO: 132 K/UL (ref 135–420)
RBC # BLD AUTO: 3.85 M/UL (ref 4.1–5.1)
WBC # BLD AUTO: 4.8 K/UL (ref 4.5–13)

## 2017-12-08 PROCEDURE — 85025 COMPLETE CBC W/AUTO DIFF WBC: CPT | Performed by: INTERNAL MEDICINE

## 2017-12-08 PROCEDURE — 74011250636 HC RX REV CODE- 250/636: Performed by: NURSE PRACTITIONER

## 2017-12-08 PROCEDURE — 74011000258 HC RX REV CODE- 258

## 2017-12-08 PROCEDURE — 85049 AUTOMATED PLATELET COUNT: CPT | Performed by: INTERNAL MEDICINE

## 2017-12-08 PROCEDURE — 36415 COLL VENOUS BLD VENIPUNCTURE: CPT

## 2017-12-08 PROCEDURE — 74011250636 HC RX REV CODE- 250/636

## 2017-12-08 PROCEDURE — 74011000250 HC RX REV CODE- 250

## 2017-12-08 PROCEDURE — 96372 THER/PROPH/DIAG INJ SC/IM: CPT

## 2017-12-08 RX ORDER — WATER FOR INJECTION,STERILE
VIAL (ML) INJECTION
Status: COMPLETED
Start: 2017-12-08 | End: 2017-12-08

## 2017-12-08 RX ADMIN — WATER 1 ML: 1 INJECTION INTRAMUSCULAR; INTRAVENOUS; SUBCUTANEOUS at 08:46

## 2017-12-08 RX ADMIN — ROMIPLOSTIM 207 MCG: 250 INJECTION, POWDER, LYOPHILIZED, FOR SOLUTION SUBCUTANEOUS at 08:45

## 2017-12-08 NOTE — PROGRESS NOTES
[facility] Progress Note    Date: 2017    Name: Noa Ortega    MRN: [id number]         : 1950      Nplate Injection     Ms. Matthew Green arrived to [facility] at 5856. Ms. Matthew Green was assessed and education was provided. Ms. Eulalio Bernal vitals were reviewed. Visit Vitals    /72 (BP 1 Location: Right arm, BP Patient Position: Sitting)    Pulse 68    Temp 97.9 °F (36.6 °C)    Resp 18    Breastfeeding No       Pt was observed for 5 minutes after obtaining vital signs prior to initiating treatment. Blood drawn for CBC via right wrist x 1 attempt per written orders. Guaze and coban applied to the site. Lab results obtained and reviewed. (Preliminary results scanned into media tab.)  Recent Results (from the past 12 hour(s))   CBC WITH 3 PART DIFF    Collection Time: 17  8:28 AM   Result Value Ref Range    WBC 4.8 4.5 - 13.0 K/uL    RBC 3.85 (L) 4.10 - 5.10 M/uL    HGB 11.0 (L) 12.0 - 16 g/dL    HCT 32.9 (L) 36 - 48 %    MCV 85.5 78 - 102 FL    MCH 28.6 25.0 - 35.0 PG    MCHC 33.4 31 - 37 g/dL    RDW 16.4 (H) 11.5 - 14.5 %    NEUTROPHILS 53 40 - 70 %    MIXED CELLS 14 0.1 - 17 %    LYMPHOCYTES 33 14 - 44 %    ABS. NEUTROPHILS 2.5 1.8 - 9.5 K/UL    ABS. MIXED CELLS 0.7 0.0 - 2.3 K/uL    ABS. LYMPHOCYTES 1.6 1.1 - 5.9 K/UL    DF AUTOMATED       Preliminary platelet count= 182,377. Per Nplate protocol, since pt's platelet count is [phone],637 today, pt's dose will remain at 3mcg/kg (3mcg x 69kg = 207mcg). Nplate 631TTS (2.69CN) administered as ordered SQ in patient's right upper arm. No redness or bleeding noted. Ms. Matthew Green was discharged from [place] [address] in stable condition at [phone]. She is to return on 12/15/17 at 0830 for her next appointment.     Jamir Florian RN  2017

## 2017-12-08 NOTE — PROGRESS NOTES
[address] [facility]   Pre-Surgical Worksheet    Patient: Bernice Bee                         MRN: [id number]     Age:  74 y.o.,      Sex: female    YOB: 1950           TASHA: December 8, 2017  PCP: Huseyin Thornton MD    Allergies   Allergen Reactions    Aspirin Swelling     Pt states her whole body swells when she takes aspirin.  Adhesive Unable to Obtain    Pcn [Penicillins] Rash         ICD-10-CM ICD-9-CM    1. Spinal stenosis of lumbar region with neurogenic claudication M48.062 724.03 AMB POC XRAY, SPINE, LUMBOSACRAL; 4+      NM BONE SCAN [facility] BODY       Surgery: L3/4 XLIF, Perc Screws. Pain Assessment   Pain Assessment  12/8/2017   Location of Pain Back   Location Modifiers -   Severity of Pain 10   Quality of Pain Aching   Quality of Pain Comment burning, numbness, tingling   Duration of Pain -   Frequency of Pain Constant   Aggravating Factors (No Data)   Aggravating Factors Comment ongoing    Limiting Behavior -   Relieving Factors (No Data)   Relieving Factors Comment gabapentin and percocet   Result of Injury -   Some recent data might be hidden       Visit Vitals    /55    Pulse 64    Temp 97.4 °F (36.3 °C) (Oral)    Resp 17    Ht 5' 6\" (1.676 m)    Wt 167 lb 9.6 oz (76 kg)    SpO2 (!) 87%    BMI 27.05 kg/m2       ADL Limits: Bathing, Dressing and Cane, Patient states that her current condition limits her walking long distances and standing to long. Spine Surgery?: Yes When 25 years ago    Spinal Injections?: Yes  When 2017. Where: [facility]    Physical Therapy?: No    NSAID's?: No    Pain Medications?: Yes  Type: Lidocaine Patches    In Pain Management: No    Current Outpatient Prescriptions   Medication Sig    oxyCODONE-acetaminophen (PERCOCET) 7.5-325 mg per tablet Take 1 tablet every 6 hours as needed for pain    loratadine 10 mg cap Take 10 mg by mouth daily.  digoxin (LANOXIN) 0.125 mg tablet Take 0.125 mg by mouth daily.     carvedilol (COREG) 3.125 mg tablet Take 3.125 mg by mouth two (2) times daily (with meals).  meloxicam (MOBIC) 7.5 mg tablet Take 7.5 mg by mouth daily.  amiodarone (CORDARONE) 200 mg tablet Take 200 mg by mouth.  lisinopril (PRINIVIL, ZESTRIL) 10 mg tablet Take 10 mg by mouth daily.  gabapentin (NEURONTIN) 300 mg capsule Take 1 po q am and 2 po q pm    rivaroxaban (XARELTO) 10 mg tablet Take 10 mg by mouth daily.  zolpidem (AMBIEN) 10 mg tablet TAKE 1 TABLET BY MOUTH NIGHTLY AS NEEDED FOR SLEEP *MAX DAILY AMOUNT 10MG*    lidocaine-prilocaine (EMLA) topical cream APPLY OVER PORT 1 HOUR PRIOR TO CHEMOTHERAPY THAN COVER WITH SARAN WRAP    magic mouthwash solution Take 5 mL by mouth three (3) times daily as needed for Stomatitis. Magic mouth wash   Maalox  Lidocaine 2% viscous   Diphenhydramine oral solution     Pharmacy to mix equal portions of ingredients to a total volume as indicated in the dispense amount.  celecoxib (CELEBREX) 200 mg capsule Take  by mouth two (2) times a day.  furosemide (LASIX) 40 mg tablet Take  by mouth daily.  dronabinol (MARINOL) 5 mg capsule Take 1 Cap by mouth two (2) times a day.  gabapentin (NEURONTIN) 300 mg capsule Take 300 mg by mouth three (3) times daily.  KLOR-CON M20 20 mEq tablet TAKE ONE TABLET BY MOUTH ONCE A DAY    aluminum-magnesium hydroxide 200-200 mg/5 mL susp 5 mL, diphenhydrAMINE 12.5 mg/5 mL liqd 12.5 mg, lidocaine 2 % soln 5 mL 5 mL by Swish and Spit route two (2) times a day. Magic mouth wash   Maalox  Lidocaine 2% viscous   Diphenhydramine oral solution     Pharmacy to mix equal portions of ingredients to a total volume as indicated in the dispense amount.  potassium chloride (K-DUR, KLOR-CON) 20 mEq tablet Take 2 Tabs by mouth daily.  albuterol (PROAIR HFA) 90 mcg/actuation inhaler 1-2 puffs every 4-6 hrs.  senna (SENNA) 8.6 mg tablet Take 1 Tab by mouth daily.     cyclobenzaprine (FLEXERIL) 5 mg tablet     nabumetone (RELAFEN) 750 mg tablet     ondansetron hcl (ZOFRAN) 8 mg tablet Take 1 Tab by mouth every eight (8) hours as needed for Nausea.  IRON AG&FUM/C/FA/MV CMB11/CA-T (PRUVATE 21-7 PO) Take  by mouth. No current facility-administered medications for this visit.         Past Medical History:   Diagnosis Date    Antineoplastic chemotherapy induced anemia     Arthritis     [facility] ([facility]     Cancer ([facility]     Breast    Chronic ITP (idiopathic thrombocytopenia) (HCC) 10/31/2016    Diabetes (HCC)     borderline, no meds    Esophageal cancer ([facility]     treated with chemo       Past Surgical History:   Procedure Laterality Date    BREAST SURGERY PROCEDURE UNLISTED      COLONOSCOPY N/A 7/27/2016    COLONOSCOPY performed [facility] Wally [facility]ina [facility] at [facility] ENDOSCOPY    HX APPENDECTOMY      HX ORTHOPAEDIC      HX VASCULAR ACCESS         Social History     Social History    Marital status:      Spouse name: N/A    Number of children: N/A    Years of education: N/A     Social History Main Topics    Smoking status: Never Smoker    Smokeless tobacco: Never Used    Alcohol use No    Drug use: No    Sexual activity: Not Asked     Other Topics Concern    None     Social History Narrative

## 2017-12-08 NOTE — PROGRESS NOTES
[address]  [address], [address]  [place], [address] [place] [address] Street  Phone: [phone]  Fax: [phone]  INITIAL CONSULTATION  Patient: Olimpia Luevano                MRN: [id number]       SSN: [id number]  YOB: 1950        AGE: 74 y.o. SEX: female  Body mass index is 27.05 kg/(m^2). PCP: Clarence Almanza MD  12/08/17    Chief Complaint   Patient presents with    Back Pain     SC per [facility]         HISTORY OF PRESENT ILLNESS, RADIOGRAPHS, and PLAN:         HISTORY OF PRESENT ILLNESS:  Ms. Tim Holguin is seen today at the request of Dr. Niels Jacob and Dr. Wojciech Lester. Ms. Tim Holguin is a 24-year-old female, cancer survivor with breast cancer in remission 2014. She had lumbar surgery 25 years ago. She has had two years of progressive back pain with six months of really severely progressive symptoms with back and right-sided anterior thigh and leg pain. She has had progressive quadriceps weakness, numbness, and radicular pain. She denies bowel or bladder dysfunction, fever, chills, or night sweats. X-rays demonstrate a progressive degenerative spondylolisthesis at L3-4 with spinal stenosis, disc protrusion, severe bilateral foraminal stenosis, right worse than left due to asymmetric disc space collapse on the right. The patient cannot ambulate outside the household anymore having to use a wheelchair. She has failed conservative treatments. She has been through numerous therapy, injections, medications, and the like. PHYSICAL EXAMINATION:  Her exam, again, is significant for dramatic weakness, 3/5 of her right quadriceps and mild weakness of her right iliopsoas, and numbness to the right anterior thigh, mild hip antalgia. She has 2+ pulses and soft abdomen. She has antalgic range of motion of her lumbar spine.        RADIOGRAPHS:  X-rays demonstrate, over the past six months, progression of an L3-4 degenerative spondylolisthesis with right greater than left disc space collapse. MRI demonstrates spinal stenosis at L3-4. ASSESSMENT/PLAN: We have a patient with this progressive degenerative spondylolisthesis at L3-4, spinal stenosis and spondylolisthesis with progressive neurology. My recommendation would be a decompression and fusion of this segment. Given the totality of her medical history and especially the asymptomatic disc space collapse with severity of the foraminal stenosis and the level of symptoms, I think she would be an excellent candidate for an interbody fusion with percutaneous screws to indirectly reduce her stenosis, restore alignment and provide fixation in a minimally invasive fashion. I fear in her situation, to decompress her would require a total facetectomy on the right, discectomy and even that, she may have pedicle on pedicle type stenosis from the asymptomatic disc space collapse requiring interbody fusion. On the whole, this could all be addressed much better from an XLIF, which would provide both decompression, restoration of disc space height, resolve the foraminal stenosis, and allow us just to do percutaneous screws and resolve all the issues present. I discussed with her the risks, benefits, complications, and alternatives to surgery. The patient wishes to proceed. We will proceed with a decompression and fusion once the appropriate approvals and clearances take place. cc: Stephanie Haywood M.D. Past Medical History:   Diagnosis Date    Antineoplastic chemotherapy induced anemia     Arthritis     [facility] [address]     Cancer ([facility] [address]     Breast    Chronic ITP (idiopathic thrombocytopenia) ([facility] [address] 10/31/2016    Diabetes ([facility] [address]     borderline, no meds    Esophageal cancer ([facility] [address]     treated with chemo       History reviewed. No pertinent family history.     Current Outpatient Prescriptions   Medication Sig Dispense Refill    oxyCODONE-acetaminophen (PERCOCET) 7.5-325 mg per tablet Take 1 tablet every 6 hours as needed for pain 120 Tab 0    loratadine 10 mg cap Take 10 mg by mouth daily.  digoxin (LANOXIN) 0.125 mg tablet Take 0.125 mg by mouth daily.  carvedilol (COREG) 3.125 mg tablet Take 3.125 mg by mouth two (2) times daily (with meals).  meloxicam (MOBIC) 7.5 mg tablet Take 7.5 mg by mouth daily.  amiodarone (CORDARONE) 200 mg tablet Take 200 mg by mouth.  lisinopril (PRINIVIL, ZESTRIL) 10 mg tablet Take 10 mg by mouth daily.  gabapentin (NEURONTIN) 300 mg capsule Take 1 po q am and 2 po q pm 90 Cap 1    rivaroxaban (XARELTO) 10 mg tablet Take 10 mg by mouth daily.  zolpidem (AMBIEN) 10 mg tablet TAKE 1 TABLET BY MOUTH NIGHTLY AS NEEDED FOR SLEEP *MAX DAILY AMOUNT 10MG* 30 Tab 3    lidocaine-prilocaine (EMLA) topical cream APPLY OVER PORT 1 HOUR PRIOR TO CHEMOTHERAPY THAN COVER WITH SARAN WRAP 30 g 6    magic mouthwash solution Take 5 mL by mouth three (3) times daily as needed for Stomatitis. Magic mouth wash   Maalox  Lidocaine 2% viscous   Diphenhydramine oral solution     Pharmacy to mix equal portions of ingredients to a total volume as indicated in the dispense amount. 236 mL 0    celecoxib (CELEBREX) 200 mg capsule Take  by mouth two (2) times a day.  furosemide (LASIX) 40 mg tablet Take  by mouth daily.  dronabinol (MARINOL) 5 mg capsule Take 1 Cap by mouth two (2) times a day. 60 Cap 1    gabapentin (NEURONTIN) 300 mg capsule Take 300 mg by mouth three (3) times daily.  KLOR-CON M20 20 mEq tablet TAKE ONE TABLET BY MOUTH ONCE A DAY 30 Tab 3    aluminum-magnesium hydroxide 200-200 mg/5 mL susp 5 mL, diphenhydrAMINE 12.5 mg/5 mL liqd 12.5 mg, lidocaine 2 % soln 5 mL 5 mL by Swish and Spit route two (2) times a day. Magic mouth wash   Maalox  Lidocaine 2% viscous   Diphenhydramine oral solution     Pharmacy to mix equal portions of ingredients to a total volume as indicated in the dispense amount.  240 mL 1    potassium chloride (K-DUR, KLOR-CON) 20 mEq tablet Take 2 Tabs by mouth daily. 30 Tab 3    albuterol (PROAIR HFA) 90 mcg/actuation inhaler 1-2 puffs every 4-6 hrs. 1 Inhaler 1    senna (SENNA) 8.6 mg tablet Take 1 Tab by mouth daily.  cyclobenzaprine (FLEXERIL) 5 mg tablet       nabumetone (RELAFEN) 750 mg tablet       ondansetron hcl (ZOFRAN) 8 mg tablet Take 1 Tab by mouth every eight (8) hours as needed for Nausea. 30 Tab 3    IRON AG&FUM/C/FA/MV CMB11/CA-T (PRUVATE 21-7 PO) Take  by mouth. Allergies   Allergen Reactions    Aspirin Swelling     Pt states her whole body swells when she takes aspirin.  Adhesive Unable to Obtain    Pcn [Penicillins] Rash       Past Surgical History:   Procedure Laterality Date    BREAST SURGERY PROCEDURE UNLISTED      COLONOSCOPY N/A 7/27/2016    COLONOSCOPY performed [facility] Suzy [facility] at [facility]    HX APPENDECTOMY      HX ORTHOPAEDIC      HX VASCULAR ACCESS         Past Medical History:   Diagnosis Date    Antineoplastic chemotherapy induced anemia     Arthritis     [facility]     Cancer ([facility]     Breast    Chronic ITP (idiopathic thrombocytopenia) ([facility] [address] 10/31/2016    Diabetes ([facility] [address]     borderline, no meds    Esophageal cancer ([facility] [address] [facility]     treated with chemo       Social History     Social History    Marital status:      Spouse name: N/A    Number of children: N/A    Years of education: N/A     Occupational History    Not on file. Social History Main Topics    Smoking status: Never Smoker    Smokeless tobacco: Never Used    Alcohol use No    Drug use: No    Sexual activity: Not on file     Other Topics Concern    Not on file     Social History Narrative           REVIEW OF SYSTEMS:   CONSTITUTIONAL SYMPTOMS:  Negative. EYES:  Negative. EARS, NOSE, THROAT AND MOUTH:  Negative. CARDIOVASCULAR:  Negative. RESPIRATORY:  Negative. GENITOURINARY: Negative. GASTROINTESTINAL:  Negative.    INTEGUMENTARY (SKIN AND/OR BREAST): Negative. MUSCULOSKELETAL: Per HPI.   ENDOCRINE/RHEUMATOLOGIC:  Negative. NEUROLOGICAL:  Per HPI. HEMATOLOGIC/LYMPHATIC:  Negative. ALLERGIC/IMMUNOLOGIC:  Negative. PSYCHIATRIC:  Negative. PHYSICAL EXAMINATION:   Visit Vitals    /55    Pulse 64    Temp 97.4 °F (36.3 °C) (Oral)    Resp 17    Ht 5' 6\" (1.676 m)    Wt 167 lb 9.6 oz (76 kg)    SpO2 (!) 87%    BMI 27.05 kg/m2    PAIN SCALE: 10 - Worst pain ever/10    CONSTITUTIONAL: The patient is in no apparent distress and is alert and oriented x 3. HEENT: Normocephalic. Hearing grossly intact. NECK: Supple and symmetric. no tenderness, or masses were felt. RESPIRATORY: No labored breathing. CARDIOVASCULAR: The carotid pulses were normal. Peripheral pulses were 2+. CHEST: Normal AP diameter and normal contour without any kyphoscoliosis. LYMPHATIC: No lymphadenopathy was appreciated in the neck, axillae or groin. SKIN:  Negative for scars, rashes, lesions, or ulcers on the right upper, right lower, left upper, left lower and trunk. NEUROLOGICAL: Alert and oriented x 3. WC bound. EXTREMITIES:  See musculoskeletal.  MUSCULOSKELETAL:   Head and Neck:  Negative for misalignment, asymmetry, crepitation, defects, tenderness masses or effusions.  Left Upper Extremity: Inspection, percussion and palpation preformed. Cottrells sign is negative.  Right Upper Extremity: Inspection, percussion and palpation preformed. Cottrells sign is negative.  Spine, Ribs and Pelvis: Back pain with radiating RLE pain. Difficulty with standing and walking. Inspection, percussion and palpation preformed. Negative for misalignment, asymmetry, crepitation, defects, tenderness masses or effusions.  Left Lower Extremity: Inspection, percussion and palpation preformed. Negative straight leg raise.  Right Lower Extremity: Radiating pain. Weakness. Inspection, percussion and palpation preformed. Negative straight leg raise.       SPINE EXAM: Lumbar spine: No rash, ecchymosis, or gross obliquity. No focal atrophy is noted. ASSESSMENT    ICD-10-CM ICD-9-CM    1. Spinal stenosis of lumbar region with neurogenic claudication M48.062 724.03 AMB POC XRAY, SPINE, LUMBOSACRAL; 4+       Written by Jessica Joshua, as dictated by Meliza Siddiqui MD.    I, Dr. Meliza Siddiqui MD, confirm that all documentation is accurate.

## 2017-12-13 ENCOUNTER — HOSPITAL ENCOUNTER (OUTPATIENT)
Dept: NUCLEAR MEDICINE | Age: 67
Discharge: HOME | End: 2017-12-13
Attending: ORTHOPAEDIC SURGERY
Payer: MEDICARE

## 2017-12-13 DIAGNOSIS — M48.062 SPINAL STENOSIS OF LUMBAR REGION WITH NEUROGENIC CLAUDICATION: ICD-10-CM

## 2017-12-13 PROCEDURE — 78306 BONE IMAGING WHOLE BODY: CPT

## 2017-12-15 ENCOUNTER — HOSPITAL ENCOUNTER (OUTPATIENT)
Dept: INFUSION THERAPY | Age: 67
Discharge: HOME | End: 2017-12-15
Payer: MEDICARE

## 2017-12-15 VITALS
DIASTOLIC BLOOD PRESSURE: 76 MMHG | HEART RATE: 68 BPM | OXYGEN SATURATION: 100 % | SYSTOLIC BLOOD PRESSURE: 122 MMHG | RESPIRATION RATE: 18 BRPM | TEMPERATURE: 97.6 F

## 2017-12-15 LAB
BASO+EOS+MONOS # BLD AUTO: 1.3 K/UL (ref 0–2.3)
BASO+EOS+MONOS # BLD AUTO: 17 % (ref 0.1–17)
DIFFERENTIAL METHOD BLD: ABNORMAL
ERYTHROCYTE [DISTWIDTH] IN BLOOD BY AUTOMATED COUNT: 16.5 % (ref 11.5–14.5)
HCT VFR BLD AUTO: 33.7 % (ref 36–48)
HGB BLD-MCNC: 10.8 G/DL (ref 12–16)
LYMPHOCYTES # BLD: 2 K/UL (ref 1.1–5.9)
LYMPHOCYTES NFR BLD: 25 % (ref 14–44)
MCH RBC QN AUTO: 28.3 PG (ref 25–35)
MCHC RBC AUTO-ENTMCNC: 32 G/DL (ref 31–37)
MCV RBC AUTO: 88.2 FL (ref 78–102)
NEUTS SEG # BLD: 4.5 K/UL (ref 1.8–9.5)
NEUTS SEG NFR BLD: 58 % (ref 40–70)
PLATELET # BLD AUTO: 78 K/UL (ref 135–420)
RBC # BLD AUTO: 3.82 M/UL (ref 4.1–5.1)
WBC # BLD AUTO: 7.8 K/UL (ref 4.5–13)

## 2017-12-15 PROCEDURE — 74011250636 HC RX REV CODE- 250/636

## 2017-12-15 PROCEDURE — 85025 COMPLETE CBC W/AUTO DIFF WBC: CPT | Performed by: INTERNAL MEDICINE

## 2017-12-15 PROCEDURE — 85049 AUTOMATED PLATELET COUNT: CPT | Performed by: INTERNAL MEDICINE

## 2017-12-15 PROCEDURE — 74011250636 HC RX REV CODE- 250/636: Performed by: INTERNAL MEDICINE

## 2017-12-15 PROCEDURE — 36415 COLL VENOUS BLD VENIPUNCTURE: CPT

## 2017-12-15 PROCEDURE — 96372 THER/PROPH/DIAG INJ SC/IM: CPT

## 2017-12-15 RX ORDER — WATER FOR INJECTION,STERILE
VIAL (ML) INJECTION
Status: DISPENSED
Start: 2017-12-15 | End: 2017-12-15

## 2017-12-15 RX ADMIN — ROMIPLOSTIM 207 MCG: 250 INJECTION, POWDER, LYOPHILIZED, FOR SOLUTION SUBCUTANEOUS at 08:51

## 2017-12-15 NOTE — PROGRESS NOTES
[facility] Progress Note    Date: December 15, 2017    Name: Deepika Yang    MRN: [id number]         : 1950      Nplate Injection     Ms. Brennen Barrera arrived to [facility] at [employer] Ms. Brennen Barrera was assessed and education was provided. Ms. Carlo Fermin vitals were reviewed. Visit Vitals    /76 (BP 1 Location: Right arm, BP Patient Position: Sitting)    Pulse 68    Temp 97.6 °F (36.4 °C)    Resp 18    SpO2 100%       Pt was observed for 5 minutes after obtaining vital signs prior to initiating treatment. Blood drawn for CBC via right wrist x 1 attempt per written orders. Guaze and coban applied to the site. Lab results obtained and reviewed. (Preliminary results scanned into media tab.)  Recent Results (from the past 12 hour(s))   CBC WITH 3 PART DIFF    Collection Time: 12/15/17  8:36 AM   Result Value Ref Range    WBC 7.8 4.5 - 13.0 K/uL    RBC 3.82 (L) 4.10 - 5.10 M/uL    HGB 10.8 (L) 12.0 - 16 g/dL    HCT 33.7 (L) 36 - 48 %    MCV 88.2 78 - 102 FL    MCH 28.3 25.0 - 35.0 PG    MCHC 32.0 31 - 37 g/dL    RDW 16.5 (H) 11.5 - 14.5 %    NEUTROPHILS 58 40 - 70 %    MIXED CELLS 17 0.1 - 17 %    LYMPHOCYTES 25 14 - 44 %    ABS. NEUTROPHILS 4.5 1.8 - 9.5 K/UL    ABS. MIXED CELLS 1.3 0.0 - 2.3 K/uL    ABS. LYMPHOCYTES 2.0 1.1 - 5.9 K/UL    DF AUTOMATED       Preliminary platelet count= 20,976. Per Nplate protocol, since pt's platelet count is 70,102 today, pt's dose will remain at 3mcg/kg (3mcg x 69kg = 207mcg). Nplate 618ENK (7.27LD) administered as ordered SQ in patient's right upper arm. No redness or bleeding noted. Ms. Brennen Barrera was discharged from [place] [address] in stable condition at 0900. She is to return on 17 at 0830 for her next appointment.     Maria A Buenrostro RN  December 15, 2017

## 2017-12-18 ENCOUNTER — HOSPITAL ENCOUNTER (OUTPATIENT)
Dept: ONCOLOGY | Age: 67
Discharge: HOME | End: 2017-12-18

## 2017-12-18 ENCOUNTER — HOSPITAL ENCOUNTER (OUTPATIENT)
Dept: LAB | Age: 67
Discharge: HOME | End: 2017-12-18
Payer: MEDICARE

## 2017-12-18 ENCOUNTER — OFFICE VISIT (OUTPATIENT)
Dept: ONCOLOGY | Age: 67
End: 2017-12-18

## 2017-12-18 VITALS
WEIGHT: 165.8 LBS | DIASTOLIC BLOOD PRESSURE: 69 MMHG | TEMPERATURE: 97.1 F | HEART RATE: 74 BPM | BODY MASS INDEX: 26.76 KG/M2 | SYSTOLIC BLOOD PRESSURE: 116 MMHG

## 2017-12-18 DIAGNOSIS — E55.9 VITAMIN D DEFICIENCY: ICD-10-CM

## 2017-12-18 DIAGNOSIS — D69.3 CHRONIC ITP (IDIOPATHIC THROMBOCYTOPENIA) (HCC): Primary | ICD-10-CM

## 2017-12-18 DIAGNOSIS — F51.01 PRIMARY INSOMNIA: ICD-10-CM

## 2017-12-18 DIAGNOSIS — C50.912 CARCINOMA OF LEFT BREAST METASTATIC TO AXILLARY LYMPH NODE (HCC): ICD-10-CM

## 2017-12-18 DIAGNOSIS — D69.3 CHRONIC ITP (IDIOPATHIC THROMBOCYTOPENIA) (HCC): ICD-10-CM

## 2017-12-18 DIAGNOSIS — R60.0 BILATERAL LOWER EXTREMITY EDEMA: ICD-10-CM

## 2017-12-18 DIAGNOSIS — D50.8 IRON DEFICIENCY ANEMIA SECONDARY TO INADEQUATE DIETARY IRON INTAKE: ICD-10-CM

## 2017-12-18 DIAGNOSIS — R53.1 WEAKNESS: ICD-10-CM

## 2017-12-18 DIAGNOSIS — R64 CACHEXIA (HCC): ICD-10-CM

## 2017-12-18 DIAGNOSIS — D64.9 CHRONIC ANEMIA: ICD-10-CM

## 2017-12-18 DIAGNOSIS — C77.3 CARCINOMA OF LEFT BREAST METASTATIC TO AXILLARY LYMPH NODE (HCC): ICD-10-CM

## 2017-12-18 LAB
ALBUMIN SERPL-MCNC: 3.6 G/DL (ref 3.4–5)
ALBUMIN/GLOB SERPL: 1 {RATIO} (ref 0.8–1.7)
ALP SERPL-CCNC: 94 U/L (ref 45–117)
ALT SERPL-CCNC: 16 U/L (ref 13–56)
ANION GAP SERPL CALC-SCNC: 9 MMOL/L (ref 3–18)
AST SERPL-CCNC: 24 U/L (ref 15–37)
BASO+EOS+MONOS # BLD AUTO: 1.2 K/UL (ref 0–2.3)
BASO+EOS+MONOS # BLD AUTO: 17 % (ref 0.1–17)
BILIRUB SERPL-MCNC: 0.3 MG/DL (ref 0.2–1)
BUN SERPL-MCNC: 22 MG/DL (ref 7–18)
BUN/CREAT SERPL: 24 (ref 12–20)
CALCIUM SERPL-MCNC: 8.6 MG/DL (ref 8.5–10.1)
CHLORIDE SERPL-SCNC: 104 MMOL/L (ref 100–108)
CO2 SERPL-SCNC: 28 MMOL/L (ref 21–32)
CREAT SERPL-MCNC: 0.9 MG/DL (ref 0.6–1.3)
DIFFERENTIAL METHOD BLD: ABNORMAL
ERYTHROCYTE [DISTWIDTH] IN BLOOD BY AUTOMATED COUNT: 16.6 % (ref 11.5–14.5)
FERRITIN SERPL-MCNC: 425 NG/ML (ref 8–388)
GLOBULIN SER CALC-MCNC: 3.7 G/DL (ref 2–4)
GLUCOSE SERPL-MCNC: 74 MG/DL (ref 74–99)
HCT VFR BLD AUTO: 32.3 % (ref 36–48)
HGB BLD-MCNC: 10.3 G/DL (ref 12–16)
IRON SATN MFR SERPL: 40 %
IRON SERPL-MCNC: 75 UG/DL (ref 50–175)
LYMPHOCYTES # BLD: 1.5 K/UL (ref 1.1–5.9)
LYMPHOCYTES NFR BLD: 23 % (ref 14–44)
MCH RBC QN AUTO: 27.8 PG (ref 25–35)
MCHC RBC AUTO-ENTMCNC: 31.9 G/DL (ref 31–37)
MCV RBC AUTO: 87.3 FL (ref 78–102)
NEUTS SEG # BLD: 4.1 K/UL (ref 1.8–9.5)
NEUTS SEG NFR BLD: 60 % (ref 40–70)
PLATELET # BLD AUTO: 105 K/UL (ref 135–420)
POTASSIUM SERPL-SCNC: 3.7 MMOL/L (ref 3.5–5.5)
PROT SERPL-MCNC: 7.3 G/DL (ref 6.4–8.2)
RBC # BLD AUTO: 3.7 M/UL (ref 4.1–5.1)
SODIUM SERPL-SCNC: 141 MMOL/L (ref 136–145)
TIBC SERPL-MCNC: 186 UG/DL (ref 250–450)
WBC # BLD AUTO: 6.8 K/UL (ref 4.5–13)

## 2017-12-18 PROCEDURE — 80053 COMPREHEN METABOLIC PANEL: CPT | Performed by: INTERNAL MEDICINE

## 2017-12-18 PROCEDURE — 82306 VITAMIN D 25 HYDROXY: CPT | Performed by: INTERNAL MEDICINE

## 2017-12-18 PROCEDURE — 83540 ASSAY OF IRON: CPT | Performed by: INTERNAL MEDICINE

## 2017-12-18 PROCEDURE — 82728 ASSAY OF FERRITIN: CPT | Performed by: INTERNAL MEDICINE

## 2017-12-18 PROCEDURE — 86300 IMMUNOASSAY TUMOR CA 15-3: CPT | Performed by: INTERNAL MEDICINE

## 2017-12-18 RX ORDER — OXYCODONE AND ACETAMINOPHEN 7.5; 325 MG/1; MG/1
TABLET ORAL
Qty: 120 TAB | Refills: 0 | Status: SHIPPED | OUTPATIENT
Start: 2017-12-18 | End: 2018-01-13

## 2017-12-18 NOTE — PATIENT INSTRUCTIONS
Breast Cancer: Care Instructions  Your Care Instructions    Breast cancer occurs when abnormal cells grow out of control in the breast. These cancer cells can spread within the breast, to nearby lymph nodes and other tissues, and to other parts of the body. Being treated for cancer can weaken your body, and you may feel very tired. Get the rest your body needs so you can feel better. Finding out that you have cancer is scary. You may feel many emotions and may need some help coping. Seek out family, friends, and counselors for support. You also can do things at home to make yourself feel better while you go through treatment. Call the [employer] (4-518.973.5500) or visit its website at [employer] for more information. Follow-up care is a key part of your treatment and safety. Be sure to make and go to all appointments, and call your doctor if you are having problems. It's also a good idea to know your test results and keep a list of the medicines you take. How can you care for yourself at home? · Take your medicines exactly as prescribed. Call your doctor if you think you are having a problem with your medicine. You may get medicine for nausea and vomiting if you have these side effects. · Follow your doctor's instructions to relieve pain. Pain from cancer and surgery can almost always be controlled. Use pain medicine when you first notice pain, before it becomes severe. · Eat healthy food. If you do not feel like eating, try to eat food that has protein and extra calories to keep up your strength and prevent weight loss. Drink liquid meal replacements for extra calories and protein. Try to eat your main meal early. · Get some physical activity every day, but do not get too tired. Keep doing the hobbies you enjoy as your energy allows. · Do not smoke. Smoking can make your cancer worse. If you need help quitting, talk to your doctor about stop-smoking programs and medicines.  These can increase your chances of quitting for good. · Take steps to control your stress and workload. Learn relaxation techniques. ¨ Share your feelings. Stress and tension affect our emotions. By expressing your feelings to others, you may be able to understand and cope with them. ¨ Consider joining a support group. Talking about a problem with your spouse, a good friend, or other people with similar problems is a good way to reduce tension and stress. ¨ Express yourself through art. Try writing, crafts, dance, or art to relieve stress. Some dance, writing, or art groups may be available just for people who have cancer. ¨ Be kind to your body and mind. Getting enough sleep, eating a healthy diet, and taking time to do things you enjoy can contribute to an overall feeling of balance in your life and can help reduce stress. ¨ Get help if you need it. Discuss your concerns with your doctor or counselor. · If you are vomiting or have diarrhea:  ¨ Drink plenty of fluids (enough so that your urine is light yellow or clear like water) to prevent dehydration. Choose water and other caffeine-free clear liquids. If you have kidney, heart, or liver disease and have to limit fluids, talk with your doctor before you increase the amount of fluids you drink. ¨ When you are able to eat, try clear soups, mild foods, and liquids until all symptoms are gone for 12 to 48 hours. Other good choices include dry toast, crackers, cooked cereal, and gelatin dessert, such as Jell-O.  · If you have not already done so, prepare a list of advance directives. Advance directives are instructions to your doctor and family members about what kind of care you want if you become unable to speak or express yourself. When should you call for help? Call 911 anytime you think you may need emergency care. For example, call if:  ? · You passed out (lost consciousness). ?Call your doctor now or seek immediate medical care if:  ? · You have a fever. ? · You have abnormal bleeding. ? · You think you have an infection. ? · You have new or worse pain. ? · You have new symptoms, such as a cough, belly pain, vomiting, diarrhea, or a rash. ? Watch closely for changes in your health, and be sure to contact your doctor if:  ? · You are much more tired than usual.   ? · You have swollen glands in your armpits, groin, or neck. ? · You do not get better as expected. Where can you learn more? Go to http://leon-edinson.info/. Enter V321 in the search box to learn more about \"Breast Cancer: Care Instructions. \"  Current as of: May 12, 2017  Content Version: 11.4  © 1099-8852 [employer]. Care instructions adapted under license by [employer] (which disclaims liability or warranty for this information). If you have questions about a medical condition or this instruction, always ask your healthcare professional. [address] any warranty or liability for your use of this information.

## 2017-12-18 NOTE — PROGRESS NOTES
Hematology/Oncology  Progress Note    Name: Favian Rutherford  Date: 2017  : 1950    PCP: Talha Oquendo MD     Ms. Meggan Ramos is a 74 y.o. female who was seen for management of her slowly progressive ITP and metastatic breast cancer with associated iron deficiency anemia. Current therapy: Active surveillance regarding breast cancer: N-plate as needed as a primary treatment for ITP  Subjective:     Ms. Meggan Ramos is a 62-year-old -American woman with history of metastatic breast cancer. The patient reports that she has been doing reasonably well. She has gained about 30 pounds weight over the past few months, due to the steroid that she has to take for her ITP. She is now actively trying to lose some weight. She denies having any unexplained bruising or bleeding. She continues to have arthritic discomfort in her joints and is continuing to use her cane for mobility support. The patient informed me that she is currently seeing a spine specialist and may need to get steroid injections or subdural injection to control the pain. She is continuing to take the n-plate as the primary treatment modality for her ITP. She is tolerating it well and is happy that her platelet counts have continued to improve. At her last appointment she was complaining of dizziness or vertigo and got a very good response to the use of Antivert. She continues to use the Antivert on a as needed basis. Today she reports that she does have a bruise on her right lower leg. She cannot recall any trauma however. Past medical history, family history, and social history: these were reviewed and remains unchanged.     Past Medical History:   Diagnosis Date    Antineoplastic chemotherapy induced anemia     Arthritis     [facility] ([facility]     Cancer ([facility]     Breast    Chronic ITP (idiopathic thrombocytopenia) ([facility] 10/31/2016    Diabetes ([facility] [facility]     borderline, no meds    Esophageal cancer ([facility] [address] [facility]     treated with chemo Past Surgical History:   Procedure Laterality Date    BREAST SURGERY PROCEDURE UNLISTED      COLONOSCOPY N/A 7/27/2016    COLONOSCOPY performed by Alisa Carl MD at [facility]    HX APPENDECTOMY      HX ORTHOPAEDIC      HX VASCULAR ACCESS       Social History     Social History    Marital status:      Spouse name: N/A    Number of children: N/A    Years of education: N/A     Occupational History    Not on file. Social History Main Topics    Smoking status: Never Smoker    Smokeless tobacco: Never Used    Alcohol use No    Drug use: No    Sexual activity: Not on file     Other Topics Concern    Not on file     Social History Narrative     No family history on file. Current Outpatient Prescriptions   Medication Sig Dispense Refill    oxyCODONE-acetaminophen (PERCOCET) 7.5-325 mg per tablet Take 1 tablet every 6 hours as needed for pain 120 Tab 0    zolpidem (AMBIEN) 10 mg tablet TAKE 1 TABLET BY MOUTH NIGHTLY AS NEEDED FOR SLEEP. MAX DAILY AMOUNT 10 MG 30 Tab 2    loratadine 10 mg cap Take 10 mg by mouth daily.  digoxin (LANOXIN) 0.125 mg tablet Take 0.125 mg by mouth daily.  carvedilol (COREG) 3.125 mg tablet Take 3.125 mg by mouth two (2) times daily (with meals).  meloxicam (MOBIC) 7.5 mg tablet Take 7.5 mg by mouth daily.  amiodarone (CORDARONE) 200 mg tablet Take 200 mg by mouth.  lisinopril (PRINIVIL, ZESTRIL) 10 mg tablet Take 10 mg by mouth daily.  gabapentin (NEURONTIN) 300 mg capsule Take 1 po q am and 2 po q pm 90 Cap 1    rivaroxaban (XARELTO) 10 mg tablet Take 10 mg by mouth daily.  lidocaine-prilocaine (EMLA) topical cream APPLY OVER PORT 1 HOUR PRIOR TO CHEMOTHERAPY THAN COVER WITH SARAN WRAP 30 g 6    magic mouthwash solution Take 5 mL by mouth three (3) times daily as needed for Stomatitis.  Magic mouth wash   Maalox  Lidocaine 2% viscous   Diphenhydramine oral solution     Pharmacy to mix equal portions of ingredients to a total volume as indicated in the dispense amount. 236 mL 0    celecoxib (CELEBREX) 200 mg capsule Take  by mouth two (2) times a day.  furosemide (LASIX) 40 mg tablet Take  by mouth daily.  dronabinol (MARINOL) 5 mg capsule Take 1 Cap by mouth two (2) times a day. 60 Cap 1    gabapentin (NEURONTIN) 300 mg capsule Take 300 mg by mouth three (3) times daily.  KLOR-CON M20 20 mEq tablet TAKE ONE TABLET BY MOUTH ONCE A DAY 30 Tab 3    aluminum-magnesium hydroxide 200-200 mg/5 mL susp 5 mL, diphenhydrAMINE 12.5 mg/5 mL liqd 12.5 mg, lidocaine 2 % soln 5 mL 5 mL by Swish and Spit route two (2) times a day. Magic mouth wash   Maalox  Lidocaine 2% viscous   Diphenhydramine oral solution     Pharmacy to mix equal portions of ingredients to a total volume as indicated in the dispense amount. 240 mL 1    potassium chloride (K-DUR, KLOR-CON) 20 mEq tablet Take 2 Tabs by mouth daily. 30 Tab 3    albuterol (PROAIR HFA) 90 mcg/actuation inhaler 1-2 puffs every 4-6 hrs. 1 Inhaler 1    senna (SENNA) 8.6 mg tablet Take 1 Tab by mouth daily.  cyclobenzaprine (FLEXERIL) 5 mg tablet       nabumetone (RELAFEN) 750 mg tablet       ondansetron hcl (ZOFRAN) 8 mg tablet Take 1 Tab by mouth every eight (8) hours as needed for Nausea. 30 Tab 3    IRON AG&FUM/C/FA/MV CMB11/CA-T (PRUVATE 21-7 PO) Take  by mouth. Review of Systems  Constitutional: The patient has no acute distress or discomfort. HEENT: The patient denies recent head trauma, eye pain, blurred vision,  hearing deficit, oropharyngeal mucosal pain or lesions, and the patient denies throat pain or discomfort. Lymphatics: The patient denies palpable peripheral lymphadenopathy. Hematologic: The patient denies having bruising, bleeding, or progressive fatigue. Respiratory: Patient denies having shortness of breath, cough, sputum production, fever, or dyspnea on exertion. Cardiovascular:  The patient denies having leg pain, leg swelling, heart palpitations, chest permit, chest pain, or lightheadedness. The patient denies having dyspnea on exertion. Gastrointestinal: The patient denies having nausea, emesis, or diarrhea. The patient denies having any hematemesis or blood in the stool. Genitourinary: Patient denies having urinary urgency, frequency, or dysuria. The patient denies having blood in the urine. Psychological: The patient denies having symptoms of nervousness, anxiety, depression, or thoughts of harming self. Skin: Patient denies having skin rashes, skin, ulcerations, or unexplained itching or pruritus. Musculoskeletal: The patient used to complain of arthritic discomfort in her joints particularly the knees, hips, and shoulders. The patient reports that she has a large bruise on her right lower extremity. Objective:     Visit Vitals    /69 (BP 1 Location: Right arm, BP Patient Position: Sitting)    Pulse 74    Temp 97.1 °F (36.2 °C) (Oral)    Wt 75.2 kg (165 lb 12.8 oz)    BMI 26.76 kg/m2     ECOG PS=1, pain score=0/10  Physical Exam:   Gen. Appearance: The patient is in no acute distress. Skin: There is no bruise or rash. HEENT: The exam is unremarkable. Neck: Supple without lymphadenopathy or thyromegaly. Lungs: Clear to auscultation and percussion; there are no wheezes or rhonchi. Heart: Regular rate and rhythm; there are no murmurs, gallops, or rubs. Abdomen: Bowel sounds are present and normal.  There is no guarding, tenderness, or hepatosplenomegaly. Extremities: There is no clubbing, cyanosis, or edema. There is a 6 cm area of bruising on the right anterior lateral calf. There is no evidence of skin breakdown or ulceration. Neurologic: There are no focal neurologic deficits. Lymphatics: There is no palpable peripheral lymphadenopathy. Musculoskeletal: The patient has full range of motion at all joints. There is no evidence of joint deformity or effusions.   There is no focal joint tenderness. Psychological/psychiatric: There is no clinical evidence of anxiety, depression, or melancholy. Lab data:      Results for orders placed or performed during the hospital encounter of 12/18/17   CBC WITH 3 PART DIFF     Status: Abnormal   Result Value Ref Range Status    WBC 6.8 4.5 - 13.0 K/uL Final    RBC 3.70 (L) 4.10 - 5.10 M/uL Final    HGB 10.3 (L) 12.0 - 16 g/dL Final    HCT 32.3 (L) 36 - 48 % Final    MCV 87.3 78 - 102 FL Final    MCH 27.8 25.0 - 35.0 PG Final    MCHC 31.9 31 - 37 g/dL Final    RDW 16.6 (H) 11.5 - 14.5 % Final    NEUTROPHILS 60 40 - 70 % Final    MIXED CELLS 17 0.1 - 17 % Final    LYMPHOCYTES 23 14 - 44 % Final    ABS. NEUTROPHILS 4.1 1.8 - 9.5 K/UL Final    ABS. MIXED CELLS 1.2 0.0 - 2.3 K/uL Final    ABS. LYMPHOCYTES 1.5 1.1 - 5.9 K/UL Final     Comment: Test performed at [address] Results Reviewed by Medical Director. DF AUTOMATED   Final      The preliminary CBC from today, 12/18/2017, shows that her WBC count is 6.8, hemoglobin is 10.3 g/dL, hematocrit 32.3%, and the platelet count is 03,318. Assessment:     1. Chronic ITP (idiopathic thrombocytopenia) (HCC)    2. Carcinoma of left breast metastatic to axillary lymph node ([facility] [address]    3. Vitamin D deficiency    4. Cachexia ([address]    5. Weakness    6. Bilateral lower extremity edema    7. Iron deficiency anemia secondary to inadequate dietary iron intake    8. Chronic anemia    9. Primary insomnia          Plan:   Chronic ITP/thrombocytopenia (progression of disease): I have informed the patient that her CBC today shows that her preliminary platelet count is 65,262. At this time I am recommending we continue the n-Plate at a dose of 1 mcg/kg subcutaneous weekly. At this time I will recheck her platelet level will plan to see her back in clinic in about 8 weeks. Vitamin D deficiency: The patient recently completed vitamin D 50,000 units for 12 weeks.   At this time I will recheck a vitamin D level. If her vitamin D 25 is low she will be restarted on vitamin D 50,000 units weekly for an additional 12 weeks. Iron deficiency anemia/chronic anemia: The current CBC shows a WBC count of 6.8, the hemoglobin is 10.3 g/dL, and the hematocrit is 32.3%. I have recommended that the patient continue taking the ferrous sulfate 1 tablet twice daily. At this time I will recheck her iron profile and ferritin levels. Bilateral lower extremity edema: The leg edema has significantly improved. Metastatic breast cancer, left breast metastasized to lymph nodes and other sites: At this time I will check a CA-27-29 level. The most recent CA-35-27 level was normal.  Clinically the patient has no evidence of disease progression. .    Cachexia, weakness, and muscular deconditioning: I am recommending that she continue physical therapy 4 times weekly. She will continue to use a walker or cane as needed for mobility support. Today, she is using a cane and is not reporting any instability. Primary insomnia: The patient was instructed to continue to use the Ambien 10 mg at bedtime. Vertigo (controlled problem): I have recommended that she continue to use the Antivert 12.5 mg every 8 hours, as needed. .  I have advised the patient to take this for 3-5 days as needed, whenever she develops vertigo or dizziness. .    I will see her back in clinic for complete assessment again in 12 weeks.   Orders Placed This Encounter    COMPLETE CBC & AUTO DIFF WBC    InHouse CBC ([place] [place])     Standing Status:   Future     Number of Occurrences:   1     Standing Expiration Date:   60/02/9423    METABOLIC PANEL, COMPREHENSIVE     Standing Status:   Future     Number of Occurrences:   1     Standing Expiration Date:   12/19/2018    IRON PROFILE     Standing Status:   Future     Number of Occurrences:   1     Standing Expiration Date:   12/19/2018    FERRITIN     Standing Status:   Future     Number of Occurrences:   1 Standing Expiration Date:   12/19/2018    CA 27.29     Standing Status:   Future     Number of Occurrences:   1     Standing Expiration Date:   12/19/2018    VITAMIN D, 25 HYDROXY     Standing Status:   Future     Number of Occurrences:   1     Standing Expiration Date:   12/19/2018    oxyCODONE-acetaminophen (PERCOCET) 7.5-325 mg per tablet     Sig: Take 1 tablet every 6 hours as needed for pain     Dispense:  120 Tab     Refill:  0       Shanel Ray MD  12/18/2017      Please note: This document has been produced using voice recognition software. Unrecognized errors in transcription may be present.

## 2017-12-19 LAB
25(OH)D3 SERPL-MCNC: 24.9 NG/ML (ref 30–100)
CANCER AG27-29 SERPL-ACNC: 34.7 U/ML (ref 0–38.6)

## 2017-12-19 RX ORDER — ZOLPIDEM TARTRATE 10 MG/1
TABLET ORAL
Qty: 30 TAB | Refills: 2 | Status: SHIPPED | OUTPATIENT
Start: 2017-12-19 | End: 2017-12-21 | Stop reason: SDUPTHER

## 2017-12-21 DIAGNOSIS — F40.9 INSOMNIA DUE TO ANXIETY AND FEAR: Primary | ICD-10-CM

## 2017-12-21 DIAGNOSIS — F51.05 INSOMNIA DUE TO ANXIETY AND FEAR: Primary | ICD-10-CM

## 2017-12-21 RX ORDER — ZOLPIDEM TARTRATE 10 MG/1
TABLET ORAL
Qty: 30 TAB | Refills: 2 | Status: SHIPPED | OUTPATIENT
Start: 2017-12-21 | End: 2018-05-08 | Stop reason: SDUPTHER

## 2017-12-22 ENCOUNTER — HOSPITAL ENCOUNTER (OUTPATIENT)
Dept: INFUSION THERAPY | Age: 67
Discharge: HOME | End: 2017-12-22
Payer: MEDICARE

## 2017-12-22 VITALS
TEMPERATURE: 97.8 F | DIASTOLIC BLOOD PRESSURE: 66 MMHG | SYSTOLIC BLOOD PRESSURE: 98 MMHG | RESPIRATION RATE: 18 BRPM | HEART RATE: 76 BPM | OXYGEN SATURATION: 98 %

## 2017-12-22 LAB
BASO+EOS+MONOS # BLD AUTO: 1 K/UL (ref 0–2.3)
BASO+EOS+MONOS # BLD AUTO: 16 % (ref 0.1–17)
DIFFERENTIAL METHOD BLD: ABNORMAL
ERYTHROCYTE [DISTWIDTH] IN BLOOD BY AUTOMATED COUNT: 16.6 % (ref 11.5–14.5)
HCT VFR BLD AUTO: 33.3 % (ref 36–48)
HGB BLD-MCNC: 10.8 G/DL (ref 12–16)
LYMPHOCYTES # BLD: 1.7 K/UL (ref 1.1–5.9)
LYMPHOCYTES NFR BLD: 29 % (ref 14–44)
MCH RBC QN AUTO: 28.3 PG (ref 25–35)
MCHC RBC AUTO-ENTMCNC: 32.4 G/DL (ref 31–37)
MCV RBC AUTO: 87.4 FL (ref 78–102)
NEUTS SEG # BLD: 3.4 K/UL (ref 1.8–9.5)
NEUTS SEG NFR BLD: 56 % (ref 40–70)
PLATELET # BLD AUTO: 141 K/UL (ref 135–420)
RBC # BLD AUTO: 3.81 M/UL (ref 4.1–5.1)
WBC # BLD AUTO: 6.1 K/UL (ref 4.5–13)

## 2017-12-22 PROCEDURE — 74011250636 HC RX REV CODE- 250/636: Performed by: NURSE PRACTITIONER

## 2017-12-22 PROCEDURE — 96372 THER/PROPH/DIAG INJ SC/IM: CPT

## 2017-12-22 PROCEDURE — 85049 AUTOMATED PLATELET COUNT: CPT | Performed by: INTERNAL MEDICINE

## 2017-12-22 PROCEDURE — 85025 COMPLETE CBC W/AUTO DIFF WBC: CPT | Performed by: INTERNAL MEDICINE

## 2017-12-22 PROCEDURE — 36415 COLL VENOUS BLD VENIPUNCTURE: CPT

## 2017-12-22 RX ORDER — WATER FOR INJECTION,STERILE
VIAL (ML) INJECTION
Status: DISPENSED
Start: 2017-12-22 | End: 2017-12-22

## 2017-12-22 RX ADMIN — ROMIPLOSTIM 207 MCG: 250 INJECTION, POWDER, LYOPHILIZED, FOR SOLUTION SUBCUTANEOUS at 09:00

## 2017-12-22 NOTE — PROGRESS NOTES
[facility] Progress Note    Date: 2017    Name: Elsy Arango    MRN: [id number]         : 1950      Nplate Injection     Ms. Maria Teresa Padilla arrived to [facility] at [facility]. Ms. Maria Teresa Padilla was assessed and education was provided. Ms. Meryle Hawking vitals were reviewed. Visit Vitals    BP 98/66 (BP 1 Location: Right arm, BP Patient Position: Sitting)    Pulse 76    Temp 97.8 °F (36.6 °C)    Resp 18    SpO2 98%       Pt was observed for 5 minutes after obtaining vital signs prior to initiating treatment. Blood drawn for CBC via right AC x 1 attempt per written orders. Guaze and coban applied to the site. Lab results obtained and reviewed. (Preliminary results scanned into media tab.)  Recent Results (from the past 12 hour(s))   CBC WITH 3 PART DIFF    Collection Time: 17  8:43 AM   Result Value Ref Range    WBC 6.1 4.5 - 13.0 K/uL    RBC 3.81 (L) 4.10 - 5.10 M/uL    HGB 10.8 (L) 12.0 - 16 g/dL    HCT 33.3 (L) 36 - 48 %    MCV 87.4 78 - 102 FL    MCH 28.3 25.0 - 35.0 PG    MCHC 32.4 31 - 37 g/dL    RDW 16.6 (H) 11.5 - 14.5 %    NEUTROPHILS 56 40 - 70 %    MIXED CELLS 16 0.1 - 17 %    LYMPHOCYTES 29 14 - 44 %    ABS. NEUTROPHILS 3.4 1.8 - 9.5 K/UL    ABS. MIXED CELLS 1.0 0.0 - 2.3 K/uL    ABS. LYMPHOCYTES 1.7 1.1 - 5.9 K/UL    DF AUTOMATED       Preliminary platelet count= 780,687. Per Nplate protocol, since pt's platelet count is 02,186 today, pt's dose will remain at 3mcg/kg (3mcg x 69kg = 207mcg). Nplate 037QDV (0.16LR) administered as ordered SQ in patient's right upper arm. No redness or bleeding noted. Ms. Maria Teresa Padilla was discharged from [place] [id number] in stable condition at 7757. She is to return on 17 at 0830 for her next appointment.     Isabell Farris RN  2017

## 2017-12-28 ENCOUNTER — HOSPITAL ENCOUNTER (OUTPATIENT)
Dept: PREADMISSION TESTING | Age: 67
Discharge: HOME | End: 2017-12-28
Payer: MEDICARE

## 2017-12-28 ENCOUNTER — HOSPITAL ENCOUNTER (OUTPATIENT)
Dept: GENERAL RADIOLOGY | Age: 67
Discharge: HOME | End: 2017-12-28
Payer: MEDICARE

## 2017-12-28 DIAGNOSIS — M48.062 SPINAL STENOSIS OF LUMBAR REGION WITH NEUROGENIC CLAUDICATION: ICD-10-CM

## 2017-12-28 LAB
ABO + RH BLD: NORMAL
ALBUMIN SERPL-MCNC: 3.6 G/DL (ref 3.4–5)
ALBUMIN/GLOB SERPL: 0.9 {RATIO} (ref 0.8–1.7)
ALP SERPL-CCNC: 92 U/L (ref 45–117)
ALT SERPL-CCNC: 26 U/L (ref 13–56)
ANION GAP SERPL CALC-SCNC: 5 MMOL/L (ref 3–18)
AST SERPL-CCNC: 30 U/L (ref 15–37)
BILIRUB SERPL-MCNC: 0.3 MG/DL (ref 0.2–1)
BLOOD GROUP ANTIBODIES SERPL: NORMAL
BUN SERPL-MCNC: 25 MG/DL (ref 7–18)
BUN/CREAT SERPL: 26 (ref 12–20)
CALCIUM SERPL-MCNC: 9.1 MG/DL (ref 8.5–10.1)
CHLORIDE SERPL-SCNC: 102 MMOL/L (ref 100–108)
CO2 SERPL-SCNC: 34 MMOL/L (ref 21–32)
CREAT SERPL-MCNC: 0.97 MG/DL (ref 0.6–1.3)
ERYTHROCYTE [DISTWIDTH] IN BLOOD BY AUTOMATED COUNT: 16.3 % (ref 11.6–14.5)
GLOBULIN SER CALC-MCNC: 4.1 G/DL (ref 2–4)
GLUCOSE SERPL-MCNC: 86 MG/DL (ref 74–99)
HCT VFR BLD AUTO: 33.3 % (ref 35–45)
HGB BLD-MCNC: 10.8 G/DL (ref 12–16)
MCH RBC QN AUTO: 27 PG (ref 24–34)
MCHC RBC AUTO-ENTMCNC: 32.4 G/DL (ref 31–37)
MCV RBC AUTO: 83.3 FL (ref 74–97)
PLATELET # BLD AUTO: 140 K/UL (ref 135–420)
POTASSIUM SERPL-SCNC: 4 MMOL/L (ref 3.5–5.5)
PROT SERPL-MCNC: 7.7 G/DL (ref 6.4–8.2)
RBC # BLD AUTO: 4 M/UL (ref 4.2–5.3)
SODIUM SERPL-SCNC: 141 MMOL/L (ref 136–145)
SPECIMEN EXP DATE BLD: NORMAL
WBC # BLD AUTO: 6 K/UL (ref 4.6–13.2)

## 2017-12-28 PROCEDURE — 85027 COMPLETE CBC AUTOMATED: CPT | Performed by: ORTHOPAEDIC SURGERY

## 2017-12-28 PROCEDURE — 36415 COLL VENOUS BLD VENIPUNCTURE: CPT | Performed by: ORTHOPAEDIC SURGERY

## 2017-12-28 PROCEDURE — 71020 XR CHEST PA LAT: CPT

## 2017-12-28 PROCEDURE — 80053 COMPREHEN METABOLIC PANEL: CPT | Performed by: ORTHOPAEDIC SURGERY

## 2017-12-28 PROCEDURE — 93005 ELECTROCARDIOGRAM TRACING: CPT

## 2017-12-28 PROCEDURE — 86900 BLOOD TYPING SEROLOGIC ABO: CPT | Performed by: ORTHOPAEDIC SURGERY

## 2017-12-29 ENCOUNTER — HOSPITAL ENCOUNTER (OUTPATIENT)
Dept: INFUSION THERAPY | Age: 67
Discharge: HOME | End: 2017-12-29
Payer: MEDICARE

## 2017-12-29 VITALS
HEART RATE: 72 BPM | DIASTOLIC BLOOD PRESSURE: 67 MMHG | SYSTOLIC BLOOD PRESSURE: 107 MMHG | TEMPERATURE: 98.5 F | RESPIRATION RATE: 18 BRPM

## 2017-12-29 LAB
ATRIAL RATE: 60 BPM
BASO+EOS+MONOS # BLD AUTO: 0.8 K/UL (ref 0–2.3)
BASO+EOS+MONOS # BLD AUTO: 12 % (ref 0.1–17)
CALCULATED P AXIS, ECG09: 76 DEGREES
CALCULATED R AXIS, ECG10: 55 DEGREES
CALCULATED T AXIS, ECG11: 75 DEGREES
DIAGNOSIS, 93000: NORMAL
DIFFERENTIAL METHOD BLD: ABNORMAL
ERYTHROCYTE [DISTWIDTH] IN BLOOD BY AUTOMATED COUNT: 16.7 % (ref 11.5–14.5)
HCT VFR BLD AUTO: 32.1 % (ref 36–48)
HGB BLD-MCNC: 10.4 G/DL (ref 12–16)
LYMPHOCYTES # BLD: 1.5 K/UL (ref 1.1–5.9)
LYMPHOCYTES NFR BLD: 22 % (ref 14–44)
MCH RBC QN AUTO: 27.6 PG (ref 25–35)
MCHC RBC AUTO-ENTMCNC: 32.4 G/DL (ref 31–37)
MCV RBC AUTO: 85.1 FL (ref 78–102)
NEUTS SEG # BLD: 4.6 K/UL (ref 1.8–9.5)
NEUTS SEG NFR BLD: 66 % (ref 40–70)
P-R INTERVAL, ECG05: 214 MS
PLATELET # BLD AUTO: 169 K/UL (ref 135–420)
Q-T INTERVAL, ECG07: 432 MS
QRS DURATION, ECG06: 110 MS
QTC CALCULATION (BEZET), ECG08: 432 MS
RBC # BLD AUTO: 3.77 M/UL (ref 4.1–5.1)
VENTRICULAR RATE, ECG03: 60 BPM
WBC # BLD AUTO: 6.9 K/UL (ref 4.5–13)

## 2017-12-29 PROCEDURE — 85025 COMPLETE CBC W/AUTO DIFF WBC: CPT | Performed by: INTERNAL MEDICINE

## 2017-12-29 PROCEDURE — 96372 THER/PROPH/DIAG INJ SC/IM: CPT

## 2017-12-29 PROCEDURE — 74011250636 HC RX REV CODE- 250/636: Performed by: NURSE PRACTITIONER

## 2017-12-29 PROCEDURE — 74011000250 HC RX REV CODE- 250

## 2017-12-29 PROCEDURE — 36591 DRAW BLOOD OFF VENOUS DEVICE: CPT

## 2017-12-29 PROCEDURE — 85049 AUTOMATED PLATELET COUNT: CPT | Performed by: INTERNAL MEDICINE

## 2017-12-29 PROCEDURE — 77030012965 HC NDL HUBR BBMI -A

## 2017-12-29 PROCEDURE — 74011250636 HC RX REV CODE- 250/636

## 2017-12-29 PROCEDURE — 74011250636 HC RX REV CODE- 250/636: Performed by: INTERNAL MEDICINE

## 2017-12-29 RX ORDER — HEPARIN SODIUM (PORCINE) LOCK FLUSH IV SOLN 100 UNIT/ML 100 UNIT/ML
500 SOLUTION INTRAVENOUS AS NEEDED
Status: ACTIVE | OUTPATIENT
Start: 2017-12-29 | End: 2017-12-30

## 2017-12-29 RX ORDER — WATER FOR INJECTION,STERILE
VIAL (ML) INJECTION
Status: COMPLETED
Start: 2017-12-29 | End: 2017-12-29

## 2017-12-29 RX ORDER — HEPARIN SODIUM (PORCINE) LOCK FLUSH IV SOLN 100 UNIT/ML 100 UNIT/ML
SOLUTION INTRAVENOUS
Status: COMPLETED
Start: 2017-12-29 | End: 2017-12-29

## 2017-12-29 RX ORDER — SODIUM CHLORIDE 0.9 % (FLUSH) 0.9 %
10-40 SYRINGE (ML) INJECTION AS NEEDED
Status: DISCONTINUED | OUTPATIENT
Start: 2017-12-29 | End: 2018-01-02 | Stop reason: HOSPADM

## 2017-12-29 RX ADMIN — Medication 40 ML: at 13:47

## 2017-12-29 RX ADMIN — ROMIPLOSTIM 207 MCG: 250 INJECTION, POWDER, LYOPHILIZED, FOR SOLUTION SUBCUTANEOUS at 13:58

## 2017-12-29 RX ADMIN — HEPARIN SODIUM (PORCINE) LOCK FLUSH IV SOLN 100 UNIT/ML 500 UNITS: 100 SOLUTION at 13:47

## 2017-12-29 RX ADMIN — WATER 10 ML: 1 INJECTION INTRAMUSCULAR; INTRAVENOUS; SUBCUTANEOUS at 13:58

## 2017-12-29 NOTE — PROGRESS NOTES
[facility] Progress Note    Date: 2017    Name: Aayush Reyes    MRN: [id number]         : 1950      Nplate/Procrit Injection/Port Flush     Ms. Harry Ibarra arrived to [facility] at 1320. Ms. Harry Ibarra was assessed and education was provided. Ms. Belita Curling vitals were reviewed. Visit Vitals    /67 (BP 1 Location: Right arm, BP Patient Position: Sitting)    Pulse 72    Temp 98.5 °F (36.9 °C)    Resp 18       Pt was observed for 5 minutes after obtaining vital signs prior to initiating treatment. Right chest double lumen mediport accessed with a 20 gauge haas needle using sterile technique. Medial port accessed, flushed with 20 ml NS and 500 units of Heparin then de-accessed. Lateral port accessed and blood drawn for CBC after a 10 ml waste. Port flushed with 20 ml NS and 500 units of Heparin then de-accessed. Gauze and Tegaderm applied to the site. Lab results obtained and reviewed. (Preliminary results scanned into media tab.)  Recent Results (from the past 12 hour(s))   CBC WITH 3 PART DIFF    Collection Time: 17  1:40 PM   Result Value Ref Range    WBC 6.9 4.5 - 13.0 K/uL    RBC 3.77 (L) 4.10 - 5.10 M/uL    HGB 10.4 (L) 12.0 - 16 g/dL    HCT 32.1 (L) 36 - 48 %    MCV 85.1 78 - 102 FL    MCH 27.6 25.0 - 35.0 PG    MCHC 32.4 31 - 37 g/dL    RDW 16.7 (H) 11.5 - 14.5 %    NEUTROPHILS 66 40 - 70 %    MIXED CELLS 12 0.1 - 17 %    LYMPHOCYTES 22 14 - 44 %    ABS. NEUTROPHILS 4.6 1.8 - 9.5 K/UL    ABS. MIXED CELLS 0.8 0.0 - 2.3 K/uL    ABS. LYMPHOCYTES 1.5 1.1 - 5.9 K/UL    DF AUTOMATED       Preliminary platelet count= 653,545. Preliminary results scanned into the media tab. Per Nplate protocol, since pt's platelet count is 942,664 today, pt's dose will remain at 3mcg/kg (3mcg x 69kg = 207mcg). Nplate 379ZBY (2.79UY) administered as ordered SQ in patient's right upper arm. No redness or bleeding noted. Procrit held today per parameters.      [facility] 1405. She is to return on 1/12/18 at 0830 for her next appointment.     Liam Richard RN  December 29, 2017

## 2018-01-01 ENCOUNTER — OFFICE VISIT (OUTPATIENT)
Dept: ONCOLOGY | Age: 68
End: 2018-01-01

## 2018-01-01 ENCOUNTER — HOME CARE VISIT (OUTPATIENT)
Dept: HOME HEALTH SERVICES | Facility: HOME HEALTH | Age: 68
End: 2018-01-01
Payer: MEDICARE

## 2018-01-01 ENCOUNTER — APPOINTMENT (OUTPATIENT)
Dept: GENERAL RADIOLOGY | Age: 68
DRG: 683 | End: 2018-01-01
Attending: INTERNAL MEDICINE
Payer: MEDICARE

## 2018-01-01 ENCOUNTER — APPOINTMENT (OUTPATIENT)
Dept: CT IMAGING | Age: 68
DRG: 683 | End: 2018-01-01
Attending: INTERNAL MEDICINE
Payer: MEDICARE

## 2018-01-01 ENCOUNTER — HOME CARE VISIT (OUTPATIENT)
Dept: HOME HEALTH SERVICES | Facility: HOME HEALTH | Age: 68
End: 2018-01-01

## 2018-01-01 ENCOUNTER — HOSPITAL ENCOUNTER (OUTPATIENT)
Dept: INFUSION THERAPY | Age: 68
End: 2018-01-01
Payer: MEDICARE

## 2018-01-01 ENCOUNTER — HOME HEALTH ADMISSION (OUTPATIENT)
Dept: HOME HEALTH SERVICES | Facility: HOME HEALTH | Age: 68
End: 2018-01-01
Payer: MEDICARE

## 2018-01-01 ENCOUNTER — HOSPITAL ENCOUNTER (OUTPATIENT)
Dept: INFUSION THERAPY | Age: 68
Discharge: HOME | End: 2018-12-21
Payer: MEDICARE

## 2018-01-01 ENCOUNTER — HOME CARE VISIT (OUTPATIENT)
Dept: SCHEDULING | Facility: HOME HEALTH | Age: 68
End: 2018-01-01
Payer: MEDICARE

## 2018-01-01 ENCOUNTER — APPOINTMENT (OUTPATIENT)
Dept: INFUSION THERAPY | Age: 68
End: 2018-01-01
Payer: MEDICARE

## 2018-01-01 ENCOUNTER — HOSPITAL ENCOUNTER (OUTPATIENT)
Dept: INFUSION THERAPY | Age: 68
Discharge: HOME | End: 2018-12-14
Payer: MEDICARE

## 2018-01-01 ENCOUNTER — HOSPITAL ENCOUNTER (OUTPATIENT)
Dept: INFUSION THERAPY | Age: 68
Discharge: HOME | End: 2018-12-31
Payer: MEDICARE

## 2018-01-01 ENCOUNTER — HOSPITAL ENCOUNTER (OUTPATIENT)
Dept: INFUSION THERAPY | Age: 68
Discharge: HOME | End: 2018-11-30
Payer: MEDICARE

## 2018-01-01 ENCOUNTER — HOSPITAL ENCOUNTER (OUTPATIENT)
Dept: ONCOLOGY | Age: 68
Discharge: HOME | End: 2018-11-30

## 2018-01-01 ENCOUNTER — HOSPITAL ENCOUNTER (OUTPATIENT)
Dept: INFUSION THERAPY | Age: 68
Discharge: HOME | End: 2018-11-20
Payer: MEDICARE

## 2018-01-01 ENCOUNTER — HOSPITAL ENCOUNTER (OUTPATIENT)
Dept: INFUSION THERAPY | Age: 68
Discharge: HOME | End: 2018-12-07
Payer: MEDICARE

## 2018-01-01 ENCOUNTER — HOSPITAL ENCOUNTER (OUTPATIENT)
Dept: INFUSION THERAPY | Age: 68
Discharge: HOME | End: 2018-11-19
Payer: MEDICARE

## 2018-01-01 ENCOUNTER — HOSPITAL ENCOUNTER (OUTPATIENT)
Dept: LAB | Age: 68
Discharge: HOME | End: 2018-11-30
Payer: MEDICARE

## 2018-01-01 VITALS
TEMPERATURE: 98.4 F | HEART RATE: 84 BPM | RESPIRATION RATE: 20 BRPM | SYSTOLIC BLOOD PRESSURE: 116 MMHG | BODY MASS INDEX: 27.27 KG/M2 | HEIGHT: 66 IN | DIASTOLIC BLOOD PRESSURE: 79 MMHG | OXYGEN SATURATION: 97 % | WEIGHT: 169.7 LBS

## 2018-01-01 VITALS — TEMPERATURE: 98.3 F | DIASTOLIC BLOOD PRESSURE: 80 MMHG | RESPIRATION RATE: 18 BRPM | SYSTOLIC BLOOD PRESSURE: 125 MMHG

## 2018-01-01 VITALS
SYSTOLIC BLOOD PRESSURE: 100 MMHG | DIASTOLIC BLOOD PRESSURE: 80 MMHG | TEMPERATURE: 98.2 F | HEART RATE: 90 BPM | RESPIRATION RATE: 18 BRPM

## 2018-01-01 VITALS
TEMPERATURE: 98.1 F | DIASTOLIC BLOOD PRESSURE: 65 MMHG | BODY MASS INDEX: 25.39 KG/M2 | HEIGHT: 66 IN | RESPIRATION RATE: 16 BRPM | OXYGEN SATURATION: 100 % | HEART RATE: 75 BPM | WEIGHT: 158 LBS | SYSTOLIC BLOOD PRESSURE: 97 MMHG

## 2018-01-01 VITALS
DIASTOLIC BLOOD PRESSURE: 69 MMHG | OXYGEN SATURATION: 99 % | SYSTOLIC BLOOD PRESSURE: 105 MMHG | HEART RATE: 82 BPM | TEMPERATURE: 98.9 F | RESPIRATION RATE: 18 BRPM

## 2018-01-01 VITALS
DIASTOLIC BLOOD PRESSURE: 68 MMHG | HEART RATE: 61 BPM | SYSTOLIC BLOOD PRESSURE: 117 MMHG | RESPIRATION RATE: 18 BRPM | TEMPERATURE: 97.5 F

## 2018-01-01 VITALS
DIASTOLIC BLOOD PRESSURE: 60 MMHG | TEMPERATURE: 98.3 F | HEART RATE: 50 BPM | SYSTOLIC BLOOD PRESSURE: 98 MMHG | RESPIRATION RATE: 16 BRPM

## 2018-01-01 VITALS — TEMPERATURE: 97.6 F | DIASTOLIC BLOOD PRESSURE: 59 MMHG | SYSTOLIC BLOOD PRESSURE: 98 MMHG

## 2018-01-01 VITALS
DIASTOLIC BLOOD PRESSURE: 72 MMHG | SYSTOLIC BLOOD PRESSURE: 103 MMHG | TEMPERATURE: 97.7 F | HEART RATE: 72 BPM | HEIGHT: 66 IN | BODY MASS INDEX: 24.11 KG/M2 | WEIGHT: 150 LBS | RESPIRATION RATE: 18 BRPM

## 2018-01-01 VITALS
HEART RATE: 96 BPM | RESPIRATION RATE: 18 BRPM | DIASTOLIC BLOOD PRESSURE: 63 MMHG | TEMPERATURE: 97.8 F | SYSTOLIC BLOOD PRESSURE: 96 MMHG

## 2018-01-01 VITALS
RESPIRATION RATE: 18 BRPM | SYSTOLIC BLOOD PRESSURE: 96 MMHG | DIASTOLIC BLOOD PRESSURE: 67 MMHG | HEART RATE: 84 BPM | TEMPERATURE: 97.5 F

## 2018-01-01 VITALS — SYSTOLIC BLOOD PRESSURE: 115 MMHG | DIASTOLIC BLOOD PRESSURE: 70 MMHG

## 2018-01-01 VITALS — SYSTOLIC BLOOD PRESSURE: 104 MMHG | HEART RATE: 88 BPM | DIASTOLIC BLOOD PRESSURE: 70 MMHG | TEMPERATURE: 98.2 F

## 2018-01-01 VITALS
SYSTOLIC BLOOD PRESSURE: 93 MMHG | WEIGHT: 157.5 LBS | BODY MASS INDEX: 25.42 KG/M2 | DIASTOLIC BLOOD PRESSURE: 73 MMHG | HEART RATE: 94 BPM

## 2018-01-01 VITALS — SYSTOLIC BLOOD PRESSURE: 112 MMHG | DIASTOLIC BLOOD PRESSURE: 78 MMHG | TEMPERATURE: 99.9 F

## 2018-01-01 VITALS
DIASTOLIC BLOOD PRESSURE: 65 MMHG | RESPIRATION RATE: 18 BRPM | SYSTOLIC BLOOD PRESSURE: 101 MMHG | TEMPERATURE: 98 F | HEART RATE: 81 BPM

## 2018-01-01 VITALS — SYSTOLIC BLOOD PRESSURE: 98 MMHG | HEART RATE: 50 BPM | DIASTOLIC BLOOD PRESSURE: 60 MMHG | TEMPERATURE: 98.3 F

## 2018-01-01 VITALS
TEMPERATURE: 100.7 F | SYSTOLIC BLOOD PRESSURE: 100 MMHG | DIASTOLIC BLOOD PRESSURE: 60 MMHG | RESPIRATION RATE: 16 BRPM | HEART RATE: 68 BPM

## 2018-01-01 VITALS — TEMPERATURE: 97.8 F | HEART RATE: 72 BPM

## 2018-01-01 VITALS — BODY MASS INDEX: 25.71 KG/M2 | TEMPERATURE: 98.4 F | WEIGHT: 160 LBS | HEIGHT: 66 IN | RESPIRATION RATE: 18 BRPM

## 2018-01-01 DIAGNOSIS — C50.919 CARCINOMA OF BREAST METASTATIC TO AXILLARY LYMPH NODE, UNSPECIFIED LATERALITY (HCC): ICD-10-CM

## 2018-01-01 DIAGNOSIS — D64.9 ANEMIA, UNSPECIFIED TYPE: ICD-10-CM

## 2018-01-01 DIAGNOSIS — C77.3 CARCINOMA OF BREAST METASTATIC TO AXILLARY LYMPH NODE, UNSPECIFIED LATERALITY (HCC): ICD-10-CM

## 2018-01-01 DIAGNOSIS — G89.3 CANCER ASSOCIATED PAIN: ICD-10-CM

## 2018-01-01 DIAGNOSIS — F51.01 PRIMARY INSOMNIA: ICD-10-CM

## 2018-01-01 DIAGNOSIS — E55.9 VITAMIN D DEFICIENCY: ICD-10-CM

## 2018-01-01 DIAGNOSIS — R60.0 BILATERAL LOWER EXTREMITY EDEMA: ICD-10-CM

## 2018-01-01 DIAGNOSIS — D64.9 CHRONIC ANEMIA: ICD-10-CM

## 2018-01-01 DIAGNOSIS — C50.919 CARCINOMA OF BREAST METASTATIC TO AXILLARY LYMPH NODE, UNSPECIFIED LATERALITY (HCC): Primary | ICD-10-CM

## 2018-01-01 DIAGNOSIS — R64 CACHEXIA (HCC): ICD-10-CM

## 2018-01-01 DIAGNOSIS — C77.3 CARCINOMA OF BREAST METASTATIC TO AXILLARY LYMPH NODE, UNSPECIFIED LATERALITY (HCC): Primary | ICD-10-CM

## 2018-01-01 DIAGNOSIS — C77.3 CARCINOMA OF RIGHT BREAST METASTATIC TO AXILLARY LYMPH NODE (HCC): ICD-10-CM

## 2018-01-01 DIAGNOSIS — C50.911 CARCINOMA OF RIGHT BREAST METASTATIC TO AXILLARY LYMPH NODE (HCC): ICD-10-CM

## 2018-01-01 DIAGNOSIS — C50.919 METASTATIC BREAST CANCER (HCC): ICD-10-CM

## 2018-01-01 DIAGNOSIS — D69.3 CHRONIC ITP (IDIOPATHIC THROMBOCYTOPENIA) (HCC): ICD-10-CM

## 2018-01-01 LAB
ABO + RH BLD: NORMAL
ALBUMIN SERPL-MCNC: 3.7 G/DL (ref 3.4–5)
ALBUMIN/GLOB SERPL: 0.9 {RATIO} (ref 0.8–1.7)
ALP SERPL-CCNC: 78 U/L (ref 45–117)
ALT SERPL-CCNC: 30 U/L (ref 13–56)
ANION GAP SERPL CALC-SCNC: 10 MMOL/L (ref 3–18)
ANION GAP SERPL CALC-SCNC: 5 MMOL/L (ref 3–18)
ANION GAP SERPL CALC-SCNC: 7 MMOL/L (ref 3–18)
ANION GAP SERPL CALC-SCNC: 9 MMOL/L (ref 3–18)
AST SERPL-CCNC: 47 U/L (ref 15–37)
BACTERIA SPEC CULT: ABNORMAL
BACTERIA SPEC CULT: ABNORMAL
BACTERIA SPEC CULT: NORMAL
BACTERIA SPEC CULT: NORMAL
BASO+EOS+MONOS # BLD AUTO: 0.4 K/UL (ref 0–2.3)
BASO+EOS+MONOS # BLD AUTO: 0.9 K/UL (ref 0–2.3)
BASO+EOS+MONOS # BLD AUTO: 1.3 K/UL (ref 0–2.3)
BASO+EOS+MONOS # BLD AUTO: 1.4 K/UL (ref 0–2.3)
BASO+EOS+MONOS # BLD AUTO: 1.5 K/UL (ref 0–2.3)
BASO+EOS+MONOS # BLD AUTO: 10 % (ref 0.1–17)
BASO+EOS+MONOS # BLD AUTO: 19 % (ref 0.1–17)
BASO+EOS+MONOS # BLD AUTO: 19 % (ref 0.1–17)
BASO+EOS+MONOS # BLD AUTO: 21 % (ref 0.1–17)
BASO+EOS+MONOS NFR BLD AUTO: 20 % (ref 0.1–17)
BASOPHILS # BLD: 0 K/UL (ref 0–0.1)
BASOPHILS # BLD: 0 K/UL (ref 0–0.1)
BASOPHILS # BLD: 0.1 K/UL (ref 0–0.06)
BASOPHILS # BLD: 0.1 K/UL (ref 0–0.1)
BASOPHILS NFR BLD: 0 % (ref 0–2)
BASOPHILS NFR BLD: 1 % (ref 0–2)
BASOPHILS NFR BLD: 1 % (ref 0–2)
BASOPHILS NFR BLD: 1 % (ref 0–3)
BILIRUB SERPL-MCNC: 0.4 MG/DL (ref 0.2–1)
BLD PROD TYP BPU: NORMAL
BLD PROD TYP BPU: NORMAL
BPU ID: NORMAL
BPU ID: NORMAL
BUN SERPL-MCNC: 13 MG/DL (ref 7–18)
BUN SERPL-MCNC: 19 MG/DL (ref 7–18)
BUN SERPL-MCNC: 20 MG/DL (ref 7–18)
BUN SERPL-MCNC: 26 MG/DL (ref 7–18)
BUN/CREAT SERPL: 15 (ref 12–20)
BUN/CREAT SERPL: 20 (ref 12–20)
BUN/CREAT SERPL: 22 (ref 12–20)
BUN/CREAT SERPL: 29 (ref 12–20)
CALCIUM SERPL-MCNC: 8.8 MG/DL (ref 8.5–10.1)
CALCIUM SERPL-MCNC: 9 MG/DL (ref 8.5–10.1)
CALCIUM SERPL-MCNC: 9 MG/DL (ref 8.5–10.1)
CALCIUM SERPL-MCNC: 9.2 MG/DL (ref 8.5–10.1)
CANCER AG27-29 SERPL-ACNC: 33.7 U/ML (ref 0–38.6)
CHLORIDE SERPL-SCNC: 104 MMOL/L (ref 100–108)
CHLORIDE SERPL-SCNC: 106 MMOL/L (ref 100–108)
CHLORIDE SERPL-SCNC: 107 MMOL/L (ref 100–108)
CHLORIDE SERPL-SCNC: 108 MMOL/L (ref 100–108)
CO2 SERPL-SCNC: 26 MMOL/L (ref 21–32)
CO2 SERPL-SCNC: 26 MMOL/L (ref 21–32)
CO2 SERPL-SCNC: 27 MMOL/L (ref 21–32)
CO2 SERPL-SCNC: 30 MMOL/L (ref 21–32)
CREAT SERPL-MCNC: 0.89 MG/DL (ref 0.6–1.3)
CREAT SERPL-MCNC: 0.89 MG/DL (ref 0.6–1.3)
CREAT SERPL-MCNC: 0.9 MG/DL (ref 0.6–1.3)
CREAT SERPL-MCNC: 0.93 MG/DL (ref 0.6–1.3)
DIFFERENTIAL METHOD BLD: ABNORMAL
EOSINOPHIL # BLD: 0.1 K/UL (ref 0–0.4)
EOSINOPHIL # BLD: 0.2 K/UL (ref 0–0.4)
EOSINOPHIL # BLD: 0.3 K/UL (ref 0–0.4)
EOSINOPHIL # BLD: 0.3 K/UL (ref 0–0.4)
EOSINOPHIL NFR BLD: 2 % (ref 0–5)
EOSINOPHIL NFR BLD: 3 % (ref 0–5)
EOSINOPHIL NFR BLD: 6 % (ref 0–5)
EOSINOPHIL NFR BLD: 6 % (ref 0–5)
ERYTHROCYTE [DISTWIDTH] IN BLOOD BY AUTOMATED COUNT: 14.6 % (ref 11.6–14.5)
ERYTHROCYTE [DISTWIDTH] IN BLOOD BY AUTOMATED COUNT: 14.8 % (ref 11.6–14.5)
ERYTHROCYTE [DISTWIDTH] IN BLOOD BY AUTOMATED COUNT: 15.3 % (ref 11.6–14.5)
ERYTHROCYTE [DISTWIDTH] IN BLOOD BY AUTOMATED COUNT: 15.3 % (ref 11.6–14.5)
ERYTHROCYTE [DISTWIDTH] IN BLOOD BY AUTOMATED COUNT: 15.4 % (ref 11.5–14.5)
ERYTHROCYTE [DISTWIDTH] IN BLOOD BY AUTOMATED COUNT: 15.5 % (ref 11.5–14.5)
ERYTHROCYTE [DISTWIDTH] IN BLOOD BY AUTOMATED COUNT: 15.6 % (ref 11.5–14.5)
ERYTHROCYTE [DISTWIDTH] IN BLOOD BY AUTOMATED COUNT: 15.6 % (ref 11.5–14.5)
ERYTHROCYTE [DISTWIDTH] IN BLOOD BY AUTOMATED COUNT: 16.3 % (ref 11.5–14.5)
FERRITIN SERPL-MCNC: 446 NG/ML (ref 8–388)
GLOBULIN SER CALC-MCNC: 4.1 G/DL (ref 2–4)
GLUCOSE SERPL-MCNC: 78 MG/DL (ref 74–99)
GLUCOSE SERPL-MCNC: 79 MG/DL (ref 74–99)
GLUCOSE SERPL-MCNC: 79 MG/DL (ref 74–99)
GLUCOSE SERPL-MCNC: 80 MG/DL (ref 74–99)
GRAM STN SPEC: ABNORMAL
HCT VFR BLD AUTO: 27.8 % (ref 35–45)
HCT VFR BLD AUTO: 28.7 % (ref 35–45)
HCT VFR BLD AUTO: 28.8 % (ref 35–45)
HCT VFR BLD AUTO: 30.6 % (ref 36–48)
HCT VFR BLD AUTO: 30.9 % (ref 35–45)
HCT VFR BLD AUTO: 31.2 % (ref 35–45)
HCT VFR BLD AUTO: 32.1 % (ref 36–48)
HCT VFR BLD AUTO: 33.2 % (ref 36–48)
HCT VFR BLD AUTO: 34 % (ref 36–48)
HCT VFR BLD AUTO: 34.8 % (ref 36–48)
HGB BLD-MCNC: 10 G/DL (ref 12–16)
HGB BLD-MCNC: 10.5 G/DL (ref 12–16)
HGB BLD-MCNC: 10.5 G/DL (ref 12–16)
HGB BLD-MCNC: 10.6 G/DL (ref 12–16)
HGB BLD-MCNC: 11.1 G/DL (ref 12–16)
HGB BLD-MCNC: 11.3 G/DL (ref 12–16)
HGB BLD-MCNC: 11.6 G/DL (ref 12–16)
HGB BLD-MCNC: 9.3 G/DL (ref 12–16)
HGB BLD-MCNC: 9.5 G/DL (ref 12–16)
HGB BLD-MCNC: 9.7 G/DL (ref 12–16)
IRON SATN MFR SERPL: 21 %
IRON SERPL-MCNC: 38 UG/DL (ref 50–175)
LYMPHOCYTES # BLD: 0.7 K/UL (ref 0.8–3.5)
LYMPHOCYTES # BLD: 0.9 K/UL (ref 1.1–5.9)
LYMPHOCYTES # BLD: 1.2 K/UL (ref 0.9–3.6)
LYMPHOCYTES # BLD: 1.3 K/UL (ref 1.1–5.9)
LYMPHOCYTES # BLD: 1.5 K/UL (ref 0.9–3.6)
LYMPHOCYTES # BLD: 1.5 K/UL (ref 1.1–5.9)
LYMPHOCYTES # BLD: 1.5 K/UL (ref 1.1–5.9)
LYMPHOCYTES # BLD: 1.6 K/UL (ref 0.9–3.6)
LYMPHOCYTES # BLD: 1.7 K/UL (ref 1.1–5.9)
LYMPHOCYTES NFR BLD: 11 % (ref 20–51)
LYMPHOCYTES NFR BLD: 18 % (ref 14–44)
LYMPHOCYTES NFR BLD: 20 % (ref 14–44)
LYMPHOCYTES NFR BLD: 23 % (ref 14–44)
LYMPHOCYTES NFR BLD: 24 % (ref 14–44)
LYMPHOCYTES NFR BLD: 26 % (ref 21–52)
LYMPHOCYTES NFR BLD: 29 % (ref 21–52)
LYMPHOCYTES NFR BLD: 31 % (ref 21–52)
LYMPHOCYTES NFR BLD: 34 % (ref 14–44)
MCH RBC QN AUTO: 26 PG (ref 24–34)
MCH RBC QN AUTO: 26.2 PG (ref 24–34)
MCH RBC QN AUTO: 26.4 PG (ref 24–34)
MCH RBC QN AUTO: 26.4 PG (ref 24–34)
MCH RBC QN AUTO: 27.1 PG (ref 25–35)
MCH RBC QN AUTO: 27.2 PG (ref 25–35)
MCH RBC QN AUTO: 27.4 PG (ref 25–35)
MCH RBC QN AUTO: 27.5 PG (ref 25–35)
MCH RBC QN AUTO: 27.9 PG (ref 25–35)
MCHC RBC AUTO-ENTMCNC: 32.7 G/DL (ref 31–37)
MCHC RBC AUTO-ENTMCNC: 33 G/DL (ref 31–37)
MCHC RBC AUTO-ENTMCNC: 33.1 G/DL (ref 31–37)
MCHC RBC AUTO-ENTMCNC: 33.2 G/DL (ref 31–37)
MCHC RBC AUTO-ENTMCNC: 33.3 G/DL (ref 31–37)
MCHC RBC AUTO-ENTMCNC: 33.4 G/DL (ref 31–37)
MCHC RBC AUTO-ENTMCNC: 33.5 G/DL (ref 31–37)
MCHC RBC AUTO-ENTMCNC: 33.7 G/DL (ref 31–37)
MCHC RBC AUTO-ENTMCNC: 34 G/DL (ref 31–37)
MCV RBC AUTO: 77.8 FL (ref 74–97)
MCV RBC AUTO: 77.8 FL (ref 74–97)
MCV RBC AUTO: 78.6 FL (ref 74–97)
MCV RBC AUTO: 79 FL (ref 74–97)
MCV RBC AUTO: 82.1 FL (ref 78–102)
MCV RBC AUTO: 82.3 FL (ref 78–102)
MCV RBC AUTO: 82.5 FL (ref 78–102)
MCV RBC AUTO: 83.4 FL (ref 78–102)
MCV RBC AUTO: 83.4 FL (ref 78–102)
METAMYELOCYTES NFR BLD MANUAL: 1 %
MONOCYTES # BLD: 0.7 K/UL (ref 0.05–1.2)
MONOCYTES # BLD: 0.8 K/UL (ref 0.05–1.2)
MONOCYTES # BLD: 0.8 K/UL (ref 0–1)
MONOCYTES # BLD: 0.9 K/UL (ref 0.05–1.2)
MONOCYTES NFR BLD: 12 % (ref 2–9)
MONOCYTES NFR BLD: 12 % (ref 3–10)
MONOCYTES NFR BLD: 16 % (ref 3–10)
MONOCYTES NFR BLD: 19 % (ref 3–10)
MYELOCYTES NFR BLD MANUAL: 2 %
NEUTS BAND NFR BLD MANUAL: 6 % (ref 0–5)
NEUTS SEG # BLD: 2 K/UL (ref 1.8–9.5)
NEUTS SEG # BLD: 2.5 K/UL (ref 1.8–8)
NEUTS SEG # BLD: 2.6 K/UL (ref 1.8–8)
NEUTS SEG # BLD: 2.7 K/UL (ref 1.8–8)
NEUTS SEG # BLD: 3.2 K/UL (ref 1.8–9.5)
NEUTS SEG # BLD: 4 K/UL (ref 1.8–9.5)
NEUTS SEG # BLD: 4.3 K/UL (ref 1.8–9.5)
NEUTS SEG # BLD: 4.4 K/UL (ref 1.8–8)
NEUTS SEG # BLD: 4.4 K/UL (ref 1.8–9.5)
NEUTS SEG NFR BLD: 46 % (ref 40–70)
NEUTS SEG NFR BLD: 48 % (ref 40–73)
NEUTS SEG NFR BLD: 51 % (ref 40–73)
NEUTS SEG NFR BLD: 52 % (ref 40–73)
NEUTS SEG NFR BLD: 57 % (ref 40–70)
NEUTS SEG NFR BLD: 58 % (ref 40–70)
NEUTS SEG NFR BLD: 61 % (ref 40–70)
NEUTS SEG NFR BLD: 61 % (ref 42–75)
NEUTS SEG NFR BLD: 71 % (ref 40–70)
OTHER CELLS NFR BLD MANUAL: 3 %
PLATELET # BLD AUTO: 153 K/UL (ref 135–420)
PLATELET # BLD AUTO: 203 K/UL (ref 135–420)
PLATELET # BLD AUTO: 208 K/UL (ref 135–420)
PLATELET # BLD AUTO: 267 K/UL (ref 135–420)
PLATELET # BLD AUTO: 304 K/UL (ref 135–420)
PLATELET # BLD AUTO: 308 K/UL (ref 135–420)
PLATELET # BLD AUTO: 308 K/UL (ref 135–420)
PLATELET # BLD AUTO: 36 K/UL (ref 135–420)
PLATELET # BLD AUTO: 469 K/UL (ref 135–420)
PLATELET COMMENTS,PCOM: ABNORMAL
PLATELET COMMENTS,PCOM: ABNORMAL
PMV BLD AUTO: 12.8 FL (ref 9.2–11.8)
POTASSIUM SERPL-SCNC: 3.8 MMOL/L (ref 3.5–5.5)
POTASSIUM SERPL-SCNC: 3.9 MMOL/L (ref 3.5–5.5)
POTASSIUM SERPL-SCNC: 4 MMOL/L (ref 3.5–5.5)
POTASSIUM SERPL-SCNC: 4.1 MMOL/L (ref 3.5–5.5)
PROT SERPL-MCNC: 7.8 G/DL (ref 6.4–8.2)
RBC # BLD AUTO: 3.52 M/UL (ref 4.2–5.3)
RBC # BLD AUTO: 3.65 M/UL (ref 4.2–5.3)
RBC # BLD AUTO: 3.67 M/UL (ref 4.1–5.1)
RBC # BLD AUTO: 3.91 M/UL (ref 4.1–5.1)
RBC # BLD AUTO: 3.97 M/UL (ref 4.2–5.3)
RBC # BLD AUTO: 3.98 M/UL (ref 4.1–5.1)
RBC # BLD AUTO: 4.01 M/UL (ref 4.2–5.3)
RBC # BLD AUTO: 4.13 M/UL (ref 4.1–5.1)
RBC # BLD AUTO: 4.22 M/UL (ref 4.1–5.1)
RBC MORPH BLD: ABNORMAL
SERVICE CMNT-IMP: ABNORMAL
SERVICE CMNT-IMP: ABNORMAL
SERVICE CMNT-IMP: NORMAL
SERVICE CMNT-IMP: NORMAL
SODIUM SERPL-SCNC: 139 MMOL/L (ref 136–145)
SODIUM SERPL-SCNC: 141 MMOL/L (ref 136–145)
SODIUM SERPL-SCNC: 142 MMOL/L (ref 136–145)
SODIUM SERPL-SCNC: 143 MMOL/L (ref 136–145)
STATUS OF UNIT,%ST: NORMAL
STATUS OF UNIT,%ST: NORMAL
TIBC SERPL-MCNC: 184 UG/DL (ref 250–450)
UNIT DIVISION, %UDIV: 0
UNIT DIVISION, %UDIV: 0
URATE SERPL-MCNC: 9 MG/DL (ref 2.6–7.2)
WBC # BLD AUTO: 4.4 K/UL (ref 4.5–13)
WBC # BLD AUTO: 4.5 K/UL (ref 4.5–13)
WBC # BLD AUTO: 4.6 K/UL (ref 4.6–13.2)
WBC # BLD AUTO: 5.3 K/UL (ref 4.6–13.2)
WBC # BLD AUTO: 5.3 K/UL (ref 4.6–13.2)
WBC # BLD AUTO: 6.5 K/UL (ref 4.6–13.2)
WBC # BLD AUTO: 6.8 K/UL (ref 4.5–13)
WBC # BLD AUTO: 7.2 K/UL (ref 4.5–13)
WBC # BLD AUTO: 7.4 K/UL (ref 4.5–13)

## 2018-01-01 PROCEDURE — 3331090001 HH PPS REVENUE CREDIT

## 2018-01-01 PROCEDURE — 3331090002 HH PPS REVENUE DEBIT

## 2018-01-01 PROCEDURE — 74011000250 HC RX REV CODE- 250: Performed by: INTERNAL MEDICINE

## 2018-01-01 PROCEDURE — 74011250636 HC RX REV CODE- 250/636

## 2018-01-01 PROCEDURE — 85018 HEMOGLOBIN: CPT | Performed by: INTERNAL MEDICINE

## 2018-01-01 PROCEDURE — G0495 RN CARE TRAIN/EDU IN HH: HCPCS

## 2018-01-01 PROCEDURE — 84550 ASSAY OF BLOOD/URIC ACID: CPT | Performed by: INTERNAL MEDICINE

## 2018-01-01 PROCEDURE — 74011636320 HC RX REV CODE- 636/320: Performed by: INTERNAL MEDICINE

## 2018-01-01 PROCEDURE — 85049 AUTOMATED PLATELET COUNT: CPT

## 2018-01-01 PROCEDURE — P9035 PLATELET PHERES LEUKOREDUCED: HCPCS

## 2018-01-01 PROCEDURE — 74011250636 HC RX REV CODE- 250/636: Performed by: INTERNAL MEDICINE

## 2018-01-01 PROCEDURE — 85025 COMPLETE CBC W/AUTO DIFF WBC: CPT

## 2018-01-01 PROCEDURE — 86900 BLOOD TYPING SEROLOGIC ABO: CPT

## 2018-01-01 PROCEDURE — 80048 BASIC METABOLIC PNL TOTAL CA: CPT | Performed by: INTERNAL MEDICINE

## 2018-01-01 PROCEDURE — 36415 COLL VENOUS BLD VENIPUNCTURE: CPT | Performed by: INTERNAL MEDICINE

## 2018-01-01 PROCEDURE — 96372 THER/PROPH/DIAG INJ SC/IM: CPT

## 2018-01-01 PROCEDURE — 74011250637 HC RX REV CODE- 250/637: Performed by: INTERNAL MEDICINE

## 2018-01-01 PROCEDURE — 36415 COLL VENOUS BLD VENIPUNCTURE: CPT

## 2018-01-01 PROCEDURE — G0157 HHC PT ASSISTANT EA 15: HCPCS

## 2018-01-01 PROCEDURE — 94640 AIRWAY INHALATION TREATMENT: CPT

## 2018-01-01 PROCEDURE — 83540 ASSAY OF IRON: CPT

## 2018-01-01 PROCEDURE — 36430 TRANSFUSION BLD/BLD COMPNT: CPT

## 2018-01-01 PROCEDURE — 97110 THERAPEUTIC EXERCISES: CPT

## 2018-01-01 PROCEDURE — 97116 GAIT TRAINING THERAPY: CPT

## 2018-01-01 PROCEDURE — 77030012890

## 2018-01-01 PROCEDURE — 3331090003 HH PPS REVENUE ADJ

## 2018-01-01 PROCEDURE — G0299 HHS/HOSPICE OF RN EA 15 MIN: HCPCS

## 2018-01-01 PROCEDURE — G0151 HHCP-SERV OF PT,EA 15 MIN: HCPCS

## 2018-01-01 PROCEDURE — 74011250636 HC RX REV CODE- 250/636: Performed by: NURSE PRACTITIONER

## 2018-01-01 PROCEDURE — 77030038269 HC DRN EXT URIN PURWCK BARD -A

## 2018-01-01 PROCEDURE — 97530 THERAPEUTIC ACTIVITIES: CPT

## 2018-01-01 PROCEDURE — 80048 BASIC METABOLIC PNL TOTAL CA: CPT

## 2018-01-01 PROCEDURE — 97166 OT EVAL MOD COMPLEX 45 MIN: CPT

## 2018-01-01 PROCEDURE — 86300 IMMUNOASSAY TUMOR CA 15-3: CPT

## 2018-01-01 PROCEDURE — 77030012965 HC NDL HUBR BBMI -A

## 2018-01-01 PROCEDURE — 74177 CT ABD & PELVIS W/CONTRAST: CPT

## 2018-01-01 PROCEDURE — 87040 BLOOD CULTURE FOR BACTERIA: CPT | Performed by: INTERNAL MEDICINE

## 2018-01-01 PROCEDURE — 73610 X-RAY EXAM OF ANKLE: CPT

## 2018-01-01 PROCEDURE — 74011250637 HC RX REV CODE- 250/637: Performed by: NURSE PRACTITIONER

## 2018-01-01 PROCEDURE — 82728 ASSAY OF FERRITIN: CPT

## 2018-01-01 PROCEDURE — 80053 COMPREHEN METABOLIC PANEL: CPT

## 2018-01-01 PROCEDURE — 94762 N-INVAS EAR/PLS OXIMTRY CONT: CPT

## 2018-01-01 PROCEDURE — 85025 COMPLETE CBC W/AUTO DIFF WBC: CPT | Performed by: INTERNAL MEDICINE

## 2018-01-01 PROCEDURE — 65660000004 HC RM CVT STEPDOWN

## 2018-01-01 PROCEDURE — 36591 DRAW BLOOD OFF VENOUS DEVICE: CPT

## 2018-01-01 PROCEDURE — G0300 HHS/HOSPICE OF LPN EA 15 MIN: HCPCS

## 2018-01-01 PROCEDURE — 97535 SELF CARE MNGMENT TRAINING: CPT

## 2018-01-01 PROCEDURE — 77030013169 SET IV BLD ICUM -A

## 2018-01-01 PROCEDURE — 94760 N-INVAS EAR/PLS OXIMETRY 1: CPT

## 2018-01-01 PROCEDURE — 74011000250 HC RX REV CODE- 250: Performed by: NURSE PRACTITIONER

## 2018-01-01 PROCEDURE — 71275 CT ANGIOGRAPHY CHEST: CPT

## 2018-01-01 PROCEDURE — 400013 HH SOC

## 2018-01-01 PROCEDURE — 99211 OFF/OP EST MAY X REQ PHY/QHP: CPT

## 2018-01-01 RX ORDER — HEPARIN 100 UNIT/ML
500 SYRINGE INTRAVENOUS AS NEEDED
Status: DISCONTINUED | OUTPATIENT
Start: 2018-01-01 | End: 2019-01-01 | Stop reason: HOSPADM

## 2018-01-01 RX ORDER — SODIUM CHLORIDE 0.9 % (FLUSH) 0.9 %
10-40 SYRINGE (ML) INJECTION AS NEEDED
Status: DISCONTINUED | OUTPATIENT
Start: 2018-01-01 | End: 2019-01-01 | Stop reason: HOSPADM

## 2018-01-01 RX ORDER — DICLOFENAC SODIUM 10 MG/G
2 GEL TOPICAL
Qty: 100 G | Refills: 0 | Status: SHIPPED | OUTPATIENT
Start: 2018-01-01 | End: 2019-01-01

## 2018-01-01 RX ORDER — WATER FOR INJECTION,STERILE
VIAL (ML) INJECTION ONCE
Status: COMPLETED | OUTPATIENT
Start: 2018-01-01 | End: 2018-01-01

## 2018-01-01 RX ORDER — DIPHENHYDRAMINE HCL 25 MG
25 CAPSULE ORAL ONCE
Status: COMPLETED | OUTPATIENT
Start: 2018-01-01 | End: 2018-01-01

## 2018-01-01 RX ORDER — SODIUM CHLORIDE 9 MG/ML
250 INJECTION, SOLUTION INTRAVENOUS AS NEEDED
Status: DISCONTINUED | OUTPATIENT
Start: 2018-01-01 | End: 2018-01-01 | Stop reason: HOSPADM

## 2018-01-01 RX ORDER — LORAZEPAM 1 MG/1
1 TABLET ORAL
Qty: 30 TAB | Refills: 3 | Status: SHIPPED | OUTPATIENT
Start: 2018-01-01 | End: 2019-01-01

## 2018-01-01 RX ORDER — ACETAMINOPHEN 325 MG/1
650 TABLET ORAL ONCE
Status: COMPLETED | OUTPATIENT
Start: 2018-01-01 | End: 2018-01-01

## 2018-01-01 RX ORDER — HEPARIN 100 UNIT/ML
500 SYRINGE INTRAVENOUS ONCE
Status: DISCONTINUED | OUTPATIENT
Start: 2018-01-01 | End: 2018-01-01 | Stop reason: HOSPADM

## 2018-01-01 RX ORDER — MIDODRINE HYDROCHLORIDE 2.5 MG/1
2.5 TABLET ORAL 2 TIMES DAILY WITH MEALS
Qty: 30 TAB | Refills: 0 | Status: SHIPPED | OUTPATIENT
Start: 2018-01-01 | End: 2018-01-01

## 2018-01-01 RX ORDER — MIDODRINE HYDROCHLORIDE 2.5 MG/1
2.5 TABLET ORAL 2 TIMES DAILY WITH MEALS
Status: DISCONTINUED | OUTPATIENT
Start: 2018-01-01 | End: 2018-01-01 | Stop reason: HOSPADM

## 2018-01-01 RX ORDER — HEPARIN SODIUM (PORCINE) LOCK FLUSH IV SOLN 100 UNIT/ML 100 UNIT/ML
500 SOLUTION INTRAVENOUS ONCE
Status: DISCONTINUED | OUTPATIENT
Start: 2018-01-01 | End: 2018-01-01

## 2018-01-01 RX ORDER — HYDROXYZINE HYDROCHLORIDE 10 MG/1
10 TABLET, FILM COATED ORAL
Status: DISCONTINUED | OUTPATIENT
Start: 2018-01-01 | End: 2018-01-01 | Stop reason: HOSPADM

## 2018-01-01 RX ORDER — WATER FOR INJECTION,STERILE
VIAL (ML) INJECTION
Status: DISPENSED
Start: 2018-01-01 | End: 2018-01-01

## 2018-01-01 RX ORDER — HEPARIN 100 UNIT/ML
500 SYRINGE INTRAVENOUS ONCE
Status: COMPLETED | OUTPATIENT
Start: 2018-01-01 | End: 2018-01-01

## 2018-01-01 RX ORDER — DIGOXIN 125 MCG
0.12 TABLET ORAL DAILY
Status: DISCONTINUED | OUTPATIENT
Start: 2018-01-01 | End: 2018-01-01 | Stop reason: HOSPADM

## 2018-01-01 RX ORDER — COLCHICINE 0.6 MG/1
0.6 CAPSULE ORAL DAILY
Status: DISCONTINUED | OUTPATIENT
Start: 2018-01-01 | End: 2018-01-01 | Stop reason: HOSPADM

## 2018-01-01 RX ORDER — ALLOPURINOL 100 MG/1
100 TABLET ORAL DAILY
Qty: 30 TAB | Refills: 0 | Status: ON HOLD | OUTPATIENT
Start: 2018-01-01 | End: 2019-01-01

## 2018-01-01 RX ORDER — COLCHICINE 0.6 MG/1
0.6 CAPSULE ORAL DAILY
Qty: 14 CAP | Refills: 0 | Status: ON HOLD | OUTPATIENT
Start: 2018-01-01 | End: 2019-01-01

## 2018-01-01 RX ORDER — SODIUM CHLORIDE 0.9 % (FLUSH) 0.9 %
10-40 SYRINGE (ML) INJECTION AS NEEDED
Status: COMPLETED | OUTPATIENT
Start: 2018-01-01 | End: 2018-01-01

## 2018-01-01 RX ORDER — WATER FOR INJECTION,STERILE
VIAL (ML) INJECTION ONCE
Status: DISPENSED | OUTPATIENT
Start: 2018-01-01 | End: 2018-01-01

## 2018-01-01 RX ORDER — SODIUM CHLORIDE 9 MG/ML
50 INJECTION, SOLUTION INTRAVENOUS CONTINUOUS
Status: DISPENSED | OUTPATIENT
Start: 2018-01-01 | End: 2018-01-01

## 2018-01-01 RX ORDER — ALLOPURINOL 100 MG/1
100 TABLET ORAL DAILY
Status: DISCONTINUED | OUTPATIENT
Start: 2018-01-01 | End: 2018-01-01 | Stop reason: HOSPADM

## 2018-01-01 RX ORDER — DIGOXIN 125 MCG
0.12 TABLET ORAL DAILY
Status: DISCONTINUED | OUTPATIENT
Start: 2018-01-01 | End: 2018-01-01

## 2018-01-01 RX ORDER — OXYCODONE AND ACETAMINOPHEN 10; 325 MG/1; MG/1
1 TABLET ORAL
Qty: 120 TAB | Refills: 0 | Status: SHIPPED | OUTPATIENT
Start: 2018-01-01 | End: 2019-01-01

## 2018-01-01 RX ORDER — DOCUSATE SODIUM 100 MG/1
100 CAPSULE, LIQUID FILLED ORAL DAILY
Qty: 30 CAP | Refills: 0 | Status: SHIPPED | OUTPATIENT
Start: 2018-01-01 | End: 2018-01-01

## 2018-01-01 RX ADMIN — SODIUM CHLORIDE, PRESERVATIVE FREE 500 UNITS: 5 INJECTION INTRAVENOUS at 09:46

## 2018-01-01 RX ADMIN — MELATONIN TAB 3 MG 3 MG: 3 TAB at 21:16

## 2018-01-01 RX ADMIN — Medication 10 ML: at 21:26

## 2018-01-01 RX ADMIN — DOCUSATE SODIUM 100 MG: 100 CAPSULE, LIQUID FILLED ORAL at 09:40

## 2018-01-01 RX ADMIN — DIPHENHYDRAMINE HYDROCHLORIDE 25 MG: 25 CAPSULE ORAL at 11:34

## 2018-01-01 RX ADMIN — LORATADINE 10 MG: 10 TABLET ORAL at 08:01

## 2018-01-01 RX ADMIN — RIVAROXABAN 20 MG: 10 TABLET, FILM COATED ORAL at 11:19

## 2018-01-01 RX ADMIN — ACETAMINOPHEN 650 MG: 325 TABLET ORAL at 21:56

## 2018-01-01 RX ADMIN — IOPAMIDOL 100 ML: 612 INJECTION, SOLUTION INTRAVENOUS at 14:09

## 2018-01-01 RX ADMIN — LORATADINE 10 MG: 10 TABLET ORAL at 09:40

## 2018-01-01 RX ADMIN — Medication 10 ML: at 05:22

## 2018-01-01 RX ADMIN — ALBUTEROL SULFATE 2.5 MG: 2.5 SOLUTION RESPIRATORY (INHALATION) at 08:38

## 2018-01-01 RX ADMIN — IOPAMIDOL 80 ML: 755 INJECTION, SOLUTION INTRAVENOUS at 16:40

## 2018-01-01 RX ADMIN — ALBUTEROL SULFATE 2.5 MG: 2.5 SOLUTION RESPIRATORY (INHALATION) at 08:52

## 2018-01-01 RX ADMIN — DOCUSATE SODIUM 100 MG: 100 CAPSULE, LIQUID FILLED ORAL at 08:53

## 2018-01-01 RX ADMIN — COLCHICINE 0.6 MG: 0.6 CAPSULE ORAL at 09:00

## 2018-01-01 RX ADMIN — ACETAMINOPHEN 650 MG: 325 TABLET ORAL at 11:34

## 2018-01-01 RX ADMIN — Medication 10 ML: at 06:00

## 2018-01-01 RX ADMIN — ALBUTEROL SULFATE 2.5 MG: 2.5 SOLUTION RESPIRATORY (INHALATION) at 22:20

## 2018-01-01 RX ADMIN — TOPIRAMATE 50 MG: 25 TABLET, FILM COATED ORAL at 09:40

## 2018-01-01 RX ADMIN — MULTIPLE VITAMINS W/ MINERALS TAB 1 TABLET: TAB at 08:53

## 2018-01-01 RX ADMIN — DOCUSATE SODIUM 100 MG: 100 CAPSULE, LIQUID FILLED ORAL at 08:00

## 2018-01-01 RX ADMIN — ACETAMINOPHEN 650 MG: 325 TABLET ORAL at 21:10

## 2018-01-01 RX ADMIN — ROMIPLOSTIM 345 MCG: 250 INJECTION, POWDER, LYOPHILIZED, FOR SOLUTION SUBCUTANEOUS at 14:27

## 2018-01-01 RX ADMIN — ALBUTEROL SULFATE 2.5 MG: 2.5 SOLUTION RESPIRATORY (INHALATION) at 20:03

## 2018-01-01 RX ADMIN — ROMIPLOSTIM 345 MCG: 250 INJECTION, POWDER, LYOPHILIZED, FOR SOLUTION SUBCUTANEOUS at 15:53

## 2018-01-01 RX ADMIN — LINACLOTIDE 145 MCG: 145 CAPSULE, GELATIN COATED ORAL at 07:30

## 2018-01-01 RX ADMIN — MIDODRINE HYDROCHLORIDE 2.5 MG: 5 TABLET ORAL at 18:05

## 2018-01-01 RX ADMIN — MULTIPLE VITAMINS W/ MINERALS TAB 1 TABLET: TAB at 08:01

## 2018-01-01 RX ADMIN — ROMIPLOSTIM 138 MCG: 250 INJECTION, POWDER, LYOPHILIZED, FOR SOLUTION SUBCUTANEOUS at 10:21

## 2018-01-01 RX ADMIN — LINACLOTIDE 145 MCG: 145 CAPSULE, GELATIN COATED ORAL at 08:02

## 2018-01-01 RX ADMIN — HYDROXYZINE HYDROCHLORIDE 10 MG: 10 TABLET, FILM COATED ORAL at 08:01

## 2018-01-01 RX ADMIN — ALLOPURINOL 100 MG: 100 TABLET ORAL at 08:52

## 2018-01-01 RX ADMIN — TOPIRAMATE 50 MG: 25 TABLET, FILM COATED ORAL at 08:01

## 2018-01-01 RX ADMIN — RIVAROXABAN 20 MG: 10 TABLET, FILM COATED ORAL at 08:01

## 2018-01-01 RX ADMIN — ACETAMINOPHEN 650 MG: 325 TABLET ORAL at 06:44

## 2018-01-01 RX ADMIN — COLCHICINE 0.6 MG: 0.6 CAPSULE ORAL at 13:12

## 2018-01-01 RX ADMIN — MELATONIN TAB 3 MG 3 MG: 3 TAB at 21:10

## 2018-01-01 RX ADMIN — ONDANSETRON 4 MG: 2 INJECTION INTRAMUSCULAR; INTRAVENOUS at 15:44

## 2018-01-01 RX ADMIN — ALTEPLASE 2 MG: 2.2 INJECTION, POWDER, LYOPHILIZED, FOR SOLUTION INTRAVENOUS at 11:50

## 2018-01-01 RX ADMIN — ALBUTEROL SULFATE 2.5 MG: 2.5 SOLUTION RESPIRATORY (INHALATION) at 19:47

## 2018-01-01 RX ADMIN — ACETAMINOPHEN 650 MG: 325 TABLET ORAL at 11:00

## 2018-01-01 RX ADMIN — ONDANSETRON 4 MG: 2 INJECTION INTRAMUSCULAR; INTRAVENOUS at 06:54

## 2018-01-01 RX ADMIN — RIVAROXABAN 20 MG: 10 TABLET, FILM COATED ORAL at 08:52

## 2018-01-01 RX ADMIN — DIGOXIN 0.12 MG: 125 TABLET ORAL at 16:03

## 2018-01-01 RX ADMIN — Medication 40 ML: at 09:45

## 2018-01-01 RX ADMIN — LORATADINE 10 MG: 10 TABLET ORAL at 11:20

## 2018-01-01 RX ADMIN — GABAPENTIN 200 MG: 100 CAPSULE ORAL at 11:19

## 2018-01-01 RX ADMIN — DOCUSATE SODIUM 100 MG: 100 CAPSULE, LIQUID FILLED ORAL at 11:19

## 2018-01-01 RX ADMIN — TOPIRAMATE 50 MG: 25 TABLET, FILM COATED ORAL at 08:52

## 2018-01-01 RX ADMIN — ROMIPLOSTIM 276 MCG: 250 INJECTION, POWDER, LYOPHILIZED, FOR SOLUTION SUBCUTANEOUS at 08:37

## 2018-01-01 RX ADMIN — RIVAROXABAN 20 MG: 10 TABLET, FILM COATED ORAL at 09:40

## 2018-01-01 RX ADMIN — DICLOFENAC SODIUM 2 G: 10 GEL TOPICAL at 21:16

## 2018-01-01 RX ADMIN — ALBUTEROL SULFATE 2.5 MG: 2.5 SOLUTION RESPIRATORY (INHALATION) at 15:43

## 2018-01-01 RX ADMIN — GABAPENTIN 200 MG: 100 CAPSULE ORAL at 21:16

## 2018-01-01 RX ADMIN — GABAPENTIN 200 MG: 100 CAPSULE ORAL at 08:52

## 2018-01-01 RX ADMIN — Medication 10 ML: at 21:11

## 2018-01-01 RX ADMIN — ACETAMINOPHEN 650 MG: 325 TABLET ORAL at 21:16

## 2018-01-01 RX ADMIN — TOPIRAMATE 50 MG: 25 TABLET, FILM COATED ORAL at 11:19

## 2018-01-01 RX ADMIN — MIDODRINE HYDROCHLORIDE 2.5 MG: 5 TABLET ORAL at 08:01

## 2018-01-01 RX ADMIN — SODIUM CHLORIDE, PRESERVATIVE FREE 500 UNITS: 5 INJECTION INTRAVENOUS at 09:45

## 2018-01-01 RX ADMIN — Medication 10 ML: at 14:00

## 2018-01-01 RX ADMIN — WATER 0.28 ML: 1 INJECTION INTRAMUSCULAR; INTRAVENOUS; SUBCUTANEOUS at 10:21

## 2018-01-01 RX ADMIN — GABAPENTIN 200 MG: 100 CAPSULE ORAL at 21:49

## 2018-01-01 RX ADMIN — SODIUM CHLORIDE 50 ML/HR: 900 INJECTION, SOLUTION INTRAVENOUS at 17:58

## 2018-01-01 RX ADMIN — GABAPENTIN 200 MG: 100 CAPSULE ORAL at 21:10

## 2018-01-01 RX ADMIN — DICLOFENAC SODIUM 2 G: 10 GEL TOPICAL at 17:56

## 2018-01-01 RX ADMIN — GABAPENTIN 200 MG: 100 CAPSULE ORAL at 08:00

## 2018-01-01 RX ADMIN — GABAPENTIN 200 MG: 100 CAPSULE ORAL at 09:40

## 2018-01-01 RX ADMIN — WATER: 1 INJECTION INTRAMUSCULAR; INTRAVENOUS; SUBCUTANEOUS at 08:45

## 2018-01-01 RX ADMIN — HEPARIN 500 UNITS: 100 SYRINGE at 13:42

## 2018-01-01 RX ADMIN — Medication 10 ML: at 18:05

## 2018-01-01 RX ADMIN — LORATADINE 10 MG: 10 TABLET ORAL at 08:53

## 2018-01-01 RX ADMIN — GABAPENTIN 200 MG: 100 CAPSULE ORAL at 18:03

## 2018-01-01 RX ADMIN — ALBUTEROL SULFATE 2.5 MG: 2.5 SOLUTION RESPIRATORY (INHALATION) at 14:00

## 2018-01-01 RX ADMIN — ROMIPLOSTIM 207 MCG: 250 INJECTION, POWDER, LYOPHILIZED, FOR SOLUTION SUBCUTANEOUS at 08:45

## 2018-01-01 RX ADMIN — HEPARIN 500 UNITS: 100 SYRINGE at 13:43

## 2018-01-01 RX ADMIN — ALLOPURINOL 100 MG: 100 TABLET ORAL at 13:12

## 2018-01-01 RX ADMIN — ACETAMINOPHEN 650 MG: 325 TABLET ORAL at 11:19

## 2018-01-01 RX ADMIN — DIGOXIN 0.12 MG: 125 TABLET ORAL at 08:01

## 2018-01-01 RX ADMIN — Medication 20 ML: at 13:43

## 2018-01-01 RX ADMIN — MULTIPLE VITAMINS W/ MINERALS TAB 1 TABLET: TAB at 11:19

## 2018-01-01 RX ADMIN — ALBUTEROL SULFATE 2.5 MG: 2.5 SOLUTION RESPIRATORY (INHALATION) at 08:00

## 2018-01-01 RX ADMIN — COLCHICINE 0.6 MG: 0.6 CAPSULE ORAL at 08:01

## 2018-01-01 RX ADMIN — Medication 10 ML: at 15:44

## 2018-01-01 RX ADMIN — GABAPENTIN 200 MG: 100 CAPSULE ORAL at 15:44

## 2018-01-01 RX ADMIN — Medication 10 ML: at 18:06

## 2018-01-01 RX ADMIN — MULTIPLE VITAMINS W/ MINERALS TAB 1 TABLET: TAB at 09:40

## 2018-01-01 RX ADMIN — GABAPENTIN 200 MG: 100 CAPSULE ORAL at 17:49

## 2018-01-01 RX ADMIN — ALLOPURINOL 100 MG: 100 TABLET ORAL at 08:01

## 2018-01-01 RX ADMIN — Medication 10 ML: at 21:53

## 2018-01-04 NOTE — H&P
Pre-Admission History and Physical    Patient: Delfin Crook   MRN: [id number]   SSN: [id number]   YOB: 1950   Age: 74 y.o. Sex: female     Patient scheduled for: L3/4 XLIF, Perc screws  Date of surgery: 01/08/18  [facility] Surgeon: Ricki Figueroa MD    HPI:  Delfin Crook is a 74 y.o. female cancer survivor with breast cancer in remission 2014. She had lumbar surgery 25 years ago. She has had two years of progressive back pain with six months of really severely progressive symptoms with back and right-sided anterior thigh and leg pain. She has had progressive quadriceps weakness, numbness, and radicular pain. She denies bowel or bladder dysfunction, fever, chills, or night sweats. X-rays demonstrate a progressive degenerative spondylolisthesis at L3-4 with spinal stenosis, disc protrusion, severe bilateral foraminal stenosis, right worse than left due to asymmetric disc space collapse on the right. MRI demonstrates spinal stenosis at L3-4. She reports a pain level of 10/10. This patient has failed the presurgical conservative treatments  including physical therapy, spinal block injections and medications. Pain has impacted the patient's functional ability to ambulate outside the household (she hast to use a wheelchair), dress, bathe, and perform ADLs and she is being admitted for surgical intervention.          Past Medical History:   Diagnosis Date    Antineoplastic chemotherapy induced anemia     Arrhythmia     Atrial Fib    Arthritis     [facility]     Cancer ([facility] 1999    Breast    Cardiomyopathy ([address]     Chronic ITP (idiopathic thrombocytopenia) (HCC) 10/31/2016    Secondary to Anemia from Chemo    Diabetes ([address]     borderline, no meds    Esophageal cancer ([address] 2005    treated with chemo    Heart failure ([address]      Social History     Social History    Marital status:      Spouse name: N/A    Number of children: N/A    Years of education: N/A     Social History Main Topics    Smoking status: Never Smoker    Smokeless tobacco: Never Used    Alcohol use No    Drug use: No    Sexual activity: Not on file     Other Topics Concern    Not on file     Social History Narrative     Past Surgical History:   Procedure Laterality Date    BREAST SURGERY PROCEDURE UNLISTED      COLONOSCOPY N/A 7/27/2016    COLONOSCOPY performed by Ban Mcfarland MD at [facility] ENDOSCOPY    HX APPENDECTOMY      HX HEENT      Tonsillectomy    HX ORTHOPAEDIC      HX TUBAL LIGATION      HX VASCULAR ACCESS      NEUROLOGICAL PROCEDURE UNLISTED      Lumbar sx     No family history on file. Allergies   Allergen Reactions    Aspirin Swelling     Pt states her whole body swells when she takes aspirin.  Adhesive Unable to Obtain    Pcn [Penicillins] Rash     Current Outpatient Prescriptions   Medication Sig Dispense Refill    digoxin (LANOXIN) 0.125 mg tablet Take 0.125 mg by mouth daily.  carvedilol (COREG) 3.125 mg tablet Take 3.125 mg by mouth two (2) times daily (with meals).  amiodarone (CORDARONE) 200 mg tablet Take 200 mg by mouth.  furosemide (LASIX) 40 mg tablet Take  by mouth daily.  zolpidem (AMBIEN) 10 mg tablet TAKE 1 TABLET BY MOUTH NIGHTLY AS NEEDED FOR SLEEP. MAX DAILY AMOUNT 10 MG 30 Tab 2    oxyCODONE-acetaminophen (PERCOCET) 7.5-325 mg per tablet Take 1 tablet every 6 hours as needed for pain 120 Tab 0    loratadine 10 mg cap Take 10 mg by mouth daily.  meloxicam (MOBIC) 7.5 mg tablet Take 7.5 mg by mouth daily.  lisinopril (PRINIVIL, ZESTRIL) 10 mg tablet Take 10 mg by mouth daily.  gabapentin (NEURONTIN) 300 mg capsule Take 1 po q am and 2 po q pm 90 Cap 1    rivaroxaban (XARELTO) 10 mg tablet Take 10 mg by mouth daily.       lidocaine-prilocaine (EMLA) topical cream APPLY OVER PORT 1 HOUR PRIOR TO CHEMOTHERAPY THAN COVER WITH SARAN WRAP 30 g 6    magic mouthwash solution Take 5 mL by mouth three (3) times daily as needed for Stomatitis. Magic mouth wash   Maalox  Lidocaine 2% viscous   Diphenhydramine oral solution     Pharmacy to mix equal portions of ingredients to a total volume as indicated in the dispense amount. 236 mL 0    celecoxib (CELEBREX) 200 mg capsule Take  by mouth two (2) times a day.  dronabinol (MARINOL) 5 mg capsule Take 1 Cap by mouth two (2) times a day. 60 Cap 1    gabapentin (NEURONTIN) 300 mg capsule Take 300 mg by mouth three (3) times daily.  KLOR-CON M20 20 mEq tablet TAKE ONE TABLET BY MOUTH ONCE A DAY 30 Tab 3    aluminum-magnesium hydroxide 200-200 mg/5 mL susp 5 mL, diphenhydrAMINE 12.5 mg/5 mL liqd 12.5 mg, lidocaine 2 % soln 5 mL 5 mL by Swish and Spit route two (2) times a day. Magic mouth wash   Maalox  Lidocaine 2% viscous   Diphenhydramine oral solution     Pharmacy to mix equal portions of ingredients to a total volume as indicated in the dispense amount. 240 mL 1    potassium chloride (K-DUR, KLOR-CON) 20 mEq tablet Take 2 Tabs by mouth daily. 30 Tab 3    albuterol (PROAIR HFA) 90 mcg/actuation inhaler 1-2 puffs every 4-6 hrs. 1 Inhaler 1    senna (SENNA) 8.6 mg tablet Take 1 Tab by mouth daily.  cyclobenzaprine (FLEXERIL) 5 mg tablet       nabumetone (RELAFEN) 750 mg tablet       ondansetron hcl (ZOFRAN) 8 mg tablet Take 1 Tab by mouth every eight (8) hours as needed for Nausea. 30 Tab 3    IRON AG&FUM/C/FA/MV CMB11/CA-T (PRUVATE 21-7 PO) Take  by mouth. ROS:  Denies chills, fever,night sweats,  bowel or bladder dysfunction, unexplained weight loss/weight gain, chest pain, sob or anxiety.     Physical Examination    Gen: Well developed, well nourished 74 y.o. female   Visit Vitals    /55    Pulse 64    Temp 97.4 °F (36.3 °C) (Oral)    Resp 17    Ht 5' 6\" (1.676 m)    Wt 167 lb 9.6 oz (76 kg)    SpO2 (!) 87%    BMI 27.05 kg/m2    PAIN SCALE: 10 - Worst pain ever/10     CONSTITUTIONAL: The patient is in no apparent distress and is alert and oriented x 3. HEENT: Normocephalic. Hearing grossly intact. NECK: Supple and symmetric. no tenderness, or masses were felt. RESPIRATORY: No labored breathing. CARDIOVASCULAR: The carotid pulses were normal. Peripheral pulses were 2+. CHEST: Normal AP diameter and normal contour without any kyphoscoliosis. LYMPHATIC: No lymphadenopathy was appreciated in the neck, axillae or groin. SKIN:  Negative for scars, rashes, lesions, or ulcers on the right upper, right lower, left upper, left lower and trunk. NEUROLOGICAL: Alert and oriented x 3. WC bound. EXTREMITIES:  See musculoskeletal.  MUSCULOSKELETAL:  · Head and Neck:  Negative for misalignment, asymmetry, crepitation, defects, tenderness masses or effusions. · Left Upper Extremity: Inspection, percussion and palpation preformed. Cottrells sign is negative. · Right Upper Extremity: Inspection, percussion and palpation preformed. Cottrells sign is negative. · Spine, Ribs and Pelvis: Back pain with radiating RLE pain. Difficulty with standing and walking. Inspection, percussion and palpation preformed. Negative for misalignment, asymmetry, crepitation, defects, tenderness masses or effusions. · Left Lower Extremity: Inspection, percussion and palpation preformed. Negative straight leg raise. · Right Lower Extremity: Radiating pain. Weakness. Inspection, percussion and palpation preformed. Negative straight leg raise.        SPINE EXAM:         Lumbar spine: No rash, ecchymosis, or gross obliquity. No focal atrophy is noted      Assessment and Plan    Due to the pt's persistent symptoms unrelieved by conservative measure Serena Headley is being admitted [facility] to undergo surgical intervention. The post-operative plan of care consists of physical therapy, home health and a 2 week f/u office visit. We are pending medical clearance by Dr. Rosa Amos and Dr. Arely Hines, cardiology.   The risks, benefits, complications and alternatives to surgery have been discussed in detail with the patient. The patient understands and agrees to proceed.      Thom Rodriguez NP-BC dictating for Elvie Quintanilla MD

## 2018-01-06 ENCOUNTER — ANESTHESIA EVENT (OUTPATIENT)
Dept: SURGERY | Age: 68
DRG: 460 | End: 2018-01-06
Payer: MEDICARE

## 2018-01-08 ENCOUNTER — ANESTHESIA (OUTPATIENT)
Dept: SURGERY | Age: 68
DRG: 460 | End: 2018-01-08
Payer: MEDICARE

## 2018-01-08 ENCOUNTER — APPOINTMENT (OUTPATIENT)
Dept: GENERAL RADIOLOGY | Age: 68
DRG: 460 | End: 2018-01-08
Attending: ORTHOPAEDIC SURGERY
Payer: MEDICARE

## 2018-01-08 ENCOUNTER — HOSPITAL ENCOUNTER (INPATIENT)
Age: 68
LOS: 5 days | Discharge: HOME HEALTH | DRG: 460 | End: 2018-01-13
Attending: ORTHOPAEDIC SURGERY | Admitting: ORTHOPAEDIC SURGERY
Payer: MEDICARE

## 2018-01-08 DIAGNOSIS — M43.16 SPONDYLOLISTHESIS OF LUMBAR REGION: ICD-10-CM

## 2018-01-08 DIAGNOSIS — M48.062 SPINAL STENOSIS OF LUMBAR REGION WITH NEUROGENIC CLAUDICATION: Primary | ICD-10-CM

## 2018-01-08 PROBLEM — M43.10 SPONDYLISTHESIS: Status: ACTIVE | Noted: 2018-01-08

## 2018-01-08 PROBLEM — M48.061 LUMBAR STENOSIS: Status: ACTIVE | Noted: 2018-01-08

## 2018-01-08 LAB
GLUCOSE BLD STRIP.AUTO-MCNC: 105 MG/DL (ref 70–110)
GLUCOSE BLD STRIP.AUTO-MCNC: 121 MG/DL (ref 70–110)
GLUCOSE BLD STRIP.AUTO-MCNC: 76 MG/DL (ref 70–110)
GLUCOSE BLD STRIP.AUTO-MCNC: 81 MG/DL (ref 70–110)

## 2018-01-08 PROCEDURE — 77030020782 HC GWN BAIR PAWS FLX 3M -B: Performed by: ORTHOPAEDIC SURGERY

## 2018-01-08 PROCEDURE — 77030003029 HC SUT VCRL J&J -B: Performed by: ORTHOPAEDIC SURGERY

## 2018-01-08 PROCEDURE — 76210000017 HC OR PH I REC 1.5 TO 2 HR: Performed by: ORTHOPAEDIC SURGERY

## 2018-01-08 PROCEDURE — 77030010507 HC ADH SKN DERMBND J&J -B: Performed by: ORTHOPAEDIC SURGERY

## 2018-01-08 PROCEDURE — 99218 HC RM OBSERVATION: CPT

## 2018-01-08 PROCEDURE — 77030008683 HC TU ET CUF COVD -A: Performed by: ANESTHESIOLOGY

## 2018-01-08 PROCEDURE — 77030012893: Performed by: ORTHOPAEDIC SURGERY

## 2018-01-08 PROCEDURE — 77030032490 HC SLV COMPR SCD KNE COVD -B: Performed by: ORTHOPAEDIC SURGERY

## 2018-01-08 PROCEDURE — 77030031878 HC NDL SPN ACC SYS I-PAS NUVA -D: Performed by: ORTHOPAEDIC SURGERY

## 2018-01-08 PROCEDURE — 77030019938 HC TBNG IV PCA ICUM -A: Performed by: ORTHOPAEDIC SURGERY

## 2018-01-08 PROCEDURE — C1763 CONN TISS, NON-HUMAN: HCPCS | Performed by: ORTHOPAEDIC SURGERY

## 2018-01-08 PROCEDURE — 74011250636 HC RX REV CODE- 250/636: Performed by: NURSE ANESTHETIST, CERTIFIED REGISTERED

## 2018-01-08 PROCEDURE — 4A1034G MONITORING OF CENTRAL NERVOUS ELECTRICAL ACTIVITY, INTRAOPERATIVE, PERCUTANEOUS APPROACH: ICD-10-PCS | Performed by: ORTHOPAEDIC SURGERY

## 2018-01-08 PROCEDURE — 74011250636 HC RX REV CODE- 250/636

## 2018-01-08 PROCEDURE — 0ST20ZZ RESECTION OF LUMBAR VERTEBRAL DISC, OPEN APPROACH: ICD-10-PCS | Performed by: ORTHOPAEDIC SURGERY

## 2018-01-08 PROCEDURE — 77030002933 HC SUT MCRYL J&J -A: Performed by: ORTHOPAEDIC SURGERY

## 2018-01-08 PROCEDURE — 77030031898 HC WRE K DISP NUVA -B: Performed by: ORTHOPAEDIC SURGERY

## 2018-01-08 PROCEDURE — 74011250636 HC RX REV CODE- 250/636: Performed by: ANESTHESIOLOGY

## 2018-01-08 PROCEDURE — 74011250637 HC RX REV CODE- 250/637: Performed by: ANESTHESIOLOGY

## 2018-01-08 PROCEDURE — 77030034475 HC MISC IMPL SPN: Performed by: ORTHOPAEDIC SURGERY

## 2018-01-08 PROCEDURE — 77030034850: Performed by: ORTHOPAEDIC SURGERY

## 2018-01-08 PROCEDURE — 0SH034Z INSERTION OF INTERNAL FIXATION DEVICE INTO LUMBAR VERTEBRAL JOINT, PERCUTANEOUS APPROACH: ICD-10-PCS | Performed by: ORTHOPAEDIC SURGERY

## 2018-01-08 PROCEDURE — 77030018836 HC SOL IRR NACL ICUM -A: Performed by: ORTHOPAEDIC SURGERY

## 2018-01-08 PROCEDURE — 77030030409 HC DIL SPN KT M5 DISP NUVA -G: Performed by: ORTHOPAEDIC SURGERY

## 2018-01-08 PROCEDURE — 74011250637 HC RX REV CODE- 250/637: Performed by: ORTHOPAEDIC SURGERY

## 2018-01-08 PROCEDURE — 74011000272 HC RX REV CODE- 272: Performed by: ORTHOPAEDIC SURGERY

## 2018-01-08 PROCEDURE — C1713 ANCHOR/SCREW BN/BN,TIS/BN: HCPCS | Performed by: ORTHOPAEDIC SURGERY

## 2018-01-08 PROCEDURE — 74011000250 HC RX REV CODE- 250

## 2018-01-08 PROCEDURE — 77030027703 HC MAXCESS 4 KT DISP NUVA -H: Performed by: ORTHOPAEDIC SURGERY

## 2018-01-08 PROCEDURE — 74011000250 HC RX REV CODE- 250: Performed by: ORTHOPAEDIC SURGERY

## 2018-01-08 PROCEDURE — 77030019908 HC STETH ESOPH SIMS -A: Performed by: ANESTHESIOLOGY

## 2018-01-08 PROCEDURE — 76010000134 HC OR TIME 3.5 TO 4 HR: Performed by: ORTHOPAEDIC SURGERY

## 2018-01-08 PROCEDURE — 74011250636 HC RX REV CODE- 250/636: Performed by: ORTHOPAEDIC SURGERY

## 2018-01-08 PROCEDURE — 77030007115 HC CGE SPN PEK CRNT NUVA -I3: Performed by: ORTHOPAEDIC SURGERY

## 2018-01-08 PROCEDURE — 76060000038 HC ANESTHESIA 3.5 TO 4 HR: Performed by: ORTHOPAEDIC SURGERY

## 2018-01-08 PROCEDURE — 77030011640 HC PAD GRND REM COVD -A: Performed by: ORTHOPAEDIC SURGERY

## 2018-01-08 PROCEDURE — 0SG00AJ FUSION OF LUMBAR VERTEBRAL JOINT WITH INTERBODY FUSION DEVICE, POSTERIOR APPROACH, ANTERIOR COLUMN, OPEN APPROACH: ICD-10-PCS | Performed by: ORTHOPAEDIC SURGERY

## 2018-01-08 PROCEDURE — 74011250636 HC RX REV CODE- 250/636: Performed by: NURSE PRACTITIONER

## 2018-01-08 PROCEDURE — 77030013079 HC BLNKT BAIR HGGR 3M -A: Performed by: ANESTHESIOLOGY

## 2018-01-08 PROCEDURE — 77030027138 HC INCENT SPIROMETER -A

## 2018-01-08 PROCEDURE — 77030014005 HC SPNG HEMSTAT GEL CARD -B: Performed by: ORTHOPAEDIC SURGERY

## 2018-01-08 PROCEDURE — 82962 GLUCOSE BLOOD TEST: CPT

## 2018-01-08 DEVICE — K WIRE FIX NIT BVL BLNT TIP PRECEPT: Type: IMPLANTABLE DEVICE | Site: SPINE LUMBAR | Status: FUNCTIONAL

## 2018-01-08 DEVICE — IMPLANTABLE DEVICE: Type: IMPLANTABLE DEVICE | Site: SPINE LUMBAR | Status: FUNCTIONAL

## 2018-01-08 DEVICE — GRAFT BONE SUB 5ML PUTTY CA PHOS SYNTH GRAN BIOABSRB ATTRAX: Type: IMPLANTABLE DEVICE | Site: SPINE LUMBAR | Status: FUNCTIONAL

## 2018-01-08 DEVICE — ROD SPNL LORDTC 5.5X40 MM TI RELINE-O: Type: IMPLANTABLE DEVICE | Site: SPINE LUMBAR | Status: FUNCTIONAL

## 2018-01-08 DEVICE — SCREW SPNL DIA5.5MM OPN TULIP LOK RELINE: Type: IMPLANTABLE DEVICE | Site: SPINE LUMBAR | Status: FUNCTIONAL

## 2018-01-08 RX ORDER — INSULIN LISPRO 100 [IU]/ML
INJECTION, SOLUTION INTRAVENOUS; SUBCUTANEOUS
Status: DISCONTINUED | OUTPATIENT
Start: 2018-01-08 | End: 2018-01-14 | Stop reason: HOSPADM

## 2018-01-08 RX ORDER — SODIUM CHLORIDE, SODIUM LACTATE, POTASSIUM CHLORIDE, CALCIUM CHLORIDE 600; 310; 30; 20 MG/100ML; MG/100ML; MG/100ML; MG/100ML
75 INJECTION, SOLUTION INTRAVENOUS CONTINUOUS
Status: DISCONTINUED | OUTPATIENT
Start: 2018-01-08 | End: 2018-01-08 | Stop reason: HOSPADM

## 2018-01-08 RX ORDER — PROPOFOL 10 MG/ML
INJECTION, EMULSION INTRAVENOUS AS NEEDED
Status: DISCONTINUED | OUTPATIENT
Start: 2018-01-08 | End: 2018-01-08 | Stop reason: HOSPADM

## 2018-01-08 RX ORDER — SODIUM CHLORIDE 0.9 % (FLUSH) 0.9 %
5-10 SYRINGE (ML) INJECTION AS NEEDED
Status: DISCONTINUED | OUTPATIENT
Start: 2018-01-08 | End: 2018-01-08 | Stop reason: HOSPADM

## 2018-01-08 RX ORDER — BISACODYL 5 MG
10 TABLET, DELAYED RELEASE (ENTERIC COATED) ORAL DAILY
Status: DISCONTINUED | OUTPATIENT
Start: 2018-01-09 | End: 2018-01-14 | Stop reason: HOSPADM

## 2018-01-08 RX ORDER — LORAZEPAM 2 MG/ML
1 INJECTION INTRAMUSCULAR
Status: DISCONTINUED | OUTPATIENT
Start: 2018-01-08 | End: 2018-01-14 | Stop reason: HOSPADM

## 2018-01-08 RX ORDER — SODIUM CHLORIDE 0.9 % (FLUSH) 0.9 %
5-10 SYRINGE (ML) INJECTION AS NEEDED
Status: DISCONTINUED | OUTPATIENT
Start: 2018-01-08 | End: 2018-01-14 | Stop reason: HOSPADM

## 2018-01-08 RX ORDER — VANCOMYCIN HYDROCHLORIDE 1 G/20ML
INJECTION, POWDER, LYOPHILIZED, FOR SOLUTION INTRAVENOUS AS NEEDED
Status: DISCONTINUED | OUTPATIENT
Start: 2018-01-08 | End: 2018-01-08 | Stop reason: HOSPADM

## 2018-01-08 RX ORDER — NALOXONE HYDROCHLORIDE 0.4 MG/ML
0.4 INJECTION, SOLUTION INTRAMUSCULAR; INTRAVENOUS; SUBCUTANEOUS AS NEEDED
Status: DISCONTINUED | OUTPATIENT
Start: 2018-01-08 | End: 2018-01-14 | Stop reason: HOSPADM

## 2018-01-08 RX ORDER — OXYCODONE AND ACETAMINOPHEN 10; 325 MG/1; MG/1
1 TABLET ORAL
Status: DISCONTINUED | OUTPATIENT
Start: 2018-01-08 | End: 2018-01-14 | Stop reason: HOSPADM

## 2018-01-08 RX ORDER — DEXAMETHASONE SODIUM PHOSPHATE 4 MG/ML
INJECTION, SOLUTION INTRA-ARTICULAR; INTRALESIONAL; INTRAMUSCULAR; INTRAVENOUS; SOFT TISSUE AS NEEDED
Status: DISCONTINUED | OUTPATIENT
Start: 2018-01-08 | End: 2018-01-08 | Stop reason: HOSPADM

## 2018-01-08 RX ORDER — ALBUTEROL SULFATE 90 UG/1
1 AEROSOL, METERED RESPIRATORY (INHALATION)
Status: DISCONTINUED | OUTPATIENT
Start: 2018-01-08 | End: 2018-01-08 | Stop reason: CLARIF

## 2018-01-08 RX ORDER — LIDOCAINE HYDROCHLORIDE 20 MG/ML
INJECTION, SOLUTION EPIDURAL; INFILTRATION; INTRACAUDAL; PERINEURAL AS NEEDED
Status: DISCONTINUED | OUTPATIENT
Start: 2018-01-08 | End: 2018-01-08 | Stop reason: HOSPADM

## 2018-01-08 RX ORDER — DIPHENHYDRAMINE HYDROCHLORIDE 50 MG/ML
12.5 INJECTION, SOLUTION INTRAMUSCULAR; INTRAVENOUS
Status: DISCONTINUED | OUTPATIENT
Start: 2018-01-08 | End: 2018-01-14 | Stop reason: HOSPADM

## 2018-01-08 RX ORDER — HYDROMORPHONE HYDROCHLORIDE 2 MG/ML
0.5 INJECTION, SOLUTION INTRAMUSCULAR; INTRAVENOUS; SUBCUTANEOUS
Status: COMPLETED | OUTPATIENT
Start: 2018-01-08 | End: 2018-01-08

## 2018-01-08 RX ORDER — POTASSIUM CHLORIDE 20 MEQ/1
20 TABLET, EXTENDED RELEASE ORAL DAILY
Status: DISCONTINUED | OUTPATIENT
Start: 2018-01-09 | End: 2018-01-14 | Stop reason: HOSPADM

## 2018-01-08 RX ORDER — GLYCOPYRROLATE 0.2 MG/ML
INJECTION INTRAMUSCULAR; INTRAVENOUS AS NEEDED
Status: DISCONTINUED | OUTPATIENT
Start: 2018-01-08 | End: 2018-01-08 | Stop reason: HOSPADM

## 2018-01-08 RX ORDER — DEXTROSE 50 % IN WATER (D50W) INTRAVENOUS SYRINGE
25-50 AS NEEDED
Status: DISCONTINUED | OUTPATIENT
Start: 2018-01-08 | End: 2018-01-08 | Stop reason: HOSPADM

## 2018-01-08 RX ORDER — MAGNESIUM SULFATE 100 %
4 CRYSTALS MISCELLANEOUS AS NEEDED
Status: DISCONTINUED | OUTPATIENT
Start: 2018-01-08 | End: 2018-01-14 | Stop reason: HOSPADM

## 2018-01-08 RX ORDER — SODIUM CHLORIDE 0.9 % (FLUSH) 0.9 %
5-10 SYRINGE (ML) INJECTION EVERY 8 HOURS
Status: DISCONTINUED | OUTPATIENT
Start: 2018-01-08 | End: 2018-01-14 | Stop reason: HOSPADM

## 2018-01-08 RX ORDER — INSULIN LISPRO 100 [IU]/ML
INJECTION, SOLUTION INTRAVENOUS; SUBCUTANEOUS ONCE
Status: DISCONTINUED | OUTPATIENT
Start: 2018-01-08 | End: 2018-01-08 | Stop reason: HOSPADM

## 2018-01-08 RX ORDER — ACETAMINOPHEN 500 MG
1000 TABLET ORAL ONCE
Status: COMPLETED | OUTPATIENT
Start: 2018-01-08 | End: 2018-01-08

## 2018-01-08 RX ORDER — FUROSEMIDE 40 MG/1
40 TABLET ORAL DAILY
Status: DISCONTINUED | OUTPATIENT
Start: 2018-01-09 | End: 2018-01-14 | Stop reason: HOSPADM

## 2018-01-08 RX ORDER — MIDAZOLAM HYDROCHLORIDE 1 MG/ML
INJECTION, SOLUTION INTRAMUSCULAR; INTRAVENOUS AS NEEDED
Status: DISCONTINUED | OUTPATIENT
Start: 2018-01-08 | End: 2018-01-08 | Stop reason: HOSPADM

## 2018-01-08 RX ORDER — DIPHENHYDRAMINE HYDROCHLORIDE 50 MG/ML
12.5 INJECTION, SOLUTION INTRAMUSCULAR; INTRAVENOUS
Status: DISCONTINUED | OUTPATIENT
Start: 2018-01-08 | End: 2018-01-08 | Stop reason: SDUPTHER

## 2018-01-08 RX ORDER — ONDANSETRON 2 MG/ML
4 INJECTION INTRAMUSCULAR; INTRAVENOUS AS NEEDED
Status: DISCONTINUED | OUTPATIENT
Start: 2018-01-08 | End: 2018-01-08 | Stop reason: SDUPTHER

## 2018-01-08 RX ORDER — MAGNESIUM SULFATE 100 %
4 CRYSTALS MISCELLANEOUS AS NEEDED
Status: DISCONTINUED | OUTPATIENT
Start: 2018-01-08 | End: 2018-01-08 | Stop reason: HOSPADM

## 2018-01-08 RX ORDER — DIGOXIN 250 MCG
0.12 TABLET ORAL DAILY
Status: DISCONTINUED | OUTPATIENT
Start: 2018-01-09 | End: 2018-01-14 | Stop reason: HOSPADM

## 2018-01-08 RX ORDER — ALBUTEROL SULFATE 0.83 MG/ML
2.5 SOLUTION RESPIRATORY (INHALATION) AS NEEDED
Status: DISCONTINUED | OUTPATIENT
Start: 2018-01-08 | End: 2018-01-08

## 2018-01-08 RX ORDER — INSULIN LISPRO 100 [IU]/ML
INJECTION, SOLUTION INTRAVENOUS; SUBCUTANEOUS EVERY 6 HOURS
Status: DISCONTINUED | OUTPATIENT
Start: 2018-01-08 | End: 2018-01-08

## 2018-01-08 RX ORDER — FAMOTIDINE 20 MG/1
20 TABLET, FILM COATED ORAL ONCE
Status: COMPLETED | OUTPATIENT
Start: 2018-01-08 | End: 2018-01-08

## 2018-01-08 RX ORDER — ONDANSETRON 2 MG/ML
4 INJECTION INTRAMUSCULAR; INTRAVENOUS ONCE
Status: DISCONTINUED | OUTPATIENT
Start: 2018-01-08 | End: 2018-01-08 | Stop reason: HOSPADM

## 2018-01-08 RX ORDER — HYDROMORPHONE HYDROCHLORIDE 1 MG/ML
1 INJECTION, SOLUTION INTRAMUSCULAR; INTRAVENOUS; SUBCUTANEOUS
Status: DISCONTINUED | OUTPATIENT
Start: 2018-01-08 | End: 2018-01-14 | Stop reason: HOSPADM

## 2018-01-08 RX ORDER — PREGABALIN 75 MG/1
75 CAPSULE ORAL ONCE
Status: COMPLETED | OUTPATIENT
Start: 2018-01-08 | End: 2018-01-08

## 2018-01-08 RX ORDER — ALBUTEROL SULFATE 0.83 MG/ML
2.5 SOLUTION RESPIRATORY (INHALATION) AS NEEDED
Status: DISCONTINUED | OUTPATIENT
Start: 2018-01-08 | End: 2018-01-08 | Stop reason: HOSPADM

## 2018-01-08 RX ORDER — SODIUM CHLORIDE 9 MG/ML
100 INJECTION, SOLUTION INTRAVENOUS CONTINUOUS
Status: DISCONTINUED | OUTPATIENT
Start: 2018-01-08 | End: 2018-01-09

## 2018-01-08 RX ORDER — ZOLPIDEM TARTRATE 5 MG/1
10 TABLET ORAL
Status: DISCONTINUED | OUTPATIENT
Start: 2018-01-08 | End: 2018-01-14 | Stop reason: HOSPADM

## 2018-01-08 RX ORDER — DEXTROSE 50 % IN WATER (D50W) INTRAVENOUS SYRINGE
25-50 AS NEEDED
Status: DISCONTINUED | OUTPATIENT
Start: 2018-01-08 | End: 2018-01-14 | Stop reason: HOSPADM

## 2018-01-08 RX ORDER — NALOXONE HYDROCHLORIDE 0.4 MG/ML
0.1 INJECTION, SOLUTION INTRAMUSCULAR; INTRAVENOUS; SUBCUTANEOUS AS NEEDED
Status: DISCONTINUED | OUTPATIENT
Start: 2018-01-08 | End: 2018-01-14 | Stop reason: HOSPADM

## 2018-01-08 RX ORDER — DIAZEPAM 5 MG/1
5 TABLET ORAL
Status: DISCONTINUED | OUTPATIENT
Start: 2018-01-08 | End: 2018-01-14 | Stop reason: HOSPADM

## 2018-01-08 RX ORDER — NALOXONE HYDROCHLORIDE 0.4 MG/ML
0.04 INJECTION, SOLUTION INTRAMUSCULAR; INTRAVENOUS; SUBCUTANEOUS AS NEEDED
Status: DISCONTINUED | OUTPATIENT
Start: 2018-01-08 | End: 2018-01-08 | Stop reason: HOSPADM

## 2018-01-08 RX ORDER — SODIUM CHLORIDE 0.9 % (FLUSH) 0.9 %
5-10 SYRINGE (ML) INJECTION EVERY 8 HOURS
Status: DISCONTINUED | OUTPATIENT
Start: 2018-01-08 | End: 2018-01-08 | Stop reason: HOSPADM

## 2018-01-08 RX ORDER — ONDANSETRON 2 MG/ML
INJECTION INTRAMUSCULAR; INTRAVENOUS AS NEEDED
Status: DISCONTINUED | OUTPATIENT
Start: 2018-01-08 | End: 2018-01-08 | Stop reason: HOSPADM

## 2018-01-08 RX ORDER — ALBUTEROL SULFATE 0.83 MG/ML
2.5 SOLUTION RESPIRATORY (INHALATION)
Status: DISCONTINUED | OUTPATIENT
Start: 2018-01-08 | End: 2018-01-14 | Stop reason: HOSPADM

## 2018-01-08 RX ORDER — DIPHENHYDRAMINE HYDROCHLORIDE 50 MG/ML
12.5 INJECTION, SOLUTION INTRAMUSCULAR; INTRAVENOUS
Status: DISCONTINUED | OUTPATIENT
Start: 2018-01-08 | End: 2018-01-08 | Stop reason: HOSPADM

## 2018-01-08 RX ORDER — GABAPENTIN 100 MG/1
300 CAPSULE ORAL 3 TIMES DAILY
Status: DISCONTINUED | OUTPATIENT
Start: 2018-01-08 | End: 2018-01-14 | Stop reason: HOSPADM

## 2018-01-08 RX ORDER — HYDROMORPHONE HYDROCHLORIDE 1 MG/ML
INJECTION, SOLUTION INTRAMUSCULAR; INTRAVENOUS; SUBCUTANEOUS AS NEEDED
Status: DISCONTINUED | OUTPATIENT
Start: 2018-01-08 | End: 2018-01-08 | Stop reason: HOSPADM

## 2018-01-08 RX ORDER — AMIODARONE HYDROCHLORIDE 200 MG/1
200 TABLET ORAL DAILY
Status: DISCONTINUED | OUTPATIENT
Start: 2018-01-09 | End: 2018-01-14 | Stop reason: HOSPADM

## 2018-01-08 RX ORDER — SODIUM CHLORIDE, SODIUM LACTATE, POTASSIUM CHLORIDE, CALCIUM CHLORIDE 600; 310; 30; 20 MG/100ML; MG/100ML; MG/100ML; MG/100ML
50 INJECTION, SOLUTION INTRAVENOUS CONTINUOUS
Status: DISCONTINUED | OUTPATIENT
Start: 2018-01-08 | End: 2018-01-08 | Stop reason: HOSPADM

## 2018-01-08 RX ORDER — LIDOCAINE HYDROCHLORIDE 10 MG/ML
0.1 INJECTION, SOLUTION EPIDURAL; INFILTRATION; INTRACAUDAL; PERINEURAL AS NEEDED
Status: DISCONTINUED | OUTPATIENT
Start: 2018-01-08 | End: 2018-01-08 | Stop reason: HOSPADM

## 2018-01-08 RX ORDER — FENTANYL CITRATE 50 UG/ML
INJECTION, SOLUTION INTRAMUSCULAR; INTRAVENOUS AS NEEDED
Status: DISCONTINUED | OUTPATIENT
Start: 2018-01-08 | End: 2018-01-08 | Stop reason: ALTCHOICE

## 2018-01-08 RX ORDER — ONDANSETRON 4 MG/1
8 TABLET, FILM COATED ORAL
Status: DISCONTINUED | OUTPATIENT
Start: 2018-01-08 | End: 2018-01-14 | Stop reason: HOSPADM

## 2018-01-08 RX ORDER — ONDANSETRON 2 MG/ML
4 INJECTION INTRAMUSCULAR; INTRAVENOUS
Status: DISCONTINUED | OUTPATIENT
Start: 2018-01-08 | End: 2018-01-14 | Stop reason: HOSPADM

## 2018-01-08 RX ORDER — SUCCINYLCHOLINE CHLORIDE 20 MG/ML
INJECTION INTRAMUSCULAR; INTRAVENOUS AS NEEDED
Status: DISCONTINUED | OUTPATIENT
Start: 2018-01-08 | End: 2018-01-08 | Stop reason: HOSPADM

## 2018-01-08 RX ORDER — CARVEDILOL 6.25 MG/1
3.12 TABLET ORAL 2 TIMES DAILY WITH MEALS
Status: DISCONTINUED | OUTPATIENT
Start: 2018-01-08 | End: 2018-01-12

## 2018-01-08 RX ORDER — SODIUM CHLORIDE, SODIUM LACTATE, POTASSIUM CHLORIDE, CALCIUM CHLORIDE 600; 310; 30; 20 MG/100ML; MG/100ML; MG/100ML; MG/100ML
INJECTION, SOLUTION INTRAVENOUS
Status: DISCONTINUED | OUTPATIENT
Start: 2018-01-08 | End: 2018-01-08

## 2018-01-08 RX ADMIN — Medication 10 ML: at 21:36

## 2018-01-08 RX ADMIN — Medication 10 ML: at 15:00

## 2018-01-08 RX ADMIN — FAMOTIDINE 20 MG: 20 TABLET, FILM COATED ORAL at 07:18

## 2018-01-08 RX ADMIN — GABAPENTIN 300 MG: 100 CAPSULE ORAL at 23:06

## 2018-01-08 RX ADMIN — ACETAMINOPHEN 1000 MG: 500 TABLET ORAL at 07:18

## 2018-01-08 RX ADMIN — SODIUM CHLORIDE, SODIUM LACTATE, POTASSIUM CHLORIDE, AND CALCIUM CHLORIDE: 600; 310; 30; 20 INJECTION, SOLUTION INTRAVENOUS at 10:32

## 2018-01-08 RX ADMIN — HYDROMORPHONE HYDROCHLORIDE: 10 INJECTION, SOLUTION INTRAMUSCULAR; INTRAVENOUS; SUBCUTANEOUS at 12:11

## 2018-01-08 RX ADMIN — PROPOFOL 160 MG: 10 INJECTION, EMULSION INTRAVENOUS at 07:40

## 2018-01-08 RX ADMIN — HYDROMORPHONE HYDROCHLORIDE 0.5 MG: 1 INJECTION, SOLUTION INTRAMUSCULAR; INTRAVENOUS; SUBCUTANEOUS at 09:51

## 2018-01-08 RX ADMIN — MIDAZOLAM HYDROCHLORIDE 2 MG: 1 INJECTION, SOLUTION INTRAMUSCULAR; INTRAVENOUS at 07:36

## 2018-01-08 RX ADMIN — ONDANSETRON 4 MG: 2 INJECTION INTRAMUSCULAR; INTRAVENOUS at 21:36

## 2018-01-08 RX ADMIN — CARVEDILOL 3.12 MG: 6.25 TABLET, FILM COATED ORAL at 17:16

## 2018-01-08 RX ADMIN — PREGABALIN 75 MG: 75 CAPSULE ORAL at 07:18

## 2018-01-08 RX ADMIN — SODIUM CHLORIDE, SODIUM LACTATE, POTASSIUM CHLORIDE, AND CALCIUM CHLORIDE: 600; 310; 30; 20 INJECTION, SOLUTION INTRAVENOUS at 09:00

## 2018-01-08 RX ADMIN — HYDROMORPHONE HYDROCHLORIDE 0.5 MG: 2 INJECTION, SOLUTION INTRAMUSCULAR; INTRAVENOUS; SUBCUTANEOUS at 11:35

## 2018-01-08 RX ADMIN — DIPHENHYDRAMINE HYDROCHLORIDE 12.5 MG: 50 INJECTION, SOLUTION INTRAMUSCULAR; INTRAVENOUS at 23:06

## 2018-01-08 RX ADMIN — DEXAMETHASONE SODIUM PHOSPHATE 8 MG: 4 INJECTION, SOLUTION INTRA-ARTICULAR; INTRALESIONAL; INTRAMUSCULAR; INTRAVENOUS; SOFT TISSUE at 07:40

## 2018-01-08 RX ADMIN — FENTANYL CITRATE 100 MCG: 50 INJECTION, SOLUTION INTRAMUSCULAR; INTRAVENOUS at 07:40

## 2018-01-08 RX ADMIN — GLYCOPYRROLATE 0.2 MG: 0.2 INJECTION INTRAMUSCULAR; INTRAVENOUS at 07:36

## 2018-01-08 RX ADMIN — FENTANYL CITRATE 100 MCG: 50 INJECTION, SOLUTION INTRAMUSCULAR; INTRAVENOUS at 09:00

## 2018-01-08 RX ADMIN — HYDROMORPHONE HYDROCHLORIDE 0.5 MG: 2 INJECTION, SOLUTION INTRAMUSCULAR; INTRAVENOUS; SUBCUTANEOUS at 12:00

## 2018-01-08 RX ADMIN — SUCCINYLCHOLINE CHLORIDE 140 MG: 20 INJECTION INTRAMUSCULAR; INTRAVENOUS at 07:40

## 2018-01-08 RX ADMIN — SODIUM CHLORIDE, SODIUM LACTATE, POTASSIUM CHLORIDE, AND CALCIUM CHLORIDE 75 ML/HR: 600; 310; 30; 20 INJECTION, SOLUTION INTRAVENOUS at 07:21

## 2018-01-08 RX ADMIN — HYDROMORPHONE HYDROCHLORIDE 0.5 MG: 2 INJECTION, SOLUTION INTRAMUSCULAR; INTRAVENOUS; SUBCUTANEOUS at 11:48

## 2018-01-08 RX ADMIN — SODIUM CHLORIDE 100 ML/HR: 900 INJECTION, SOLUTION INTRAVENOUS at 14:47

## 2018-01-08 RX ADMIN — ONDANSETRON 4 MG: 2 INJECTION INTRAMUSCULAR; INTRAVENOUS at 07:36

## 2018-01-08 RX ADMIN — HYDROMORPHONE HYDROCHLORIDE 0.5 MG: 1 INJECTION, SOLUTION INTRAMUSCULAR; INTRAVENOUS; SUBCUTANEOUS at 11:12

## 2018-01-08 RX ADMIN — SODIUM CHLORIDE 1000 MG: 900 INJECTION, SOLUTION INTRAVENOUS at 07:21

## 2018-01-08 RX ADMIN — HYDROMORPHONE HYDROCHLORIDE 0.5 MG: 2 INJECTION, SOLUTION INTRAMUSCULAR; INTRAVENOUS; SUBCUTANEOUS at 11:26

## 2018-01-08 RX ADMIN — GABAPENTIN 300 MG: 100 CAPSULE ORAL at 17:16

## 2018-01-08 RX ADMIN — LIDOCAINE HYDROCHLORIDE 80 MG: 20 INJECTION, SOLUTION EPIDURAL; INFILTRATION; INTRACAUDAL; PERINEURAL at 07:40

## 2018-01-08 NOTE — PROGRESS NOTES
OR today  1. Extreme lateral interbody fusion, L3-L4, PEEK cage, demineralized bone matrix. 2.  Posterior instrumentation, L3-L4, with percutaneously placed instrumentation, NuVasive type.   3.  Intraoperative monitoring, with intraoperative EMGs, NeuroVision type.   VSS  Yanes with clear yellow urine   U/O = 800 cc  DM orders done, FSBS 105  Dressings to right flank and lumbar bilateral paraspinal dry and intact  Moving extremites  Sleepy, but easily aroused and answers questions appropriately  C/o incisional pain, will adjust the dilaudid PCA as pt states it is not helping her pain    Jasmin Hudson, NP

## 2018-01-08 NOTE — BRIEF OP NOTE
BRIEF OPERATIVE NOTE    Date of Procedure: 1/8/2018   Preoperative Diagnosis: M48.062 SPINAL STENOSIS/SPONDYLOSIS L3/4  Postoperative Diagnosis: M48.062 SPINAL STENOSIS/SPONDYLOSIS L3/4    Procedure(s):  L3/4 EXTREMEN LATERAL INTERBODY FUSION (XLIF)/PERCUTANEOUS SCREWS  Surgeon(s) and Role:     * Rene Arredondo MD - Primary         Assistant Staff:       Surgical Staff:  Circ-1: Gloria Lamas RN  Radiology Technician: Aparna Velasquez  Scrub Tech-1: Saman Spry  Surg Asst-1: Bertrand Humjanina  Event Time In   Incision Start 5580   Incision Close      Anesthesia: General   Estimated Blood Loss: 50  Specimens: * No specimens in log *   Findings: spondy, stenosis, poor bone left l4 pedicle   Complications: 0  Implants:   Implant Name Type Inv.  Item Serial No.  Lot No. LRB No. Used Action   GRAFT PTTY BNE SYNTH 5CC -- ATTRAX - [id number]  GRAFT PTTY BNE SYNTH 5CC -- ATTRAX  NUVASIVE [id number] N/A 1 Implanted   SPACER PEEK XL L [id number] -- COROENT - [id number]  SPACER PEEK XL L [id number] -- COROENT  NUVASIVE NA N/A 1 Implanted   Reduction Screw 6.5 x 40 Screw   NUVASIVE NA N/A 4 Implanted   SCR LCK OPEN TULIP 5.5MM -- RELINE - [id number]   SCR LCK OPEN TULIP 5.5MM -- RELINE   NUVASIVE NA N/A 4 Implanted

## 2018-01-08 NOTE — PROGRESS NOTES
Per nurse, Mey Reid RN, patient remains very lethargic, not appropriate for initiation of PT at present time. Nurse asked PT to re-attempt tomorrow.

## 2018-01-08 NOTE — OP NOTES
[address] [place] [address]   OPERATIVE REPORT    Alvin Ash  MR#: [id number]  : 1950  ACCOUNT #: [de-identified]   DATE OF SERVICE: 2018    PREOPERATIVE DIAGNOSIS:  Degenerative spondylolisthesis, L3-L4, post-laminectomy syndrome lumbar and spinal stenosis. POSTOPERATIVE DIAGNOSIS:  Degenerative spondylolisthesis, L3-L4, post-laminectomy syndrome lumbar and spinal stenosis. PROCEDURES PERFORMED:  1. Extreme lateral interbody fusion, L3-L4, PEEK cage, demineralized bone matrix. 2.  Posterior instrumentation, L3-L4, with percutaneously placed instrumentation, NuVasive type. 3.  Intraoperative monitoring, with intraoperative EMGs, NeuroVision type. SURGEON:  Alonso Walsh MD    ANESTHESIA:  General endotracheal.    ESTIMATED BLOOD LOSS:  Maximal blood loss was probably 50 mL from bleeding skin edges and the like. SPECIMENS REMOVED:  None. COMPLICATIONS:  None. FINDINGS:  We were able to restore disk space height and anatomic alignment of the L3-L4 segment. There is still mild scoliotic curvature at the level, though restoration of disk space height and foraminal size. The listhesis was also resolved. There was only fair bone quality present, and dramatically poor quality of the left L4 pedicle. The other pedicles were reasonable bone quality for instrumentation, but the left L4 pedicle was marginal.  We were able to place a screw in it, but it was demonstrably of lesser quality than the other pedicles. DESCRIPTION OF PROCEDURE:  Following induction of general endotracheal anesthesia, the patient turned in the lateral decubitus position, after placing a neuromonitoring leads. Clean runs were found, good responses. The patient was placed right side up, affixed to the table, bony prominences well padded. Retroperitoneal access was obtained percutaneously overlying the L3-L4 disk space. Initial guidewire was placed with initial dilation tube over the L3-L4 disk space.   Nerve stimulation demonstrated clean runs. Max access retractor was placed. Visualization of the disk space was done with the psoas muscle visualized. No nerve bundle was visualized, and the region stimulated with positive stimulation of any nerve irritability. The small incision area was dilated, full disk space exposed. Again, retested after the retractor was well docked to the spine. An annulotomy done, radical diskectomy accomplished to the contralateral side, the endplates preserved. Bone quality was clearly fair. Care was taken to maintain endplate integrity. After debridement of the disk space, mobilization of the disk space, various trials were utilized. Ultimately, the 10 mm lordotic implant 18 mm in width was selected to restore disk space height, segmental lordosis, and provide anterior column support. It fit well and snugly. The actual device was filled with demineralized bone matrix and tamped firmly into place with excellent bony apposition. It restored disk space height, segmental lordosis, resolved much of the segmental scoliosis and resolved the segmental spondylolisthesis. Excellent [id number] alignment and fit. There was essentially no bleeding involved. Vancomycin powder was instilled into the incision. Clean neurological remnants were found throughout the procedure, without any nerve irritability or stimulation throughout the case. The incision was closed with 2-0 Vicryl and a 4-0 Monocryl subcuticular suture and Dermabond. The patient was then turned on a prone position on a spinal frame. Inspection radiographically at this point demonstrated resolution of the patient's spondylolisthesis, near complete normalization of the segmental scoliosis and resolution improvement of the segmental lordosis. The patient was prepped and draped in the usual fashion for placement of percutaneous instrumentation for stabilization of this segment.   Stab incisions were made overlying the pedicles of L3 and L4.  Jamshidi needles hooked up to neuromonitoring were inserted, first at the pedicles, 3 placed with AP and lateral imaging transpedicularly, with clean neuromonitoring runs. This was followed by guidewires, again confirmed with AP and lateral imaging. Then, 6.5 x 40 mm pedicle screws were then placed over these guidewires transpedicularly, with again clean neuromonitoring runs and good fixation. Bone quality, I would say, was fair to moderate, with good insertional resistance. Attention then turned to the L4 pedicles. First, I placed the screw on the right at L4. This was done without issue. On the left at L4, the bone quality on placement of the Jamshidi needle was unusually poor and soft. I met a little insertional resistance. Placement of this screw, one had to be careful not to overdrive the pedicle screw. I tried different trajectories for the pedicle screw placement, but it was just the poorness of the bone in this area. Different trajectories did not change the bone quality on placement of the Jamshidi needle and subsequent guidewire. The ultimate screw had at best fair insertional torque. It seemed to have good pullout strength, and was radiographically well placed. Lordosed rods were placed within the screw heads bilaterally. Final tightening and torquing was done. Vancomycin powder was instilled in these stab incisions. They were also closed with 2-0 Vicryl, a 4-0 Monocryl subcuticular suture and Dermabond. From both procedures, maximal blood loss was probably 50 mL from bleeding skin edges and   the like. There were no complications and no specimens. The patient maintained hemodynamic stability throughout the procedure. The patient was brought to the recovery room in good and stable condition.       Alex Dick MD       [address]  D: 01/08/2018 10:53     T: 01/08/2018 12:20  JOB #: [id number]

## 2018-01-08 NOTE — IP AVS SNAPSHOT
[address] [id number] [facility] 1501 [facility] Patient: Laura Greenfield MRN: [id number] FIN:[id number] About your hospitalization You were admitted on:  January 8, 2018 You last received care in the:  [facility] [address] [facility] You were discharged on:  January 13, 2018 Why you were hospitalized Your primary diagnosis was:  Not on File Your diagnoses also included:  Spinal Stenosis Of Lumbar Region With Neurogenic Claudication, Spondylisthesis, Lumbar Stenosis, Lumbar Stenosis With Neurogenic Claudication Follow-up Information Follow up With Details Comments Contact Info Mustapha Umanzor NP On 1/22/2018 Appointment at 10:30 am [address] [facility] 
[phone] Keenan Barnes MD On 1/19/2018 Appointment at 1:30 pm [address] [facility] 
[phone] Your Scheduled Appointments Sunday January 14, 2018 To Be Determined PT ADMISSION with Brittany Mai PT  
[facility] CARE SCHEDULING/INTAKE ([facility]) [address] [place] [address] CARE SCHEDULING/INTAKE ( HOME HEALTH/ HOSPICE) Monday January 22, 2018 10:30 AM EST  
POST OP with Mustapha Umanzor NP  
[facility] ([facility]) [address] [facility] [address] [phone] Tuesday February 20, 2018 10:15 AM EST  
POST OP with Monica Mace MD  
4 [facility], [address] [facility]) [address] [facility] [address]  
[phone] Discharge Orders None A check sierra indicates which time of day the medication should be taken. My Medications START taking these medications Instructions Each Dose to Equal  
 Morning Noon Evening Bedtime oxyCODONE-acetaminophen  mg per tablet Commonly known as:  PERCOCET Replaces:  oxyCODONE-acetaminophen 7.5-325 mg per tablet Your last dose was: Your next dose is: Take 1 Tab by mouth every four (4) hours as needed for Pain. Max Daily Amount: 6 Tabs. Indications: Pain, ACUTE POST OP PAIN  
 1 Tab CONTINUE taking these medications Instructions Each Dose to Equal  
 Morning Noon Evening Bedtime  
 albuterol 90 mcg/actuation inhaler Commonly known as:  PROAIR HFA Your last dose was: Your next dose is:    
   
   
 1-2 puffs every 4-6 hrs. aluminum-magnesium hydroxide 200-200 mg/5 mL susp 5 mL, diphenhydrAMINE 12.5 mg/5 mL liqd 12.5 mg, lidocaine 2 % soln 5 mL Your last dose was: Your next dose is:    
   
   
 5 mL by Swish and Spit route two (2) times a day. Magic mouth wash  Maalox Lidocaine 2% viscous  Diphenhydramine oral solution   Pharmacy to mix equal portions of ingredients to a total volume as indicated in the dispense amount. 5 mL  
    
   
   
   
  
 amiodarone 200 mg tablet Commonly known as:  CORDARONE Your last dose was: Your next dose is: Take 200 mg by mouth. 200 mg COREG 3.125 mg tablet Generic drug:  carvedilol Your last dose was: Your next dose is: Take 3.125 mg by mouth two (2) times daily (with meals). 3.125 mg  
    
   
   
   
  
 cyclobenzaprine 5 mg tablet Commonly known as:  FLEXERIL Your last dose was: Your next dose is:    
   
   
      
   
   
   
  
 digoxin 0.125 mg tablet Commonly known as:  LANOXIN Your last dose was: Your next dose is: Take 0.125 mg by mouth daily. 0.125 mg  
    
   
   
   
  
 dronabinol 5 mg capsule Commonly known as:  Veria Alu Your last dose was: Your next dose is: Take 1 Cap by mouth two (2) times a day. 5 mg  
    
   
   
   
  
 furosemide 40 mg tablet Commonly known as:  LASIX Your last dose was: Your next dose is: Take  by mouth daily. * gabapentin 300 mg capsule Commonly known as:  NEURONTIN Your last dose was: Your next dose is: Take 300 mg by mouth three (3) times daily. 300 mg  
    
   
   
   
  
 * gabapentin 300 mg capsule Commonly known as:  NEURONTIN Your last dose was: Your next dose is: Take 1 po q am and 2 po q pm  
     
   
   
   
  
 lidocaine-prilocaine topical cream  
Commonly known as:  EMLA Your last dose was: Your next dose is:    
   
   
 APPLY OVER PORT 1 HOUR PRIOR TO CHEMOTHERAPY THAN COVER WITH SARAN WRAP  
     
   
   
   
  
 loratadine 10 mg Cap Your last dose was: Your next dose is: Take 10 mg by mouth daily. 10 mg  
    
   
   
   
  
 magic mouthwash solution Your last dose was: Your next dose is: Take 5 mL by mouth three (3) times daily as needed for Stomatitis. Magic mouth wash  Maalox Lidocaine 2% viscous  Diphenhydramine oral solution   Pharmacy to mix equal portions of ingredients to a total volume as indicated in the dispense amount. 5 mL  
    
   
   
   
  
 meloxicam 7.5 mg tablet Commonly known as:  MOBIC Your last dose was: Your next dose is: Take 7.5 mg by mouth daily. 7.5 mg  
    
   
   
   
  
 nabumetone 750 mg tablet Commonly known as:  RELAFEN Your last dose was: Your next dose is:    
   
   
      
   
   
   
  
 ondansetron hcl 8 mg tablet Commonly known as:  ZOFRAN (AS HYDROCHLORIDE) Your last dose was: Your next dose is: Take 1 Tab by mouth every eight (8) hours as needed for Nausea. 8 mg * potassium chloride 20 mEq tablet Commonly known as:  K-DUR, KLOR-CON Your last dose was: Your next dose is: Take 2 Tabs by mouth daily. 40 mEq * KLOR-CON M20 20 mEq tablet Generic drug:  potassium chloride Your last dose was: Your next dose is: TAKE ONE TABLET BY MOUTH ONCE A DAY  
     
   
   
   
  
 PRUVATE 21-7 PO Your last dose was: Your next dose is: Take  by mouth. Senna 8.6 mg tablet Generic drug:  senna Your last dose was: Your next dose is: Take 1 Tab by mouth daily. 1 Tab XARELTO 10 mg tablet Generic drug:  rivaroxaban Your last dose was: Your next dose is: Take 10 mg by mouth daily. 10 mg  
    
   
   
   
  
 zolpidem 10 mg tablet Commonly known as:  AMBIEN Your last dose was: Your next dose is: TAKE 1 TABLET BY MOUTH NIGHTLY AS NEEDED FOR SLEEP. MAX DAILY AMOUNT 10 MG  
     
   
   
   
  
 * Notice: This list has 4 medication(s) that are the same as other medications prescribed for you. Read the directions carefully, and ask your doctor or other care provider to review them with you. STOP taking these medications CeleBREX 200 mg capsule Generic drug:  celecoxib  
   
  
 oxyCODONE-acetaminophen 7.5-325 mg per tablet Commonly known as:  PERCOCET Replaced by:  oxyCODONE-acetaminophen  mg per tablet Where to Get Your Medications Information on where to get these meds will be given to you by the nurse or doctor. ! Ask your nurse or doctor about these medications  
  oxyCODONE-acetaminophen  mg per tablet Discharge Instructions Spondylolysis and Spondylolisthesis: Exercises Your Care Instructions Here are some examples of typical rehabilitation exercises for your condition. Start each exercise slowly. Ease off the exercise if you start to have pain. Your doctor or physical therapist will tell you when you can start these exercises and which ones will work best for you. How to do the exercises Single knee-to-chest 
 
1. Lie on your back with your knees bent and your feet flat on the floor. You can put a small pillow under your head and neck if it is more comfortable. 2. Bring one knee to your chest, keeping the other foot flat on the floor. 3. Keep your lower back pressed to the floor. Hold for 15 to 30 seconds. 4. Relax, and lower the knee to the starting position. 5. Repeat with the other leg. Repeat 2 to 4 times with each leg. 6. To get more stretch, put your other leg flat on the floor while pulling your knee to your chest. 
Double knee-to-chest 
 
1. Lie on your back with your knees bent and your feet flat on the floor. You can put a small pillow under your head and neck if it is more comfortable. 2. Bring both knees to your chest. 
3. Keep your lower back pressed to the floor. Hold for 15 to 30 seconds. 4. Relax, and lower your knees to the starting position. 5. Repeat 2 to 4 times. Alternate arm and leg (bird dog) exercise Do this exercise slowly. Try to keep your body straight at all times. 1. Start on the floor, on your hands and knees. 2. Tighten your belly muscles by pulling your belly button in toward your spine. Be sure you continue to breathe normally and do not hold your breath. 3. Raise one arm off the floor, and hold it straight out in front of you. Be careful not to let your shoulder drop down, because that will twist your trunk. 4. Hold for about 6 seconds, then lower your arm and switch to your other arm. 5. Repeat 8 to 12 times on each arm. 6. When you can do this exercise with ease and no pain, repeat steps 1 through 5.  But this time do it with one leg raised off the floor, holding your leg straight out behind you. Be careful not to let your hip drop down, because that will twist your trunk. 7. When holding your leg straight out becomes easier, try raising your opposite arm at the same time, and repeat steps 1 through 5. Bridging 1. Lie on your back with both knees bent. Your knees should be bent about 90 degrees. 2. Then push your feet into the floor, squeeze your buttocks, and lift your hips off the floor until your shoulders, hips, and knees are all in a straight line. 3. Hold for about 6 seconds as you continue to breathe normally, and then slowly lower your hips back down to the floor and rest for up to 10 seconds. 4. Repeat 8 to 12 times. Curl-ups 1. Lie on the floor on your back with your knees bent at a 90-degree angle. Your feet should be flat on the floor, about 12 inches from your buttocks. 2. Cross your arms over your chest. If this bothers your neck, try putting your hands behind your neck (not your head), with your elbows spread apart. 3. Slowly tighten your belly muscles and raise your shoulder blades off the floor. 4. Keep your head in line with your body, and do not press your chin to your chest. 
5. Hold this position for 1 or 2 seconds, then slowly lower yourself back down to the floor. 6. Repeat 8 to 12 times. Monica Mike Do this exercise slowly. Try to keep your body straight at all times, and do not let one hip drop lower than the other. 1. Lie on your stomach, resting your upper body on your forearms. 2. Tighten your belly muscles by pulling your belly button in toward your spine. 3. Keeping your knees on the floor, press down with your forearms to lift your upper body off the floor. 4. Hold for about 6 seconds, then lower your body to the floor. Rest for up to 10 seconds. 5. Repeat 8 to 12 times. 6. Over time, work up to holding for 15 to 30 seconds each time.  
7. If this exercise is easy to do with your knees on the floor, try doing this exercise with your knees and legs straight, supported by your toes on the floor. Follow-up care is a key part of your treatment and safety. Be sure to make and go to all appointments, and call your doctor if you are having problems. It's also a good idea to know your test results and keep a list of the medicines you take. Where can you learn more? Go to http://elon-edinson.info/. Enter 536-851-359 in the search box to learn more about \"Spondylolysis and Spondylolisthesis: Exercises. \" Current as of: March 21, 2017 Content Version: 11.4 © 7583-5673 [employer]. Care instructions adapted under license by [employer] (which disclaims liability or warranty for this information). If you have questions about a medical condition or this instruction, always ask your healthcare professional. [address] [address] any warranty or liability for your use of this information. Post-Operative Discharge Instructions after Spine Surgery [facility] 
[phone] Spondylolysis and Spondylolisthesis: Exercises Your Care Instructions Here are some examples of typical rehabilitation exercises for your condition. Start each exercise slowly. Ease off the exercise if you start to have pain. Your doctor or physical therapist will tell you when you can start these exercises and which ones will work best for you. How to do the exercises Single knee-to-chest 
 
7. Lie on your back with your knees bent and your feet flat on the floor. You can put a small pillow under your head and neck if it is more comfortable. 8. Bring one knee to your chest, keeping the other foot flat on the floor. 9. Keep your lower back pressed to the floor. Hold for 15 to 30 seconds. 10. Relax, and lower the knee to the starting position. 11. Repeat with the other leg. Repeat 2 to 4 times with each leg. 12. To get more stretch, put your other leg flat on the floor while pulling your knee to your chest. 
Double knee-to-chest 
 
6. Lie on your back with your knees bent and your feet flat on the floor. You can put a small pillow under your head and neck if it is more comfortable. 7. Bring both knees to your chest. 
8. Keep your lower back pressed to the floor. Hold for 15 to 30 seconds. 9. Relax, and lower your knees to the starting position. 10. Repeat 2 to 4 times. Alternate arm and leg (bird dog) exercise Do this exercise slowly. Try to keep your body straight at all times. 8. Start on the floor, on your hands and knees. 9. Tighten your belly muscles by pulling your belly button in toward your spine. Be sure you continue to breathe normally and do not hold your breath. 10. Raise one arm off the floor, and hold it straight out in front of you. Be careful not to let your shoulder drop down, because that will twist your trunk. 11. Hold for about 6 seconds, then lower your arm and switch to your other arm. 12. Repeat 8 to 12 times on each arm. 13. When you can do this exercise with ease and no pain, repeat steps 1 through 5. But this time do it with one leg raised off the floor, holding your leg straight out behind you. Be careful not to let your hip drop down, because that will twist your trunk. 14. When holding your leg straight out becomes easier, try raising your opposite arm at the same time, and repeat steps 1 through 5. Bridging 5. Lie on your back with both knees bent. Your knees should be bent about 90 degrees. 6. Then push your feet into the floor, squeeze your buttocks, and lift your hips off the floor until your shoulders, hips, and knees are all in a straight line. 7. Hold for about 6 seconds as you continue to breathe normally, and then slowly lower your hips back down to the floor and rest for up to 10 seconds. 8. Repeat 8 to 12 times. Curl-ups 7. Lie on the floor on your back with your knees bent at a 90-degree angle. Your feet should be flat on the floor, about 12 inches from your buttocks. 8. Cross your arms over your chest. If this bothers your neck, try putting your hands behind your neck (not your head), with your elbows spread apart. 9. Slowly tighten your belly muscles and raise your shoulder blades off the floor. 10. Keep your head in line with your body, and do not press your chin to your chest. 
11. Hold this position for 1 or 2 seconds, then slowly lower yourself back down to the floor. 12. Repeat 8 to 12 times. Toña Balbuena Do this exercise slowly. Try to keep your body straight at all times, and do not let one hip drop lower than the other. 8. Lie on your stomach, resting your upper body on your forearms. 9. Tighten your belly muscles by pulling your belly button in toward your spine. 10. Keeping your knees on the floor, press down with your forearms to lift your upper body off the floor. 11. Hold for about 6 seconds, then lower your body to the floor. Rest for up to 10 seconds. 12. Repeat 8 to 12 times. 13. Over time, work up to holding for 15 to 30 seconds each time. 14. If this exercise is easy to do with your knees on the floor, try doing this exercise with your knees and legs straight, supported by your toes on the floor. Follow-up care is a key part of your treatment and safety. Be sure to make and go to all appointments, and call your doctor if you are having problems. It's also a good idea to know your test results and keep a list of the medicines you take. Where can you learn more? Go to http://leon-edinson.info/. Enter 284-175-303 in the search box to learn more about \"Spondylolysis and Spondylolisthesis: Exercises. \" Current as of: March 21, 2017 Content Version: 11.4 © 2360-4743 [employer]wise[employer].  Care instructions adapted under license by [address] (which disclaims liability or warranty for this information). If you have questions about a medical condition or this instruction, always ask your healthcare professional. [address] any warranty or liability for your use of this information. Patient armband removed and shredded Spondylolysis and Spondylolisthesis: Exercises Your Care Instructions Here are some examples of typical rehabilitation exercises for your condition. Start each exercise slowly. Ease off the exercise if you start to have pain. Your doctor or physical therapist will tell you when you can start these exercises and which ones will work best for you. How to do the exercises Single knee-to-chest 
 
13. Lie on your back with your knees bent and your feet flat on the floor. You can put a small pillow under your head and neck if it is more comfortable. 14. Bring one knee to your chest, keeping the other foot flat on the floor. 15. Keep your lower back pressed to the floor. Hold for 15 to 30 seconds. 16. Relax, and lower the knee to the starting position. 17. Repeat with the other leg. Repeat 2 to 4 times with each leg. 18. To get more stretch, put your other leg flat on the floor while pulling your knee to your chest. 
Double knee-to-chest 
 
11. Lie on your back with your knees bent and your feet flat on the floor. You can put a small pillow under your head and neck if it is more comfortable. 12. Bring both knees to your chest. 
13. Keep your lower back pressed to the floor. Hold for 15 to 30 seconds. 14. Relax, and lower your knees to the starting position. 15. Repeat 2 to 4 times. Alternate arm and leg (bird dog) exercise Do this exercise slowly. Try to keep your body straight at all times. 15. Start on the floor, on your hands and knees.  
12. Tighten your belly muscles by pulling your belly button in toward your spine. Be sure you continue to breathe normally and do not hold your breath. 17. Raise one arm off the floor, and hold it straight out in front of you. Be careful not to let your shoulder drop down, because that will twist your trunk. 18. Hold for about 6 seconds, then lower your arm and switch to your other arm. 19. Repeat 8 to 12 times on each arm. 20. When you can do this exercise with ease and no pain, repeat steps 1 through 5. But this time do it with one leg raised off the floor, holding your leg straight out behind you. Be careful not to let your hip drop down, because that will twist your trunk. 21. When holding your leg straight out becomes easier, try raising your opposite arm at the same time, and repeat steps 1 through 5. Bridging 9. Lie on your back with both knees bent. Your knees should be bent about 90 degrees. 10. Then push your feet into the floor, squeeze your buttocks, and lift your hips off the floor until your shoulders, hips, and knees are all in a straight line. 11. Hold for about 6 seconds as you continue to breathe normally, and then slowly lower your hips back down to the floor and rest for up to 10 seconds. 12. Repeat 8 to 12 times. Curl-ups 13. Lie on the floor on your back with your knees bent at a 90-degree angle. Your feet should be flat on the floor, about 12 inches from your buttocks. 15. Cross your arms over your chest. If this bothers your neck, try putting your hands behind your neck (not your head), with your elbows spread apart. 15. Slowly tighten your belly muscles and raise your shoulder blades off the floor. 16. Keep your head in line with your body, and do not press your chin to your chest. 
17. Hold this position for 1 or 2 seconds, then slowly lower yourself back down to the floor. 18. Repeat 8 to 12 times. Mychal De La Garza Do this exercise slowly. Try to keep your body straight at all times, and do not let one hip drop lower than the other. 13. Lie on your stomach, resting your upper body on your forearms. 12. Tighten your belly muscles by pulling your belly button in toward your spine. 17. Keeping your knees on the floor, press down with your forearms to lift your upper body off the floor. 18. Hold for about 6 seconds, then lower your body to the floor. Rest for up to 10 seconds. 19. Repeat 8 to 12 times. 20. Over time, work up to holding for 15 to 30 seconds each time. 21. If this exercise is easy to do with your knees on the floor, try doing this exercise with your knees and legs straight, supported by your toes on the floor. Follow-up care is a key part of your treatment and safety. Be sure to make and go to all appointments, and call your doctor if you are having problems. It's also a good idea to know your test results and keep a list of the medicines you take. Where can you learn more? Go to http://leon-edinson.info/. Enter 479-211-783 in the search box to learn more about \"Spondylolysis and Spondylolisthesis: Exercises. \" Current as of: March 21, 2017 Content Version: 11.4 © 0783-6047 [employer]. Care instructions adapted under license by [employer] (which disclaims liability or warranty for this information). If you have questions about a medical condition or this instruction, always ask your healthcare professional. [place] [address] any warranty or liability for your use of this information. DISCHARGE SUMMARY from Nurse PATIENT INSTRUCTIONS: 
 
 
F-face looks uneven A-arms unable to move or move unevenly S-speech slurred or non-existent T-time-call 911 as soon as signs and symptoms begin-DO NOT go  
 Back to bed or wait to see if you get better-TIME IS BRAIN. Warning Signs of HEART ATTACK Call 911 if you have these symptoms: 
? Chest discomfort. Most heart attacks involve discomfort in the center of the chest that lasts more than a few minutes, or that goes away and comes back. It can feel like uncomfortable pressure, squeezing, fullness, or pain. ? Discomfort in other areas of the upper body. Symptoms can include pain or discomfort in one or both arms, the back, neck, jaw, or stomach. ? Shortness of breath with or without chest discomfort. ? Other signs may include breaking out in a cold sweat, nausea, or lightheadedness. Don't wait more than five minutes to call [address]! Fast action can save your life. Calling 911 is almost always the fastest way to get lifesaving treatment. Emergency Medical Services staff can begin treatment when they arrive  up to an hour sooner than if someone gets to the hospital by car. The discharge information has been reviewed with the patient. The patient verbalized understanding. Discharge medications reviewed with the patient and appropriate educational materials and side effects teaching were provided. ___________________________________________________________________________________________________________________________________ At your 2-week post-operative visit we will remove any skin staples or sutures, if present. (Appointment was made at the time you scheduled your surgery) Call office or physician on-call for any of the following: · Fever greater than 100.5 · Uncontrollable chills · Drainage from incision · Pain not controlled by pain medication · New pain · New weakness or progressive weakness · Food sticking in throat or excessive coughing when swallowing Stop all anti-inflammatories (arthritis medication) such as Ibuprofen, Motrin, Aleve, Voltaren, Relafen, Naproxen, Arthrotec, etc. for 12 weeks ONLY if you had a neck or back Fusion. You may take Tylenol or acetaminophen over the counter. May remove EZ stockings when discharged from the hospital. 
 
May shower on the third day after surgery. Leave dressing on while you shower; the dressing is waterproof as long as the edges are sealed. Change dressing after 1 week, or sooner if saturated with drainage. Replace with a gauze dressing. Abstain from driving until your first post-operative visit. If you must drive, do not take pain medications that may alter your abilities. Lumbar braces and neck collars are for comfort only. Walking is the best therapy after back surgery. Avoid extremes of bending, twisting, or lifting. All post-operative surgical patients should function within the range of the pain. \"If it hurts - do not do it. \" 
 
 
 
Patient armband removed and shredded DISCHARGE SUMMARY from Nurse PATIENT INSTRUCTIONS: 
 
 
F-face looks uneven A-arms unable to move or move unevenly S-speech slurred or non-existent T-time-call 911 as soon as signs and symptoms begin-DO NOT go Back to bed or wait to see if you get better-TIME IS BRAIN. Warning Signs of HEART ATTACK Call 911 if you have these symptoms: 
? Chest discomfort. Most heart attacks involve discomfort in the center of the chest that lasts more than a few minutes, or that goes away and comes back. It can feel like uncomfortable pressure, squeezing, fullness, or pain. ? Discomfort in other areas of the upper body. Symptoms can include pain or discomfort in one or both arms, the back, neck, jaw, or stomach. ? Shortness of breath with or without chest discomfort. ? Other signs may include breaking out in a cold sweat, nausea, or lightheadedness. Don't wait more than five minutes to call [address]! Fast action can save your life. Calling 911 is almost always the fastest way to get lifesaving treatment. Emergency Medical Services staff can begin treatment when they arrive  up to an hour sooner than if someone gets to the hospital by car. The discharge information has been reviewed with the patient. The patient verbalized understanding. Discharge medications reviewed with the patient and appropriate educational materials and side effects teaching were provided. ___________________________________________________________________________________________________________________________________ Unresulted Labs-Please follow up with your PCP about these lab tests Order Current Status [place] [facility] TECHNOLOGIST SERVICE In process Providers Seen During Your Hospitalization Provider Specialty Primary office phone Gwen Irizarry MD Orthopedic Surgery [phone] Your Primary Care Physician (PCP) Primary Care Physician Office Phone Office Fax [place] [facility] [phone] You are allergic to the following Allergen Reactions Aspirin Swelling Pt states her whole body swells when she takes aspirin. Adhesive Unable to Obtain Pcn (Penicillins) Rash Recent Documentation Height Weight BMI OB Status Smoking Status 1.676 m 79.3 kg 28.22 kg/m2 Postmenopausal Never Smoker Emergency Contacts Name Discharge Info Relation Home Work Mobile [address] CAREGIVER [3] Daughter [21] [phone] Corby Pagan DISCHARGE CAREGIVER [3] Son [22] [phone] Jerrod Pagan DISCHARGE CAREGIVER [3] Spouse [3] [phone] Luciano Men  Daughter [21] [phone] Patient Belongings The following personal items are in your possession at time of discharge: 
  Dental Appliances: None  Visual Aid: Glasses      Home Medications: Sent home   Jewelry: None  Clothing: At bedside    Other Valuables: Cell Phone Discharge Instructions Attachments/References OXYCODONE/ACETAMINOPHEN (BY MOUTH) (ENGLISH) Patient Handouts Oxycodone/Acetaminophen (By mouth) Acetaminophen (l-dgmm-x-MIN-oh-fen), Oxycodone Hydrochloride (bf-v-JHZ-done carloz-droe-KLOR-ayde) Treats moderate to moderately severe pain. This medicine is a narcotic pain reliever. Brand Name(s): Endocet, Percocet, Primlev, Xartemis XR There may be other brand names for this medicine. When This Medicine Should Not Be Used: This medicine is not right for everyone. Do not use it if you had an allergic reaction to acetaminophen or oxycodone, or if you have serious breathing problems or paralytic ileus. How to Use This Medicine:  
Capsule, Liquid, Tablet, Long Acting Tablet · Your doctor will tell you how much medicine to use. Do not use more than directed. · An overdose can be dangerous. Follow directions carefully so you do not get too much medicine at one time. · Oral liquid: Measure the oral liquid medicine with a marked measuring spoon, oral syringe, or medicine cup. · Swallow the extended-release tablet whole. Do not crush, break, or chew it. Do not lick or wet the tablet before placing it in your mouth. Do not give this medicine through a feeding tube. · This medicine should come with a Medication Guide. Ask your pharmacist for a copy if you do not have one. · Missed dose: If you miss a dose of this medicine, skip the missed dose and go back to your regular dosing schedule. Do not double doses. · Store the medicine in a closed container at room temperature, away from heat, moisture, and direct light. Ask your pharmacist about the best way to dispose of medicine you do not use. Drugs and Foods to Avoid: Ask your doctor or pharmacist before using any other medicine, including over-the-counter medicines, vitamins, and herbal products. · Do not use Xartemis XR if you are using or have used an MAO inhibitor in the past 14 days. · Some medicines can affect how this medicine works. Tell your doctor if you are using any of the following: ¨ Carbamazepine, erythromycin, ketoconazole, lamotrigine, mirtazapine, naltrexone, phenytoin, propranolol, rifampin, ritonavir, tramadol, trazodone, or zidovudine ¨ Birth control pills ¨ Diuretic (water pill) ¨ Medicine to treat depression ¨ Phenothiazine medicine ¨ Triptan medicine to treat migraine headaches · Do not drink alcohol while you are using this medicine. Acetaminophen can damage your liver, and alcohol can increase this risk. Do not take acetaminophen without asking your doctor if you have 3 or more drinks of alcohol every day. · Tell your doctor if you use anything else that makes you sleepy. Some examples are allergy medicine, narcotic pain medicine, and alcohol. Tell your doctor if you are using buprenorphine, butorphanol, nalbuphine, pentazocine, a benzodiazepine, or a muscle relaxer. Warnings While Using This Medicine: · Tell your doctor if you are pregnant or breastfeeding, or if you have kidney disease, liver disease, heart disease, low blood pressure, breathing problems or lung disease (such as asthma, COPD), thyroid problems, Colo disease, pancreas or gallbladder problems, prostate problems, trouble urinating, or a stomach problems, or a history of head injury or brain damage, seizures, or alcohol or drug abuse. Tell your doctor if you are allergic to codeine. · This medicine may cause the following problems: 
¨ High risk of overdose, which can lead to death ¨ Respiratory depression (serious breathing problem that can be life-threatening) ¨ Liver problems ¨ Serious skin reactions ¨ Serotonin syndrome (when used with certain medicines) · This medicine may make you dizzy or drowsy. Do not drive or do anything that could be dangerous until you know how this medicine affects you. Sit or lie down if you feel dizzy. Stand up carefully. · This medicine contains acetaminophen. Read the labels of all other medicines you are using to see if they also contain acetaminophen, or ask your doctor or pharmacist. Radha Fajardo not use more than 4 grams (4,000 milligrams) total of acetaminophen in one day. · This medicine can be habit-forming. Do not use more than your prescribed dose. Call your doctor if you think your medicine is not working. · Do not stop using this medicine suddenly. Your doctor will need to slowly decrease your dose before you stop it completely. · This medicine could cause infertility. Talk with your doctor before using this medicine if you plan to have children. · This medicine may cause constipation, especially with long-term use. Ask your doctor if you should use a laxative to prevent and treat constipation. · Keep all medicine out of the reach of children. Never share your medicine with anyone. Possible Side Effects While Using This Medicine:  
Call your doctor right away if you notice any of these side effects: · Allergic reaction: Itching or hives, swelling in your face or hands, swelling or tingling in your mouth or throat, chest tightness, trouble breathing · Anxiety, restlessness, fast heartbeat, fever, muscle spasms, twitching, diarrhea, seeing or hearing things that are not there · Blistering, peeling, red skin rash · Blue lips, fingernails, or skin · Dark urine or pale stools, loss of appetite, stomach pain, yellow skin or eyes · Extreme weakness, shallow breathing, uneven heartbeat, seizures, sweating, or cold or clammy skin · Severe confusion, lightheadedness, dizziness, or fainting · Severe constipation, nausea, or vomiting · Trouble breathing or slow breathing If you notice these less serious side effects, talk with your doctor:  
· Headache · Mild constipation, nausea, or vomiting · Mild sleepiness or drowsiness If you notice other side effects that you think are caused by this medicine, tell your doctor. Call your doctor for medical advice about side effects. You may report side effects to FDA at 6-386-FDA-3995 © 2017 [address] Information is for End User's use only and may not be sold, redistributed or otherwise used for commercial purposes. The above information is an  only. It is not intended as medical advice for individual conditions or treatments. Talk to your doctor, nurse or pharmacist before following any medical regimen to see if it is safe and effective for you. Please provide this summary of care documentation to your next provider. Signatures-by signing, you are acknowledging that this After Visit Summary has been reviewed with you and you have received a copy. Patient Signature:  ____________________________________________________________ Date:  ____________________________________________________________  
  
Francois Ernst Provider Signature:  ____________________________________________________________ Date:  ____________________________________________________________

## 2018-01-08 NOTE — ANESTHESIA PREPROCEDURE EVALUATION
Anesthetic History               Review of Systems / Medical History  Patient summary reviewed and pertinent labs reviewed    Pulmonary                   Neuro/Psych              Cardiovascular            Dysrhythmias            GI/Hepatic/Renal                Endo/Other    Diabetes: well controlled, type 2    Arthritis     Other Findings   Comments:   Risk Factors for Postoperative nausea/vomiting:       History of postoperative nausea/vomiting? NO       Female? YES       Motion sickness? NO       Intended opioid administration for postoperative analgesia? YES      Smoking Abstinence  Current Smoker? NO  Elective Surgery? YES  Seen preoperatively by anesthesiologist or proxy prior to day of surgery? YES  Pt abstained from smoking 24 hours prior to anesthesia?  N/A         Physical Exam    Airway  Mallampati: III  TM Distance: > 6 cm  Neck ROM: normal range of motion   Mouth opening: Normal     Cardiovascular    Rhythm: regular  Rate: normal         Dental  No notable dental hx       Pulmonary  Breath sounds clear to auscultation               Abdominal  GI exam deferred       Other Findings            Anesthetic Plan    ASA: 3  Anesthesia type: general          Induction: Intravenous  Anesthetic plan and risks discussed with: Patient

## 2018-01-08 NOTE — PROGRESS NOTES
Orders received, chart reviewed, PT evaluation. Patient is extremely lethargic at present time. Not appropriate to initiate PT at present time. Will re-attempt later this afternoon.

## 2018-01-08 NOTE — PERIOP NOTES
TRANSFER - OUT REPORT:    Verbal report given to Jasbir Bush on [employer]  being transferred to  for routine post - op       Report consisted of patients Situation, Background, Assessment and   Recommendations(SBAR). Information from the following report(s) SBAR and MAR was reviewed with the receiving nurse. Lines:   Peripheral IV 01/08/18 Right Wrist (Active)   Site Assessment Clean, dry, & intact 1/8/2018 11:15 AM   Phlebitis Assessment 0 1/8/2018 11:15 AM   Infiltration Assessment 0 1/8/2018 11:15 AM   Dressing Status Clean, dry, & intact 1/8/2018 11:15 AM   Dressing Type Tape;Transparent 1/8/2018 11:15 AM   Hub Color/Line Status Pink; Infusing 1/8/2018 11:15 AM        Opportunity for questions and clarification was provided.       Patient transported with:   O2 @ 2 liters

## 2018-01-08 NOTE — ANESTHESIA POSTPROCEDURE EVALUATION
Post-Anesthesia Evaluation and Assessment    Patient: Charlee Floyd MRN: [id number]  SSN: [id number]    YOB: 1950  Age: 74 y.o. Sex: female       Cardiovascular Function/Vital Signs  Visit Vitals    /70    Pulse 69    Temp 35.8 °C (96.4 °F)    Resp 14    Ht 5' 6\" (1.676 m)    Wt 73.9 kg (163 lb)    SpO2 100%    BMI 26.31 kg/m2       Patient is status post general anesthesia for Procedure(s):  L3/4 EXTREMEN LATERAL INTERBODY FUSION (XLIF)/PERCUTANEOUS SCREWS. Nausea/Vomiting: None    Postoperative hydration reviewed and adequate. Pain:  Pain Scale 1: Visual (01/08/18 1245)  Pain Intensity 1: 0 (01/08/18 1245)   Managed    Neurological Status:   Neuro (WDL): Within Defined Limits (01/08/18 1115)  Neuro  RLE Motor Response: Weak (01/08/18 2659)   At baseline    Mental Status and Level of Consciousness: Arousable    Pulmonary Status:   O2 Device: Nasal cannula (01/08/18 1126)   Adequate oxygenation and airway patent    Complications related to anesthesia: None    Post-anesthesia assessment completed.  No concerns    Signed By: Yanyc Almonte MD     January 8, 2018

## 2018-01-08 NOTE — IP AVS SNAPSHOT
[address] Patient: Prateek Vang MRN: [id number] HYK:[id number] A check sierra indicates which time of day the medication should be taken. My Medications START taking these medications Instructions Each Dose to Equal  
 Morning Noon Evening Bedtime  
 oxyCODONE-acetaminophen  mg per tablet Commonly known as:  PERCOCET Replaces:  oxyCODONE-acetaminophen 7.5-325 mg per tablet Your last dose was: Your next dose is: Take 1 Tab by mouth every four (4) hours as needed for Pain. Max Daily Amount: 6 Tabs. Indications: Pain, ACUTE POST OP PAIN  
 1 Tab CONTINUE taking these medications Instructions Each Dose to Equal  
 Morning Noon Evening Bedtime  
 albuterol 90 mcg/actuation inhaler Commonly known as:  PROAIR HFA Your last dose was: Your next dose is:    
   
   
 1-2 puffs every 4-6 hrs. aluminum-magnesium hydroxide 200-200 mg/5 mL susp 5 mL, diphenhydrAMINE 12.5 mg/5 mL liqd 12.5 mg, lidocaine 2 % soln 5 mL Your last dose was: Your next dose is:    
   
   
 5 mL by Swish and Spit route two (2) times a day. Magic mouth wash  Maalox Lidocaine 2% viscous  Diphenhydramine oral solution   Pharmacy to mix equal portions of ingredients to a total volume as indicated in the dispense amount. 5 mL  
    
   
   
   
  
 amiodarone 200 mg tablet Commonly known as:  CORDARONE Your last dose was: Your next dose is: Take 200 mg by mouth. 200 mg COREG 3.125 mg tablet Generic drug:  carvedilol Your last dose was: Your next dose is: Take 3.125 mg by mouth two (2) times daily (with meals). 3.125 mg  
    
   
   
   
  
 cyclobenzaprine 5 mg tablet Commonly known as:  FLEXERIL Your last dose was: Your next dose is:    
   
   
      
   
   
   
  
 digoxin 0.125 mg tablet Commonly known as:  LANOXIN Your last dose was: Your next dose is: Take 0.125 mg by mouth daily. 0.125 mg  
    
   
   
   
  
 dronabinol 5 mg capsule Commonly known as:  Sophy Echolsond Your last dose was: Your next dose is: Take 1 Cap by mouth two (2) times a day. 5 mg  
    
   
   
   
  
 furosemide 40 mg tablet Commonly known as:  LASIX Your last dose was: Your next dose is: Take  by mouth daily. * gabapentin 300 mg capsule Commonly known as:  NEURONTIN Your last dose was: Your next dose is: Take 300 mg by mouth three (3) times daily. 300 mg  
    
   
   
   
  
 * gabapentin 300 mg capsule Commonly known as:  NEURONTIN Your last dose was: Your next dose is: Take 1 po q am and 2 po q pm  
     
   
   
   
  
 lidocaine-prilocaine topical cream  
Commonly known as:  EMLA Your last dose was: Your next dose is:    
   
   
 APPLY OVER PORT 1 HOUR PRIOR TO CHEMOTHERAPY THAN COVER WITH SARAN WRAP  
     
   
   
   
  
 loratadine 10 mg Cap Your last dose was: Your next dose is: Take 10 mg by mouth daily. 10 mg  
    
   
   
   
  
 magic mouthwash solution Your last dose was: Your next dose is: Take 5 mL by mouth three (3) times daily as needed for Stomatitis. Magic mouth wash  Maalox Lidocaine 2% viscous  Diphenhydramine oral solution   Pharmacy to mix equal portions of ingredients to a total volume as indicated in the dispense amount. 5 mL  
    
   
   
   
  
 meloxicam 7.5 mg tablet Commonly known as:  MOBIC Your last dose was: Your next dose is: Take 7.5 mg by mouth daily. 7.5 mg  
    
   
   
   
  
 nabumetone 750 mg tablet Commonly known as:  RELAFEN Your last dose was: Your next dose is:    
   
   
      
   
   
   
  
 ondansetron hcl 8 mg tablet Commonly known as:  ZOFRAN (AS HYDROCHLORIDE) Your last dose was: Your next dose is: Take 1 Tab by mouth every eight (8) hours as needed for Nausea. 8 mg * potassium chloride 20 mEq tablet Commonly known as:  K-DUR, KLOR-CON Your last dose was: Your next dose is: Take 2 Tabs by mouth daily. 40 mEq * KLOR-CON M20 20 mEq tablet Generic drug:  potassium chloride Your last dose was: Your next dose is: TAKE ONE TABLET BY MOUTH ONCE A DAY  
     
   
   
   
  
 PRUVATE 21-7 PO Your last dose was: Your next dose is: Take  by mouth. Senna 8.6 mg tablet Generic drug:  senna Your last dose was: Your next dose is: Take 1 Tab by mouth daily. 1 Tab XARELTO 10 mg tablet Generic drug:  rivaroxaban Your last dose was: Your next dose is: Take 10 mg by mouth daily. 10 mg  
    
   
   
   
  
 zolpidem 10 mg tablet Commonly known as:  AMBIEN Your last dose was: Your next dose is: TAKE 1 TABLET BY MOUTH NIGHTLY AS NEEDED FOR SLEEP. MAX DAILY AMOUNT 10 MG  
     
   
   
   
  
 * Notice: This list has 4 medication(s) that are the same as other medications prescribed for you. Read the directions carefully, and ask your doctor or other care provider to review them with you. STOP taking these medications CeleBREX 200 mg capsule Generic drug:  celecoxib  
   
  
 oxyCODONE-acetaminophen 7.5-325 mg per tablet Commonly known as:  PERCOCET Replaced by:  oxyCODONE-acetaminophen  mg per tablet Where to Get Your Medications Information on where to get these meds will be given to you by the nurse or doctor. ! Ask your nurse or doctor about these medications  
  oxyCODONE-acetaminophen  mg per tablet

## 2018-01-09 ENCOUNTER — TELEPHONE (OUTPATIENT)
Dept: ORTHOPEDIC SURGERY | Age: 68
End: 2018-01-09

## 2018-01-09 ENCOUNTER — HOME HEALTH ADMISSION (OUTPATIENT)
Dept: HOME HEALTH SERVICES | Facility: HOME HEALTH | Age: 68
End: 2018-01-09
Payer: MEDICARE

## 2018-01-09 PROBLEM — M48.062 LUMBAR STENOSIS WITH NEUROGENIC CLAUDICATION: Status: ACTIVE | Noted: 2018-01-09

## 2018-01-09 LAB
ANION GAP SERPL CALC-SCNC: 8 MMOL/L (ref 3–18)
BUN SERPL-MCNC: 17 MG/DL (ref 7–18)
BUN/CREAT SERPL: 18 (ref 12–20)
CALCIUM SERPL-MCNC: 8.7 MG/DL (ref 8.5–10.1)
CHLORIDE SERPL-SCNC: 106 MMOL/L (ref 100–108)
CO2 SERPL-SCNC: 26 MMOL/L (ref 21–32)
CREAT SERPL-MCNC: 0.97 MG/DL (ref 0.6–1.3)
ERYTHROCYTE [DISTWIDTH] IN BLOOD BY AUTOMATED COUNT: 16.6 % (ref 11.6–14.5)
GLUCOSE BLD STRIP.AUTO-MCNC: 72 MG/DL (ref 70–110)
GLUCOSE BLD STRIP.AUTO-MCNC: 84 MG/DL (ref 70–110)
GLUCOSE BLD STRIP.AUTO-MCNC: 87 MG/DL (ref 70–110)
GLUCOSE BLD STRIP.AUTO-MCNC: 93 MG/DL (ref 70–110)
GLUCOSE SERPL-MCNC: 87 MG/DL (ref 74–99)
HCT VFR BLD AUTO: 32.8 % (ref 35–45)
HGB BLD-MCNC: 10.5 G/DL (ref 12–16)
MCH RBC QN AUTO: 26.6 PG (ref 24–34)
MCHC RBC AUTO-ENTMCNC: 32 G/DL (ref 31–37)
MCV RBC AUTO: 83.2 FL (ref 74–97)
PLATELET # BLD AUTO: 100 K/UL (ref 135–420)
POTASSIUM SERPL-SCNC: 4.3 MMOL/L (ref 3.5–5.5)
RBC # BLD AUTO: 3.94 M/UL (ref 4.2–5.3)
SODIUM SERPL-SCNC: 140 MMOL/L (ref 136–145)
WBC # BLD AUTO: 9.4 K/UL (ref 4.6–13.2)

## 2018-01-09 PROCEDURE — 99218 HC RM OBSERVATION: CPT

## 2018-01-09 PROCEDURE — 82962 GLUCOSE BLOOD TEST: CPT

## 2018-01-09 PROCEDURE — G8979 MOBILITY GOAL STATUS: HCPCS

## 2018-01-09 PROCEDURE — 36415 COLL VENOUS BLD VENIPUNCTURE: CPT | Performed by: ORTHOPAEDIC SURGERY

## 2018-01-09 PROCEDURE — 74011250636 HC RX REV CODE- 250/636: Performed by: ORTHOPAEDIC SURGERY

## 2018-01-09 PROCEDURE — 74011250637 HC RX REV CODE- 250/637: Performed by: ORTHOPAEDIC SURGERY

## 2018-01-09 PROCEDURE — 97161 PT EVAL LOW COMPLEX 20 MIN: CPT

## 2018-01-09 PROCEDURE — G8978 MOBILITY CURRENT STATUS: HCPCS

## 2018-01-09 PROCEDURE — 97166 OT EVAL MOD COMPLEX 45 MIN: CPT

## 2018-01-09 PROCEDURE — 80048 BASIC METABOLIC PNL TOTAL CA: CPT | Performed by: ORTHOPAEDIC SURGERY

## 2018-01-09 PROCEDURE — G8988 SELF CARE GOAL STATUS: HCPCS

## 2018-01-09 PROCEDURE — 77030037134 HC WRAP COMPR BACK THER SOLM -B

## 2018-01-09 PROCEDURE — 74011250637 HC RX REV CODE- 250/637: Performed by: INTERNAL MEDICINE

## 2018-01-09 PROCEDURE — 85027 COMPLETE CBC AUTOMATED: CPT | Performed by: ORTHOPAEDIC SURGERY

## 2018-01-09 PROCEDURE — 97116 GAIT TRAINING THERAPY: CPT

## 2018-01-09 PROCEDURE — G8987 SELF CARE CURRENT STATUS: HCPCS

## 2018-01-09 PROCEDURE — 65270000029 HC RM PRIVATE

## 2018-01-09 RX ORDER — SODIUM CHLORIDE 9 MG/ML
250 INJECTION, SOLUTION INTRAVENOUS ONCE
Status: COMPLETED | OUTPATIENT
Start: 2018-01-09 | End: 2018-01-09

## 2018-01-09 RX ADMIN — CARVEDILOL 3.12 MG: 6.25 TABLET, FILM COATED ORAL at 17:43

## 2018-01-09 RX ADMIN — Medication 10 ML: at 04:22

## 2018-01-09 RX ADMIN — AMIODARONE HYDROCHLORIDE 200 MG: 200 TABLET ORAL at 11:00

## 2018-01-09 RX ADMIN — SODIUM CHLORIDE 100 ML/HR: 900 INJECTION, SOLUTION INTRAVENOUS at 04:21

## 2018-01-09 RX ADMIN — OXYCODONE HYDROCHLORIDE AND ACETAMINOPHEN 1 TABLET: 10; 325 TABLET ORAL at 15:31

## 2018-01-09 RX ADMIN — VANCOMYCIN HYDROCHLORIDE 1250 MG: 10 INJECTION, POWDER, LYOPHILIZED, FOR SOLUTION INTRAVENOUS at 02:17

## 2018-01-09 RX ADMIN — POTASSIUM CHLORIDE 20 MEQ: 20 TABLET, EXTENDED RELEASE ORAL at 11:01

## 2018-01-09 RX ADMIN — GABAPENTIN 300 MG: 100 CAPSULE ORAL at 15:31

## 2018-01-09 RX ADMIN — Medication 10 ML: at 22:32

## 2018-01-09 RX ADMIN — HYDROMORPHONE HYDROCHLORIDE 1 MG: 1 INJECTION, SOLUTION INTRAMUSCULAR; INTRAVENOUS; SUBCUTANEOUS at 17:43

## 2018-01-09 RX ADMIN — GABAPENTIN 300 MG: 100 CAPSULE ORAL at 22:30

## 2018-01-09 RX ADMIN — SODIUM CHLORIDE 250 ML: 900 INJECTION, SOLUTION INTRAVENOUS at 08:00

## 2018-01-09 RX ADMIN — DIGOXIN 0.12 MG: 250 TABLET ORAL at 11:01

## 2018-01-09 RX ADMIN — Medication 5 ML: at 22:00

## 2018-01-09 RX ADMIN — BISACODYL 10 MG: 5 TABLET, COATED ORAL at 10:59

## 2018-01-09 NOTE — PROGRESS NOTES
Problem: Mobility Impaired (Adult and Pediatric)  Goal: *Acute Goals and Plan of Care (Insert Text)  Physical Therapy Goals  Initiated 1/9/2018 and to be accomplished within 7 day(s)  1. Patient will move from supine to sit and sit to supine, scoot up and down and roll side to side in bed with supervision/set-up. 2.  Patient will transfer from bed to chair and chair to bed with supervision/set-up using the least restrictive device. 3.  Patient will perform sit to stand with supervision/set-up. 4.  Patient will ambulate with supervision/set-up for >150 feet with the least restrictive device. 5.  Patient will ascend/descend3 stairs with 1 handrail(s) with minimal assistance/contact guard assist.  physical Therapy EVALUATION    Patient: Mauricio Brito (57 y.o. female)  Date: 1/9/2018  Primary Diagnosis: M48.062 SPINAL STENOSIS/SPONDYLOSIS L3/4  Spinal stenosis of lumbar region with neurogenic claudication  Spondylisthesis  Lumbar stenosis  Spondylisthesis  Lumbar stenosis  Procedure(s) (LRB):  L3/4 EXTREMEN LATERAL INTERBODY FUSION (XLIF)/PERCUTANEOUS SCREWS (N/A) 1 Day Post-Op   Precautions:   Fall, Back     ASSESSMENT :  Patient is 75 yo F admitted to hospital for L3-4 XLIF and presents today alert and agreeable to therapy. Patient was educated on spinal precautions. Patient was given demo with instruction on sit <> stand transfer and gait training and transferred to standing with Glynn and ambulated 15ft with RW and decreased step length bilaterally. Required cues for keeping eyes open and looking straight forward. Patient demonstrated good compliance with precautions throughout session. At conclusion of session patient transferred to sitting in recliner/supine in bed and was left resting with call bell by the side and SCDs donned. Patient instructed to call for assistance if they needed to get up for any reason and denied need for further assistance.  Patient demonstrates decreased strength, mobility, and endurance and will benefit from skilled intervention to address the above impairments. Patients rehabilitation potential is considered to be Good  Factors which may influence rehabilitation potential include:   []         None noted  []         Mental ability/status  []         Medical condition  []         Home/family situation and support systems  []         Safety awareness  []         Pain tolerance/management  []         Other:      PLAN :  Recommendations and Planned Interventions:  [x]           Bed Mobility Training             [x]    Neuromuscular Re-Education  [x]           Transfer Training                   []    Orthotic/Prosthetic Training  [x]           Gait Training                          []    Modalities  [x]           Therapeutic Exercises          []    Edema Management/Control  [x]           Therapeutic Activities            [x]    Patient and Family Training/Education  []           Other (comment):    Frequency/Duration: Patient will be followed by physical therapy 1-2 times per day/4-7 days per week to address goals. Discharge Recommendations: Home Health vs SNF  Further Equipment Recommendations for Discharge: N/A     G-CODES     Mobility  Current  CJ= 20-39%   Goal  CI= 1-19%. The severity rating is based on the Level of Assistance required for Functional Mobility and ADLs.        G-CODES     Eval Complexity: History: MEDIUM  Complexity : 1-2 comorbidities / personal factors will impact the outcome/ POC Exam:LOW Complexity : 1-2 Standardized tests and measures addressing body structure, function, activity limitation and / or participation in recreation  Presentation: LOW Complexity : Stable, uncomplicated  Clinical Decision Making:Low Complexity   Overall Complexity:LOW     SUBJECTIVE:   Patient stated I feel okay right now.     OBJECTIVE DATA SUMMARY:     Past Medical History:   Diagnosis Date    Antineoplastic chemotherapy induced anemia     Arrhythmia     Atrial Fib    Arthritis     [facility]     Cancer ([facility] 1999    Breast    Cardiomyopathy ([facility]     Chronic ITP (idiopathic thrombocytopenia) (HCC) 10/31/2016    Secondary to Anemia from Chemo    Diabetes ([facility]     borderline, no meds    Esophageal cancer ([facility] 2005    treated with chemo    Heart failure [facility])      Past Surgical History:   Procedure Laterality Date    BREAST SURGERY PROCEDURE UNLISTED      COLONOSCOPY N/A 7/27/2016    COL[facility] ENDOSCOPY    HX APPENDECTOMY      HX HEENT      Tonsillectomy    HX ORTHOPAEDIC      HX TUBAL LIGATION      HX VASCULAR ACCESS      NEUROLOGICAL PROCEDURE UNLISTED      Lumbar sx     Barriers to Learning/Limitations: None  Compensate with: N/A  Prior Level of Function/Home Situation: Patient lives in 2 story home with 3STE and lives with son who works during the day. Patient has no AD/DME at home. Home Situation  Home Environment: Private residence  # Steps to Enter: 3  One/Two Story Residence: One story  Living Alone: No  Support Systems: Child(alexandria), [employer]  Patient Expects to be Discharged to[de-identified] Private residence  Current DME Used/Available at Home: Cane, straight  Tub or Shower Type: Tub/Shower combination  Critical Behavior:  Neurologic State: Alert  Orientation Level: Oriented X4  Cognition: Follows commands     Strength:    Strength: Generally decreased, functional (BLE)   Tone & Sensation:   Tone: Normal (BLE)   Sensation: Impaired (RLE diminished to LT)   Range Of Motion:  AROM: Within functional limits (BLE)   Functional Mobility:  Bed Mobility:   Scooting: Minimum assistance  Transfers:  Sit to Stand: Minimum assistance  Stand to Sit: Minimum assistance    Balance:   Sitting: Intact  Standing: Impaired; With support  Standing - Static: Fair  Standing - Dynamic : Fair  Ambulation/Gait Training:  Distance (ft): 15 Feet (ft)  Assistive Device: Walker, rolling  Ambulation - Level of Assistance: Contact guard assistance;Minimal assistance   Base of Support: Narrowed  Speed/Gege: Shuffled; Slow  Interventions: Verbal cues; Visual/Demos               Pain:  Pt reports 7/10 pain or discomfort prior to treatment.    Pt reports 7/10 pain or discomfort post treatment. Activity Tolerance:   Patient demosntrated decreased tolerance to activity 2/2 fatigue and pain. Please refer to the flowsheet for vital signs taken during this treatment. After treatment:   [x]         Patient left in no apparent distress sitting up in chair  []         Patient left in no apparent distress in bed  [x]         Call bell left within reach  [x]         Nursing notified Aramis Stinson)  []         Caregiver present  []         Bed alarm activated  []         SCDs in place to B LE     COMMUNICATION/EDUCATION:   [x]         Fall prevention education was provided and the patient/caregiver indicated understanding. [x]         Patient/family have participated as able in goal setting and plan of care. [x]         Patient/family agree to work toward stated goals and plan of care. []         Patient understands intent and goals of therapy, but is neutral about his/her participation. []         Patient is unable to participate in goal setting and plan of care.     Thank you for this referral.  Ai Alex, PT   Time Calculation: 32 mins

## 2018-01-09 NOTE — PROGRESS NOTES
Mobility Intervention: Patient assisted up to chair by staff        [] Pt dangled at edge of bed    [] Pt assisted OOB to bedside commode    [] Pt assisted OOB to chair    [] Pt ambulated to bathroom    [] Patient was ambulated in room/hallway    Assistive Device Utilized:       [x] Rolling walker   [] Crutches   [] Straight Cane   [] Knee immobilizer   [] IV pole    After Mobilization:     [] Patient left in no apparent distress sitting up in chair  [x] Patient left in no apparent distress in bed  [x] Call bell left within reach  [x] SCDs on & machine turned on  [x] Ice applied  [] RN notified  [] Caregiver present  [] Bed alarm activated    Reason patient not mobilized:      [] Patient refused   [] Nausea/vomiting   [] Low blood pressure   [] Drowsy/lethargic    Pain Rating:     [] 0  [] 1  Assistive Device:        [x] 2  [] 3  [] 4  [] 5  [] 6  Assistive Device:        [] 7  [] 8  [] 9  [] 10    Comments:Rounded on patient. Reinforced importance of getting OOB for all meals, going to bathroom to help prevent blood clots. Reviewed pain medications patient is taking and the importance of keeping pain under control to help with getting OOB and therapy. Discussed the importance of keeping ice on surgery site when in bed or chair to decrease swelling. .. Encouraged patient monitor for constipation and to take a stool softner/laxative while recovering on pain medication. Also reviewed how to use incentive spirometer with return demonstration by patient. Discussed pain medication, bowel medication with patient. Finally, educated patient on the importance of eating three well balanced meals a day with protein to promote bone/muscle healing. Reminded patient to drink lots of fluids to protect kidneys from all the medications being taken currently with recovery. Patient verbalizing understanding. Patient given CHG wash to use in hospital and at home.   Patient reminded to put on clean clothes and use a clean towel daily.     Dressing to surgical site dry and intact. Patient instructed not to take dressing off at home. Ice in place per protocol. Call light in reach. Patient reminded to call for help to get OOB or when leaving bathroom for safety. Patient given the opportunity for asking questions. Asked patient if there is anything they need.      Orthopedic

## 2018-01-09 NOTE — PROGRESS NOTES
PT eval order received and chart reviewed. Patient requesting to eat her breakfast prior to eval. Will follow up as patient schedule allows. Thank you for this referral. Karlos Rosen, PT, DPT.

## 2018-01-09 NOTE — PROGRESS NOTES
Mobility Intervention:       [] Pt dangled at edge of bed    [] Pt assisted OOB to bedside commode    [] Pt assisted OOB to chair    [] Pt ambulated to bathroom    [] Patient was ambulated in room/hallway    Assistive Device Utilized:       [] Rolling walker   [] Crutches   [] Straight Cane   [] Knee immobilizer   [] IV pole    After Mobilization:     [] Patient left in no apparent distress sitting up in chair  [] Patient left in no apparent distress in bed  [] Call bell left within reach  [] SCDs on & machine turned on  [] Ice applied  [] RN notified  [] Caregiver present  [] Bed alarm activated    Reason patient not mobilized:      [] Patient refused   [] Nausea/vomiting   [] Low blood pressure   [x] Drowsy/lethargic    Pain Rating:     [] 0  [] 1  Assistive Device:        [] 2  [] 3  [] 4  [] 5  [] 6  Assistive Device:        [] 7  [] 8  [] 9  [] 10    Comments:

## 2018-01-09 NOTE — TELEPHONE ENCOUNTER
Felice Stock from [facility] Utilization Review is requesting orders to get this patient upgraded from observation to in patient. Felice Stock can be reached at [phone].

## 2018-01-09 NOTE — PROGRESS NOTES
PO day #1  Hypotensive this am 86/50  Responded to 250 cc fluid blous 116/52  B/p now 96/52  HR 62  Have consulted [facility] for medical management  Dr. Elham Martinez to see today and has held the coreg for now  Denies chest pain, SOB or dizziness  Labs okay  Pt is up in chair without complaints  Dressings to right flank and bilateral paraspinal lumbar area dry and intact  Ambulated 15 feet with PT  States bilateral leg pain improved  Neuro intact    Tiffani Drew, NP

## 2018-01-09 NOTE — PROGRESS NOTES
Care Management Interventions  PCP Verified by CM: Yes  Last Visit to PCP: 01/03/18  Transition of Care Consult (CM Consult): [address]: Yes  Physical Therapy Consult: Yes  Occupational Therapy Consult: Yes  Current Support Network: Family Lives Nearby  Confirm Follow Up Transport: Family  Plan discussed with Pt/Family/Caregiver: Yes  Freedom of Choice Offered: Yes (For home health)  Discharge Location  Discharge Placement: Home with home health     Pt is a 74year old female admitted for spinal stenosis. Pt is alert and oriented in room. Pt states that her son Raffaele Gilbert resides with her and her plan is to return home at discharge. Pt indicates being independent with ADLs and reports that she uses a cane or sometimes a walker to assist with ambulation. Pt's daughter Leela Byers ([phone]) is her POC. Pt's children will be assisting her with transportation home at discharge. FOC presented and pt chooses [facility]. Agency contacted and informed. Referral submitted. Pt provided with [facility] and OBS letter. Pt mentions that she will need to read further when she is more alert.

## 2018-01-09 NOTE — PROGRESS NOTES
Problem: Falls - Risk of  Goal: *Absence of Falls  Document [place] Fall Risk and appropriate interventions in the flowsheet.    Outcome: Progressing Towards Goal  Fall Risk Interventions:  Mobility Interventions: Patient to call before getting OOB         Medication Interventions: Teach patient to arise slowly, Patient to call before getting OOB    Elimination Interventions: Call light in reach, Patient to call for help with toileting needs

## 2018-01-09 NOTE — PROGRESS NOTES
Bedside and Verbal shift change report given to Mere Fabian RN (oncoming nurse) by aDi Torres RN (offgoing nurse). Report included the following information SBAR, Kardex, MAR and Recent Results.     SITUATION:    Code Status: Full Code   Reason for Admission: M48.062 SPINAL STENOSIS/SPONDYLOSIS L3/4   Spinal stenosis of lumbar region with neurogenic claudication   Spondylisthesis   Lumbar stenosis   Spondylisthesis   Lumbar stenosis    [facility] day: 1   Problem List:       Hospital Problems  Date Reviewed: 12/8/2017          Codes Class Noted POA    Spondylisthesis ICD-10-CM: M43.10  ICD-9-CM: 756.12  1/8/2018 Unknown        Lumbar stenosis ICD-10-CM: M48.061  ICD-9-CM: 724.02  1/8/2018 Unknown        Spinal stenosis of lumbar region with neurogenic claudication ICD-10-CM: M48.062  ICD-9-CM: 724.03  12/8/2017 Unknown              BACKGROUND:    Past Medical History:   Past Medical History:   Diagnosis Date    Antineoplastic chemotherapy induced anemia     Arrhythmia     Atrial Fib    Arthritis     [facility] ([facility]     Cancer ([facility] 1999    Breast    Cardiomyopathy ([facility]     Chronic ITP (idiopathic thrombocytopenia) ([facility] 10/31/2016    Secondary to Anemia from Chemo    Diabetes ([facility]     borderline, no meds    Esophageal cancer ([facility] 2005    treated with chemo    Heart failure ([facility]          Patient taking anticoagulants no     ASSESSMENT:    Changes in Assessment Throughout Shift: none     Patient has Central Line: no Reasons if yes:      Patient has Yanes Cath: no Reasons if yes:          Last Vitals:     Vitals:    01/09/18 0438 01/09/18 0636 01/09/18 0732 01/09/18 0759   BP: 98/46   (!) 86/50   Pulse: 68  (!) 51 88   Resp: 17  12    Temp: 97.9 °F (36.6 °C)  96.6 °F (35.9 °C)    SpO2: 100%  98%    Weight:  77.4 kg (170 lb 9.6 oz)     Height:            IV and DRAINS (will only show if present)   Peripheral IV 01/08/18 Right Wrist-Site Assessment: Clean, dry, & intact     WOUND (if present)   Wound Type:  Surgical   Dressing present Dressing Present : Yes   Wound Concerns/Notes:  none     PAIN    Pain Assessment    Pain Intensity 1: 0 (01/09/18 0530)    Pain Location 1: Back    Pain Intervention(s) 1: Encouraged PCA    Patient Stated Pain Goal: 4  o Interventions for Pain:  medication  o Intervention effective: yes  o Time of last intervention: see mar   o Reassessment Completed: yes      Last 3 Weights:  Last 3 Recorded Weights in this Encounter    01/03/18 1308 01/08/18 0707 01/09/18 0636   Weight: 73 kg (161 lb) 73.9 kg (163 lb) 77.4 kg (170 lb 9.6 oz)     Weight change:      INTAKE/OUPUT    Current Shift:      Last three shifts: 01/07 1901 - 01/09 0700  In: 2324 [P.O.:200; I.V.:3650]  Out: 1425 [Urine:1175]     LAB RESULTS     Recent Labs      01/09/18   0441   WBC  9.4   HGB  10.5*   HCT  32.8*   PLT  100*        Recent Labs      01/09/18   0441   NA  140   K  4.3   GLU  87   BUN  17   CREA  0.97   CA  8.7       RECOMMENDATIONS AND DISCHARGE PLANNING     1. Pending tests/procedures/ Plan of Care or Other Needs: pain management, PT/OT     2. Discharge plan for patient and Needs/Barriers:     3. Estimated Discharge Date: 1/10/18 Posted on Whiteboard in [facility] Room: yes      4. The patient's care plan was reviewed with the oncoming nurse. \"HEALS\" SAFETY CHECK      Fall Risk    Total Score: 3    Safety Measures: Safety Measures: Bed/Chair-Wheels locked, Bed in low position, Call light within reach, Fall prevention (comment), Gripper socks    A safety check occurred in the patient's room between off going nurse and oncoming nurse listed above.     The safety check included the below items  Area Items   H  High Alert Medications - Verify all high alert medication drips (heparin, PCA, etc.)   E  Equipment - Suction is set up for ALL patients (with yanker)  - Red plugs utilized for all equipment (IV pumps, etc.)  - WOWs wiped down at end of shift.  - Room stocked with oxygen, suction, and other unit-specific supplies   A  Alarms - Bed alarm is set for fall risk patients  - Ensure chair alarm is in place and activated if patient is up in a chair   L  Lines - Check IV for any infiltration  - Yanes bag is empty if patient has a Yanes   - Tubing and IV bags are labeled   S  Safety   - Room is clean, patient is clean, and equipment is clean. - Hallways are clear from equipment besides carts. - Fall bracelet on for fall risk patients  - Ensure room is clear and free of clutter  - Suction is set up for ALL patients (with rusty)  - Hallways are clear from equipment besides carts.    - Isolation precautions followed, supplies available outside room, sign posted     Fantasma Durant RN

## 2018-01-09 NOTE — HOME CARE
Received HH referral, [facility] will follow for PT/ [place] protocol, pt states she already has a RW and cane, [facility] will follow. Pt states that her daughter and son will be helping and checking on her at home. MAGALY SARGENT.

## 2018-01-09 NOTE — PHYSICIAN ADVISORY
Letter of Admission Status Determination: Upgrade to Inpatient     Mauricio Brito was hospitalized as observation status on 1/8/2018. Her hospital stay has already crossed one medically necessary midnight for postoperative management. On postop day 1, ongoing hospitalization is warranted as the patient is hypotension requiring IV fluids and further evaluation. Since Ms. Mauricio Brito' hospital care is expected to span an additional midnight of medically necessary care, she will meet the benchmark for Inpatient Admission status in accordance with CMS regulation Section 43 .3. Based on the documented clinical condition and care plan, with anticipated continued hospital care today, we recommend upgrading patient's hospitalization status from Observation to INPATIENT status. The final decision regarding the patient's hospitalization status depends on the attending physician's clinical judgment.        Emili Benson MD, JOANN, [address] [facility]. [facility]  Physician Tad Raza.  [phone]    January 9, 2018   2:57 PM

## 2018-01-09 NOTE — PROGRESS NOTES
Mobility Intervention:       [x] Pt dangled at edge of bed    [] Pt assisted OOB to bedside commode    [x] Pt assisted OOB to chair    [] Pt ambulated to bathroom    [] Patient was ambulated in room/hallway    Assistive Device Utilized:       [x] Rolling walker   [] Crutches   [] Straight Cane   [] Knee immobilizer   [] IV pole    After Mobilization:     [x] Patient left in no apparent distress sitting up in chair  [] Patient left in no apparent distress in bed  [x] Call bell left within reach  [x] SCDs on & machine turned on  [x] Ice applied  [] RN notified  [] Caregiver present  [] Bed alarm activated    Reason patient not mobilized:      [] Patient refused   [] Nausea/vomiting   [] Low blood pressure   [] Drowsy/lethargic    Pain Rating:     [] 0  [] 1  Assistive Device:        [] 2  [] 3  [] 4  [] 5  [] 6  Assistive Device:        [] 7  [x] 8  [] 9  [] 10    Comments:   PCA

## 2018-01-09 NOTE — PROGRESS NOTES
2124  Received pt in stable condition,   General: lying in bed in supine, not apparent distress  Neuro: Alert, able to wiggle toes to bilateral extremities  Cardio: pedal pulses palpable, denies chest pain  Respiratory:  denies shortness of breath  Skin: Dressing to back clean, dry and intact  GI: koehler, clear, yellow urine, no odor  : denies nausea and vomiting  Mus: weakness to RLE; not new  Bed in low position and call bell within reach. 0006  Alert, stable, NAD, no changes in condition. 0451  AOx3, NAD, stable, assisted pt to transfer from bed to chair and tolerated well.     0758  Alertrodo dc'd, pt tolerated well.

## 2018-01-09 NOTE — CONSULTS
[facility]. Pulmonary, Critical Care, and Sleep Medicine    Initial Patient Consult    Name: Yosi Michelle MRN: [id number]   : 1950 Hospital: [facility]   Date: 2018        CONSULT FOR MEDICAL MANAGEMENT    IMPRESSION:   · 1. Spinal stenosis s/p L3/4 EXTREMEN LATERAL INTERBODY FUSION (XLIF)/PERCUTANEOUS SCREWS- POD 1, pain well controlled, no intraop- post op issues  · 2. H/O breast cancer in , in remission  · 3. H/O chronic back pain  · 4. H/O CHF (cardiomyopathy- as per H and P and notes and patient's hx- no echo or cardiology note on file)- on coreg, digoxin , amiodarone, well compensated  · 5. H/o A.fib on xarelto for stroke prevention  · 6. H/O esophageal cancer  · 7. H/o chronic Anemia      RECOMMENDATIONS:   · 1. Pain Mx by ortho (primary team)  · 2. Ok to hold coreg for now for Soft BP, likely due to NPO for Sx and PCA opioid pump with pain meds. Accept lower BP as far as mental status and UO are good. Goal SBP > 95. Ok to hold lasix for today and resume tomorrow. · 3. 12 lead EKG for baseline  · 4. Cont other home meds- amiodarone, digoxin, xarelto  · 5. DVT Ppx as per primary team  · 6. PT/OT and early ambulation as per primary team.  · 7. ISB q1 hrs while awake     Subjective:     Patient is a 74 y.o. female who is s/p elective ortho Sx, POD 1. Except for back pain from Sx she has no other complaints. Denies SOB/CP/fever. BP is soft with SBP of 94 mm Hg but denies light headedness with good U.O.       Patient Active Problem List   Diagnosis Code    Hypokalemia E87.6    Breast cancer metastasized to axillary lymph node (HCC) C50.919, C77.3    Anemia D64.9    Cachexia ([address] R64    Weakness R53.1    Vitamin D deficiency E55.9    Iron deficiency anemia D50.9    Chronic anemia D64.9    Back pain of thoracolumbar region M54.5    Thrombocytopenia (HCC) D69.6    Chronic ITP (idiopathic thrombocytopenia) (HCC) D69.3    Muscular deconditioning R29.891  Insomnia G47.00    Bilateral lower extremity edema R60.0    Vertigo R42    Spinal stenosis of lumbar region with neurogenic claudication M48.062    Spondylisthesis M43.10    Lumbar stenosis M48.061     Past Medical History:   Diagnosis Date    Antineoplastic chemotherapy induced anemia     Arrhythmia     Atrial Fib    Arthritis     Breast cancer (HCC)     Cancer (HCC) 1999    Breast    [facility]     Chronic ITP [facility]idiopathic thrombocytopenia) [facility] 10/31/2016    Secondary to Anemia from Chemo    Diabetes ([facility])     borderline, no meds    Esophageal cancer ([facility] 2005    treated with chemo    Heart failure ([facility]       Past Surgical History:   Procedure Laterality Date    BREAST SURGERY PROCEDURE UNLISTED      COLONOSCOPY N/A 7/27/2016    COLONOSCOPY performed by Shayy Kumari MD at [facility]COPY    HX APPENDECTOMY      HX HEENT      Tonsillectomy    HX ORTHOPAEDIC      HX TUBAL LIGATION      HX VASCULAR ACCESS      NEUROLOGICAL PROCEDURE UNLISTED      Lumbar sx      Prior to Admission medications    Medication Sig Start Date End Date Taking? Authorizing Provider   zolpidem (AMBIEN) 10 mg tablet TAKE 1 TABLET BY MOUTH NIGHTLY AS NEEDED FOR SLEEP. MAX DAILY AMOUNT 10 MG 12/21/17  Yes Sudheer Goodwin NP   digoxin (LANOXIN) 0.125 mg tablet Take 0.125 mg by mouth daily. Yes Historical Provider   carvedilol (COREG) 3.125 mg tablet Take 3.125 mg by mouth two (2) times daily (with meals). Yes Historical Provider   amiodarone (CORDARONE) 200 mg tablet Take 200 mg by mouth. Yes Historical Provider   lidocaine-prilocaine (EMLA) topical cream APPLY OVER PORT 1 HOUR PRIOR TO CHEMOTHERAPY THAN COVER WITH SARAN WRAP 4/19/17  Yes Malick Perla MD   furosemide (LASIX) 40 mg tablet Take  by mouth daily.    Yes Historical Provider   aluminum-magnesium hydroxide 200-200 mg/5 mL susp 5 mL, diphenhydrAMINE 12.5 mg/5 mL liqd 12.5 mg, lidocaine 2 % soln 5 mL 5 mL by Swish and Spit route two (2) times a day. Magic mouth wash   Maalox  Lidocaine 2% viscous   Diphenhydramine oral solution     Pharmacy to mix equal portions of ingredients to a total volume as indicated in the dispense amount. 2/29/16  Yes Jose Cao NP   albuterol (PROAIR HFA) 90 mcg/actuation inhaler 1-2 puffs every 4-6 hrs. 10/27/15  Yes Tommy Oro NP   senna (SENNA) 8.6 mg tablet Take 1 Tab by mouth daily. Yes Historical Provider   oxyCODONE-acetaminophen (PERCOCET) 7.5-325 mg per tablet Take 1 tablet every 6 hours as needed for pain 12/18/17   Isabelle Benjamin MD   loratadine 10 mg cap Take 10 mg by mouth daily. Historical Provider   meloxicam (MOBIC) 7.5 mg tablet Take 7.5 mg by mouth daily. Historical Provider   gabapentin (NEURONTIN) 300 mg capsule Take 1 po q am and 2 po q pm 7/7/17   Tasenem May MD   rivaroxaban (XARELTO) 10 mg tablet Take 10 mg by mouth daily. Historical Provider   magic mouthwash solution Take 5 mL by mouth three (3) times daily as needed for Stomatitis. Magic mouth wash   Maalox  Lidocaine 2% viscous   Diphenhydramine oral solution     Pharmacy to mix equal portions of ingredients to a total volume as indicated in the dispense amount. 11/23/16   Jose Cao NP   celecoxib (CELEBREX) 200 mg capsule Take  by mouth two (2) times a day. Historical Provider   dronabinol (MARINOL) 5 mg capsule Take 1 Cap by mouth two (2) times a day. 7/26/16   Tommy Oro NP   gabapentin (NEURONTIN) 300 mg capsule Take 300 mg by mouth three (3) times daily. Historical Provider   KLOR-CON M20 20 mEq tablet TAKE ONE TABLET BY MOUTH ONCE A DAY 4/7/16   Tommy Oro NP   potassium chloride (K-DUR, KLOR-CON) 20 mEq tablet Take 2 Tabs by mouth daily.  1/28/16   Jose Cao NP   cyclobenzaprine (FLEXERIL) 5 mg tablet  1/31/14   Historical Provider   nabumetone (RELAFEN) 750 mg tablet  9/9/13   Historical Provider   ondansetron hcl (ZOFRAN) 8 mg tablet Take 1 Tab by mouth every eight (8) hours as needed for Nausea. 6/3/13   Trey Villarreal MD   IRON AG&FUM/C/FA/MV CMB11/CA-T (PRUVATE 21-7 PO) Take  by mouth. Historical Provider     Allergies   Allergen Reactions    Aspirin Swelling     Pt states her whole body swells when she takes aspirin.  Adhesive Unable to Obtain    Pcn [Penicillins] Rash      Social History   Substance Use Topics    Smoking status: Never Smoker    Smokeless tobacco: Never Used    Alcohol use No      History reviewed. No pertinent family history. Current Facility-Administered Medications   Medication Dose Route Frequency    amiodarone (CORDARONE) tablet 200 mg  200 mg Oral DAILY    carvedilol (COREG) tablet 3.125 mg  3.125 mg Oral BID WITH MEALS    digoxin (LANOXIN) tablet 0.125 mg  0.125 mg Oral DAILY    furosemide (LASIX) tablet 40 mg  40 mg Oral DAILY    gabapentin (NEURONTIN) capsule 300 mg  300 mg Oral TID    potassium chloride (K-DUR, KLOR-CON) SR tablet 20 mEq  20 mEq Oral DAILY    sodium chloride (NS) flush 5-10 mL  5-10 mL IntraVENous Q8H    bisacodyl (DULCOLAX) tablet 10 mg  10 mg Oral DAILY    sodium chloride (NS) flush 5-10 mL  5-10 mL IntraVENous Q8H    insulin lispro (HUMALOG) injection   SubCUTAneous AC&HS       Review of Systems:  Pertinent items are noted in HPI. ROS    Objective:   Vital Signs:    Visit Vitals    BP 96/52 (BP 1 Location: Right arm, BP Patient Position: Sitting)    Pulse (!) 52    Temp 95.2 °F (35.1 °C)    Resp 14    Ht 5' 6\" (1.676 m)    Wt 77.4 kg (170 lb 9.6 oz)    SpO2 100%    BMI 27.54 kg/m2       O2 Device: Nasal cannula   O2 Flow Rate (L/min): 2 l/min   Temp (24hrs), Av.8 °F (36 °C), Min:95.2 °F (35.1 °C), Max:97.9 °F (36.6 °C)       Intake/Output:   Last shift:      701 - 1900  In: 120 [P.O.:120]  Out: -   Last 3 shifts: 1901 -  07  In: 8915 [P.O.:200;  I.V.:3650]  Out: 1425 [Urine:1175]    Intake/Output Summary (Last 24 hours) at 18 1431  Last data filed at 18 0945   Gross per 24 hour   Intake             1920 ml   Output              375 ml   Net             1545 ml      Physical Exam:   General:  Alert, cooperative, no distress, appears stated age. Head:  Normocephalic, without obvious abnormality, atraumatic. Eyes:  Conjunctivae/corneas clear. ANicteric   Nose: Nares normal. Mucosa normal. No drainage or sinus tenderness. Throat: Lips, mucosa dry. NO thrush;    Neck: Supple, symmetrical, trachea midline, no adenopathy, thyroid: no enlargment/tenderness/nodules, no carotid bruit and no JVD. No crepitus   Back:   Symmetric, no curvature, no spine tenderness or flank pain   Lungs:   CTA b/l   Chest wall:  No tenderness or deformity. NO CREPITUS   Heart:  Regular rate and rhythm, S1, S2 normal, no murmur, click, rub or gallop. Abdomen:   Soft, non-tender. Bowel sounds normal. No masses,  No organomegaly. No paradox   Extremities: normal, atraumatic, no cyanosis or edema. Pulses: 1-2+ and symmetric all extremities. Skin: Skin color, texture, turgor normal. No rashes or lesions   Lymph nodes: Cervical, supraclavicular, and axillary nodes normal.   Neurologic: Grossly nonfocal        High complexity decision making was performed during the evaluation of this patient at high risk for decompensation with multiple organ involvement     Above mentioned total time spent on reviewing the case/medical record/data/notes/EMR/patient examination/documentation/coordinating care with nurse/consultants, exclusive of procedures with complex decision making performed and > 50% time spent in face to face evaluation.      Burgess Lisset MD

## 2018-01-09 NOTE — PROGRESS NOTES
vss afeb  Neuro intact  Patient says pain is improved  Yet patient appears in pain  Has mobilized self to side of bed  Begin PT  Given chronicity of pain, chronic pain meds, chronic cardiac disease - rehab likely to be an issue.

## 2018-01-09 NOTE — ROUTINE PROCESS
BP 86/50. Norma Serra NP notified. 250 ml bolus started. Pt able to move all extremities. 0820-pt assisted to the chair with 2 person assist. Bp rechecked 100./52. Pt states that she feels a lot better sitting up in the chair. Uli.Can- consult called to Dr. Chantell Zarate.

## 2018-01-09 NOTE — PROGRESS NOTES
Problem: Self Care Deficits Care Plan (Adult)  Goal: *Acute Goals and Plan of Care (Insert Text)  Occupational Therapy Goals  Initiated 1/9/2018 within 7 day(s). 1.  Patient will perform lower body dressing with supervision/set-up, AE prn.   2.  Patient will perform toilet transfers with supervision/set-up. Outcome: Progressing Towards Goal  Occupational Therapy EVALUATION    Patient: Antonia Navarro (37 y.o. female)  Date: 1/9/2018  Primary Diagnosis: M48.062 SPINAL STENOSIS/SPONDYLOSIS L3/4  Spinal stenosis of lumbar region with neurogenic claudication  Spondylisthesis  Lumbar stenosis  Spondylisthesis  Lumbar stenosis  Procedure(s) (LRB):  L3/4 EXTREMEN LATERAL INTERBODY FUSION (XLIF)/PERCUTANEOUS SCREWS (N/A) 1 Day Post-Op   Precautions:   Fall, Back    ASSESSMENT :  Based on the objective data described below, the patient presents with decreased ADLs and decreased functional mobility after back surgery. Back precautions reviewed and patient verbalized understanding. She is unable to reach her feet for LB ADLs and will benefit from AE training. AE given after demonstration but patient unable to practice secondary to nursing in room at end of session for IV placement (after multiple trials to get IV access). Min assist given for functional standing and transfers. She has a supportive son at home to assist her at night prn. Patient will benefit from skilled intervention to address the above impairments.   Patients rehabilitation potential is considered to be Good  Factors which may influence rehabilitation potential include:   []             None noted  []             Mental ability/status  [x]             Medical condition  []             Home/family situation and support systems  []             Safety awareness  []             Pain tolerance/management  []             Other:      PLAN :  Recommendations and Planned Interventions:  [x]               Self Care Training                  [x] Therapeutic Activities  [x]               Functional Mobility Training    []        Cognitive Retraining  [x]               Therapeutic Exercises           [x]        Endurance Activities  [x]               Balance Training                   []        Neuromuscular Re-Education  []               Visual/Perceptual Training     [x]   Home Safety Training  [x]               Patient Education                 [x]        Family Training/Education  []               Other (comment):    Frequency/Duration: Patient will be followed by occupational therapy 1-2 times per day/4-7 days per week to address goals. Discharge Recommendations: Home Health  Further Equipment Recommendations for Discharge: N/A     Barriers to Learning/Limitations: None  Compensate with: visual, verbal, tactile, kinesthetic cues/model     PATIENT COMPLEXITY      Eval Complexity: History: MEDIUM Complexity : Expanded review of history including physical, cognitive and psychosocial  history ; Examination: MEDIUM Complexity : 3-5 performance deficits relating to physical, cognitive , or psychosocial skils that result in activity limitations and / or participation restrictions; Decision Making:MEDIUM Complexity : Patient may present with comorbidities that affect occupational performnce. Miniml to moderate modification of tasks or assistance (eg, physical or verbal ) with assesment(s) is necessary to enable patient to complete evaluation  Assessment: Moderate Complexity     G-CODES:     Self Care  Current  CK= 40-59%   Goal  CI= 1-19%. The severity rating is based on the Level of Assistance required for Functional Mobility and ADLs. SUBJECTIVE:   Patient stated I'm just sitting here.     OBJECTIVE DATA SUMMARY:     Past Medical History:   Diagnosis Date    Antineoplastic chemotherapy induced anemia     Arrhythmia     Atrial Fib    Arthritis     [facility]     Cancer ([facility] 1999    Breast    Cardiomyopathy (HCC)     [facility] 10/[facility]/2016    Secondary to Anemia from Chemo    Diabetes [facility]     borderline, no meds    Es[facility] 2005    treated with chemo    Heart failure [facility])      Past Surgical History:   Procedure Laterality Date    BREAST SURGERY PROCEDURE UNLISTED      COLONOSCOPY N/A 7/27/2016    COLON[facility] performed by Osvaldo Valles [facility] at [facility]OSCOPY    HX APPENDECTOMY      HX HEENT      Tonsillectomy    HX ORTHOPAEDIC      HX TUBAL LIGATION      HX VASCULAR ACCESS      NEUROLOGICAL PROCEDURE UNLISTED      Lumbar sx     Prior Level of Function/Home Situation: Pt was independent with basic self care tasks and functional mobility PTA. She has a RW for walking outside. Home Situation  Home Environment: Private residence  # Steps to Enter: 3  One/Two Story Residence: One story  Living Alone: No  Support Systems: Child(alexandria), Mandaen / [employer] community  Patient Expects to be Discharged to[de-identified] Private residence  Current DME Used/Available at Home: Cane, straight  Tub or Shower Type: Tub/Shower combination  [x]  Right hand dominant   []  Left hand dominant  Cognitive/Behavioral Status:  Neurologic State: Alert  Orientation Level: Oriented X4  Cognition: Follows commands  Safety/Judgement: Awareness of environment; Fall prevention    Skin: Intact on UEs    Edema: None noted in UEs    Vision/Perceptual:    Acuity: Within Defined Limits    Corrective Lenses: Glasses    Balance:  Sitting: Intact  Standing: Impaired; With support  Standing - Static: Fair  Standing - Dynamic : Fair    Strength:  Strength: Within functional limits (UEs)    Tone & Sensation:  Tone: Normal (BUE)  Sensation: Intact (UEs)    Range of Motion:  AROM: Within functional limits (BUE)    Functional Mobility and Transfers for ADLs:  Bed Mobility:  Scooting: Minimum assistance  Transfers:  Sit to Stand: Minimum assistance    ADL Assessment:  Feeding: Setup    Oral Facial Hygiene/Grooming: Setup    Bathing:  Moderate assistance    Upper Body Dressing: Setup    Lower Body Dressing: Moderate assistance    Toileting: Minimum assistance    Pain:  Pt reports 8/10 pain or discomfort prior to treatment, in back. Nursing notified.    Pt reports 8/10 pain or discomfort post treatment, in back. Patient resting in chair at end of session. Activity Tolerance:   Good    Please refer to the flowsheet for vital signs taken during this treatment. After treatment:   [] Patient left in no apparent distress sitting up in chair  [x] Patient left in no apparent distress in bed  [x] Call bell left within reach  [] Nursing notified  [] Caregiver present  [] Bed alarm activated    COMMUNICATION/EDUCATION:   [x] Home safety education was provided and the patient/caregiver indicated understanding. [x] Patient/family have participated as able in goal setting and plan of care. [x] Patient/family agree to work toward stated goals and plan of care. [] Patient understands intent and goals of therapy, but is neutral about his/her participation. [] Patient is unable to participate in goal setting and plan of care.     Thank you for this referral.  Jamison Alvarenga MS OTR/L  Time Calculation: 15 mins

## 2018-01-10 LAB
EST. AVERAGE GLUCOSE BLD GHB EST-MCNC: 114 MG/DL
GLUCOSE BLD STRIP.AUTO-MCNC: 71 MG/DL (ref 70–110)
GLUCOSE BLD STRIP.AUTO-MCNC: 80 MG/DL (ref 70–110)
GLUCOSE BLD STRIP.AUTO-MCNC: 85 MG/DL (ref 70–110)
GLUCOSE BLD STRIP.AUTO-MCNC: 92 MG/DL (ref 70–110)
HBA1C MFR BLD: 5.6 % (ref 4.2–5.6)

## 2018-01-10 PROCEDURE — 97530 THERAPEUTIC ACTIVITIES: CPT

## 2018-01-10 PROCEDURE — 97116 GAIT TRAINING THERAPY: CPT

## 2018-01-10 PROCEDURE — 77030037134 HC WRAP COMPR BACK THER SOLM -B

## 2018-01-10 PROCEDURE — 83036 HEMOGLOBIN GLYCOSYLATED A1C: CPT | Performed by: ORTHOPAEDIC SURGERY

## 2018-01-10 PROCEDURE — 97535 SELF CARE MNGMENT TRAINING: CPT

## 2018-01-10 PROCEDURE — 74011250637 HC RX REV CODE- 250/637: Performed by: INTERNAL MEDICINE

## 2018-01-10 PROCEDURE — 82962 GLUCOSE BLOOD TEST: CPT

## 2018-01-10 PROCEDURE — 74011250637 HC RX REV CODE- 250/637: Performed by: ORTHOPAEDIC SURGERY

## 2018-01-10 PROCEDURE — 65270000029 HC RM PRIVATE

## 2018-01-10 RX ADMIN — Medication 10 ML: at 06:12

## 2018-01-10 RX ADMIN — OXYCODONE HYDROCHLORIDE AND ACETAMINOPHEN 1 TABLET: 10; 325 TABLET ORAL at 10:56

## 2018-01-10 RX ADMIN — OXYCODONE HYDROCHLORIDE AND ACETAMINOPHEN 1 TABLET: 10; 325 TABLET ORAL at 15:29

## 2018-01-10 RX ADMIN — AMIODARONE HYDROCHLORIDE 200 MG: 200 TABLET ORAL at 10:51

## 2018-01-10 RX ADMIN — CARVEDILOL 3.12 MG: 6.25 TABLET, FILM COATED ORAL at 10:51

## 2018-01-10 RX ADMIN — DIGOXIN 0.12 MG: 250 TABLET ORAL at 10:52

## 2018-01-10 RX ADMIN — Medication 5 ML: at 22:00

## 2018-01-10 RX ADMIN — GABAPENTIN 300 MG: 100 CAPSULE ORAL at 21:32

## 2018-01-10 RX ADMIN — OXYCODONE HYDROCHLORIDE AND ACETAMINOPHEN 1 TABLET: 10; 325 TABLET ORAL at 03:40

## 2018-01-10 RX ADMIN — FUROSEMIDE 40 MG: 40 TABLET ORAL at 10:52

## 2018-01-10 RX ADMIN — OXYCODONE HYDROCHLORIDE AND ACETAMINOPHEN 1 TABLET: 10; 325 TABLET ORAL at 21:36

## 2018-01-10 RX ADMIN — POTASSIUM CHLORIDE 20 MEQ: 20 TABLET, EXTENDED RELEASE ORAL at 10:51

## 2018-01-10 RX ADMIN — BISACODYL 10 MG: 5 TABLET, COATED ORAL at 10:51

## 2018-01-10 NOTE — PROGRESS NOTES
Problem: Mobility Impaired (Adult and Pediatric)  Goal: *Acute Goals and Plan of Care (Insert Text)  Physical Therapy Goals  Initiated 1/9/2018 and to be accomplished within 7 day(s)  1. Patient will move from supine to sit and sit to supine, scoot up and down and roll side to side in bed with supervision/set-up. 2.  Patient will transfer from bed to chair and chair to bed with supervision/set-up using the least restrictive device. 3.  Patient will perform sit to stand with supervision/set-up. 4.  Patient will ambulate with supervision/set-up for >150 feet with the least restrictive device. 5.  Patient will ascend/descend3 stairs with 1 handrail(s) with minimal assistance/contact guard assist.   physical Therapy TREATMENT    Patient: Mauricio Brito (37 y.o. female)  Date: 1/10/2018  Diagnosis: M48.062 SPINAL STENOSIS/SPONDYLOSIS L3/4  Spinal stenosis of lumbar region with neurogenic claudication  Spondylisthesis  Lumbar stenosis  Spondylisthesis  Lumbar stenosis  Spondylisthesis  Lumbar stenosis with neurogenic claudication <principal problem not specified>  Procedure(s) (LRB):  L3/4 EXTREMEN LATERAL INTERBODY FUSION (XLIF)/PERCUTANEOUS SCREWS (N/A) 2 Days Post-Op  Precautions: Fall, Back   Chart, physical therapy assessment, plan of care and goals were reviewed. ASSESSMENT:  Pt found sitting up in chair, awake and alert. Pt presented pleasant and willing to participate in therapy. Pt able to reiterate spinal precautions pre trx. Pt reported requiring toileting for bladder movement at this time. Pt performed sit to stand with min-a to RW and amb to bathroom with SBA. Pt required SBA for stand to sit transfer to commode but required min-a for sit to stand back to RW. Pt then amb to other side of room for ~25 ft with SBA and slow pace. Pt returned to bedside chair with CGA and was left with all needs met and call bell in reach.     Progression toward goals:  []      Improving appropriately and progressing toward goals  [x]      Improving slowly and progressing toward goals  []      Not making progress toward goals and plan of care will be adjusted     PLAN:  Patient continues to benefit from skilled intervention to address the above impairments. Continue treatment per established plan of care. Discharge Recommendations:  [facility]  Further Equipment Recommendations for Discharge:  N/A     SUBJECTIVE:   Patient stated I didn't sleep really at all last night.     OBJECTIVE DATA SUMMARY:   Critical Behavior:  Neurologic State: Alert  Orientation Level: Oriented X4  Cognition: Follows commands  Safety/Judgement: Awareness of environment, Fall prevention  Functional Mobility Training:  Bed Mobility:  Transfers:  Sit to Stand: Minimum assistance  Stand to Sit: Contact guard assistance  Balance:  Sitting: Intact  Standing: Intact; With support  Standing - Static: Fair  Standing - Dynamic : Fair  Ambulation/Gait Training:  Distance (ft): 25 Feet (ft)  Assistive Device: Walker, rolling  Ambulation - Level of Assistance: Stand-by asssistance  Gait Description (WDL): Exceptions to WDL  Gait Abnormalities: Antalgic;Decreased step clearance;Shuffling gait  Base of Support: Narrowed  Speed/Gege: Pace decreased (<100 feet/min); Slow  Step Length: Left shortened;Right shortened  Interventions: Verbal cues  Pain:  Pain Scale 1: Numeric (0 - 10)  Pain Intensity 1: 5  Pain Location 1: Back  Pain Orientation 1: Posterior  Pain Description 1: Aching  Pain Intervention(s) 1: Medication (see MAR)  Activity Tolerance:   fair  Please refer to the flowsheet for vital signs taken during this treatment.   After treatment:   [x] Patient left in no apparent distress sitting up in chair  [] Patient left in no apparent distress in bed  [x] Call bell left within reach  [x] Nursing notified  [] Caregiver present  [] Bed alarm activated      Jyothi Galaviz   Time Calculation: 23 mins    Mobility  Current  CJ= 20-39%   Goal  CI= 1-19%  D/C  CJ= 20-39%. The severity rating is based on the Level of Assistance required for Functional Mobility and ADLs.

## 2018-01-10 NOTE — ROUTINE PROCESS
Received patient in bed, awake, alert and oriented. IV fluid infusing. No complaints made, will continue to monitor. Report  Given by [facility].

## 2018-01-10 NOTE — PROGRESS NOTES
POD # 2  Hx: Heart Failure, A-Fib, Breast Ca, Pre-diabetic? BS 85 today    VSS, LS clear, Apical pulse regular  Dressings clean, dry and intact  UA/GI: Voiding adequate clear yellow UA, Passing gas  PT: Ambulated 25ft w/ rolling walker today. Pt up in bed-side chair. Neuro  Intact. Moving all extremities. 4/5 BLE strength to dorsi/plantar flexion. Pre op pain:Back and BLE R>L  Post op pain:RLE pain is much improved, reports incisional back pain  Plan: Spoke with Edie Nelson CM. ARU is in process of determining acceptance. They have no beds today, so possible transfer tomorrow.     Fawn Escoto, JONATAN

## 2018-01-10 NOTE — ROUTINE PROCESS
Received patient in bathroom, awake, alert and oriented. No complaints made, will continue to monitor.   Report  Given by colt ROSARIO

## 2018-01-10 NOTE — CDMP QUERY
Please clarify if this patient is being treated/managed for:    => Chronic Diastolic  CHF   => Chronic Systolic CHF  => Chronic Combined sys/espinoza CHF  =>Other Explanation of clinical findings  =>Unable to Determine (no explanation of clinical findings)    The medical record reflects the following:    Risk:67 y.o. female cancer survivor with breast cancer in remission 2014.  She had lumbar surgery 25 years ago. Wander Bacon has had two years of progressive back pain with six months of really severely progressive symptoms with back and right-sided anterior thigh and leg pain.  She has had progressive quadriceps weakness, numbness, and radicular pain.  She denies bowel or bladder dysfunction, fever, chills, or night sweats.  X-rays demonstrate a progressive degenerative spondylolisthesis at L3-4 with spinal stenosis, disc protrusion, severe bilateral foraminal stenosis, right worse than left due to asymmetric disc space collapse on the right.  MRI demonstrates spinal stenosis at L3-4. She reports a pain level of 10/10. This patient has failed the presurgical conservative treatments  including physical therapy, spinal block injections and medications. Pain has impacted the patient's functional ability to ambulate outside the household (she hast to use a wheelchair), dress, bathe, and perform ADLs and she is being admitted for surgical intervention. Clinical Indicators:hx CHF and cardiomyopathy    Treatment: coreg and digoxin continued    Please clarify and document your clinical opinion in the progress notes and discharge summary including the definitive and/or presumptive diagnosis, (suspected or probable), related to the above clinical findings. Please include clinical findings supporting your diagnosis. If you DECLINE this query or would like to communicate with [facility], please utilize the \"[facility] message box\" at the TOP of the Progress Note on the right.       Thank you,[facility]  ext [phone]  DR CHANA VENEGAS [facility] RN

## 2018-01-10 NOTE — PROGRESS NOTES
[facility]  Pulmonary, Critical Care, and Sleep Medicine    Name: Huma Mcclendon MRN: [id number]   : 1950 Hospital: [facility]   Date: 1/10/2018        F/u MEDICAL MX CONSULT:    IMPRESSION:   · 1. Transient Hypotension: likely due to mild hypovolemia from preop NPO status, Pain meds, post anesthesia- resolved. Tolerated her BP meds and lasix in am. Asymptomatic. ·  2. Spinal stenosis s/p L3/4 EXTREMEN LATERAL INTERBODY FUSION (XLIF)/PERCUTANEOUS SCREWS- POD 2, pain well controlled, no intraop- post op issues  · 2. H/O breast cancer in , in remission  · 3. H/O chronic back pain  · 4. H/O chronic CHF (cardiomyopathy- as per H and P and notes and patient's hx- no echo or cardiology note on file)- on coreg, digoxin , amiodarone, well compensated  · 5. H/o A.fib on xarelto for stroke prevention  · 6. H/O esophageal cancer  · 7. H/o chronic Anemia       RECOMMENDATIONS:   · 1. Pain Mx by ortho (primary team)  · 2. Back On home meds now. Tolerated the doses in am.  · 3. Cont other home meds- amiodarone, digoxin, xarelto  · 4. DVT Ppx as per primary team  · 5. PT/OT and early ambulation as per primary team.  · 6. ISB q1 hrs while awake    WILL SIGN OFF. CALL WITH QUESTIONS      Subjective:   Last night BP was low. This am BP has been normal. Tolerated all Home BP meds. Pain well controlled. Eating well. No Active issues. PREVIOUS HPI:    Patient is a 74 y.o. female who is s/p elective ortho Sx, POD 1. Except for back pain from Sx she has no other complaints. Denies SOB/CP/fever.  BP is soft with SBP of 94 mm Hg but denies light headedness with good U.O      Objective:   Vital Signs:    Visit Vitals    /90 (BP 1 Location: Right leg)    Pulse 68    Temp 97.4 °F (36.3 °C)    Resp 16    Ht 5' 6\" (1.676 m)    Wt 77.4 kg (170 lb 9.6 oz)    SpO2 95%    BMI 27.54 kg/m2       O2 Device: Nasal cannula   O2 Flow Rate (L/min): 2 l/min   Temp (24hrs), Av °F (36.1 °C), Min:96.7 °F (35.9 °C), Max:97.4 °F (36.3 °C)       Intake/Output:   Last shift:      01/10 0701 - 01/10 1900  In: 120 [P.O.:120]  Out: 200 [Urine:200]  Last 3 shifts: 01/08 1901 - 01/10 0700  In: 2333 [P.O.:1606; I.V.:1600]  Out: 1525 [Urine:1525]    Intake/Output Summary (Last 24 hours) at 01/10/18 1515  Last data filed at 01/10/18 1416   Gross per 24 hour   Intake             1186 ml   Output             1350 ml   Net             -164 ml    Physical Exam:   General:  Alert, cooperative, no distress, appears stated age. Head:  Normocephalic, without obvious abnormality, atraumatic. Eyes:  Conjunctivae/corneas clear. ANicteric   Nose: Nares normal. Mucosa normal. No drainage or sinus tenderness. Throat: Lips, mucosa dry. NO thrush;    Neck: Supple, symmetrical, trachea midline, no adenopathy, thyroid: no enlargment/tenderness/nodules, no carotid bruit and no JVD. No crepitus   Back:   Symmetric, no curvature, no spine tenderness or flank pain   Lungs:   CTA b/l   Chest wall:  No tenderness or deformity. NO CREPITUS   Heart:  Regular rate and rhythm, S1, S2 normal, no murmur, click, rub or gallop. Abdomen:   Soft, non-tender. Bowel sounds normal. No masses,  No organomegaly. No paradox   Extremities: normal, atraumatic, no cyanosis or edema. Pulses: 1-2+ and symmetric all extremities.    Skin: Skin color, texture, turgor normal. No rashes or lesions   Lymph nodes: Cervical, supraclavicular, and axillary nodes normal.   Neurologic: Grossly nonfocal         DATA:  Labs:  Recent Labs      01/09/18   0441   WBC  9.4   HGB  10.5*   HCT  32.8*   PLT  100*     Recent Labs      01/09/18   0441   NA  140   K  4.3   CL  106   CO2  26   GLU  87   BUN  17   CREA  0.97   CA  8.7     Imaging:  []I have personally reviewed the patients radiographs  []Radiographs reviewed with radiologist   []No change from prior, tubes and lines in adequate position  []Improved   []Worsening    High complexity decision making was performed during the evaluation of this patient at high risk for decompensation with multiple organ involvement     Above mentioned total time spent on reviewing the case/medical record/data/notes/EMR/patient examination/documentation/coordinating care with nurse/consultants, exclusive of procedures with complex decision making performed and > 50% time spent in face to face evaluation.     Santo Stearns MD

## 2018-01-10 NOTE — PROGRESS NOTES
Breakfast provided for pt and pt wife     Malcom Black RN  08/79/10 6742 Rounded on patient. Discussed with patient the importance of ambulating with assistance. Reinforced using incentive spirometer and doing ankle pumps when up in chair. Encouraged patient to take pain medication so that they can get OOB and ambulate to help recovery. Informed patient that ice is to be used not heat to surgical site. Reviewed the importance of eating a healthy diet and not skipping meals for healing. Reviewed pain medication, bowel medication. Reinforced to patient that they should not take any NSAIDS until given permission by Dr. Pooja Mcqueen. Patient verbalizing understanding. Educational material given to patient. Patient reminded no bending, lifting or twisting. Log roll to side to get Out of bed. Also no lifting anything heavier than a half a gallon of milk. Patient given  CHG soap to use in hospital and at home to wash off with using a clean towel and clean clothes everyday. Patient also encouraged to use ice for pain control, distraction and repositioning. Dressing dry and intact. Instructed no to remove dressing. Call light in reach. Patient asked if there is anything they need or questions they have. Asked patient if there is anything they need.      Mobility Intervention:       [] Pt dangled at edge of bed    [] Pt assisted OOB to bedside commode    [] Pt assisted OOB to chair    [] Pt ambulated to bathroom    [] Patient was ambulated in room/hallway    Assistive Device Utilized:       [] Rolling walker   [] Crutches   [] Straight Cane   [] Knee immobilizer   [] IV pole    After Mobilization:     [x] Patient left in no apparent distress sitting up in chair  [] Patient left in no apparent distress in bed  [x] Call bell left within reach  [x] SCDs on & machine turned on  [x] Ice applied  [] RN notified  [] Caregiver present  [] Bed alarm activated    Reason patient not mobilized:      [] Patient refused   [] Nausea/vomiting   [] Low blood pressure   [] Drowsy/lethargic    Pain Rating: [] 0  [] 1  Assistive Device:        [] 2  [] 3  [] 4  [] 5  [] 6  Assistive Device:        [] 7  [] 8  [] 9  [] 10    Comments:       Orthopedic

## 2018-01-10 NOTE — TELEPHONE ENCOUNTER
Late entry: spoke with Barbara 1/9/18, was given verbal order to upgrade to in patient per NP [facility] JUAN ALBERTO message below. Stanley Salas [phone] stated understanding.

## 2018-01-10 NOTE — PROGRESS NOTES
vss afeb  Neuro intact pain controlled  Difficult to  bp given cardiac condition and s we had consultation made to assist  Slow progress being made  Continue pt ot

## 2018-01-10 NOTE — PROGRESS NOTES
Problem: Self Care Deficits Care Plan (Adult)  Goal: *Acute Goals and Plan of Care (Insert Text)  Occupational Therapy Goals  Initiated 1/9/2018 within 7 day(s). 1.  Patient will perform lower body dressing with supervision/set-up, AE prn.   2.  Patient will perform toilet transfers with supervision/set-up. Outcome: Progressing Towards Goal  Occupational Therapy TREATMENT    Patient: Marychuy Argueta (55 y.o. female)  Date: 1/10/2018  Diagnosis: M48.062 SPINAL STENOSIS/SPONDYLOSIS L3/4  Spinal stenosis of lumbar region with neurogenic claudication  Spondylisthesis  Lumbar stenosis  Spondylisthesis  Lumbar stenosis  Spondylisthesis  Lumbar stenosis with neurogenic claudication <principal problem not specified>  Procedure(s) (LRB):  L3/4 EXTREMEN LATERAL INTERBODY FUSION (XLIF)/PERCUTANEOUS SCREWS (N/A) 2 Days Post-Op  Precautions: Fall, Back  Chart, occupational therapy assessment, plan of care, and goals were reviewed. ASSESSMENT:  Pt is pleasant and cooperative, agreeable to participate in LB dressing training. Pt was educated on AE for LB dressing (issued previous session), following education pt was able to doff/don socks w/AE and required CGA for std to simulate donning of underwear w/reacher. Pt performed simulated txfr to the toilet w/FWW and required CGA for safety and for sit->std in preparation for txfr. Pt also requires VCs for following of spinal precautions. Progression toward goals:  []          Improving appropriately and progressing toward goals  [x]          Improving slowly and progressing toward goals  []          Not making progress toward goals and plan of care will be adjusted     PLAN:  Patient continues to benefit from skilled intervention to address the above impairments. Continue treatment per established plan of care.   Discharge Recommendations:  Home Health   Further Equipment Recommendations for Discharge:  shower chair      G-CODES:     Self Care  Current  CJ= 20-39%   Goal  CI= 1-19%. The severity rating is based on the Level of Assistance required for Functional Mobility and ADLs. SUBJECTIVE:   Patient stated I am not hurting unless I move.     OBJECTIVE DATA SUMMARY:   Cognitive/Behavioral Status:  Neurologic State: Alert  Orientation Level: Oriented X4  Cognition: Follows commands  Safety/Judgement: Awareness of environment, Fall prevention    Functional Mobility and Transfers for ADLs:    Transfers:  Sit to Stand: Contact guard assistance    Toilet Transfer : Contact guard assistance (w/FWW simulated)    Balance:  Sitting: Intact  Standing: Impaired; With support  Standing - Static: Fair  Standing - Dynamic : Fair    ADL Intervention:   Lower Body Dressing Assistance  Underpants: Contact guard assistance (simulated)  Socks: Supervision/set-up  Slip on Shoes with Back: Supervision/set-up (simulated)  Adaptive Equipment Used: Reacher;Sock aid; Long handled shoe horn  Pain:  Pt reports 0/10 pain or discomfort prior to treatment.    Pt reports 0/10 pain or discomfort post treatment. Activity Tolerance:    Fair    Please refer to the flowsheet for vital signs taken during this treatment.   After treatment:   [x]  Patient left in no apparent distress sitting up in chair  []  Patient left in no apparent distress in bed  [x]  Call bell left within reach  [x]  Nursing notified  []  Caregiver present  []  Bed alarm activated  JORI Langley  Time Calculation: 25 mins

## 2018-01-10 NOTE — PROGRESS NOTES
Bedside and Verbal shift change report given to Zee Taylor RN (oncoming nurse) by Yanna Prado RN (offgoing nurse). Report included the following information SBAR, Kardex, MAR and Recent Results.     SITUATION:    Code Status: Full Code  Reason for Admission: M48.062 SPINAL STENOSIS/SPONDYLOSIS L3/4  Spinal stenosis of lumbar region with neurogenic claudication  Spondylisthesis  Lumbar stenosis  Spondylisthesis  Lumbar stenosis  Spondylisthesis   Lumbar stenosis with neurogenic claudication    [facility] day: 2   Problem List:       Hospital Problems  Date Reviewed: 12/8/2017          Codes Class Noted POA    Lumbar stenosis with neurogenic claudication ICD-10-CM: M48.062  ICD-9-CM: 724.03  1/9/2018 Unknown        Spondylisthesis ICD-10-CM: M43.10  ICD-9-CM: 756.12  1/8/2018 Unknown        Lumbar stenosis ICD-10-CM: M48.061  ICD-9-CM: 724.02  1/8/2018 Unknown        Spinal stenosis of lumbar region with neurogenic claudication ICD-10-CM: M48.062  ICD-9-CM: 724.03  12/8/2017 Unknown              BACKGROUND:    Past Medical History:   Past Medical History:   Diagnosis Date    Antineoplastic chemotherapy induced anemia     Arrhythmia     Atrial Fib    Arthritis     [facility]     Cancer ([facility] [address] 1999    Breast    Cardiomyopathy [facility] [address] [facility]     Chronic ITP (idiopathic thrombocytopenia) ([facility] [address] 10/31/2016    Secondary to Anemia from Chemo    Diabetes ([facility] [address]     borderline, no meds    Esophageal cancer ([facility] [address] 2005    treated with chemo    Heart failure ([facility] [address]          Patient taking anticoagulants no     ASSESSMENT:    Changes in Assessment Throughout Shift: none     Patient has Central Line: no Reasons if yes:      Patient has Yanes Cath: no Reasons if yes:          Last Vitals:     Vitals:    01/09/18 1726 01/09/18 2121 01/09/18 2225 01/10/18 0046   BP: 97/55 (!) 83/53 97/60 92/58   Pulse: (!) 58 (!) 57  (!) 57   Resp: 16 15  17   Temp: 96.7 °F (35.9 °C) 97.1 °F (36.2 °C)  96.8 °F (36 °C)   SpO2: 96% 99% 96%   Weight:       Height:            IV and DRAINS (will only show if present)   Peripheral IV 01/08/18 Right Wrist-Site Assessment: Clean, dry, & intact     WOUND (if present)   Wound Type:  Surgical   Dressing present Dressing Present : Yes   Wound Concerns/Notes:  none     PAIN    Pain Assessment    Pain Intensity 1: 8 (01/10/18 0341)    Pain Location 1: Incisional, Back    Pain Intervention(s) 1: Medication (see MAR)    Patient Stated Pain Goal: 1  o Interventions for Pain:  medication  o Intervention effective: yes  o Time of last intervention: see mar   o Reassessment Completed: yes      Last 3 Weights:  Last 3 Recorded Weights in this Encounter    01/03/18 1308 01/08/18 0707 01/09/18 0636   Weight: 73 kg (161 lb) 73.9 kg (163 lb) 77.4 kg (170 lb 9.6 oz)     Weight change:      INTAKE/OUPUT    Current Shift: 01/09 1901 - 01/10 0700  In: 946 [P.O.:946]  Out: 850 [Urine:850]    Last three shifts: 01/08 0701 - 01/09 1900  In: 8116 [P.O.:660; I.V.:3650]  Out: 4016 [Urine:1475]     LAB RESULTS     Recent Labs      01/09/18   0441   WBC  9.4   HGB  10.5*   HCT  32.8*   PLT  100*        Recent Labs      01/09/18   0441   NA  140   K  4.3   GLU  87   BUN  17   CREA  0.97   CA  8.7       RECOMMENDATIONS AND DISCHARGE PLANNING     1. Pending tests/procedures/ Plan of Care or Other Needs: pain management, PT/OT     2. Discharge plan for patient and Needs/Barriers:     3. Estimated Discharge Date: 1/10/18 Posted on Whiteboard in [address] [place] [address] Room: yes      4. The patient's care plan was reviewed with the oncoming nurse. \"HEALS\" SAFETY CHECK      Fall Risk    Total Score: 3    Safety Measures: Safety Measures: Bed/Chair-Wheels locked, Bed in low position, Caregiver at bedside, Call light within reach, Side rails X 3    A safety check occurred in the patient's room between off going nurse and oncoming nurse listed above.     The safety check included the below items  Area Items   H  High Alert Medications - Verify all high alert medication drips (heparin, PCA, etc.)   E  Equipment - Suction is set up for ALL patients (with rusty)  - Red plugs utilized for all equipment (IV pumps, etc.)  - WOWs wiped down at end of shift.  - Room stocked with oxygen, suction, and other unit-specific supplies   A  Alarms - Bed alarm is set for fall risk patients  - Ensure chair alarm is in place and activated if patient is up in a chair   L  Lines - Check IV for any infiltration  - Yanes bag is empty if patient has a Yanes   - Tubing and IV bags are labeled   S  Safety   - Room is clean, patient is clean, and equipment is clean. - Hallways are clear from equipment besides carts. - Fall bracelet on for fall risk patients  - Ensure room is clear and free of clutter  - Suction is set up for ALL patients (with rusty)  - Hallways are clear from equipment besides carts.    - Isolation precautions followed, supplies available outside room, sign posted     Tomeka Coffman RN

## 2018-01-10 NOTE — ROUTINE PROCESS
2000 Patient is alert and oriented times three with no signs or symptoms of distress. Dressings on the back are clean dry and intact and neuro is intact. 2155 called doctor about the blood pressure for the patient. 2215 Patients blood pressure rebounded on its own its documented in the chart. 0000 Patient is alert and oriented times three with no signs or symptoms of distress. Dressing on the back is intact and dry and neuro is intact for this patient.    0145   Mobility Intervention:       [] Pt dangled at edge of bed    [] Pt assisted OOB to bedside commode    [] Pt assisted OOB to chair    [x] Pt ambulated to bathroom    [] Patient was ambulated in room/hallway    Assistive Device Utilized:       [x] Rolling walker   [] Crutches   [] Straight Cane   [] Knee immobilizer   [] IV pole    After Mobilization:     [x] Patient left in no apparent distress sitting up in chair  [] Patient left in no apparent distress in bed  [x] Call bell left within reach  [x] SCDs on & machine turned on  [x] Ice applied  [] RN notified  [] Caregiver present  [] Bed alarm activated    Reason patient not mobilized:      [] Patient refused   [] Nausea/vomiting   [] Low blood pressure   [] Drowsy/lethargic    Pain Rating:     [] 0  [] 1  Assistive Device:        [] 2  [] 3  [] 4  [x] 5  [] 6  Assistive Device:        [] 7  [] 8  [] 9  [] 10    Comments:     0345   Mobility Intervention:       [] Pt dangled at edge of bed    [] Pt assisted OOB to bedside commode    [] Pt assisted OOB to chair    [] Pt ambulated to bathroom    [x] Patient was ambulated in room/hallway    Assistive Device Utilized:       [x] Rolling walker   [] Crutches   [] Straight Cane   [] Knee immobilizer   [] IV pole    After Mobilization:     [] Patient left in no apparent distress sitting up in chair  [x] Patient left in no apparent distress in bed  [x] Call bell left within reach  [x] SCDs on & machine turned on  [x] Ice applied  [] RN notified  [] Caregiver present  [] Bed alarm activated    Reason patient not mobilized:      [] Patient refused   [] Nausea/vomiting   [] Low blood pressure   [] Drowsy/lethargic    Pain Rating:     [] 0  [] 1  Assistive Device:        [] 2  [] 3  [] 4  [x] 5  [] 6  Assistive Device:        [] 7  [] 8  [] 9  [] 10    Comments:     0400 Patient is alert and oriented times three with no signs or symptoms of distress.  Dressing is clean dry and intact and pulses palpable

## 2018-01-11 LAB
GLUCOSE BLD STRIP.AUTO-MCNC: 106 MG/DL (ref 70–110)
GLUCOSE BLD STRIP.AUTO-MCNC: 69 MG/DL (ref 70–110)
GLUCOSE BLD STRIP.AUTO-MCNC: 84 MG/DL (ref 70–110)

## 2018-01-11 PROCEDURE — 74011250637 HC RX REV CODE- 250/637: Performed by: INTERNAL MEDICINE

## 2018-01-11 PROCEDURE — 82962 GLUCOSE BLOOD TEST: CPT

## 2018-01-11 PROCEDURE — 97530 THERAPEUTIC ACTIVITIES: CPT

## 2018-01-11 PROCEDURE — 74011250637 HC RX REV CODE- 250/637: Performed by: ORTHOPAEDIC SURGERY

## 2018-01-11 PROCEDURE — 65270000029 HC RM PRIVATE

## 2018-01-11 PROCEDURE — 97116 GAIT TRAINING THERAPY: CPT

## 2018-01-11 RX ADMIN — OXYCODONE HYDROCHLORIDE AND ACETAMINOPHEN 1 TABLET: 10; 325 TABLET ORAL at 01:07

## 2018-01-11 RX ADMIN — OXYCODONE HYDROCHLORIDE AND ACETAMINOPHEN 1 TABLET: 10; 325 TABLET ORAL at 11:29

## 2018-01-11 RX ADMIN — OXYCODONE HYDROCHLORIDE AND ACETAMINOPHEN 1 TABLET: 10; 325 TABLET ORAL at 23:18

## 2018-01-11 RX ADMIN — FUROSEMIDE 40 MG: 40 TABLET ORAL at 08:24

## 2018-01-11 RX ADMIN — CARVEDILOL 3.12 MG: 6.25 TABLET, FILM COATED ORAL at 17:48

## 2018-01-11 RX ADMIN — POTASSIUM CHLORIDE 20 MEQ: 20 TABLET, EXTENDED RELEASE ORAL at 08:23

## 2018-01-11 RX ADMIN — GABAPENTIN 300 MG: 100 CAPSULE ORAL at 23:19

## 2018-01-11 RX ADMIN — Medication 10 ML: at 23:20

## 2018-01-11 RX ADMIN — CARVEDILOL 3.12 MG: 6.25 TABLET, FILM COATED ORAL at 08:23

## 2018-01-11 RX ADMIN — BISACODYL 10 MG: 5 TABLET, COATED ORAL at 08:23

## 2018-01-11 RX ADMIN — Medication 10 ML: at 23:21

## 2018-01-11 RX ADMIN — Medication 10 ML: at 14:00

## 2018-01-11 RX ADMIN — OXYCODONE HYDROCHLORIDE AND ACETAMINOPHEN 1 TABLET: 10; 325 TABLET ORAL at 17:51

## 2018-01-11 RX ADMIN — AMIODARONE HYDROCHLORIDE 200 MG: 200 TABLET ORAL at 08:23

## 2018-01-11 RX ADMIN — DIGOXIN 0.12 MG: 250 TABLET ORAL at 08:24

## 2018-01-11 RX ADMIN — GABAPENTIN 300 MG: 100 CAPSULE ORAL at 17:46

## 2018-01-11 RX ADMIN — GABAPENTIN 300 MG: 100 CAPSULE ORAL at 08:23

## 2018-01-11 NOTE — PROGRESS NOTES
Problem: Mobility Impaired (Adult and Pediatric)  Goal: *Acute Goals and Plan of Care (Insert Text)  Physical Therapy Goals  Initiated 1/9/2018 and to be accomplished within 7 day(s)  1. Patient will move from supine to sit and sit to supine, scoot up and down and roll side to side in bed with supervision/set-up. 2.  Patient will transfer from bed to chair and chair to bed with supervision/set-up using the least restrictive device. 3.  Patient will perform sit to stand with supervision/set-up. 4.  Patient will ambulate with supervision/set-up for >150 feet with the least restrictive device. 5.  Patient will ascend/descend3 stairs with 1 handrail(s) with minimal assistance/contact guard assist.   physical Therapy TREATMENT    Patient: Freida Wharton (89 y.o. female)  Date: 1/11/2018  Diagnosis: M48.062 SPINAL STENOSIS/SPONDYLOSIS L3/4  Spinal stenosis of lumbar region with neurogenic claudication  Spondylisthesis  Lumbar stenosis  Spondylisthesis  Lumbar stenosis  Spondylisthesis  Lumbar stenosis with neurogenic claudication <principal problem not specified>  Procedure(s) (LRB):  L3/4 EXTREMEN LATERAL INTERBODY FUSION (XLIF)/PERCUTANEOUS SCREWS (N/A) 3 Days Post-Op  Precautions: Fall, Back   Chart, physical therapy assessment, plan of care and goals were reviewed. ASSESSMENT:  Pt found lying awkwardly sidelying in bed and educated on spinal precautions. Pt denied amb today but did request assistance with positioning in bed. Pt able to perform rolling with SBA but required mod-a for scooting up in bed due to increased pain from pushing. Pt left with call bell in reach and all needs met. Progression toward goals:  []      Improving appropriately and progressing toward goals  [x]      Improving slowly and progressing toward goals  []      Not making progress toward goals and plan of care will be adjusted     PLAN:  Patient continues to benefit from skilled intervention to address the above impairments. Continue treatment per established plan of care. Discharge Recommendations:  [address]  Further Equipment Recommendations for Discharge:  N/A     SUBJECTIVE:   Patient stated I just walked with my family 30 minutes ago and just got back into bed. I think I'm good for today.     OBJECTIVE DATA SUMMARY:   Critical Behavior:  Neurologic State: Alert  Orientation Level: Oriented X4  Cognition: Appropriate decision making, Follows commands  Safety/Judgement: Awareness of environment, Fall prevention  Functional Mobility Training:  Bed Mobility:  Rolling: Stand-by asssistance  Scooting: Moderate assistance  Transfers:  Sit to Stand: Supervision  Stand to Sit: Supervision  Balance:  Sitting: Intact  Standing: Intact; With support  Standing - Static: Good  Standing - Dynamic : Fair  Ambulation/Gait Training:  Distance (ft): 70 Feet (ft)  Assistive Device: Walker, rolling  Ambulation - Level of Assistance: Supervision;Stand-by asssistance  Gait Description (WDL): Exceptions to WDL  Gait Abnormalities: Antalgic;Decreased step clearance  Base of Support: Narrowed  Speed/Gege: Pace decreased (<100 feet/min); Slow  Step Length: Left shortened;Right shortened  Interventions: Verbal cues  Pain:  Pain Scale 1: Numeric (0 - 10)  Pain Intensity 1: 6  Pain Location 1: Incisional;Back  Pain Orientation 1: Lower  Pain Description 1: Aching  Pain Intervention(s) 1: Medication (see MAR)  Activity Tolerance:   Fair+  Please refer to the flowsheet for vital signs taken during this treatment. After treatment:   [] Patient left in no apparent distress sitting up in chair  [x] Patient left in no apparent distress in bed  [x] Call bell left within reach  [x] Nursing notified  [] Caregiver present  [] Bed alarm activated      Saw Carrington   Time Calculation: 8 mins    Mobility  Current  CI= 1-19%   Goal  CI= 1-19%  D/C  CI= 1-19%.   The severity rating is based on the Level of Assistance required for Functional Mobility and ADLs.

## 2018-01-11 NOTE — PROGRESS NOTES
Problem: Mobility Impaired (Adult and Pediatric)  Goal: *Acute Goals and Plan of Care (Insert Text)  Physical Therapy Goals  Initiated 1/9/2018 and to be accomplished within 7 day(s)  1. Patient will move from supine to sit and sit to supine, scoot up and down and roll side to side in bed with supervision/set-up. 2.  Patient will transfer from bed to chair and chair to bed with supervision/set-up using the least restrictive device. 3.  Patient will perform sit to stand with supervision/set-up. 4.  Patient will ambulate with supervision/set-up for >150 feet with the least restrictive device. 5.  Patient will ascend/descend3 stairs with 1 handrail(s) with minimal assistance/contact guard assist.   physical Therapy TREATMENT    Patient: Danny Kocher (37 y.o. female)  Date: 1/11/2018  Diagnosis: M48.062 SPINAL STENOSIS/SPONDYLOSIS L3/4  Spinal stenosis of lumbar region with neurogenic claudication  Spondylisthesis  Lumbar stenosis  Spondylisthesis  Lumbar stenosis  Spondylisthesis  Lumbar stenosis with neurogenic claudication <principal problem not specified>  Procedure(s) (LRB):  L3/4 EXTREMEN LATERAL INTERBODY FUSION (XLIF)/PERCUTANEOUS SCREWS (N/A) 3 Days Post-Op  Precautions: Fall, Back   Chart, physical therapy assessment, plan of care and goals were reviewed. ASSESSMENT:  Pt found standing up with nursing, having just performed bathing. Pt pleasant and agreed to participate in therapy. Pt amb to bathroom for bladder movement with SBA and RW. Post bathroom, pt amb into hallway for ~70 ft showing slightly improved step length but still shortened. Pt returned to bedside chair with SBA. Will attempt stair amb next visit. Pt left with all needs met and call bell in reach.   Progression toward goals:  []      Improving appropriately and progressing toward goals  [x]      Improving slowly and progressing toward goals  []      Not making progress toward goals and plan of care will be adjusted     PLAN:  Patient continues to benefit from skilled intervention to address the above impairments. Continue treatment per established plan of care. Discharge Recommendations:  Home Health vs [facility]  Further Equipment Recommendations for Discharge:  N/A     SUBJECTIVE:   Patient stated I'm alright today.     OBJECTIVE DATA SUMMARY:   Critical Behavior:  Neurologic State: Alert  Orientation Level: Oriented X4  Cognition: Appropriate decision making, Follows commands  Safety/Judgement: Awareness of environment, Fall prevention  Functional Mobility Training:  Bed Mobility:  Transfers:  Sit to Stand: Supervision  Stand to Sit: Supervision  Balance:  Sitting: Intact  Standing: Intact; With support  Standing - Static: Good  Standing - Dynamic : Fair  Ambulation/Gait Training:  Distance (ft): 70 Feet (ft)  Assistive Device: Walker, rolling  Ambulation - Level of Assistance: Supervision;Stand-by asssistance  Gait Description (WDL): Exceptions to WDL  Gait Abnormalities: Antalgic;Decreased step clearance   Base of Support: Narrowed  Speed/Gege: Pace decreased (<100 feet/min); Slow  Step Length: Left shortened;Right shortened  Interventions: Verbal cues  Pain:  Pain Scale 1: Numeric (0 - 10)  Pain Intensity 1: 7  Pain Location 1: Incisional;Back  Pain Orientation 1: Lower  Pain Description 1: Aching  Pain Intervention(s) 1: Medication (see MAR)  Activity Tolerance:   Fair+  Please refer to the flowsheet for vital signs taken during this treatment. After treatment:   [x] Patient left in no apparent distress sitting up in chair  [] Patient left in no apparent distress in bed  [x] Call bell left within reach  [x] Nursing notified  [] Caregiver present  [] Bed alarm activated      Anna Reardon   Time Calculation: 17 mins    Mobility  Current  CI= 1-19%   Goal  CI= 1-19%  D/C  CI= 1-19%. The severity rating is based on the Level of Assistance required for Functional Mobility and ADLs.

## 2018-01-11 NOTE — PROGRESS NOTES
Care Management Interventions  PCP Verified by CM: Yes  Last Visit to PCP: 01/03/18  Transition of Care Consult (CM Consult): [address]: Yes  Physical Therapy Consult: Yes  Occupational Therapy Consult: Yes  Current Support Network: Family Lives Nearby  Confirm Follow Up Transport: Family  Plan discussed with Pt/Family/Caregiver: Yes  Freedom of Choice Offered: Yes (For home health)  Discharge Location  Discharge Placement: Home with home health      [address] with [facility]U and she states that they will review pt in huddle at 1030 am and get back with this CM. 1030 - Spoke to pt who is requesting to go to ARU on discharge. Informed her that ARU is currently meeting to review her case but that it is unlikely that she will meet insurance guidelines to go to ARU. Pt verbalized understanding and states that she will need a RW on discharge. Pt reports that she does not already have a RW. CM will continue to follow. 1325 - Pt is too high level for ARU. Spoke to pt and she is agreeable to go home with home health. She states that her son and daughter both work and that no onw will be there with her tomorrow but that they will be with her on Saturday. Informed her that she should have someone with her tomorrow during the day. She said that she will see if she can get in contact with someone today who can stay with her tomorrow. [id number] with [facility] regarding walker. Called [facility] admission liaisons office and M. Gigi 1521 with [facility] reports that pt is not eligible for RW because she already got one Oct. 2016. Jessica Mcgraw reports that she informed pt. Spoke to pt and she reports that she thinks her walker is in the laundry room.      12 - Spoke to Dr Aggie Lockwood NP and she reports that the plan is for pt to discharge home tomorrow with home health and that pt's children will pick her up from hospital after work tomorrow and stay with her all weekend and that a friend will be staying with pt during the day starting Monday.

## 2018-01-11 NOTE — PROGRESS NOTES
Date of Surgery: POD #3   Surgery: 1. Extreme lateral interbody fusion, L3-L4, PEEK cage, demineralized bone matrix. 2.  Posterior instrumentation, L3-L4, with percutaneously placed instrumentation, NuVasive type. 3.  Intraoperative monitoring, with intraoperative EMGs, NeuroVision type. VSS  Wound: Dressing clean, dry and intact. PT: 79 ft w/ RW  Neuro intact. Moving all extremities. 4/5 strength to BLE. Pre op pain: back and BLE R> L. Post op pain: RLE pain improved, she is having some incisional back pain. Plan: Patient was denied ARF. She does not want to go to SNF. She is concerned about going home due to the fact she will be alone for most of the day. She lives with her son who works 12 hour shifts. She states he is off Saturday and Sunday. On Monday, she is hopeful a friend can sit with her during the day. We discussed discharge today. She does not have a ride home. I have told her we will plan on discharging her home tomorrow. She will discuss this with her family and will arrange transportation for tomorrow. I have discussed this plan with CM as well. DME: Cecily Campos: Medicare will only pay for one piece of DME equipment every 5 years. The patient has received a RW in 2016 so she was denied this today. Patient states she will have family check the shed for her RW.       Irene Mai NP

## 2018-01-11 NOTE — PROGRESS NOTES
ARU/IPR REFERRAL CONTACT NOTE  [id number] [place] [address] for Physical Rehabilitation    RE: Kenyatta Granado     Thank you for the opportunity to review this patient's case for admission to [address] [place] [address] for Physical Rehabilitation. Based on our pre-admission screening:     [x ] This patient does not meet criteria for admission to [facility] for Physical Rehabilitation due to:    [x ] Too functional, per documentation, patient does not require both Physical and Occupational Therapy Services at an acute rehabilitation level of intensity. [x ] We recommend the following:    [x ] [facility]  Again, Thank you for this referral. Should you have any questions please do not hesitate to call. Sincerely,  Annalee Monique. Katlin Davis, [address]   Katlin Davis RN  Admissions [facility] for Physical Rehabilitation  [phone]

## 2018-01-11 NOTE — ROUTINE PROCESS
2000 Patient is alert and oriented times three with no signs or symptoms of distress. Tolerating diet well. Dressing on the lower back is clean dry and intact. Neuro is intact for this patient. 0000 Patient is alert and oriented times three with no signs or symptoms of distress. Dressing is clean drya nd intact and neuro intact. 0400 Patient is alert and orientated times three with no signs or symptoms of distress. Dressing is clean dry and intact and neuro remains intact.  Patienthas been up walking throughout the night

## 2018-01-11 NOTE — PROGRESS NOTES
Rounded on patient. Discussed with patient the importance of ambulating with assistance. Reinforced using incentive spirometer and doing ankle pumps when up in chair. Encouraged patient to take pain medication so that they can get OOB and ambulate to help recovery. Informed patient that ice is to be used not heat to surgical site. Reviewed the importance of eating a healthy diet and not skipping meals for healing. Reviewed pain medication, bowel medication. Reinforced to patient that they should not take any NSAIDS until given permission by Dr. Imelda Spring. Patient verbalizing understanding. Educational material given to patient. Patient reminded no bending, lifting or twisting. Log roll to side to get Out of bed. Also no lifting anything heavier than a half a gallon of milk. Patient given  CHG soap to use in hospital and at home to wash off with using a clean towel and clean clothes everyday. Patient also encouraged to use ice for pain control, distraction and repositioning. Dressing dry and intact. Instructed no to remove dressing. Call light in reach. Patient asked if there is anything they need or questions they have. Asked patient if there is anything they need.      Mobility Intervention:       [] Pt dangled at edge of bed    [] Pt assisted OOB to bedside commode    [] Pt assisted OOB to chair    [] Pt ambulated to bathroom    [] Patient was ambulated in room/hallway    Assistive Device Utilized:       [] Rolling walker   [] Crutches   [] Straight Cane   [] Knee immobilizer   [] IV pole    After Mobilization:     [x] Patient left in no apparent distress sitting up in chair  [] Patient left in no apparent distress in bed  [x] Call bell left within reach  [x] SCDs on & machine turned on  [x] Ice applied  [] RN notified  [] Caregiver present  [] Bed alarm activated    Reason patient not mobilized:      [] Patient refused   [] Nausea/vomiting   [] Low blood pressure   [] Drowsy/lethargic    Pain Rating: [] 0  [] 1  Assistive Device:        [] 2  [x] 3  [] 4  [] 5  [] 6  Assistive Device:        [] 7  [] 8  [] 9  [] 10    Comments:       Orthopedic

## 2018-01-11 NOTE — ROUTINE PROCESS
Bedside and Verbal shift change report given to Aamir Holman RN (oncoming nurse) by Padmini Krause (offgoing nurse). Report included the following information SBAR, Kardex, MAR and Recent Results.     SITUATION:    Code Status: Full Code   Reason for Admission: M48.062 SPINAL STENOSIS/SPONDYLOSIS L3/4   Spinal stenosis of lumbar region with neurogenic claudication   Spondylisthesis   Lumbar stenosis   Spondylisthesis   Lumbar stenosis   Spondylisthesis   Lumbar stenosis with neurogenic claudication    [facility] day: 2   Problem List:       Hospital Problems  Date Reviewed: 12/8/2017          Codes Class Noted POA    Lumbar stenosis with neurogenic claudication ICD-10-CM: M48.062  ICD-9-CM: 724.03  1/9/2018 Unknown        Spondylisthesis ICD-10-CM: M43.10  ICD-9-CM: 756.12  1/8/2018 Unknown        Lumbar stenosis ICD-10-CM: M48.061  ICD-9-CM: 724.02  1/8/2018 Unknown        Spinal stenosis of lumbar region with neurogenic claudication ICD-10-CM: M48.062  ICD-9-CM: 724.03  12/8/2017 Unknown              BACKGROUND:    Past Medical History:   Past Medical History:   Diagnosis Date    Antineoplastic chemotherapy induced anemia     Arrhythmia     Atrial Fib    Arthritis     [facility]     Cancer ([facility] 1999    Breast    Cardiomyopathy ([facility]     Chronic ITP (idiopathic thrombocytopenia) ([facility] 10/31/2016    Secondary to Anemia from Chemo    Diabetes ([facility]     borderline, no meds    Esophageal cancer ([facility] 2005    treated with chemo    Heart failure ([facility]          Patient taking anticoagulants no     ASSESSMENT:    Changes in Assessment Throughout Shift: none     Patient has Central Line: no Reasons if yes: na   Patient has Yanes Cath: no Reasons if yes: na      Last Vitals:     Vitals:    01/10/18 0833 01/10/18 1109 01/10/18 1535 01/10/18 1750   BP: 101/55 142/90 97/64    Pulse: 67 68 62    Resp: 16 16 16    Temp: 97 °F (36.1 °C) 97.4 °F (36.3 °C) 97.6 °F (36.4 °C)    SpO2: 99% 95% 95%   Weight:       Height:            IV and DRAINS (will only show if present)   [REMOVED] Peripheral IV 01/08/18 Right Wrist-Site Assessment: Clean, dry, & intact     WOUND (if present)   Wound Type:  incision   Dressing present Dressing Present : No   Wound Concerns/Notes:  none     PAIN    Pain Assessment    Pain Intensity 1: 0 (01/10/18 1607)    Pain Location 1: Back    Pain Intervention(s) 1: Medication (see MAR)    Patient Stated Pain Goal: 0  o Interventions for Pain:  Yes/percocet  o Intervention effective: yes  o Time of last intervention: see MAR   o Reassessment Completed: yes      Last 3 Weights:  Last 3 Recorded Weights in this Encounter    01/03/18 1308 01/08/18 0707 01/09/18 0636   Weight: 73 kg (161 lb) 73.9 kg (163 lb) 77.4 kg (170 lb 9.6 oz)     Weight change:      INTAKE/OUPUT    Current Shift: 01/10 1901 - 01/11 0700  In: -   Out: 400 [Urine:400]    Last three shifts: 01/09 0701 - 01/10 1900  In: 1526 [P.O.:1526]  Out: 1950 [Urine:1950]     LAB RESULTS     Recent Labs      01/09/18   0441   WBC  9.4   HGB  10.5*   HCT  32.8*   PLT  100*        Recent Labs      01/09/18   0441   NA  140   K  4.3   GLU  87   BUN  17   CREA  0.97   CA  8.7       RECOMMENDATIONS AND DISCHARGE PLANNING     1. Pending tests/procedures/ Plan of Care or Other Needs: continue to monitor, for discharge to rehab     2. Discharge plan for patient and Needs/Barriers: rehab    3. Estimated Discharge Date: 1/11/18 Posted on Whiteboard in [facility]: yes      4. The patient's care plan was reviewed with the oncoming nurse. \"HEALS\" SAFETY CHECK      Fall Risk    Total Score: 1    Safety Measures: Safety Measures: Bed/Chair-Wheels locked, Bed in low position, Call light within reach, Gripper socks    A safety check occurred in the patient's room between off going nurse and oncoming nurse listed above.     The safety check included the below items  Area Items   H  High Alert Medications - Verify all high alert medication drips (heparin, PCA, etc.)   E  Equipment - Suction is set up for ALL patients (with rusty)  - Red plugs utilized for all equipment (IV pumps, etc.)  - WOWs wiped down at end of shift.  - Room stocked with oxygen, suction, and other unit-specific supplies   A  Alarms - Bed alarm is set for fall risk patients  - Ensure chair alarm is in place and activated if patient is up in a chair   L  Lines - Check IV for any infiltration  - Yanes bag is empty if patient has a Yanes   - Tubing and IV bags are labeled   S  Safety   - Room is clean, patient is clean, and equipment is clean. - Hallways are clear from equipment besides carts. - Fall bracelet on for fall risk patients  - Ensure room is clear and free of clutter  - Suction is set up for ALL patients (with rusty)  - Hallways are clear from equipment besides carts.    - Isolation precautions followed, supplies available outside room, sign posted     Priyanka Johnson

## 2018-01-11 NOTE — ROUTINE PROCESS
Written shift change report given to Muna Lilly RN  For MARLENE Bynum(oncoming nurse) by Shantel Garrett RN   (offgoing nurse). Report included the following information SBAR, Kardex, Intake/Output and MAR.

## 2018-01-11 NOTE — ROUTINE PROCESS
Bedside and Verbal shift change report given to [address] [place] (oncoming nurse) by Britta Cunha RN (offgoing nurse). Report included the following information SBAR, Kardex, MAR and Recent Results.     SITUATION:    Code Status: Full Code  Reason for Admission: M48.062 SPINAL STENOSIS/SPONDYLOSIS L3/4  Spinal stenosis of lumbar region with neurogenic claudication  Spondylisthesis  Lumbar stenosis  Spondylisthesis  Lumbar stenosis  Spondylisthesis   Lumbar stenosis with neurogenic claudication    [facility] HOSPITAL day: 3   Problem List:       Hospital Problems  Date Reviewed: 12/8/2017          Codes Class Noted POA    Lumbar stenosis with neurogenic claudication ICD-10-CM: M48.062  ICD-9-CM: 724.03  1/9/2018 Unknown        Spondylisthesis ICD-10-CM: M43.10  ICD-9-CM: 756.12  1/8/2018 Unknown        Lumbar stenosis ICD-10-CM: M48.061  ICD-9-CM: 724.02  1/8/2018 Unknown        Spinal stenosis of lumbar region with neurogenic claudication ICD-10-CM: M48.062  ICD-9-CM: 724.03  12/8/2017 Unknown              BACKGROUND:    Past Medical History:   Past Medical History:   Diagnosis Date    Antineoplastic chemotherapy induced anemia     Arrhythmia     Atrial Fib    Arthritis     [facility]     Cancer ([facility] 1999    Breast    Cardiomyopathy ([facility]     Chronic ITP (idiopathic thrombocytopenia) ([facility] 10/31/2016    Secondary to Anemia from Chemo    Diabetes (HCC)     borderline, no meds    Esophageal cancer ([facility] 2005    treated with chemo    Heart failure ([facility]          Patient taking anticoagulants no     ASSESSMENT:    Changes in Assessment Throughout Shift: none     Patient has Central Line: no Reasons if yes: na   Patient has Yanes Cath: no Reasons if yes: na      Last Vitals:     Vitals:    01/10/18 1750 01/10/18 2000 01/11/18 0000 01/11/18 0449   BP:  124/70 117/62 93/56   Pulse:  74 79 67   Resp:  18 18 20   Temp:  98.3 °F (36.8 °C) 99.2 °F (37.3 °C) 98 °F (36.7 °C)   SpO2: 95% 98% 98% 100%   Weight: Height:            IV and DRAINS (will only show if present)   [REMOVED] Peripheral IV 01/08/18 Right Wrist-Site Assessment: Clean, dry, & intact  Peripheral IV 01/10/18 Right Wrist-Site Assessment: Clean, dry, & intact     WOUND (if present)   Wound Type:  incision   Dressing present Dressing Present : No   Wound Concerns/Notes:  none     PAIN    Pain Assessment    Pain Intensity 1: 0 (01/11/18 0159)    Pain Location 1: Incisional, Back    Pain Intervention(s) 1: Medication (see MAR)    Patient Stated Pain Goal: 0  o Interventions for Pain:  Yes/percocet  o Intervention effective: yes  o Time of last intervention: see MAR   o Reassessment Completed: yes      Last 3 Weights:  Last 3 Recorded Weights in this Encounter    01/03/18 1308 01/08/18 0707 01/09/18 0636   Weight: 73 kg (161 lb) 73.9 kg (163 lb) 77.4 kg (170 lb 9.6 oz)     Weight change:      INTAKE/OUPUT    Current Shift:      Last three shifts: 01/09 1901 - 01/11 0700  In: 2485 [P.O.:2485]  Out: 2550 [Urine:2550]     LAB RESULTS     Recent Labs      01/09/18   0441   WBC  9.4   HGB  10.5*   HCT  32.8*   PLT  100*        Recent Labs      01/09/18   0441   NA  140   K  4.3   GLU  87   BUN  17   CREA  0.97   CA  8.7       RECOMMENDATIONS AND DISCHARGE PLANNING     1. Pending tests/procedures/ Plan of Care or Other Needs: continue to monitor, for discharge to rehab     2. Discharge plan for patient and Needs/Barriers: rehab    3. Estimated Discharge Date: 1/11/18 Posted on Whiteboard in [facility]: yes      4. The patient's care plan was reviewed with the oncoming nurse. \"HEALS\" SAFETY CHECK      Fall Risk    Total Score: 2    Safety Measures: Safety Measures: Bed/Chair-Wheels locked, Bed in low position, Caregiver at bedside, Call light within reach    A safety check occurred in the patient's room between off going nurse and oncoming nurse listed above.     The safety check included the below items  Area Items   H  High Alert Medications - Verify all high alert medication drips (heparin, PCA, etc.)   E  Equipment - Suction is set up for ALL patients (with rusty)  - Red plugs utilized for all equipment (IV pumps, etc.)  - WOWs wiped down at end of shift.  - Room stocked with oxygen, suction, and other unit-specific supplies   A  Alarms - Bed alarm is set for fall risk patients  - Ensure chair alarm is in place and activated if patient is up in a chair   L  Lines - Check IV for any infiltration  - Yanes bag is empty if patient has a Yanes   - Tubing and IV bags are labeled   S  Safety   - Room is clean, patient is clean, and equipment is clean. - Hallways are clear from equipment besides carts. - Fall bracelet on for fall risk patients  - Ensure room is clear and free of clutter  - Suction is set up for ALL patients (with rusty)  - Hallways are clear from equipment besides carts.    - Isolation precautions followed, supplies available outside room, sign posted     Anamaria Tan RN

## 2018-01-12 LAB
ALBUMIN SERPL-MCNC: 2.7 G/DL (ref 3.4–5)
ALBUMIN/GLOB SERPL: 0.7 {RATIO} (ref 0.8–1.7)
ALP SERPL-CCNC: 62 U/L (ref 45–117)
ALT SERPL-CCNC: 14 U/L (ref 13–56)
ANION GAP SERPL CALC-SCNC: 6 MMOL/L (ref 3–18)
AST SERPL-CCNC: 18 U/L (ref 15–37)
BASOPHILS # BLD: 0 K/UL (ref 0–0.06)
BASOPHILS NFR BLD: 0 % (ref 0–2)
BILIRUB SERPL-MCNC: 0.4 MG/DL (ref 0.2–1)
BUN SERPL-MCNC: 13 MG/DL (ref 7–18)
BUN/CREAT SERPL: 12 (ref 12–20)
CALCIUM SERPL-MCNC: 8.8 MG/DL (ref 8.5–10.1)
CHLORIDE SERPL-SCNC: 97 MMOL/L (ref 100–108)
CO2 SERPL-SCNC: 34 MMOL/L (ref 21–32)
CREAT SERPL-MCNC: 1.07 MG/DL (ref 0.6–1.3)
DIFFERENTIAL METHOD BLD: ABNORMAL
EOSINOPHIL # BLD: 0.2 K/UL (ref 0–0.4)
EOSINOPHIL NFR BLD: 2 % (ref 0–5)
ERYTHROCYTE [DISTWIDTH] IN BLOOD BY AUTOMATED COUNT: 16.3 % (ref 11.6–14.5)
GLOBULIN SER CALC-MCNC: 4.1 G/DL (ref 2–4)
GLUCOSE BLD STRIP.AUTO-MCNC: 102 MG/DL (ref 70–110)
GLUCOSE BLD STRIP.AUTO-MCNC: 103 MG/DL (ref 70–110)
GLUCOSE BLD STRIP.AUTO-MCNC: 91 MG/DL (ref 70–110)
GLUCOSE BLD STRIP.AUTO-MCNC: 92 MG/DL (ref 70–110)
GLUCOSE BLD STRIP.AUTO-MCNC: 98 MG/DL (ref 70–110)
GLUCOSE SERPL-MCNC: 101 MG/DL (ref 74–99)
HCT VFR BLD AUTO: 26.3 % (ref 35–45)
HGB BLD-MCNC: 8.6 G/DL (ref 12–16)
LYMPHOCYTES # BLD: 1.7 K/UL (ref 0.9–3.6)
LYMPHOCYTES NFR BLD: 20 % (ref 21–52)
MCH RBC QN AUTO: 27.2 PG (ref 24–34)
MCHC RBC AUTO-ENTMCNC: 32.7 G/DL (ref 31–37)
MCV RBC AUTO: 83.2 FL (ref 74–97)
MONOCYTES # BLD: 1.2 K/UL (ref 0.05–1.2)
MONOCYTES NFR BLD: 14 % (ref 3–10)
NEUTS SEG # BLD: 5.3 K/UL (ref 1.8–8)
NEUTS SEG NFR BLD: 64 % (ref 40–73)
PLATELET # BLD AUTO: 59 K/UL (ref 135–420)
PLATELET COMMENTS,PCOM: ABNORMAL
POTASSIUM SERPL-SCNC: 3.5 MMOL/L (ref 3.5–5.5)
PROT SERPL-MCNC: 6.8 G/DL (ref 6.4–8.2)
RBC # BLD AUTO: 3.16 M/UL (ref 4.2–5.3)
RBC MORPH BLD: ABNORMAL
SODIUM SERPL-SCNC: 137 MMOL/L (ref 136–145)
WBC # BLD AUTO: 8.4 K/UL (ref 4.6–13.2)

## 2018-01-12 PROCEDURE — 74011250637 HC RX REV CODE- 250/637: Performed by: INTERNAL MEDICINE

## 2018-01-12 PROCEDURE — 93005 ELECTROCARDIOGRAM TRACING: CPT

## 2018-01-12 PROCEDURE — 36415 COLL VENOUS BLD VENIPUNCTURE: CPT | Performed by: NURSE PRACTITIONER

## 2018-01-12 PROCEDURE — 97535 SELF CARE MNGMENT TRAINING: CPT

## 2018-01-12 PROCEDURE — 65270000029 HC RM PRIVATE

## 2018-01-12 PROCEDURE — 97530 THERAPEUTIC ACTIVITIES: CPT

## 2018-01-12 PROCEDURE — 82962 GLUCOSE BLOOD TEST: CPT

## 2018-01-12 PROCEDURE — 80053 COMPREHEN METABOLIC PANEL: CPT | Performed by: NURSE PRACTITIONER

## 2018-01-12 PROCEDURE — 74011250637 HC RX REV CODE- 250/637: Performed by: ORTHOPAEDIC SURGERY

## 2018-01-12 PROCEDURE — 85025 COMPLETE CBC W/AUTO DIFF WBC: CPT | Performed by: NURSE PRACTITIONER

## 2018-01-12 RX ORDER — OXYCODONE AND ACETAMINOPHEN 10; 325 MG/1; MG/1
1 TABLET ORAL
Qty: 40 TAB | Refills: 0 | Status: SHIPPED | OUTPATIENT
Start: 2018-01-12 | End: 2018-01-22 | Stop reason: SDUPTHER

## 2018-01-12 RX ADMIN — Medication 10 ML: at 14:00

## 2018-01-12 RX ADMIN — OXYCODONE HYDROCHLORIDE AND ACETAMINOPHEN 1 TABLET: 10; 325 TABLET ORAL at 06:21

## 2018-01-12 RX ADMIN — BISACODYL 10 MG: 5 TABLET, COATED ORAL at 10:59

## 2018-01-12 RX ADMIN — AMIODARONE HYDROCHLORIDE 200 MG: 200 TABLET ORAL at 10:59

## 2018-01-12 RX ADMIN — CARVEDILOL 3.12 MG: 6.25 TABLET, FILM COATED ORAL at 10:59

## 2018-01-12 RX ADMIN — OXYCODONE HYDROCHLORIDE AND ACETAMINOPHEN 1 TABLET: 10; 325 TABLET ORAL at 20:10

## 2018-01-12 RX ADMIN — Medication 10 ML: at 21:13

## 2018-01-12 RX ADMIN — POTASSIUM CHLORIDE 20 MEQ: 20 TABLET, EXTENDED RELEASE ORAL at 11:00

## 2018-01-12 RX ADMIN — GABAPENTIN 300 MG: 100 CAPSULE ORAL at 21:12

## 2018-01-12 RX ADMIN — FUROSEMIDE 40 MG: 40 TABLET ORAL at 10:58

## 2018-01-12 RX ADMIN — OXYCODONE HYDROCHLORIDE AND ACETAMINOPHEN 1 TABLET: 10; 325 TABLET ORAL at 10:59

## 2018-01-12 RX ADMIN — GABAPENTIN 300 MG: 100 CAPSULE ORAL at 10:59

## 2018-01-12 RX ADMIN — DIGOXIN 0.12 MG: 250 TABLET ORAL at 10:58

## 2018-01-12 NOTE — PROGRESS NOTES
POD 4-   1.  Extreme lateral interbody fusion, L3-L4, PEEK cage, demineralized bone matrix. 2.  Posterior instrumentation, L3-L4, with percutaneously placed instrumentation, NuVasive type. 3.  Intraoperative monitoring, with intraoperative EMGs, NeuroVision type. Hypotensive Event: At 1530, pt experienced hypotensive event of 93/62 when a staff member assisted pt up to bathroom. She became lightheaded and dizzy. I did not receive a phone call. I returned a phone call noted to our office 30-45minutes after this event. At that time, the pt was sitting up in the chair. I requested a new set of VS, her BP was still low at 86/57. She continued to report lightheadedness/dizziness. I ordered a 250cc bolus and for nursing staff to lay pt flat either lay the recliner flat or if can be done safely lay flat in the bed. STAT CBC, CMP, EKG ordered. I re-consulted IM and I consulted Cardiology. BP: 89/55  Now: The pt reports improved lightheadedness and dizziness, labs are being drawn now. She is resting comfortably in bed and is without chest pain, no SOB. No distress. Hx: Heart Failure, A-Fib  Wound: Dressing clean, dry and intact. PT: Not done due to dizziness  Neuro intact. Moving all extremities. 4/5 strength to BLE. Pre op pain: back and BLE R> L. Post op pain: RLE pain improved, she is having some incisional back pain. Plan: Observe, I have consulted cardiology and spoken with Dr. Real Monreal. They will round on her tomorrow. Nursing staff to hold all BP meds for now.     Xiang Hall NP

## 2018-01-12 NOTE — PROGRESS NOTES
7852  Received report. Patient A/OX4. Dressing to mid lower back and right flank clean,dry and intact. Denies pain. No acute distress noted. Call light within reach. 1122  Patient resting in bed. Call light within reach. 1447  No acute distress noted. 1620  Patient c/o dizziness. Vitals at this time 86/57,P 60, R 20. Vitals at 1530, 93/62, P 61, R 16. NP notified and orders received. Assisted patient back to bed. 250 ml bolus given. Call light within reach. 1710  Patient resting in bed. No c/o chest pain,SOB OR dizziness. Vitals 99/60, P 64,R 20.    1802  Vitals at this time, 105/62, 64, R 18. EKG completed. NSR  With 1st degree. 2016  Cardiologist notified. Bedside and Verbal shift change report given to [facility] (oncoming nurse) by Vince Lopez (offgoing nurse). Report included the following information SBAR and Kardex.

## 2018-01-12 NOTE — PROGRESS NOTES
vss afeb  Neuro intact  Slowly improving  MEDICINE MANAGEMENT OF CHRONIC COMBINED SYS/HARE chf COMPLETED  Denied ARU  For dc home with johann ePT  Fu 2 WEEKS

## 2018-01-12 NOTE — PROGRESS NOTES
Patient is sitting up in chair no s/s of distress, area to back dry and intact. No s/s of distress or sob.

## 2018-01-12 NOTE — PROGRESS NOTES
Problem: Mobility Impaired (Adult and Pediatric)  Goal: *Acute Goals and Plan of Care (Insert Text)  Physical Therapy Goals  Initiated 1/9/2018 and to be accomplished within 7 day(s)  1. Patient will move from supine to sit and sit to supine, scoot up and down and roll side to side in bed with supervision/set-up. 2.  Patient will transfer from bed to chair and chair to bed with supervision/set-up using the least restrictive device. 3.  Patient will perform sit to stand with supervision/set-up. 4.  Patient will ambulate with supervision/set-up for >150 feet with the least restrictive device. 5.  Patient will ascend/descend3 stairs with 1 handrail(s) with minimal assistance/contact guard assist.     Hold PT tx as pt is experiencing light headedness currently and BP is low. Will f/u at later date if pt does not discharge today.

## 2018-01-12 NOTE — DISCHARGE SUMMARY
Discharge  Summary     Patient: Lucio Floyd MRN: [id number]  SSN: [id number]    YOB: 1950  Age: 74 y.o.   Sex: female       Admit Date: 1/8/2018    Discharge Date: 1/12/2018      Admission Diagnoses: M48.062 SPINAL STENOSIS/SPONDYLOSIS L3/4  Spinal stenosis of lumbar region with neurogenic claudication  Spondylisthesis  Lumbar stenosis  Spondylisthesis  Lumbar stenosis  Spondylisthesis  Lumbar stenosis with neurogenic claudication    Discharge Diagnoses:   Problem List as of 1/12/2018  Date Reviewed: 12/8/2017          Codes Class Noted - Resolved    Lumbar stenosis with neurogenic claudication ICD-10-CM: M48.062  ICD-9-CM: 724.03  1/9/2018 - Present        Spondylisthesis ICD-10-CM: M43.10  ICD-9-CM: 756.12  1/8/2018 - Present        Lumbar stenosis ICD-10-CM: M48.061  ICD-9-CM: 724.02  1/8/2018 - Present        Spinal stenosis of lumbar region with neurogenic claudication ICD-10-CM: M48.062  ICD-9-CM: 724.03  12/8/2017 - Present        Vertigo ICD-10-CM: R42  ICD-9-CM: 780.4  8/2/2017 - Present        Bilateral lower extremity edema ICD-10-CM: R60.0  ICD-9-CM: 782.3  4/19/2017 - Present        Insomnia ICD-10-CM: G47.00  ICD-9-CM: 780.52  2/8/2017 - Present        Chronic ITP (idiopathic thrombocytopenia) (HCC) ICD-10-CM: D69.3  ICD-9-CM: 287.31  10/31/2016 - Present        Muscular deconditioning ICD-10-CM: R29.898  ICD-9-CM: 781.99  10/31/2016 - Present        Thrombocytopenia ([address] ICD-10-CM: D69.6  ICD-9-CM: 287.5  6/14/2016 - Present        Chronic anemia ICD-10-CM: D64.9  ICD-9-CM: 285.9  6/13/2016 - Present        Back pain of thoracolumbar region ICD-10-CM: M54.5  ICD-9-CM: 724.2  6/13/2016 - Present        Iron deficiency anemia ICD-10-CM: D50.9  ICD-9-CM: 280.9  12/14/2015 - Present        Vitamin D deficiency ICD-10-CM: E55.9  ICD-9-CM: 268.9  3/4/2015 - Present        Cachexia ([facility] ICD-10-CM: R64  ICD-9-CM: 799.4  5/5/2014 - Present        Weakness ICD-10-CM: R53.1  ICD-9-CM: 780.79 5/5/2014 - Present        Anemia ICD-10-CM: D64.9  ICD-9-CM: 285.9  11/22/2013 - Present        Breast cancer metastasized to axillary lymph node ([facility] ICD-10-CM: C50.919, C77.3  ICD-9-CM: 174.9, 196.3  6/3/2013 - Present        Hypokalemia ICD-10-CM: E87.6  ICD-9-CM: 276.8  5/7/2013 - Present        RESOLVED: Metastatic cancer ([facility] ICD-10-CM: C79.9  ICD-9-CM: 199.1  11/22/2013 - 3/10/2014        RESOLVED: Breast cancer ([facility] ICD-10-CM: C50.919  ICD-9-CM: 174.9  Unknown - 3/10/2014    Overview Signed 3/13/2013  6:35 PM by Fidelina Flores     Metastic                    Discharge Condition: Good    Procedure:   1. Extreme lateral interbody fusion, L3-L4, PEEK cage, demineralized bone matrix. 2.  Posterior instrumentation, L3-L4, with percutaneously placed instrumentation, NuVasive type. 3.  Intraoperative monitoring, with intraoperative EMGs, NeuroVision type. Hospital Course: Pt has hx of Heart Failure and A-Fib. She had a hypotensive event on POD #1 with a BP of 86/50, she was given a 250cc NS Bolus and it came up to 96/52. She denied chest pain, SOB or dizziness. We consulted [facility] for her medical management. She was determined to be hypotensive due to a combination of pain medication and dehydration. Her BP stabilized the following day and the day of DC her BP was in the 100s/60s. We wanted her DC to ARU, but she was denied. SNF placement was recommended but she declined and wanted to be DC-Home and have her family and [facility] care for her. Otherwise, Normal hospital course for this procedure. Tolerated surgical intervention well. VSS Throughout other than hypotensive event. Neuro intact . Incision dry and intact, tolerating PO intake, voiding adequately. Ambulatory with walker.        Disposition: home    Discharge Medications:   Current Discharge Medication List      START taking these medications    Details   oxyCODONE-acetaminophen (PERCOCET)  mg per tablet Take 1 Tab by mouth every four (4) hours as needed for Pain. Max Daily Amount: 6 Tabs. Indications: Pain, ACUTE POST OP PAIN  Qty: 40 Tab, Refills: 0    Associated Diagnoses: Spinal stenosis of lumbar region with neurogenic claudication; Spondylolisthesis of lumbar region         CONTINUE these medications which have NOT CHANGED    Details   zolpidem (AMBIEN) 10 mg tablet TAKE 1 TABLET BY MOUTH NIGHTLY AS NEEDED FOR SLEEP. MAX DAILY AMOUNT 10 MG  Qty: 30 Tab, Refills: 2    Associated Diagnoses: Insomnia due to anxiety and fear      digoxin (LANOXIN) 0.125 mg tablet Take 0.125 mg by mouth daily. carvedilol (COREG) 3.125 mg tablet Take 3.125 mg by mouth two (2) times daily (with meals). amiodarone (CORDARONE) 200 mg tablet Take 200 mg by mouth.      lidocaine-prilocaine (EMLA) topical cream APPLY OVER PORT 1 HOUR PRIOR TO CHEMOTHERAPY THAN COVER WITH SARAN WRAP  Qty: 30 g, Refills: 6      furosemide (LASIX) 40 mg tablet Take  by mouth daily. aluminum-magnesium hydroxide 200-200 mg/5 mL susp 5 mL, diphenhydrAMINE 12.5 mg/5 mL liqd 12.5 mg, lidocaine 2 % soln 5 mL 5 mL by Swish and Spit route two (2) times a day. Magic mouth wash   Maalox  Lidocaine 2% viscous   Diphenhydramine oral solution     Pharmacy to mix equal portions of ingredients to a total volume as indicated in the dispense amount. Qty: 240 mL, Refills: 1      albuterol (PROAIR HFA) 90 mcg/actuation inhaler 1-2 puffs every 4-6 hrs. Qty: 1 Inhaler, Refills: 1      senna (SENNA) 8.6 mg tablet Take 1 Tab by mouth daily. loratadine 10 mg cap Take 10 mg by mouth daily. meloxicam (MOBIC) 7.5 mg tablet Take 7.5 mg by mouth daily. !! gabapentin (NEURONTIN) 300 mg capsule Take 1 po q am and 2 po q pm  Qty: 90 Cap, Refills: 1      rivaroxaban (XARELTO) 10 mg tablet Take 10 mg by mouth daily. magic mouthwash solution Take 5 mL by mouth three (3) times daily as needed for Stomatitis.  Magic mouth wash   Maalox  Lidocaine 2% viscous   Diphenhydramine oral solution     Pharmacy to mix equal portions of ingredients to a total volume as indicated in the dispense amount. Qty: 236 mL, Refills: 0      dronabinol (MARINOL) 5 mg capsule Take 1 Cap by mouth two (2) times a day. Qty: 60 Cap, Refills: 1      !! gabapentin (NEURONTIN) 300 mg capsule Take 300 mg by mouth three (3) times daily. !! KLOR-CON M20 20 mEq tablet TAKE ONE TABLET BY MOUTH ONCE A DAY  Qty: 30 Tab, Refills: 3      !! potassium chloride (K-DUR, KLOR-CON) 20 mEq tablet Take 2 Tabs by mouth daily. Qty: 30 Tab, Refills: 3      cyclobenzaprine (FLEXERIL) 5 mg tablet       nabumetone (RELAFEN) 750 mg tablet       ondansetron hcl (ZOFRAN) 8 mg tablet Take 1 Tab by mouth every eight (8) hours as needed for Nausea. Qty: 30 Tab, Refills: 3      IRON AG&FUM/C/FA/MV CMB11/CA-T (PRUVATE 21-7 PO) Take  by mouth. !! - Potential duplicate medications found. Please discuss with provider. STOP taking these medications       oxyCODONE-acetaminophen (PERCOCET) 7.5-325 mg per tablet Comments:   Reason for Stopping:         celecoxib (CELEBREX) 200 mg capsule Comments:   Reason for Stopping: Follow-up Appointments   Procedures    FOLLOW UP VISIT Appointment in: Two Weeks     Standing Status:   Standing     Number of Occurrences:   1     Order Specific Question:   Appointment in     Answer:    Two Weeks       Signed By: Ladan Funes NP     January 12, 2018

## 2018-01-12 NOTE — PROGRESS NOTES
Bedside and Verbal shift change report given to Chalo Oliveira RN (oncoming nurse) by Emilee Reyez RN (offgoing nurse). Report included the following information SBAR, Kardex, Intake/Output and MAR.

## 2018-01-12 NOTE — PROGRESS NOTES
Problem: Self Care Deficits Care Plan (Adult)  Goal: *Acute Goals and Plan of Care (Insert Text)  Occupational Therapy Goals  Initiated 1/9/2018 within 7 day(s). 1.  Patient will perform lower body dressing with supervision/set-up, AE prn.   2.  Patient will perform toilet transfers with supervision/set-up. Outcome: Resolved/Met Date Met: 01/12/18  Occupational Therapy TREATMENT/discharge    Patient: Delfin Martínez (81 y.o. female)  Date: 1/12/2018  Diagnosis: M48.062 SPINAL STENOSIS/SPONDYLOSIS L3/4  Spinal stenosis of lumbar region with neurogenic claudication  Spondylisthesis  Lumbar stenosis  Spondylisthesis  Lumbar stenosis  Spondylisthesis  Lumbar stenosis with neurogenic claudication <principal problem not specified>  Procedure(s) (LRB):  L3/4 EXTREMEN LATERAL INTERBODY FUSION (XLIF)/PERCUTANEOUS SCREWS (N/A) 4 Days Post-Op  Precautions: Fall, Back  Chart, occupational therapy assessment, plan of care, and goals were reviewed. ASSESSMENT:  Pt is motivated to participate in OT, presents sitting in the chair. Pt was able to recall all her spinal precautions and how they relate to ADLs. Pt was able to maneuver to the BR and perform toilet txfr this session w/FWW and Supervised A and min VCs for use of grab bars for balance. Upon returning to the chair pt was able to perform LB dressing w/issued AE requiring additional time w/RBs, and occasional VCs for proper use. Pt has met her acute OT goals, we will DC from skilled OT at this time. Progression toward goals:  [x]          Improving appropriately and progressing toward goals  []          Improving slowly and progressing toward goals  []          Not making progress toward goals and plan of care will be adjusted     PLAN:  Patient will be discharged from occupational therapy at this time.   Rationale for discharge:  [x] Goals Achieved  [] [address]  [] Patient not participating in therapy  [] Other:  Discharge Recommendations:  Home Health  Further Equipment Recommendations for Discharge:  shower chair        G-CODES:     Self Care  Current  CI= 1-19%   Goal  CI= 1-19%   D/C  CI= 1-19%. The severity rating is based on the Other Levels of Assistance for ADLs and functional mobility    SUBJECTIVE:   Patient stated I am just tired.     OBJECTIVE DATA SUMMARY:   Cognitive/Behavioral Status:  Neurologic State: Alert  Orientation Level: Oriented X4  Cognition: Follows commands  Safety/Judgement: Awareness of environment, Fall prevention  Functional Mobility and Transfers for ADLs:    Transfers:  Sit to Stand: Supervision    Toilet Transfer : Supervision (w/FWW)    Balance:  Sitting: Intact  Standing: Intact; With support  Standing - Static: Good  Standing - Dynamic : Fair (plus)  ADL Intervention:     Lower Body Dressing Assistance  Underpants: Supervision/set-up  Socks: Supervision/set-up  Adaptive Equipment Used: Reacher;Sock aid  Pain:  Pt reports 0/10 pain or discomfort prior to treatment.    Pt reports 0/10 pain or discomfort post treatment. Activity Tolerance:    Fair+  Please refer to the flowsheet for vital signs taken during this treatment.   After treatment:   [x]  Patient left in no apparent distress sitting up in chair  []  Patient left in no apparent distress in bed  [x]  Call bell left within reach  [x]  Nursing notified  []  Caregiver present  []  Bed alarm activated    JORI Costello  Time Calculation: 25 mins

## 2018-01-12 NOTE — HOME CARE
Discharge orders noted for today, [address] will follow for PT/ Antonia Russell protocol, pt states that she has a cane and she thinks she has RW at home also, this liaison called pt's daughter Isidro Barbosa) about checking to see if pt has RW ,daughter states that she will go check for RW at her mother's home in a few hours and call this liaison back,  daughter informed that pt was provided a RW last year per DME rep and that insurance will not pay for another RW at this time , pt states that her daughter and son will be picking her up for discharge today,[facility] will follow. MAGALY SARGENT. 2pm- Pt's daughter called back and spoke to [facility] liaison Huang Christian)  , she states daughter said that pt does have a RW at home. MAGALY SARGENT.

## 2018-01-13 VITALS
SYSTOLIC BLOOD PRESSURE: 105 MMHG | BODY MASS INDEX: 28.1 KG/M2 | HEART RATE: 73 BPM | TEMPERATURE: 97.8 F | RESPIRATION RATE: 18 BRPM | HEIGHT: 66 IN | DIASTOLIC BLOOD PRESSURE: 67 MMHG | WEIGHT: 174.82 LBS | OXYGEN SATURATION: 97 %

## 2018-01-13 LAB
ATRIAL RATE: 61 BPM
CALCULATED P AXIS, ECG09: 41 DEGREES
CALCULATED R AXIS, ECG10: -19 DEGREES
CALCULATED T AXIS, ECG11: 24 DEGREES
DIAGNOSIS, 93000: NORMAL
GLUCOSE BLD STRIP.AUTO-MCNC: 103 MG/DL (ref 70–110)
GLUCOSE BLD STRIP.AUTO-MCNC: 105 MG/DL (ref 70–110)
GLUCOSE BLD STRIP.AUTO-MCNC: 114 MG/DL (ref 70–110)
P-R INTERVAL, ECG05: 216 MS
Q-T INTERVAL, ECG07: 414 MS
QRS DURATION, ECG06: 104 MS
QTC CALCULATION (BEZET), ECG08: 416 MS
VENTRICULAR RATE, ECG03: 61 BPM

## 2018-01-13 PROCEDURE — 82962 GLUCOSE BLOOD TEST: CPT

## 2018-01-13 PROCEDURE — 74011250636 HC RX REV CODE- 250/636: Performed by: PHYSICIAN ASSISTANT

## 2018-01-13 PROCEDURE — 97116 GAIT TRAINING THERAPY: CPT

## 2018-01-13 PROCEDURE — 97530 THERAPEUTIC ACTIVITIES: CPT

## 2018-01-13 PROCEDURE — 74011250637 HC RX REV CODE- 250/637: Performed by: ORTHOPAEDIC SURGERY

## 2018-01-13 RX ORDER — SODIUM CHLORIDE 9 MG/ML
500 INJECTION, SOLUTION INTRAVENOUS ONCE
Status: COMPLETED | OUTPATIENT
Start: 2018-01-13 | End: 2018-01-13

## 2018-01-13 RX ADMIN — POTASSIUM CHLORIDE 20 MEQ: 20 TABLET, EXTENDED RELEASE ORAL at 09:20

## 2018-01-13 RX ADMIN — GABAPENTIN 300 MG: 100 CAPSULE ORAL at 17:26

## 2018-01-13 RX ADMIN — Medication 10 ML: at 05:28

## 2018-01-13 RX ADMIN — OXYCODONE HYDROCHLORIDE AND ACETAMINOPHEN 1 TABLET: 10; 325 TABLET ORAL at 13:46

## 2018-01-13 RX ADMIN — GABAPENTIN 300 MG: 100 CAPSULE ORAL at 09:20

## 2018-01-13 RX ADMIN — OXYCODONE HYDROCHLORIDE AND ACETAMINOPHEN 1 TABLET: 10; 325 TABLET ORAL at 03:43

## 2018-01-13 RX ADMIN — Medication 10 ML: at 14:00

## 2018-01-13 RX ADMIN — DIGOXIN 0.12 MG: 250 TABLET ORAL at 09:20

## 2018-01-13 RX ADMIN — FUROSEMIDE 40 MG: 40 TABLET ORAL at 09:20

## 2018-01-13 RX ADMIN — BISACODYL 10 MG: 5 TABLET, COATED ORAL at 09:20

## 2018-01-13 RX ADMIN — AMIODARONE HYDROCHLORIDE 200 MG: 200 TABLET ORAL at 09:20

## 2018-01-13 RX ADMIN — OXYCODONE HYDROCHLORIDE AND ACETAMINOPHEN 1 TABLET: 10; 325 TABLET ORAL at 09:27

## 2018-01-13 RX ADMIN — SODIUM CHLORIDE 500 ML: 900 INJECTION, SOLUTION INTRAVENOUS at 12:44

## 2018-01-13 NOTE — PROGRESS NOTES
Ortho    Pt seen and evaluated  Doing well  Reports slightly dizzy when up  Seen by cardiology  No cp, sob, abd pain    Visit Vitals    BP 99/68  Comment: standing    Pulse 70  Comment: Patients nails to long to get a reading    Temp 97.8 °F (36.6 °C)    Resp 18    Ht 5' 6\" (1.676 m)    Wt 174 lb 13.2 oz (79.3 kg)    SpO2 96%    BMI 28.22 kg/m2     nv intact  Neg calf tenderness    Labs  Lab Results   Component Value Date/Time    WBC 8.4 01/12/2018 05:14 PM    Hemoglobin, POC 10.9 09/21/2016 09:17 AM    HGB 8.6 01/12/2018 05:14 PM    Hematocrit, POC 32 09/21/2016 09:17 AM    HCT 26.3 01/12/2018 05:14 PM    PLATELET 59 43/73/7007 05:14 PM    MCV 83.2 01/12/2018 05:14 PM     Lab Results   Component Value Date/Time    Sodium 137 01/12/2018 05:14 PM    Potassium 3.5 01/12/2018 05:14 PM    Chloride 97 01/12/2018 05:14 PM    CO2 34 01/12/2018 05:14 PM    Anion gap 6 01/12/2018 05:14 PM    Glucose 101 01/12/2018 05:14 PM    BUN 13 01/12/2018 05:14 PM    Creatinine 1.07 01/12/2018 05:14 PM    BUN/Creatinine ratio 12 01/12/2018 05:14 PM    GFR est AA >60 01/12/2018 05:14 PM    GFR est non-AA 51 01/12/2018 05:14 PM    Calcium 8.8 01/12/2018 05:14 PM    Bilirubin, total 0.4 01/12/2018 05:14 PM    AST (SGOT) 18 01/12/2018 05:14 PM    Alk.  phosphatase 62 01/12/2018 05:14 PM    Protein, total 6.8 01/12/2018 05:14 PM    Albumin 2.7 01/12/2018 05:14 PM    Globulin 4.1 01/12/2018 05:14 PM    A-G Ratio 0.7 01/12/2018 05:14 PM    ALT (SGPT) 14 01/12/2018 05:14 PM     A: sp lumbar interbody fusion, orthostatic hypotension  Past Medical History:   Diagnosis Date    Antineoplastic chemotherapy induced anemia     Arrhythmia     Atrial Fib    Arthritis     Breast cancer (HCC)     [facility]    Breast    Cardiomyopathy [facility]     Chronic ITP (idiopathic thrombocytopenia) ([facility] 10/31/2016    Secondary to Anemia from Chemo    Diabetes ([facility]     borderline, no meds    Esophageal cancer ([facility] 2005    treated with chemo  Heart failure (HCC)      ASA: 3    P:  IV fluid bolus. If feeling better and hypotension improves, plan to dc to home today.

## 2018-01-13 NOTE — CONSULTS
[facility] - Consult Note    Date of  Admission: 1/8/2018  6:17 AM     Primary Care Physician:  Arminda Mora MD     Plan:     1) episode of dizziness/ transient hypotension- likely from orthostatic hypotension. Patient denied chest pain or sob. Repeat orthostatics today. 2) non ischemic cardiomyopathy EF 45% by last echo. Resume coreg as outpatient as his BP remains low. 3) chronic systolic CHF- currently in compensated state. Continue po lasix. 4) PAF- was on eliquis- now on hold as she is post op. Resume per ortho recommendation. 5) known borderline BP  6) s/p spinal surgery    If orthostatics are negative- can be discharged from cardiac standpoint. Patient advised to follow up with Dr Devon Amador. Assessment:     Hospital Problems  Date Reviewed: 12/8/2017          Codes Class Noted POA    Lumbar stenosis with neurogenic claudication ICD-10-CM: M48.062  ICD-9-CM: 724.03  1/9/2018 Unknown        Spondylisthesis ICD-10-CM: M43.10  ICD-9-CM: 756.12  1/8/2018 Unknown        Lumbar stenosis ICD-10-CM: M48.061  ICD-9-CM: 724.02  1/8/2018 Unknown        Spinal stenosis of lumbar region with neurogenic claudication ICD-10-CM: M48.062  ICD-9-CM: 724.03  12/8/2017 Unknown                   History of Present Illness: This patient has been seen and evaluated at the request of Maribel Rodriguez for episode of hypotension      This is a 74 y.o. female admitted for M48.062 SPINAL STENOSIS/SPONDYLOSIS L3/4;Spinal stenosis of*. Patient complains of:        Patient is a 74 yr old female s/p spinal surgery- yesterday after returning from restroom- felt lightheaded with episode of hypotension-- denies syncope. Symptom was transient with no chest pain or sob. No chest pain or pressure. No palpitations. No SOB. No orthopnea. No cough. No pnd. No fever or chills. No diaphoresis. No leg swelling. No recent syncopal episodes. No blood in stool or urine. No nausea or vomit. No recent CVA.                  Past Medical History:     Past Medical History:   Diagnosis Date    Antineoplastic chemotherapy induced anemia     Arrhythmia     Atrial Fib    Arthritis     [facility] ([facility] [place] [facility]     Cancer ([facility] [place] [facility] 1999    Breast    Cardiomyopathy ([facility] [place] [facility]     Chronic ITP (idiopathic thrombocytopenia) (HCC) 10/31/2016    Secondary to Anemia from Chemo    [facility]     borderline, no meds    Esophageal cancer ([facility] [place] [facility] 2005    treated with chemo    Heart failure ([facility] [place] [facility]          Social History:     Social History     Social History    Marital status:      Spouse name: N/A    Number of children: N/A    Years of education: N/A     Social History Main Topics    Smoking status: Never Smoker    Smokeless tobacco: Never Used    Alcohol use No    Drug use: No    Sexual activity: Not Asked     Other Topics Concern    None     Social History Narrative        Family History:   History reviewed. No pertinent family history. Medications: Allergies   Allergen Reactions    Aspirin Swelling     Pt states her whole body swells when she takes aspirin.     Adhesive Unable to Obtain    Pcn [Penicillins] Rash        Current Facility-Administered Medications   Medication Dose Route Frequency    amiodarone (CORDARONE) tablet 200 mg  200 mg Oral DAILY    digoxin (LANOXIN) tablet 0.125 mg  0.125 mg Oral DAILY    furosemide (LASIX) tablet 40 mg  40 mg Oral DAILY    gabapentin (NEURONTIN) capsule 300 mg  300 mg Oral TID    potassium chloride (K-DUR, KLOR-CON) SR tablet 20 mEq  20 mEq Oral DAILY    ondansetron hcl (ZOFRAN) tablet 8 mg  8 mg Oral Q8H PRN    zolpidem (AMBIEN) tablet 10 mg  10 mg Oral QHS PRN    sodium chloride (NS) flush 5-10 mL  5-10 mL IntraVENous Q8H    sodium chloride (NS) flush 5-10 mL  5-10 mL IntraVENous PRN    naloxone (NARCAN) injection 0.1 mg  0.1 mg IntraVENous PRN    bisacodyl (DULCOLAX) tablet 10 mg  10 mg Oral DAILY    sodium chloride (NS) flush 5-10 mL  5-10 mL IntraVENous Q8H    sodium chloride (NS) flush 5-10 mL  5-10 mL IntraVENous PRN    oxyCODONE-acetaminophen (PERCOCET 10)  mg per tablet 1 Tab  1 Tab Oral Q4H PRN    HYDROmorphone (PF) (DILAUDID) injection 1 mg  1 mg IntraVENous Q4H PRN    naloxone (NARCAN) injection 0.4 mg  0.4 mg IntraVENous PRN    ondansetron (ZOFRAN) injection 4 mg  4 mg IntraVENous Q4H PRN    diphenhydrAMINE (BENADRYL) injection 12.5 mg  12.5 mg IntraVENous Q4H PRN    diazePAM (VALIUM) tablet 5 mg  5 mg Oral Q6H PRN    LORazepam (ATIVAN) injection 1 mg  1 mg IntraVENous Q6H PRN    glucose chewable tablet 16 g  4 Tab Oral PRN    glucagon (GLUCAGEN) injection 1 mg  1 mg IntraMUSCular PRN    dextrose (D50W) injection syrg 12.5-25 g  25-50 mL IntraVENous PRN    albuterol (PROVENTIL VENTOLIN) nebulizer solution 2.5 mg  2.5 mg Nebulization Q6H PRN    insulin lispro (HUMALOG) injection   SubCUTAneous AC&HS    dextrose (D50W) injection syrg 12.5-25 g  25-50 mL IntraVENous PRN        Review Of Systems:       A comprehensive review of systems was negative except for that written in the HPI.     Constitutional: No fever, no chills, no weight loss, no night sweats   HEENT: No epistaxis, no nasal drainage, no difficulty in swallowing, no redness in eyes  Respiratory:  negative for cough, sputum, hemoptysis, pleurisy/chest pain, wheezing, dyspnea on exertion or emphysema  Cardiovascular: no chest pain, no chest pressure, no dyspnea, no pnd, no claudication no palpitations, no chronic leg edema, no syncope  Gastrointestinal: no abd pain, no vomiting, no diarrhea, no bleeding symptoms  Genitourinary: No urinary symptoms or hematuria  Integument/breast: No ulcers or rashes  Musculoskeletal: no muscle pain, no weakness  Neurological: No focal weakness, no seizures, no headaches  Behvioral/Psych: No anxiety, no depression             Physical Exam:     Visit Vitals    /58    Pulse 64    Temp 97.1 °F (36.2 °C)    Resp 18    Ht 5' 6\" (1.676 m)    Wt 79.3 kg (174 lb 13.2 oz)    SpO2 96%    BMI 28.22 kg/m2     BP Readings from Last 3 Encounters:   01/13/18 113/58   12/29/17 107/67   12/22/17 98/66     Pulse Readings from Last 3 Encounters:   01/13/18 64   12/29/17 72   12/22/17 76     Wt Readings from Last 3 Encounters:   01/12/18 79.3 kg (174 lb 13.2 oz)   12/18/17 75.2 kg (165 lb 12.8 oz)   12/08/17 76 kg (167 lb 9.6 oz)       General:  alert, cooperative, no distress, appears stated age  Skin: Warm and dry, acyanotic, normal color. Head: Normocephalic, atraumatic. Eyes: Sclerae anicteric, conjunctivae without injection. Neck:  nontender, no nuchal rigidity, no masses, no stridor, no carotid bruit, no JVD  Lungs:  clear to auscultation bilaterally, no rhonchi  Heart:  regular rate and rhythm, S1, S2 normal, no murmur, click, rub or gallop  Abdomen:  abdomen is soft without significant tenderness, masses, organomegaly or guarding  Extremities:  extremities normal, atraumatic, no cyanosis or edema  Neurological: grossly intact. No focal abnormalities, moves all extremities well. Psychiatric Affect: The patient is awake, alert and oriented x3. Mary Rising is interactive and appropriate.    Data Review:     Recent Results (from the past 48 hour(s))   GLUCOSE, POC    Collection Time: 01/11/18 12:25 PM   Result Value Ref Range    Glucose (POC) 84 70 - 110 mg/dL   GLUCOSE, POC    Collection Time: 01/11/18  5:01 PM   Result Value Ref Range    Glucose (POC) 106 70 - 110 mg/dL   GLUCOSE, POC    Collection Time: 01/11/18 11:51 PM   Result Value Ref Range    Glucose (POC) 102 70 - 110 mg/dL   GLUCOSE, POC    Collection Time: 01/12/18  6:24 AM   Result Value Ref Range    Glucose (POC) 91 70 - 110 mg/dL   GLUCOSE, POC    Collection Time: 01/12/18 11:05 AM   Result Value Ref Range    Glucose (POC) 98 70 - 110 mg/dL   GLUCOSE, POC    Collection Time: 01/12/18  5:13 PM   Result Value Ref Range    Glucose (POC) 103 70 - 110 mg/dL   CBC WITH AUTOMATED DIFF    Collection Time: 01/12/18 5:14 PM   Result Value Ref Range    WBC 8.4 4.6 - 13.2 K/uL    RBC 3.16 (L) 4.20 - 5.30 M/uL    HGB 8.6 (L) 12.0 - 16.0 g/dL    HCT 26.3 (L) 35.0 - 45.0 %    MCV 83.2 74.0 - 97.0 FL    MCH 27.2 24.0 - 34.0 PG    MCHC 32.7 31.0 - 37.0 g/dL    RDW 16.3 (H) 11.6 - 14.5 %    PLATELET 59 (L) 999 - 420 K/uL    NEUTROPHILS 64 40 - 73 %    LYMPHOCYTES 20 (L) 21 - 52 %    MONOCYTES 14 (H) 3 - 10 %    EOSINOPHILS 2 0 - 5 %    BASOPHILS 0 0 - 2 %    ABS. NEUTROPHILS 5.3 1.8 - 8.0 K/UL    ABS. LYMPHOCYTES 1.7 0.9 - 3.6 K/UL    ABS. MONOCYTES 1.2 0.05 - 1.2 K/UL    ABS. EOSINOPHILS 0.2 0.0 - 0.4 K/UL    ABS. BASOPHILS 0.0 0.0 - 0.06 K/UL    DF AUTOMATED      PLATELET COMMENTS Platelet Estimate, Decreased      RBC COMMENTS ANISOCYTOSIS  1+        RBC COMMENTS TARGET CELLS  1+        RBC COMMENTS HYPOCHROMIA  1+       METABOLIC PANEL, COMPREHENSIVE    Collection Time: 01/12/18  5:14 PM   Result Value Ref Range    Sodium 137 136 - 145 mmol/L    Potassium 3.5 3.5 - 5.5 mmol/L    Chloride 97 (L) 100 - 108 mmol/L    CO2 34 (H) 21 - 32 mmol/L    Anion gap 6 3.0 - 18 mmol/L    Glucose 101 (H) 74 - 99 mg/dL    BUN 13 7.0 - 18 MG/DL    Creatinine 1.07 0.6 - 1.3 MG/DL    BUN/Creatinine ratio 12 12 - 20      GFR est AA >60 >60 ml/min/1.73m2    GFR est non-AA 51 (L) >60 ml/min/1.73m2    Calcium 8.8 8.5 - 10.1 MG/DL    Bilirubin, total 0.4 0.2 - 1.0 MG/DL    ALT (SGPT) 14 13 - 56 U/L    AST (SGOT) 18 15 - 37 U/L    Alk.  phosphatase 62 45 - 117 U/L    Protein, total 6.8 6.4 - 8.2 g/dL    Albumin 2.7 (L) 3.4 - 5.0 g/dL    Globulin 4.1 (H) 2.0 - 4.0 g/dL    A-G Ratio 0.7 (L) 0.8 - 1.7     EKG, 12 LEAD, SUBSEQUENT    Collection Time: 01/12/18  5:54 PM   Result Value Ref Range    Ventricular Rate 61 BPM    Atrial Rate 61 BPM    P-R Interval 216 ms    QRS Duration 104 ms    Q-T Interval 414 ms    QTC Calculation (Bezet) 416 ms    Calculated P Axis 41 degrees    Calculated R Axis -19 degrees    Calculated T Axis 24 degrees    Diagnosis       Sinus rhythm with 1st degree AV block  Minimal voltage criteria for LVH, may be normal variant  Cannot rule out Anterior infarct (cited on or before 28-DEC-2017)  Abnormal ECG  When compared with ECG of 28-DEC-2017 12:25,  Questionable change in initial forces of Septal leads  Nonspecific T wave abnormality now evident in Inferior leads  Confirmed by Mere Bond ([id number]) on 1/13/2018 8:30:44 AM     GLUCOSE, POC    Collection Time: 01/12/18  9:11 PM   Result Value Ref Range    Glucose (POC) 92 70 - 110 mg/dL   GLUCOSE, POC    Collection Time: 01/13/18  5:26 AM   Result Value Ref Range    Glucose (POC) 103 70 - 110 mg/dL         Intake/Output Summary (Last 24 hours) at 01/13/18 0959  Last data filed at 01/13/18 0344   Gross per 24 hour   Intake             1320 ml   Output             1000 ml   Net              320 ml       Cardiographics:     ECG: sinus rhythm with non specific st-t changes    Echocardiogram: 11/2017:     LOW NORMAL LEFT VENTRICULAR EJECTION FRACTION 45%. NORMAL RIGHT VENTRICULAR GLOBAL SYSTOLIC FUNCTION. MODERATE MITRAL REGURGITATION. MILDLY SCLEROTIC AORTIC VALVE. ESTIMATED PULMONARY ARTERY PRESSURE IS 11 MMHG. SMALL PERICARDIAL EFFUSION.     Signed By: Panfilo Belcher MD     January 13, 2018

## 2018-01-13 NOTE — ROUTINE PROCESS
2010 Patient in bed talking in the phone, AAOx4. FL=001/71, patient stated dizziness improved, denies chest pain, no SOB noted. Pain rating 6/10 percocet 1 tab given, dressing to lower back and left flank dry and clean. Patient move all extremities, no acute distress noted. 0030 Patient resting quietly. 0400 Patient assisted to bathroom voided 300 ml of yellow urine. Gave percocet for pain ,VS stable. [place]

## 2018-01-13 NOTE — PROGRESS NOTES
Problem: Mobility Impaired (Adult and Pediatric)  Goal: *Acute Goals and Plan of Care (Insert Text)  Physical Therapy Goals  Initiated 1/9/2018 and to be accomplished within 7 day(s)  1. Patient will move from supine to sit and sit to supine, scoot up and down and roll side to side in bed with supervision/set-up. 2.  Patient will transfer from bed to chair and chair to bed with supervision/set-up using the least restrictive device. 3.  Patient will perform sit to stand with supervision/set-up. 4.  Patient will ambulate with supervision/set-up for >150 feet with the least restrictive device. 5.  Patient will ascend/descend3 stairs with 1 handrail(s) with minimal assistance/contact guard assist.   physical Therapy TREATMENT    Patient: Herson Camacho (63 y.o. female)  Date: 1/13/2018  Diagnosis: M48.062 SPINAL STENOSIS/SPONDYLOSIS L3/4  Spinal stenosis of lumbar region with neurogenic claudication  Spondylisthesis  Lumbar stenosis  Spondylisthesis  Lumbar stenosis  Spondylisthesis  Lumbar stenosis with neurogenic claudication <principal problem not specified>  Procedure(s) (LRB):  L3/4 EXTREMEN LATERAL INTERBODY FUSION (XLIF)/PERCUTANEOUS SCREWS (N/A) 5 Days Post-Op  Precautions: Fall, Back  Chart, physical therapy assessment, plan of care and goals were reviewed. ASSESSMENT:  Pt alert, sitting in bedside chair upon entering room. Pt states she is still having some lightheadedness, but it has improved since yesterday. Agreeable to PT session. Requires extra time for transfers. Ambulation into hallway w/ RW, gait belt. Slow gait, short steps. Maintains good posture, stays close to RW. Returned to bed. Requires extra time and pillow/bed adjustment to get to supine. Call bell left within reach, nursing notified.     Progression toward goals:  [x]      Improving appropriately and progressing toward goals  []      Improving slowly and progressing toward goals  []      Not making progress toward goals and plan of care will be adjusted     PLAN:  Patient continues to benefit from skilled intervention to address the above impairments. Continue treatment per established plan of care. Discharge Recommendations:  Home Health vs [facility]  Further Equipment Recommendations for Discharge:  rolling walker and N/A     SUBJECTIVE:   Patient stated I'm still feeling a little lightheaded.   Mobility [id number] Current  CI= 1-19%   Goal  CI= 1-19%. The severity rating is based on the Other level of assistance required for functional mobility and ADLs. OBJECTIVE DATA SUMMARY:   Critical Behavior:  Neurologic State: Alert  Orientation Level: Oriented X4  Cognition: Appropriate decision making, Appropriate safety awareness, Follows commands  Safety/Judgement: Awareness of environment, Fall prevention  Functional Mobility Training:  Bed Mobility:  Rolling: Stand-by asssistance        Scooting: Moderate assistance         Transfers:  Sit to Stand: Supervision  Stand to Sit: Supervision        Balance:  Sitting: Intact  Standing: Intact; With support  Standing - Static: Good  Standing - Dynamic : Fair  Ambulation/Gait Training:  Distance (ft): 110 Feet (ft)  Assistive Device: Walker, rolling  Ambulation - Level of Assistance: Stand-by asssistance;Supervision        Gait Abnormalities: Decreased step clearance;Shuffling gait        Base of Support: Narrowed     Speed/Gege: Pace decreased (<100 feet/min); Slow  Step Length: Right shortened;Left shortened    Therapeutic Exercises:   Seated LAQ, marches, HR/TR. Pain:  Pt pain was reported as  0 pre-treatment. Pt pain was reported as 0 post-treatment. Activity Tolerance:   Good  Please refer to the flowsheet for vital signs taken during this treatment.   After treatment:   [] Patient left in no apparent distress sitting up in chair  [x] Patient left in no apparent distress in bed  [x] Call bell left within reach  [x] Nursing notified  [] Caregiver present  [] Bed alarm activated      Mikey , PTA   Time Calculation: 26 mins

## 2018-01-13 NOTE — ROUTINE PROCESS
Discharge instructions given. Patient had opportunity to ask any questions. Patients IV and armbands removed. Patient taken down by transport in stable condition.

## 2018-01-13 NOTE — ROUTINE PROCESS
Mobility Intervention:       [] Pt dangled at edge of bed    [] Pt assisted OOB to bedside commode    [x] Pt assisted OOB to chair    [] Pt ambulated to bathroom    [] Patient was ambulated in room/hallway    Assistive Device Utilized:       [x] Rolling walker   [] Crutches   [] Straight Cane   [] Knee immobilizer   [] IV pole    After Mobilization:     [x] Patient left in no apparent distress sitting up in chair  [] Patient left in no apparent distress in bed  [x] Call bell left within reach  [x] SCDs on & machine turned on  [] Ice applied  [] RN notified  [] Caregiver present  [] Bed alarm activated    Reason patient not mobilized:      [] Patient refused   [] Nausea/vomiting   [] Low blood pressure   [] Drowsy/lethargic    Pain Rating:     [] 0  [] 1  Assistive Device:        [] 2  [] 3  [] 4  [] 5  [] 6  Assistive Device:        [x] 7  [] 8  [] 9  [] 10    Comments:

## 2018-01-13 NOTE — ROUTINE PROCESS
Bedside and Verbal shift change report given to Esther Cunningham (oncoming nurse) by Trey Lambert RN (offgoing nurse). Report included the following information SBAR, Kardex, MAR and Recent Results.     SITUATION:    Code Status: Full Code   Reason for Admission: M48.062 SPINAL STENOSIS/SPONDYLOSIS L3/4   Spinal stenosis of lumbar region with neurogenic claudication   Spondylisthesis   Lumbar stenosis   Spondylisthesis   Lumbar stenosis   Spondylisthesis   Lumbar stenosis with neurogenic claudication    [facility] day: 5   Problem List:       Hospital Problems  Date Reviewed: 12/8/2017          Codes Class Noted POA    Lumbar stenosis with neurogenic claudication ICD-10-CM: M48.062  ICD-9-CM: 724.03  1/9/2018 Unknown        Spondylisthesis ICD-10-CM: M43.10  ICD-9-CM: 756.12  1/8/2018 Unknown        Lumbar stenosis ICD-10-CM: M48.061  ICD-9-CM: 724.02  1/8/2018 Unknown        Spinal stenosis of lumbar region with neurogenic claudication ICD-10-CM: M48.062  ICD-9-CM: 724.03  12/8/2017 Unknown              BACKGROUND:    Past Medical History:   Past Medical History:   Diagnosis Date    Antineoplastic chemotherapy induced anemia     Arrhythmia     Atrial Fib    Arthritis     [facility]     Cancer ([facility] 1999    Breast    [facility]     Chronic ITP (idiopathic thrombocytopenia) ([facility] 10/31/2016    Secondary to Anemia from Chemo    Diabetes ([facility]     borderline, no meds    Esophageal cancer ([facility] 2005    treated with chemo    Heart failure ([facility]          Patient taking anticoagulants no     ASSESSMENT:    Changes in Assessment Throughout Shift: no     Patient has Central Line: no Reasons if yes: no   Patient has Yanes Cath: no Reasons if yes: no      Last Vitals:     Vitals:    01/12/18 1530 01/12/18 1600 01/12/18 2000 01/13/18 0334   BP: 93/62 (!) 86/57 110/71 113/58   Pulse: 61 60 70 64   Resp: 16 20 18 18   Temp: 97.1 °F (36.2 °C) 97.4 °F (36.3 °C) 97.8 °F (36.6 °C) 97.1 °F (36.2 °C)   SpO2: 99%   96%   Weight:       Height:            IV and DRAINS (will only show if present)   [REMOVED] Peripheral IV 01/08/18 Right Wrist-Site Assessment: Clean, dry, & intact  Peripheral IV 01/10/18 Right Wrist-Site Assessment: Clean, dry, & intact     WOUND (if present)   Wound Type:  none   Dressing present Dressing Present : Yes   Wound Concerns/Notes:  none     PAIN    Pain Assessment    Pain Intensity 1: 6 (01/12/18 2000)    Pain Location 1: Back    Pain Intervention(s) 1: Medication (see MAR)    Patient Stated Pain Goal: 0  o Interventions for Pain:  none  o Intervention effective: no  o Time of last intervention: 0345   o Reassessment Completed: no      Last 3 Weights:  Last 3 Recorded Weights in this Encounter    01/08/18 0707 01/09/18 0636 01/12/18 0445   Weight: 73.9 kg (163 lb) 77.4 kg (170 lb 9.6 oz) 79.3 kg (174 lb 13.2 oz)     Weight change:      INTAKE/OUPUT    Current Shift: 01/12 1901 - 01/13 0700  In: 580 [P.O.:580]  Out: 600 [Urine:600]    Last three shifts: 01/11 0701 - 01/12 1900  In: 740 [P.O.:740]  Out: 2700 [Urine:2700]     LAB RESULTS     Recent Labs      01/12/18   1714   WBC  8.4   HGB  8.6*   HCT  26.3*   PLT  59*        Recent Labs      01/12/18   1714   NA  137   K  3.5   GLU  101*   BUN  13   CREA  1.07   CA  8.8       RECOMMENDATIONS AND DISCHARGE PLANNING     1. Pending tests/procedures/ Plan of Care or Other Needs: PT/OT     2. Discharge plan for patient and Needs/Barriers:  Home    3. Estimated Discharge Date: 1/13/18 Posted on Whiteboard in Patients Room: no      4. The patient's care plan was reviewed with the oncoming nurse. \"HEALS\" SAFETY CHECK      Fall Risk    Total Score: 3    Safety Measures: Safety Measures: Bed in low position, Call light within reach    A safety check occurred in the patient's room between off going nurse and oncoming nurse listed above.     The safety check included the below items  Area Items   H  High Alert Medications - Verify all high alert medication drips (heparin, PCA, etc.)   E  Equipment - Suction is set up for ALL patients (with rusty)  - Red plugs utilized for all equipment (IV pumps, etc.)  - WOWs wiped down at end of shift.  - Room stocked with oxygen, suction, and other unit-specific supplies   A  Alarms - Bed alarm is set for fall risk patients  - Ensure chair alarm is in place and activated if patient is up in a chair   L  Lines - Check IV for any infiltration  - Yanes bag is empty if patient has a Yanes   - Tubing and IV bags are labeled   S  Safety   - Room is clean, patient is clean, and equipment is clean. - Hallways are clear from equipment besides carts. - Fall bracelet on for fall risk patients  - Ensure room is clear and free of clutter  - Suction is set up for ALL patients (with rusty)  - Hallways are clear from equipment besides carts.    - Isolation precautions followed, supplies available outside room, sign posted     Matt Gross RN

## 2018-01-13 NOTE — ROUTINE PROCESS
Patient stable, no signs of distress. Patient ambulatory to bathroom. Pain medication given for pain.   Patient worked with PT.

## 2018-01-13 NOTE — ROUTINE PROCESS
Mobility Intervention:       [] Pt dangled at edge of bed    [] Pt assisted OOB to bedside commode    [] Pt assisted OOB to chair    [x] Pt ambulated to bathroom    [] Patient was ambulated in room/hallway    Assistive Device Utilized:       [x] Rolling walker   [] Crutches   [] Straight Cane   [] Knee immobilizer   [] IV pole    After Mobilization:     [x] Patient left in no apparent distress sitting up in chair  [] Patient left in no apparent distress in bed  [x] Call bell left within reach  [x] SCDs on & machine turned on  [] Ice applied  [] RN notified  [] Caregiver present  [] Bed alarm activated    Reason patient not mobilized:      [] Patient refused   [] Nausea/vomiting   [] Low blood pressure   [] Drowsy/lethargic    Pain Rating:     [] 0  [] 1  Assistive Device:        [] 2  [] 3  [] 4  [] 5  [] 6  Assistive Device:        [] 7  [] 8  [] 9  [] 10    Comments:

## 2018-01-13 NOTE — ROUTINE PROCESS
Mobility Intervention:       [] Pt dangled at edge of bed    [] Pt assisted OOB to bedside commode    [] Pt assisted OOB to chair    [x] Pt ambulated to bathroom    [] Patient was ambulated in room/hallway    Assistive Device Utilized:       [x] Rolling walker   [] Crutches   [] Straight Cane   [] Knee immobilizer   [] IV pole    After Mobilization:     [] Patient left in no apparent distress sitting up in chair  [x] Patient left in no apparent distress in bed  [x] Call bell left within reach  [x] SCDs on & machine turned on  [] Ice applied  [] RN notified  [] Caregiver present  [] Bed alarm activated    Reason patient not mobilized:      [] Patient refused   [] Nausea/vomiting   [] Low blood pressure   [] Drowsy/lethargic    Pain Rating:     [] 0  [] 1  Assistive Device:        [] 2  [] 3  [] 4  [] 5  [] 6  Assistive Device:        [] 7  [] 8  [] 9  [] 10    Comments:

## 2018-01-13 NOTE — ROUTINE PROCESS
Patient stable, no signs of distress. Patient sitting up in bed eating. Patient ambulatory to restroom.

## 2018-01-14 ENCOUNTER — HOME CARE VISIT (OUTPATIENT)
Dept: HOME HEALTH SERVICES | Facility: HOME HEALTH | Age: 68
End: 2018-01-14

## 2018-01-15 ENCOUNTER — HOME CARE VISIT (OUTPATIENT)
Dept: SCHEDULING | Facility: HOME HEALTH | Age: 68
End: 2018-01-15
Payer: MEDICARE

## 2018-01-15 ENCOUNTER — TELEPHONE (OUTPATIENT)
Dept: ORTHOPEDIC SURGERY | Age: 68
End: 2018-01-15

## 2018-01-15 VITALS — TEMPERATURE: 96.6 F | SYSTOLIC BLOOD PRESSURE: 107 MMHG | HEART RATE: 62 BPM | DIASTOLIC BLOOD PRESSURE: 62 MMHG

## 2018-01-15 PROCEDURE — G0151 HHCP-SERV OF PT,EA 15 MIN: HCPCS

## 2018-01-15 PROCEDURE — 3331090002 HH PPS REVENUE DEBIT

## 2018-01-15 PROCEDURE — 3331090001 HH PPS REVENUE CREDIT

## 2018-01-15 PROCEDURE — 400013 HH SOC

## 2018-01-15 NOTE — TELEPHONE ENCOUNTER
-called Jazlyn Grandchild at home health and let her know that it was ok for them to see patient for home health PT per previous message. She verbalized understanding.

## 2018-01-15 NOTE — TELEPHONE ENCOUNTER
Ainsley Middleton from [address] [place] [address] states she has the orders from when patient was supposed to be discharge on 1-11-18. Patient stayed longer and was discharged on 01-13-18. Ainsley Middleton wants to be sure they are cleared to see the patient for PT [facility], she is scheduled to see her today.   Ainsley Middleton can be reached at [phone]

## 2018-01-15 NOTE — DISCHARGE SUMMARY
Discharge  Summary     Patient: Jax Valles MRN: [id number]  SSN: [id number]    YOB: 1950  Age: 74 y.o.   Sex: female       Admit Date: 1/8/2018    Discharge Date: 1/15/2018      Admission Diagnoses: M48.062 SPINAL STENOSIS/SPONDYLOSIS L3/4  Spinal stenosis of lumbar region with neurogenic claudication  Spondylisthesis  Lumbar stenosis  Spondylisthesis  Lumbar stenosis  Spondylisthesis  Lumbar stenosis with neurogenic claudication    Discharge Diagnoses:   Problem List as of 1/13/2018  Date Reviewed: 12/8/2017          Codes Class Noted - Resolved    Lumbar stenosis with neurogenic claudication ICD-10-CM: M48.062  ICD-9-CM: 724.03  1/9/2018 - Present        Spondylisthesis ICD-10-CM: M43.10  ICD-9-CM: 756.12  1/8/2018 - Present        Lumbar stenosis ICD-10-CM: M48.061  ICD-9-CM: 724.02  1/8/2018 - Present        Spinal stenosis of lumbar region with neurogenic claudication ICD-10-CM: M48.062  ICD-9-CM: 724.03  12/8/2017 - Present        Vertigo ICD-10-CM: R42  ICD-9-CM: 780.4  8/2/2017 - Present        Bilateral lower extremity edema ICD-10-CM: R60.0  ICD-9-CM: 782.3  4/19/2017 - Present        Insomnia ICD-10-CM: G47.00  ICD-9-CM: 780.52  2/8/2017 - Present        Chronic ITP (idiopathic thrombocytopenia) (HCC) ICD-10-CM: D69.3  ICD-9-CM: 287.31  10/31/2016 - Present        Muscular deconditioning ICD-10-CM: R29.898  ICD-9-CM: 781.99  10/31/2016 - Present        Thrombocytopenia ([address] ICD-10-CM: D69.6  ICD-9-CM: 287.5  6/14/2016 - Present        Chronic anemia ICD-10-CM: D64.9  ICD-9-CM: 285.9  6/13/2016 - Present        Back pain of thoracolumbar region ICD-10-CM: M54.5  ICD-9-CM: 724.2  6/13/2016 - Present        Iron deficiency anemia ICD-10-CM: D50.9  ICD-9-CM: 280.9  12/14/2015 - Present        Vitamin D deficiency ICD-10-CM: E55.9  ICD-9-CM: 268.9  3/4/2015 - Present        Cachexia ([facility] ICD-10-CM: R64  ICD-9-CM: 799.4  5/5/2014 - Present        Weakness ICD-10-CM: R53.1  ICD-9-CM: 780.79 5/5/2014 - Present        Anemia ICD-10-CM: D64.9  ICD-9-CM: 285.9  11/22/2013 - Present        Breast cancer metastasized to axillary lymph node ([facility] ICD-10-CM: C50.919, C77.3  ICD-9-CM: 174.9, 196.3  6/3/2013 - Present        Hypokalemia ICD-10-CM: E87.6  ICD-9-CM: 276.8  5/7/2013 - Present        RESOLVED: Metastatic cancer ([facility] ICD-10-CM: C79.9  ICD-9-CM: 199.1  11/22/2013 - 3/10/2014        RESOLVED: Breast cancer ([facility] ICD-10-CM: C50.919  ICD-9-CM: 174.9  Unknown - 3/10/2014    Overview Signed 3/13/2013  6:35 PM by Rajendra Guillory     Metastic                    Discharge Condition: Good    Procedure: 1. Extreme lateral interbody fusion, L3-L4, PEEK cage, demineralized bone matrix. 2.  Posterior instrumentation, L3-L4, with percutaneously placed instrumentation, NuVasive type. 3.  Intraoperative monitoring, with intraoperative EMGs, NeuroVision type. Hospital Course: Normal hospital course for this procedure. Tolerated surgical intervention well. VSS. Patient had an episode of hypotension. Cardiology consulted and patient was cleared for discharge. Neuro intact. Incision dry and intact, tolerating PO intake, voiding adequately. Ambulatory. Disposition: home    Discharge Medications:      My Medications      STOP taking these medications          CeleBREX 200 mg capsule   Generic drug:  celecoxib           oxyCODONE-acetaminophen 7.5-325 mg per tablet   Commonly known as:  PERCOCET   Replaced by:  oxyCODONE-acetaminophen  mg per tablet             TAKE these medications as instructed       Instructions Each Dose to Equal   Morning Noon Evening Bedtime    albuterol 90 mcg/actuation inhaler   Commonly known as:  PROAIR HFA       Your last dose was: Your next dose is:              1-2 puffs every 4-6 hrs.                          aluminum-magnesium hydroxide 200-200 mg/5 mL susp 5 mL, diphenhydrAMINE 12.5 mg/5 mL liqd 12.5 mg, lidocaine 2 % soln 5 mL       Your last dose was: Your next dose is:              5 mL by Swish and Spit route two (2) times a day. Magic mouth wash  Maalox Lidocaine 2% viscous  Diphenhydramine oral solution   Pharmacy to mix equal portions of ingredients to a total volume as indicated in the dispense amount. 5 mL                        amiodarone 200 mg tablet   Commonly known as:  CORDARONE       Your last dose was: Your next dose is: Take 200 mg by mouth. 200 mg                        COREG 3.125 mg tablet   Generic drug:  carvedilol       Your last dose was: Your next dose is: Take 3.125 mg by mouth two (2) times daily (with meals). 3.125 mg                        cyclobenzaprine 5 mg tablet   Commonly known as:  FLEXERIL       Your last dose was: Your next dose is:                                    digoxin 0.125 mg tablet   Commonly known as:  LANOXIN       Your last dose was: Your next dose is: Take 0.125 mg by mouth daily. 0.125 mg                        dronabinol 5 mg capsule   Commonly known as:  MARINOL       Your last dose was: Your next dose is: Take 1 Cap by mouth two (2) times a day. 5 mg                        furosemide 40 mg tablet   Commonly known as:  LASIX       Your last dose was: Your next dose is: Take  by mouth daily. * gabapentin 300 mg capsule   Commonly known as:  NEURONTIN       Your last dose was: Your next dose is: Take 300 mg by mouth three (3) times daily. 300 mg                        * gabapentin 300 mg capsule   Commonly known as:  NEURONTIN       Your last dose was: Your next dose is: Take 1 po q am and 2 po q pm                         lidocaine-prilocaine topical cream   Commonly known as:  EMLA       Your last dose was:          Your next dose is:              APPLY OVER PORT 1 HOUR PRIOR TO CHEMOTHERAPY THAN COVER WITH IVONE WRAP                         loratadine 10 mg Cap       Your last dose was: Your next dose is: Take 10 mg by mouth daily. 10 mg                        magic mouthwash solution       Your last dose was: Your next dose is: Take 5 mL by mouth three (3) times daily as needed for Stomatitis. Magic mouth wash  Maalox Lidocaine 2% viscous  Diphenhydramine oral solution   Pharmacy to mix equal portions of ingredients to a total volume as indicated in the dispense amount. 5 mL                        meloxicam 7.5 mg tablet   Commonly known as:  MOBIC       Your last dose was: Your next dose is: Take 7.5 mg by mouth daily. 7.5 mg                        nabumetone 750 mg tablet   Commonly known as:  RELAFEN       Your last dose was: Your next dose is:                                    ondansetron hcl 8 mg tablet   Commonly known as:  ZOFRAN (AS HYDROCHLORIDE)       Your last dose was: Your next dose is: Take 1 Tab by mouth every eight (8) hours as needed for Nausea. 8 mg                        oxyCODONE-acetaminophen  mg per tablet   Commonly known as:  PERCOCET       Your last dose was: Your next dose is: Take 1 Tab by mouth every four (4) hours as needed for Pain. Max Daily Amount: 6 Tabs. Indications: Pain, ACUTE POST OP PAIN    1 Tab                        * potassium chloride 20 mEq tablet   Commonly known as:  K-DUR, KLOR-CON       Your last dose was: Your next dose is: Take 2 Tabs by mouth daily. 40 mEq                        * KLOR-CON M20 20 mEq tablet   Generic drug:  potassium chloride       Your last dose was: Your next dose is:              TAKE ONE TABLET BY MOUTH ONCE A DAY                         PRUVATE 21-7 PO       Your last dose was: Your next dose is: Take  by mouth.                          Senna 8.6 mg tablet   Generic drug:  senna       Your last dose was: Your next dose is: Take 1 Tab by mouth daily. 1 Tab                        XARELTO 10 mg tablet   Generic drug:  rivaroxaban       Your last dose was: Your next dose is: Take 10 mg by mouth daily. 10 mg                        zolpidem 10 mg tablet   Commonly known as:  AMBIEN       Your last dose was: Your next dose is:              TAKE 1 TABLET BY MOUTH NIGHTLY AS NEEDED FOR SLEEP. MAX DAILY AMOUNT 10 MG                         * Notice: This list has 4 medication(s) that are the same as other medications prescribed for you. Read the directions carefully, and ask your doctor or other care provider to review them with you. Where to Get Your Medications      Information on where to get these meds will be given to you by the nurse or doctor. ! Ask your nurse or doctor about these medications     oxyCODONE-acetaminophen  mg per tablet                 Follow-up Appointments   Procedures    FOLLOW UP VISIT Appointment in: Two Weeks     Standing Status:   Standing     Number of Occurrences:   1     Order Specific Question:   Appointment in     Answer:    Two Weeks       Signed By: Lizbeth Aburto NP     January 15, 2018

## 2018-01-16 ENCOUNTER — HOME CARE VISIT (OUTPATIENT)
Dept: HOME HEALTH SERVICES | Facility: HOME HEALTH | Age: 68
End: 2018-01-16
Payer: MEDICARE

## 2018-01-16 ENCOUNTER — HOME CARE VISIT (OUTPATIENT)
Dept: SCHEDULING | Facility: HOME HEALTH | Age: 68
End: 2018-01-16
Payer: MEDICARE

## 2018-01-16 ENCOUNTER — TELEPHONE (OUTPATIENT)
Dept: CASE MANAGEMENT | Age: 68
End: 2018-01-16

## 2018-01-16 VITALS — HEART RATE: 60 BPM | DIASTOLIC BLOOD PRESSURE: 60 MMHG | TEMPERATURE: 98.1 F | SYSTOLIC BLOOD PRESSURE: 98 MMHG

## 2018-01-16 PROCEDURE — 3331090002 HH PPS REVENUE DEBIT

## 2018-01-16 PROCEDURE — 3331090001 HH PPS REVENUE CREDIT

## 2018-01-16 PROCEDURE — G0157 HHC PT ASSISTANT EA 15: HCPCS

## 2018-01-16 NOTE — TELEPHONE ENCOUNTER
Spoke with Ms Renata Bibi, she states she is doing ok. She was able to get her prescription filled and I advised her on her upcoming doctor appointments. She had no further questions for me at this time.

## 2018-01-17 ENCOUNTER — HOME CARE VISIT (OUTPATIENT)
Dept: SCHEDULING | Facility: HOME HEALTH | Age: 68
End: 2018-01-17
Payer: MEDICARE

## 2018-01-17 ENCOUNTER — DOCUMENTATION ONLY (OUTPATIENT)
Dept: ORTHOPEDIC SURGERY | Age: 68
End: 2018-01-17

## 2018-01-17 VITALS — DIASTOLIC BLOOD PRESSURE: 52 MMHG | TEMPERATURE: 98.2 F | HEART RATE: 76 BPM | SYSTOLIC BLOOD PRESSURE: 95 MMHG

## 2018-01-17 PROCEDURE — G0157 HHC PT ASSISTANT EA 15: HCPCS

## 2018-01-17 PROCEDURE — 3331090002 HH PPS REVENUE DEBIT

## 2018-01-17 PROCEDURE — 3331090001 HH PPS REVENUE CREDIT

## 2018-01-17 NOTE — PROGRESS NOTES
Home health PT called. Patients blood pressure remains low. Today it is 90/50. PT states she called PCP Dr. Roxane Santana prior to contacting us. PCP told PT to call us. Instructed PT to tell pt to go to the ER if she is symptomatic and instructed to FU with PCP. Patient has FU on Friday with PCP. She verbalized understanding to go to the ER if she is dizzy, fatigued, SOB or has any other symptoms.

## 2018-01-18 ENCOUNTER — HOME CARE VISIT (OUTPATIENT)
Dept: SCHEDULING | Facility: HOME HEALTH | Age: 68
End: 2018-01-18
Payer: MEDICARE

## 2018-01-18 ENCOUNTER — HOME CARE VISIT (OUTPATIENT)
Dept: HOME HEALTH SERVICES | Facility: HOME HEALTH | Age: 68
End: 2018-01-18
Payer: MEDICARE

## 2018-01-18 PROCEDURE — 3331090001 HH PPS REVENUE CREDIT

## 2018-01-18 PROCEDURE — 3331090002 HH PPS REVENUE DEBIT

## 2018-01-18 PROCEDURE — G0157 HHC PT ASSISTANT EA 15: HCPCS

## 2018-01-19 ENCOUNTER — HOSPITAL ENCOUNTER (OUTPATIENT)
Dept: INFUSION THERAPY | Age: 68
Discharge: HOME | End: 2018-01-19
Payer: MEDICARE

## 2018-01-19 ENCOUNTER — HOME CARE VISIT (OUTPATIENT)
Dept: HOME HEALTH SERVICES | Facility: HOME HEALTH | Age: 68
End: 2018-01-19
Payer: MEDICARE

## 2018-01-19 VITALS
TEMPERATURE: 98.2 F | SYSTOLIC BLOOD PRESSURE: 110 MMHG | DIASTOLIC BLOOD PRESSURE: 64 MMHG | HEART RATE: 73 BPM | RESPIRATION RATE: 18 BRPM

## 2018-01-19 LAB
BASO+EOS+MONOS # BLD AUTO: 0.8 K/UL (ref 0–2.3)
BASO+EOS+MONOS # BLD AUTO: 14 % (ref 0.1–17)
DIFFERENTIAL METHOD BLD: ABNORMAL
ERYTHROCYTE [DISTWIDTH] IN BLOOD BY AUTOMATED COUNT: 16.4 % (ref 11.5–14.5)
HCT VFR BLD AUTO: 26.2 % (ref 36–48)
HGB BLD-MCNC: 8.4 G/DL (ref 12–16)
LYMPHOCYTES # BLD: 1.5 K/UL (ref 1.1–5.9)
LYMPHOCYTES NFR BLD: 26 % (ref 14–44)
MCH RBC QN AUTO: 27.3 PG (ref 25–35)
MCHC RBC AUTO-ENTMCNC: 32.1 G/DL (ref 31–37)
MCV RBC AUTO: 85.1 FL (ref 78–102)
NEUTS SEG # BLD: 3.6 K/UL (ref 1.8–9.5)
NEUTS SEG NFR BLD: 60 % (ref 40–70)
PLATELET # BLD AUTO: 46 K/UL (ref 135–420)
RBC # BLD AUTO: 3.08 M/UL (ref 4.1–5.1)
WBC # BLD AUTO: 5.9 K/UL (ref 4.5–13)

## 2018-01-19 PROCEDURE — 74011250636 HC RX REV CODE- 250/636: Performed by: NURSE PRACTITIONER

## 2018-01-19 PROCEDURE — 74011250636 HC RX REV CODE- 250/636

## 2018-01-19 PROCEDURE — 96372 THER/PROPH/DIAG INJ SC/IM: CPT

## 2018-01-19 PROCEDURE — 74011000250 HC RX REV CODE- 250: Performed by: NURSE PRACTITIONER

## 2018-01-19 PROCEDURE — 36415 COLL VENOUS BLD VENIPUNCTURE: CPT

## 2018-01-19 PROCEDURE — 3331090001 HH PPS REVENUE CREDIT

## 2018-01-19 PROCEDURE — 3331090002 HH PPS REVENUE DEBIT

## 2018-01-19 PROCEDURE — 85025 COMPLETE CBC W/AUTO DIFF WBC: CPT | Performed by: INTERNAL MEDICINE

## 2018-01-19 RX ORDER — WATER FOR INJECTION,STERILE
VIAL (ML) INJECTION ONCE
Status: COMPLETED | OUTPATIENT
Start: 2018-01-19 | End: 2018-01-19

## 2018-01-19 RX ADMIN — WATER 10 ML: 1 INJECTION INTRAMUSCULAR; INTRAVENOUS; SUBCUTANEOUS at 12:14

## 2018-01-19 RX ADMIN — ROMIPLOSTIM 276 MCG: 250 INJECTION, POWDER, LYOPHILIZED, FOR SOLUTION SUBCUTANEOUS at 12:13

## 2018-01-19 NOTE — PROGRESS NOTES
[facility] Progress Note    Date: 2018    Name: Luis Alberto Marrero    MRN: [id number]         : 1950      Nplate Injection     Ms. Rk Reeder arrived to [facility] at [phone]. Ms. Rk Reeder was assessed and education was provided. Ms. Griselda Ma vitals were reviewed. Visit Vitals    /64 (BP 1 Location: Right arm, BP Patient Position: Sitting)    Pulse 73    Temp 98.2 °F (36.8 °C)    Resp 18    Breastfeeding No       Pt was observed for 5 minutes after obtaining vital signs prior to initiating treatment. Blood drawn for CBC via right hand x 1 attempt per written orders. Guaze and coban applied to the site. Lab results obtained and reviewed. (Preliminary results scanned into media tab.)  Recent Results (from the past 12 hour(s))   CBC WITH 3 PART DIFF    Collection Time: 18 11:45 AM   Result Value Ref Range    WBC 5.9 4.5 - 13.0 K/uL    RBC 3.08 (L) 4.10 - 5.10 M/uL    HGB 8.4 (L) 12.0 - 16 g/dL    HCT 26.2 (L) 36 - 48 %    MCV 85.1 78 - 102 FL    MCH 27.3 25.0 - 35.0 PG    MCHC 32.1 31 - 37 g/dL    RDW 16.4 (H) 11.5 - 14.5 %    NEUTROPHILS 60 40 - 70 %    MIXED CELLS 14 0.1 - 17 %    LYMPHOCYTES 26 14 - 44 %    ABS. NEUTROPHILS 3.6 1.8 - 9.5 K/UL    ABS. MIXED CELLS 0.8 0.0 - 2.3 K/uL    ABS. LYMPHOCYTES 1.5 1.1 - 5.9 K/UL    DF AUTOMATED       Preliminary platelet count= 84,996. Per Nplate protocol, since pt's platelet count is 17,194 today, pt's dose will increase to 4 mcg/kg (4 mcg x 69kg = 276 mcg) from the previous 3 mcg/kg dose she was receiving. Nplate 710 mcg (4.42OR) administered as ordered SQ in patient's right upper arm. No redness or bleeding noted. Ms. Rk Reeder was discharged from [address] in stable condition at 1215. She is to return on 18 at 0830 for her next appointment for Nplate and Procrit.     Moshe Shannon RN  2018

## 2018-01-20 VITALS — DIASTOLIC BLOOD PRESSURE: 65 MMHG | TEMPERATURE: 98.3 F | HEART RATE: 66 BPM | SYSTOLIC BLOOD PRESSURE: 113 MMHG

## 2018-01-20 PROCEDURE — 3331090001 HH PPS REVENUE CREDIT

## 2018-01-20 PROCEDURE — 3331090002 HH PPS REVENUE DEBIT

## 2018-01-21 PROCEDURE — 3331090001 HH PPS REVENUE CREDIT

## 2018-01-21 PROCEDURE — 3331090002 HH PPS REVENUE DEBIT

## 2018-01-22 ENCOUNTER — OFFICE VISIT (OUTPATIENT)
Dept: ORTHOPEDIC SURGERY | Age: 68
End: 2018-01-22

## 2018-01-22 VITALS
BODY MASS INDEX: 26.87 KG/M2 | WEIGHT: 167.2 LBS | HEART RATE: 66 BPM | HEIGHT: 66 IN | RESPIRATION RATE: 16 BRPM | TEMPERATURE: 98.2 F | DIASTOLIC BLOOD PRESSURE: 58 MMHG | SYSTOLIC BLOOD PRESSURE: 109 MMHG

## 2018-01-22 DIAGNOSIS — Z98.1 S/P LUMBAR FUSION: Primary | ICD-10-CM

## 2018-01-22 DIAGNOSIS — M43.16 SPONDYLOLISTHESIS OF LUMBAR REGION: ICD-10-CM

## 2018-01-22 PROCEDURE — 3331090001 HH PPS REVENUE CREDIT

## 2018-01-22 PROCEDURE — 3331090002 HH PPS REVENUE DEBIT

## 2018-01-22 RX ORDER — OXYCODONE AND ACETAMINOPHEN 10; 325 MG/1; MG/1
1 TABLET ORAL
Qty: 60 TAB | Refills: 0 | Status: SHIPPED | OUTPATIENT
Start: 2018-01-22 | End: 2018-02-15 | Stop reason: SDUPTHER

## 2018-01-22 NOTE — PROGRESS NOTES
Chief complaint/History of Present Illness:  Chief Complaint   Patient presents with    Back Pain     post op     HPI  Janet Dunn is a  74 y.o.  female      HISTORY OF PRESENT ILLNESS:  The patient comes in today two weeks status post her XLIF at L3-4 with L3-4 percutaneous instrumentation posteriorly. She states her right anterior thigh pain is better. Her quadriceps weakness and numbness, she feels like is also getting better. She can at least stand up and walk with a walker now. She is taking Percocet 10/325 three to four times a day. It brings her pain from a 9/10 to a 5/10. She states it is mostly incisional and surgical pain. She does get a cramping in that right leg sometimes. She denies fever and bowel or bladder dysfunction. She has no new medical issues. She is retired. She is a nonsmoker. PHYSICAL EXAM:  Ms. Patrick Jiménez is a 72-year-old female. She is alert and oriented. She has a normal mood and affect. She comes in the office in one of our wheelchairs today, but she is able to get from sitting to standing on her own power. She is able to walk with touch assist slowly to the examination table. Her posterior, four stab wounds, where her posterior instrumentation was placed percutaneous, the incisions are doing well, as is the right-sided flank incision. They have well-approximated edges. There is no erythema, warmth, drainage, or signs of infection. It has appropriate scabbing. ASSESSENT/PLAN:  This is a patient two weeks out from her XLIF at L3-4 with posterior instrumentation placed at L3-4 percutaneously. She is doing well. She is taking Percocet 10/325 mg three to four times day. It brings her pain from a 9/10 to a 5/10. She needs a refill of that. We will give her one more refill. We discussed weaning off of it and only taking it if she absolutely needs it. This will hopefully be her last fill of pain medication. We will see her back in four weeks with Dr. Mary Carmen Thorpe. Review of systems:    Past Medical History:   Diagnosis Date    Antineoplastic chemotherapy induced anemia     Arrhythmia     Atrial Fib    Arthritis     [facility] ([facility]     Cancer ([facility] 1999    Breast    Cardiomyopathy ([facility]     Chronic ITP (idiopathic thrombocytopenia) (HCC) 10/31/2016    Secondary to Anemia from Chemo    Diabetes ([facility]     borderline, no meds    Esophageal cancer ([facility] 2005    treated with chemo    Heart failure ([facility]      Past Surgical History:   Procedure Laterality Date    BREAST SURGERY PROCEDURE UNLISTED      COLONOSCOPY N/A 7/27/2016    COL[facility] ENDOSCOPY    HX APPENDECTOMY      HX HEENT      Tonsillectomy    HX ORTHOPAEDIC      HX TUBAL LIGATION      HX VASCULAR ACCESS      NEUROLOGICAL PROCEDURE UNLISTED      Lumbar sx     Social History     Social History    Marital status:      Spouse name: N/A    Number of children: N/A    Years of education: N/A     Occupational History    Not on file. Social History Main Topics    Smoking status: Never Smoker    Smokeless tobacco: Never Used    Alcohol use No    Drug use: No    Sexual activity: Not on file     Other Topics Concern    Not on file     Social History Narrative     No family history on file. Physical Exam:  Visit Vitals    /58    Pulse 66    Temp 98.2 °F (36.8 °C) (Oral)    Resp 16    Ht 5' 6\" (1.676 m)    Wt 167 lb 3.2 oz (75.8 kg)    BMI 26.99 kg/m2     Pain Scale: 8/10            has been . reviewed and is appropriate        Diagnoses and all orders for this visit:    1. S/P lumbar fusion  -     oxyCODONE-acetaminophen (PERCOCET)  mg per tablet; Take 1 Tab by mouth every six (6) hours as needed for Pain. Max Daily Amount: 4 Tabs. Indications: Pain, ACUTE POST OP PAIN    2. Spondylolisthesis of lumbar region  -     oxyCODONE-acetaminophen (PERCOCET)  mg per tablet;  Take 1 Tab by mouth every six (6) hours as needed for Pain. Max Daily Amount: 4 Tabs. Indications: Pain, ACUTE POST OP PAIN            Follow-up Disposition:  Return in about 4 weeks (around 2/19/2018) for with Dr. Dina Eason.         We have informed Laura Greenfield to notify us for immediate appointment if she has any worsening neurogical symptoms or if an emergency situation presents, then call [phone]

## 2018-01-22 NOTE — MR AVS SNAPSHOT
[address] [facility] 
 
 
 [address] [facility] [address] 
[phone] Patient: Casey Diaz MRN: [id number] [id number] Visit Information Date & Time Provider Department Dept. Phone Encounter #  
 1/22/2018 10:30 AM Sharyn Muñoz NP [facility] [phone] [id number] Follow-up Instructions Return in about 4 weeks (around 2/19/2018) for with Dr. Yon David. Your Appointments 2/20/2018 10:15 AM  
POST OP with Bianca Nance MD  
[address] [facility], [address] [facility]) Appt Note: 6 wk fu sx 1-8  
 [address] [facility] [address]  
[phone]  
  
   
 [address] [facility] [address] 3/19/2018 10:00 AM  
Office Visit with Charles Mohs, MD  
[facility] [facility]  On[facility] Appt Note: 3 month  
 [address], [address], [address] [place] [address] Upcoming Health Maintenance Date Due Hepatitis C Screening 1950 DTaP/Tdap/Td series (1 - Tdap) 5/14/1973 FOBT Q 1 YEAR AGE 50-75 5/14/2002 ZOSTER VACCINE AGE 60> 3/14/2012 BREAST CANCER SCRN MAMMOGRAM 4/4/2015 GLAUCOMA SCREENING Q2Y 5/14/2017 OSTEOPOROSIS SCREENING (DEXA) 5/14/2017 Pneumococcal 65+ High/Highest Risk (1 of 2 - PCV13) 5/14/2017 MEDICARE YEARLY EXAM 5/14/2017 Influenza Age 5 to Adult 8/1/2017 Allergies as of 1/22/2018  Review Complete On: 1/22/2018 By: Sharyn Muñoz NP Severity Noted Reaction Type Reactions Aspirin High 08/07/2013   Systemic Swelling Pt states her whole body swells when she takes aspirin. Adhesive    Unable to Obtain Pcn [Penicillins]  08/20/2012    Rash Current Immunizations  Reviewed on 12/29/2017 No immunizations on file. Not reviewed this visit You Were Diagnosed With   
  
 Codes Comments S/P lumbar fusion    -  Primary ICD-10-CM: Z98.1 ICD-9-CM: V45.4 Spondylolisthesis of lumbar region     ICD-10-CM: M43.16 
ICD-9-CM: 738.4 Vitals BP Pulse Temp Resp Height(growth percentile) Weight(growth percentile) 109/58 66 98.2 °F (36.8 °C) (Oral) 16 5' 6\" (1.676 m) 167 lb 3.2 oz (75.8 kg) BMI OB Status Smoking Status 26.99 kg/m2 Postmenopausal Never Smoker Vitals History BMI and BSA Data Body Mass Index Body Surface Area  
 26.99 kg/m 2 1.88 m 2 Preferred Pharmacy Pharmacy Name Phone [id number] [address] [phone] Your Updated Medication List  
  
   
This list is accurate as of: 1/22/18 11:07 AM.  Always use your most recent med list.  
  
  
  
  
 albuterol 90 mcg/actuation inhaler Commonly known as:  PROAIR HFA  
1-2 puffs every 4-6 hrs. aluminum-magnesium hydroxide 200-200 mg/5 mL susp 5 mL, diphenhydrAMINE 12.5 mg/5 mL liqd 12.5 mg, lidocaine 2 % soln 5 mL  
5 mL by Swish and Spit route two (2) times a day. Magic mouth wash  Maalox Lidocaine 2% viscous  Diphenhydramine oral solution   Pharmacy to mix equal portions of ingredients to a total volume as indicated in the dispense amount. amiodarone 200 mg tablet Commonly known as:  CORDARONE Take 200 mg by mouth. COREG 3.125 mg tablet Generic drug:  carvedilol Take 3.125 mg by mouth two (2) times daily (with meals). digoxin 0.125 mg tablet Commonly known as:  LANOXIN Take 0.125 mg by mouth daily. dronabinol 5 mg capsule Commonly known as:  Alfredo Matter Take 1 Cap by mouth two (2) times a day. furosemide 40 mg tablet Commonly known as:  LASIX Take  by mouth daily. gabapentin 300 mg capsule Commonly known as:  NEURONTIN Take 1 po q am and 2 po q pm  
  
 lidocaine-prilocaine topical cream  
Commonly known as:  EMLA APPLY OVER PORT 1 HOUR PRIOR TO CHEMOTHERAPY THAN COVER WITH SARAN WRAP  
  
 lisinopril 10 mg tablet Commonly known as:  Jorge Blas Take 10 mg by mouth daily. Indications: hypertension  
  
 loratadine 10 mg Cap Take 10 mg by mouth daily. magic mouthwash solution Take 5 mL by mouth three (3) times daily as needed for Stomatitis. Magic mouth wash  Maalox Lidocaine 2% viscous  Diphenhydramine oral solution   Pharmacy to mix equal portions of ingredients to a total volume as indicated in the dispense amount. meloxicam 7.5 mg tablet Commonly known as:  MOBIC Take 7.5 mg by mouth daily. nabumetone 750 mg tablet Commonly known as:  RELAFEN Take 750 mg by mouth daily. Indications: OSTEOARTHRITIS  
  
 ondansetron hcl 8 mg tablet Commonly known as:  ZOFRAN (AS HYDROCHLORIDE) Take 1 Tab by mouth every eight (8) hours as needed for Nausea. oxyCODONE-acetaminophen  mg per tablet Commonly known as:  PERCOCET Take 1 Tab by mouth every six (6) hours as needed for Pain. Max Daily Amount: 4 Tabs. Indications: Pain, ACUTE POST OP PAIN * potassium chloride 20 mEq tablet Commonly known as:  K-DUR, KLOR-CON Take 2 Tabs by mouth daily. * KLOR-CON M20 20 mEq tablet Generic drug:  potassium chloride TAKE ONE TABLET BY MOUTH ONCE A DAY  
  
 PRUVATE 21-7 PO Take  by mouth. Senna 8.6 mg tablet Generic drug:  senna Take 1 Tab by mouth daily. * XARELTO 10 mg tablet Generic drug:  rivaroxaban Take 10 mg by mouth daily. * rivaroxaban 10 mg tablet Commonly known as:  Allison Fox Take 10 mg by mouth daily (with breakfast). Indications: PREVENTION OF DEEP VEIN THROMBOSIS RECURRENCE  
  
 zolpidem 10 mg tablet Commonly known as:  AMBIEN  
TAKE 1 TABLET BY MOUTH NIGHTLY AS NEEDED FOR SLEEP. MAX DAILY AMOUNT 10 MG  
  
 * Notice:   This list has 4 medication(s) that are the same as other medications prescribed for you. Read the directions carefully, and ask your doctor or other care provider to review them with you. Prescriptions Printed Refills  
 oxyCODONE-acetaminophen (PERCOCET)  mg per tablet 0 Sig: Take 1 Tab by mouth every six (6) hours as needed for Pain. Max Daily Amount: 4 Tabs. Indications: Pain, ACUTE POST OP PAIN Class: Print Route: Oral  
  
Follow-up Instructions Return in about 4 weeks (around 2/19/2018) for with Dr. Marli Mac. To-Do List   
 01/24/2018 To Be Determined Appointment with Dilshad White PT at [address] [place] [address] MED CTR  
  
 01/24/2018 10:00 AM  
  Appointment with Akin Candelaria PT at [address] [place] [address] SCHEDULING/INTAKE  
  
 01/26/2018 To Be Determined Appointment with Dilshad White PT at [address] [place] [address] MED CTR  
  
 01/29/2018 To Be Determined Appointment with Dilshad White PT at [address] [place] [address] MED CTR  
  
 01/29/2018 8:30 AM  
  Appointment with [facility] NURSE at [facility] ([phone])  
  
 02/02/2018 To Be Determined Appointment with kAin Candelaria PT at [address] [place] [address] SCHEDULING/INTAKE  
  
 02/05/2018 8:30 AM  
  Appointment with [facility] NUR[facility] at [facility] ([phone])  
  
 02/12/2018 8:30 AM  
  Appointment with [facility] TRACK NURSE at [facility] ([phone])  
  
 02/19/2018 8:30 AM  
  Appointment with [facility] TRACK NURSE at [facility] ([phone]) Patient Instructions Lumbar Spinal Fusion: What to Expect at Home Your Recovery After surgery, you can expect your back to feel stiff and sore. You may have trouble sitting or standing in one position for very long and may need pain medicine in the weeks after your surgery.  It may take 4 to 6 weeks to get back to doing simple activities, such as light housework. It may take 6 months to a year for your back to get better completely. You may need to wear a back brace while your back heals. And your doctor may have you go to physical therapy. If your job doesn't require physical labor, you will probably be able to go back to work after 1 or 2 months. If your job involves light physical labor, it may take 3 to 6 months. Most people whose jobs involved heavy labor can never return to those jobs. This care sheet gives you a general idea about how long it will take for you to recover. But each person recovers at a different pace. Follow the steps below to get better as quickly as possible. How can you care for yourself at home? Activity ? · Rest when you feel tired. Getting enough sleep will help you recover. ? · Try to walk each day. Start by walking a little more than you did the day before. Bit by bit, increase the amount you walk. Walking boosts blood flow and helps prevent pneumonia and constipation. Walking may also decrease your muscle soreness after surgery. ? · If advised by your doctor, you may need to avoid lifting anything that would cause excessive strain on your back. This may include a child, heavy grocery bags and milk containers, a heavy briefcase or backpack, cat litter or dog food bags, or a vacuum . ? · Avoid strenuous activities, such as bicycle riding, jogging, weight lifting, or aerobic exercise, until your doctor says it is okay. ? · Do not drive for 2 to 4 weeks after your surgery or until your doctor says it is okay. ? · Avoid riding in a car for more than 30 minutes at a time for 2 to 4 weeks after surgery. If you must ride in a car for a longer distance, stop often to walk and stretch your legs. ? · Try to change your position about every 30 minutes while sitting or standing. This will help decrease your back pain while you are healing. ? · You will probably need to take at least 4 to 6 weeks off from work. It depends on the type of work you do and how you feel. ? · You may have sex as soon as you feel able, but avoid positions that put stress on your back or cause pain. Diet ? · You can eat your normal diet. If your stomach is upset, try bland, low-fat foods like plain rice, broiled chicken, toast, and yogurt. ? · Drink plenty of fluids (unless your doctor tells you not to). ? · You may notice that your bowel movements are not regular right after your surgery. This is common. Try to avoid constipation and straining with bowel movements. You may want to take a fiber supplement every day. If you have not had a bowel movement after a couple of days, ask your doctor about taking a mild laxative. Medicines ? · Be safe with medicines. Take pain medicines exactly as directed. ¨ If the doctor gave you a prescription medicine for pain, take it as prescribed. ¨ If you are not taking a prescription pain medicine, ask your doctor if you can take an over-the-counter medicine. ? · If your doctor prescribed antibiotics, take them as directed. Do not stop taking them just because you feel better. You need to take the full course of antibiotics. ? · If you think your pain medicine is making you sick to your stomach: 
¨ Take your medicine after meals (unless your doctor has told you not to). ¨ Ask your doctor for a different pain medicine. Incision care ? · You will be given specific instructions about how to care for the cuts (incisions) the doctor made. The instructions will depend on the type of materials used to close the cut. Exercise ? · Do back exercises as instructed by your doctor. ? · Your doctor may advise you to work with a physical therapist to improve the strength and flexibility of your back. Other instructions ?  · To reduce stiffness and help sore muscles, use a warm water bottle, a heating pad set on low, or a warm cloth on your back. Do not put heat right over the incision. Do not go to sleep with a heating pad on your skin. Follow-up care is a key part of your treatment and safety. Be sure to make and go to all appointments, and call your doctor if you are having problems. It's also a good idea to know your test results and keep a list of the medicines you take. When should you call for help? Call 911 anytime you think you may need emergency care. For example, call if: 
? · You passed out (lost consciousness). ? · You have sudden chest pain and shortness of breath, or you cough up blood. ? · You are unable to move a leg at all. ?Call your doctor now or seek immediate medical care if: 
? · You have pain that does not get better after you take pain pills. ? · You have new or worse symptoms in your legs or buttocks. Symptoms may include: ¨ Numbness or tingling. ¨ Weakness. ¨ Pain. ? · You lose bladder or bowel control. ? · You have loose stitches, or your incision comes open. ? · You have blood or fluid draining from the incision. ? · You have signs of infection, such as: 
¨ Increased pain, swelling, warmth, or redness. ¨ Pus draining from the incision. ¨ A fever. ? Watch closely for any changes in your health, and be sure to contact your doctor if: 
? · You do not have a bowel movement after taking a laxative. ? · You are not getting better as expected. Where can you learn more? Go to http://leon-edinson.info/. Enter O071 in the search box to learn more about \"Lumbar Spinal Fusion: What to Expect at Home. \" Current as of: March 21, 2017 Content Version: 11.4 © 2649-0834 [employer]. Care instructions adapted under license by [employer] (which disclaims liability or warranty for this information).  If you have questions about a medical condition or this instruction, always ask your healthcare professional. [employer] disclaims any warranty or liability for your use of this information. Please provide this summary of care documentation to your next provider. Your primary care clinician is listed as Arminda Mora. If you have any questions after today's visit, please call [phone].

## 2018-01-22 NOTE — PATIENT INSTRUCTIONS
Lumbar Spinal Fusion: What to Expect at Home  Your Recovery    After surgery, you can expect your back to feel stiff and sore. You may have trouble sitting or standing in one position for very long and may need pain medicine in the weeks after your surgery. It may take 4 to 6 weeks to get back to doing simple activities, such as light housework. It may take 6 months to a year for your back to get better completely. You may need to wear a back brace while your back heals. And your doctor may have you go to physical therapy. If your job doesn't require physical labor, you will probably be able to go back to work after 1 or 2 months. If your job involves light physical labor, it may take 3 to 6 months. Most people whose jobs involved heavy labor can never return to those jobs. This care sheet gives you a general idea about how long it will take for you to recover. But each person recovers at a different pace. Follow the steps below to get better as quickly as possible. How can you care for yourself at home? Activity  ? · Rest when you feel tired. Getting enough sleep will help you recover. ? · Try to walk each day. Start by walking a little more than you did the day before. Bit by bit, increase the amount you walk. Walking boosts blood flow and helps prevent pneumonia and constipation. Walking may also decrease your muscle soreness after surgery. ? · If advised by your doctor, you may need to avoid lifting anything that would cause excessive strain on your back. This may include a child, heavy grocery bags and milk containers, a heavy briefcase or backpack, cat litter or dog food bags, or a vacuum . ? · Avoid strenuous activities, such as bicycle riding, jogging, weight lifting, or aerobic exercise, until your doctor says it is okay. ? · Do not drive for 2 to 4 weeks after your surgery or until your doctor says it is okay.    ? · Avoid riding in a car for more than 30 minutes at a time for 2 to 4 weeks after surgery. If you must ride in a car for a longer distance, stop often to walk and stretch your legs. ? · Try to change your position about every 30 minutes while sitting or standing. This will help decrease your back pain while you are healing. ? · You will probably need to take at least 4 to 6 weeks off from work. It depends on the type of work you do and how you feel. ? · You may have sex as soon as you feel able, but avoid positions that put stress on your back or cause pain. Diet  ? · You can eat your normal diet. If your stomach is upset, try bland, low-fat foods like plain rice, broiled chicken, toast, and yogurt. ? · Drink plenty of fluids (unless your doctor tells you not to). ? · You may notice that your bowel movements are not regular right after your surgery. This is common. Try to avoid constipation and straining with bowel movements. You may want to take a fiber supplement every day. If you have not had a bowel movement after a couple of days, ask your doctor about taking a mild laxative. Medicines  ? · Be safe with medicines. Take pain medicines exactly as directed. ¨ If the doctor gave you a prescription medicine for pain, take it as prescribed. ¨ If you are not taking a prescription pain medicine, ask your doctor if you can take an over-the-counter medicine. ? · If your doctor prescribed antibiotics, take them as directed. Do not stop taking them just because you feel better. You need to take the full course of antibiotics. ? · If you think your pain medicine is making you sick to your stomach:  ¨ Take your medicine after meals (unless your doctor has told you not to). ¨ Ask your doctor for a different pain medicine. Incision care  ? · You will be given specific instructions about how to care for the cuts (incisions) the doctor made. The instructions will depend on the type of materials used to close the cut. Exercise  ?  · Do back exercises as instructed by your doctor. ? · Your doctor may advise you to work with a physical therapist to improve the strength and flexibility of your back. Other instructions  ? · To reduce stiffness and help sore muscles, use a warm water bottle, a heating pad set on low, or a warm cloth on your back. Do not put heat right over the incision. Do not go to sleep with a heating pad on your skin. Follow-up care is a key part of your treatment and safety. Be sure to make and go to all appointments, and call your doctor if you are having problems. It's also a good idea to know your test results and keep a list of the medicines you take. When should you call for help? Call 911 anytime you think you may need emergency care. For example, call if:  ? · You passed out (lost consciousness). ? · You have sudden chest pain and shortness of breath, or you cough up blood. ? · You are unable to move a leg at all. ?Call your doctor now or seek immediate medical care if:  ? · You have pain that does not get better after you take pain pills. ? · You have new or worse symptoms in your legs or buttocks. Symptoms may include:  ¨ Numbness or tingling. ¨ Weakness. ¨ Pain. ? · You lose bladder or bowel control. ? · You have loose stitches, or your incision comes open. ? · You have blood or fluid draining from the incision. ? · You have signs of infection, such as:  ¨ Increased pain, swelling, warmth, or redness. ¨ Pus draining from the incision. ¨ A fever. ? Watch closely for any changes in your health, and be sure to contact your doctor if:  ? · You do not have a bowel movement after taking a laxative. ? · You are not getting better as expected. Where can you learn more? Go to http://leon-edinson.info/. Enter F721 in the search box to learn more about \"Lumbar Spinal Fusion: What to Expect at Home. \"  Current as of: March 21, 2017  Content Version: 11.4  © 4841-8762 [employer], [employer].  Care instructions adapted under license by [employer] (which disclaims liability or warranty for this information). If you have questions about a medical condition or this instruction, always ask your healthcare professional. [address] any warranty or liability for your use of this information.

## 2018-01-23 ENCOUNTER — HOME CARE VISIT (OUTPATIENT)
Dept: HOME HEALTH SERVICES | Facility: HOME HEALTH | Age: 68
End: 2018-01-23
Payer: MEDICARE

## 2018-01-23 VITALS — DIASTOLIC BLOOD PRESSURE: 60 MMHG | SYSTOLIC BLOOD PRESSURE: 100 MMHG | HEART RATE: 68 BPM | TEMPERATURE: 98.9 F

## 2018-01-23 PROCEDURE — 3331090002 HH PPS REVENUE DEBIT

## 2018-01-23 PROCEDURE — 3331090001 HH PPS REVENUE CREDIT

## 2018-01-23 PROCEDURE — G0157 HHC PT ASSISTANT EA 15: HCPCS

## 2018-01-24 ENCOUNTER — HOME CARE VISIT (OUTPATIENT)
Dept: SCHEDULING | Facility: HOME HEALTH | Age: 68
End: 2018-01-24
Payer: MEDICARE

## 2018-01-24 PROCEDURE — 3331090001 HH PPS REVENUE CREDIT

## 2018-01-24 PROCEDURE — 3331090002 HH PPS REVENUE DEBIT

## 2018-01-24 PROCEDURE — G0151 HHCP-SERV OF PT,EA 15 MIN: HCPCS

## 2018-01-25 PROCEDURE — 3331090001 HH PPS REVENUE CREDIT

## 2018-01-25 PROCEDURE — 3331090002 HH PPS REVENUE DEBIT

## 2018-01-26 ENCOUNTER — HOSPITAL ENCOUNTER (OUTPATIENT)
Dept: INFUSION THERAPY | Age: 68
Discharge: HOME | End: 2018-01-26
Payer: MEDICARE

## 2018-01-26 ENCOUNTER — HOME CARE VISIT (OUTPATIENT)
Dept: HOME HEALTH SERVICES | Facility: HOME HEALTH | Age: 68
End: 2018-01-26
Payer: MEDICARE

## 2018-01-26 VITALS
DIASTOLIC BLOOD PRESSURE: 91 MMHG | SYSTOLIC BLOOD PRESSURE: 132 MMHG | HEART RATE: 61 BPM | TEMPERATURE: 98.7 F | RESPIRATION RATE: 18 BRPM

## 2018-01-26 VITALS — DIASTOLIC BLOOD PRESSURE: 54 MMHG | HEART RATE: 68 BPM | SYSTOLIC BLOOD PRESSURE: 97 MMHG | TEMPERATURE: 96.2 F

## 2018-01-26 LAB
BASO+EOS+MONOS # BLD AUTO: 1 K/UL (ref 0–2.3)
BASO+EOS+MONOS # BLD AUTO: 13 % (ref 0.1–17)
DIFFERENTIAL METHOD BLD: ABNORMAL
ERYTHROCYTE [DISTWIDTH] IN BLOOD BY AUTOMATED COUNT: 17.4 % (ref 11.5–14.5)
HCT VFR BLD AUTO: 26.4 % (ref 36–48)
HGB BLD-MCNC: 8.5 G/DL (ref 12–16)
LYMPHOCYTES # BLD: 1.8 K/UL (ref 1.1–5.9)
LYMPHOCYTES NFR BLD: 23 % (ref 14–44)
MCH RBC QN AUTO: 27.5 PG (ref 25–35)
MCHC RBC AUTO-ENTMCNC: 32.2 G/DL (ref 31–37)
MCV RBC AUTO: 85.4 FL (ref 78–102)
NEUTS SEG # BLD: 4.9 K/UL (ref 1.8–9.5)
NEUTS SEG NFR BLD: 64 % (ref 40–70)
PLATELET # BLD AUTO: 201 K/UL (ref 135–420)
RBC # BLD AUTO: 3.09 M/UL (ref 4.1–5.1)
WBC # BLD AUTO: 7.7 K/UL (ref 4.5–13)

## 2018-01-26 PROCEDURE — 3331090002 HH PPS REVENUE DEBIT

## 2018-01-26 PROCEDURE — 96372 THER/PROPH/DIAG INJ SC/IM: CPT

## 2018-01-26 PROCEDURE — 74011250636 HC RX REV CODE- 250/636: Performed by: NURSE PRACTITIONER

## 2018-01-26 PROCEDURE — 85025 COMPLETE CBC W/AUTO DIFF WBC: CPT | Performed by: INTERNAL MEDICINE

## 2018-01-26 PROCEDURE — 36415 COLL VENOUS BLD VENIPUNCTURE: CPT

## 2018-01-26 PROCEDURE — 74011000250 HC RX REV CODE- 250

## 2018-01-26 PROCEDURE — 74011250636 HC RX REV CODE- 250/636

## 2018-01-26 PROCEDURE — 3331090001 HH PPS REVENUE CREDIT

## 2018-01-26 RX ORDER — WATER FOR INJECTION,STERILE
VIAL (ML) INJECTION
Status: COMPLETED
Start: 2018-01-26 | End: 2018-01-26

## 2018-01-26 RX ORDER — HEPARIN SODIUM (PORCINE) LOCK FLUSH IV SOLN 100 UNIT/ML 100 UNIT/ML
500 SOLUTION INTRAVENOUS AS NEEDED
Status: ACTIVE | OUTPATIENT
Start: 2018-01-26 | End: 2018-01-27

## 2018-01-26 RX ORDER — HEPARIN SODIUM (PORCINE) LOCK FLUSH IV SOLN 100 UNIT/ML 100 UNIT/ML
SOLUTION INTRAVENOUS
Status: DISPENSED
Start: 2018-01-26 | End: 2018-01-27

## 2018-01-26 RX ORDER — SODIUM CHLORIDE 0.9 % (FLUSH) 0.9 %
10-40 SYRINGE (ML) INJECTION AS NEEDED
Status: DISCONTINUED | OUTPATIENT
Start: 2018-01-26 | End: 2018-01-30 | Stop reason: HOSPADM

## 2018-01-26 RX ADMIN — ROMIPLOSTIM 276 MCG: 250 INJECTION, POWDER, LYOPHILIZED, FOR SOLUTION SUBCUTANEOUS at 13:48

## 2018-01-26 RX ADMIN — ERYTHROPOIETIN 20000 UNITS: 20000 INJECTION, SOLUTION INTRAVENOUS; SUBCUTANEOUS at 13:48

## 2018-01-26 RX ADMIN — WATER 10 ML: 1 INJECTION INTRAMUSCULAR; INTRAVENOUS; SUBCUTANEOUS at 13:48

## 2018-01-26 RX ADMIN — ERYTHROPOIETIN 40000 UNITS: 40000 INJECTION, SOLUTION INTRAVENOUS; SUBCUTANEOUS at 13:48

## 2018-01-26 NOTE — PROGRESS NOTES
[facility] Progress Note    Date: 2018    Name: Brooke Fatima    MRN: [id number]         : 1950      Nplate/Procrit Injection    Ms. Camilo Almeida arrived to [facility] at [phone]. Patient states that she forgot to put her numbing cream on her port so she refused her port flush today. She stated that she will get it done next week. Ms. Camilo Almeida was assessed and education was provided. Ms. Jacinto Mijares vitals were reviewed. Visit Vitals    BP (!) 132/91 (BP 1 Location: Right arm, BP Patient Position: Sitting)    Pulse 61    Temp 98.7 °F (37.1 °C)    Resp 18       Pt was observed for 5 minutes after obtaining vital signs prior to initiating treatment. Blood drawn via the left arm x 4 attempts for CBC. Gauze and paper tape applied to the site. Lab results obtained and reviewed. (Preliminary results scanned into media tab.)  Recent Results (from the past 12 hour(s))   CBC WITH 3 PART DIFF    Collection Time: 18  1:20 PM   Result Value Ref Range    WBC 7.7 4.5 - 13.0 K/uL    RBC 3.09 (L) 4.10 - 5.10 M/uL    HGB 8.5 (L) 12.0 - 16 g/dL    HCT 26.4 (L) 36 - 48 %    MCV 85.4 78 - 102 FL    MCH 27.5 25.0 - 35.0 PG    MCHC 32.2 31 - 37 g/dL    RDW 17.4 (H) 11.5 - 14.5 %    NEUTROPHILS 64 40 - 70 %    MIXED CELLS 13 0.1 - 17 %    LYMPHOCYTES 23 14 - 44 %    ABS. NEUTROPHILS 4.9 1.8 - 9.5 K/UL    ABS. MIXED CELLS 1.0 0.0 - 2.3 K/uL    ABS. LYMPHOCYTES 1.8 1.1 - 5.9 K/UL    DF AUTOMATED            Preliminary platelet count= 087,889. Preliminary results scanned into the media tab. Per Nplate protocol, since pt's platelet count is [phone],284 today, pt's dose will remain the same at 4 mcg/kg (4 mcg x 69kg = 276 mcg) from the previous 4 mcg/kg dose she was receiving. Nplate 330 mcg (1.99OZ) administered as ordered SQ in patient's right upper arm. No redness or bleeding noted. HGB= 8.5 and HCT= 26.4  Procrit 60,000 units administered SQ in the back of the right arm. Gauze and paper tape applied to the site. Ms. Willim Brunner was discharged from [place] [address] in stable condition at [phone]. She is to return on 2/2/2018 at 36 for her next appointment.     Yulia Gottlieb RN  January 26, 2018

## 2018-01-27 PROCEDURE — 3331090002 HH PPS REVENUE DEBIT

## 2018-01-27 PROCEDURE — 3331090001 HH PPS REVENUE CREDIT

## 2018-01-28 PROCEDURE — 3331090002 HH PPS REVENUE DEBIT

## 2018-01-28 PROCEDURE — 3331090001 HH PPS REVENUE CREDIT

## 2018-01-29 ENCOUNTER — APPOINTMENT (OUTPATIENT)
Dept: INFUSION THERAPY | Age: 68
End: 2018-01-29
Payer: MEDICARE

## 2018-01-29 PROCEDURE — 3331090002 HH PPS REVENUE DEBIT

## 2018-01-29 PROCEDURE — 3331090001 HH PPS REVENUE CREDIT

## 2018-01-30 ENCOUNTER — HOME CARE VISIT (OUTPATIENT)
Dept: SCHEDULING | Facility: HOME HEALTH | Age: 68
End: 2018-01-30
Payer: MEDICARE

## 2018-01-30 VITALS — SYSTOLIC BLOOD PRESSURE: 100 MMHG | TEMPERATURE: 98 F | DIASTOLIC BLOOD PRESSURE: 55 MMHG

## 2018-01-30 PROCEDURE — G0157 HHC PT ASSISTANT EA 15: HCPCS

## 2018-01-30 PROCEDURE — 3331090001 HH PPS REVENUE CREDIT

## 2018-01-30 PROCEDURE — 3331090002 HH PPS REVENUE DEBIT

## 2018-01-31 PROCEDURE — 3331090001 HH PPS REVENUE CREDIT

## 2018-01-31 PROCEDURE — 3331090002 HH PPS REVENUE DEBIT

## 2018-02-01 ENCOUNTER — HOME CARE VISIT (OUTPATIENT)
Dept: SCHEDULING | Facility: HOME HEALTH | Age: 68
End: 2018-02-01
Payer: MEDICARE

## 2018-02-01 PROCEDURE — 3331090003 HH PPS REVENUE ADJ

## 2018-02-01 PROCEDURE — 3331090001 HH PPS REVENUE CREDIT

## 2018-02-01 PROCEDURE — 3331090002 HH PPS REVENUE DEBIT

## 2018-02-01 PROCEDURE — G0151 HHCP-SERV OF PT,EA 15 MIN: HCPCS

## 2018-02-02 ENCOUNTER — HOSPITAL ENCOUNTER (OUTPATIENT)
Dept: INFUSION THERAPY | Age: 68
Discharge: HOME | End: 2018-02-02
Payer: MEDICARE

## 2018-02-02 ENCOUNTER — TELEPHONE (OUTPATIENT)
Dept: ORTHOPEDIC SURGERY | Age: 68
End: 2018-02-02

## 2018-02-02 VITALS
TEMPERATURE: 96.2 F | SYSTOLIC BLOOD PRESSURE: 116 MMHG | OXYGEN SATURATION: 97 % | DIASTOLIC BLOOD PRESSURE: 59 MMHG | HEART RATE: 60 BPM

## 2018-02-02 VITALS
HEART RATE: 61 BPM | TEMPERATURE: 98.4 F | SYSTOLIC BLOOD PRESSURE: 134 MMHG | OXYGEN SATURATION: 97 % | RESPIRATION RATE: 18 BRPM | DIASTOLIC BLOOD PRESSURE: 74 MMHG

## 2018-02-02 LAB
BASO+EOS+MONOS # BLD AUTO: 1 K/UL (ref 0–2.3)
BASO+EOS+MONOS # BLD AUTO: 13 % (ref 0.1–17)
DIFFERENTIAL METHOD BLD: ABNORMAL
ERYTHROCYTE [DISTWIDTH] IN BLOOD BY AUTOMATED COUNT: 18.4 % (ref 11.5–14.5)
HCT VFR BLD AUTO: 28 % (ref 36–48)
HGB BLD-MCNC: 9 G/DL (ref 12–16)
LYMPHOCYTES # BLD: 1.4 K/UL (ref 1.1–5.9)
LYMPHOCYTES NFR BLD: 18 % (ref 14–44)
MCH RBC QN AUTO: 27.6 PG (ref 25–35)
MCHC RBC AUTO-ENTMCNC: 32.1 G/DL (ref 31–37)
MCV RBC AUTO: 85.9 FL (ref 78–102)
NEUTS SEG # BLD: 5.4 K/UL (ref 1.8–9.5)
NEUTS SEG NFR BLD: 69 % (ref 40–70)
PLATELET # BLD AUTO: 225 K/UL (ref 135–420)
RBC # BLD AUTO: 3.26 M/UL (ref 4.1–5.1)
WBC # BLD AUTO: 7.8 K/UL (ref 4.5–13)

## 2018-02-02 PROCEDURE — 77030012965 HC NDL HUBR BBMI -A

## 2018-02-02 PROCEDURE — 96372 THER/PROPH/DIAG INJ SC/IM: CPT

## 2018-02-02 PROCEDURE — 74011000250 HC RX REV CODE- 250

## 2018-02-02 PROCEDURE — 36415 COLL VENOUS BLD VENIPUNCTURE: CPT | Performed by: INTERNAL MEDICINE

## 2018-02-02 PROCEDURE — 74011250636 HC RX REV CODE- 250/636

## 2018-02-02 PROCEDURE — 74011250636 HC RX REV CODE- 250/636: Performed by: INTERNAL MEDICINE

## 2018-02-02 PROCEDURE — 36591 DRAW BLOOD OFF VENOUS DEVICE: CPT

## 2018-02-02 PROCEDURE — 85049 AUTOMATED PLATELET COUNT: CPT | Performed by: INTERNAL MEDICINE

## 2018-02-02 PROCEDURE — 3331090001 HH PPS REVENUE CREDIT

## 2018-02-02 PROCEDURE — 3331090002 HH PPS REVENUE DEBIT

## 2018-02-02 PROCEDURE — 85025 COMPLETE CBC W/AUTO DIFF WBC: CPT | Performed by: INTERNAL MEDICINE

## 2018-02-02 RX ORDER — SODIUM CHLORIDE 0.9 % (FLUSH) 0.9 %
10-40 SYRINGE (ML) INJECTION AS NEEDED
Status: DISCONTINUED | OUTPATIENT
Start: 2018-02-02 | End: 2018-02-06 | Stop reason: HOSPADM

## 2018-02-02 RX ORDER — HEPARIN SODIUM (PORCINE) LOCK FLUSH IV SOLN 100 UNIT/ML 100 UNIT/ML
500 SOLUTION INTRAVENOUS AS NEEDED
Status: ACTIVE | OUTPATIENT
Start: 2018-02-02 | End: 2018-02-03

## 2018-02-02 RX ORDER — WATER FOR INJECTION,STERILE
VIAL (ML) INJECTION
Status: COMPLETED
Start: 2018-02-02 | End: 2018-02-02

## 2018-02-02 RX ADMIN — Medication 20 ML: at 12:20

## 2018-02-02 RX ADMIN — Medication 20 ML: at 12:19

## 2018-02-02 RX ADMIN — HEPARIN SODIUM (PORCINE) LOCK FLUSH IV SOLN 100 UNIT/ML 500 UNITS: 100 SOLUTION at 12:19

## 2018-02-02 RX ADMIN — WATER 10 ML: 1 INJECTION INTRAMUSCULAR; INTRAVENOUS; SUBCUTANEOUS at 12:20

## 2018-02-02 RX ADMIN — HEPARIN SODIUM (PORCINE) LOCK FLUSH IV SOLN 100 UNIT/ML 500 UNITS: 100 SOLUTION at 12:20

## 2018-02-02 RX ADMIN — ROMIPLOSTIM 207 MCG: 250 INJECTION, POWDER, LYOPHILIZED, FOR SOLUTION SUBCUTANEOUS at 12:21

## 2018-02-02 NOTE — TELEPHONE ENCOUNTER
Adam Shah from [address] called stating the patient has completed all of the goals but would benefit from outpatient therapy. She is requesting the order be faxed to [phone]. Please advise Adam Shah regarding this at [phone].

## 2018-02-02 NOTE — PROGRESS NOTES
[facility] Progress Note    Date: 2018    Name: Noe Griffith    MRN: [id number]         : 1950      Nplate/Port Flush     Ms. Kalpesh Carney arrived to [facility] at [address]. Ms. Kalpesh Carney was assessed and education was provided. Ms. Pako Watson vitals were reviewed. Visit Vitals    /74 (BP 1 Location: Right arm, BP Patient Position: Sitting)    Pulse 61    Temp 98.4 °F (36.9 °C)    Resp 18    SpO2 97%    Breastfeeding No       Pt was observed for 5 minutes after obtaining vital signs prior to initiating treatment. Right chest double lumen mediport accessed with a 20 gauge haas needle using sterile technique. Medial port accessed and blood drawn for CBC after a 10 ml waste. Port flushed with 20 ml NS and 500 units of Heparin then de-accessed. Lateral port accessed, flushes easily but is negative for blood return. Patient refused cathflo at this time. She states that she will get it done next week if there is still no blood return. Port was flushed with 20 ml NS and 500 units of Heparin then de-accessed. Gauze and Tegaderm applied to the site. Lab results obtained and reviewed. (Preliminary results scanned into media tab.)  Recent Results (from the past 12 hour(s))   CBC WITH 3 PART DIFF    Collection Time: 18 11:56 AM   Result Value Ref Range    WBC 7.8 4.5 - 13.0 K/uL    RBC 3.26 (L) 4.10 - 5.10 M/uL    HGB 9.0 (L) 12.0 - 16 g/dL    HCT 28.0 (L) 36 - 48 %    MCV 85.9 78 - 102 FL    MCH 27.6 25.0 - 35.0 PG    MCHC 32.1 31 - 37 g/dL    RDW 18.4 (H) 11.5 - 14.5 %    NEUTROPHILS 69 40 - 70 %    MIXED CELLS 13 0.1 - 17 %    LYMPHOCYTES 18 14 - 44 %    ABS. NEUTROPHILS 5.4 1.8 - 9.5 K/UL    ABS. MIXED CELLS 1.0 0.0 - 2.3 K/uL    ABS. LYMPHOCYTES 1.4 1.1 - 5.9 K/UL    DF AUTOMATED       Preliminary platelet count= 060,837. Preliminary results scanned into the media tab.      Per Nplate protocol, since pt's platelet count is 602,059 today, pt's dose will decrease to 3mcg/kg (3mcg x 69kg = 207mcg) since her platelet count has been above 200,000 for 2 consecutive weeks. Nplate 311CZA (9.49CH) administered as ordered SQ in patient's right upper arm. No redness or bleeding noted. Ms. Albertina Epps was discharged from [place] [address] in stable condition at 1225. She is to return on 2/09/2018 at 0800 for her next appointment.     Stanley Harrell RN  February 2, 2018

## 2018-02-02 NOTE — TELEPHONE ENCOUNTER
If she has completed home PT and is doing well, I would recommend waiting until her 6 week post op. We will get a set if xrays at this time and if she would like to outpatient PT, we can order it then.

## 2018-02-03 PROCEDURE — 3331090002 HH PPS REVENUE DEBIT

## 2018-02-03 PROCEDURE — 3331090001 HH PPS REVENUE CREDIT

## 2018-02-04 PROCEDURE — 3331090002 HH PPS REVENUE DEBIT

## 2018-02-04 PROCEDURE — 3331090001 HH PPS REVENUE CREDIT

## 2018-02-05 ENCOUNTER — APPOINTMENT (OUTPATIENT)
Dept: INFUSION THERAPY | Age: 68
End: 2018-02-05
Payer: MEDICARE

## 2018-02-05 PROCEDURE — 3331090002 HH PPS REVENUE DEBIT

## 2018-02-05 PROCEDURE — 3331090001 HH PPS REVENUE CREDIT

## 2018-02-06 PROCEDURE — 3331090001 HH PPS REVENUE CREDIT

## 2018-02-06 PROCEDURE — 3331090002 HH PPS REVENUE DEBIT

## 2018-02-06 NOTE — TELEPHONE ENCOUNTER
I called Conrad Noel back but had to leave another message, I left a brief message regarding what the NP had stated and said that she could call me back if she did not understand.

## 2018-02-07 PROCEDURE — 3331090002 HH PPS REVENUE DEBIT

## 2018-02-07 PROCEDURE — 3331090001 HH PPS REVENUE CREDIT

## 2018-02-08 PROCEDURE — 3331090002 HH PPS REVENUE DEBIT

## 2018-02-08 PROCEDURE — 3331090001 HH PPS REVENUE CREDIT

## 2018-02-09 ENCOUNTER — HOSPITAL ENCOUNTER (OUTPATIENT)
Dept: INFUSION THERAPY | Age: 68
Discharge: HOME | End: 2018-02-09
Payer: MEDICARE

## 2018-02-09 VITALS
RESPIRATION RATE: 18 BRPM | HEART RATE: 68 BPM | SYSTOLIC BLOOD PRESSURE: 115 MMHG | DIASTOLIC BLOOD PRESSURE: 73 MMHG | TEMPERATURE: 98 F

## 2018-02-09 LAB
BASO+EOS+MONOS # BLD AUTO: 0.9 K/UL (ref 0–2.3)
BASO+EOS+MONOS # BLD AUTO: 20 % (ref 0.1–17)
DIFFERENTIAL METHOD BLD: ABNORMAL
ERYTHROCYTE [DISTWIDTH] IN BLOOD BY AUTOMATED COUNT: 18.4 % (ref 11.5–14.5)
HCT VFR BLD AUTO: 29.3 % (ref 36–48)
HGB BLD-MCNC: 9.5 G/DL (ref 12–16)
LYMPHOCYTES # BLD: 1.2 K/UL (ref 1.1–5.9)
LYMPHOCYTES NFR BLD: 27 % (ref 14–44)
MCH RBC QN AUTO: 27.6 PG (ref 25–35)
MCHC RBC AUTO-ENTMCNC: 32.4 G/DL (ref 31–37)
MCV RBC AUTO: 85.2 FL (ref 78–102)
NEUTS SEG # BLD: 2.4 K/UL (ref 1.8–9.5)
NEUTS SEG NFR BLD: 53 % (ref 40–70)
PLATELET # BLD AUTO: 174 K/UL (ref 135–420)
RBC # BLD AUTO: 3.44 M/UL (ref 4.1–5.1)
WBC # BLD AUTO: 4.5 K/UL (ref 4.5–13)

## 2018-02-09 PROCEDURE — 3331090002 HH PPS REVENUE DEBIT

## 2018-02-09 PROCEDURE — 74011000250 HC RX REV CODE- 250

## 2018-02-09 PROCEDURE — 36591 DRAW BLOOD OFF VENOUS DEVICE: CPT

## 2018-02-09 PROCEDURE — 77030012965 HC NDL HUBR BBMI -A

## 2018-02-09 PROCEDURE — 74011250636 HC RX REV CODE- 250/636

## 2018-02-09 PROCEDURE — 3331090001 HH PPS REVENUE CREDIT

## 2018-02-09 PROCEDURE — 96372 THER/PROPH/DIAG INJ SC/IM: CPT

## 2018-02-09 PROCEDURE — 85025 COMPLETE CBC W/AUTO DIFF WBC: CPT | Performed by: INTERNAL MEDICINE

## 2018-02-09 PROCEDURE — 74011250636 HC RX REV CODE- 250/636: Performed by: NURSE PRACTITIONER

## 2018-02-09 PROCEDURE — 85049 AUTOMATED PLATELET COUNT: CPT | Performed by: INTERNAL MEDICINE

## 2018-02-09 RX ORDER — SODIUM CHLORIDE 0.9 % (FLUSH) 0.9 %
10-40 SYRINGE (ML) INJECTION AS NEEDED
Status: DISCONTINUED | OUTPATIENT
Start: 2018-02-09 | End: 2018-02-13 | Stop reason: HOSPADM

## 2018-02-09 RX ORDER — WATER FOR INJECTION,STERILE
VIAL (ML) INJECTION
Status: COMPLETED
Start: 2018-02-09 | End: 2018-02-09

## 2018-02-09 RX ORDER — HEPARIN SODIUM (PORCINE) LOCK FLUSH IV SOLN 100 UNIT/ML 100 UNIT/ML
500 SOLUTION INTRAVENOUS AS NEEDED
Status: ACTIVE | OUTPATIENT
Start: 2018-02-09 | End: 2018-02-10

## 2018-02-09 RX ORDER — HEPARIN SODIUM (PORCINE) LOCK FLUSH IV SOLN 100 UNIT/ML 100 UNIT/ML
SOLUTION INTRAVENOUS
Status: COMPLETED
Start: 2018-02-09 | End: 2018-02-09

## 2018-02-09 RX ADMIN — ROMIPLOSTIM 207 MCG: 250 INJECTION, POWDER, LYOPHILIZED, FOR SOLUTION SUBCUTANEOUS at 09:00

## 2018-02-09 RX ADMIN — Medication 30 ML: at 08:55

## 2018-02-09 RX ADMIN — HEPARIN SODIUM (PORCINE) LOCK FLUSH IV SOLN 100 UNIT/ML 500 UNITS: 100 SOLUTION at 08:54

## 2018-02-09 RX ADMIN — WATER 10 ML: 1 INJECTION INTRAMUSCULAR; INTRAVENOUS; SUBCUTANEOUS at 09:00

## 2018-02-09 NOTE — PROGRESS NOTES
[facility] Progress Note    Date: 2018    Name: Jose Padron    MRN: [id number]         : 1950      Nplate Injection     Ms. Mark Mcclendon arrived to [facility] at [employer]. Ms. Mark Mcclendon was assessed and education was provided. Ms. Yudy Calderon vitals were reviewed. Visit Vitals    /73 (BP 1 Location: Right arm, BP Patient Position: Sitting)    Pulse 68    Temp 98 °F (36.7 °C)    Resp 18       Pt was observed for 5 minutes after obtaining vital signs prior to initiating treatment. Lateral port accessed because it did not give blood return last week but patient did not want to stay for cathflo. Postitive blood return noted and cbc drawn off. Port flushed and heparinized per protocol. Lab results obtained and reviewed. (Preliminary results scanned into media tab.)  Recent Results (from the past 12 hour(s))   CBC WITH 3 PART DIFF    Collection Time: 18  8:25 AM   Result Value Ref Range    WBC 4.5 4.5 - 13.0 K/uL    RBC 3.44 (L) 4.10 - 5.10 M/uL    HGB 9.5 (L) 12.0 - 16 g/dL    HCT 29.3 (L) 36 - 48 %    MCV 85.2 78 - 102 FL    MCH 27.6 25.0 - 35.0 PG    MCHC 32.4 31 - 37 g/dL    RDW 18.4 (H) 11.5 - 14.5 %    NEUTROPHILS 53 40 - 70 %    MIXED CELLS 20 (H) 0.1 - 17 %    LYMPHOCYTES 27 14 - 44 %    ABS. NEUTROPHILS 2.4 1.8 - 9.5 K/UL    ABS. MIXED CELLS 0.9 0.0 - 2.3 K/uL    ABS. LYMPHOCYTES 1.2 1.1 - 5.9 K/UL    DF AUTOMATED       Preliminary platelet count= 854,819. Per Nplate protocol, since pt's platelet count is 014,169 today, pt's dose will remain at 3mcg/kg (3mcg x 69kg = 207mcg). Nplate 046UET (8.62KU) administered as ordered SQ in patient's right upper arm. No redness or bleeding noted. Ms. Mark Mcclendon was discharged from [place] [address] in stable condition at 9013. She is to return on 18 at 0830 for her next appointment.     Liam Richard RN  2018

## 2018-02-10 PROCEDURE — 3331090001 HH PPS REVENUE CREDIT

## 2018-02-10 PROCEDURE — 3331090002 HH PPS REVENUE DEBIT

## 2018-02-11 PROCEDURE — 3331090001 HH PPS REVENUE CREDIT

## 2018-02-11 PROCEDURE — 3331090002 HH PPS REVENUE DEBIT

## 2018-02-12 ENCOUNTER — APPOINTMENT (OUTPATIENT)
Dept: INFUSION THERAPY | Age: 68
End: 2018-02-12
Payer: MEDICARE

## 2018-02-12 PROCEDURE — 3331090002 HH PPS REVENUE DEBIT

## 2018-02-12 PROCEDURE — 3331090001 HH PPS REVENUE CREDIT

## 2018-02-15 DIAGNOSIS — Z98.1 S/P LUMBAR FUSION: ICD-10-CM

## 2018-02-15 DIAGNOSIS — M43.16 SPONDYLOLISTHESIS OF LUMBAR REGION: ICD-10-CM

## 2018-02-15 RX ORDER — OXYCODONE AND ACETAMINOPHEN 10; 325 MG/1; MG/1
1 TABLET ORAL
Qty: 60 TAB | Refills: 0 | Status: SHIPPED | OUTPATIENT
Start: 2018-02-15 | End: 2018-04-20 | Stop reason: SDUPTHER

## 2018-02-16 ENCOUNTER — HOSPITAL ENCOUNTER (OUTPATIENT)
Dept: INFUSION THERAPY | Age: 68
Discharge: HOME | End: 2018-02-16
Payer: MEDICARE

## 2018-02-16 VITALS
HEART RATE: 60 BPM | SYSTOLIC BLOOD PRESSURE: 117 MMHG | DIASTOLIC BLOOD PRESSURE: 71 MMHG | TEMPERATURE: 97.8 F | RESPIRATION RATE: 18 BRPM

## 2018-02-16 LAB
BASO+EOS+MONOS # BLD AUTO: 0.8 K/UL (ref 0–2.3)
BASO+EOS+MONOS # BLD AUTO: 16 % (ref 0.1–17)
DIFFERENTIAL METHOD BLD: ABNORMAL
ERYTHROCYTE [DISTWIDTH] IN BLOOD BY AUTOMATED COUNT: 17.3 % (ref 11.5–14.5)
HCT VFR BLD AUTO: 31.2 % (ref 36–48)
HGB BLD-MCNC: 10 G/DL (ref 12–16)
LYMPHOCYTES # BLD: 1.6 K/UL (ref 1.1–5.9)
LYMPHOCYTES NFR BLD: 32 % (ref 14–44)
MCH RBC QN AUTO: 27 PG (ref 25–35)
MCHC RBC AUTO-ENTMCNC: 32.1 G/DL (ref 31–37)
MCV RBC AUTO: 84.3 FL (ref 78–102)
NEUTS SEG # BLD: 2.4 K/UL (ref 1.8–9.5)
NEUTS SEG NFR BLD: 52 % (ref 40–70)
PLATELET # BLD AUTO: 155 K/UL (ref 135–420)
RBC # BLD AUTO: 3.7 M/UL (ref 4.1–5.1)
WBC # BLD AUTO: 4.8 K/UL (ref 4.5–13)

## 2018-02-16 PROCEDURE — 74011000250 HC RX REV CODE- 250

## 2018-02-16 PROCEDURE — 74011250636 HC RX REV CODE- 250/636: Performed by: INTERNAL MEDICINE

## 2018-02-16 PROCEDURE — 85049 AUTOMATED PLATELET COUNT: CPT | Performed by: INTERNAL MEDICINE

## 2018-02-16 PROCEDURE — 85025 COMPLETE CBC W/AUTO DIFF WBC: CPT | Performed by: INTERNAL MEDICINE

## 2018-02-16 PROCEDURE — 96372 THER/PROPH/DIAG INJ SC/IM: CPT

## 2018-02-16 PROCEDURE — 74011250636 HC RX REV CODE- 250/636

## 2018-02-16 PROCEDURE — 36415 COLL VENOUS BLD VENIPUNCTURE: CPT

## 2018-02-16 RX ORDER — WATER FOR INJECTION,STERILE
VIAL (ML) INJECTION
Status: COMPLETED
Start: 2018-02-16 | End: 2018-02-16

## 2018-02-16 RX ADMIN — ROMIPLOSTIM 207 MCG: 250 INJECTION, POWDER, LYOPHILIZED, FOR SOLUTION SUBCUTANEOUS at 08:52

## 2018-02-16 RX ADMIN — WATER 10 ML: 1 INJECTION INTRAMUSCULAR; INTRAVENOUS; SUBCUTANEOUS at 08:52

## 2018-02-19 ENCOUNTER — APPOINTMENT (OUTPATIENT)
Dept: INFUSION THERAPY | Age: 68
End: 2018-02-19
Payer: MEDICARE

## 2018-02-20 ENCOUNTER — OFFICE VISIT (OUTPATIENT)
Dept: ORTHOPEDIC SURGERY | Age: 68
End: 2018-02-20

## 2018-02-20 VITALS
HEIGHT: 66 IN | RESPIRATION RATE: 17 BRPM | TEMPERATURE: 98.2 F | BODY MASS INDEX: 25.65 KG/M2 | DIASTOLIC BLOOD PRESSURE: 56 MMHG | WEIGHT: 159.6 LBS | SYSTOLIC BLOOD PRESSURE: 116 MMHG

## 2018-02-20 DIAGNOSIS — M48.062 LUMBAR STENOSIS WITH NEUROGENIC CLAUDICATION: ICD-10-CM

## 2018-02-20 DIAGNOSIS — R29.898 RIGHT LEG WEAKNESS: ICD-10-CM

## 2018-02-20 DIAGNOSIS — Z98.1 S/P LUMBAR FUSION: Primary | ICD-10-CM

## 2018-02-20 RX ORDER — TOPIRAMATE 50 MG/1
50 TABLET, FILM COATED ORAL DAILY
Refills: 5 | COMMUNITY
Start: 2018-02-15

## 2018-02-20 RX ORDER — TEMAZEPAM 30 MG/1
CAPSULE ORAL
Refills: 1 | COMMUNITY
Start: 2018-02-02 | End: 2018-01-01

## 2018-02-20 RX ORDER — LINACLOTIDE 145 UG/1
CAPSULE, GELATIN COATED ORAL
Refills: 0 | COMMUNITY
Start: 2018-02-16

## 2018-02-20 NOTE — PROGRESS NOTES
Jeannette Balderash[facility] [place] [address]  [address], [address] [facility],[address]  [place], [address]  Phone: [phone]  Fax: [phone]  PROGRESS NOTE  Patient: Janet Dunn                MRN: [id number]       SSN: [id number]  YOB: 1950        AGE: 74 y.o. SEX: female  Body mass index is 25.76 kg/(m^2). PCP: Cj Perez MD  02/20/18    Chief Complaint   Patient presents with    Back Pain     6 WK FU Sx 1/8       HISTORY OF PRESENT ILLNESS, RADIOGRAPHS, and PLAN:     HISTORY:  Ms. Patrick Jiménez returns today. She is six weeks out from her XLIF at L3-4. Her pain is dramatically improved. She was able to walk back to the exam room as opposed to having to use the wheelchair, which is what she required prior to surgery. She still is having some hip flexor weakness on the right as anticipated, but that is improving. We are going to get her involved in some physical therapy. Her x-rays demonstrate appropriate alignment. I have some concern there may have been some inferior screw cut out or loosening at L4 on the left, but she has no evident symptoms from it. Her fixation and her alignment is being maintained. She is walking with a single-point cane, where prior she had to use a walker or a wheelchair. She is quite pleased. She feels much improved. ASSESSMENT/PLAN: We are going to get her involved in some physical therapy and work towards getting her healed. I will see her back in six weeks with another x-ray. cc: Sadie Chisholm M.D.        2V Lumbar spine XR at next visit.      Past Medical History:   Diagnosis Date    Antineoplastic chemotherapy induced anemia     Arrhythmia     Atrial Fib    Arthritis     [facility]     Cancer ([facility] [facility] 1999    Breast    Cardiomyopathy ([facility] [facility]     Chronic ITP (idiopathic thrombocytopenia) (HCC) 10/31/2016    Secondary to Anemia from Chemo    Diabetes ([address]     borderline, no meds    Esophageal cancer ([facility] [facility] 2005    treated with chemo    Heart failure ([facility] [facility]        History reviewed. No pertinent family history. Current Outpatient Prescriptions   Medication Sig Dispense Refill    topiramate (TOPAMAX) 50 mg tablet   5    temazepam (RESTORIL) 30 mg capsule   1    LINZESS 145 mcg cap capsule TAKE 1 CAPSULE BY MOUTH DAILY 30 MINUTES BEFORE BREAKFAST  0    oxyCODONE-acetaminophen (PERCOCET)  mg per tablet Take 1 Tab by mouth every six (6) hours as needed for Pain. Max Daily Amount: 4 Tabs. Indications: Pain, ACUTE POST OP PAIN 60 Tab 0    lisinopril (PRINIVIL, ZESTRIL) 10 mg tablet Take 10 mg by mouth daily. Indications: hypertension      rivaroxaban (XARELTO) 10 mg tablet Take 10 mg by mouth daily (with breakfast). Indications: PREVENTION OF DEEP VEIN THROMBOSIS RECURRENCE      zolpidem (AMBIEN) 10 mg tablet TAKE 1 TABLET BY MOUTH NIGHTLY AS NEEDED FOR SLEEP. MAX DAILY AMOUNT 10 MG 30 Tab 2    loratadine 10 mg cap Take 10 mg by mouth daily.  digoxin (LANOXIN) 0.125 mg tablet Take 0.125 mg by mouth daily.  carvedilol (COREG) 3.125 mg tablet Take 3.125 mg by mouth two (2) times daily (with meals).  meloxicam (MOBIC) 7.5 mg tablet Take 7.5 mg by mouth daily.  amiodarone (CORDARONE) 200 mg tablet Take 200 mg by mouth daily. Indications: PREVENTION OF VENTRICULAR FIBRILLATION      gabapentin (NEURONTIN) 300 mg capsule Take 1 po q am and 2 po q pm (Patient taking differently: Take 300 mg by mouth two (2) times a day. Take 1 po q am and 2 po q pm) 90 Cap 1    rivaroxaban (XARELTO) 10 mg tablet Take 10 mg by mouth daily.  lidocaine-prilocaine (EMLA) topical cream APPLY OVER PORT 1 HOUR PRIOR TO CHEMOTHERAPY THAN COVER WITH SARAN WRAP 30 g 6    magic mouthwash solution Take 5 mL by mouth three (3) times daily as needed for Stomatitis.  Magic mouth wash   Maalox  Lidocaine 2% viscous   Diphenhydramine oral solution     Pharmacy to mix equal portions of ingredients to a total volume as indicated in the dispense amount. 236 mL 0    furosemide (LASIX) 40 mg tablet Take 40 mg by mouth daily. Indications: Edema      dronabinol (MARINOL) 5 mg capsule Take 1 Cap by mouth two (2) times a day. 60 Cap 1    KLOR-CON M20 20 mEq tablet TAKE ONE TABLET BY MOUTH ONCE A DAY 30 Tab 3    aluminum-magnesium hydroxide 200-200 mg/5 mL susp 5 mL, diphenhydrAMINE 12.5 mg/5 mL liqd 12.5 mg, lidocaine 2 % soln 5 mL 5 mL by Swish and Spit route two (2) times a day. Magic mouth wash   Maalox  Lidocaine 2% viscous   Diphenhydramine oral solution     Pharmacy to mix equal portions of ingredients to a total volume as indicated in the dispense amount. 240 mL 1    potassium chloride (K-DUR, KLOR-CON) 20 mEq tablet Take 2 Tabs by mouth daily. 30 Tab 3    albuterol (PROAIR HFA) 90 mcg/actuation inhaler 1-2 puffs every 4-6 hrs. (Patient taking differently: Take 2 Puffs by inhalation every four (4) hours as needed for Shortness of Breath. 1-2 puffs every 4-6 hrs.) 1 Inhaler 1    senna (SENNA) 8.6 mg tablet Take 1 Tab by mouth daily.  nabumetone (RELAFEN) 750 mg tablet Take 750 mg by mouth daily. Indications: OSTEOARTHRITIS      ondansetron hcl (ZOFRAN) 8 mg tablet Take 1 Tab by mouth every eight (8) hours as needed for Nausea. 30 Tab 3    IRON AG&FUM/C/FA/MV CMB11/CA-T (PRUVATE 21-7 PO) Take 325 mg by mouth daily. Indications: iron deficiency         Allergies   Allergen Reactions    Aspirin Swelling     Pt states her whole body swells when she takes aspirin.     Adhesive Unable to Obtain    Pcn [Penicillins] Rash       Past Surgical History:   Procedure Laterality Date    BREAST SURGERY PROCEDURE UNLISTED      COLONOSCOPY N/A 7/27/2016    COLONOSCOPY performed [facility] De [facility] at [facility] ENDOSCOPY    HX APPENDECTOMY      HX HEENT      Tonsillectomy    HX ORTHOPAEDIC      HX TUBAL LIGATION      HX VASCULAR ACCESS      NEUROLOGICAL PROCEDURE UNLISTED      Lumbar sx       Past Medical History:   Diagnosis Date    Antineoplastic chemotherapy induced anemia     Arrhythmia     Atrial Fib    Arthritis     Breast cancer (HCC)     Cancer (HCC) 1999    Breast    [facility]omy[facility] ([facility] [address] [facility]     Chronic ITP (idiopathic thrombocytopenia) (HCC) 10/31/2016    Secondary to Anemia from Chemo    Diabetes (HCC)     borderline, no meds    Esophageal cancer ([facility] [address] 2005    treated with chemo    [facility]        Social History     Social History    Marital status:      Spouse name: N/A    Number of children: N/A    Years of education: N/A     Occupational History    Not on file. Social History Main Topics    Smoking status: Never Smoker    Smokeless tobacco: Never Used    Alcohol use No    Drug use: No    Sexual activity: Not on file     Other Topics Concern    Not on file     Social History Narrative         REVIEW OF SYSTEMS:   CONSTITUTIONAL SYMPTOMS:  Negative. EYES:  Negative. EARS, NOSE, THROAT AND MOUTH:  Negative. CARDIOVASCULAR:  Negative. RESPIRATORY:  Negative. GENITOURINARY: Per HPI. GASTROINTESTINAL:  Per HPI. INTEGUMENTARY (SKIN AND/OR BREAST):  Negative. MUSCULOSKELETAL: Per HPI.   ENDOCRINE/RHEUMATOLOGIC:  Negative. NEUROLOGICAL:  Per HPI. HEMATOLOGIC/LYMPHATIC:  Negative. ALLERGIC/IMMUNOLOGIC:  Negative. PSYCHIATRIC:  Negative. PHYSICAL EXAMINATION:   Visit Vitals    /56    Temp 98.2 °F (36.8 °C) (Oral)    Resp 17    Ht 5' 6\" (1.676 m)    Wt 159 lb 9.6 oz (72.4 kg)    BMI 25.76 kg/m2    PAIN SCALE: 6/10    CONSTITUTIONAL: The patient is in no apparent distress and is alert and oriented x 3. HEENT: Normocephalic. Hearing grossly intact. NECK: Supple and symmetric. no tenderness, or masses were felt. RESPIRATORY: No labored breathing. CARDIOVASCULAR: The carotid pulses were normal. Peripheral pulses were 2+. CHEST: Normal AP diameter and normal contour without any kyphoscoliosis. LYMPHATIC: No lymphadenopathy was appreciated in the neck, axillae or groin.   SKIN: Incision healing well, no drainage, no erythema, no hernia, no seroma, no swelling, no dehiscence, incision well approximated. Negative for scars, rashes, lesions, or ulcers on the right upper, right lower, left upper, left lower and trunk. NEUROLOGICAL: Alert and oriented x 3. Ambulation with single point cane. FWB. EXTREMITIES: See musculoskeletal.  MUSCULOSKELETAL:   Head and Neck:  Negative for misalignment, asymmetry, crepitation, defects, tenderness masses or effusions.  Left Upper Extremity: Inspection, percussion and palpation preformed. Cottrells sign is negative.  Right Upper Extremity: Inspection, percussion and palpation preformed. Cottrells sign is negative.  Spine, Ribs and Pelvis: Inspection, percussion and palpation preformed. Negative for misalignment, asymmetry, crepitation, defects, tenderness masses or effusions.  Left Lower Extremity: Inspection, percussion and palpation preformed. Negative straight leg raise.  Right Lower Extremity: Weakness. Inspection, percussion and palpation preformed. Negative straight leg raise. SPINE EXAM:     Lumbar spine: No rash, ecchymosis, or gross obliquity. No focal atrophy is noted. ASSESSMENT    ICD-10-CM ICD-9-CM    1. S/P lumbar fusion Z98.1 V45.4 AMB POC XRAY, SPINE, LUMBOSACRAL; 2 O      REFERRAL TO PHYSICAL THERAPY   2. Lumbar stenosis with neurogenic claudication M48.062 724.03 AMB POC XRAY, SPINE, LUMBOSACRAL; 2 O      REFERRAL TO PHYSICAL THERAPY   3. Right leg weakness R29.898 729.89 REFERRAL TO PHYSICAL THERAPY       Written by Theron Patel, as dictated by Adrien Daley MD.    I, Dr. Adrien Daley MD, confirm that all documentation is accurate.

## 2018-02-20 NOTE — PATIENT INSTRUCTIONS
Learning About How to Have a Healthy Back  What causes back pain? Back pain is often caused by overuse, strain, or injury. For example, people often hurt their backs playing sports or working in the yard, being jolted in a car accident, or lifting something too heavy. Aging plays a part too. Your bones and muscles tend to lose strength as you age, which makes injury more likely. The spongy discs between the bones of the spine (vertebrae) may suffer from wear and tear and no longer provide enough cushion between the bones. A disc that bulges or breaks open (herniated disc) can press on nerves, causing back pain. In some people, back pain is the result of arthritis, broken vertebrae caused by bone loss (osteoporosis), illness, or a spine problem. Although most people have back pain at one time or another, there are steps you can take to make it less likely. How can you have a healthy back? Reduce stress on your back through good posture  Slumping or slouching alone may not cause low back pain. But after the back has been strained or injured, bad posture can make pain worse. · Sleep in a position that maintains your back's normal curves and on a mattress that feels comfortable. Sleep on your side with a pillow between your knees, or sleep on your back with a pillow under your knees. These positions can reduce strain on your back. · Stand and sit up straight. \"Good posture\" generally means your ears, shoulders, and hips are in a straight line. · If you must stand for a long time, put one foot on a stool, ledge, or box. Switch feet every now and then. · Sit in a chair that is low enough to let you place both feet flat on the floor with both knees nearly level with your hips. If your chair or desk is too high, use a footrest to raise your knees. Place a small pillow, a rolled-up towel, or a lumbar roll in the curve of your back if you need extra support.   · Try a kneeling chair, which helps tilt your hips forward. This takes pressure off your lower back. · Try sitting on an exercise ball. It can rock from side to side, which helps keep your back loose. · When driving, keep your knees nearly level with your hips. Sit straight, and drive with both hands on the steering wheel. Your arms should be in a slightly bent position. Reduce stress on your back through careful lifting  · Squat down, bending at the hips and knees only. If you need to, put one knee to the floor and extend your other knee in front of you, bent at a right angle (half kneeling). · Press your chest straight forward. This helps keep your upper back straight while keeping a slight arch in your low back. · Hold the load as close to your body as possible, at the level of your belly button (navel). · Use your feet to change direction, taking small steps. · Lead with your hips as you change direction. Keep your shoulders in line with your hips as you move. · Set down your load carefully, squatting with your knees and hips only. Exercise and stretch your back  · Do some exercise on most days of the week, if your doctor says it is okay. You can walk, run, swim, or cycle. · Stretch your back muscles. Here are a few exercises to try:  Adan Mary on your back, and gently pull one bent knee to your chest. Put that foot back on the floor, and then pull the other knee to your chest.  ¨ Do pelvic tilts. Lie on your back with your knees bent. Tighten your stomach muscles. Pull your belly button (navel) in and up toward your ribs. You should feel like your back is pressing to the floor and your hips and pelvis are slightly lifting off the floor. Hold for 6 seconds while breathing smoothly. ¨ Sit with your back flat against a wall. · Keep your core muscles strong. The muscles of your back, belly (abdomen), and buttocks support your spine. ¨ Pull in your belly and imagine pulling your navel toward your spine. Hold this for 6 seconds, then relax.  Remember to keep breathing normally as you tense your muscles. ¨ Do curl-ups. Always do them with your knees bent. Keep your low back on the floor, and curl your shoulders toward your knees using a smooth, slow motion. Keep your arms folded across your chest. If this bothers your neck, try putting your hands behind your neck (not your head), with your elbows spread apart. ¨ Lie on your back with your knees bent and your feet flat on the floor. Tighten your belly muscles, and then push with your feet and raise your buttocks up a few inches. Hold this position 6 seconds as you continue to breathe normally, then lower yourself slowly to the floor. Repeat 8 to 12 times. ¨ If you like group exercise, try Pilates or yoga. These classes have poses that strengthen the core muscles. Lead a healthy lifestyle  · Stay at a healthy weight to avoid strain on your back. · Do not smoke. Smoking increases the risk of osteoporosis, which weakens the spine. If you need help quitting, talk to your doctor about stop-smoking programs and medicines. These can increase your chances of quitting for good. Where can you learn more? Go to http://leonHeadspaceedinson.info/. Enter L315 in the search box to learn more about \"Learning About How to Have a Healthy Back. \"  Current as of: March 21, 2017  Content Version: 11.4  © 7197-6899 [employer]. Care instructions adapted under license by [facility] (which disclaims liability or warranty for this information). If you have questions about a medical condition or this instruction, always ask your healthcare professional. [place] [address] any warranty or liability for your use of this information. Preventing Falls: Care Instructions  Your Care Instructions    Getting around your home safely can be a challenge if you have injuries or health problems that make it easy for you to fall.  Loose rugs and furniture in walkways are among the dangers for many older people who have problems walking or who have poor eyesight. People who have conditions such as arthritis, osteoporosis, or dementia also have to be careful not to fall. You can make your home safer with a few simple measures. Follow-up care is a key part of your treatment and safety. Be sure to make and go to all appointments, and call your doctor if you are having problems. It's also a good idea to know your test results and keep a list of the medicines you take. How can you care for yourself at home? Taking care of yourself  · You may get dizzy if you do not drink enough water. To prevent dehydration, drink plenty of fluids, enough so that your urine is light yellow or clear like water. Choose water and other caffeine-free clear liquids. If you have kidney, heart, or liver disease and have to limit fluids, talk with your doctor before you increase the amount of fluids you drink. · Exercise regularly to improve your strength, muscle tone, and balance. Walk if you can. Swimming may be a good choice if you cannot walk easily. · Have your vision and hearing checked each year or any time you notice a change. If you have trouble seeing and hearing, you might not be able to avoid objects and could lose your balance. · Know the side effects of the medicines you take. Ask your doctor or pharmacist whether the medicines you take can affect your balance. Sleeping pills or sedatives can affect your balance. · Limit the amount of alcohol you drink. Alcohol can impair your balance and other senses. · Ask your doctor whether calluses or corns on your feet need to be removed. If you wear loose-fitting shoes because of calluses or corns, you can lose your balance and fall. · Talk to your doctor if you have numbness in your feet. Preventing falls at home  · Remove raised doorway thresholds, throw rugs, and clutter. Repair loose carpet or raised areas in the floor.   · Move furniture and electrical cords to keep them out of walking paths. · Use nonskid floor wax, and wipe up spills right away, especially on ceramic tile floors. · If you use a walker or cane, put rubber tips on it. If you use crutches, clean the bottoms of them regularly with an abrasive pad, such as steel wool. · Keep your house well lit, especially [place], and outside walkways. Use night-lights in areas such as hallways and bathrooms. Add extra light switches or use remote switches (such as switches that go on or off when you clap your hands) to make it easier to turn lights on if you have to get up during the night. · Install sturdy handrails on stairways. · Move items in your cabinets so that the things you use a lot are on the lower shelves (about waist level). · Keep a cordless phone and a flashlight with new batteries by your bed. If possible, put a phone in each of the main rooms of your house, or carry a cell phone in case you fall and cannot reach a phone. Or, you can wear a device around your neck or wrist. You push a button that sends a signal for help. · Wear low-heeled shoes that fit well and give your feet good support. Use footwear with nonskid soles. Check the heels and soles of your shoes for wear. Repair or replace worn heels or soles. · Do not wear socks without shoes on wood floors. · Walk on the grass when the sidewalks are slippery. If you live in an area that gets snow and ice in the winter, sprinkle salt on slippery steps and sidewalks. Preventing falls in the bath  · Install grab bars and nonskid mats inside and outside your shower or tub and near the toilet and sinks. · Use shower chairs and bath benches. · Use a hand-held shower head that will allow you to sit while showering.   · Get into a tub or shower by putting the weaker leg in first. Get out of a tub or shower with your strong side first.  · Repair loose toilet seats and consider installing a raised toilet seat to make getting on and off the toilet easier. · Keep your bathroom door unlocked while you are in the shower. Where can you learn more? Go to http://leon-edinson.info/. Enter 0476 79 69 [id number] in the search box to learn more about \"Preventing Falls: Care Instructions. \"  Current as of: May 12, 2017  Content Version: 11.4  © 4890-1872 [employer]. Care instructions adapted under license by [employer] (which disclaims liability or warranty for this information). If you have questions about a medical condition or this instruction, always ask your healthcare professional. [place] [address] any warranty or liability for your use of this information.

## 2018-02-20 NOTE — MR AVS SNAPSHOT
[facility] [address] [facility] [address] 
[phone] Patient: Roseline Scott MRN: [id number] ISL:[id number] Visit Information Date & Time Provider Department Dept. Phone Encounter #  
 2/20/2018 10:15 AM Herson Hill MD 4 [facility], [address] and [facility] [phone] [id number] Follow-up Instructions Return in about 6 weeks (around 4/3/2018) for with Dr. Edyta Stearns. Follow-up and Disposition History Your Appointments 3/19/2018 10:00 AM  
Office Visit with Erica Lynch MD  
[facility]) Appt Note: 3 month  
 [address], [address] [place] [address] Upcoming Health Maintenance Date Due Hepatitis C Screening 1950 DTaP/Tdap/Td series (1 - Tdap) 5/14/1973 FOBT Q 1 YEAR AGE 50-75 5/14/2002 ZOSTER VACCINE AGE 60> 3/14/2012 BREAST CANCER SCRN MAMMOGRAM 4/4/2015 GLAUCOMA SCREENING Q2Y 5/14/2017 OSTEOPOROSIS SCREENING (DEXA) 5/14/2017 Pneumococcal 65+ High/Highest Risk (1 of 2 - PCV13) 5/14/2017 MEDICARE YEARLY EXAM 5/14/2017 Influenza Age 5 to Adult 8/1/2017 Allergies as of 2/20/2018  Review Complete On: 2/20/2018 By: Herson Hill MD  
  
 Severity Noted Reaction Type Reactions Aspirin High 08/07/2013   Systemic Swelling Pt states her whole body swells when she takes aspirin. Adhesive    Unable to Obtain Pcn [Penicillins]  08/20/2012    Rash Current Immunizations  Reviewed on 2/16/2018 No immunizations on file. Not reviewed this visit You Were Diagnosed With   
  
 Codes Comments S/P lumbar fusion    -  Primary ICD-10-CM: Z98.1 ICD-9-CM: V45.4 Lumbar stenosis with neurogenic claudication     ICD-10-CM: M48.062 
ICD-9-CM: 724.03 Right leg weakness     ICD-10-CM: R29.898 ICD-9-CM: 729.89 Vitals BP Temp Resp Height(growth percentile) Weight(growth percentile) BMI  
 116/56 98.2 °F (36.8 °C) (Oral) 17 5' 6\" (1.676 m) 159 lb 9.6 oz (72.4 kg) 25.76 kg/m2 OB Status Smoking Status Postmenopausal Never Smoker BMI and BSA Data Body Mass Index Body Surface Area 25.76 kg/m 2 1.84 m 2 Preferred Pharmacy Pharmacy Name Phone [id number] [address] [phone] Your Updated Medication List  
  
   
This list is accurate as of: 2/20/18 11:11 AM.  Always use your most recent med list.  
  
  
  
  
 albuterol 90 mcg/actuation inhaler Commonly known as:  PROAIR HFA  
1-2 puffs every 4-6 hrs. aluminum-magnesium hydroxide 200-200 mg/5 mL susp 5 mL, diphenhydrAMINE 12.5 mg/5 mL liqd 12.5 mg, lidocaine 2 % soln 5 mL  
5 mL by Swish and Spit route two (2) times a day. Magic mouth wash  Maalox Lidocaine 2% viscous  Diphenhydramine oral solution   Pharmacy to mix equal portions of ingredients to a total volume as indicated in the dispense amount. amiodarone 200 mg tablet Commonly known as:  CORDARONE Take 200 mg by mouth daily. Indications: PREVENTION OF VENTRICULAR FIBRILLATION  
  
 COREG 3.125 mg tablet Generic drug:  carvedilol Take 3.125 mg by mouth two (2) times daily (with meals). digoxin 0.125 mg tablet Commonly known as:  LANOXIN Take 0.125 mg by mouth daily. dronabinol 5 mg capsule Commonly known as:  Veria Alu Take 1 Cap by mouth two (2) times a day. furosemide 40 mg tablet Commonly known as:  LASIX Take 40 mg by mouth daily. Indications: Edema  
  
 gabapentin 300 mg capsule Commonly known as:  NEURONTIN Take 1 po q am and 2 po q pm  
  
 lidocaine-prilocaine topical cream  
Commonly known as:  EMLA  
APPLY OVER PORT 1 HOUR PRIOR TO CHEMOTHERAPY THAN COVER WITH [place] WRAP LINZESS 145 mcg Cap capsule Generic drug:  linaclotide TAKE 1 CAPSULE BY MOUTH DAILY 30 MINUTES BEFORE BREAKFAST  
  
 lisinopril 10 mg tablet Commonly known as:  Deidre Best Take 10 mg by mouth daily. Indications: hypertension  
  
 loratadine 10 mg Cap Take 10 mg by mouth daily. magic mouthwash solution Take 5 mL by mouth three (3) times daily as needed for Stomatitis. Magic mouth wash  Maalox Lidocaine 2% viscous  Diphenhydramine oral solution   Pharmacy to mix equal portions of ingredients to a total volume as indicated in the dispense amount. meloxicam 7.5 mg tablet Commonly known as:  MOBIC Take 7.5 mg by mouth daily. nabumetone 750 mg tablet Commonly known as:  RELAFEN Take 750 mg by mouth daily. Indications: OSTEOARTHRITIS  
  
 ondansetron hcl 8 mg tablet Commonly known as:  ZOFRAN (AS HYDROCHLORIDE) Take 1 Tab by mouth every eight (8) hours as needed for Nausea. oxyCODONE-acetaminophen  mg per tablet Commonly known as:  PERCOCET Take 1 Tab by mouth every six (6) hours as needed for Pain. Max Daily Amount: 4 Tabs. Indications: Pain, ACUTE POST OP PAIN * potassium chloride 20 mEq tablet Commonly known as:  K-DUR, KLOR-CON Take 2 Tabs by mouth daily. * KLOR-CON M20 20 mEq tablet Generic drug:  potassium chloride TAKE ONE TABLET BY MOUTH ONCE A DAY  
  
 PRUVATE 21-7 PO Take 325 mg by mouth daily. Indications: iron deficiency Senna 8.6 mg tablet Generic drug:  senna Take 1 Tab by mouth daily. temazepam 30 mg capsule Commonly known as:  RESTORIL  
  
 topiramate 50 mg tablet Commonly known as:  TOPAMAX * XARELTO 10 mg tablet Generic drug:  rivaroxaban Take 10 mg by mouth daily. * rivaroxaban 10 mg tablet Commonly known as:  Faraz Mcdonough Take 10 mg by mouth daily (with breakfast). Indications: PREVENTION OF DEEP VEIN THROMBOSIS RECURRENCE  
  
 zolpidem 10 mg tablet Commonly known as:  AMBIEN  
 TAKE 1 TABLET BY MOUTH NIGHTLY AS NEEDED FOR SLEEP. MAX DAILY AMOUNT 10 MG  
  
 * Notice: This list has 4 medication(s) that are the same as other medications prescribed for you. Read the directions carefully, and ask your doctor or other care provider to review them with you. We Performed the Following AMB POC XRAY, SPINE, LUMBOSACRAL; 2 O [79589 CPT(R)] REFERRAL TO PHYSICAL THERAPY [XIH69 Custom] Comments:  
 Evaluate and Treat for RLE weakness and S/P LS Fusion L3/4 Follow-up Instructions Return in about 6 weeks (around 4/3/2018) for with Dr. Edyta Stearns. To-Do List   
 02/23/2018  9:00 AM  
  Appointment with [facility] FAST TRACK NURSE at [facility] OP INFUSION ([phone]) Referral Information Referral ID Referred By Referred To  
  
 [id number] John Mcqueen Not Available Visits Status Start Date End Date 1 New Request 2/20/18 2/20/19 If your referral has a status of pending review or denied, additional information will be sent to support the outcome of this decision. Patient Instructions Learning About How to Have a Healthy Back What causes back pain? Back pain is often caused by overuse, strain, or injury. For example, people often hurt their backs playing sports or working in the yard, being jolted in a car accident, or lifting something too heavy. Aging plays a part too. Your bones and muscles tend to lose strength as you age, which makes injury more likely. The spongy discs between the bones of the spine (vertebrae) may suffer from wear and tear and no longer provide enough cushion between the bones. A disc that bulges or breaks open (herniated disc) can press on nerves, causing back pain. In some people, back pain is the result of arthritis, broken vertebrae caused by bone loss (osteoporosis), illness, or a spine problem. Although most people have back pain at one time or another, there are steps you can take to make it less likely. How can you have a healthy back? Reduce stress on your back through good posture Slumping or slouching alone may not cause low back pain. But after the back has been strained or injured, bad posture can make pain worse. · Sleep in a position that maintains your back's normal curves and on a mattress that feels comfortable. Sleep on your side with a pillow between your knees, or sleep on your back with a pillow under your knees. These positions can reduce strain on your back. · Stand and sit up straight. \"Good posture\" generally means your ears, shoulders, and hips are in a straight line. · If you must stand for a long time, put one foot on a stool, ledge, or box. Switch feet every now and then. · Sit in a chair that is low enough to let you place both feet flat on the floor with both knees nearly level with your hips. If your chair or desk is too high, use a footrest to raise your knees. Place a small pillow, a rolled-up towel, or a lumbar roll in the curve of your back if you need extra support. · Try a kneeling chair, which helps tilt your hips forward. This takes pressure off your lower back. · Try sitting on an exercise ball. It can rock from side to side, which helps keep your back loose. · When driving, keep your knees nearly level with your hips. Sit straight, and drive with both hands on the steering wheel. Your arms should be in a slightly bent position. Reduce stress on your back through careful lifting · Squat down, bending at the hips and knees only. If you need to, put one knee to the floor and extend your other knee in front of you, bent at a right angle (half kneeling). · Press your chest straight forward. This helps keep your upper back straight while keeping a slight arch in your low back. · Hold the load as close to your body as possible, at the level of your belly button (navel). · Use your feet to change direction, taking small steps. · Lead with your hips as you change direction. Keep your shoulders in line with your hips as you move. · Set down your load carefully, squatting with your knees and hips only. Exercise and stretch your back · Do some exercise on most days of the week, if your doctor says it is okay. You can walk, run, swim, or cycle. · Stretch your back muscles. Here are a few exercises to try: ¨ Lie on your back, and gently pull one bent knee to your chest. Put that foot back on the floor, and then pull the other knee to your chest. 
¨ Do pelvic tilts. Lie on your back with your knees bent. Tighten your stomach muscles. Pull your belly button (navel) in and up toward your ribs. You should feel like your back is pressing to the floor and your hips and pelvis are slightly lifting off the floor. Hold for 6 seconds while breathing smoothly. ¨ Sit with your back flat against a wall. · Keep your core muscles strong. The muscles of your back, belly (abdomen), and buttocks support your spine. ¨ Pull in your belly and imagine pulling your navel toward your spine. Hold this for 6 seconds, then relax. Remember to keep breathing normally as you tense your muscles. ¨ Do curl-ups. Always do them with your knees bent. Keep your low back on the floor, and curl your shoulders toward your knees using a smooth, slow motion. Keep your arms folded across your chest. If this bothers your neck, try putting your hands behind your neck (not your head), with your elbows spread apart. ¨ Lie on your back with your knees bent and your feet flat on the floor. Tighten your belly muscles, and then push with your feet and raise your buttocks up a few inches. Hold this position 6 seconds as you continue to breathe normally, then lower yourself slowly to the floor. Repeat 8 to 12 times. ¨ If you like group exercise, try Pilates or yoga. These classes have poses that strengthen the core muscles. Lead a healthy lifestyle · Stay at a healthy weight to avoid strain on your back. · Do not smoke. Smoking increases the risk of osteoporosis, which weakens the spine. If you need help quitting, talk to your doctor about stop-smoking programs and medicines. These can increase your chances of quitting for good. Where can you learn more? Go to http://leon-edinson.info/. Enter L315 in the search box to learn more about \"Learning About How to Have a Healthy Back. \" Current as of: March 21, 2017 Content Version: 11.4 © 9672-8515 [employer]. Care instructions adapted under license by "[employer]" (which disclaims liability or warranty for this information). If you have questions about a medical condition or this instruction, always ask your healthcare professional. [address] any warranty or liability for your use of this information. Preventing Falls: Care Instructions Your Care Instructions Getting around your home safely can be a challenge if you have injuries or health problems that make it easy for you to fall. Loose rugs and furniture in walkways are among the dangers for many older people who have problems walking or who have poor eyesight. People who have conditions such as arthritis, osteoporosis, or dementia also have to be careful not to fall. You can make your home safer with a few simple measures. Follow-up care is a key part of your treatment and safety. Be sure to make and go to all appointments, and call your doctor if you are having problems. It's also a good idea to know your test results and keep a list of the medicines you take. How can you care for yourself at home? Taking care of yourself · You may get dizzy if you do not drink enough water. To prevent dehydration, drink plenty of fluids, enough so that your urine is light yellow or clear like water.  Choose water and other caffeine-free clear liquids. If you have kidney, heart, or liver disease and have to limit fluids, talk with your doctor before you increase the amount of fluids you drink. · Exercise regularly to improve your strength, muscle tone, and balance. Walk if you can. Swimming may be a good choice if you cannot walk easily. · Have your vision and hearing checked each year or any time you notice a change. If you have trouble seeing and hearing, you might not be able to avoid objects and could lose your balance. · Know the side effects of the medicines you take. Ask your doctor or pharmacist whether the medicines you take can affect your balance. Sleeping pills or sedatives can affect your balance. · Limit the amount of alcohol you drink. Alcohol can impair your balance and other senses. · Ask your doctor whether calluses or corns on your feet need to be removed. If you wear loose-fitting shoes because of calluses or corns, you can lose your balance and fall. · Talk to your doctor if you have numbness in your feet. Preventing falls at home · Remove raised doorway thresholds, throw rugs, and clutter. Repair loose carpet or raised areas in the floor. · Move furniture and electrical cords to keep them out of walking paths. · Use nonskid floor wax, and wipe up spills right away, especially on ceramic tile floors. · If you use a walker or cane, put rubber tips on it. If you use crutches, clean the bottoms of them regularly with an abrasive pad, such as steel wool. · Keep your house well lit, especially [place] Sous, and outside walkways. Use night-lights in areas such as hallways and bathrooms. Add extra light switches or use remote switches (such as switches that go on or off when you clap your hands) to make it easier to turn lights on if you have to get up during the night. · Install sturdy handrails on stairways. · Move items in your cabinets so that the things you use a lot are on the lower shelves (about waist level). · Keep a cordless phone and a flashlight with new batteries by your bed. If possible, put a phone in each of the main rooms of your house, or carry a cell phone in case you fall and cannot reach a phone. Or, you can wear a device around your neck or wrist. You push a button that sends a signal for help. · Wear low-heeled shoes that fit well and give your feet good support. Use footwear with nonskid soles. Check the heels and soles of your shoes for wear. Repair or replace worn heels or soles. · Do not wear socks without shoes on wood floors. · Walk on the grass when the sidewalks are slippery. If you live in an area that gets snow and ice in the winter, sprinkle salt on slippery steps and sidewalks. Preventing falls in the bath · Install grab bars and nonskid mats inside and outside your shower or tub and near the toilet and sinks. · Use shower chairs and bath benches. · Use a hand-held shower head that will allow you to sit while showering. · Get into a tub or shower by putting the weaker leg in first. Get out of a tub or shower with your strong side first. 
· Repair loose toilet seats and consider installing a raised toilet seat to make getting on and off the toilet easier. · Keep your bathroom door unlocked while you are in the shower. Where can you learn more? Go to http://leon-edinson.info/. Enter 0476 79 69 71 in the search box to learn more about \"Preventing Falls: Care Instructions. \" Current as of: May 12, 2017 Content Version: 11.4 © 9608-2176 [employer]. Care instructions adapted under license by [employer] (which disclaims liability or warranty for this information). If you have questions about a medical condition or this instruction, always ask your healthcare professional. [address] any warranty or liability for your use of this information. Patient Instructions History Please provide this summary of care documentation to your next provider. Your primary care clinician is listed as Namon Drop. If you have any questions after today's visit, please call [phone].

## 2018-02-23 ENCOUNTER — HOSPITAL ENCOUNTER (OUTPATIENT)
Dept: INFUSION THERAPY | Age: 68
Discharge: HOME | End: 2018-02-23
Payer: MEDICARE

## 2018-02-23 VITALS
DIASTOLIC BLOOD PRESSURE: 70 MMHG | TEMPERATURE: 97.7 F | HEART RATE: 63 BPM | SYSTOLIC BLOOD PRESSURE: 103 MMHG | RESPIRATION RATE: 18 BRPM

## 2018-02-23 LAB
BASO+EOS+MONOS # BLD AUTO: 0.9 K/UL (ref 0–2.3)
BASO+EOS+MONOS # BLD AUTO: 18 % (ref 0.1–17)
BASOPHILS # BLD: 0 K/UL (ref 0–0.06)
BASOPHILS NFR BLD: 0 % (ref 0–2)
DIFFERENTIAL METHOD BLD: ABNORMAL
DIFFERENTIAL METHOD BLD: ABNORMAL
EOSINOPHIL # BLD: 0.2 K/UL (ref 0–0.4)
EOSINOPHIL NFR BLD: 4 % (ref 0–5)
ERYTHROCYTE [DISTWIDTH] IN BLOOD BY AUTOMATED COUNT: 16.8 % (ref 11.6–14.5)
ERYTHROCYTE [DISTWIDTH] IN BLOOD BY AUTOMATED COUNT: 17.6 % (ref 11.5–14.5)
HCT VFR BLD AUTO: 29.5 % (ref 35–45)
HCT VFR BLD AUTO: 31.5 % (ref 36–48)
HGB BLD-MCNC: 10.1 G/DL (ref 12–16)
HGB BLD-MCNC: 10.2 G/DL (ref 12–16)
LYMPHOCYTES # BLD: 1.8 K/UL (ref 0.9–3.6)
LYMPHOCYTES # BLD: 1.9 K/UL (ref 1.1–5.9)
LYMPHOCYTES NFR BLD: 36 % (ref 21–52)
LYMPHOCYTES NFR BLD: 38 % (ref 14–44)
MCH RBC QN AUTO: 27.3 PG (ref 24–34)
MCH RBC QN AUTO: 27.3 PG (ref 25–35)
MCHC RBC AUTO-ENTMCNC: 32.1 G/DL (ref 31–37)
MCHC RBC AUTO-ENTMCNC: 34.6 G/DL (ref 31–37)
MCV RBC AUTO: 79.1 FL (ref 74–97)
MCV RBC AUTO: 85.1 FL (ref 78–102)
MONOCYTES # BLD: 0.9 K/UL (ref 0.05–1.2)
MONOCYTES NFR BLD: 17 % (ref 3–10)
NEUTS SEG # BLD: 2.2 K/UL (ref 1.8–8)
NEUTS SEG # BLD: 2.2 K/UL (ref 1.8–9.5)
NEUTS SEG NFR BLD: 43 % (ref 40–73)
NEUTS SEG NFR BLD: 44 % (ref 40–70)
PLATELET # BLD AUTO: 129 K/UL (ref 135–420)
RBC # BLD AUTO: 3.7 M/UL (ref 4.1–5.1)
RBC # BLD AUTO: 3.73 M/UL (ref 4.2–5.3)
WBC # BLD AUTO: 5 K/UL (ref 4.5–13)
WBC # BLD AUTO: 5.1 K/UL (ref 4.6–13.2)

## 2018-02-23 PROCEDURE — 85025 COMPLETE CBC W/AUTO DIFF WBC: CPT | Performed by: INTERNAL MEDICINE

## 2018-02-23 PROCEDURE — 74011250636 HC RX REV CODE- 250/636: Performed by: NURSE PRACTITIONER

## 2018-02-23 PROCEDURE — 74011000250 HC RX REV CODE- 250: Performed by: NURSE PRACTITIONER

## 2018-02-23 PROCEDURE — 74011250636 HC RX REV CODE- 250/636

## 2018-02-23 PROCEDURE — 36415 COLL VENOUS BLD VENIPUNCTURE: CPT

## 2018-02-23 PROCEDURE — 96372 THER/PROPH/DIAG INJ SC/IM: CPT

## 2018-02-23 RX ORDER — WATER FOR INJECTION,STERILE
VIAL (ML) INJECTION ONCE
Status: COMPLETED | OUTPATIENT
Start: 2018-02-23 | End: 2018-02-23

## 2018-02-23 RX ADMIN — ROMIPLOSTIM 207 MCG: 250 INJECTION, POWDER, LYOPHILIZED, FOR SOLUTION SUBCUTANEOUS at 10:07

## 2018-02-23 RX ADMIN — WATER 0.41 ML: 1 INJECTION INTRAMUSCULAR; INTRAVENOUS; SUBCUTANEOUS at 10:07

## 2018-02-23 NOTE — PROGRESS NOTES
[facility] OP[facility] Progress Note    Date: 2018    Name: Rashaad Patiño    MRN: [id number]         : 1950      Ms. Genevie Boast arrived to [facility] at 8976 ambulatory    Ms. Genevie Boast was assessed and education was provided. States joint pain 8/10. States she does not take pain meds because they do not help. Declined to have pain issues addressed. reassured and positioned for comfort. Nplate. Ms. Brandi Lindsey vitals were reviewed. Visit Vitals    /70 (BP 1 Location: Right arm, BP Patient Position: Sitting)    Pulse 63    Temp 97.7 °F (36.5 °C)    Resp 18       Pt was observed for 5 minutes after obtaining vital signs prior to initiating treatment. Blood drawn for cbc via right AC venipuncture on 3rd attempt       Lab results obtained and reviewed. (Preliminary results scanned into media tab.)    Pt's preliminary platelet count was [phone],772. Per Nplate protocol, since pt's platelet count is [phone] today, pt's dose will remain at 3mcg/kg (3mcg x 69kg = 207mcg). Nplate 147MLD (4.67GO) administered as ordered SQ in patient's right upper arm. Declined bandaid. No irritation or bleeding at site    Preliminary h/h=10.1/31.5    Procrit held per parameters    Ms. Genevie Boast was discharged from [place] [address] in stable condition at 1010. She is to return on 3/2/18 at 0830 for her next appointment.     Sawyer Ashley RN  2018

## 2018-03-01 ENCOUNTER — HOSPITAL ENCOUNTER (OUTPATIENT)
Dept: PHYSICAL THERAPY | Age: 68
Discharge: HOME | End: 2018-03-01
Payer: MEDICARE

## 2018-03-01 PROCEDURE — 97140 MANUAL THERAPY 1/> REGIONS: CPT | Performed by: PHYSICAL THERAPIST

## 2018-03-01 PROCEDURE — G8978 MOBILITY CURRENT STATUS: HCPCS | Performed by: PHYSICAL THERAPIST

## 2018-03-01 PROCEDURE — 97162 PT EVAL MOD COMPLEX 30 MIN: CPT | Performed by: PHYSICAL THERAPIST

## 2018-03-01 PROCEDURE — G8979 MOBILITY GOAL STATUS: HCPCS | Performed by: PHYSICAL THERAPIST

## 2018-03-01 PROCEDURE — 97110 THERAPEUTIC EXERCISES: CPT | Performed by: PHYSICAL THERAPIST

## 2018-03-01 NOTE — PROGRESS NOTES
PT DAILY TREATMENT NOTE - [facility]     Patient Name: Erlin Carey  Date:3/1/2018  : 1950  [x]  Patient  Verified  Payor: [place] MEDICARE / Plan: [place] MEDICARE PART A & B / Product Type: Medicare /    In time:1006  Out time:1050  Total Treatment Time (min): 44  Total Timed Codes (min): 30  1:1 Treatment Time ( only): 40   Visit #: 1 of 12    Treatment Area: Right leg weakness [R29.898]    SUBJECTIVE  Pain Level (0-10 scale): 7/10  Any medication changes, allergies to medications, adverse drug reactions, diagnosis change, or new procedure performed?: [x] No    [] Yes (see summary sheet for update)  Subjective functional status/changes:   [] No changes reported  HPI: Pt c/o increased pain in the low back and weakness in the right knee s/p L3-4 fusion on 18. Reports prior to surgery she was using a FWW and had more pain in the right LE and back. States she has hx of L5-6 fusion almost 20 years ago. Reports currently her pain increases w/ standing and walking and decreases w/ laying down w/ her legs elevated and icing. Reports she had HHPT x4 weeks following surgery. Reports her sone lives with her and can help if she needs it. Reports she is a 4 time cancer survivor and had her last treatment in . Reports she has injections every Friday 2' low platelet levels. Pt has 3 steps to enter her home but no steps inside.      OBJECTIVE    Modality rationale:  to improve the patients ability to    Min Type Additional Details    [] Estim:  []Unatt       []IFC  []Premod                        []Other:  []w/ice   []w/heat  Position:  Location:    [] Estim: []Att    []TENS instruct  []NMES                    []Other:  []w/US   []w/ice   []w/heat  Position:  Location:    []  Traction: [] Cervical       []Lumbar                       [] Prone          []Supine                       []Intermittent   []Continuous Lbs:  [] before manual  [] after manual    []  Ultrasound: []Continuous   [] Pulsed []1MHz   []3MHz W/cm2:  Location:    []  Iontophoresis with dexamethasone         Location: [] Take home patch   [] In clinic    []  Ice     []  heat  []  Ice massage  []  Laser   []  Anodyne Position:  Location:    []  Laser with stim  []  Other:  Position:  Location:    []  Vasopneumatic Device Pressure:       [] lo [] med [] hi   Temperature: [] lo [] med [] hi   [] Skin assessment post-treatment:  []intact []redness- no adverse reaction    []redness  adverse reaction:     14 min []Eval                  []Re-Eval       20 min Therapeutic Exercise:  [] See flow sheet : instructed in and demo'd HEP educated in positioning while sleeping and how to elevate her LE w/o causing knee flexion contractures. Reviewed HHPT HEP and progressed. Educated on PT goals and progression   Rationale: increase ROM, increase strength, improve coordination and increase proprioception to improve the patients ability to decrease pain and improve activity tolerance      min Therapeutic Activity:  []  See flow sheet :   Rationale:   to improve the patients ability to       min Neuromuscular Re-education:  []  See flow sheet :   Rationale:   to improve the patients ability to     10 min Manual Therapy:  STM/TPR to B lumbar paraspinals, upper glutes, and QLs, scar massage   Rationale: decrease pain, increase ROM, increase tissue extensibility, decrease trigger points and increase postural awareness to improve activity tolerance and mobility     min Gait Training:  ___ feet with ___ device on level surfaces with ___ level of assist   Rationale: With   [] TE   [] TA   [] neuro   [] other: Patient Education: [x] Review HEP    [] Progressed/Changed HEP based on:   [] positioning   [] body mechanics   [] transfers   [] heat/ice application    [] other:      Other Objective/Functional Measures: Pt presents w/ SPC R hand and slowed sherrell w/ decreased B knee ext in stance phase.  MMT Right hip flex -3/5 2' pain, abd 2+/5, ext NT, knee flex 3/5, ext -3/5, ankle DF -3/5; Left hip flex 4/5, abd -3/5, ext NT, knee flex -4/5, ext 4/5, ankle DF 4+/5. Lumbar AROM limited 50% into flexion 2' pulling, ext limited 80%, Right SB limited 75% 2' pain, Left SB WFL. Unable to stand SLS w/o HHA B. + slump Right, + SLR B Right>Left, pain w/ initiation of supine bridge. Increased TTP to incision scars and along lower thoracic and lumbar paraspinals and into B upper gluteals. Scars are healing well and show no signs of infection but are sensitive. Educated in scar tissue massage and use of vitamin E for healing. Difficulty w/ rolling on the table and supine to sit transfers. Pain Level (0-10 scale) post treatment: 7/10    ASSESSMENT/Changes in Function: Focus on improving strength and balance to decrease fall risk and improve activity tolerance. Educated pt to not prop knees w/ pillow horizontally to assist in improving B knee flexion contracture development. Pt verbalized understanding. Patient will continue to benefit from skilled PT services to modify and progress therapeutic interventions, address functional mobility deficits, address ROM deficits, address strength deficits, analyze and address soft tissue restrictions, analyze and cue movement patterns, analyze and modify body mechanics/ergonomics, assess and modify postural abnormalities, address imbalance/dizziness and instruct in home and community integration to attain remaining goals. [x]  See Plan of Care  []  See progress note/recertification  []  See Discharge Summary         Progress towards goals / Updated goals:  Short Term Goals: To be accomplished in 2 weeks:  1. Pt will be independent and compliant w/ HEP to progress gains in PT. At eval: initiated HEP  2. Pt will report < or = to 3/10 pain prior to and following session to demo improve pain management at home. At eval: pain 7/10 prior to and following session  Long Term Goals: To be accomplished in 6 weeks:  1.  Pt will improve FOTO to > or = to 51 to demo improved function. At eval: FOTO = 36  2. Pt will increase MMT right hip flex, ext, and abd to > or = to 4/5 for ease w/ improved standing tolerance. At eval: MMT Right hip flex -3/5 2' pain, abd 2+/5, ext NT  3. Pt will increase core activation as demo'd by supine bridge w/o increased pain x10 for ease w/ bed mobility. At eval: pain w/ initiation of supine bridge x1  4. Pt will increase balance as demo'd by SLS B for > or = to 15 seconds w/o HHA for decreased fall risk. At eval: pt unable to stand SLS B w/o HHA  5. Pt will ambulate w/ correct heel toe gait pattern for > or = to 500ft w/ LRAD for improved community navigation.    At eval: Pt presents w/ SPC R hand and slowed sherrell w/ decreased B knee ext in stance phase    PLAN  []  Upgrade activities as tolerated     []  Continue plan of care  []  Update interventions per flow sheet       []  Discharge due to:_  [x]  Other: 3x/6 weeks      Guerline Brink PT 3/1/2018  10:55 AM    Future Appointments  Date Time Provider Nick Dixon   3/2/2018 1:00 PM [facility] FAST TRACK NURSE [facility]   3/6/2018 10:00 AM Payton Hopper, PT [facility]   3/8/2018 11:00 AM Payton Hopper, PT [facility]   3/9/2018 8:30 AM [facility] FAST TRACK NURSE [facility]   3/12/2018 9:30 AM [facility] PT [place] [address] [facility]   3/14/2018 11:00 AM [facility] PT [place] [facility]   3/15/2018 11:00 AM Payton Hopper PT [facility]   3/16/2018 8:30 AM [facility] FAST TRACK NURSE [facility]   3/19/2018 10:00 AM Gurvinder Shell MD [id number] [facility]   3/20/2018 10:00 AM Payton Hopper, PT [facility]   3/22/2018 12:00 PM Dorinda Khan, PT [facility]   3/23/2018 8:30 AM [facility] FAST TRACK NURSE [facility]   3/26/2018 10:00 AM Isidoro Paul, PTA [facility]   3/28/2018 10:00 AM Isidoro Paul, PTA [facility]   3/29/2018 10:30 AM Payton Hopper, PT [facility]   4/2/2018 10:00 AM Isidoro Paul, PTA [facility]   4/4/2018 10:00 AM Isidoro Paul, PTA [facility]   4/5/2018 9:30 AM Guerline Brink PT [facility]   4/9/2018 10:00 AM Lilliana Car, PTA [facility]   4/10/2018 9:15 AM Valerie Garcia  [address] 4/11/2018 10:00 AM Lilliana Car, PTA [facility]   4/12/2018 9:30 AM Maribeth Vega, PT [facility]

## 2018-03-01 NOTE — PROGRESS NOTES
[facility]              [address]        [place], [address] [place]   [phone] fax    Plan of Care/ Statement of Necessity for Physical Therapy Services    Patient name: Herbie Valle Start of Care: 3/1/2018   Referral source: Eunice Brink MD : 1950    Medical Diagnosis: Right leg weakness [R29.898]   Onset Date:18    Treatment Diagnosis: LBP and R LE weakness   Prior Hospitalization: see medical history Provider#: [id number]   Medications: Verified on Patient summary List    Comorbidities: allergies, back pain, CA, CHF or heart disease, GI disease, prior surgery, sleep dysfunction    Prior Level of Function: hx of back pain and L5-6 fusion, ambulating w/ FWW and n/t throughout R LE      The Plan of Care and following information is based on the information from the initial evaluation. Assessment/ key information: Pt is a 75 y/o female w/  c/o increased pain in the low back and weakness in the right knee s/p L3-4 fusion on 18. Reports prior to surgery she was using a FWW and had more pain in the right LE and back. States she has hx of L5-6 fusion almost 20 years ago. Reports currently her pain increases w/ standing and walking and decreases w/ laying down w/ her legs elevated and icing. Reports she had HHPT x4 weeks following surgery. Reports her sone lives with her and can help if she needs it. Reports she is a 4 time cancer survivor and had her last treatment in . Reports she has injections every Friday 2' low platelet levels. Pt has 3 steps to enter her home but no steps inside. Pt presents w/ SPC R hand and slowed sherrell w/ decreased B knee ext in stance phase. MMT Right hip flex -3/5 2' pain, abd 2+/5, ext NT, knee flex 3/5, ext -3/5, ankle DF -3/5; Left hip flex 4/5, abd -3/5, ext NT, knee flex -4/5, ext 4/5, ankle DF 4+/5. Lumbar AROM limited 50% into flexion 2' pulling, ext limited 80%, Right SB limited 75% 2' pain, Left SB WFL. Unable to stand SLS w/o HHA B. + slump Right, + SLR B Right>Left, pain w/ initiation of supine bridge. Increased TTP to incision scars and along lower thoracic and lumbar paraspinals and into B upper gluteals. Scars are healing well and show no signs of infection but are sensitive. Educated in scar tissue massage and use of vitamin E for healing. Difficulty w/ rolling on the table and supine to sit transfers. Pt will benefit from skilled PT to address the deficits and progress with pt goals. Evaluation Complexity History HIGH Complexity :3+ comorbidities / personal factors will impact the outcome/ POC ; Examination HIGH Complexity : 4+ Standardized tests and measures addressing body structure, function, activity limitation and / or participation in recreation  ;Presentation MEDIUM Complexity : Evolving with changing characteristics  ; Clinical Decision Making MEDIUM Complexity : FOTO score of 26-74  Overall Complexity Rating: MEDIUM  Problem List: pain affecting function, decrease ROM, decrease strength, impaired gait/ balance, decrease ADL/ functional abilitiies, decrease activity tolerance, decrease flexibility/ joint mobility and decrease transfer abilities   Treatment Plan may include any combination of the following: Therapeutic exercise, Therapeutic activities, Neuromuscular re-education, Physical agent/modality, Gait/balance training, Manual therapy, Patient education, Functional mobility training and Stair training  Patient / Family readiness to learn indicated by: asking questions, trying to perform skills and interest  Persons(s) to be included in education: patient (P)  Barriers to Learning/Limitations: None  Patient Goal (s): hope pain will end, hope leg will gain strength and hope to stop walking with a cane  Patient Self Reported Health Status: good  Rehabilitation Potential: good    Short Term Goals: To be accomplished in 2 weeks:  1.  Pt will be independent and compliant w/ HEP to progress gains in PT.  2. Pt will report < or = to 3/10 pain prior to and following session to demo improve pain management at home. Long Term Goals: To be accomplished in 6 weeks:  1. Pt will improve FOTO to > or = to 51 to demo improved function. 2. Pt will increase MMT right hip flex, ext, and abd to > or = to 4/5 for ease w/ improved standing tolerance. 3. Pt will increase core activation as demo'd by supine bridge w/o increased pain x10 for ease w/ bed mobility. 4. Pt will increase balance as demo'd by SLS B for > or = to 15 seconds w/o HHA for decreased fall risk. 5. Pt will ambulate w/ correct heel toe gait pattern for > or = to 500ft w/ LRAD for improved community navigation. Frequency / Duration: Patient to be seen 3 times per week for 6 weeks. Patient/ Caregiver education and instruction: Diagnosis, prognosis, activity modification and exercises   [x]  Plan of care has been reviewed with PTA    G-Codes (GP)  Mobility   Current  CL= 60-79%   Goal  CK= 40-59%    The severity rating is based on clinical judgment and the FOTO score. Certification Period: 3/1/18 - 5/30/18  Cher Blanco, PT 3/1/2018 3:14 PM  ________________________________________________________________________    I certify that the above Therapy Services are being furnished while the patient is under my care. I agree with the treatment plan and certify that this therapy is necessary.     [de-identified] Signature:____________________  Date:____________Time: _________    Please sign and return to [facility]              [address], [address] [place]   [phone] fax

## 2018-03-02 ENCOUNTER — HOSPITAL ENCOUNTER (OUTPATIENT)
Dept: INFUSION THERAPY | Age: 68
Discharge: HOME | End: 2018-03-02
Payer: MEDICARE

## 2018-03-02 VITALS
SYSTOLIC BLOOD PRESSURE: 135 MMHG | DIASTOLIC BLOOD PRESSURE: 75 MMHG | TEMPERATURE: 97.7 F | HEART RATE: 76 BPM | RESPIRATION RATE: 18 BRPM

## 2018-03-02 LAB
BASO+EOS+MONOS # BLD AUTO: 0.8 K/UL (ref 0–2.3)
BASO+EOS+MONOS # BLD AUTO: 11 % (ref 0.1–17)
DIFFERENTIAL METHOD BLD: ABNORMAL
ERYTHROCYTE [DISTWIDTH] IN BLOOD BY AUTOMATED COUNT: 17.5 % (ref 11.5–14.5)
HCT VFR BLD AUTO: 29 % (ref 36–48)
HGB BLD-MCNC: 9.9 G/DL (ref 12–16)
LYMPHOCYTES # BLD: 1.5 K/UL (ref 1.1–5.9)
LYMPHOCYTES NFR BLD: 19 % (ref 14–44)
MCH RBC QN AUTO: 27.8 PG (ref 25–35)
MCHC RBC AUTO-ENTMCNC: 34.1 G/DL (ref 31–37)
MCV RBC AUTO: 81.5 FL (ref 78–102)
NEUTS SEG # BLD: 5.3 K/UL (ref 1.8–9.5)
NEUTS SEG NFR BLD: 70 % (ref 40–70)
PLATELET # BLD AUTO: 107 K/UL (ref 135–420)
RBC # BLD AUTO: 3.56 M/UL (ref 4.1–5.1)
WBC # BLD AUTO: 7.6 K/UL (ref 4.5–13)

## 2018-03-02 PROCEDURE — 85025 COMPLETE CBC W/AUTO DIFF WBC: CPT | Performed by: INTERNAL MEDICINE

## 2018-03-02 PROCEDURE — 74011250636 HC RX REV CODE- 250/636: Performed by: NURSE PRACTITIONER

## 2018-03-02 PROCEDURE — 74011000250 HC RX REV CODE- 250

## 2018-03-02 PROCEDURE — 74011250636 HC RX REV CODE- 250/636

## 2018-03-02 PROCEDURE — 85049 AUTOMATED PLATELET COUNT: CPT | Performed by: INTERNAL MEDICINE

## 2018-03-02 PROCEDURE — 36415 COLL VENOUS BLD VENIPUNCTURE: CPT

## 2018-03-02 PROCEDURE — 96372 THER/PROPH/DIAG INJ SC/IM: CPT

## 2018-03-02 RX ORDER — WATER FOR INJECTION,STERILE
VIAL (ML) INJECTION
Status: COMPLETED
Start: 2018-03-02 | End: 2018-03-02

## 2018-03-02 RX ADMIN — ERYTHROPOIETIN 40000 UNITS: 40000 INJECTION, SOLUTION INTRAVENOUS; SUBCUTANEOUS at 14:06

## 2018-03-02 RX ADMIN — ROMIPLOSTIM 207 MCG: 250 INJECTION, POWDER, LYOPHILIZED, FOR SOLUTION SUBCUTANEOUS at 14:06

## 2018-03-02 RX ADMIN — ERYTHROPOIETIN 20000 UNITS: 20000 INJECTION, SOLUTION INTRAVENOUS; SUBCUTANEOUS at 14:05

## 2018-03-02 RX ADMIN — WATER 0.72 ML: 1 INJECTION INTRAMUSCULAR; INTRAVENOUS; SUBCUTANEOUS at 14:12

## 2018-03-02 NOTE — PROGRESS NOTES
[facility] Progress Note    Date: 2018    Name: Maritza Fink    MRN: [id number]         : 1950      Ms. Norah Oliveira was assessed and education was provided. No changes noted since last treatment    Ms. Pagan's vitals were reviewed and patient was observed for 5 minutes prior to treatment. Visit Vitals    /75    Pulse 76    Temp 97.7 °F (36.5 °C)    Resp 18       Lab results were obtained and reviewed. Recent Results (from the past 12 hour(s))   CBC WITH 3 PART DIFF    Collection Time: 18  1:44 PM   Result Value Ref Range    WBC 7.6 4.5 - 13.0 K/uL    RBC 3.56 (L) 4.10 - 5.10 M/uL    HGB 9.9 (L) 12.0 - 16 g/dL    HCT 29.0 (L) 36 - 48 %    MCV 81.5 78 - 102 FL    MCH 27.8 25.0 - 35.0 PG    MCHC 34.1 31 - 37 g/dL    RDW 17.5 (H) 11.5 - 14.5 %    NEUTROPHILS 70 40 - 70 %    MIXED CELLS 11 0.1 - 17 %    LYMPHOCYTES 19 14 - 44 %    ABS. NEUTROPHILS 5.3 1.8 - 9.5 K/UL    ABS. MIXED CELLS 0.8 0.0 - 2.3 K/uL    ABS. LYMPHOCYTES 1.5 1.1 - 5.9 K/UL    DF AUTOMATED     CBC drawn via venipuncture from right hand X 1 attempt    Per results of CBC procrit indicated today     Procrit 60,0000 units was administered subcutaneous in  Right arm    N-plate 982 mcg was administered subcutaneous in right arm       Ms. Pagan tolerated well, and had no complaints. Patient armband removed and shredded. Ms. Norah Oliveira was discharged from [place] [id number] in stable condition at 1410. She is to return on 2018 at 1300 for her next appointment.     Xenia Kaiser, RN  2018  3:06 PM

## 2018-03-06 ENCOUNTER — HOSPITAL ENCOUNTER (OUTPATIENT)
Dept: PHYSICAL THERAPY | Age: 68
Discharge: HOME | End: 2018-03-06
Payer: MEDICARE

## 2018-03-06 PROCEDURE — 97112 NEUROMUSCULAR REEDUCATION: CPT

## 2018-03-06 PROCEDURE — 97140 MANUAL THERAPY 1/> REGIONS: CPT

## 2018-03-06 PROCEDURE — 97110 THERAPEUTIC EXERCISES: CPT

## 2018-03-06 NOTE — PROGRESS NOTES
PT DAILY TREATMENT NOTE - [facility]     Patient Name: Jax Valles  Date:3/6/2018  : 1950  [x]  Patient  Verified  Payor: [place] MEDICARE / Plan: [place] MEDICARE PART A & B / Product Type: Medicare /    In time:10:10  Out time:10:55  Total Treatment Time (min): 45  Total Timed Codes (min): 45  1:1 Treatment Time ([address] only): 39   Visit #: 2 of 12    Treatment Area: Right leg weakness [R29.898]    SUBJECTIVE  Pain Level (0-10 scale): 7-8  Any medication changes, allergies to medications, adverse drug reactions, diagnosis change, or new procedure performed?: [x] No    [] Yes (see summary sheet for update)  Subjective functional status/changes:   [] No changes reported  \"It hurts in my back and legs\". Reports partial compliance with HEP. OBJECTIVE    20 min Therapeutic Exercise:  [x] See flow sheet :   Rationale: increase ROM and increase strength to improve the patients ability to tolerate ADLs     15 min Neuromuscular Re-education:  [x]  See flow sheet : sciatic nerve glide, core stability and glute re-education exercises   Rationale: increase ROM, increase strength, improve coordination and increase proprioception  to improve the patients ability to tolerate functional tasks    10 min Manual Therapy: DTM/TPR right>left l/s glutes and QLs; scar tissue massage   Rationale: decrease pain, increase ROM, increase tissue extensibility, decrease trigger points and increase postural awareness to improve activity tolerance. With   [] TE   [] TA   [] neuro   [] other: Patient Education: [x] Review HEP    [] Progressed/Changed HEP based on:   [] positioning   [] body mechanics   [] transfers   [] heat/ice application    [] other:      Other Objective/Functional Measures: Initiated exercises/interventions in flow sheet. Tightness/tenderness noted in the right>left glutes which improved with manual interventions.       Pain Level (0-10 scale) post treatment: 7-8    ASSESSMENT/Changes in Function: Pt denied ice post session and stated she will use ice at home as needed for 10-15 mins. Educated pt on importance of stretching her HS and mobility to help with her mild B knee flex contractures. Pt only able to perform SLR on the right LE with PPT 5x secondary to weakness in the right LE. Educated pt on importance of performing HEP exercises to help with her pain and symptoms. Continue POC as tolerated. Patient will continue to benefit from skilled PT services to modify and progress therapeutic interventions, address functional mobility deficits, address ROM deficits, address strength deficits, analyze and address soft tissue restrictions, analyze and cue movement patterns, analyze and modify body mechanics/ergonomics, address imbalance/dizziness and instruct in home and community integration to attain remaining goals. []  See Plan of Care  []  See progress note/recertification  []  See Discharge Summary         Progress towards goals / Updated goals:  Short Term Goals: To be accomplished in 2 weeks:  1. Pt will be independent and compliant w/ HEP to progress gains in PT. At eval: initiated HEP  Current: reported partial compliance 3/6/2018  2. Pt will report < or = to 3/10 pain prior to and following session to demo improve pain management at home. At eval: pain 7/10 prior to and following session  [address] be accomplished in 6 weeks:  1. Pt will improve FOTO to > or = to 51 to demo improved function. At eval: FOTO = 36  2. Pt will increase MMT right hip flex, ext, and abd to > or = to 4/5 for ease w/ improved standing tolerance. At eval: MMT Right hip flex -3/5 2' pain, abd 2+/5, ext NT  3. Pt will increase core activation as demo'd by supine bridge w/o increased pain x10 for ease w/ bed mobility. At eval: pain w/ initiation of supine bridge x1  4. Pt will increase balance as demo'd by SLS B for > or = to 15 seconds w/o HHA for decreased fall risk. At eval: pt unable to stand SLS B w/o HHA  5.  Pt will ambulate w/ correct heel toe gait pattern for > or = to 500ft w/ LRAD for improved community navigation.    At eval: Pt presents w/ SPC R hand and slowed sherrell w/ decreased B knee ext in stance phase    PLAN  [x]  Upgrade activities as tolerated     [x]  Continue plan of care  [x]  Update interventions per flow sheet       []  Discharge due to:_  []  Other:_      Nadira Caba, PT 3/6/2018  10:23 AM    Future Appointments  Date Time Provider Nick Dixon   3/6/2018 10:00 AM Nadira Caba, PT [facility]   3/8/2018 11:00 AM Nadira Caba, PT [facility]   3/9/2018 8:30 AM [facility] FAST TRACK NURSE [facility]   3/12/2018 9:30 AM [facility] PT [place] [address] [facility]   3/14/2018 11:00 AM [facility] PT [place] [facility]   3/15/2018 11:00 AM Nadira Caba, PT [facility]   3/16/2018 8:30 AM [facility] FAST TRACK NURSE [facility]   3/19/2018 10:00 AM Marshall Elena [place] [id number] [facility]   3/20/2018 10:00 AM Nadira Caba PT [facility]   3/22/2018 12:00 PM Rodrick Thurston PT [facility]   3/23/2018 8:30 AM [facility] FAST TRACK NURSE [facility]   3/26/2018 10:00 AM Narinder Ny PTA [facility]   3/28/2018 10:00 AM Narinder Ny PTA [facility]   3/29/2018 10:30 AM Nadira Caba, PT [facility]   4/2/2018 10:00 AM Narinder Ny PTA [facility]   4/4/2018 10:00 AM Narinder Ny, PTA [facility]   4/5/2018 9:30 AM Ivan Bonilla, PT [facility]   4/9/2018 10:00 AM Narinder Ny, PTA [facility]   4/10/2018 9:15 AM Jay Madison  [address]    4/11/2018 10:00 AM Narinder Ny, PTA [facility]   4/12/2018 9:30 AM Ivan Bonilla, PT [facility]

## 2018-03-08 ENCOUNTER — HOSPITAL ENCOUNTER (OUTPATIENT)
Dept: PHYSICAL THERAPY | Age: 68
Discharge: HOME | End: 2018-03-08
Payer: MEDICARE

## 2018-03-08 PROCEDURE — 97112 NEUROMUSCULAR REEDUCATION: CPT

## 2018-03-08 PROCEDURE — 97110 THERAPEUTIC EXERCISES: CPT

## 2018-03-08 PROCEDURE — 97140 MANUAL THERAPY 1/> REGIONS: CPT

## 2018-03-08 RX ORDER — HEPARIN SODIUM (PORCINE) LOCK FLUSH IV SOLN 100 UNIT/ML 100 UNIT/ML
500 SOLUTION INTRAVENOUS AS NEEDED
Status: DISCONTINUED | OUTPATIENT
Start: 2018-03-09 | End: 2018-03-13 | Stop reason: HOSPADM

## 2018-03-08 RX ORDER — SODIUM CHLORIDE 0.9 % (FLUSH) 0.9 %
10-40 SYRINGE (ML) INJECTION AS NEEDED
Status: DISCONTINUED | OUTPATIENT
Start: 2018-03-09 | End: 2018-03-13 | Stop reason: HOSPADM

## 2018-03-08 NOTE — PROGRESS NOTES
PT DAILY TREATMENT NOTE - [facility]     Patient Name: Joshua Hart  Date:3/8/2018  : 1950  [x]  Patient  Verified  Payor: [place] MEDICARE / Plan: [place] MEDICARE PART A & B / Product Type: Medicare /    In time: 11:03   Out time: 12:03  Total Treatment Time (min): 60  Total Timed Codes (min): 50  1:1 Treatment Time ( only): 44  Visit #: 3 of 12    Treatment Area: Right leg weakness [R29.898]    SUBJECTIVE  Pain Level (0-10 scale): 6  Any medication changes, allergies to medications, adverse drug reactions, diagnosis change, or new procedure performed?: [x] No    [] Yes (see summary sheet for update)  Subjective functional status/changes:   [] No changes reported  No changes reported today.      OBJECTIVE    Modality rationale: decrease inflammation and decrease pain to improve the patients ability to tolerate functional tasks   Min Type Additional Details    [] Estim:  []Unatt       []IFC  []Premod                        []Other:  []w/ice   []w/heat  Position:  Location:    [] Estim: []Att    []TENS instruct  []NMES                    []Other:  []w/US   []w/ice   []w/heat  Position:  Location:    []  Traction: [] Cervical       []Lumbar                       [] Prone          []Supine                       []Intermittent   []Continuous Lbs:  [] before manual  [] after manual    []  Ultrasound: []Continuous   [] Pulsed                           []1MHz   []3MHz Location:  W/cm2:    []  Iontophoresis with dexamethasone         Location: [] Take home patch   [] In clinic   10 [x]  Ice     []  heat  []  Ice massage  []  Laser   []  Anodyne Position:  Supine, wedge under LEs  Location: low back    []  Laser with stim  []  Other: Position:  Location:    []  Vasopneumatic Device Pressure:       [] lo [] med [] hi   Temperature: [] lo [] med [] hi   [] Skin assessment post-treatment:  []intact []redness- no adverse reaction    []redness  adverse reaction:     30 min Therapeutic Exercise:  [x] See flow sheet : Rationale: increase ROM and increase strength to improve the patients ability to tolerate ADLs     12 min Neuromuscular Re-education:  [x]  See flow sheet : sciatic nerve glide, core stability and glute re-education exercises   Rationale: increase ROM, increase strength, improve coordination and increase proprioception  to improve the patients ability to tolerate functional tasks    8 min Manual Therapy: DTM/TPR right glutes/piriformis and QLs; scar tissue massage   Rationale: decrease pain, increase ROM, increase tissue extensibility, decrease trigger points and increase postural awareness to improve activity tolerance. With   [] TE   [] TA   [] neuro   [] other: Patient Education: [x] Review HEP    [] Progressed/Changed HEP based on:   [] positioning   [] body mechanics   [] transfers   [] heat/ice application    [] other:      Other Objective/Functional Measures: Tenderness/Tightness to the noted to the right glutes and piriformis which improved with manual interventions. Pain Level (0-10 scale) post treatment: 5    ASSESSMENT/Changes in Function: Decreased pain reported post session. Continues to have B knee flexion with ambulation with the SPC. Performed right HS stretch in standing to improve flexibility. Continue POC as tolerated. Patient will continue to benefit from skilled PT services to modify and progress therapeutic interventions, address functional mobility deficits, address ROM deficits, address strength deficits, analyze and address soft tissue restrictions, analyze and cue movement patterns, analyze and modify body mechanics/ergonomics, address imbalance/dizziness and instruct in home and community integration to attain remaining goals. []  See Plan of Care  []  See progress note/recertification  []  See Discharge Summary         Progress towards goals / Updated goals:  Short Term Goals: To be accomplished in 2 weeks:  1.  Pt will be independent and compliant w/ HEP to progress gains in PT. At eval: initiated HEP  Current: reported partial compliance 3/6/2018  2. Pt will report < or = to 3/10 pain prior to and following session to demo improve pain management at home. At eval: pain 7/10 prior to and following session  [address] be accomplished in 6 weeks:  1. Pt will improve FOTO to > or = to 51 to demo improved function. At eval: FOTO = 36  2. Pt will increase MMT right hip flex, ext, and abd to > or = to 4/5 for ease w/ improved standing tolerance. At eval: MMT Right hip flex -3/5 2' pain, abd 2+/5, ext NT  3. Pt will increase core activation as demo'd by supine bridge w/o increased pain x10 for ease w/ bed mobility. At eval: pain w/ initiation of supine bridge x1  4. Pt will increase balance as demo'd by SLS B for > or = to 15 seconds w/o HHA for decreased fall risk. At eval: pt unable to stand SLS B w/o HHA  5. Pt will ambulate w/ correct heel toe gait pattern for > or = to 500ft w/ LRAD for improved community navigation.    At eval: Pt presents w/ SPC R hand and slowed sherrell w/ decreased B knee ext in stance phase    PLAN  [x]  Upgrade activities as tolerated     [x]  Continue plan of care  [x]  Update interventions per flow sheet       []  Discharge due to:_  []  Other:_      Gagandeep Wallace PT 3/8/2018  11:13 AM    Future Appointments  Date Time Provider Nick Dixon   3/8/2018 11:00 AM Gagandeep Wallace PT [facility]   3/9/2018 8:30 AM [facility] FAST TRACK NURSE [facility]   3/12/2018 9:30 AM [facility] PT [place] [facility]   3/14/2018 11:00 AM [facility] PT [place] 1 [facility]   3/15/2018 11:00 AM Gagandeep Wallace PT [facility]   3/16/2018 8:30 AM [facility] FAST TRACK NURSE [facility]   3/19/2018 10:00 AM [place] Iss[place] [address] [facility]   3/20/2018 10:00 AM Gagandeep Wallace, PT [facility]   3/22/2018 12:00 PM Lupe Swan, PT [facility]   3/23/2018 8:30 AM [facility] FAST TRACK NURSE [facility]   3/26/2018 10:00 AM Audrey Welsh, PTA [facility]   3/28/2018 10:00 AM Ford Duck Jun [place], [place] [facility]   3/29/2018 10:30 AM Warren Hearn, PT [facility]   4/2/2018 10:00 AM Tasha Ahs, PTA [facility]   4/4/2018 10:00 AM Tasha Ash, PTA [facility]   4/5/2018 9:30 AM Song Mendiola, PT [facility]   4/9/2018 10:00 AM Tasha Ash, PTA [facility]   4/10/2018 9:15 AM Estefania Marin  [address]   4/11/2018 10:00 AM Tasha Ash, [place] [facility]   4/12/2018 9:30 AM Song Mendiola, PT [facility]

## 2018-03-09 ENCOUNTER — HOSPITAL ENCOUNTER (OUTPATIENT)
Dept: INFUSION THERAPY | Age: 68
Discharge: HOME | End: 2018-03-09
Payer: MEDICARE

## 2018-03-09 VITALS
HEART RATE: 78 BPM | SYSTOLIC BLOOD PRESSURE: 108 MMHG | OXYGEN SATURATION: 97 % | DIASTOLIC BLOOD PRESSURE: 72 MMHG | RESPIRATION RATE: 18 BRPM | TEMPERATURE: 97.3 F

## 2018-03-09 LAB
BASO+EOS+MONOS # BLD AUTO: 0.6 K/UL (ref 0–2.3)
BASO+EOS+MONOS # BLD AUTO: 10 % (ref 0.1–17)
DIFFERENTIAL METHOD BLD: ABNORMAL
ERYTHROCYTE [DISTWIDTH] IN BLOOD BY AUTOMATED COUNT: 18.8 % (ref 11.5–14.5)
HCT VFR BLD AUTO: 31 % (ref 36–48)
HGB BLD-MCNC: 10.1 G/DL (ref 12–16)
LYMPHOCYTES # BLD: 1.9 K/UL (ref 1.1–5.9)
LYMPHOCYTES NFR BLD: 32 % (ref 14–44)
MCH RBC QN AUTO: 28 PG (ref 25–35)
MCHC RBC AUTO-ENTMCNC: 32.6 G/DL (ref 31–37)
MCV RBC AUTO: 85.9 FL (ref 78–102)
NEUTS SEG # BLD: 3.3 K/UL (ref 1.8–9.5)
NEUTS SEG NFR BLD: 58 % (ref 40–70)
PLATELET # BLD AUTO: 99 K/UL (ref 135–420)
RBC # BLD AUTO: 3.61 M/UL (ref 4.1–5.1)
WBC # BLD AUTO: 5.8 K/UL (ref 4.5–13)

## 2018-03-09 PROCEDURE — 85025 COMPLETE CBC W/AUTO DIFF WBC: CPT | Performed by: INTERNAL MEDICINE

## 2018-03-09 PROCEDURE — 96523 IRRIG DRUG DELIVERY DEVICE: CPT

## 2018-03-09 PROCEDURE — 74011250636 HC RX REV CODE- 250/636: Performed by: INTERNAL MEDICINE

## 2018-03-09 PROCEDURE — 74011250636 HC RX REV CODE- 250/636: Performed by: NURSE PRACTITIONER

## 2018-03-09 PROCEDURE — 96372 THER/PROPH/DIAG INJ SC/IM: CPT

## 2018-03-09 PROCEDURE — 74011000250 HC RX REV CODE- 250: Performed by: NURSE PRACTITIONER

## 2018-03-09 PROCEDURE — 85049 AUTOMATED PLATELET COUNT: CPT | Performed by: INTERNAL MEDICINE

## 2018-03-09 PROCEDURE — 77030012965 HC NDL HUBR BBMI -A

## 2018-03-09 PROCEDURE — 74011250636 HC RX REV CODE- 250/636

## 2018-03-09 RX ORDER — WATER FOR INJECTION,STERILE
VIAL (ML) INJECTION ONCE
Status: COMPLETED | OUTPATIENT
Start: 2018-03-09 | End: 2018-03-09

## 2018-03-09 RX ORDER — HEPARIN SODIUM (PORCINE) LOCK FLUSH IV SOLN 100 UNIT/ML 100 UNIT/ML
SOLUTION INTRAVENOUS
Status: COMPLETED
Start: 2018-03-09 | End: 2018-03-09

## 2018-03-09 RX ADMIN — WATER 0.72 ML: 1 INJECTION INTRAMUSCULAR; INTRAVENOUS; SUBCUTANEOUS at 10:25

## 2018-03-09 RX ADMIN — HEPARIN SODIUM (PORCINE) LOCK FLUSH IV SOLN 100 UNIT/ML 500 UNITS: 100 SOLUTION at 08:50

## 2018-03-09 RX ADMIN — HEPARIN SODIUM (PORCINE) LOCK FLUSH IV SOLN 100 UNIT/ML 500 UNITS: 100 SOLUTION at 08:47

## 2018-03-09 RX ADMIN — Medication 30 ML: at 08:46

## 2018-03-09 RX ADMIN — ROMIPLOSTIM 207 MCG: 250 INJECTION, POWDER, LYOPHILIZED, FOR SOLUTION SUBCUTANEOUS at 10:25

## 2018-03-09 RX ADMIN — Medication 10 ML: at 08:49

## 2018-03-09 NOTE — PROGRESS NOTES
[facility] Progress Note    Date: 2018    Name: Maritza Fink    MRN: [id number]         : 1950      Nplate/Port Flush/procrit     Ms. Norah Oliveira arrived to [place] at 5281 ambulatory. Generalized pain 5/10. Took meds prior opic admit    Patient stated that she was having spitting up bloody sputum the past couple on mornings and she had a blood stool last night. Ilene Crawford NP made aware and saw patient. Patient instructed by Krunal Cook to monitor same and go to er if it becomes worse      Ms. Norah Oliveira was assessed and education was provided. Ms. Orr Postal vitals were reviewed. Visit Vitals    /72 (BP 1 Location: Right arm, BP Patient Position: Sitting)    Pulse 78    Temp 97.3 °F (36.3 °C)    Resp 18    SpO2 97%       Pt was observed for 5 minutes after obtaining vital signs prior to initiating treatment. Right chest double lumen mediport accessed with a 20 gauge haas needle using sterile technique. Lateral port accessed. Brisk flush/blood returns noted. Blood drawn for CBC after a 10 ml waste. Port flushed with 20 ml NS and 500 units of Heparin then de-accessed. Site s signs/symptoms. Gauze/tegaderm applied        medial port accessed, flushed with difficulty, and sluggish blood returns noted. Patient refused cathflo at this time. Port was flushed with 20 ml NS and 500 units of Heparin then de-accessed. Site s signs/symptoms. Gauze and Tegaderm applied to the site. Lab results obtained and reviewed. (Preliminary results scanned into media tab.)  Recent Results (from the past 12 hour(s))   CBC WITH 3 PART DIFF    Collection Time: 18  8:48 AM   Result Value Ref Range    WBC 5.8 4.5 - 13.0 K/uL    RBC 3.61 (L) 4.10 - 5.10 M/uL    HGB 10.1 (L) 12.0 - 16 g/dL    HCT 31.0 (L) 36 - 48 %    MCV 85.9 78 - 102 FL    MCH 28.0 25.0 - 35.0 PG    MCHC 32.6 31 - 37 g/dL    RDW 18.8 (H) 11.5 - 14.5 %    NEUTROPHILS 58 40 - 70 %    MIXED CELLS 10 0.1 - 17 %    LYMPHOCYTES 32 14 - 44 %    ABS. NEUTROPHILS 3.3 1.8 - 9.5 K/UL    ABS. MIXED CELLS 0.6 0.0 - 2.3 K/uL    ABS. LYMPHOCYTES 1.9 1.1 - 5.9 K/UL    DF AUTOMATED     PLATELET COUNT    Collection Time: 03/09/18  8:48 AM   Result Value Ref Range    PLATELET 99 (L) 418 - 420 K/uL     Preliminary platelet count= 81,241. Preliminary results scanned into the media tab. Cbc sent out for further testing     Procrit held per parameters based on above h/h results      Per Nplate protocol, since pt's platelet count is 346,611 today, pt's dose will remain at  3mcg/kg (3mcg x 69kg = 207mcg)     Nplate 527SBU (0.95LS) administered as ordered SQ in patient's right upper arm. No redness or bleeding noted. Ms. Angella Silva was discharged from [place] [address] in stable condition at 1030. She is to return on 3/16/2018 at 0830 for her next appointment.     Jose Medel RN  March 9, 2018

## 2018-03-12 ENCOUNTER — HOSPITAL ENCOUNTER (OUTPATIENT)
Dept: PHYSICAL THERAPY | Age: 68
Discharge: HOME | End: 2018-03-12
Payer: MEDICARE

## 2018-03-12 PROCEDURE — 97112 NEUROMUSCULAR REEDUCATION: CPT

## 2018-03-12 PROCEDURE — 97110 THERAPEUTIC EXERCISES: CPT

## 2018-03-12 PROCEDURE — 97140 MANUAL THERAPY 1/> REGIONS: CPT

## 2018-03-12 NOTE — PROGRESS NOTES
PT DAILY TREATMENT NOTE - [facility]     Patient Name: Herson Camacho  Date:3/12/2018  : 1950  [x]  Patient  Verified  Payor: [place] MEDICARE / Plan: VA MEDICARE PART A & B / Product Type: Medicare /    In time:9:40  Out time:10:30  Total Treatment Time (min): 50  Total Timed Codes (min): 50  1:1 Treatment Time ([address] only): 40   Visit #: 4 of 12    Treatment Area: Right leg weakness [R29.898]    SUBJECTIVE  Pain Level (0-10 scale): 10  Any medication changes, allergies to medications, adverse drug reactions, diagnosis change, or new procedure performed?: [x] No    [] Yes (see summary sheet for update)  Subjective functional status/changes:   [] No changes reported  Pt reports she is getting bettering overall. OBJECTIVE    30 min Therapeutic Exercise:  [] See flow sheet :   Rationale: increase ROM and increase strength to improve the patients ability to perform ADLs without difficulty. 10 min Neuromuscular Re-education:  []  See flow sheet :   Rationale: increase strength, improve coordination and improve balance  to improve the patients ability to perform ADLs without difficulty. 10 min Manual Therapy:  DTM/TPR right glutes/piriformis and QLs in left S/L position. Rationale: decrease pain, increase ROM and increase tissue extensibility to perform ADls without difficulty. With   [] TE   [] TA   [] neuro   [] other: Patient Education: [x] Review HEP    [] Progressed/Changed HEP based on:   [] positioning   [] body mechanics   [] transfers   [] heat/ice application    [] other:      Other Objective/Functional Measures:      Pain Level (0-10 scale) post treatment: -7/10    ASSESSMENT/Changes in Function: Continued with current ex. Per flow sheet. No p! Was reported during ex., but pt. Reported increased pain post therapy.      Patient will continue to benefit from skilled PT services to modify and progress therapeutic interventions, address functional mobility deficits, address ROM deficits, address strength deficits, analyze and address soft tissue restrictions, analyze and cue movement patterns and analyze and modify body mechanics/ergonomics to attain remaining goals. []  See Plan of Care  []  See progress note/recertification  []  See Discharge Summary         Progress towards goals / Updated goals:  Short Term Goals: To be accomplished in 2 weeks:  1. Pt will be independent and compliant w/ HEP to progress gains in PT. At eval: initiated HEP  Current: reported partial compliance 3/6/2018  2. Pt will report < or = to 3/10 pain prior to and following session to demo improve pain management at home. At eval: pain 7/10 prior to and following session  [address] be accomplished in 6 weeks:  1. Pt will improve FOTO to > or = to 51 to demo improved function. At eval: FOTO = 36  2. Pt will increase MMT right hip flex, ext, and abd to > or = to 4/5 for ease w/ improved standing tolerance. At eval: MMT Right hip flex -3/5 2' pain, abd 2+/5, ext NT  3. Pt will increase core activation as demo'd by supine bridge w/o increased pain x10 for ease w/ bed mobility. At eval: pain w/ initiation of supine bridge x1  4. Pt will increase balance as demo'd by SLS B for > or = to 15 seconds w/o HHA for decreased fall risk. At eval: pt unable to stand SLS B w/o HHA  5. Pt will ambulate w/ correct heel toe gait pattern for > or = to 500ft w/ LRAD for improved community navigation.    At eval: Pt presents w/ SPC R hand and slowed sherrell w/ decreased B knee ext in stance phase    PLAN  [x]  Upgrade activities as tolerated     [x]  Continue plan of care  []  Update interventions per flow sheet       []  Discharge due to:_  []  Other:_      Danni Zelaya, [place] 3/12/2018  8:14 AM    Future Appointments  Date Time Provider Nick Dixon   3/12/2018 9:30 AM [facility] PT [place] [facility]   3/14/2018 3:30 PM Shelley Roe PT [facility]   3/15/2018 11:00 AM Andrea Douglas, PT [facility]   3/16/2018 8:30 AM [facility]ACK NUR[facility]   3/19/2018 10:00 AM [place] Issa [place] [address] [facility]   3/20/2018 10:00 AM Gagandeep Wallace, PT [facility]   3/22/2018 12:00 PM Dorinda Khan, PT [facility]   3/23/2018 8:30 AM [facility] FAST TRACK NURSE [facility]   3/26/2018 10:00 AM Audrey Welsh, PTA [facility]   3/28/2018 10:00 AM Audrey Welsh, PTA [facility]   3/29/2018 10:30 AM Gagandeep Wallace, PT [facility]   4/2/2018 10:00 AM Audrey Welsh PTA [facility]   4/4/2018 10:00 AM Audrey Welsh PTA [facility]   4/5/2018 9:30 AM Rosario Etienne, PT [facility]   4/9/2018 10:00 AM Audrey Welsh PTA [facility]   4/10/2018 9:15 AM Suzette Mckenna  [address]   4/11/2018 10:00 AM Audrey Welsh PTA [facility]   4/12/2018 9:30 AM Rosario Etienne, PT [facility]

## 2018-03-14 ENCOUNTER — HOSPITAL ENCOUNTER (OUTPATIENT)
Dept: PHYSICAL THERAPY | Age: 68
Discharge: HOME | End: 2018-03-14
Payer: MEDICARE

## 2018-03-14 PROCEDURE — 97140 MANUAL THERAPY 1/> REGIONS: CPT

## 2018-03-14 PROCEDURE — 97112 NEUROMUSCULAR REEDUCATION: CPT

## 2018-03-14 PROCEDURE — 97110 THERAPEUTIC EXERCISES: CPT

## 2018-03-14 NOTE — PROGRESS NOTES
PT DAILY TREATMENT NOTE - [facility]     Patient Name: Corey Donnelly  Date:3/14/2018  : 1950  [x]  Patient  Verified  Payor: [employer] / Plan: [place] MEDICARE PART A & B / Product Type: Medicare /    In time:330  Out time:412  Total Treatment Time (min): 42  Total Timed Codes (min): 42  1:1 Treatment Time ( only): 40   Visit #: 5 of 12    Treatment Area: Right leg weakness [R29.898]    SUBJECTIVE  Pain Level (0-10 scale): 5  Any medication changes, allergies to medications, adverse drug reactions, diagnosis change, or new procedure performed?: [x] No    [] Yes (see summary sheet for update)  Subjective functional status/changes:   [] No changes reported  Patient reports some back discomfort with prolonged ambulation but reports an overall improvement in R LE weakness. OBJECTIVE    20 min Therapeutic Exercise:  [x] See flow sheet : Emphasis placed on improving available lumbar and hip AROM and strength of the gluteal musculature   Rationale: increase ROM and increase strength to improve the patients ability to improve ease with household ADLs    14 min Neuromuscular Re-education:  [x]  See flow sheet : Emphasis placed on improving pelvic proprioceptive awareness and recruitment and activation of the anterior abdominal musculature, Emphasis placed on improving static and dynamic weightbearing stability and balance   Rationale: increase ROM, increase strength, improve balance and increase proprioception  to improve the patients ability to improve safety with community ambulation. 8 min Manual Therapy:    Supine, R Hip Grade I/II Inferior Mobilization  Supine, R Hip Grade I/II Lateral Mobilization   Rationale: decrease pain, increase ROM and increase tissue extensibility to improve ease with stair management.           With   [] TE   [] TA   [] neuro   [] other: Patient Education: [x] Review HEP    [] Progressed/Changed HEP based on:   [] positioning   [] body mechanics   [] transfers   [] heat/ice application    [] other:      Pain Level (0-10 scale) post treatment: 6    ASSESSMENT/Changes in Function: Secondary to increased apprehension with PROM of the R hip, with reproduction of R posterior lumbosacral pain, completion of low-grade hip mobilizations for pain-relief. Patient continues to benefit from verbal cuing for appropriate lumbopelvic dissociation. Patient continues to benefit from verbal cuing to promote terminal knee extension bilaterally with stance phase of gait. Patient will continue to benefit from skilled PT services to modify and progress therapeutic interventions, address functional mobility deficits, address ROM deficits, address strength deficits, analyze and address soft tissue restrictions, analyze and cue movement patterns, analyze and modify body mechanics/ergonomics, assess and modify postural abnormalities and address imbalance/dizziness to attain remaining goals. []  See Plan of Care  []  See progress note/recertification  []  See Discharge Summary         Progress towards goals / Updated goals:    Short Term Goals: To be accomplished in 2 weeks:  1. Pt will be independent and compliant w/ HEP to progress gains in PT. At eval: initiated HEP  Current: reported partial compliance 3/6/2018  2. Pt will report < or = to 3/10 pain prior to and following session to demo improve pain management at home. At eval: pain 7/10 prior to and following session   [address] be accomplished in 6 weeks:  1. Pt will improve FOTO to > or = to 51 to demo improved function. At eval: FOTO = 36  2. Pt will increase MMT right hip flex, ext, and abd to > or = to 4/5 for ease w/ improved standing tolerance. At eval: MMT Right hip flex -3/5 2' pain, abd 2+/5, ext NT  3. Pt will increase core activation as demo'd by supine bridge w/o increased pain x10 for ease w/ bed mobility. At eval: pain w/ initiation of supine bridge x1  4.  Pt will increase balance as demo'd by SLS B for > or = to 15 seconds w/o HHA for decreased fall risk. At eval: pt unable to stand SLS B w/o HHA  5. Pt will ambulate w/ correct heel toe gait pattern for > or = to 500ft w/ LRAD for improved community navigation.    At eval: Pt presents w/ SPC R hand and slowed sherrell w/ decreased B knee ext in stance phase  Current: Remains, SPC with R UE with slowed sherrell and diminished terminal knee extension bilaterally during stance phase of gait, 3/14/2018    PLAN  [x]  Upgrade activities as tolerated     [x]  Continue plan of care  []  Update interventions per flow sheet       []  Discharge due to:_  []  Other:_      Romie Bacon PT 3/14/2018  1:33 PM    Future Appointments  Date Time Provider Nick Sultanai   3/14/2018 3:30 PM Romie Bacon PT [facility]   3/15/2018 11:00 AM Leopoldo Courts, PT [facility]   3/16/2018 8:30 AM [facility] FAST TRACK NURSE [facility]   3/19/2018 10:00 AM Mariana Rincon MD [address] [facility]   3/20/2018 10:00 AM Leopoldo Courts, PT [facility]   3/22/2018 12:00 PM Efrain Cedeño PT [facility]   3/23/2018 8:30 AM [facility] FAST TRACK NURSE [facility]   3/26/2018 10:00 AM Shayy Irwin, PTA [facility]   3/28/2018 10:00 AM Shayy Lev, PTA [facility]   3/29/2018 10:30 AM Leopoldo Courts, PT [facility]   4/2/2018 10:00 AM Shayy Lev, PTA [facility]   4/4/2018 10:00 AM Shayy Lev, PTA [facility]   4/5/2018 9:30 AM Nima Elder, PT [facility]   4/9/2018 10:00 AM Shayy Irwin PTA [facility]   4/10/2018 9:15 AM Imelda Love  [address]   4/11/2018 10:00 AM Shayy Irwin PTA [facility]   4/12/2018 9:30 AM Nima Elder PT [facility]

## 2018-03-16 ENCOUNTER — HOSPITAL ENCOUNTER (OUTPATIENT)
Dept: INFUSION THERAPY | Age: 68
Discharge: HOME | End: 2018-03-16
Payer: MEDICARE

## 2018-03-16 ENCOUNTER — APPOINTMENT (OUTPATIENT)
Dept: PHYSICAL THERAPY | Age: 68
End: 2018-03-16
Payer: MEDICARE

## 2018-03-16 VITALS
RESPIRATION RATE: 18 BRPM | HEART RATE: 76 BPM | TEMPERATURE: 98.1 F | OXYGEN SATURATION: 97 % | SYSTOLIC BLOOD PRESSURE: 106 MMHG | DIASTOLIC BLOOD PRESSURE: 69 MMHG

## 2018-03-16 LAB
BASO+EOS+MONOS # BLD AUTO: 1.5 K/UL (ref 0–2.3)
BASO+EOS+MONOS # BLD AUTO: 17 % (ref 0.1–17)
DIFFERENTIAL METHOD BLD: ABNORMAL
ERYTHROCYTE [DISTWIDTH] IN BLOOD BY AUTOMATED COUNT: 18.4 % (ref 11.5–14.5)
HCT VFR BLD AUTO: 32.2 % (ref 36–48)
HGB BLD-MCNC: 10.4 G/DL (ref 12–16)
LYMPHOCYTES # BLD: 1.3 K/UL (ref 1.1–5.9)
LYMPHOCYTES NFR BLD: 15 % (ref 14–44)
MCH RBC QN AUTO: 27.7 PG (ref 25–35)
MCHC RBC AUTO-ENTMCNC: 32.3 G/DL (ref 31–37)
MCV RBC AUTO: 85.9 FL (ref 78–102)
NEUTS SEG # BLD: 6 K/UL (ref 1.8–9.5)
NEUTS SEG NFR BLD: 69 % (ref 40–70)
PLATELET # BLD AUTO: 93 K/UL (ref 135–420)
RBC # BLD AUTO: 3.75 M/UL (ref 4.1–5.1)
WBC # BLD AUTO: 8.8 K/UL (ref 4.5–13)

## 2018-03-16 PROCEDURE — 74011250636 HC RX REV CODE- 250/636

## 2018-03-16 PROCEDURE — 36415 COLL VENOUS BLD VENIPUNCTURE: CPT

## 2018-03-16 PROCEDURE — 85025 COMPLETE CBC W/AUTO DIFF WBC: CPT | Performed by: INTERNAL MEDICINE

## 2018-03-16 PROCEDURE — 74011250636 HC RX REV CODE- 250/636: Performed by: NURSE PRACTITIONER

## 2018-03-16 PROCEDURE — 96372 THER/PROPH/DIAG INJ SC/IM: CPT

## 2018-03-16 PROCEDURE — 74011000250 HC RX REV CODE- 250: Performed by: NURSE PRACTITIONER

## 2018-03-16 RX ORDER — WATER FOR INJECTION,STERILE
VIAL (ML) INJECTION ONCE
Status: COMPLETED | OUTPATIENT
Start: 2018-03-16 | End: 2018-03-16

## 2018-03-16 RX ADMIN — ROMIPLOSTIM 207 MCG: 250 INJECTION, POWDER, LYOPHILIZED, FOR SOLUTION SUBCUTANEOUS at 09:13

## 2018-03-16 RX ADMIN — WATER 0.72 ML: 1 INJECTION INTRAMUSCULAR; INTRAVENOUS; SUBCUTANEOUS at 09:13

## 2018-03-16 NOTE — PROGRESS NOTES
[facility] Progress Note    Date: 2018    Name: Irineo Plascencia    MRN: [id number]         : 1950      Nplate Injection     Ms. Willim Brunner arrived to [facility] at 0290. Ms. Willim Brunner was assessed and education was provided. Ms. Lashae Truong vitals were reviewed. Visit Vitals    /69 (BP 1 Location: Right arm, BP Patient Position: Sitting)    Pulse 76    Temp 98.1 °F (36.7 °C)    Resp 18    SpO2 97%       Pt was observed for 5 minutes after obtaining vital signs prior to initiating treatment. Blood drawn via peripheral lab draw via the right AC x1 attempt for CBC. Gauze and coban applied to the site. Lab results obtained and reviewed. (Preliminary results scanned into media tab.)  Recent Results (from the past 12 hour(s))   CBC WITH 3 PART DIFF    Collection Time: 18  8:50 AM   Result Value Ref Range    WBC 8.8 4.5 - 13.0 K/uL    RBC 3.75 (L) 4.10 - 5.10 M/uL    HGB 10.4 (L) 12.0 - 16 g/dL    HCT 32.2 (L) 36 - 48 %    MCV 85.9 78 - 102 FL    MCH 27.7 25.0 - 35.0 PG    MCHC 32.3 31 - 37 g/dL    RDW 18.4 (H) 11.5 - 14.5 %    NEUTROPHILS 69 40 - 70 %    MIXED CELLS 17 0.1 - 17 %    LYMPHOCYTES 15 14 - 44 %    ABS. NEUTROPHILS 6.0 1.8 - 9.5 K/UL    ABS. MIXED CELLS 1.5 0.0 - 2.3 K/uL    ABS. LYMPHOCYTES 1.3 1.1 - 5.9 K/UL    DF AUTOMATED       Preliminary platelet count= 82,388. Per Nplate protocol, since pt's platelet count is 92,956 today, pt's dose will remain at 3mcg/kg (3mcg x 69kg = 207mcg). Nplate 654YFT (5.40ZZ) administered as ordered SQ in the back of the patient's right upper arm. No redness or bleeding noted. Ms. Willim Brunner was discharged from [place] [address] in stable condition at [facility] 10. She is to return on 3/23/18 at 0830 for her next appointment.     Brent Russo RN  2018

## 2018-03-19 ENCOUNTER — HOSPITAL ENCOUNTER (OUTPATIENT)
Dept: LAB | Age: 68
Discharge: HOME | End: 2018-03-19
Payer: MEDICARE

## 2018-03-19 ENCOUNTER — HOSPITAL ENCOUNTER (OUTPATIENT)
Dept: ONCOLOGY | Age: 68
Discharge: HOME | End: 2018-03-19

## 2018-03-19 ENCOUNTER — OFFICE VISIT (OUTPATIENT)
Dept: ONCOLOGY | Age: 68
End: 2018-03-19

## 2018-03-19 VITALS
HEART RATE: 68 BPM | WEIGHT: 161.4 LBS | DIASTOLIC BLOOD PRESSURE: 76 MMHG | TEMPERATURE: 97.6 F | HEIGHT: 66 IN | SYSTOLIC BLOOD PRESSURE: 117 MMHG | BODY MASS INDEX: 25.94 KG/M2

## 2018-03-19 DIAGNOSIS — D50.8 IRON DEFICIENCY ANEMIA SECONDARY TO INADEQUATE DIETARY IRON INTAKE: ICD-10-CM

## 2018-03-19 DIAGNOSIS — E55.9 VITAMIN D DEFICIENCY: ICD-10-CM

## 2018-03-19 DIAGNOSIS — D69.3 CHRONIC ITP (IDIOPATHIC THROMBOCYTOPENIA) (HCC): ICD-10-CM

## 2018-03-19 DIAGNOSIS — D64.9 CHRONIC ANEMIA: ICD-10-CM

## 2018-03-19 DIAGNOSIS — D69.3 CHRONIC ITP (IDIOPATHIC THROMBOCYTOPENIA) (HCC): Primary | ICD-10-CM

## 2018-03-19 DIAGNOSIS — C77.3 CARCINOMA OF BREAST METASTATIC TO AXILLARY LYMPH NODE, UNSPECIFIED LATERALITY (HCC): ICD-10-CM

## 2018-03-19 DIAGNOSIS — C50.919 CARCINOMA OF BREAST METASTATIC TO AXILLARY LYMPH NODE, UNSPECIFIED LATERALITY (HCC): ICD-10-CM

## 2018-03-19 DIAGNOSIS — G89.3 CANCER ASSOCIATED PAIN: ICD-10-CM

## 2018-03-19 DIAGNOSIS — R64 CACHEXIA (HCC): ICD-10-CM

## 2018-03-19 DIAGNOSIS — R42 VERTIGO: ICD-10-CM

## 2018-03-19 DIAGNOSIS — R60.0 BILATERAL LOWER EXTREMITY EDEMA: ICD-10-CM

## 2018-03-19 DIAGNOSIS — F51.01 PRIMARY INSOMNIA: ICD-10-CM

## 2018-03-19 LAB
ALBUMIN SERPL-MCNC: 3.7 G/DL (ref 3.4–5)
ALBUMIN/GLOB SERPL: 0.9 {RATIO} (ref 0.8–1.7)
ALP SERPL-CCNC: 79 U/L (ref 45–117)
ALT SERPL-CCNC: 14 U/L (ref 13–56)
ANION GAP SERPL CALC-SCNC: 10 MMOL/L (ref 3–18)
AST SERPL-CCNC: 15 U/L (ref 15–37)
BASOPHILS # BLD: 0 K/UL (ref 0–0.06)
BASOPHILS NFR BLD: 0 % (ref 0–3)
BILIRUB SERPL-MCNC: 0.4 MG/DL (ref 0.2–1)
BUN SERPL-MCNC: 25 MG/DL (ref 7–18)
BUN/CREAT SERPL: 26 (ref 12–20)
CALCIUM SERPL-MCNC: 9.3 MG/DL (ref 8.5–10.1)
CHLORIDE SERPL-SCNC: 100 MMOL/L (ref 100–108)
CO2 SERPL-SCNC: 30 MMOL/L (ref 21–32)
CREAT SERPL-MCNC: 0.98 MG/DL (ref 0.6–1.3)
DIFFERENTIAL METHOD BLD: ABNORMAL
EOSINOPHIL # BLD: 0.2 K/UL (ref 0–0.4)
EOSINOPHIL NFR BLD: 4 % (ref 0–5)
ERYTHROCYTE [DISTWIDTH] IN BLOOD BY AUTOMATED COUNT: 17.3 % (ref 11.6–14.5)
FERRITIN SERPL-MCNC: 361 NG/ML (ref 8–388)
GLOBULIN SER CALC-MCNC: 3.9 G/DL (ref 2–4)
GLUCOSE SERPL-MCNC: 83 MG/DL (ref 74–99)
HCT VFR BLD AUTO: 31.8 % (ref 35–45)
HGB BLD-MCNC: 10.5 G/DL (ref 12–16)
IRON SATN MFR SERPL: 46 %
IRON SERPL-MCNC: 82 UG/DL (ref 50–175)
LYMPHOCYTES # BLD: 1.4 K/UL (ref 0.8–3.5)
LYMPHOCYTES NFR BLD: 30 % (ref 20–51)
MCH RBC QN AUTO: 27.1 PG (ref 24–34)
MCHC RBC AUTO-ENTMCNC: 33 G/DL (ref 31–37)
MCV RBC AUTO: 82 FL (ref 74–97)
MONOCYTES # BLD: 0.6 K/UL (ref 0–1)
MONOCYTES NFR BLD: 13 % (ref 2–9)
NEUTS SEG # BLD: 2.4 K/UL (ref 1.8–8)
NEUTS SEG NFR BLD: 53 % (ref 42–75)
PLATELET # BLD AUTO: 96 K/UL (ref 135–420)
PLATELET COMMENTS,PCOM: ABNORMAL
POTASSIUM SERPL-SCNC: 3.9 MMOL/L (ref 3.5–5.5)
PROT SERPL-MCNC: 7.6 G/DL (ref 6.4–8.2)
RBC # BLD AUTO: 3.88 M/UL (ref 4.2–5.3)
RBC MORPH BLD: ABNORMAL
SODIUM SERPL-SCNC: 140 MMOL/L (ref 136–145)
TIBC SERPL-MCNC: 178 UG/DL (ref 250–450)
WBC # BLD AUTO: 4.6 K/UL (ref 4.6–13.2)

## 2018-03-19 PROCEDURE — 82728 ASSAY OF FERRITIN: CPT | Performed by: INTERNAL MEDICINE

## 2018-03-19 PROCEDURE — 80053 COMPREHEN METABOLIC PANEL: CPT | Performed by: INTERNAL MEDICINE

## 2018-03-19 PROCEDURE — 36415 COLL VENOUS BLD VENIPUNCTURE: CPT | Performed by: INTERNAL MEDICINE

## 2018-03-19 PROCEDURE — 83540 ASSAY OF IRON: CPT | Performed by: INTERNAL MEDICINE

## 2018-03-19 PROCEDURE — 86300 IMMUNOASSAY TUMOR CA 15-3: CPT | Performed by: INTERNAL MEDICINE

## 2018-03-19 RX ORDER — OXYCODONE AND ACETAMINOPHEN 7.5; 325 MG/1; MG/1
1 TABLET ORAL
Qty: 120 TAB | Refills: 0 | Status: SHIPPED | OUTPATIENT
Start: 2018-03-19 | End: 2018-04-10 | Stop reason: SDUPTHER

## 2018-03-19 RX ORDER — ERGOCALCIFEROL 1.25 MG/1
50000 CAPSULE ORAL
Qty: 12 CAP | Refills: 1 | Status: SHIPPED | OUTPATIENT
Start: 2018-03-19

## 2018-03-19 NOTE — PATIENT INSTRUCTIONS
Breast Cancer: Care Instructions  Your Care Instructions    Breast cancer occurs when abnormal cells grow out of control in the breast. These cancer cells can spread within the breast, to nearby lymph nodes and other tissues, and to other parts of the body. Being treated for cancer can weaken your body, and you may feel very tired. Get the rest your body needs so you can feel better. Finding out that you have cancer is scary. You may feel many emotions and may need some help coping. Seek out family, friends, and counselors for support. You also can do things at home to make yourself feel better while you go through treatment. Call the [employer] ([phone]) or visit its website at [employer] for more information. Follow-up care is a key part of your treatment and safety. Be sure to make and go to all appointments, and call your doctor if you are having problems. It's also a good idea to know your test results and keep a list of the medicines you take. How can you care for yourself at home? · Take your medicines exactly as prescribed. Call your doctor if you think you are having a problem with your medicine. You may get medicine for nausea and vomiting if you have these side effects. · Follow your doctor's instructions to relieve pain. Pain from cancer and surgery can almost always be controlled. Use pain medicine when you first notice pain, before it becomes severe. · Eat healthy food. If you do not feel like eating, try to eat food that has protein and extra calories to keep up your strength and prevent weight loss. Drink liquid meal replacements for extra calories and protein. Try to eat your main meal early. · Get some physical activity every day, but do not get too tired. Keep doing the hobbies you enjoy as your energy allows. · Do not smoke. Smoking can make your cancer worse. If you need help quitting, talk to your doctor about stop-smoking programs and medicines.  These can increase your chances of quitting for good. · Take steps to control your stress and workload. Learn relaxation techniques. ¨ Share your feelings. Stress and tension affect our emotions. By expressing your feelings to others, you may be able to understand and cope with them. ¨ Consider joining a support group. Talking about a problem with your spouse, a good friend, or other people with similar problems is a good way to reduce tension and stress. ¨ Express yourself through art. Try writing, crafts, dance, or art to relieve stress. Some dance, writing, or art groups may be available just for people who have cancer. ¨ Be kind to your body and mind. Getting enough sleep, eating a healthy diet, and taking time to do things you enjoy can contribute to an overall feeling of balance in your life and can help reduce stress. ¨ Get help if you need it. Discuss your concerns with your doctor or counselor. · If you are vomiting or have diarrhea:  ¨ Drink plenty of fluids (enough so that your urine is light yellow or clear like water) to prevent dehydration. Choose water and other caffeine-free clear liquids. If you have kidney, heart, or liver disease and have to limit fluids, talk with your doctor before you increase the amount of fluids you drink. ¨ When you are able to eat, try clear soups, mild foods, and liquids until all symptoms are gone for 12 to 48 hours. Other good choices include dry toast, crackers, cooked cereal, and gelatin dessert, such as Jell-O.  · If you have not already done so, prepare a list of advance directives. Advance directives are instructions to your doctor and family members about what kind of care you want if you become unable to speak or express yourself. When should you call for help? Call 911 anytime you think you may need emergency care. For example, call if:  ? · You passed out (lost consciousness). ?Call your doctor now or seek immediate medical care if:  ? · You have a fever. ? · You have abnormal bleeding. ? · You think you have an infection. ? · You have new or worse pain. ? · You have new symptoms, such as a cough, belly pain, vomiting, diarrhea, or a rash. ? Watch closely for changes in your health, and be sure to contact your doctor if:  ? · You are much more tired than usual.   ? · You have swollen glands in your armpits, groin, or neck. ? · You do not get better as expected. Where can you learn more? Go to http://leon-edinson.info/. Enter V321 in the search box to learn more about \"Breast Cancer: Care Instructions. \"  Current as of: May 12, 2017  Content Version: 11.4  © 4659-6224 [employer]. Care instructions adapted under license by [employer] (which disclaims liability or warranty for this information). If you have questions about a medical condition or this instruction, always ask your healthcare professional. [address] any warranty or liability for your use of this information.

## 2018-03-19 NOTE — PROGRESS NOTES
Hematology/Oncology  Progress Note    Name: Julien Alatorre  Date: 3/19/2018  : 1950    PCP: Alek Sifuentes MD     Ms. Tao is a 74 y.o. female who was seen for management of her slowly progressive ITP and metastatic breast cancer with associated iron deficiency anemia. Current therapy: Active surveillance regarding breast cancer: N-plate as needed as a primary treatment for ITP  Subjective:     Ms. Tao is a 29-year-old -American woman with history of metastatic breast cancer. The patient reports that she has been doing reasonably well. She has gained about 30 pounds weight over the past few months, due to the steroid that she has to take for her ITP. She is now actively trying to lose some weight. She denies having any unexplained bruising or bleeding. She continues to have arthritic discomfort in her joints and is continuing to use her cane for mobility support. The patient informed me that she is currently seeing a spine specialist and may need to get steroid injections or subdural injection to control the pain. She is continuing to take the n-plate as the primary treatment modality for her ITP. She is tolerating it well and is happy that her platelet counts have continued to improve. At her last appointment she was complaining of dizziness or vertigo and got a very good response to the use of Antivert. She continues to use the Antivert on a as needed basis. Currently she is not complaining of any bruising or bleeding episodes. Past medical history, family history, and social history: these were reviewed and remains unchanged.     Past Medical History:   Diagnosis Date    Antineoplastic chemotherapy induced anemia     Arrhythmia     Atrial Fib    Arthritis     Breast cancer (HCC)     Cancer (HCC)     Breast    Cardiomyopathy ([facility] [address]     Chronic ITP (idiopathic thrombocytopenia) (HCC) 10/31/2016    Secondary to Anemia from Chemo    Diabetes (HCC)     borderline, no meds    Esophageal cancer ([facility] [address] 2005    treated with chemo    Heart failure [facility])      Past Surgical History:   Procedure Laterality Date    BREAST SURGERY PROCEDURE UNLISTED      COLONOSCOPY N/A 7/27/2016    COL[facility]OSCOPY performed by Douglas [facility] at [facility] ENDOSCOPY    HX APPENDECTOMY      HX HEENT      Tonsillectomy    HX ORTHOPAEDIC      HX TUBAL LIGATION      HX VASCULAR ACCESS      NEUROLOGICAL PROCEDURE UNLISTED      Lumbar sx     Social History     Social History    Marital status:      Spouse name: N/A    Number of children: N/A    Years of education: N/A     Occupational History    Not on file. Social History Main Topics    Smoking status: Never Smoker    Smokeless tobacco: Never Used    Alcohol use No    Drug use: No    Sexual activity: Not on file     Other Topics Concern    Not on file     Social History Narrative     No family history on file. Current Outpatient Prescriptions   Medication Sig Dispense Refill    oxyCODONE-acetaminophen (PERCOCET 7.5) 7.5-325 mg per tablet Take 1 Tab by mouth every six (6) hours as needed for Pain. Max Daily Amount: 4 Tabs. 120 Tab 0    ergocalciferol (VITAMIN D2) 50,000 unit capsule Take 1 Cap by mouth every seven (7) days. 12 Cap 1    topiramate (TOPAMAX) 50 mg tablet   5    temazepam (RESTORIL) 30 mg capsule   1    LINZESS 145 mcg cap capsule TAKE 1 CAPSULE BY MOUTH DAILY 30 MINUTES BEFORE BREAKFAST  0    oxyCODONE-acetaminophen (PERCOCET)  mg per tablet Take 1 Tab by mouth every six (6) hours as needed for Pain. Max Daily Amount: 4 Tabs. Indications: Pain, ACUTE POST OP PAIN 60 Tab 0    lisinopril (PRINIVIL, ZESTRIL) 10 mg tablet Take 10 mg by mouth daily. Indications: hypertension      rivaroxaban (XARELTO) 10 mg tablet Take 10 mg by mouth daily (with breakfast). Indications: PREVENTION OF DEEP VEIN THROMBOSIS RECURRENCE      zolpidem (AMBIEN) 10 mg tablet TAKE 1 TABLET BY MOUTH NIGHTLY AS NEEDED FOR SLEEP.  MAX DAILY AMOUNT 10 MG 30 Tab 2    loratadine 10 mg cap Take 10 mg by mouth daily.  digoxin (LANOXIN) 0.125 mg tablet Take 0.125 mg by mouth daily.  carvedilol (COREG) 3.125 mg tablet Take 3.125 mg by mouth two (2) times daily (with meals).  meloxicam (MOBIC) 7.5 mg tablet Take 7.5 mg by mouth daily.  amiodarone (CORDARONE) 200 mg tablet Take 200 mg by mouth daily. Indications: PREVENTION OF VENTRICULAR FIBRILLATION      gabapentin (NEURONTIN) 300 mg capsule Take 1 po q am and 2 po q pm (Patient taking differently: Take 300 mg by mouth two (2) times a day. Take 1 po q am and 2 po q pm) 90 Cap 1    rivaroxaban (XARELTO) 10 mg tablet Take 10 mg by mouth daily.  lidocaine-prilocaine (EMLA) topical cream APPLY OVER PORT 1 HOUR PRIOR TO CHEMOTHERAPY THAN COVER WITH SARAN WRAP 30 g 6    magic mouthwash solution Take 5 mL by mouth three (3) times daily as needed for Stomatitis. Magic mouth wash   Maalox  Lidocaine 2% viscous   Diphenhydramine oral solution     Pharmacy to mix equal portions of ingredients to a total volume as indicated in the dispense amount. 236 mL 0    furosemide (LASIX) 40 mg tablet Take 40 mg by mouth daily. Indications: Edema      dronabinol (MARINOL) 5 mg capsule Take 1 Cap by mouth two (2) times a day. 60 Cap 1    KLOR-CON M20 20 mEq tablet TAKE ONE TABLET BY MOUTH ONCE A DAY 30 Tab 3    aluminum-magnesium hydroxide 200-200 mg/5 mL susp 5 mL, diphenhydrAMINE 12.5 mg/5 mL liqd 12.5 mg, lidocaine 2 % soln 5 mL 5 mL by Swish and Spit route two (2) times a day. Magic mouth wash   Maalox  Lidocaine 2% viscous   Diphenhydramine oral solution     Pharmacy to mix equal portions of ingredients to a total volume as indicated in the dispense amount. 240 mL 1    potassium chloride (K-DUR, KLOR-CON) 20 mEq tablet Take 2 Tabs by mouth daily. 30 Tab 3    albuterol (PROAIR HFA) 90 mcg/actuation inhaler 1-2 puffs every 4-6 hrs.  (Patient taking differently: Take 2 Puffs by inhalation every four (4) hours as needed for Shortness of Breath. 1-2 puffs every 4-6 hrs.) 1 Inhaler 1    senna (SENNA) 8.6 mg tablet Take 1 Tab by mouth daily.  nabumetone (RELAFEN) 750 mg tablet Take 750 mg by mouth daily. Indications: OSTEOARTHRITIS      ondansetron hcl (ZOFRAN) 8 mg tablet Take 1 Tab by mouth every eight (8) hours as needed for Nausea. 30 Tab 3    IRON AG&FUM/C/FA/MV CMB11/CA-T (PRUVATE 21-7 PO) Take 325 mg by mouth daily. Indications: iron deficiency         Review of Systems  Constitutional: The patient has no acute distress or discomfort. HEENT: The patient denies recent head trauma, eye pain, blurred vision,  hearing deficit, oropharyngeal mucosal pain or lesions, and the patient denies throat pain or discomfort. Lymphatics: The patient denies palpable peripheral lymphadenopathy. Hematologic: The patient denies having bruising, bleeding, or progressive fatigue. Respiratory: Patient denies having shortness of breath, cough, sputum production, fever, or dyspnea on exertion. Cardiovascular: The patient denies having leg pain, leg swelling, heart palpitations, chest permit, chest pain, or lightheadedness. The patient denies having dyspnea on exertion. Gastrointestinal: The patient denies having nausea, emesis, or diarrhea. The patient denies having any hematemesis or blood in the stool. Genitourinary: Patient denies having urinary urgency, frequency, or dysuria. The patient denies having blood in the urine. Psychological: The patient denies having symptoms of nervousness, anxiety, depression, or thoughts of harming self. Skin: Patient denies having skin rashes, skin, ulcerations, or unexplained itching or pruritus. Musculoskeletal: The patient used to complain of arthritic discomfort in her joints particularly the knees, hips, and shoulders. The patient reports that she has a large bruise on her right lower extremity.       Objective:     Visit Vitals    /76    Pulse 68    Temp 97.6 °F (36.4 °C)    Ht 5' 6\" (1.676 m)    Wt 73.2 kg (161 lb 6.4 oz)    BMI 26.05 kg/m2     ECOG PS=1, pain score=0/10  Physical Exam:   Gen. Appearance: The patient is in no acute distress. Skin: There is no bruise or rash. HEENT: The exam is unremarkable. Neck: Supple without lymphadenopathy or thyromegaly. Lungs: Clear to auscultation and percussion; there are no wheezes or rhonchi. Heart: Regular rate and rhythm; there are no murmurs, gallops, or rubs. Abdomen: Bowel sounds are present and normal.  There is no guarding, tenderness, or hepatosplenomegaly. Extremities: There is no clubbing, cyanosis, or edema. There is a 6 cm area of bruising on the right anterior lateral calf. There is no evidence of skin breakdown or ulceration. Neurologic: There are no focal neurologic deficits. Lymphatics: There is no palpable peripheral lymphadenopathy. Musculoskeletal: The patient has full range of motion at all joints. There is no evidence of joint deformity or effusions. There is no focal joint tenderness. Psychological/psychiatric: There is no clinical evidence of anxiety, depression, or melancholy. Lab data:      Results for orders placed or performed during the hospital encounter of 03/16/18   CBC WITH 3 PART DIFF     Status: Abnormal   Result Value Ref Range Status    WBC 8.8 4.5 - 13.0 K/uL Final    RBC 3.75 (L) 4.10 - 5.10 M/uL Final    HGB 10.4 (L) 12.0 - 16 g/dL Final    HCT 32.2 (L) 36 - 48 % Final    MCV 85.9 78 - 102 FL Final    MCH 27.7 25.0 - 35.0 PG Final    MCHC 32.3 31 - 37 g/dL Final    RDW 18.4 (H) 11.5 - 14.5 % Final    NEUTROPHILS 69 40 - 70 % Final    MIXED CELLS 17 0.1 - 17 % Final    LYMPHOCYTES 15 14 - 44 % Final    ABS. NEUTROPHILS 6.0 1.8 - 9.5 K/UL Final    ABS. MIXED CELLS 1.5 0.0 - 2.3 K/uL Final    ABS. LYMPHOCYTES 1.3 1.1 - 5.9 K/UL Final     Comment: Test performed at [address]. Results Reviewed by Medical Director.     DF AUTOMATED   Final      The preliminary CBC from today, 3/19/2018, shows a WBC count of 4.6, hemoglobin 10.6 g/dL, hematocrit 33.1%, and the platelet count is [phone]. Assessment:     1. Chronic ITP (idiopathic thrombocytopenia) (HCC)    2. Carcinoma of breast metastatic to axillary lymph node, unspecified laterality ([facility] [address]    3. Vitamin D deficiency    4. Cachexia ([facility] [address]    5. Bilateral lower extremity edema    6. Vertigo    7. Primary insomnia    8. Chronic anemia    9. Iron deficiency anemia secondary to inadequate dietary iron intake    10. Cancer associated pain          Plan:   Chronic ITP/thrombocytopenia (progression of disease): I have informed the patient that her CBC today shows that her preliminary platelet count is [phone]. At this time I am recommending we continue the n-Plate at a dose of 1 mcg/kg subcutaneous weekly. At this time I will recheck her platelet level will plan to see her back in clinic in about 8 weeks. Vitamin D deficiency: I have informed the patient and her vitamin D level on 12/18/2017 was 24.9 ng/mL. We will order vitamin D 50,000 units for 12 weeks at this time. Iron deficiency anemia/chronic anemia: The current CBC shows a WBC count of 4.6, hemoglobin is 10.6 g/dL, and the hematocrit is 33.1%. I have recommended that the patient continue taking the ferrous sulfate 1 tablet twice daily. At this time I will recheck her iron profile and ferritin levels. Bilateral lower extremity edema: The leg edema has significantly improved. Metastatic breast cancer, left breast metastasized to lymph nodes and other sites: At this time I will check a CA-27-29 level. The most recent CA-35-27 level was normal.  Clinically the patient has no evidence of disease progression. .    Cachexia, weakness, and muscular deconditioning: I am recommending that she continue physical therapy 4 times weekly. She will continue to use a walker or cane as needed for mobility support.   Today, she is ambulating without the use of a cane or walker. Primary insomnia: The patient was instructed to continue to use the Ambien 10 mg at bedtime. Vertigo (controlled problem): I have recommended that she continue to use the Antivert 12.5 mg every 8 hours, as needed. .  I have advised the patient to take this for 3-5 days as needed, whenever she develops vertigo or dizziness. .    I will see her back in clinic for complete assessment again in 12 weeks. Orders Placed This Encounter    COMPLETE CBC & AUTO DIFF WBC    InHouse CBC ([employer] [facility])     Standing Status:   Future     Number of Occurrences:   1     Standing Expiration Date:   6/40/8304    METABOLIC PANEL, COMPREHENSIVE     Standing Status:   Future     Standing Expiration Date:   3/20/2019    IRON PROFILE     Standing Status:   Future     Standing Expiration Date:   3/20/2019    FERRITIN     Standing Status:   Future     Standing Expiration Date:   3/20/2019    CA 27.29     Standing Status:   Future     Standing Expiration Date:   3/20/2019    oxyCODONE-acetaminophen (PERCOCET 7.5) 7.5-325 mg per tablet     Sig: Take 1 Tab by mouth every six (6) hours as needed for Pain. Max Daily Amount: 4 Tabs. Dispense:  120 Tab     Refill:  0    ergocalciferol (VITAMIN D2) 50,000 unit capsule     Sig: Take 1 Cap by mouth every seven (7) days. Dispense:  12 Cap     Refill:  1       Shazia Marcos MD  3/19/2018      Please note: This document has been produced using voice recognition software. Unrecognized errors in transcription may be present.

## 2018-03-19 NOTE — MR AVS SNAPSHOT
[place] [address] [address] [facility] 
[phone] Patient: Jovany Delaney MRN: [id number] VFZ:8/04/1613 Visit Information Date & Time Provider Department Dept. Phone Encounter #  
 3/19/2018 10:00 AM Rozina Kimball MD [facility] Oncology [phone] [id number] Follow-up Instructions Return in about 4 months (around 7/19/2018). Your Appointments 4/10/2018  9:15 AM  
Follow Up with Yumiko Verma MD  
[facility]) Appt Note: 6WK FU APPT  
 [address] [facility] [address]  
[phone]  
  
   
 [address] [place] [address]  
  
    
 7/23/2018 10:00 AM  
Office Visit with Rozina Kimball MD  
[facility]  Oncology [facility]) Appt Note: 4 MO RET  
 [address], [address] [place] [address] Upcoming Health Maintenance Date Due Hepatitis C Screening 1950 DTaP/Tdap/Td series (1 - Tdap) 5/14/1973 FOBT Q 1 YEAR AGE 50-75 5/14/2002 ZOSTER VACCINE AGE 60> 3/14/2012 BREAST CANCER SCRN MAMMOGRAM 4/4/2015 GLAUCOMA SCREENING Q2Y 5/14/2017 Bone Densitometry (Dexa) Screening 5/14/2017 Pneumococcal 65+ High/Highest Risk (1 of 2 - PCV13) 5/14/2017 MEDICARE YEARLY EXAM 5/14/2017 Influenza Age 5 to Adult 8/1/2017 Allergies as of 3/19/2018  Review Complete On: 3/19/2018 By: Rozina Kimball MD  
  
 Severity Noted Reaction Type Reactions Aspirin High 08/07/2013   Systemic Swelling Pt states her whole body swells when she takes aspirin. Adhesive    Unable to Obtain Pcn [Penicillins]  08/20/2012    Rash Current Immunizations  Reviewed on 3/16/2018 No immunizations on file. Not reviewed this visit You Were Diagnosed With   
  
 Codes Comments Chronic ITP (idiopathic thrombocytopenia) (HCC)    -  Primary ICD-10-CM: P67.1 ICD-9-CM: 287.31 Carcinoma of breast metastatic to axillary lymph node, unspecified laterality ([facility]     ICD-10-CM: C50.919, C77.3 ICD-9-CM: 174.9, 196.3 Vitamin D deficiency     ICD-10-CM: E55.9 ICD-9-CM: 268.9 Cachexia ([facility] 75.)     ICD-10-CM: R64 
ICD-9-CM: 799.4 Bilateral lower extremity edema     ICD-10-CM: R60.0 ICD-9-CM: 088. 3 Vertigo     ICD-10-CM: Z48 ICD-9-CM: 780.4 Primary insomnia     ICD-10-CM: F51.01 
ICD-9-CM: 307.42 Chronic anemia     ICD-10-CM: D64.9 ICD-9-CM: 285.9 Iron deficiency anemia secondary to inadequate dietary iron intake     ICD-10-CM: D50.8 ICD-9-CM: 280.1 Cancer associated pain     ICD-10-CM: G89.3 ICD-9-CM: 338. 3 Vitals OB Status Smoking Status Postmenopausal Never Smoker Preferred Pharmacy Pharmacy Name Phone [id number] [address] [phone] Your Updated Medication List  
  
   
This list is accurate as of 3/19/18 10:58 AM.  Always use your most recent med list.  
  
  
  
  
 albuterol 90 mcg/actuation inhaler Commonly known as:  PROAIR HFA  
1-2 puffs every 4-6 hrs. aluminum-magnesium hydroxide 200-200 mg/5 mL susp 5 mL, diphenhydrAMINE 12.5 mg/5 mL liqd 12.5 mg, lidocaine 2 % soln 5 mL  
5 mL by Swish and Spit route two (2) times a day. Magic mouth wash  Maalox Lidocaine 2% viscous  Diphenhydramine oral solution   Pharmacy to mix equal portions of ingredients to a total volume as indicated in the dispense amount. amiodarone 200 mg tablet Commonly known as:  CORDARONE Take 200 mg by mouth daily. Indications: PREVENTION OF VENTRICULAR FIBRILLATION  
  
 COREG 3.125 mg tablet Generic drug:  carvedilol Take 3.125 mg by mouth two (2) times daily (with meals). digoxin 0.125 mg tablet Commonly known as:  LANOXIN Take 0.125 mg by mouth daily. dronabinol 5 mg capsule Commonly known as:  Lenon Oka Take 1 Cap by mouth two (2) times a day. ergocalciferol 50,000 unit capsule Commonly known as:  VITAMIN D2 Take 1 Cap by mouth every seven (7) days. furosemide 40 mg tablet Commonly known as:  LASIX Take 40 mg by mouth daily. Indications: Edema  
  
 gabapentin 300 mg capsule Commonly known as:  NEURONTIN Take 1 po q am and 2 po q pm  
  
 lidocaine-prilocaine topical cream  
Commonly known as:  EMLA  
APPLY OVER PORT 1 HOUR PRIOR TO CHEMOTHERAPY THAN COVER WITH [place] WRAP LINZESS 145 mcg Cap capsule Generic drug:  linaclotide TAKE 1 CAPSULE BY MOUTH DAILY 30 MINUTES BEFORE BREAKFAST  
  
 lisinopril 10 mg tablet Commonly known as:  Rennis Douse Take 10 mg by mouth daily. Indications: hypertension  
  
 loratadine 10 mg Cap Take 10 mg by mouth daily. magic mouthwash solution Take 5 mL by mouth three (3) times daily as needed for Stomatitis. Magic mouth wash  Maalox Lidocaine 2% viscous  Diphenhydramine oral solution   Pharmacy to mix equal portions of ingredients to a total volume as indicated in the dispense amount. meloxicam 7.5 mg tablet Commonly known as:  MOBIC Take 7.5 mg by mouth daily. nabumetone 750 mg tablet Commonly known as:  RELAFEN Take 750 mg by mouth daily. Indications: OSTEOARTHRITIS  
  
 ondansetron hcl 8 mg tablet Commonly known as:  ZOFRAN (AS HYDROCHLORIDE) Take 1 Tab by mouth every eight (8) hours as needed for Nausea. * oxyCODONE-acetaminophen  mg per tablet Commonly known as:  PERCOCET Take 1 Tab by mouth every six (6) hours as needed for Pain. Max Daily Amount: 4 Tabs. Indications: Pain, ACUTE POST OP PAIN  
  
 * oxyCODONE-acetaminophen 7.5-325 mg per tablet Commonly known as:  PERCOCET 7.5 Take 1 Tab by mouth every six (6) hours as needed for Pain.  Max Daily Amount: 4 Tabs. * potassium chloride 20 mEq tablet Commonly known as:  K-DUR, KLOR-CON Take 2 Tabs by mouth daily. * KLOR-CON M20 20 mEq tablet Generic drug:  potassium chloride TAKE ONE TABLET BY MOUTH ONCE A DAY  
  
 PRUVATE 21-7 PO Take 325 mg by mouth daily. Indications: iron deficiency Senna 8.6 mg tablet Generic drug:  senna Take 1 Tab by mouth daily. temazepam 30 mg capsule Commonly known as:  RESTORIL  
  
 topiramate 50 mg tablet Commonly known as:  TOPAMAX * XARELTO 10 mg tablet Generic drug:  rivaroxaban Take 10 mg by mouth daily. * rivaroxaban 10 mg tablet Commonly known as:  Darra Ayana Take 10 mg by mouth daily (with breakfast). Indications: PREVENTION OF DEEP VEIN THROMBOSIS RECURRENCE  
  
 zolpidem 10 mg tablet Commonly known as:  AMBIEN  
TAKE 1 TABLET BY MOUTH NIGHTLY AS NEEDED FOR SLEEP. MAX DAILY AMOUNT 10 MG  
  
 * Notice: This list has 6 medication(s) that are the same as other medications prescribed for you. Read the directions carefully, and ask your doctor or other care provider to review them with you. Prescriptions Printed Refills  
 oxyCODONE-acetaminophen (PERCOCET 7.5) 7.5-325 mg per tablet 0 Sig: Take 1 Tab by mouth every six (6) hours as needed for Pain. Max Daily Amount: 4 Tabs. Class: Print Route: Oral  
  
Prescriptions Sent to Pharmacy Refills  
 ergocalciferol (VITAMIN D2) 50,000 unit capsule 1 Sig: Take 1 Cap by mouth every seven (7) days. Class: Normal  
 Pharmacy: [id number] Ph #: [phone] Route: Oral  
  
We Performed the Following COMPLETE CBC & AUTO DIFF WBC [10440 CPT(R)] Follow-up Instructions Return in about 4 months (around 7/19/2018). To-Do List   
 03/19/2018   Lab:  CBC WITH 3 PART DIFF   
  
 03/20/2018 10:00 AM  
  Appointment with Kay Ac PT at [facility] PT Marcy WOMACK ([phone]) 03/22/2018 12:00 PM  
  Appointment with Gabo Mccartney PT at [facility]  [address] IM ([phone])  
  
 03/23/2018  8:30 AM  
  Appointment with [facility] FA[facility] TRACK NURSE at [facility] ([phone])  
  
 03/26/2018 10:00 AM  
  Appointment with Rachid Styles PTA at [facility]  [address] IM ([phone])  
  
 03/28/2018 10:00 AM  
  Appointment with Rachid Styles PTA at [address] IM ([phone])  
  
 03/29/2018 10:30 AM  
  Appointment with Usha Zamudio PT at [address] IM ([phone])  
  
 03/30/2018 8:30 AM  
  Appointment with [facility] FAST TRACK NURSE at [facility] ([phone]) 04/02/2018 10:00 AM  
  Appointment with Rachid Styles PTA at [facility]  [address] IM ([phone])  
  
 04/04/2018 10:00 AM  
  Appointment with Rachid Styles PTA at [address] IM ([phone])  
  
 04/05/2018 9:30 AM  
  Appointment with lAy Harden PT at [facility]  [address] IM ([phone]) 04/06/2018 8:30 AM  
  Appointment with [facility] FAST TRACK NURSE at [facility] ([phone]) 04/09/2018 10:00 AM  
  Appointment with Rachid Styles PTA at [facility]  [address] IM ([phone])  
  
 04/11/2018 10:00 AM  
  Appointment with Rachid Styles PTA at [address] IM ([phone])  
  
 04/12/2018 9:30 AM  
  Appointment with Aly Harden PT at [facility]  [address] IM ([phone]) Patient Instructions Breast Cancer: Care Instructions Your Care Instructions Breast cancer occurs when abnormal cells grow out of control in the breast. These cancer cells can spread within the breast, to nearby lymph nodes and other tissues, and to other parts of the body. Being treated for cancer can weaken your body, and you may feel very tired. Get the rest your body needs so you can feel better. Finding out that you have cancer is scary. You may feel many emotions and may need some help coping.  Seek out family, friends, and counselors for support. You also can do things at home to make yourself feel better while you go through treatment. Call the [facility] ([phone]) or visit its website at [employer] [employer] for more information. Follow-up care is a key part of your treatment and safety. Be sure to make and go to all appointments, and call your doctor if you are having problems. It's also a good idea to know your test results and keep a list of the medicines you take. How can you care for yourself at home? · Take your medicines exactly as prescribed. Call your doctor if you think you are having a problem with your medicine. You may get medicine for nausea and vomiting if you have these side effects. · Follow your doctor's instructions to relieve pain. Pain from cancer and surgery can almost always be controlled. Use pain medicine when you first notice pain, before it becomes severe. · Eat healthy food. If you do not feel like eating, try to eat food that has protein and extra calories to keep up your strength and prevent weight loss. Drink liquid meal replacements for extra calories and protein. Try to eat your main meal early. · Get some physical activity every day, but do not get too tired. Keep doing the hobbies you enjoy as your energy allows. · Do not smoke. Smoking can make your cancer worse. If you need help quitting, talk to your doctor about stop-smoking programs and medicines. These can increase your chances of quitting for good. · Take steps to control your stress and workload. Learn relaxation techniques. ¨ Share your feelings. Stress and tension affect our emotions. By expressing your feelings to others, you may be able to understand and cope with them. ¨ Consider joining a support group. Talking about a problem with your spouse, a good friend, or other people with similar problems is a good way to reduce tension and stress. ¨ Express yourself through art.  Try writing, crafts, dance, or art to relieve stress. Some dance, writing, or art groups may be available just for people who have cancer. ¨ Be kind to your body and mind. Getting enough sleep, eating a healthy diet, and taking time to do things you enjoy can contribute to an overall feeling of balance in your life and can help reduce stress. ¨ Get help if you need it. Discuss your concerns with your doctor or counselor. · If you are vomiting or have diarrhea: ¨ Drink plenty of fluids (enough so that your urine is light yellow or clear like water) to prevent dehydration. Choose water and other caffeine-free clear liquids. If you have kidney, heart, or liver disease and have to limit fluids, talk with your doctor before you increase the amount of fluids you drink. ¨ When you are able to eat, try clear soups, mild foods, and liquids until all symptoms are gone for 12 to 48 hours. Other good choices include dry toast, crackers, cooked cereal, and gelatin dessert, such as Jell-O. · If you have not already done so, prepare a list of advance directives. Advance directives are instructions to your doctor and family members about what kind of care you want if you become unable to speak or express yourself. When should you call for help? Call 911 anytime you think you may need emergency care. For example, call if: 
? · You passed out (lost consciousness). ?Call your doctor now or seek immediate medical care if: 
? · You have a fever. ? · You have abnormal bleeding. ? · You think you have an infection. ? · You have new or worse pain. ? · You have new symptoms, such as a cough, belly pain, vomiting, diarrhea, or a rash. ? Watch closely for changes in your health, and be sure to contact your doctor if: 
? · You are much more tired than usual.  
? · You have swollen glands in your armpits, groin, or neck. ? · You do not get better as expected. Where can you learn more? Go to http://leon-edinson.info/. Enter V321 in the search box to learn more about \"Breast Cancer: Care Instructions. \" Current as of: May 12, 2017 Content Version: 11.4 © 5770-3146 [employer], [employer]. Care instructions adapted under license by [employer] (which disclaims liability or warranty for this information). If you have questions about a medical condition or this instruction, always ask your healthcare professional. [address] any warranty or liability for your use of this information. Please provide this summary of care documentation to your next provider. Your primary care clinician is listed as [place]. If you have any questions after today's visit, please call [phone].

## 2018-03-20 ENCOUNTER — HOSPITAL ENCOUNTER (OUTPATIENT)
Dept: PHYSICAL THERAPY | Age: 68
Discharge: HOME | End: 2018-03-20
Payer: MEDICARE

## 2018-03-20 LAB — CANCER AG27-29 SERPL-ACNC: 44.7 U/ML (ref 0–38.6)

## 2018-03-20 PROCEDURE — 97140 MANUAL THERAPY 1/> REGIONS: CPT

## 2018-03-20 PROCEDURE — 97112 NEUROMUSCULAR REEDUCATION: CPT

## 2018-03-20 NOTE — PROGRESS NOTES
PT DAILY TREATMENT NOTE - [facility]     Patient Name: Charlee Floyd  Date:3/20/2018  : 1950  [x]  Patient  Verified  Payor: VA MEDICARE / Plan: VA MEDICARE PART A & B / Product Type: Medicare /    In time:1008  Out time:1054  Total Treatment Time (min): 46  Total Timed Codes (min): 46  1:1 Treatment Time ( only): 23   Visit #: 6 of 12    Treatment Area: Right leg weakness [R29.898]    SUBJECTIVE  Pain Level (0-10 scale): 7  Any medication changes, allergies to medications, adverse drug reactions, diagnosis change, or new procedure performed?: [x] No    [] Yes (see summary sheet for update)  Subjective functional status/changes:   [] No changes reported  Patient reports discomfort at present localized to right lumbar spine and right posterior hip.     OBJECTIVE    18 min Therapeutic Exercise:  [x] See flow sheet : Emphasis placed on improving available lumbar and hip AROM and strength of the gluteal musculature   Rationale: increase ROM and increase strength to improve the patients ability to improve ease with household ADLs     20 min Neuromuscular Re-education:  [x]  See flow sheet : Emphasis placed on improving pelvic proprioceptive awareness and recruitment and activation of the anterior abdominal musculature, Emphasis placed on improving static and dynamic weightbearing stability and balance   Rationale: increase ROM, increase strength, improve balance and increase proprioception  to improve the patients ability to improve safety with community ambulation.     8 min Manual Therapy:    Supine, R Hip Grade I/II Inferior Mobilization  Supine, R Hip Grade I/II Lateral Mobilization   Rationale: decrease pain, increase ROM and increase tissue extensibility to improve ease with stair management.          With   [] TE   [] TA   [] neuro   [] other: Patient Education: [x] Review HEP    [] Progressed/Changed HEP based on:   [] positioning   [] body mechanics   [] transfers   [] heat/ice application    [] other:      Pain Level (0-10 scale) post treatment: 6    ASSESSMENT/Changes in Function: Progressed to completion of supine PPT with glute isometric in order to improve ease with completion of supine bridge with bed mobility with patient continuing to demonstrate limitations in available AROM secondary to pain reproduction. Patient will continue to benefit from skilled PT services to modify and progress therapeutic interventions, address functional mobility deficits, address ROM deficits, address strength deficits, analyze and address soft tissue restrictions and analyze and cue movement patterns to attain remaining goals. []  See Plan of Care  []  See progress note/recertification  []  See Discharge Summary         Progress towards goals / Updated  goals:    Short Term Goals: To be accomplished in 2 weeks:  1. Pt will be independent and compliant w/ HEP to progress gains in PT. At eval: initiated HEP  Current: reported partial compliance 3/6/2018  2. Pt will report < or = to 3/10 pain prior to and following session to demo improve pain management at home. At eval: pain 7/10 prior to and following session   [address] be accomplished in 6 weeks:  1. Pt will improve FOTO to > or = to 51 to demo improved function. At eval: FOTO = 36  Current: Progressing, FOTO = 38, 3/20/2018  2. Pt will increase MMT right hip flex, ext, and abd to > or = to 4/5 for ease w/ improved standing tolerance. At eval: MMT Right hip flex -3/5 2' pain, abd 2+/5, ext NT  3. Pt will increase core activation as demo'd by supine bridge w/o increased pain x10 for ease w/ bed mobility. At eval: pain w/ initiation of supine bridge x1  Current: Progressing, Ability to achieve 50% of available AROM with pain 5-6/10, 3/20/2018  4. Pt will increase balance as demo'd by SLS B for > or = to 15 seconds w/o HHA for decreased fall risk. At eval: pt unable to stand SLS B w/o HHA  5.  Pt will ambulate w/ correct heel toe gait pattern for > or = to 500ft w/ LRAD for improved community navigation.    At eval: Pt presents w/ SPC R hand and slowed sherrell w/ decreased B knee ext in stance phase  Current: Remains, SPC with R UE with slowed sherrell and diminished terminal knee extension bilaterally during stance phase of gait, 3/14/2018    PLAN  [x]  Upgrade activities as tolerated     [x]  Continue plan of care  []  Update interventions per flow sheet       []  Discharge due to:_  []  Other:_      Margaret Davidson, PT 3/20/2018  8:08 AM    Future Appointments  Date Time Provider Nick Dixon   3/20/2018 10:00 AM Margaret Davidson, PT [facility] [address]   3/22/2018 12:00 PM Melquiades Morelos, PT [facility] [address]   3/23/2018 8:30 AM [facility] FAST TRACK NURSE [facility]   3/26/2018 10:00 AM Manjeet Cochran PTA [facility] [address]   3/28/2018 10:00 AM Manjeet Cochran PTA [facility] [address]   3/29/2018 10:30 AM Ester Bedoya, PT [facility] [address]   3/30/2018 8:30 AM [facility] FAST TRACK NURSE [facility]   4/2/2018 10:00 AM Manjeet Cochran PTA [facility] [address]   4/4/2018 10:00 AM Manjeet Cochran PTA [facility] [address]   4/5/2018 9:30 AM Chiqui Simpson, PT [facility] [address]   4/6/2018 8:30 AM [facility] FAST TRACK NURSE [facility]   4/9/2018 10:00 AM Manjeet Cochran PTA [facility] [address]   4/10/2018 9:15 AM [place]  [address]   4/11/2018 10:00 AM Manjeet Cochran PTA [facility] [address]   4/12/2018 9:30 AM Chiqui Simpson, PT [facility] [address]   7/23/2018 10:00 AM Erica Lynch MD [address] [facility]

## 2018-03-22 ENCOUNTER — HOSPITAL ENCOUNTER (OUTPATIENT)
Dept: PHYSICAL THERAPY | Age: 68
Discharge: HOME | End: 2018-03-22
Payer: MEDICARE

## 2018-03-22 PROCEDURE — 97140 MANUAL THERAPY 1/> REGIONS: CPT

## 2018-03-22 PROCEDURE — 97112 NEUROMUSCULAR REEDUCATION: CPT

## 2018-03-22 PROCEDURE — 97110 THERAPEUTIC EXERCISES: CPT

## 2018-03-22 NOTE — PROGRESS NOTES
PT DAILY TREATMENT NOTE - [facility]     Patient Name: Delfin Martínez  Date:3/22/2018  : 1950  [x]  Patient  Verified  Payor: [place] MEDICARE / Plan: [place] MEDICARE PART A & B / Product Type: Medicare /    In time: 12:04  Out time: 1:03  Total Treatment Time (min): 59  Total Timed Codes (min): 59  1:1 Treatment Time ( [address] only): 40   Visit #: 7 of 12    Treatment Area: Right leg weakness [R29.898]    SUBJECTIVE  Pain Level (0-10 scale): 6/10   Any medication changes, allergies to medications, adverse drug reactions, diagnosis change, or new procedure performed?: [x] No    [] Yes (see summary sheet for update)  Subjective functional status/changes:   [] No changes reported  Patient stated that her back was bothering her more yesterday and attributed it to the weather. Patient stated the pain today is focused more on the (R) side of her low back. Patient also stated that yesterday she went to stand up from the chair and got a sharp pain at (L) side of back that radiated down (L) leg, but only lasted a short period of time. Patient stated that has happened 2 times last week as well. Patient stated she just sits and waits for the pain to go away and then she can get up. OBJECTIVE    34 min Therapeutic Exercise:  [x] See flow sheet :   Rationale: increase ROM and increase strength to improve the patients ability to perform ADLs. 15 min Neuromuscular Re-education:  [x]  See flow sheet : core stabilization exercises, and dynamic weight bearing activities. Rationale: increase strength, improve coordination, improve balance and increase proprioception  to improve the patients ability to activate core effectively to increase ease with household management. 10 min Manual Therapy:  STM to (R) lumbar paraspinal/upper glut/Ql   Rationale: decrease pain, increase ROM and increase tissue extensibility to perform ADLs.          With   [] TE   [] TA   [] neuro   [] other: Patient Education: [x] Review HEP    [] Progressed/Changed HEP based on:   [] positioning   [] body mechanics   [] transfers   [] heat/ice application    [] other:      Other Objective/Functional Measures:   Weakness noted in (R) glut med with side lying exercises. (R) hip abd strength: 3-/5   Extensor lag noted with supine SLR on the (R). Pain Level (0-10 scale) post treatment: 5/10    ASSESSMENT/Changes in Function: Patient reported reduced pain following manual. Educated patient on how to properaly activate TA in hook lying position and to palpate for TA engagement during supine marches. Patient reported overall decrease in pain at end of session. Patient will continue to benefit from skilled PT services to modify and progress therapeutic interventions, address functional mobility deficits, address ROM deficits, address strength deficits, analyze and address soft tissue restrictions, analyze and cue movement patterns, assess and modify postural abnormalities and address imbalance/dizziness to attain remaining goals. []  See Plan of Care  []  See progress note/recertification  []  See Discharge Summary         Progress towards goals / Updated goals:  Short Term Goals: To be accomplished in 2 weeks:  1. Pt will be independent and compliant w/ HEP to progress gains in PT. At [facility]: initiated HEP  Current: reported partial compliance 3/6/2018  2. Pt will report < or = to 3/10 pain prior to and following session to demo improve pain management at home. At [facility]: pain 7/10 prior to and following session   Long Term Goals: To be accomplished in 6 weeks:  1. Pt will improve FOTO to > or = to 51 to demo improved function. At [facility]: FOTO = 36  Current: Progressing, FOTO = 38, 3/20/2018  2. Pt will increase MMT right hip flex, ext, and abd to > or = to 4/5 for ease w/ improved standing tolerance. At eval: MMT Right hip flex -3/5 2' pain, abd 2+/5, ext NT  Current: MMT (R) hip abd: 3-/5 (3/22/18)   3.  Pt will increase core activation as demo'd by supine bridge w/o increased pain x10 for ease w/ bed mobility. At eval: pain w/ initiation of supine bridge x1  Current: Progressing, Ability to achieve 50% of available AROM with pain 5-6/10, 3/20/2018  4. Pt will increase balance as demo'd by SLS B for > or = to 15 seconds w/o HHA for decreased fall risk. At eval: pt unable to stand SLS B w/o HHA  5. Pt will ambulate w/ correct heel toe gait pattern for > or = to 500ft w/ LRAD for improved community navigation.    At eval: Pt presents w/ SPC R hand and slowed sherrell w/ decreased B knee ext in stance phase  Current: Remains, SPC with R UE with slowed sherrell and diminished terminal knee extension bilaterally during stance phase of gait, 3/14/2018       PLAN  []  Upgrade activities as tolerated     [x]  Continue plan of care  []  Update interventions per flow sheet       []  Discharge due to:_  []  Other:_      Caroline Ferguson, PT 3/22/2018  12:08 PM    Future Appointments  Date Time Provider Nick Dixon   3/23/2018 8:30 AM [facility] FAST TRACK NURSE [facility]   3/26/2018 10:00 AM Get Montoya PTA [facility]   3/28/2018 10:00 AM Get Montoya PTA [facility]   3/29/2018 10:30 AM Javier Doshi, PT [facility]   3/30/2018 8:30 AM [facility] FAST TRACK NURSE [facility]   4/2/2018 10:00 AM Get Montoya PTA [facility]   4/4/2018 10:00 AM Get Montoya PTA [facility]   4/5/2018 9:30 AM Burton Campoverde, PT [facility]   4/6/2018 8:30 AM [facility] FAST TRACK NURSE [facility]   4/9/2018 10:00 AM Get Montoya PTA [facility]   4/10/2018 9:15 AM Chu Green  [address]   4/11/2018 10:00 AM Get Montoya, PTA [facility]   4/12/2018 9:30 AM Burton Campoverde, PT [facility]   7/23/2018 10:00 AM Davy Desouza MD [place] [id number]

## 2018-03-23 ENCOUNTER — HOSPITAL ENCOUNTER (OUTPATIENT)
Dept: INFUSION THERAPY | Age: 68
Discharge: HOME | End: 2018-03-23
Payer: MEDICARE

## 2018-03-23 VITALS
HEART RATE: 62 BPM | SYSTOLIC BLOOD PRESSURE: 114 MMHG | TEMPERATURE: 97.3 F | DIASTOLIC BLOOD PRESSURE: 68 MMHG | RESPIRATION RATE: 18 BRPM

## 2018-03-23 LAB
BASO+EOS+MONOS # BLD AUTO: 1.2 K/UL (ref 0–2.3)
BASO+EOS+MONOS # BLD AUTO: 22 % (ref 0.1–17)
DIFFERENTIAL METHOD BLD: ABNORMAL
ERYTHROCYTE [DISTWIDTH] IN BLOOD BY AUTOMATED COUNT: 17.8 % (ref 11.5–14.5)
HCT VFR BLD AUTO: 32.7 % (ref 36–48)
HGB BLD-MCNC: 10.6 G/DL (ref 12–16)
LYMPHOCYTES # BLD: 1.8 K/UL (ref 1.1–5.9)
LYMPHOCYTES NFR BLD: 34 % (ref 14–44)
MCH RBC QN AUTO: 28 PG (ref 25–35)
MCHC RBC AUTO-ENTMCNC: 32.4 G/DL (ref 31–37)
MCV RBC AUTO: 86.3 FL (ref 78–102)
NEUTS SEG # BLD: 2.5 K/UL (ref 1.8–9.5)
NEUTS SEG NFR BLD: 45 % (ref 40–70)
PLATELET # BLD AUTO: 93 K/UL (ref 135–420)
RBC # BLD AUTO: 3.79 M/UL (ref 4.1–5.1)
WBC # BLD AUTO: 5.5 K/UL (ref 4.5–13)

## 2018-03-23 PROCEDURE — 74011250636 HC RX REV CODE- 250/636

## 2018-03-23 PROCEDURE — 74011000250 HC RX REV CODE- 250: Performed by: NURSE PRACTITIONER

## 2018-03-23 PROCEDURE — 74011250636 HC RX REV CODE- 250/636: Performed by: NURSE PRACTITIONER

## 2018-03-23 PROCEDURE — 36415 COLL VENOUS BLD VENIPUNCTURE: CPT

## 2018-03-23 PROCEDURE — 85049 AUTOMATED PLATELET COUNT: CPT | Performed by: INTERNAL MEDICINE

## 2018-03-23 PROCEDURE — 96372 THER/PROPH/DIAG INJ SC/IM: CPT

## 2018-03-23 PROCEDURE — 85025 COMPLETE CBC W/AUTO DIFF WBC: CPT | Performed by: INTERNAL MEDICINE

## 2018-03-23 RX ORDER — WATER FOR INJECTION,STERILE
VIAL (ML) INJECTION ONCE
Status: COMPLETED | OUTPATIENT
Start: 2018-03-23 | End: 2018-03-23

## 2018-03-23 RX ADMIN — ROMIPLOSTIM 207 MCG: 250 INJECTION, POWDER, LYOPHILIZED, FOR SOLUTION SUBCUTANEOUS at 09:03

## 2018-03-23 RX ADMIN — WATER: 1 INJECTION INTRAMUSCULAR; INTRAVENOUS; SUBCUTANEOUS at 09:04

## 2018-03-23 NOTE — PROGRESS NOTES
[facility] Progress Note    Date: 2018    Name: Maik Jones    MRN: [id number]         : 1950      Nplate Injection     Ms. Venkat Curtis arrived to [facility] [employer]. Ms. Venkat Curtis was assessed and education was provided. Ms. Keith Alexander vitals were reviewed. Visit Vitals    /68 (BP 1 Location: Right arm, BP Patient Position: Sitting)    Pulse 62    Temp 97.3 °F (36.3 °C)    Resp 18       Pt was observed for 5 minutes after obtaining vital signs prior to initiating treatment. Blood drawn via peripheral lab draw via the right AC x1 attempt for CBC. Gauze and coban applied to the site. Lab results obtained and reviewed. (Preliminary results scanned into media tab.)  Recent Results (from the past 12 hour(s))   CBC WITH 3 PART DIFF    Collection Time: 18  8:40 AM   Result Value Ref Range    WBC 5.5 4.5 - 13.0 K/uL    RBC 3.79 (L) 4.10 - 5.10 M/uL    HGB 10.6 (L) 12.0 - 16 g/dL    HCT 32.7 (L) 36 - 48 %    MCV 86.3 78 - 102 FL    MCH 28.0 25.0 - 35.0 PG    MCHC 32.4 31 - 37 g/dL    RDW 17.8 (H) 11.5 - 14.5 %    NEUTROPHILS 45 40 - 70 %    MIXED CELLS 22 (H) 0.1 - 17 %    LYMPHOCYTES 34 14 - 44 %    ABS. NEUTROPHILS 2.5 1.8 - 9.5 K/UL    ABS. MIXED CELLS 1.2 0.0 - 2.3 K/uL    ABS. LYMPHOCYTES 1.8 1.1 - 5.9 K/UL    DF AUTOMATED       Preliminary platelet count= 46,130. Per Nplate protocol, since pt's platelet count is 94,936 today, pt's dose will remain at 3mcg/kg (3mcg x 69kg = 207mcg). Nplate 330ZPC (9.60FQ) administered as ordered SQ in the back of the patient's right upper arm. No redness or bleeding noted. Ms. Venkat Curtis was discharged from [address] in stable condition at 0910. She is to return on 3/29/18 at 0930 for her next appointment.     Julien You RN  2018

## 2018-03-26 ENCOUNTER — HOSPITAL ENCOUNTER (OUTPATIENT)
Dept: PHYSICAL THERAPY | Age: 68
Discharge: HOME | End: 2018-03-26
Payer: MEDICARE

## 2018-03-26 PROCEDURE — 97110 THERAPEUTIC EXERCISES: CPT

## 2018-03-26 PROCEDURE — 97140 MANUAL THERAPY 1/> REGIONS: CPT

## 2018-03-26 PROCEDURE — 97112 NEUROMUSCULAR REEDUCATION: CPT

## 2018-03-26 NOTE — PROGRESS NOTES
PT DAILY TREATMENT NOTE - [facility]     Patient Name: Laura Greenfield  Date:3/26/2018  : 1950  [x]  Patient  Verified  Payor: [place] MEDICARE / Plan: VA MEDICARE PART A & B / Product Type: Medicare /    In time:10:09  Out time:11:03  Total Treatment Time (min): 54  Total Timed Codes (min): 54  1:1 Treatment Time [facility] only):40  Visit #: 8 of 12    Treatment Area: Right leg weakness [R29.898]    SUBJECTIVE  Pain Level (0-10 scale): 5.5  Any medication changes, allergies to medications, adverse drug reactions, diagnosis change, or new procedure performed?: [x] No    [] Yes (see summary sheet for update)  Subjective functional status/changes:   [] No changes reported  Pt reports that her right leg continues to feel weak. OBJECTIVE        Min Type Additional Details    [] Estim:  []Unatt       []IFC  []Premod                        []Other:  []w/ice   []w/heat  Position:  Location:    [] Estim: []Att    []TENS instruct  []NMES                    []Other:  []w/US   []w/ice   []w/heat  Position:  Location:    []  Traction: [] Cervical       []Lumbar                       [] Prone          []Supine                       []Intermittent   []Continuous Lbs:  [] before manual  [] after manual    []  Ultrasound: []Continuous   [] Pulsed                           []1MHz   []3MHz W/cm2:  Location:    []  Iontophoresis with dexamethasone         Location: [] Take home patch   [] In clinic    []  Ice     []  heat  []  Ice massage  []  Laser   []  Anodyne Position:  Location:    []  Laser with stim  []  Other:  Position:  Location:    []  Vasopneumatic Device Pressure:       [] lo [] med [] hi   Temperature: [] lo [] med [] hi   [] Skin assessment post-treatment:  []intact []redness- no adverse reaction    []redness  adverse reaction:       31 min Therapeutic Exercise:  [x] See flow sheet :   Rationale: increase ROM and increase strength to improve the patients ability to increase tolerance to activities.         15 min Neuromuscular Re-education:  [x]  See flow sheet :core activation exercises. Rationale: increase ROM and increase strength  to improve the patients ability to increase tolerance to activities. 8 min Manual Therapy:  STM/TPR to B lumbar paraspinals, upper glutes, and QLs,   Rationale: decrease pain, increase ROM, increase tissue extensibility and decrease trigger points to increase      With   [] TE   [] TA   [] neuro   [] other: Patient Education: [x] Review HEP    [] Progressed/Changed HEP based on:   [] positioning   [] body mechanics   [] transfers   [] heat/ice application    [] other:      Other Objective/Functional Measures: prior pain 5.5. Pain Level (0-10 scale) post treatment: 0    ASSESSMENT/Changes in Function: Continue to progress pt as tolerated. Pt continues to have minimal motion with SLR and hip abd raises. Pt lacks effort given toward exercises and does not like when therapist corrects forms. Patient will continue to benefit from skilled PT services to modify and progress therapeutic interventions, address functional mobility deficits, address ROM deficits, address strength deficits and analyze and address soft tissue restrictions to attain remaining goals. []  See Plan of Care  []  See progress note/recertification  []  See Discharge Summary         Progress towards goals / Updated goals:  Short Term Goals: To be accomplished in 2 weeks:  1. Pt will be independent and compliant w/ HEP to progress gains in PT. At [facility]: initiated HEP  Current: reported partial compliance 3/6/2018  2. Pt will report < or = to 3/10 pain prior to and following session to demo improve pain management at home. At [facility]: pain 7/10 prior to and following session  Current: Not met: prior pain 5.5. 3/26/18   Long Term Goals: To be accomplished in 6 weeks:  1. Pt will improve FOTO to > or = to 51 to demo improved function. At [facility]: FOTO = 36  Current: Progressing, FOTO = 38, 3/20/2018  2.  Pt will increase MMT right hip flex, ext, and abd to > or = to 4/5 for ease w/ improved standing tolerance. At eval: MMT Right hip flex -3/5 2' pain, abd 2+/5, ext NT  Current: MMT (R) hip abd: 3-/5 (3/22/18)   3. Pt will increase core activation as demo'd by supine bridge w/o increased pain x10 for ease w/ bed mobility. At eval: pain w/ initiation of supine bridge x1  Current: Progressing, Ability to achieve 50% of available AROM with pain 5-6/10, 3/20/2018  4. Pt will increase balance as demo'd by SLS B for > or = to 15 seconds w/o HHA for decreased fall risk. At eval: pt unable to stand SLS B w/o HHA  5. Pt will ambulate w/ correct heel toe gait pattern for > or = to 500ft w/ LRAD for improved community navigation.    At eval: Pt presents w/ SPC R hand and slowed sherrell w/ decreased B knee ext in stance phase  Current: Remains, SPC with R UE with slowed sherrell and diminished terminal knee extension bilaterally during stance phase of gait, 3/14/2018       PLAN  []  Upgrade activities as tolerated     [x]  Continue plan of care  []  Update interventions per flow sheet       []  Discharge due to:_  []  Other:_      Manjeet Cochran PTA 3/26/2018  10:20 AM    Future Appointments  Date Time Provider Nick Dixon   3/28/2018 10:00 AM Manjeet Cochran PTA [facility]   3/29/2018 9:30 AM [facility] FAST TRACK NURSE [facility]   3/29/2018 10:30 AM Ester Bedoya, PT [facility]   4/2/2018 10:00 AM Manjeet Cochran PTA [facility]   4/4/2018 10:00 AM Manjeet Cochran PTA [facility]   4/5/2018 9:30 AM Chiqui Simpson, PT [facility]   4/6/2018 8:30 AM [facility] FAST TRACK NURSE [facility]   4/9/2018 10:00 AM Manjeet Cochran PTA [facility]   4/10/2018 9:15 AM Herson Hill  [address] 4/11/2018 10:00 AM Manjeet Cochran PTA [facility]   4/12/2018 9:30 AM Chiqui Simpson, PT [facility]   7/23/2018 10:00 AM Erica Lynch MD [id number] [facility]

## 2018-03-28 ENCOUNTER — HOSPITAL ENCOUNTER (OUTPATIENT)
Dept: PHYSICAL THERAPY | Age: 68
Discharge: HOME | End: 2018-03-28
Payer: MEDICARE

## 2018-03-28 PROCEDURE — 97112 NEUROMUSCULAR REEDUCATION: CPT

## 2018-03-28 PROCEDURE — 97140 MANUAL THERAPY 1/> REGIONS: CPT

## 2018-03-28 PROCEDURE — 97110 THERAPEUTIC EXERCISES: CPT

## 2018-03-28 NOTE — PROGRESS NOTES
PT DAILY TREATMENT NOTE - [facility]     Patient Name: Ann Colunga  Date:3/28/2018  : 1950  [x]  Patient  Verified  Payor: [place] MEDICARE / Plan: VA MEDICARE PART A & B / Product Type: Medicare /    In time:10:03  Out time:10:52  Total Treatment Time (min): 49  Total Timed Codes (min): 49  1:1 Treatment Time ( [address] only): 40   Visit #: 9 of 12    Treatment Area: Right leg weakness [R29.898]    SUBJECTIVE  Pain Level (0-10 scale): 5  Any medication changes, allergies to medications, adverse drug reactions, diagnosis change, or new procedure performed?: [x] No    [] Yes (see summary sheet for update)  Subjective functional status/changes:   [] No changes reported  Pt reports no change since last session. OBJECTIVE        Min Type Additional Details    [] Estim:  []Unatt       []IFC  []Premod                        []Other:  []w/ice   []w/heat  Position:  Location:    [] Estim: []Att    []TENS instruct  []NMES                    []Other:  []w/US   []w/ice   []w/heat  Position:  Location:    []  Traction: [] Cervical       []Lumbar                       [] Prone          []Supine                       []Intermittent   []Continuous Lbs:  [] before manual  [] after manual    []  Ultrasound: []Continuous   [] Pulsed                           []1MHz   []3MHz W/cm2:  Location:    []  Iontophoresis with dexamethasone         Location: [] Take home patch   [] In clinic    []  Ice     []  heat  []  Ice massage  []  Laser   []  Anodyne Position:  Location:    []  Laser with stim  []  Other:  Position:  Location:    []  Vasopneumatic Device Pressure:       [] lo [] med [] hi   Temperature: [] lo [] med [] hi   [] Skin assessment post-treatment:  []intact []redness- no adverse reaction    []redness  adverse reaction:        26 min Therapeutic Exercise:  [x] See flow sheet :   Rationale: increase ROM and increase strength to improve the patients ability to increase tolerance to activities.           15 min Neuromuscular Re-education:  [x]  See flow sheet :core activation exercises. Rationale: increase ROM and increase strength  to improve the patients ability to increase tolerance to activities.      8 min Manual Therapy:  STM/TPR to B lumbar paraspinals, upper glutes, and QLs,   Rationale: decrease pain, increase ROM, increase tissue extensibility and decrease trigger points to increase ease with ADLs. With   [] TE   [] TA   [] neuro   [] other: Patient Education: [x] Review HEP    [] Progressed/Changed HEP based on:   [] positioning   [] body mechanics   [] transfers   [] heat/ice application    [] other:      Other Objective/Functional Measures:      Pain Level (0-10 scale) post treatment: 5    ASSESSMENT/Changes in Function: Continue to progress pt as tolerated. Patient will continue to benefit from skilled PT services to modify and progress therapeutic interventions, address functional mobility deficits, address ROM deficits, address strength deficits and analyze and address soft tissue restrictions to attain remaining goals. []  See Plan of Care  []  See progress note/recertification  []  See Discharge Summary         Progress towards goals / Updated goals:  Short Term Goals: To be accomplished in 2 weeks:  1. Pt will be independent and compliant w/ HEP to progress gains in PT. At eval: initiated HEP  Current: reported partial compliance 3/6/2018  2. Pt will report < or = to 3/10 pain prior to and following session to demo improve pain management at home. At [facility]: pain 7/10 prior to and following session  Current: Not met: prior pain 5.5. 3/26/18   Long Term Goals: To be accomplished in 6 weeks:  1. Pt will improve FOTO to > or = to 51 to demo improved function. At eval: FOTO = 36  Current: Progressing, FOTO = 38, 3/20/2018  2. Pt will increase MMT right hip flex, ext, and abd to > or = to 4/5 for ease w/ improved standing tolerance.   At eval: MMT Right hip flex -3/5 2' pain, abd 2+/5, ext NT  Current: MMT (R) hip abd: 3-/5 (3/22/18)   3. Pt will increase core activation as demo'd by supine bridge w/o increased pain x10 for ease w/ bed mobility. At eval: pain w/ initiation of supine bridge x1  Current: Progressing, Ability to achieve 50% of available AROM with pain 5-6/10, 3/20/2018  4. Pt will increase balance as demo'd by SLS B for > or = to 15 seconds w/o HHA for decreased fall risk. At eval: pt unable to stand SLS B w/o HHA  5. Pt will ambulate w/ correct heel toe gait pattern for > or = to 500ft w/ LRAD for improved community navigation.    At eval: Pt presents w/ SPC R hand and slowed sherrell w/ decreased B knee ext in stance phase  Current: Remains, SPC with R UE with slowed sherrell and diminished terminal knee extension bilaterally during stance phase of gait, 3/14/2018    PLAN  []  Upgrade activities as tolerated     [x]  Continue plan of care  []  Update interventions per flow sheet       []  Discharge due to:_  []  Other:_      Oskar Holley PTA 3/28/2018  10:07 AM    Future Appointments  Date Time Provider Nick Dixon   3/29/2018 9:30 AM [facility] FAST TRACK NURSE [facility]   3/29/2018 10:30 AM Vince Sparrow, PT [facility]   4/2/2018 10:00 AM Oskar Holley PTA [facility]   4/4/2018 10:00 AM Oskar Holley PTA [facility]   4/5/2018 9:30 AM Alexandru Hernández, PT [facility]   4/6/2018 8:30 AM [facility] FAST TRACK NURSE [facility]   4/9/2018 10:00 AM Oskar Holley PTA [facility]   4/10/2018 9:15 AM Flex Brown  [address]   4/11/2018 10:00 AM Oskar Holley PTA [facility]   4/12/2018 9:30 AM Alexandru Hernández, PT [facility]   7/23/2018 10:00 AM MD Radha Mae [address]

## 2018-03-29 ENCOUNTER — HOSPITAL ENCOUNTER (OUTPATIENT)
Dept: PHYSICAL THERAPY | Age: 68
Discharge: HOME | End: 2018-03-29
Payer: MEDICARE

## 2018-03-29 ENCOUNTER — HOSPITAL ENCOUNTER (OUTPATIENT)
Dept: INFUSION THERAPY | Age: 68
Discharge: HOME | End: 2018-03-29
Payer: MEDICARE

## 2018-03-29 VITALS
DIASTOLIC BLOOD PRESSURE: 72 MMHG | TEMPERATURE: 97.8 F | SYSTOLIC BLOOD PRESSURE: 120 MMHG | RESPIRATION RATE: 18 BRPM | HEART RATE: 76 BPM

## 2018-03-29 LAB
BASO+EOS+MONOS # BLD AUTO: 1 K/UL (ref 0–2.3)
BASO+EOS+MONOS # BLD AUTO: 17 % (ref 0.1–17)
DIFFERENTIAL METHOD BLD: ABNORMAL
ERYTHROCYTE [DISTWIDTH] IN BLOOD BY AUTOMATED COUNT: 17.4 % (ref 11.5–14.5)
HCT VFR BLD AUTO: 31.3 % (ref 36–48)
HGB BLD-MCNC: 10.2 G/DL (ref 12–16)
LYMPHOCYTES # BLD: 2 K/UL (ref 1.1–5.9)
LYMPHOCYTES NFR BLD: 33 % (ref 14–44)
MCH RBC QN AUTO: 27.8 PG (ref 25–35)
MCHC RBC AUTO-ENTMCNC: 32.6 G/DL (ref 31–37)
MCV RBC AUTO: 85.3 FL (ref 78–102)
NEUTS SEG # BLD: 3 K/UL (ref 1.8–9.5)
NEUTS SEG NFR BLD: 50 % (ref 40–70)
PLATELET # BLD AUTO: 82 K/UL (ref 135–420)
RBC # BLD AUTO: 3.67 M/UL (ref 4.1–5.1)
WBC # BLD AUTO: 6 K/UL (ref 4.5–13)

## 2018-03-29 PROCEDURE — 74011250636 HC RX REV CODE- 250/636: Performed by: NURSE PRACTITIONER

## 2018-03-29 PROCEDURE — 36415 COLL VENOUS BLD VENIPUNCTURE: CPT

## 2018-03-29 PROCEDURE — 97112 NEUROMUSCULAR REEDUCATION: CPT

## 2018-03-29 PROCEDURE — 96372 THER/PROPH/DIAG INJ SC/IM: CPT

## 2018-03-29 PROCEDURE — 74011250636 HC RX REV CODE- 250/636

## 2018-03-29 PROCEDURE — 97140 MANUAL THERAPY 1/> REGIONS: CPT

## 2018-03-29 PROCEDURE — G8978 MOBILITY CURRENT STATUS: HCPCS

## 2018-03-29 PROCEDURE — G8979 MOBILITY GOAL STATUS: HCPCS

## 2018-03-29 PROCEDURE — 85049 AUTOMATED PLATELET COUNT: CPT | Performed by: INTERNAL MEDICINE

## 2018-03-29 PROCEDURE — 85025 COMPLETE CBC W/AUTO DIFF WBC: CPT | Performed by: INTERNAL MEDICINE

## 2018-03-29 RX ORDER — WATER FOR INJECTION,STERILE
VIAL (ML) INJECTION
Status: DISPENSED
Start: 2018-03-29 | End: 2018-03-29

## 2018-03-29 RX ADMIN — ROMIPLOSTIM 207 MCG: 250 INJECTION, POWDER, LYOPHILIZED, FOR SOLUTION SUBCUTANEOUS at 09:58

## 2018-03-29 NOTE — PROGRESS NOTES
PT DAILY TREATMENT NOTE - [facility]     Patient Name: Bridget Queen  Date:3/29/2018  : 1950  [x]  Patient  Verified  [place]or: [place] / Plan: [place] MED[place]ARE PART A & B / Product Type: Medicare /    In time:1050  Out time:1137  Total Treatment Time (min): 47  Total Timed Codes (min): 47  1:1 Treatment Time ( only): 26   Visit #: 10 of 12    Treatment Area: Right leg weakness [R29.898]    SUBJECTIVE  Pain Level (0-10 scale): 5  Any medication changes, allergies to medications, adverse drug reactions, diagnosis change, or new procedure performed?: [x] No    [] Yes (see summary sheet for update)  Subjective functional status/changes:   [] No changes reported  Patient reports since SoC improvement in ambulation tolerance with patient reporting reduction in use of SPC but continued reports of lumbar and knee instability. OBJECTIVE    19 min Therapeutic Exercise:  [x] See flow sheet : Emphasis placed on improving available lumbar and hip AROM and strength of the gluteal musculature   Rationale: increase ROM and increase strength to improve the patients ability to improve ease with household ADLs      20 min Neuromuscular Re-education:  [x]  See flow sheet : Emphasis placed on improving pelvic proprioceptive awareness and recruitment and activation of the anterior abdominal musculature, Emphasis placed on improving static and dynamic weightbearing stability and balance   Rationale: increase ROM, increase strength, improve balance and increase proprioception  to improve the patients ability to improve safety with community ambulation.      8 min Manual Therapy:    Supine, R Hip Grade I/II Inferior Mobilization  Supine, R Hip Grade I/II Lateral Mobilization  Supine, R Piriformis Manual Stretch (flexion, abduction, ER)   Rationale: decrease pain, increase ROM and increase tissue extensibility to improve ease with stair management.         With   [] TE   [] TA   [] neuro   [] other: Patient Education: [x] Review HEP    [] Progressed/Changed HEP based on:   [] positioning   [] body mechanics   [] transfers   [] heat/ice application    [] other:      Pain Level (0-10 scale) post treatment: 5    ASSESSMENT/Changes in Function: Since SoC patient has subjectively reported a significant improvement in ambulation tolerance with patient subjectively reporting reduced utilization of SPC and reduced pain intensity and severity. Patient continues to report episodes of lumbar and right knee instability with patient reporting gait instability but denying falls. Patient objectively has demonstrated greater available AROM with completion of supine bridges but noted with continued deficits in hip strength and SLS stability bilaterally. Patient would benefit from the continuation of physical therapy services in order to allow for further improvements in functional activity tolerance and safety and improve overall independence. Patient will continue to benefit from skilled PT services to modify and progress therapeutic interventions, address functional mobility deficits, address ROM deficits, address strength deficits, analyze and address soft tissue restrictions, analyze and cue movement patterns and analyze and modify body mechanics/ergonomics to attain remaining goals. []  See Plan of Care  [x]  See progress note/recertification  []  See Discharge Summary         Progress towards goals / Updated goals:    Short Term Goals: To be accomplished in 2 weeks:  1. Pt will be independent and compliant w/ HEP to progress gains in PT. At eval: initiated HEP  Current: reported partial compliance 3/6/2018  2. Pt will report < or = to 3/10 pain prior to and following session to demo improve pain management at home. At eval: pain 7/10 prior to and following session  Current: Not met: prior pain 5.5. 3/26/18   Long Term Goals: To be accomplished in 6 weeks:  1. Pt will improve FOTO to > or = to 51 to demo improved function.    At eval: FOTO = 36  Current: Progressing, FOTO = 38, 3/20/2018  2. Pt will increase MMT right hip flex, ext, and abd to > or = to 4/5 for ease w/ improved standing tolerance. At eval: MMT Right hip flex -3/5 2' pain, abd 2+/5, ext NT  Current: Remains, MMT (R) hip abd: 3-/5 (3/22/18)   3. Pt will increase core activation as demo'd by supine bridge w/o increased pain x10 for ease w/ bed mobility. At eval: pain w/ initiation of supine bridge x1  Current: Progressing, Ability to achieve 50% of available AROM with pain 5-6/10, 3/20/2018  4. Pt will increase balance as demo'd by SLS B for > or = to 15 seconds w/o HHA for decreased fall risk. At eval: pt unable to stand SLS B w/o HHA  Current: Progressing, Left SLS 5 sec, Right SLS 5 sec, 3/29/2018  5. Pt will ambulate w/ correct heel toe gait pattern for > or = to 500ft w/ LRAD for improved community navigation.    At eval: Pt presents w/ SPC R hand and slowed sherrell w/ decreased B knee ext in stance phase  Current: Remains, SPC with R UE with slowed sherrell and diminished terminal knee extension bilaterally during stance phase of gait, 3/14/2018    PLAN  [x]  Upgrade activities as tolerated     [x]  Continue plan of care  []  Update interventions per flow sheet       []  Discharge due to:_  []  Other:_      Jaime Arriaga PT 3/29/2018  7:43 AM    Future Appointments  Date Time Provider Nick Dixon   3/29/2018 9:30 AM [facility] FAST TRACK NURSE [facility]   3/29/2018 11:00 AM Jaime Arriaga PT [facility]   4/2/2018 10:00 AM Imel[facility] Rodriguez PTA [facility]   4/4/2018 10:00 AM Imelda Rodriguez PTA [facility]   4/5/2018 9:30 AM Alyssa Rivera PT [facility]   4/6/2018 8:30 AM [facility] FAST TRACK NURSE [facility]   4/9/2018 10:00 AM Imelda Rodriguez PTA [facility]   4/10/2018 9:15 AM Sheba Henderson  [address]    4/11/2018 10:00 AM Imelda Rodriguez PTA [facility]   4/12/2018 9:30 AM Alyssa Rivera, PT [facility]   7/23/2018 10:00 AM Allyssa Cassidy MD [id number] [facility]

## 2018-03-29 NOTE — PROGRESS NOTES
[facility]              [address]        [place], [address] [place]   [phone] fax    Continued Plan of Care/ Re-certification for Physical Therapy Services    Patient name: Misbah Negron Start of Care: 3/1/2018   Referral source: Nataliya Meyer MD : 1950   Medical/Treatment Diagnosis: Right leg weakness [R29.898] Onset Date:2018     Prior Hospitalization: see medical history Provider#: [id number]   Medications: Verified on Patient Summary List    Comorbidities: allergies, back pain, CA, CHF or heart disease, GI disease, prior surgery, sleep dysfunction    Prior Level of Function: hx of back pain and L5-6 fusion, ambulating w/ FWW and n/t throughout R LE  Visits from Start of Care: 10    Missed Visits: 2    The Plan of Care and following information is based on the patient's current status:    Short Term Goals: To be accomplished in 2 weeks:  1. Pt will be independent and compliant w/ HEP to progress gains in PT. At eval: initiated HEP  At PN: reported partial compliance   2. Pt will report < or = to 3/10 pain prior to and following session to demo improve pain management at home. At eval: pain 7/10 prior to and following session  At PN: Not met: prior pain 5.5   Long Term Goals: To be accomplished in 6 weeks:  1. Pt will improve FOTO to > or = to 51 to demo improved function. At eval: FOTO = 36  At PN: Progressing, FOTO = 38  2. Pt will increase MMT right hip flex, ext, and abd to > or = to 4/5 for ease w/ improved standing tolerance. At eval: MMT Right hip flex -3/5 2' pain, abd 2+/5, ext NT  At PN: Remains, MMT (R) hip abd: 3-/5   3. Pt will increase core activation as demo'd by supine bridge w/o increased pain x10 for ease w/ bed mobility. At eval: pain w/ initiation of supine bridge x1  Current: Progressing, Ability to achieve 50% of available AROM with pain 5-6/10  4.  Pt will increase balance as demo'd by SLS B for > or = to 15 seconds w/o HHA for decreased fall risk. At eval: pt unable to stand SLS B w/o HHA  At PN: Progressing, Left SLS 5 sec, Right SLS 5 sec  5. Pt will ambulate w/ correct heel toe gait pattern for > or = to 500ft w/ LRAD for improved community navigation. At eval: Pt presents w/ SPC R hand and slowed sherrell w/ decreased B knee ext in stance phase  At PN: Remains, SPC with R UE with slowed sherrell and diminished terminal knee extension bilaterally during stance phase of gait    Key functional changes: See above goals. Problems/ barriers to goal attainment: Patient demonstrates relative poor activity tolerance thus resulting in poor tolerance to exercise progression. Problem List: pain affecting function, decrease ROM, decrease strength, impaired gait/ balance, decrease ADL/ functional abilitiies and decrease activity tolerance    Treatment Plan: Therapeutic exercise, Therapeutic activities, Neuromuscular re-education, Physical agent/modality, Gait/balance training, Manual therapy, Patient education, Self Care training, Functional mobility training, Home safety training and Stair training     Goals for this certification period: Continue with unmet goals above     Frequency / Duration: Patient to be seen 2-3 times per week for 4 weeks:    Assessment / Recommendations:    Since SoC patient has subjectively reported a significant improvement in ambulation tolerance with patient subjectively reporting reduced utilization of SPC and reduced pain intensity and severity. Patient continues to report episodes of lumbar and right knee instability with patient reporting gait instability but denying falls. Patient objectively has demonstrated greater available AROM with completion of supine bridges but noted with continued deficits in hip strength and SLS stability bilaterally.  Patient would benefit from the continuation of physical therapy services in order to allow for further improvements in functional activity tolerance and safety and improve overall independence.      Patient will continue to benefit from skilled PT services to modify and progress therapeutic interventions, address functional mobility deficits, address ROM deficits, address strength deficits, analyze and address soft tissue restrictions, analyze and cue movement patterns and analyze and modify body mechanics/ergonomics to attain remaining goals. G-Codes (GP)  Mobility  [id number] Current  CL= 60-79%  [id number] Goal  CK= 40-59%    The severity rating is based on clinical judgment and the FOTO score. Certification Period: 3/29/2018 - 5/28/2018    Lalo Mills PT 3/29/2018 7:44 AM    ________________________________________________________________________  I certify that the above Therapy Services are being furnished while the patient is under my care. I agree with the treatment plan and certify that this therapy is necessary. [] I have read the above and request that my patient continue as recommended.   [] I have read the above report and request that my patient continue therapy with the following changes/special instructions: _______________________________________  [] I have read the above report and request that my patient be discharged from therapy    Physician's Signature:_______________________________Date:___________Time:__________    Please sign and return to [facility]              [address] [place], [address] [place]   [phone] fax

## 2018-03-29 NOTE — PROGRESS NOTES
[facility] Progress Note    Date: 2018    Name: Caprice Jackson    MRN: [id number]         : 1950      Nplate Injection     Ms. Aster Castro arrived to [facility] at 4730. Ms. Aster Castro was assessed and education was provided. Ms. Roosevelt Valle vitals were reviewed. Visit Vitals    /72 (BP 1 Location: Right arm, BP Patient Position: Sitting)    Pulse 76    Temp 97.8 °F (36.6 °C)    Resp 18       Pt was observed for 5 minutes after obtaining vital signs prior to initiating treatment. Blood drawn via peripheral lab draw via the right AC x1 attempt for CBC. Gauze and coban applied to the site. Lab results obtained and reviewed. (Preliminary results scanned into media tab.)  Recent Results (from the past 12 hour(s))   CBC WITH 3 PART DIFF    Collection Time: 18  9:47 AM   Result Value Ref Range    WBC 6.0 4.5 - 13.0 K/uL    RBC 3.67 (L) 4.10 - 5.10 M/uL    HGB 10.2 (L) 12.0 - 16 g/dL    HCT 31.3 (L) 36 - 48 %    MCV 85.3 78 - 102 FL    MCH 27.8 25.0 - 35.0 PG    MCHC 32.6 31 - 37 g/dL    RDW 17.4 (H) 11.5 - 14.5 %    NEUTROPHILS 50 40 - 70 %    MIXED CELLS 17 0.1 - 17 %    LYMPHOCYTES 33 14 - 44 %    ABS. NEUTROPHILS 3.0 1.8 - 9.5 K/UL    ABS. MIXED CELLS 1.0 0.0 - 2.3 K/uL    ABS. LYMPHOCYTES 2.0 1.1 - 5.9 K/UL    DF AUTOMATED       Preliminary platelet count= 61,454. Per Nplate protocol, since pt's platelet count is 46,920 today, pt's dose will remain at 3mcg/kg (3mcg x 69kg = 207mcg). Nplate 368OLJ (0.46JT) administered as ordered SQ in the back of the patient's right upper arm. No redness or bleeding noted. Ms. Aster Castro was discharged from [place] [address] in stable condition at 1005. She is to return on 18 at 0830 for her next appointment.     Tanya Tineo RN  2018

## 2018-03-30 ENCOUNTER — HOSPITAL ENCOUNTER (OUTPATIENT)
Dept: INFUSION THERAPY | Age: 68
End: 2018-03-30
Payer: MEDICARE

## 2018-04-02 ENCOUNTER — HOSPITAL ENCOUNTER (OUTPATIENT)
Dept: PHYSICAL THERAPY | Age: 68
Discharge: HOME | End: 2018-04-02
Payer: MEDICARE

## 2018-04-02 PROCEDURE — 97110 THERAPEUTIC EXERCISES: CPT

## 2018-04-02 PROCEDURE — 97140 MANUAL THERAPY 1/> REGIONS: CPT

## 2018-04-02 PROCEDURE — 97112 NEUROMUSCULAR REEDUCATION: CPT

## 2018-04-02 NOTE — PROGRESS NOTES
PT DAILY TREATMENT NOTE - [facility]     Patient Name: Erica Solis  Date:2018  : 1950  [x]  Patient  Verified  Payor: [place] MEDICARE / Plan: [place] MEDICARE PART A & B / Product Type: Medicare /    In time:3:01  Out time:4:00  Total Treatment Time (min): 59  Total Timed Codes (min): 59  1:1 Treatment Time ( [address] only): 39   Visit #: 1 of 12 (signed recert)    Treatment Area: Right leg weakness [R29.898]    SUBJECTIVE  Pain Level (0-10 scale): 5  Any medication changes, allergies to medications, adverse drug reactions, diagnosis change, or new procedure performed?: [x] No    [] Yes (see summary sheet for update)  Subjective functional status/changes:   [] No changes reported  Pt reports she is trying to walk more without her cane. OBJECTIVE        Min Type Additional Details    [] Estim:  []Unatt       []IFC  []Premod                        []Other:  []w/ice   []w/heat  Position:  Location:    [] Estim: []Att    []TENS instruct  []NMES                    []Other:  []w/US   []w/ice   []w/heat  Position:  Location:    []  Traction: [] Cervical       []Lumbar                       [] Prone          []Supine                       []Intermittent   []Continuous Lbs:  [] before manual  [] after manual    []  Ultrasound: []Continuous   [] Pulsed                           []1MHz   []3MHz W/cm2:  Location:    []  Iontophoresis with dexamethasone         Location: [] Take home patch   [] In clinic    []  Ice     []  heat  []  Ice massage  []  Laser   []  Anodyne Position:  Location:    []  Laser with stim  []  Other:  Position:  Location:    []  Vasopneumatic Device Pressure:       [] lo [] med [] hi   Temperature: [] lo [] med [] hi   [] Skin assessment post-treatment:  []intact []redness- no adverse reaction    []redness  adverse reaction:         36 min Therapeutic Exercise:  [x] See flow sheet :   Rationale: increase ROM and increase strength to improve the patients ability to increase tolerance to activities.         15 min Neuromuscular Re-education:  [x]  See flow sheet :core activation exercises. Rationale: increase ROM and increase strength  to improve the patients ability to increase tolerance to activities.     8 min Manual Therapy:  STM/TPR to B lumbar paraspinals, upper glutes, and QLs,   Rationale: decrease pain, increase ROM, increase tissue extensibility and decrease trigger points to increase ease with ADLs.              With   [] TE   [] TA   [] neuro   [] other: Patient Education: [x] Review HEP    [] Progressed/Changed HEP based on:   [] positioning   [] body mechanics   [] transfers   [] heat/ice application    [] other:      Other Objective/Functional Measures:      Pain Level (0-10 scale) post treatment: 4.5    ASSESSMENT/Changes in Function: Continue to progress pt as tolerated. Pt tender along    Patient will continue to benefit from skilled PT services to modify and progress therapeutic interventions, address functional mobility deficits, address ROM deficits, address strength deficits and analyze and address soft tissue restrictions to attain remaining goals. []  See Plan of Care  []  See progress note/recertification  []  See Discharge Summary         Progress towards goals / Updated goals:     Short Term Goals: To be accomplished in 2 weeks:  1. Pt will be independent and compliant w/ HEP to progress gains in PT. At PN:  reported partial compliance 3/6/2018  2. Pt will report < or = to 3/10 pain prior to and following session to demo improve pain management at home. At PN:  prior pain 5.5. 3/26/18   Long Term Goals: To be accomplished in 6 weeks:  1. Pt will improve FOTO to > or = to 51 to demo improved function. At PN: FOTO = 38, 3/20/2018  2. Pt will increase MMT right hip flex, ext, and abd to > or = to 4/5 for ease w/ improved standing tolerance. At PN:  Remains, MMT (R) hip abd: 3-/5 (3/22/18)   3.  Pt will increase core activation as demo'd by supine bridge w/o increased pain x10 for ease w/ bed mobility. At PN:  Ability to achieve 50% of available AROM with pain 5-6/10, 3/20/2018  4. Pt will increase balance as demo'd by SLS B for > or = to 15 seconds w/o HHA for decreased fall risk. A PN: , Left SLS 5 sec, Right SLS 5 sec, 3/29/2018  5. Pt will ambulate w/ correct heel toe gait pattern for > or = to 500ft w/ LRAD for improved community navigation.    At PN: SPC with R UE with slowed sherrell and diminished terminal knee extension bilaterally during stance phase of gait, 3/14/2018    PLAN  []  Upgrade activities as tolerated     [x]  Continue plan of care  []  Update interventions per flow sheet       []  Discharge due to:_  []  Other:_      Spring Quarles PTA 4/2/2018  3:05 PM    Future Appointments  Date Time Provider Nick Dixon   4/4/2018 10:00 AM Spring Quarles PTA [facility]   4/5/2018 9:30 AM Caity Helton, PT [facility]   4/6/2018 8:30 AM [facility] FAST TRACK NURSE [facility]   4/9/2018 10:00 AM Spring Quarles PTA [facility]   4/10/2018 9:15 AM Kel Chinchilla  [address]    4/11/2018 10:00 AM Spring Quarles PTA [facility]   4/12/2018 9:30 AM Caity Helton PT [facility]   4/16/2018 9:30 AM Spring Quarles PTA [facility]   4/19/2018 9:30 AM Celeste Jon, PT [facility]   4/23/2018 9:30 AM Spring Quarles PTA [facility]   4/25/2018 9:30 AM Celeste Jon, PT [facility]   7/23/2018 10:00 AM Lisa Coronado [place] [id number] [facility]

## 2018-04-04 ENCOUNTER — HOSPITAL ENCOUNTER (OUTPATIENT)
Dept: PHYSICAL THERAPY | Age: 68
Discharge: HOME | End: 2018-04-04
Payer: MEDICARE

## 2018-04-04 PROCEDURE — 97140 MANUAL THERAPY 1/> REGIONS: CPT

## 2018-04-04 PROCEDURE — 97112 NEUROMUSCULAR REEDUCATION: CPT

## 2018-04-04 PROCEDURE — 97110 THERAPEUTIC EXERCISES: CPT

## 2018-04-04 NOTE — PROGRESS NOTES
PT DAILY TREATMENT NOTE - [facility]     Patient Name: Ahsan Tay  Date:2018  : 1950  [x]  Patient  Verified  Payor: [place] MED[place]ARE / Plan: [place] MEDICARE PART A & B / Product Type: Medicare /    In time:10:03  Out time:11:02  Total Treatment Time (min): 59  Total Timed Codes (min): 59  1:1 Treatment Time ( only): 61   Visit #: 2 of 12    Treatment Area: Right leg weakness [R29.898]    SUBJECTIVE  Pain Level (0-10 scale): 4.5  Any medication changes, allergies to medications, adverse drug reactions, diagnosis change, or new procedure performed?: [x] No    [] Yes (see summary sheet for update)  Subjective functional status/changes:   [] No changes reported  Pt reports she continues to have difficulty with standing for long periods of time.      OBJECTIVE        Min Type Additional Details    [] Estim:  []Unatt       []IFC  []Premod                        []Other:  []w/ice   []w/heat  Position:  Location:    [] Estim: []Att    []TENS instruct  []NMES                    []Other:  []w/US   []w/ice   []w/heat  Position:  Location:    []  Traction: [] Cervical       []Lumbar                       [] Prone          []Supine                       []Intermittent   []Continuous Lbs:  [] before manual  [] after manual    []  Ultrasound: []Continuous   [] Pulsed                           []1MHz   []3MHz W/cm2:  Location:    []  Iontophoresis with dexamethasone         Location: [] Take home patch   [] In clinic    []  Ice     []  heat  []  Ice massage  []  Laser   []  Anodyne Position:  Location:    []  Laser with stim  []  Other:  Position:  Location:    []  Vasopneumatic Device Pressure:       [] lo [] med [] hi   Temperature: [] lo [] med [] hi   [] Skin assessment post-treatment:  []intact []redness- no adverse reaction    []redness  adverse reaction:          36 min Therapeutic Exercise:  [x] See flow sheet :   Rationale: increase ROM and increase strength to improve the patients ability to increase tolerance to activities.         15 min Neuromuscular Re-education:  [x]  See flow sheet :core activation exercises. Rationale: increase ROM and increase strength  to improve the patients ability to increase tolerance to activities.     8 min Manual Therapy:  STM/TPR to B lumbar paraspinals, upper glutes, and QLs,   Rationale: decrease pain, increase ROM, increase tissue extensibility and decrease trigger points to increase ease with ADLs.                  With   [] TE   [] TA   [] neuro   [] other: Patient Education: [x] Review HEP    [] Progressed/Changed HEP based on:   [] positioning   [] body mechanics   [] transfers   [] heat/ice application    [] other:      Other Objective/Functional Measures:  FOTO = 41, increased by 5 since [facility]. Pain Level (0-10 scale) post treatment: 4    ASSESSMENT/Changes in Function: Pt tender along right side of incision. Educated pt to perform scar massage at home. Patient will continue to benefit from skilled PT services to modify and progress therapeutic interventions, address functional mobility deficits, address ROM deficits, address strength deficits and analyze and address soft tissue restrictions to attain remaining goals. []  See Plan of Care  []  See progress note/recertification  []  See Discharge Summary         Progress towards goals / Updated goals:  Short Term Goals: To be accomplished in 2 weeks:  1. Pt will be independent and compliant w/ HEP to progress gains in PT. At PN:  reported partial compliance 3/6/2018  2. Pt will report < or = to 3/10 pain prior to and following session to demo improve pain management at home. At PN:  prior pain 5.5. 3/26/18   Long Term Goals: To be accomplished in 6 weeks:  1. Pt will improve FOTO to > or = to 51 to demo improved function. At PN: FOTO = 38, 3/20/2018  Current: Progressing: FOTO = 41, increased by 5 since [facility]. 4/4/18  2.  Pt will increase MMT right hip flex, ext, and abd to > or = to 4/5 for ease w/ improved standing tolerance. At PN:  Remains, MMT (R) hip abd: 3-/5 (3/22/18)   3. Pt will increase core activation as demo'd by supine bridge w/o increased pain x10 for ease w/ bed mobility. At PN:  Ability to achieve 50% of available AROM with pain 5-6/10, 3/20/2018  4. Pt will increase balance as demo'd by SLS B for > or = to 15 seconds w/o HHA for decreased fall risk. A PN: , Left SLS 5 sec, Right SLS 5 sec, 3/29/2018  5. Pt will ambulate w/ correct heel toe gait pattern for > or = to 500ft w/ LRAD for improved community navigation.    At PN: SPC with R UE with slowed sherrell and diminished terminal knee extension bilaterally during stance phase of gait, 3/14/2018       PLAN  []  Upgrade activities as tolerated     [x]  Continue plan of care  []  Update interventions per flow sheet       []  Discharge due to:_  []  Other:_      Herson Aragon PTA 4/4/2018  10:02 AM    Future Appointments  Date Time Provider Nick Dixon   4/5/2018 9:30 AM Tha Flaherty, PT [facility]   4/6/2018 8:30 AM [facility] FAST TRACK NURSE [facility]   4/9/2018 10:00 AM Herson Balloon, PTA [facility]   4/10/2018 9:15 AM Victor M Ray  [facility]   4/11/2018 10:00 AM Adia Balloon, PTA [facility]   4/12/2018 9:30 AM Tha Flaherty, PT [facility]   4/16/2018 9:30 AM Marsudarshana Balloon, PTA [facility]   4/19/2018 9:30 AM Angelajohn Gonzalez, PT [facility]   4/23/2018 9:30 AM Marsudarshana Balloon, PTA [facility]   4/25/2018 9:30 AM Angela Bending, PT [facility]   7/23/2018 10:00 AM MD Radha Chavira 71

## 2018-04-05 ENCOUNTER — HOSPITAL ENCOUNTER (OUTPATIENT)
Dept: PHYSICAL THERAPY | Age: 68
End: 2018-04-05
Payer: MEDICARE

## 2018-04-06 ENCOUNTER — HOSPITAL ENCOUNTER (OUTPATIENT)
Dept: INFUSION THERAPY | Age: 68
Discharge: HOME | End: 2018-04-06
Payer: MEDICARE

## 2018-04-06 VITALS
HEART RATE: 72 BPM | HEIGHT: 66 IN | WEIGHT: 164.8 LBS | SYSTOLIC BLOOD PRESSURE: 122 MMHG | BODY MASS INDEX: 26.48 KG/M2 | TEMPERATURE: 97.9 F | RESPIRATION RATE: 18 BRPM | DIASTOLIC BLOOD PRESSURE: 72 MMHG

## 2018-04-06 LAB
BASO+EOS+MONOS # BLD AUTO: 0.8 K/UL (ref 0–2.3)
BASO+EOS+MONOS # BLD AUTO: 16 % (ref 0.1–17)
DIFFERENTIAL METHOD BLD: ABNORMAL
ERYTHROCYTE [DISTWIDTH] IN BLOOD BY AUTOMATED COUNT: 17.5 % (ref 11.5–14.5)
HCT VFR BLD AUTO: 32.3 % (ref 36–48)
HGB BLD-MCNC: 10.3 G/DL (ref 12–16)
LYMPHOCYTES # BLD: 1.6 K/UL (ref 1.1–5.9)
LYMPHOCYTES NFR BLD: 31 % (ref 14–44)
MCH RBC QN AUTO: 27 PG (ref 25–35)
MCHC RBC AUTO-ENTMCNC: 31.9 G/DL (ref 31–37)
MCV RBC AUTO: 84.6 FL (ref 78–102)
NEUTS SEG # BLD: 2.7 K/UL (ref 1.8–9.5)
NEUTS SEG NFR BLD: 53 % (ref 40–70)
PLATELET # BLD AUTO: 146 K/UL (ref 135–420)
RBC # BLD AUTO: 3.82 M/UL (ref 4.1–5.1)
WBC # BLD AUTO: 5.1 K/UL (ref 4.5–13)

## 2018-04-06 PROCEDURE — 96372 THER/PROPH/DIAG INJ SC/IM: CPT

## 2018-04-06 PROCEDURE — 85025 COMPLETE CBC W/AUTO DIFF WBC: CPT | Performed by: INTERNAL MEDICINE

## 2018-04-06 RX ORDER — SODIUM CHLORIDE 0.9 % (FLUSH) 0.9 %
10-40 SYRINGE (ML) INJECTION AS NEEDED
Status: DISCONTINUED | OUTPATIENT
Start: 2018-04-06 | End: 2018-04-10 | Stop reason: HOSPADM

## 2018-04-06 RX ORDER — HEPARIN SODIUM (PORCINE) LOCK FLUSH IV SOLN 100 UNIT/ML 100 UNIT/ML
500 SOLUTION INTRAVENOUS AS NEEDED
Status: ACTIVE | OUTPATIENT
Start: 2018-04-06 | End: 2018-04-07

## 2018-04-06 RX ORDER — WATER FOR INJECTION,STERILE
VIAL (ML) INJECTION ONCE
Status: DISPENSED | OUTPATIENT
Start: 2018-04-06 | End: 2018-04-06

## 2018-04-06 NOTE — PROGRESS NOTES
[facility] Progress Note    Date: 2018    Name: Hermila Napier    MRN: [id number]         : 1950     Nplate Q Week  Procrit Q 4 weeks--held      Ms. Pagan to [facility], ambulatory at [address] [place] [address]. Pt was assessed and education was provided. Patient denies new complaints today. She reports chronic joint pain = 5.5/10. Ms. Adi Pan vitals were reviewed and patient was observed for 5 minutes prior to treatment. Visit Vitals    /72 (BP 1 Location: Right arm, BP Patient Position: Sitting)    Pulse 72    Temp 97.9 °F (36.6 °C)    Resp 18    Ht 5' 6\" (1.676 m)    Wt 74.8 kg (164 lb 12.8 oz)    Breastfeeding No    BMI 26.6 kg/m2     Patient refused to have port flushed today. She stated she was not aware it was supposed to be done and did not apply EMLA cream. Offered patient ice pack, but she still declined. Instructed her to use EMLA next week for port flush. Blood obtained peripherally from right AC using butterfly needle without difficulty by Dina Davenport and run on Sysmex for CBC per written orders. No bleeding or hematoma noted at site. Guaze and coban applied. Lab results were obtained and reviewed. Recent Results (from the past 12 hour(s))   CBC WITH 3 PART DIFF    Collection Time: 18  8:54 AM   Result Value Ref Range    WBC 5.1 4.5 - 13.0 K/uL    RBC 3.82 (L) 4.10 - 5.10 M/uL    HGB 10.3 (L) 12.0 - 16 g/dL    HCT 32.3 (L) 36 - 48 %    MCV 84.6 78 - 102 FL    MCH 27.0 25.0 - 35.0 PG    MCHC 31.9 31 - 37 g/dL    RDW 17.5 (H) 11.5 - 14.5 %    NEUTROPHILS 53 40 - 70 %    MIXED CELLS 16 0.1 - 17 %    LYMPHOCYTES 31 14 - 44 %    ABS. NEUTROPHILS 2.7 1.8 - 9.5 K/UL    ABS. MIXED CELLS 0.8 0.0 - 2.3 K/uL    ABS. LYMPHOCYTES 1.6 1.1 - 5.9 K/UL    DF AUTOMATED       Procrit injection held today per parameters (hold for Hgb > 10 or Hct > 30). Preliminary PLT = 131,000. Per protocol, Nplate dose to remain the same. Patient received Nplate 418 mcg in back of right upper arm.  Pressure held over site x 30 sec; no bleeding noted. Ms. Melvin Petit tolerated well, and had no complaints. Patient armband removed and shredded. Ms. Melvin Petit was discharged from [place] [id number] in stable condition at [phone]. She is to return on 4/13/18 at 0830 for her next Nplate/port flush appointment.     Jennifer Duke RN  April 6, 2018

## 2018-04-09 ENCOUNTER — HOSPITAL ENCOUNTER (OUTPATIENT)
Dept: PHYSICAL THERAPY | Age: 68
Discharge: HOME | End: 2018-04-09
Payer: MEDICARE

## 2018-04-09 PROCEDURE — 97110 THERAPEUTIC EXERCISES: CPT

## 2018-04-09 PROCEDURE — 97112 NEUROMUSCULAR REEDUCATION: CPT

## 2018-04-09 PROCEDURE — 97140 MANUAL THERAPY 1/> REGIONS: CPT

## 2018-04-09 NOTE — PROGRESS NOTES
PT DAILY TREATMENT NOTE - [facility]     Patient Name: Isaac Macedo  Date:2018  : 1950  [x]  Patient  Verified  Payor: [place] MEDICARE / Plan: [place] MEDICARE PART A & B / Product Type: Medicare /    In time:10:08  Out time:11:02  Total Treatment Time (min): 54  Total Timed Codes (min): 54  1:1 Treatment Time ( [address] only): 47   Visit #: 3 of 12    Treatment Area: Right leg weakness [R29.898]    SUBJECTIVE  Pain Level (0-10 scale): 5  Any medication changes, allergies to medications, adverse drug reactions, diagnosis change, or new procedure performed?: [x] No    [] Yes (see summary sheet for update)  Subjective functional status/changes:   [] No changes reported  Pt reports she has noticed some change each session. OBJECTIVE        Min Type Additional Details    [] Estim:  []Unatt       []IFC  []Premod                        []Other:  []w/ice   []w/heat  Position:  Location:    [] Estim: []Att    []TENS instruct  []NMES                    []Other:  []w/US   []w/ice   []w/heat  Position:  Location:    []  Traction: [] Cervical       []Lumbar                       [] Prone          []Supine                       []Intermittent   []Continuous Lbs:  [] before manual  [] after manual    []  Ultrasound: []Continuous   [] Pulsed                           []1MHz   []3MHz W/cm2:  Location:    []  Iontophoresis with dexamethasone         Location: [] Take home patch   [] In clinic    []  Ice     []  heat  []  Ice massage  []  Laser   []  Anodyne Position:  Location:    []  Laser with stim  []  Other:  Position:  Location:    []  Vasopneumatic Device Pressure:       [] lo [] med [] hi   Temperature: [] lo [] med [] hi   [] Skin assessment post-treatment:  []intact []redness- no adverse reaction    []redness  adverse reaction:            31 min Therapeutic Exercise:  [x] See flow sheet :   Rationale: increase ROM and increase strength to improve the patients ability to increase tolerance to activities.            15 min Neuromuscular Re-education:  [x]  See flow sheet :core activation exercises. Rationale: increase ROM and increase strength  to improve the patients ability to increase tolerance to activities.     8 min Manual Therapy:  STM/TPR to B lumbar paraspinals, upper glutes, and QLs,   Rationale: decrease pain, increase ROM, increase tissue extensibility and decrease trigger points to increase ease with ADLs.                  With   [] TE   [] TA   [] neuro   [] other: Patient Education: [x] Review HEP    [] Progressed/Changed HEP based on:   [] positioning   [] body mechanics   [] transfers   [] heat/ice application    [] other:      Other Objective/Functional Measures: Supine brdiges 50% ROM with 5/10 pain. Pain Level (0-10 scale) post treatment: 4    ASSESSMENT/Changes in Function: Pt continues to remain tender along incision with scar massage. Patient will continue to benefit from skilled PT services to modify and progress therapeutic interventions, address functional mobility deficits, address ROM deficits, address strength deficits and analyze and address soft tissue restrictions to attain remaining goals. []  See Plan of Care  []  See progress note/recertification  []  See Discharge Summary         Progress towards goals / Updated goals:  Short Term Goals: To be accomplished in 2 weeks:  1. Pt will be independent and compliant w/ HEP to progress gains in PT. At PN:  reported partial compliance 3/6/2018  2. Pt will report < or = to 3/10 pain prior to and following session to demo improve pain management at home. At PN:  prior pain 5.5. 3/26/18   Long Term Goals: To be accomplished in 6 weeks:  1. Pt will improve FOTO to > or = to 51 to demo improved function. At PN: FOTO = 38, 3/20/2018  Current: Progressing: FOTO = 41, increased by 5 since [facility]. 4/4/18  2. Pt will increase MMT right hip flex, ext, and abd to > or = to 4/5 for ease w/ improved standing tolerance.   At PN:  Remains, MMT (R) hip abd: 3-/5 (3/22/18)   3. Pt will increase core activation as demo'd by supine bridge w/o increased pain x10 for ease w/ bed mobility. At PN:  Ability to achieve 50% of available AROM with pain 5-6/10, 3/20/2018  Current: Not met: Supine brdiges 50% ROM with 5/10 pain. 4/9/18  4. Pt will increase balance as demo'd by SLS B for > or = to 15 seconds w/o HHA for decreased fall risk. A PN: , Left SLS 5 sec, Right SLS 5 sec, 3/29/2018  5. Pt will ambulate w/ correct heel toe gait pattern for > or = to 500ft w/ LRAD for improved community navigation.    At PN: SPC with R UE with slowed sherrell and diminished terminal knee extension bilaterally during stance phase of gait, 3/14/2018       PLAN  []  Upgrade activities as tolerated     [x]  Continue plan of care  []  Update interventions per flow sheet       []  Discharge due to:_  []  Other:_      Raciel Hankins PTA 4/9/2018  10:07 AM    Future Appointments  Date Time Provider Nick Sultanai   4/10/2018 Lio Chirinos  [address]   4/11/2018 10:00 AM Raciel Hankins PTA [facility]   4/12/2018 9:30 AM Khari De La Cruz PT [facility]   4/13/2018 8:30 AM [facility] FAST TRACK NURSE [facility]   4/16/2018 9:30 AM Raciel Hankins PTA [facility]   4/19/2018 9:30 AM Lalo Mills PT [facility]   4/20/2018 8:30 AM [facility] FAST TRACK NURSE [facility]   4/23/2018 9:30 AM Raciel Hankins PTA [facility]   4/25/2018 9:30 AM Lalo Mills, PT [facility]   4/27/2018 8:30 AM [facility] FAST TRACK NURSE [facility]   5/4/2018 8:30 AM [facility] FAST TRACK NURSE [facility]   7/23/2018 10:00 AM Lavonne Altman [place] [id number] [facility]

## 2018-04-10 ENCOUNTER — OFFICE VISIT (OUTPATIENT)
Dept: ORTHOPEDIC SURGERY | Age: 68
End: 2018-04-10

## 2018-04-10 VITALS
BODY MASS INDEX: 25.07 KG/M2 | HEIGHT: 66 IN | DIASTOLIC BLOOD PRESSURE: 74 MMHG | TEMPERATURE: 97.4 F | HEART RATE: 68 BPM | SYSTOLIC BLOOD PRESSURE: 125 MMHG | RESPIRATION RATE: 18 BRPM | WEIGHT: 156 LBS

## 2018-04-10 DIAGNOSIS — Z98.1 S/P LUMBAR FUSION: Primary | ICD-10-CM

## 2018-04-10 DIAGNOSIS — M48.062 LUMBAR STENOSIS WITH NEUROGENIC CLAUDICATION: ICD-10-CM

## 2018-04-10 RX ORDER — OXYCODONE AND ACETAMINOPHEN 7.5; 325 MG/1; MG/1
1 TABLET ORAL
Qty: 30 TAB | Refills: 0 | Status: SHIPPED | OUTPATIENT
Start: 2018-04-10 | End: 2018-04-24

## 2018-04-10 NOTE — MR AVS SNAPSHOT
[address] [facility] [address] [facility] [address] 
[phone] Patient: Edyta Rondon MRN: [id number] QKP:4/99/2523 Visit Information Date & Time Provider Department Dept. Phone Encounter #  
 4/10/2018  9:15 AM Magdi Canales MD [facility], [address] and [facility] [phone] [id number] Follow-up Instructions Return in about 6 weeks (around 5/22/2018) for with NP. Follow-up and Disposition History Your Appointments 7/23/2018 10:00 AM  
Office Visit with Pilo Prasad MD  
[facility] Appt Note: 4 MO RET  
 [address] [address] [address] [facility] [address] [facility], [address] [place] [address] Upcoming Health Maintenance Date Due Hepatitis C Screening 1950 DTaP/Tdap/Td series (1 - Tdap) 5/14/1973 FOBT Q 1 YEAR AGE 50-75 5/14/2002 ZOSTER VACCINE AGE 60> 3/14/2012 BREAST CANCER SCRN MAMMOGRAM 4/4/2015 GLAUCOMA SCREENING Q2Y 5/14/2017 Bone Densitometry (Dexa) Screening 5/14/2017 Pneumococcal 65+ High/Highest Risk (1 of 2 - PCV13) 5/14/2017 Influenza Age 5 to Adult 8/1/2017 MEDICARE YEARLY EXAM 3/16/2018 Allergies as of 4/10/2018  Review Complete On: 4/10/2018 By: Magdi Canales MD  
  
 Severity Noted Reaction Type Reactions Aspirin High 08/07/2013   Systemic Swelling Pt states her whole body swells when she takes aspirin. Adhesive    Unable to Obtain Pcn [Penicillins]  08/20/2012    Rash Current Immunizations  Reviewed on 3/16/2018 No immunizations on file. Not reviewed this visit You Were Diagnosed With   
  
 Codes Comments S/P lumbar fusion    -  Primary ICD-10-CM: Z98.1 ICD-9-CM: V45.4 Lumbar stenosis with neurogenic claudication     ICD-10-CM: M48.062 
ICD-9-CM: 724.03 Vitals BP Pulse Temp Resp Height(growth percentile) Weight(growth percentile) 125/74 68 97.4 °F (36.3 °C) (Oral) 18 5' 6\" (1.676 m) 156 lb (70.8 kg) BMI OB Status Smoking Status 25.18 kg/m2 Postmenopausal Never Smoker BMI and BSA Data Body Mass Index Body Surface Area  
 25.18 kg/m 2 1.82 m 2 Preferred Pharmacy Pharmacy Name Phone [facility] #[id number] [address] [phone] Your Updated Medication List  
  
   
This list is accurate as of 4/10/18 10:05 AM.  Always use your most recent med list.  
  
  
  
  
 albuterol 90 mcg/actuation inhaler Commonly known as:  PROAIR HFA  
1-2 puffs every 4-6 hrs. aluminum-magnesium hydroxide 200-200 mg/5 mL susp 5 mL, diphenhydrAMINE 12.5 mg/5 mL liqd 12.5 mg, lidocaine 2 % soln 5 mL  
5 mL by Swish and Spit route two (2) times a day. Magic mouth wash  Maalox Lidocaine 2% viscous  Diphenhydramine oral solution   Pharmacy to mix equal portions of ingredients to a total volume as indicated in the dispense amount. amiodarone 200 mg tablet Commonly known as:  CORDARONE Take 200 mg by mouth daily. Indications: PREVENTION OF VENTRICULAR FIBRILLATION  
  
 COREG 3.125 mg tablet Generic drug:  carvedilol Take 3.125 mg by mouth two (2) times daily (with meals). digoxin 0.125 mg tablet Commonly known as:  LANOXIN Take 0.125 mg by mouth daily. dronabinol 5 mg capsule Commonly known as:  Dinuba Karina Take 1 Cap by mouth two (2) times a day. ergocalciferol 50,000 unit capsule Commonly known as:  VITAMIN D2 Take 1 Cap by mouth every seven (7) days. furosemide 40 mg tablet Commonly known as:  LASIX Take 40 mg by mouth daily. Indications: Edema  
  
 gabapentin 300 mg capsule Commonly known as:  NEURONTIN Take 1 po q am and 2 po q pm  
  
 lidocaine-prilocaine topical cream  
Commonly known as:  EMLA APPLY OVER PORT 1 HOUR PRIOR TO CHEMOTHERAPY THAN COVER WITH [place] WRAP LINZESS 145 mcg Cap capsule Generic drug:  linaclotide TAKE 1 CAPSULE BY MOUTH DAILY 30 MINUTES BEFORE BREAKFAST  
  
 lisinopril 10 mg tablet Commonly known as:  Nishi Drought Take 10 mg by mouth daily. Indications: hypertension  
  
 loratadine 10 mg Cap Take 10 mg by mouth daily. magic mouthwash solution Take 5 mL by mouth three (3) times daily as needed for Stomatitis. Magic mouth wash  Maalox Lidocaine 2% viscous  Diphenhydramine oral solution   Pharmacy to mix equal portions of ingredients to a total volume as indicated in the dispense amount. meloxicam 7.5 mg tablet Commonly known as:  MOBIC Take 7.5 mg by mouth daily. nabumetone 750 mg tablet Commonly known as:  RELAFEN Take 750 mg by mouth daily. Indications: OSTEOARTHRITIS  
  
 ondansetron hcl 8 mg tablet Commonly known as:  ZOFRAN (AS HYDROCHLORIDE) Take 1 Tab by mouth every eight (8) hours as needed for Nausea. * oxyCODONE-acetaminophen  mg per tablet Commonly known as:  PERCOCET Take 1 Tab by mouth every six (6) hours as needed for Pain. Max Daily Amount: 4 Tabs. Indications: Pain, ACUTE POST OP PAIN  
  
 * oxyCODONE-acetaminophen 7.5-325 mg per tablet Commonly known as:  PERCOCET 7.5 Take 1 Tab by mouth every six (6) hours as needed for Pain. Max Daily Amount: 4 Tabs. * potassium chloride 20 mEq tablet Commonly known as:  K-DUR, KLOR-CON Take 2 Tabs by mouth daily. * KLOR-CON M20 20 mEq tablet Generic drug:  potassium chloride TAKE ONE TABLET BY MOUTH ONCE A DAY  
  
 PRUVATE 21-7 PO Take 325 mg by mouth daily. Indications: iron deficiency Senna 8.6 mg tablet Generic drug:  senna Take 1 Tab by mouth daily. temazepam 30 mg capsule Commonly known as:  RESTORIL  
  
 topiramate 50 mg tablet Commonly known as:  TOPAMAX * XARELTO 10 mg tablet Generic drug:  rivaroxaban Take 10 mg by mouth daily. * rivaroxaban 10 mg tablet Commonly known as:  Electa Dux Take 10 mg by mouth daily (with breakfast). Indications: PREVENTION OF DEEP VEIN THROMBOSIS RECURRENCE  
  
 zolpidem 10 mg tablet Commonly known as:  AMBIEN  
TAKE 1 TABLET BY MOUTH NIGHTLY AS NEEDED FOR SLEEP. MAX DAILY AMOUNT 10 MG  
  
 * Notice: This list has 6 medication(s) that are the same as other medications prescribed for you. Read the directions carefully, and ask your doctor or other care provider to review them with you. Prescriptions Printed Refills  
 oxyCODONE-acetaminophen (PERCOCET 7.5) 7.5-325 mg per tablet 0 Sig: Take 1 Tab by mouth every six (6) hours as needed for Pain. Max Daily Amount: 4 Tabs. Class: Print Route: Oral  
  
We Performed the Following AMB POC XRAY, SPINE, LUMBOSACRAL; 2 O [59589 CPT(R)] Follow-up Instructions Return in about 6 weeks (around 5/22/2018) for with NP. To-Do List   
 04/11/2018 10:00 AM  
  Appointment with Vinicius Evans PTA at [facility] [place] [facility] ([phone])  
  
 04/12/2018 9:30 AM  
  Appointment with Patsy Dewitt PT at [facility] [place] [facility] ([phone])  
  
 04/13/2018 8:30 AM  
  Appointment with [facility] FAST TRACK NURSE at [facility] OP INFUSION ([phone])  
  
 04/16/2018 9:30 AM  
  Appointment with Vinicius Evans PTA at [facility] [place] [facility] ([phone])  
  
 04/19/2018 9:30 AM  
  Appointment with Ashley Fonseca PT at [facility] [place] [facility] ([phone])  
  
 04/20/2018 8:30 AM  
  Appointment with [facility] FAST TRACK NURSE at [facility] OP INFUSION ([phone])  
  
 04/23/2018 9:30 AM  
  Appointment with Vinicius Evans PTA at [facility] [place] [facility] ([phone])  
  
 04/25/2018 9:30 AM  
  Appointment with Ashley Fonseca PT at [facility] [place] [facility] ([phone])  
  
 04/27/2018 8:30 AM  
  Appointment with [facility] FAST TRACK NURSE at [address] [place] [address] ([phone])  05/04/2018 8:30 AM  
 Appointment with [facility] FAST TRACK NURSE at [facility] ([phone]) Patient Instructions Back Care and Preventing Injuries: Care Instructions Your Care Instructions You can hurt your back doing many everyday activities: lifting a heavy box, bending down to garden, exercising at the gym, and even getting out of bed. But you can keep your back strong and healthy by doing some exercises. You also can follow a few tips for sitting, sleeping, and lifting to avoid hurting your back again. Talk to your doctor before you start an exercise program. Ask for help if you want to learn more about keeping your back healthy. Follow-up care is a key part of your treatment and safety. Be sure to make and go to all appointments, and call your doctor if you are having problems. It's also a good idea to know your test results and keep a list of the medicines you take. How can you care for yourself at home? · Stay at a healthy weight to avoid strain on your lower back. · Do not smoke. Smoking increases the risk of osteoporosis, which weakens the spine. If you need help quitting, talk to your doctor about stop-smoking programs and medicines. These can increase your chances of quitting for good. · Make sure you sleep in a position that maintains your back's normal curves and on a mattress that feels comfortable. Sleep on your side with a pillow between your knees, or sleep on your back with a pillow under your knees. These positions can reduce strain on your back. · When you get out of bed, lie on your side and bend both knees. Drop your feet over the edge of the bed as you push up with both arms. Scoot to the edge of the bed. Make sure your feet are in line with your rear end (buttocks), and then stand up. · If you must stand for a long time, put one foot on a stool, ledge, or box. Exercise to strengthen your back and other muscles · Get at least 30 minutes of exercise on most days of the week. Walking is a good choice. You also may want to do other activities, such as running, swimming, cycling, or playing tennis or team sports. · Stretch your back muscles. Here are few exercises to try: ¨ Lie on your back with your knees bent and your feet flat on the floor. Gently pull one bent knee to your chest. Put that foot back on the floor, and then pull the other knee to your chest. Hold for 15 to 30 seconds. Repeat 2 to 4 times. ¨ Do pelvic tilts. Lie on your back with your knees bent. Tighten your stomach muscles. Pull your belly button (navel) in and up toward your ribs. You should feel like your back is pressing to the floor and your hips and pelvis are slightly lifting off the floor. Hold for 6 seconds while breathing smoothly. · Keep your core muscles strong. The muscles of your back, belly (abdomen), and buttocks support your spine. ¨ Pull in your belly, and imagine pulling your navel toward your spine. Hold this for 6 seconds, then relax. Remember to keep breathing normally as you tense your muscles. ¨ Do curl-ups. Always do them with your knees bent. Keep your low back on the floor, and curl your shoulders toward your knees using a smooth, slow motion. Keep your arms folded across your chest. If this bothers your neck, try putting your hands behind your neck (not your head), with your elbows spread apart. ¨ Lie on your back with your knees bent and your feet flat on the floor. Tighten your belly muscles, and then push with your feet and raise your buttocks up a few inches. Hold this position 6 seconds as you continue to breathe normally, then lower yourself slowly to the floor. Repeat 8 to 12 times. ¨ If you like group exercise, try Pilates or yoga. These classes have poses that strengthen the core muscles. Protect your back when you sit · Place a small pillow, a rolled-up towel, or a lumbar roll in the curve of your back if you need extra support. · Sit in a chair that is low enough to let you place both feet flat on the floor with both knees nearly level with your hips. If your chair or desk is too high, use a foot rest to raise your knees. · When driving, keep your knees nearly level with your hips. Sit straight, and drive with both hands on the steering wheel. Your arms should be in a slightly bent position. · Try a kneeling chair, which helps tilt your hips forward. This takes pressure off your lower back. · Try sitting on an exercise ball. It can rock from side to side, which helps keep your back loose. Lift properly · Squat down, bending at the hips and knees only. If you need to, put one knee to the floor and extend your other knee in front of you, bent at a right angle (half kneeling). · Press your chest straight forward. This helps keep your upper back straight while keeping a slight arch in your low back. · Hold the load as close to your body as possible, at the level of your navel. · Use your feet to change direction, taking small steps. · Lead with your hips as you change direction. Keep your shoulders in line with your hips as you move. Do not twist your body. · Set down your load carefully, squatting with your knees and hips only. When should you call for help? Watch closely for changes in your health, and be sure to contact your doctor if you have any problems. Where can you learn more? Go to http://leon-edinson.info/. Enter S810 in the search box to learn more about \"Back Care and Preventing Injuries: Care Instructions. \" Current as of: March 21, 2017 Content Version: 11.4 © 9297-5043 [employer]. Care instructions adapted under license by [employer] (which disclaims liability or warranty for this information).  If you have questions about a medical condition or this instruction, always ask your healthcare professional. [employer] disclaims any warranty or liability for your use of this information. Please provide this summary of care documentation to your next provider. Your primary care clinician is listed as Laura Harris. If you have any questions after today's visit, please call [phone].

## 2018-04-10 NOTE — PROGRESS NOTES
[facility] [address]  [facility], [address]  Phone: [phone]  Fax: [phone]  PROGRESS NOTE  Patient: Ankita Gant                MRN: [id number]       SSN: [id number]  YOB: 1950        AGE: 74 y.o. SEX: female  Body mass index is 25.18 kg/(m^2). PCP: Eloy Steward MD  04/10/18    Chief Complaint   Patient presents with    Follow-up     6 wk f/u     Back Pain     lower back        HISTORY OF PRESENT ILLNESS, RADIOGRAPHS, and PLAN:     HISTORY:  Ms. Ivan Lorenzo returns today. She is three months out from her L3-4 XLIF. She comes in today without using an assistive device, though she says she uses an occasional single-point cane when at home. She takes pain medication only occasionally after therapy sessions. She finds that Motrin and Extra-Strength Tylenol bother her stomach. I have told her that we really have to avoid narcotic pain medications in the future, and while I will give her some more pain medication for breakthrough pain, that something we really have to avoid is prescription narcotics. Her wounds are healed and dry. Her x-rays demonstrate appropriate alignment and fixation and what appears to be early consolidation of her fusion, although it is only at three months. PHYSICAL EXAMINATION:  Her exam demonstrates what appears to be normal strength and sensation. ASSESSMENT/PLAN: I will see her back in three months time. She still complains of some right-sided low back pain with occasional radiation into her right leg. cc: Paula Benson M.D.          Past Medical History:   Diagnosis Date    Antineoplastic chemotherapy induced anemia     Arrhythmia     Atrial Fib    Arthritis     Breast cancer (HCC)     Cancer (HCC) 1999    Breast    [facility]omy[facility]     Chronic ITP (idiopathic thrombocytopenia) (HCC) 10/31/2016    Secondary to Anemia from Chemo    Diabetes (HCC)     borderline, no meds  Esophageal cancer ([facility] [address] 2005    treated with chemo    Heart failure ([facility]        No family history on file. Current Outpatient Prescriptions   Medication Sig Dispense Refill    ergocalciferol (VITAMIN D2) 50,000 unit capsule Take 1 Cap by mouth every seven (7) days. 12 Cap 1    temazepam (RESTORIL) 30 mg capsule   1    LINZESS 145 mcg cap capsule TAKE 1 CAPSULE BY MOUTH DAILY 30 MINUTES BEFORE BREAKFAST  0    lisinopril (PRINIVIL, ZESTRIL) 10 mg tablet Take 10 mg by mouth daily. Indications: hypertension      rivaroxaban (XARELTO) 10 mg tablet Take 10 mg by mouth daily (with breakfast). Indications: PREVENTION OF DEEP VEIN THROMBOSIS RECURRENCE      zolpidem (AMBIEN) 10 mg tablet TAKE 1 TABLET BY MOUTH NIGHTLY AS NEEDED FOR SLEEP. MAX DAILY AMOUNT 10 MG 30 Tab 2    loratadine 10 mg cap Take 10 mg by mouth daily.  digoxin (LANOXIN) 0.125 mg tablet Take 0.125 mg by mouth daily.  carvedilol (COREG) 3.125 mg tablet Take 3.125 mg by mouth two (2) times daily (with meals).  meloxicam (MOBIC) 7.5 mg tablet Take 7.5 mg by mouth daily.  amiodarone (CORDARONE) 200 mg tablet Take 200 mg by mouth daily. Indications: PREVENTION OF VENTRICULAR FIBRILLATION      gabapentin (NEURONTIN) 300 mg capsule Take 1 po q am and 2 po q pm (Patient taking differently: Take 300 mg by mouth two (2) times a day. Take 1 po q am and 2 po q pm) 90 Cap 1    lidocaine-prilocaine (EMLA) topical cream APPLY OVER PORT 1 HOUR PRIOR TO CHEMOTHERAPY THAN COVER WITH [place] WRAP 30 g 6    magic mouthwash solution Take 5 mL by mouth three (3) times daily as needed for Stomatitis. Magic mouth wash   Maalox  Lidocaine 2% viscous   Diphenhydramine oral solution     Pharmacy to mix equal portions of ingredients to a total volume as indicated in the dispense amount. 236 mL 0    furosemide (LASIX) 40 mg tablet Take 40 mg by mouth daily.  Indications: Edema      dronabinol (MARINOL) 5 mg capsule Take 1 Cap by mouth two (2) times a day. 60 Cap 1    KLOR-CON M20 20 mEq tablet TAKE ONE TABLET BY MOUTH ONCE A DAY 30 Tab 3    aluminum-magnesium hydroxide 200-200 mg/5 mL susp 5 mL, diphenhydrAMINE 12.5 mg/5 mL liqd 12.5 mg, lidocaine 2 % soln 5 mL 5 mL by Swish and Spit route two (2) times a day. Magic mouth wash   Maalox  Lidocaine 2% viscous   Diphenhydramine oral solution     Pharmacy to mix equal portions of ingredients to a total volume as indicated in the dispense amount. 240 mL 1    potassium chloride (K-DUR, KLOR-CON) 20 mEq tablet Take 2 Tabs by mouth daily. 30 Tab 3    albuterol (PROAIR HFA) 90 mcg/actuation inhaler 1-2 puffs every 4-6 hrs. (Patient taking differently: Take 2 Puffs by inhalation every four (4) hours as needed for Shortness of Breath. 1-2 puffs every 4-6 hrs.) 1 Inhaler 1    senna (SENNA) 8.6 mg tablet Take 1 Tab by mouth daily.  nabumetone (RELAFEN) 750 mg tablet Take 750 mg by mouth daily. Indications: OSTEOARTHRITIS      IRON AG&FUM/C/FA/MV CMB11/CA-T (PRUVATE 21-7 PO) Take 325 mg by mouth daily. Indications: iron deficiency      oxyCODONE-acetaminophen (PERCOCET 7.5) 7.5-325 mg per tablet Take 1 Tab by mouth every six (6) hours as needed for Pain. Max Daily Amount: 4 Tabs. 120 Tab 0    topiramate (TOPAMAX) 50 mg tablet   5    oxyCODONE-acetaminophen (PERCOCET)  mg per tablet Take 1 Tab by mouth every six (6) hours as needed for Pain. Max Daily Amount: 4 Tabs. Indications: Pain, ACUTE POST OP PAIN 60 Tab 0    rivaroxaban (XARELTO) 10 mg tablet Take 10 mg by mouth daily.  ondansetron hcl (ZOFRAN) 8 mg tablet Take 1 Tab by mouth every eight (8) hours as needed for Nausea. 30 Tab 3       Allergies   Allergen Reactions    Aspirin Swelling     Pt states her whole body swells when she takes aspirin.     Adhesive Unable to Obtain    Pcn [Penicillins] Rash       Past Surgical History:   Procedure Laterality Date    BREAST SURGERY PROCEDURE UNLISTED      COLONOSCOPY N/A 7/27/2016    COL[facility] ENDOSCOPY    HX APPENDECTOMY      HX HEENT      Tonsillectomy    HX ORTHOPAEDIC      HX TUBAL LIGATION      HX VASCULAR ACCESS      NEUROLOGICAL PROCEDURE UNLISTED      Lumbar sx       Past Medical History:   Diagnosis Date    Antineoplastic chemotherapy induced anemia     Arrhythmia     Atrial Fib    Arthritis     [facility] ([facility] [address]     Cancer [facility] 1999    Breast    Cardiomyopathy ([facility]     Chronic ITP (idiopathic thrombocytopenia) ([facility] 10/31/2016    Secondary to Anemia from Chemo    Diabetes ([facility]     borderline, no meds    Esophageal cancer ([facility] 2005    treated with chemo    Heart failure ([facility]        Social History     Social History    Marital status:      Spouse name: N/A    Number of children: N/A    Years of education: N/A     Occupational History    Not on file. Social History Main Topics    Smoking status: Never Smoker    Smokeless tobacco: Never Used    Alcohol use No    Drug use: No    Sexual activity: Not on file     Other Topics Concern    Not on file     Social History Narrative         REVIEW OF SYSTEMS:   CONSTITUTIONAL SYMPTOMS:  Negative. EYES:  Negative. EARS, NOSE, THROAT AND MOUTH:  Negative. CARDIOVASCULAR:  Negative. RESPIRATORY:  Negative. GENITOURINARY: Per HPI. GASTROINTESTINAL:  Per HPI. INTEGUMENTARY (SKIN AND/OR BREAST):  Negative. MUSCULOSKELETAL: Per HPI.   ENDOCRINE/RHEUMATOLOGIC:  Negative. NEUROLOGICAL:  Per HPI. HEMATOLOGIC/LYMPHATIC:  Negative. ALLERGIC/IMMUNOLOGIC:  Negative. PSYCHIATRIC:  Negative. PHYSICAL EXAMINATION:   Visit Vitals    /74    Pulse 68    Temp 97.4 °F (36.3 °C) (Oral)    Resp 18    Ht 5' 6\" (1.676 m)    Wt 156 lb (70.8 kg)    BMI 25.18 kg/m2    PAIN SCALE: 6/10    CONSTITUTIONAL: The patient is in no apparent distress and is alert and oriented x 3. HEENT: Normocephalic. Hearing grossly intact.   NECK: Supple and symmetric. no tenderness, or masses were felt. RESPIRATORY: No labored breathing. CARDIOVASCULAR: The carotid pulses were normal. Peripheral pulses were 2+. CHEST: Normal AP diameter and normal contour without any kyphoscoliosis. LYMPHATIC: No lymphadenopathy was appreciated in the neck, axillae or groin. SKIN:  Incision healing well, no drainage, no erythema, no hernia, no seroma, no swelling, no dehiscence, incision well approximated. Negative for scars, rashes, lesions, or ulcers on the right upper, right lower, left upper, left lower and trunk. NEUROLOGICAL: Alert and oriented x 3. Ambulation without assistive device. FWB. EXTREMITIES: See musculoskeletal.  MUSCULOSKELETAL:   Head and Neck:  Negative for misalignment, asymmetry, crepitation, defects, tenderness masses or effusions.  Left Upper Extremity: Inspection, percussion and palpation preformed. Cottrells sign is negative.  Right Upper Extremity: Inspection, percussion and palpation preformed. Cottrells sign is negative.  Spine, Ribs and Pelvis: R sided low back pain. Inspection, percussion and palpation preformed. Negative for misalignment, asymmetry, crepitation, defects, tenderness masses or effusions.  Left Lower Extremity: Inspection, percussion and palpation preformed. Negative straight leg raise.  Right Lower Extremity: Inspection, percussion and palpation preformed. Negative straight leg raise. SPINE EXAM:     Lumbar spine: No rash, ecchymosis, or gross obliquity. No focal atrophy is noted. ASSESSMENT    ICD-10-CM ICD-9-CM    1. S/P lumbar fusion Z98.1 V45.4 AMB POC XRAY, SPINE, LUMBOSACRAL; 2 O      oxyCODONE-acetaminophen (PERCOCET 7.5) 7.5-325 mg per tablet   2.  Lumbar stenosis with neurogenic claudication M48.062 724.03 AMB POC XRAY, SPINE, LUMBOSACRAL; 2 O      oxyCODONE-acetaminophen (PERCOCET 7.5) 7.5-325 mg per tablet       Written by Chin Horta, as dictated by Pam Copeland MD.    I, Dr. Pam Copeland MD, confirm that all documentation is accurate.

## 2018-04-10 NOTE — PATIENT INSTRUCTIONS
Back Care and Preventing Injuries: Care Instructions  Your Care Instructions    You can hurt your back doing many everyday activities: lifting a heavy box, bending down to garden, exercising at the gym, and even getting out of bed. But you can keep your back strong and healthy by doing some exercises. You also can follow a few tips for sitting, sleeping, and lifting to avoid hurting your back again. Talk to your doctor before you start an exercise program. Ask for help if you want to learn more about keeping your back healthy. Follow-up care is a key part of your treatment and safety. Be sure to make and go to all appointments, and call your doctor if you are having problems. It's also a good idea to know your test results and keep a list of the medicines you take. How can you care for yourself at home? · Stay at a healthy weight to avoid strain on your lower back. · Do not smoke. Smoking increases the risk of osteoporosis, which weakens the spine. If you need help quitting, talk to your doctor about stop-smoking programs and medicines. These can increase your chances of quitting for good. · Make sure you sleep in a position that maintains your back's normal curves and on a mattress that feels comfortable. Sleep on your side with a pillow between your knees, or sleep on your back with a pillow under your knees. These positions can reduce strain on your back. · When you get out of bed, lie on your side and bend both knees. Drop your feet over the edge of the bed as you push up with both arms. Scoot to the edge of the bed. Make sure your feet are in line with your rear end (buttocks), and then stand up. · If you must stand for a long time, put one foot on a stool, ledge, or box. Exercise to strengthen your back and other muscles  · Get at least 30 minutes of exercise on most days of the week. Walking is a good choice.  You also may want to do other activities, such as running, swimming, cycling, or playing tennis or team sports. · Stretch your back muscles. Here are few exercises to try:  Chance Ridges on your back with your knees bent and your feet flat on the floor. Gently pull one bent knee to your chest. Put that foot back on the floor, and then pull the other knee to your chest. Hold for 15 to 30 seconds. Repeat 2 to 4 times. ¨ Do pelvic tilts. Lie on your back with your knees bent. Tighten your stomach muscles. Pull your belly button (navel) in and up toward your ribs. You should feel like your back is pressing to the floor and your hips and pelvis are slightly lifting off the floor. Hold for 6 seconds while breathing smoothly. · Keep your core muscles strong. The muscles of your back, belly (abdomen), and buttocks support your spine. ¨ Pull in your belly, and imagine pulling your navel toward your spine. Hold this for 6 seconds, then relax. Remember to keep breathing normally as you tense your muscles. ¨ Do curl-ups. Always do them with your knees bent. Keep your low back on the floor, and curl your shoulders toward your knees using a smooth, slow motion. Keep your arms folded across your chest. If this bothers your neck, try putting your hands behind your neck (not your head), with your elbows spread apart. ¨ Lie on your back with your knees bent and your feet flat on the floor. Tighten your belly muscles, and then push with your feet and raise your buttocks up a few inches. Hold this position 6 seconds as you continue to breathe normally, then lower yourself slowly to the floor. Repeat 8 to 12 times. ¨ If you like group exercise, try Pilates or yoga. These classes have poses that strengthen the core muscles. Protect your back when you sit  · Place a small pillow, a rolled-up towel, or a lumbar roll in the curve of your back if you need extra support. · Sit in a chair that is low enough to let you place both feet flat on the floor with both knees nearly level with your hips.  If your chair or desk is too high, use a foot rest to raise your knees. · When driving, keep your knees nearly level with your hips. Sit straight, and drive with both hands on the steering wheel. Your arms should be in a slightly bent position. · Try a kneeling chair, which helps tilt your hips forward. This takes pressure off your lower back. · Try sitting on an exercise ball. It can rock from side to side, which helps keep your back loose. Lift properly  · Squat down, bending at the hips and knees only. If you need to, put one knee to the floor and extend your other knee in front of you, bent at a right angle (half kneeling). · Press your chest straight forward. This helps keep your upper back straight while keeping a slight arch in your low back. · Hold the load as close to your body as possible, at the level of your navel. · Use your feet to change direction, taking small steps. · Lead with your hips as you change direction. Keep your shoulders in line with your hips as you move. Do not twist your body. · Set down your load carefully, squatting with your knees and hips only. When should you call for help? Watch closely for changes in your health, and be sure to contact your doctor if you have any problems. Where can you learn more? Go to http://leon-edinson.info/. Enter S810 in the search box to learn more about \"Back Care and Preventing Injuries: Care Instructions. \"  Current as of: March 21, 2017  Content Version: 11.4  © 2412-0278 [employer]. Care instructions adapted under license by [employer] (which disclaims liability or warranty for this information). If you have questions about a medical condition or this instruction, always ask your healthcare professional. [place] [address] any warranty or liability for your use of this information.

## 2018-04-11 ENCOUNTER — HOSPITAL ENCOUNTER (OUTPATIENT)
Dept: PHYSICAL THERAPY | Age: 68
Discharge: HOME | End: 2018-04-11
Payer: MEDICARE

## 2018-04-11 ENCOUNTER — APPOINTMENT (OUTPATIENT)
Dept: PHYSICAL THERAPY | Age: 68
End: 2018-04-11
Payer: MEDICARE

## 2018-04-11 PROCEDURE — 97110 THERAPEUTIC EXERCISES: CPT

## 2018-04-11 PROCEDURE — 97140 MANUAL THERAPY 1/> REGIONS: CPT

## 2018-04-12 ENCOUNTER — HOSPITAL ENCOUNTER (OUTPATIENT)
Dept: PHYSICAL THERAPY | Age: 68
Discharge: HOME | End: 2018-04-12
Payer: MEDICARE

## 2018-04-12 PROCEDURE — 97112 NEUROMUSCULAR REEDUCATION: CPT | Performed by: PHYSICAL THERAPIST

## 2018-04-12 PROCEDURE — 97140 MANUAL THERAPY 1/> REGIONS: CPT | Performed by: PHYSICAL THERAPIST

## 2018-04-12 NOTE — PROGRESS NOTES
PT DAILY TREATMENT NOTE - [facility]     Patient Name: Lavonne Portillo  Date:2018  : 1950  [x]  Patient  Verified  Payor: [place] MED[place] / Plan: [place] MEDICARE PART A & B / Product Type: Medicare /    In time:934  Out time:1036  Total Treatment Time (min): 62  Total Timed Codes (min): 62  1:1 Treatment Time ( only): 23   Visit #: 5 of 12    Treatment Area: Right leg weakness [R29.898]    SUBJECTIVE  Pain Level (0-10 scale): 4.5/10  Any medication changes, allergies to medications, adverse drug reactions, diagnosis change, or new procedure performed?: [x] No    [] Yes (see summary sheet for update)  Subjective functional status/changes:   [] No changes reported  Pt reports she can navigate her stairs at home better    OBJECTIVE    Modality rationale:  to improve the patients ability to    Min Type Additional Details    [] Estim:  []Unatt       []IFC  []Premod                        []Other:  []w/ice   []w/heat  Position:  Location:    [] Estim: []Att    []TENS instruct  []NMES                    []Other:  []w/US   []w/ice   []w/heat  Position:  Location:    []  Traction: [] Cervical       []Lumbar                       [] Prone          []Supine                       []Intermittent   []Continuous Lbs:  [] before manual  [] after manual    []  Ultrasound: []Continuous   [] Pulsed                           []1MHz   []3MHz W/cm2:  Location:    []  Iontophoresis with dexamethasone         Location: [] Take home patch   [] In clinic    []  Ice     []  heat  []  Ice massage  []  Laser   []  Anodyne Position:  Location:    []  Laser with stim  []  Other:  Position:  Location:    []  Vasopneumatic Device Pressure:       [] lo [] med [] hi   Temperature: [] lo [] med [] hi   [] Skin assessment post-treatment:  []intact []redness- no adverse reaction    []redness  adverse reaction:      min []Eval                  []Re-Eval       40 min Therapeutic Exercise:  [x] See flow sheet : increased per flow sheet    Rationale: increase ROM, increase strength and improve coordination to improve the patients ability to improve activity tolerance and decrease pain      min Therapeutic Activity:  []  See flow sheet :   Rationale:   to improve the patients ability to      12 min Neuromuscular Re-education:  [x]  See flow sheet : core activation ex's per flow sheet   Rationale: increase strength, improve coordination, improve balance and increase proprioception  to improve the patients ability to decrease pain and improve activity tolerance     10 min Manual Therapy:  STM/TPR to Right lumbar paraspinals, QL, and upper glutes   Rationale: decrease pain, increase ROM, increase tissue extensibility and decrease trigger points to improve gait and activity tolerance      min Gait Training:  ___ feet with ___ device on level surfaces with ___ level of assist   Rationale: With   [] TE   [] TA   [] neuro   [] other: Patient Education: [x] Review HEP    [] Progressed/Changed HEP based on:   [] positioning   [] body mechanics   [] transfers   [] heat/ice application    [] other:      Other Objective/Functional Measures:      Pain Level (0-10 scale) post treatment: 4.5/10    ASSESSMENT/Changes in Function: Pt w/ limited changes in reports of pain but noted improved tolerance to activity. Continue to progress as tolerated. Patient will continue to benefit from skilled PT services to modify and progress therapeutic interventions, address functional mobility deficits, address ROM deficits, address strength deficits, analyze and address soft tissue restrictions, analyze and cue movement patterns, analyze and modify body mechanics/ergonomics, address imbalance/dizziness and instruct in home and community integration to attain remaining goals. []  See Plan of Care  []  See progress note/recertification  []  See Discharge Summary         Progress towards goals / Updated goals:  Short Term Goals: To be accomplished in 2 weeks:  1.  Pt will be independent and compliant w/ HEP to progress gains in PT. At PN:  reported partial compliance 3/6/2018  2. Pt will report < or = to 3/10 pain prior to and following session to demo improve pain management at home. At PN:  prior pain 5.5. 3/26/18   Current: progressing pain over last 3 visits 4.75/10 average 4/12/18  Long Term Goals: To be accomplished in 6 weeks:  1. Pt will improve FOTO to > or = to 51 to demo improved function. At PN: FOTO = 38, 3/20/2018  Current: Progressing: FOTO = 41, increased by 5 since [facility]. 4/4/18  2. Pt will increase MMT right hip flex, ext, and abd to > or = to 4/5 for ease w/ improved standing tolerance. At PN:  Remains, MMT (R) hip abd: 3-/5 (3/22/18)   3. Pt will increase core activation as demo'd by supine bridge w/o increased pain x10 for ease w/ bed mobility. At PN:  Ability to achieve 50% of available AROM with pain 5-6/10, 3/20/2018  Current: Not met: Supine brdiges 50% ROM with 5/10 pain. 4/9/18  4. Pt will increase balance as demo'd by SLS B for > or = to 15 seconds w/o HHA for decreased fall risk. A PN: , Left SLS 5 sec, Right SLS 5 sec, 3/29/2018  5. Pt will ambulate w/ correct heel toe gait pattern for > or = to 500ft w/ LRAD for improved community navigation.    At PN: SPC with R UE with slowed sherrell and diminished terminal knee extension bilaterally during stance phase of gait, 3/14/2018    PLAN  [x]  Upgrade activities as tolerated     [x]  Continue plan of care  []  Update interventions per flow sheet       []  Discharge due to:_  []  Other:_      Zhanna Stein, PT 4/12/2018  9:58 AM    Future Appointments  Date Time Provider Nick Dixon   4/13/2018 8:30 AM [facility] FAST TRACK NURSE [facility]   4/16/2018 9:30 AM Lilly Giron, PTA [facility]   4/19/2018 9:30 AM [place], PT [facility]   4/20/2018 8:30 AM [facility] FAST TRACK NURSE [facility]   4/23/2018 9:30 AM Lilly Giron, PTA [facility]   4/25/2018 9:30 AM Luther Hutson, PT [facility] 4/27/2018 8:30 AM [facility] FAST TRACK NURSE [facility]   5/4/2018 8:30 AM [facility] FAST TRACK NURSE [facility]   5/22/2018 10:20 AM Molly Govea  [address] 7/23/2018 10:00 AM Allyssa Cassidy MD [facility] 69

## 2018-04-13 ENCOUNTER — HOSPITAL ENCOUNTER (OUTPATIENT)
Dept: INFUSION THERAPY | Age: 68
Discharge: HOME | End: 2018-04-13
Payer: MEDICARE

## 2018-04-13 VITALS
SYSTOLIC BLOOD PRESSURE: 125 MMHG | TEMPERATURE: 97.7 F | DIASTOLIC BLOOD PRESSURE: 76 MMHG | RESPIRATION RATE: 18 BRPM | HEART RATE: 69 BPM

## 2018-04-13 LAB
BASO+EOS+MONOS # BLD AUTO: 0.8 K/UL (ref 0–2.3)
BASO+EOS+MONOS # BLD AUTO: 18 % (ref 0.1–17)
BASOPHILS # BLD: 0 K/UL (ref 0–0.06)
BASOPHILS NFR BLD: 1 % (ref 0–2)
DIFFERENTIAL METHOD BLD: ABNORMAL
DIFFERENTIAL METHOD BLD: ABNORMAL
EOSINOPHIL # BLD: 0.2 K/UL (ref 0–0.4)
EOSINOPHIL NFR BLD: 4 % (ref 0–5)
ERYTHROCYTE [DISTWIDTH] IN BLOOD BY AUTOMATED COUNT: 16.9 % (ref 11.6–14.5)
ERYTHROCYTE [DISTWIDTH] IN BLOOD BY AUTOMATED COUNT: 17.5 % (ref 11.5–14.5)
HCT VFR BLD AUTO: 29.4 % (ref 35–45)
HCT VFR BLD AUTO: 30.6 % (ref 36–48)
HGB BLD-MCNC: 9.8 G/DL (ref 12–16)
HGB BLD-MCNC: 9.8 G/DL (ref 12–16)
LYMPHOCYTES # BLD: 1.5 K/UL (ref 0.9–3.6)
LYMPHOCYTES # BLD: 1.5 K/UL (ref 1.1–5.9)
LYMPHOCYTES NFR BLD: 32 % (ref 14–44)
LYMPHOCYTES NFR BLD: 35 % (ref 21–52)
MCH RBC QN AUTO: 26.4 PG (ref 24–34)
MCH RBC QN AUTO: 26.9 PG (ref 25–35)
MCHC RBC AUTO-ENTMCNC: 32 G/DL (ref 31–37)
MCHC RBC AUTO-ENTMCNC: 33.3 G/DL (ref 31–37)
MCV RBC AUTO: 79.2 FL (ref 74–97)
MCV RBC AUTO: 84.1 FL (ref 78–102)
MONOCYTES # BLD: 0.8 K/UL (ref 0.05–1.2)
MONOCYTES NFR BLD: 18 % (ref 3–10)
NEUTS SEG # BLD: 1.9 K/UL (ref 1.8–8)
NEUTS SEG # BLD: 2.3 K/UL (ref 1.8–9.5)
NEUTS SEG NFR BLD: 42 % (ref 40–73)
NEUTS SEG NFR BLD: 50 % (ref 40–70)
PLATELET # BLD AUTO: 68 K/UL (ref 135–420)
RBC # BLD AUTO: 3.64 M/UL (ref 4.1–5.1)
RBC # BLD AUTO: 3.71 M/UL (ref 4.2–5.3)
WBC # BLD AUTO: 4.4 K/UL (ref 4.6–13.2)
WBC # BLD AUTO: 4.6 K/UL (ref 4.5–13)

## 2018-04-13 PROCEDURE — 96372 THER/PROPH/DIAG INJ SC/IM: CPT

## 2018-04-13 PROCEDURE — 74011250636 HC RX REV CODE- 250/636: Performed by: NURSE PRACTITIONER

## 2018-04-13 PROCEDURE — 36591 DRAW BLOOD OFF VENOUS DEVICE: CPT

## 2018-04-13 PROCEDURE — 74011000250 HC RX REV CODE- 250: Performed by: NURSE PRACTITIONER

## 2018-04-13 PROCEDURE — 85025 COMPLETE CBC W/AUTO DIFF WBC: CPT | Performed by: INTERNAL MEDICINE

## 2018-04-13 PROCEDURE — 77030012965 HC NDL HUBR BBMI -A

## 2018-04-13 PROCEDURE — 74011250636 HC RX REV CODE- 250/636

## 2018-04-13 RX ORDER — SODIUM CHLORIDE 0.9 % (FLUSH) 0.9 %
10-40 SYRINGE (ML) INJECTION AS NEEDED
Status: DISCONTINUED | OUTPATIENT
Start: 2018-04-13 | End: 2018-04-17 | Stop reason: HOSPADM

## 2018-04-13 RX ORDER — HEPARIN SODIUM (PORCINE) LOCK FLUSH IV SOLN 100 UNIT/ML 100 UNIT/ML
SOLUTION INTRAVENOUS
Status: COMPLETED
Start: 2018-04-13 | End: 2018-04-13

## 2018-04-13 RX ORDER — WATER FOR INJECTION,STERILE
VIAL (ML) INJECTION ONCE
Status: COMPLETED | OUTPATIENT
Start: 2018-04-13 | End: 2018-04-13

## 2018-04-13 RX ORDER — HEPARIN SODIUM (PORCINE) LOCK FLUSH IV SOLN 100 UNIT/ML 100 UNIT/ML
500 SOLUTION INTRAVENOUS AS NEEDED
Status: ACTIVE | OUTPATIENT
Start: 2018-04-13 | End: 2018-04-14

## 2018-04-13 RX ADMIN — HEPARIN SODIUM (PORCINE) LOCK FLUSH IV SOLN 100 UNIT/ML 500 UNITS: 100 SOLUTION at 09:07

## 2018-04-13 RX ADMIN — WATER: 1 INJECTION INTRAMUSCULAR; INTRAVENOUS; SUBCUTANEOUS at 09:07

## 2018-04-13 RX ADMIN — Medication 40 ML: at 09:07

## 2018-04-13 RX ADMIN — ROMIPLOSTIM 207 MCG: 250 INJECTION, POWDER, LYOPHILIZED, FOR SOLUTION SUBCUTANEOUS at 09:07

## 2018-04-13 NOTE — PROGRESS NOTES
[facility] Progress Note    Date: 2018    Name: Mariama Kessler    MRN: [id number]         : 1950      Port flush/ Nplate Injection     Ms. Jose Pemberton arrived to [facility] at 5815. Ms. Jose Pemberton was assessed and education was provided. Ms. Renee Nance vitals were reviewed. Visit Vitals    /76 (BP 1 Location: Right arm, BP Patient Position: Sitting)    Pulse 69    Temp 97.7 °F (36.5 °C)    Resp 18       Pt was observed for 5 minutes after obtaining vital signs prior to initiating treatment. Patients right chest double lumen mediport accessed using sterile technique with 20G 1 inch haas needles. Both lumens positive for blood return but medial lumen is very sluggish to flush. Blood drawn off lateral lumen and sent to lab for CBC. Lumens with with NS and heparin before de accessing. Gauze and tegaderm applied per patient request.     Lab results obtained and reviewed. (Preliminary results scanned into media tab.)  Recent Results (from the past 12 hour(s))   CBC WITH 3 PART DIFF    Collection Time: 18  8:51 AM   Result Value Ref Range    WBC 4.6 4.5 - 13.0 K/uL    RBC 3.64 (L) 4.10 - 5.10 M/uL    HGB 9.8 (L) 12.0 - 16 g/dL    HCT 30.6 (L) 36 - 48 %    MCV 84.1 78 - 102 FL    MCH 26.9 25.0 - 35.0 PG    MCHC 32.0 31 - 37 g/dL    RDW 17.5 (H) 11.5 - 14.5 %    NEUTROPHILS 50 40 - 70 %    MIXED CELLS 18 (H) 0.1 - 17 %    LYMPHOCYTES 32 14 - 44 %    ABS. NEUTROPHILS 2.3 1.8 - 9.5 K/UL    ABS. MIXED CELLS 0.8 0.0 - 2.3 K/uL    ABS. LYMPHOCYTES 1.5 1.1 - 5.9 K/UL    DF AUTOMATED       Preliminary platelet count= 52,439. Per Nplate protocol, since pt's platelet count is 26,309 today, pt's dose will remain at 3mcg/kg (3mcg x 69kg = 207mcg). Nplate 449WPM (0.75TC) administered as ordered SQ in the back of the patient's right upper arm. No redness or bleeding noted. Ms. Jose Pemberton was discharged from [place] [address] in stable condition at [place] [address].   She is to return on 18 at 0830 for her next appointment.     Tim Christianson RN  April 13, 2018

## 2018-04-16 ENCOUNTER — HOSPITAL ENCOUNTER (OUTPATIENT)
Dept: LAB | Age: 68
Discharge: HOME | End: 2018-04-16
Payer: MEDICARE

## 2018-04-16 ENCOUNTER — OFFICE VISIT (OUTPATIENT)
Dept: ONCOLOGY | Age: 68
End: 2018-04-16

## 2018-04-16 ENCOUNTER — HOSPITAL ENCOUNTER (OUTPATIENT)
Dept: ONCOLOGY | Age: 68
Discharge: HOME | End: 2018-04-16

## 2018-04-16 ENCOUNTER — APPOINTMENT (OUTPATIENT)
Dept: PHYSICAL THERAPY | Age: 68
End: 2018-04-16
Payer: MEDICARE

## 2018-04-16 VITALS
TEMPERATURE: 97.1 F | HEART RATE: 67 BPM | SYSTOLIC BLOOD PRESSURE: 101 MMHG | DIASTOLIC BLOOD PRESSURE: 72 MMHG | HEIGHT: 66 IN

## 2018-04-16 DIAGNOSIS — C77.3 CARCINOMA OF RIGHT BREAST METASTATIC TO AXILLARY LYMPH NODE (HCC): ICD-10-CM

## 2018-04-16 DIAGNOSIS — D69.3 CHRONIC ITP (IDIOPATHIC THROMBOCYTOPENIA) (HCC): ICD-10-CM

## 2018-04-16 DIAGNOSIS — D50.8 IRON DEFICIENCY ANEMIA SECONDARY TO INADEQUATE DIETARY IRON INTAKE: ICD-10-CM

## 2018-04-16 DIAGNOSIS — R29.898 MUSCULAR DECONDITIONING: ICD-10-CM

## 2018-04-16 DIAGNOSIS — J39.2 OROPHARYNGEAL BLEEDING: ICD-10-CM

## 2018-04-16 DIAGNOSIS — E55.9 VITAMIN D DEFICIENCY: ICD-10-CM

## 2018-04-16 DIAGNOSIS — D64.9 CHRONIC ANEMIA: ICD-10-CM

## 2018-04-16 DIAGNOSIS — G89.3 CANCER ASSOCIATED PAIN: ICD-10-CM

## 2018-04-16 DIAGNOSIS — R64 CACHEXIA (HCC): ICD-10-CM

## 2018-04-16 DIAGNOSIS — D69.3 CHRONIC ITP (IDIOPATHIC THROMBOCYTOPENIA) (HCC): Primary | ICD-10-CM

## 2018-04-16 DIAGNOSIS — C50.911 CARCINOMA OF RIGHT BREAST METASTATIC TO AXILLARY LYMPH NODE (HCC): ICD-10-CM

## 2018-04-16 DIAGNOSIS — F51.01 PRIMARY INSOMNIA: ICD-10-CM

## 2018-04-16 LAB
ALBUMIN SERPL-MCNC: 3.7 G/DL (ref 3.4–5)
ALBUMIN/GLOB SERPL: 0.9 {RATIO} (ref 0.8–1.7)
ALP SERPL-CCNC: 86 U/L (ref 45–117)
ALT SERPL-CCNC: 15 U/L (ref 13–56)
ANION GAP SERPL CALC-SCNC: 9 MMOL/L (ref 3–18)
AST SERPL-CCNC: 17 U/L (ref 15–37)
BASOPHILS # BLD: 0 K/UL (ref 0–0.1)
BASOPHILS NFR BLD: 0 % (ref 0–2)
BILIRUB SERPL-MCNC: 0.2 MG/DL (ref 0.2–1)
BUN SERPL-MCNC: 27 MG/DL (ref 7–18)
BUN/CREAT SERPL: 24 (ref 12–20)
CALCIUM SERPL-MCNC: 8.7 MG/DL (ref 8.5–10.1)
CHLORIDE SERPL-SCNC: 105 MMOL/L (ref 100–108)
CO2 SERPL-SCNC: 31 MMOL/L (ref 21–32)
CREAT SERPL-MCNC: 1.14 MG/DL (ref 0.6–1.3)
DIFFERENTIAL METHOD BLD: ABNORMAL
EOSINOPHIL # BLD: 0.2 K/UL (ref 0–0.4)
EOSINOPHIL NFR BLD: 3 % (ref 0–5)
ERYTHROCYTE [DISTWIDTH] IN BLOOD BY AUTOMATED COUNT: 16.8 % (ref 11.6–14.5)
FERRITIN SERPL-MCNC: 353 NG/ML (ref 8–388)
GLOBULIN SER CALC-MCNC: 4.3 G/DL (ref 2–4)
GLUCOSE SERPL-MCNC: 77 MG/DL (ref 74–99)
HCT VFR BLD AUTO: 32.1 % (ref 35–45)
HGB BLD-MCNC: 10.7 G/DL (ref 12–16)
IRON SATN MFR SERPL: 28 %
IRON SERPL-MCNC: 52 UG/DL (ref 50–175)
LYMPHOCYTES # BLD: 1.4 K/UL (ref 0.9–3.6)
LYMPHOCYTES NFR BLD: 23 % (ref 21–52)
MCH RBC QN AUTO: 26.8 PG (ref 24–34)
MCHC RBC AUTO-ENTMCNC: 33.3 G/DL (ref 31–37)
MCV RBC AUTO: 80.3 FL (ref 74–97)
MONOCYTES # BLD: 1 K/UL (ref 0.05–1.2)
MONOCYTES NFR BLD: 16 % (ref 3–10)
NEUTS SEG # BLD: 3.6 K/UL (ref 1.8–8)
NEUTS SEG NFR BLD: 58 % (ref 40–73)
PLATELET # BLD AUTO: 71 K/UL (ref 135–420)
PLATELET COMMENTS,PCOM: ABNORMAL
POTASSIUM SERPL-SCNC: 3.8 MMOL/L (ref 3.5–5.5)
PROT SERPL-MCNC: 8 G/DL (ref 6.4–8.2)
RBC # BLD AUTO: 4 M/UL (ref 4.2–5.3)
RBC MORPH BLD: ABNORMAL
SODIUM SERPL-SCNC: 145 MMOL/L (ref 136–145)
TIBC SERPL-MCNC: 188 UG/DL (ref 250–450)
WBC # BLD AUTO: 6.2 K/UL (ref 4.6–13.2)

## 2018-04-16 PROCEDURE — 80053 COMPREHEN METABOLIC PANEL: CPT | Performed by: INTERNAL MEDICINE

## 2018-04-16 PROCEDURE — 36415 COLL VENOUS BLD VENIPUNCTURE: CPT | Performed by: INTERNAL MEDICINE

## 2018-04-16 PROCEDURE — 82306 VITAMIN D 25 HYDROXY: CPT | Performed by: INTERNAL MEDICINE

## 2018-04-16 PROCEDURE — 86300 IMMUNOASSAY TUMOR CA 15-3: CPT | Performed by: INTERNAL MEDICINE

## 2018-04-16 PROCEDURE — 83540 ASSAY OF IRON: CPT | Performed by: INTERNAL MEDICINE

## 2018-04-16 PROCEDURE — 82728 ASSAY OF FERRITIN: CPT | Performed by: INTERNAL MEDICINE

## 2018-04-16 NOTE — PROGRESS NOTES
Hematology/Oncology  Progress Note    Name: Isaac Macedo  Date: 3/19/2018  : 1950    PCP: Steve Zapata MD     Ms. Aden Romo is a 74 y.o. female who was seen for management of her slowly progressive ITP and metastatic breast cancer with associated iron deficiency anemia. Current therapy: Active surveillance regarding breast cancer: N-plate as needed as a primary treatment for ITP  Subjective:     Ms. Aden Romo is a 79-year-old -American woman with history of metastatic breast cancer. The patient reports that she has been doing reasonably well. She has gained about 30 pounds weight over the past few months, due to the steroid that she has to take for her ITP. She is now actively trying to lose some weight. She denies having any unexplained bruising or bleeding. She continues to have arthritic discomfort in her joints and is continuing to use her cane for mobility support. The patient informed me that she is currently seeing a spine specialist and may need to get steroid injections or subdural injection to control the pain. She is continuing to take the n-plate as the primary treatment modality for her ITP. Over the past several weeks she has noticed a slowly progressive decline in her platelet count. Past medical history, family history, and social history: these were reviewed and remains unchanged.     Past Medical History:   Diagnosis Date    Antineoplastic chemotherapy induced anemia     Arrhythmia     Atrial Fib    Arthritis     Breast cancer ([address]     Cancer ([address]     Breast    Cardiomyopathy ([address]     Chronic ITP (idiopathic thrombocytopenia) (HCC) 10/31/2016    Secondary to Anemia from Chemo    Diabetes ([address]     borderline, no meds    Esophageal cancer ([address] 2005    treated with chemo    Heart failure [facility])      Past Surgical History:   Procedure Laterality Date    BREAST SURGERY PROCEDURE UNLISTED      COLONOSCOPY N/A 2016    COLONOSCOPY performed by Vijay Ahuja [facility] at [facility]    HX APPENDECTOMY      HX HEENT      Tonsillectomy    HX ORTHOPAEDIC      HX TUBAL LIGATION      HX VASCULAR ACCESS      NEUROLOGICAL PROCEDURE UNLISTED      Lumbar sx     Social History     Social History    Marital status:      Spouse name: N/A    Number of children: N/A    Years of education: N/A     Occupational History    Not on file. Social History Main Topics    Smoking status: Never Smoker    Smokeless tobacco: Never Used    Alcohol use No    Drug use: No    Sexual activity: Not on file     Other Topics Concern    Not on file     Social History Narrative     No family history on file. Current Outpatient Prescriptions   Medication Sig Dispense Refill    oxyCODONE-acetaminophen (PERCOCET 7.5) 7.5-325 mg per tablet Take 1 Tab by mouth every six (6) hours as needed for Pain. Max Daily Amount: 4 Tabs. 30 Tab 0    ergocalciferol (VITAMIN D2) 50,000 unit capsule Take 1 Cap by mouth every seven (7) days. 12 Cap 1    topiramate (TOPAMAX) 50 mg tablet   5    temazepam (RESTORIL) 30 mg capsule   1    LINZESS 145 mcg cap capsule TAKE 1 CAPSULE BY MOUTH DAILY 30 MINUTES BEFORE BREAKFAST  0    oxyCODONE-acetaminophen (PERCOCET)  mg per tablet Take 1 Tab by mouth every six (6) hours as needed for Pain. Max Daily Amount: 4 Tabs. Indications: Pain, ACUTE POST OP PAIN 60 Tab 0    lisinopril (PRINIVIL, ZESTRIL) 10 mg tablet Take 10 mg by mouth daily. Indications: hypertension      rivaroxaban (XARELTO) 10 mg tablet Take 10 mg by mouth daily (with breakfast). Indications: PREVENTION OF DEEP VEIN THROMBOSIS RECURRENCE      zolpidem (AMBIEN) 10 mg tablet TAKE 1 TABLET BY MOUTH NIGHTLY AS NEEDED FOR SLEEP. MAX DAILY AMOUNT 10 MG 30 Tab 2    loratadine 10 mg cap Take 10 mg by mouth daily.  digoxin (LANOXIN) 0.125 mg tablet Take 0.125 mg by mouth daily.  carvedilol (COREG) 3.125 mg tablet Take 3.125 mg by mouth two (2) times daily (with meals).       meloxicam (MOBIC) 7.5 mg tablet Take 7.5 mg by mouth daily.  amiodarone (CORDARONE) 200 mg tablet Take 200 mg by mouth daily. Indications: PREVENTION OF VENTRICULAR FIBRILLATION      gabapentin (NEURONTIN) 300 mg capsule Take 1 po q am and 2 po q pm (Patient taking differently: Take 300 mg by mouth two (2) times a day. Take 1 po q am and 2 po q pm) 90 Cap 1    rivaroxaban (XARELTO) 10 mg tablet Take 10 mg by mouth daily.  lidocaine-prilocaine (EMLA) topical cream APPLY OVER PORT 1 HOUR PRIOR TO CHEMOTHERAPY THAN COVER WITH SARAN WRAP 30 g 6    magic mouthwash solution Take 5 mL by mouth three (3) times daily as needed for Stomatitis. Magic mouth wash   Maalox  Lidocaine 2% viscous   Diphenhydramine oral solution     Pharmacy to mix equal portions of ingredients to a total volume as indicated in the dispense amount. 236 mL 0    furosemide (LASIX) 40 mg tablet Take 40 mg by mouth daily. Indications: Edema      dronabinol (MARINOL) 5 mg capsule Take 1 Cap by mouth two (2) times a day. 60 Cap 1    KLOR-CON M20 20 mEq tablet TAKE ONE TABLET BY MOUTH ONCE A DAY 30 Tab 3    aluminum-magnesium hydroxide 200-200 mg/5 mL susp 5 mL, diphenhydrAMINE 12.5 mg/5 mL liqd 12.5 mg, lidocaine 2 % soln 5 mL 5 mL by Swish and Spit route two (2) times a day. Magic mouth wash   Maalox  Lidocaine 2% viscous   Diphenhydramine oral solution     Pharmacy to mix equal portions of ingredients to a total volume as indicated in the dispense amount. 240 mL 1    potassium chloride (K-DUR, KLOR-CON) 20 mEq tablet Take 2 Tabs by mouth daily. 30 Tab 3    albuterol (PROAIR HFA) 90 mcg/actuation inhaler 1-2 puffs every 4-6 hrs. (Patient taking differently: Take 2 Puffs by inhalation every four (4) hours as needed for Shortness of Breath. 1-2 puffs every 4-6 hrs.) 1 Inhaler 1    senna (SENNA) 8.6 mg tablet Take 1 Tab by mouth daily.  nabumetone (RELAFEN) 750 mg tablet Take 750 mg by mouth daily.  Indications: OSTEOARTHRITIS      ondansetron hcl (ZOFRAN) 8 mg tablet Take 1 Tab by mouth every eight (8) hours as needed for Nausea. 30 Tab 3    IRON AG&FUM/C/FA/MV CMB11/CA-T (PRUVATE 21-7 PO) Take 325 mg by mouth daily. Indications: iron deficiency       Facility-Administered Medications Ordered in Other Visits   Medication Dose Route Frequency Provider Last Rate Last Dose    sodium chloride (NS) flush 10-40 mL  10-40 mL IntraVENous PRN Yogi Mg MD   40 mL at 04/13/18 6416       Review of Systems  Constitutional: The patient has no acute distress or discomfort. HEENT: The patient denies recent head trauma, eye pain, blurred vision,  hearing deficit, oropharyngeal mucosal pain or lesions, and the patient denies throat pain or discomfort. Lymphatics: The patient denies palpable peripheral lymphadenopathy. Hematologic: The patient denies having bruising, bleeding, or progressive fatigue. Respiratory: Patient denies having shortness of breath, cough, sputum production, fever, or dyspnea on exertion. Cardiovascular: The patient denies having leg pain, leg swelling, heart palpitations, chest permit, chest pain, or lightheadedness. The patient denies having dyspnea on exertion. Gastrointestinal: The patient denies having nausea, emesis, or diarrhea. The patient denies having any hematemesis or blood in the stool. Genitourinary: Patient denies having urinary urgency, frequency, or dysuria. The patient denies having blood in the urine. Psychological: The patient denies having symptoms of nervousness, anxiety, depression, or thoughts of harming self. Skin: Patient denies having skin rashes, skin, ulcerations, or unexplained itching or pruritus. Musculoskeletal: The patient used to complain of arthritic discomfort in her joints particularly the knees, hips, and shoulders. The patient reports that she has a large bruise on her right lower extremity.       Objective:     Visit Vitals    /72    Pulse 67    Temp 97.1 °F (36.2 °C)  Ht 5' 6\" (1.676 m)     ECOG PS=1, pain score=0/10  Physical Exam:   Gen. Appearance: The patient is in no acute distress. Skin: There is no bruise or rash. HEENT: The exam is unremarkable. Neck: Supple without lymphadenopathy or thyromegaly. Lungs: Clear to auscultation and percussion; there are no wheezes or rhonchi. Heart: Regular rate and rhythm; there are no murmurs, gallops, or rubs. Abdomen: Bowel sounds are present and normal.  There is no guarding, tenderness, or hepatosplenomegaly. Extremities: There is no clubbing, cyanosis, or edema. There is a 6 cm area of bruising on the right anterior lateral calf. There is no evidence of skin breakdown or ulceration. Neurologic: There are no focal neurologic deficits. Lymphatics: There is no palpable peripheral lymphadenopathy. Musculoskeletal: The patient has full range of motion at all joints. There is no evidence of joint deformity or effusions. There is no focal joint tenderness. Psychological/psychiatric: There is no clinical evidence of anxiety, depression, or melancholy. Lab data:      Results for orders placed or performed during the hospital encounter of 04/13/18   CBC WITH 3 PART DIFF     Status: Abnormal   Result Value Ref Range Status    WBC 4.6 4.5 - 13.0 K/uL Final    RBC 3.64 (L) 4.10 - 5.10 M/uL Final    HGB 9.8 (L) 12.0 - 16 g/dL Final    HCT 30.6 (L) 36 - 48 % Final    MCV 84.1 78 - 102 FL Final    MCH 26.9 25.0 - 35.0 PG Final    MCHC 32.0 31 - 37 g/dL Final    RDW 17.5 (H) 11.5 - 14.5 % Final    NEUTROPHILS 50 40 - 70 % Final    MIXED CELLS 18 (H) 0.1 - 17 % Final    LYMPHOCYTES 32 14 - 44 % Final    ABS. NEUTROPHILS 2.3 1.8 - 9.5 K/UL Final    ABS. MIXED CELLS 0.8 0.0 - 2.3 K/uL Final    ABS. LYMPHOCYTES 1.5 1.1 - 5.9 K/UL Final     Comment: Test performed at [address] Results Reviewed by Medical Director.     DF AUTOMATED   Final      The preliminary CBC from today, 3/19/2018, shows a WBC count of 4.6, hemoglobin 10.6 g/dL, hematocrit 33.1%, and the platelet count is [phone]. Assessment:     1. Chronic ITP (idiopathic thrombocytopenia) (HCC)    2. Carcinoma of right breast metastatic to axillary lymph node ([facility]    3. Vitamin D deficiency    4. Cachexia ([address]    5. Cancer associated pain    6. Primary insomnia    7. Muscular deconditioning    8. Chronic anemia    9. Iron deficiency anemia secondary to inadequate dietary iron intake          Plan:   Chronic ITP/thrombocytopenia (progression of disease): I have informed the patient that her CBC today shows that her preliminary platelet count is 42,385. At this time I am recommending we continue the n-Plate at a dose of 1 mcg/kg subcutaneous weekly. At this time I will recheck her platelet level will plan to see her back in clinic in about 8 weeks. Vitamin D deficiency: I have informed the patient and her vitamin D level on 12/18/2017 was 24.9 ng/mL. We will order vitamin D 50,000 units for 12 weeks at this time. Iron deficiency anemia/chronic anemia: The current CBC shows a WBC count of 6.7, hemoglobin is 10.9 g/dL hematocrit is 34.6. I have recommended that the patient continue taking the ferrous sulfate 1 tablet twice daily. At this time I will recheck her iron profile and ferritin levels. Bilateral lower extremity edema: The leg edema has significantly improved. Metastatic breast cancer, left breast metastasized to lymph nodes and other sites: At this time I will check a CA-27-29 level. The most recent CA-35-27 level was normal.  Clinically the patient has no evidence of disease progression. .    Cachexia, weakness, and muscular deconditioning: I am recommending that she continue physical therapy 4 times weekly. She will continue to use a walker or cane as needed for mobility support. Today, she is ambulating without the use of a cane or walker.     Primary insomnia: The patient was instructed to continue to use the Ambien 10 mg at bedtime. Vertigo (controlled problem): I have recommended that she continue to use the Antivert 12.5 mg every 8 hours, as needed. .  I have advised the patient to take this for 3-5 days as needed, whenever she develops vertigo or dizziness. .    I will see her back in clinic for complete assessment again in 12 weeks. Orders Placed This Encounter    COMPLETE CBC & AUTO DIFF WBC    InHouse CBC ([facility])     Standing Status:   Future     Number of Occurrences:   1     Standing Expiration Date:   9/95/1039    METABOLIC PANEL, COMPREHENSIVE     Standing Status:   Future     Standing Expiration Date:   4/17/2019    IRON PROFILE     Standing Status:   Future     Standing Expiration Date:   4/17/2019    FERRITIN     Standing Status:   Future     Standing Expiration Date:   4/17/2019    CA 27.29     Standing Status:   Future     Standing Expiration Date:   4/17/2019    VITAMIN D, 25 HYDROXY     Standing Status:   Future     Standing Expiration Date:   4/17/2019       Stuart Deleon MD  3/19/2018      Please note: This document has been produced using voice recognition software. Unrecognized errors in transcription may be present.

## 2018-04-16 NOTE — MR AVS SNAPSHOT
Chris Mccarty 
 
 
 [facility] [address] [facility] 
[phone] Patient: David Tomlin MRN: [id number] Visit Information Date & Time Provider Department Dept. Phone Encounter #  
 4/16/2018 11:00 AM Rusty Tamayo MD [facility] [facility] Medical Oncology [phone] [id number] Follow-up Instructions Return in about 4 months (around 8/16/2018). Your Appointments 5/14/2018  9:30 AM  
Office Visit with Rusty Tamayo MD  
[facility]  On[facility] Appt Note: 4 weeks [place] [address], [place] [address] [address], [facility] [address] [place] [address]  
  
    
 5/22/2018 10:20 AM  
Follow Up with Sammi Vidal NP  
[facility] ([facility] Appt Note: 6 WK F/U NP  
 [address] [facility] [address]  
[phone]  
  
   
 [address] [facility] [address] Upcoming Health Maintenance Date Due Hepatitis C Screening 1950 DTaP/Tdap/Td series (1 - Tdap) 5/14/1973 FOBT Q 1 YEAR AGE 50-75 5/14/2002 ZOSTER VACCINE AGE 60> 3/14/2012 BREAST CANCER SCRN MAMMOGRAM 4/4/2015 GLAUCOMA SCREENING Q2Y 5/14/2017 Bone Densitometry (Dexa) Screening 5/14/2017 Pneumococcal 65+ High/Highest Risk (1 of 2 - PCV13) 5/14/2017 Influenza Age 5 to Adult 8/1/2017 MEDICARE YEARLY EXAM 3/16/2018 Allergies as of 4/16/2018  Review Complete On: 4/16/2018 By: Rusty Tamayo MD  
  
 Severity Noted Reaction Type Reactions Aspirin High 08/07/2013   Systemic Swelling Pt states her whole body swells when she takes aspirin. Adhesive    Unable to Obtain Pcn [Penicillins]  08/20/2012    Rash Current Immunizations  Reviewed on 3/16/2018 No immunizations on file. Not reviewed this visit You Were Diagnosed With   
  
 Codes Comments Chronic ITP (idiopathic thrombocytopenia) (HCC)    -  Primary ICD-10-CM: N72.3 ICD-9-CM: 287.31 Carcinoma of right breast metastatic to axillary lymph node ([facility]     ICD-10-CM: C50.911, C77.3 ICD-9-CM: 174.9, 196.3 Vitamin D deficiency     ICD-10-CM: E55.9 ICD-9-CM: 268.9 Cachexia ([facility]     ICD-10-CM: R64 
ICD-9-CM: 799.4 Cancer associated pain     ICD-10-CM: G89.3 ICD-9-CM: 338.3 Primary insomnia     ICD-10-CM: F51.01 
ICD-9-CM: 307.42 Muscular deconditioning     ICD-10-CM: R29.898 ICD-9-CM: 781.99 Chronic anemia     ICD-10-CM: D64.9 ICD-9-CM: 285.9 Iron deficiency anemia secondary to inadequate dietary iron intake     ICD-10-CM: D50.8 ICD-9-CM: 280.1 Vitals BP Pulse Temp Height(growth percentile) OB Status Smoking Status 101/72 67 97.1 °F (36.2 °C) 5' 6\" (1.676 m) Postmenopausal Never Smoker Preferred Pharmacy Pharmacy Name Phone [facility][id number] [address] [phone] Your Updated Medication List  
  
   
This list is accurate as of 4/16/18 12:50 PM.  Always use your most recent med list.  
  
  
  
  
 albuterol 90 mcg/actuation inhaler Commonly known as:  PROAIR HFA  
1-2 puffs every 4-6 hrs. aluminum-magnesium hydroxide 200-200 mg/5 mL susp 5 mL, diphenhydrAMINE 12.5 mg/5 mL liqd 12.5 mg, lidocaine 2 % soln 5 mL  
5 mL by Swish and Spit route two (2) times a day. Magic mouth wash  Maalox Lidocaine 2% viscous  Diphenhydramine oral solution   Pharmacy to mix equal portions of ingredients to a total volume as indicated in the dispense amount. amiodarone 200 mg tablet Commonly known as:  CORDARONE Take 200 mg by mouth daily. Indications: PREVENTION OF VENTRICULAR FIBRILLATION  
  
 COREG 3.125 mg tablet Generic drug:  carvedilol Take 3.125 mg by mouth two (2) times daily (with meals). digoxin 0.125 mg tablet Commonly known as:  LANOXIN Take 0.125 mg by mouth daily. dronabinol 5 mg capsule Commonly known as:  [place] Take 1 Cap by mouth two (2) times a day. ergocalciferol 50,000 unit capsule Commonly known as:  VITAMIN D2 Take 1 Cap by mouth every seven (7) days. furosemide 40 mg tablet Commonly known as:  LASIX Take 40 mg by mouth daily. Indications: Edema  
  
 gabapentin 300 mg capsule Commonly known as:  NEURONTIN Take 1 po q am and 2 po q pm  
  
 lidocaine-prilocaine topical cream  
Commonly known as:  EMLA  
APPLY OVER PORT 1 HOUR PRIOR TO CHEMOTHERAPY THAN COVER WITH [place] WRAP LINZESS 145 mcg Cap capsule Generic drug:  linaclotide TAKE 1 CAPSULE BY MOUTH DAILY 30 MINUTES BEFORE BREAKFAST  
  
 lisinopril 10 mg tablet Commonly known as:  [place] Ha[place]kit Take 10 mg by mouth daily. Indications: hypertension  
  
 loratadine 10 mg Cap Take 10 mg by mouth daily. magic mouthwash solution Take 5 mL by mouth three (3) times daily as needed for Stomatitis. Magic mouth wash  Maalox Lidocaine 2% viscous  Diphenhydramine oral solution   Pharmacy to mix equal portions of ingredients to a total volume as indicated in the dispense amount. meloxicam 7.5 mg tablet Commonly known as:  MOBIC Take 7.5 mg by mouth daily. nabumetone 750 mg tablet Commonly known as:  RELAFEN Take 750 mg by mouth daily. Indications: OSTEOARTHRITIS  
  
 ondansetron hcl 8 mg tablet Commonly known as:  ZOFRAN (AS HYDROCHLORIDE) Take 1 Tab by mouth every eight (8) hours as needed for Nausea. * oxyCODONE-acetaminophen  mg per tablet Commonly known as:  PERCOCET Take 1 Tab by mouth every six (6) hours as needed for Pain. Max Daily Amount: 4 Tabs. Indications: Pain, ACUTE POST OP PAIN  
  
 * oxyCODONE-acetaminophen 7.5-325 mg per tablet Commonly known as:  PERCOCET 7.5 Take 1 Tab by mouth every six (6) hours as needed for Pain.  Max Daily Amount: 4 Tabs. * potassium chloride 20 mEq tablet Commonly known as:  K-DUR, KLOR-CON Take 2 Tabs by mouth daily. * KLOR-CON M20 20 mEq tablet Generic drug:  potassium chloride TAKE ONE TABLET BY MOUTH ONCE A DAY  
  
 PRUVATE 21-7 PO Take 325 mg by mouth daily. Indications: iron deficiency Senna 8.6 mg tablet Generic drug:  senna Take 1 Tab by mouth daily. temazepam 30 mg capsule Commonly known as:  RESTORIL  
  
 topiramate 50 mg tablet Commonly known as:  TOPAMAX * XARELTO 10 mg tablet Generic drug:  rivaroxaban Take 10 mg by mouth daily. * rivaroxaban 10 mg tablet Commonly known as:  Anali Hayneso Take 10 mg by mouth daily (with breakfast). Indications: PREVENTION OF DEEP VEIN THROMBOSIS RECURRENCE  
  
 zolpidem 10 mg tablet Commonly known as:  AMBIEN  
TAKE 1 TABLET BY MOUTH NIGHTLY AS NEEDED FOR SLEEP. MAX DAILY AMOUNT 10 MG  
  
 * Notice: This list has 6 medication(s) that are the same as other medications prescribed for you. Read the directions carefully, and ask your doctor or other care provider to review them with you. We Performed the Following COMPLETE CBC & AUTO DIFF WBC [70316 CPT(R)] Follow-up Instructions Return in about 4 months (around 8/16/2018). To-Do List   
 04/16/2018 Lab:  CA 27.29   
  
 04/16/2018 Lab:  CBC WITH 3 PART DIFF   
  
 04/16/2018 Lab:  FERRITIN   
  
 04/16/2018 Lab:  IRON PROFILE   
  
 04/16/2018 Lab:  METABOLIC PANEL, COMPREHENSIVE   
  
 04/16/2018   Lab:  VITAMIN D, 25 HYDROXY   
  
 04/19/2018 9:30 AM  
  Appointment with Jannette Eli PT at [facility] PT [facility] ([phone])  
  
 04/20/2018 8:30 AM  
  Appointment with [facility] FAST TRACK NURSE at [facility] ([phone])  
  
 04/20/2018 11:30 AM  
  Appointment with Javi Ramirez PT at [facility] PT [facility] ([phone])  
  
 04/23/2018 9:30 AM  
 Appointment with Greg Kolb PTA at [facility] ([phone])  
  
 04/25/2018 9:30 AM  
  Appointment with Hank Stone PT at [facility] ([phone])  
  
 04/27/2018 8:30 AM  
  Appointment with [facility] FAST TRACK NURSE at [address] [facility] ([phone]) 05/04/2018 8:30 AM  
  Appointment with [facility] FAST TRACK NURSE at [facility] ([phone]) Please provide this summary of care documentation to your next provider. Your primary care clinician is listed as Coit Lints. If you have any questions after today's visit, please call [phone].

## 2018-04-17 LAB
25(OH)D3 SERPL-MCNC: 22.8 NG/ML (ref 30–100)
CANCER AG27-29 SERPL-ACNC: 41.7 U/ML (ref 0–38.6)

## 2018-04-19 ENCOUNTER — HOSPITAL ENCOUNTER (OUTPATIENT)
Dept: PHYSICAL THERAPY | Age: 68
Discharge: HOME | End: 2018-04-19
Payer: MEDICARE

## 2018-04-19 PROCEDURE — 97112 NEUROMUSCULAR REEDUCATION: CPT

## 2018-04-19 PROCEDURE — 97140 MANUAL THERAPY 1/> REGIONS: CPT

## 2018-04-19 PROCEDURE — 97110 THERAPEUTIC EXERCISES: CPT

## 2018-04-19 NOTE — PROGRESS NOTES
PT DAILY TREATMENT NOTE - [facility]     Patient Name: David Tomlin  Date:2018  : 1950  [x]  Patient  Verified  Payor: Boby Amin / Plan: [place] MED[place] & B / Product Type: Medicare /    In YNPO:[id number]  Out time:1028  Total Treatment Time (min): 68  Total Timed Codes (min): 68  1:1 Treatment Time ( only): 40   Visit #: 6 of 12    Treatment Area: Right leg weakness [R29.898]    SUBJECTIVE  Pain Level (0-10 scale): 4  Any medication changes, allergies to medications, adverse drug reactions, diagnosis change, or new procedure performed?: [x] No    [] Yes (see summary sheet for update)  Subjective functional status/changes:   [] No changes reported  Patient reports generalized feelings of lightheadness and nausea this AM without known reason for onset    OBJECTIVE    30 min Therapeutic Exercise:  [x] See flow sheet : Emphasis placed on improving available lumbar and hip AROM and strength of the gluteal musculature   Rationale: increase ROM and increase strength to improve the patients ability to improve ease with household ADLs      30 min Neuromuscular Re-education:  [x]  See flow sheet : Emphasis placed on improving pelvic proprioceptive awareness and recruitment and activation of the anterior abdominal musculature, Emphasis placed on improving static and dynamic weightbearing stability and balance   Rationale: increase ROM, increase strength, improve balance and increase proprioception  to improve the patients ability to improve safety with community ambulation.      8 min Manual Therapy:    Supine, R Hip Grade I/II Inferior Mobilization  Supine, R Hip Grade I/II Lateral Mobilization  Supine, R Piriformis Manual Stretch (flexion, abduction, ER)   Rationale: decrease pain, increase ROM and increase tissue extensibility to improve ease with stair management.         With   [] TE   [] TA   [] neuro   [] other: Patient Education: [x] Review HEP    [] Progressed/Changed HEP based on:   [] positioning [] body mechanics   [] transfers   [] heat/ice application    [] other:      Other Objective/Functional Measures: /60 mmHg     Pain Level (0-10 scale) post treatment: 4-5    ASSESSMENT/Changes in Function: /60 mmHg at initiation of treatment with monitoring of patient response throughout session to ensure tolerance secondary to patient subjective report of generalized lightheadedness. Patient noted with significant improvement in pelvic proprioceptive awareness with LE AROM. Patient continues to demonstrate generalized fatigue thus limiting overall progression. Patient will continue to benefit from skilled PT services to modify and progress therapeutic interventions, address functional mobility deficits, address ROM deficits, address strength deficits, analyze and address soft tissue restrictions and analyze and cue movement patterns to attain remaining goals. []  See Plan of Care  []  See progress note/recertification  []  See Discharge Summary         Progress towards goals / Updated goals:    Short Term Goals: To be accomplished in 2 weeks:  1. Pt will be independent and compliant w/ HEP to progress gains in PT. At PN:  reported partial compliance 3/6/2018  2. Pt will report < or = to 3/10 pain prior to and following session to demo improve pain management at home. At PN:  prior pain 5.5. 3/26/18   Current: progressing pain over last 3 visits 4.75/10 average 4/12/18  Long Term Goals: To be accomplished in 6 weeks:  1. Pt will improve FOTO to > or = to 51 to demo improved function. At PN: FOTO = 38, 3/20/2018  Current: Progressing: FOTO = 41, increased by 5 since [facility]. 4/4/18  2. Pt will increase MMT right hip flex, ext, and abd to > or = to 4/5 for ease w/ improved standing tolerance. At PN:  Remains, MMT (R) hip abd: 3-/5 (3/22/18)   3. Pt will increase core activation as demo'd by supine bridge w/o increased pain x10 for ease w/ bed mobility.    At PN:  Ability to achieve 50% of available AROM with pain 5-6/10, 3/20/2018  Current: Progressing, Supine bridges throughout 75% of AROM with pain 3-4/10, 4/19/2018  4. Pt will increase balance as demo'd by SLS B for > or = to 15 seconds w/o HHA for decreased fall risk. A PN: , Left SLS 5 sec, Right SLS 5 sec, 3/29/2018  5. Pt will ambulate w/ correct heel toe gait pattern for > or = to 500ft w/ LRAD for improved community navigation.    At PN: SPC with R UE with slowed sherrell and diminished terminal knee extension bilaterally during stance phase of gait, 3/14/2018     PLAN  [x]  Upgrade activities as tolerated     [x]  Continue plan of care  []  Update interventions per flow sheet       []  Discharge due to:_  []  Other:_      Jose Crooks, PT 4/19/2018  7:39 AM    Future Appointments  Date Time Provider Nick Dixon   4/19/2018 9:30 AM Jose Crooks PT [facility]   4/20/2018 8:30 AM [facility] FAST TRACK NURSE [facility]   4/20/2018 11:30 AM Amanda Ley PT [facility]   4/23/2018 9:30 AM Elkin Lucero PTA [facility]   4/25/2018 9:30 AM Jose Crooks PT [facility]   4/27/2018 8:30 AM [facility] FAST TRACK NURSE [facility]   5/4/2018 8:30 AM [facility] FAST TRACK NURSE [facility]   5/14/2018 11:45 AM Stuart Deleon MD [id number] [facility]   5/22/2018 10:20 AM Paulo Olsen  [address]

## 2018-04-20 ENCOUNTER — HOSPITAL ENCOUNTER (OUTPATIENT)
Dept: INFUSION THERAPY | Age: 68
Discharge: HOME | End: 2018-04-20
Payer: MEDICARE

## 2018-04-20 ENCOUNTER — HOSPITAL ENCOUNTER (OUTPATIENT)
Dept: PHYSICAL THERAPY | Age: 68
Discharge: HOME | End: 2018-04-20
Payer: MEDICARE

## 2018-04-20 VITALS
HEART RATE: 64 BPM | DIASTOLIC BLOOD PRESSURE: 66 MMHG | RESPIRATION RATE: 18 BRPM | OXYGEN SATURATION: 100 % | TEMPERATURE: 97.3 F | SYSTOLIC BLOOD PRESSURE: 97 MMHG

## 2018-04-20 DIAGNOSIS — G89.3 CANCER ASSOCIATED PAIN: Primary | ICD-10-CM

## 2018-04-20 DIAGNOSIS — Z98.1 S/P LUMBAR FUSION: ICD-10-CM

## 2018-04-20 DIAGNOSIS — M43.16 SPONDYLOLISTHESIS OF LUMBAR REGION: ICD-10-CM

## 2018-04-20 LAB
BASO+EOS+MONOS # BLD AUTO: 1.1 K/UL (ref 0–2.3)
BASO+EOS+MONOS # BLD AUTO: 22 % (ref 0.1–17)
DIFFERENTIAL METHOD BLD: ABNORMAL
ERYTHROCYTE [DISTWIDTH] IN BLOOD BY AUTOMATED COUNT: 17 % (ref 11.5–14.5)
HCT VFR BLD AUTO: 34.5 % (ref 36–48)
HGB BLD-MCNC: 11.2 G/DL (ref 12–16)
LYMPHOCYTES # BLD: 1.6 K/UL (ref 1.1–5.9)
LYMPHOCYTES NFR BLD: 32 % (ref 14–44)
MCH RBC QN AUTO: 27 PG (ref 25–35)
MCHC RBC AUTO-ENTMCNC: 32.5 G/DL (ref 31–37)
MCV RBC AUTO: 83.1 FL (ref 78–102)
NEUTS SEG # BLD: 2.3 K/UL (ref 1.8–9.5)
NEUTS SEG NFR BLD: 46 % (ref 40–70)
PLATELET # BLD AUTO: 83 K/UL (ref 135–420)
RBC # BLD AUTO: 4.15 M/UL (ref 4.1–5.1)
WBC # BLD AUTO: 5 K/UL (ref 4.5–13)

## 2018-04-20 PROCEDURE — 85049 AUTOMATED PLATELET COUNT: CPT | Performed by: INTERNAL MEDICINE

## 2018-04-20 PROCEDURE — 74011250636 HC RX REV CODE- 250/636: Performed by: NURSE PRACTITIONER

## 2018-04-20 PROCEDURE — 97140 MANUAL THERAPY 1/> REGIONS: CPT

## 2018-04-20 PROCEDURE — 97112 NEUROMUSCULAR REEDUCATION: CPT

## 2018-04-20 PROCEDURE — 96372 THER/PROPH/DIAG INJ SC/IM: CPT

## 2018-04-20 PROCEDURE — 97110 THERAPEUTIC EXERCISES: CPT

## 2018-04-20 PROCEDURE — 74011250636 HC RX REV CODE- 250/636

## 2018-04-20 PROCEDURE — 36415 COLL VENOUS BLD VENIPUNCTURE: CPT

## 2018-04-20 PROCEDURE — G8985 CARRY GOAL STATUS: HCPCS

## 2018-04-20 PROCEDURE — G8984 CARRY CURRENT STATUS: HCPCS

## 2018-04-20 PROCEDURE — 74011000250 HC RX REV CODE- 250: Performed by: NURSE PRACTITIONER

## 2018-04-20 PROCEDURE — 85025 COMPLETE CBC W/AUTO DIFF WBC: CPT | Performed by: INTERNAL MEDICINE

## 2018-04-20 RX ORDER — OXYCODONE AND ACETAMINOPHEN 10; 325 MG/1; MG/1
1 TABLET ORAL
Qty: 120 TAB | Refills: 0 | Status: SHIPPED | OUTPATIENT
Start: 2018-04-20 | End: 2018-06-14 | Stop reason: SDUPTHER

## 2018-04-20 RX ORDER — OXYCODONE AND ACETAMINOPHEN 10; 325 MG/1; MG/1
1 TABLET ORAL
Qty: 60 TAB | Refills: 0 | Status: CANCELLED | OUTPATIENT
Start: 2018-04-20

## 2018-04-20 RX ORDER — WATER FOR INJECTION,STERILE
VIAL (ML) INJECTION ONCE
Status: COMPLETED | OUTPATIENT
Start: 2018-04-20 | End: 2018-04-20

## 2018-04-20 RX ADMIN — ROMIPLOSTIM 207 MCG: 250 INJECTION, POWDER, LYOPHILIZED, FOR SOLUTION SUBCUTANEOUS at 08:52

## 2018-04-20 RX ADMIN — WATER: 1 INJECTION INTRAMUSCULAR; INTRAVENOUS; SUBCUTANEOUS at 08:52

## 2018-04-20 NOTE — PROGRESS NOTES
[facility] Progress Note    Date: 2018    Name: Noe Griffith    MRN: [id number]         : 1950      Port flush/ Nplate Injection     Ms. Kalpesh Carney arrived to [facility] [place]. Ms. Kalpesh Carney was assessed and education was provided. Ms. Pako Watson vitals were reviewed. Visit Vitals    BP 97/66 (BP 1 Location: Right arm, BP Patient Position: Sitting)    Pulse 64    Temp 97.3 °F (36.3 °C)    Resp 18    SpO2 100%       Pt was observed for 5 minutes after obtaining vital signs prior to initiating treatment. Blood drawn for CBC x1 attempt to the right AC. Gauze and coban applied to the site. Lab results obtained and reviewed. (Preliminary results scanned into media tab.)  Recent Results (from the past 12 hour(s))   CBC WITH 3 PART DIFF    Collection Time: 18  8:35 AM   Result Value Ref Range    WBC 5.0 4.5 - 13.0 K/uL    RBC 4.15 4.10 - 5.10 M/uL    HGB 11.2 (L) 12.0 - 16 g/dL    HCT 34.5 (L) 36 - 48 %    MCV 83.1 78 - 102 FL    MCH 27.0 25.0 - 35.0 PG    MCHC 32.5 31 - 37 g/dL    RDW 17.0 (H) 11.5 - 14.5 %    NEUTROPHILS 46 40 - 70 %    MIXED CELLS 22 (H) 0.1 - 17 %    LYMPHOCYTES 32 14 - 44 %    ABS. NEUTROPHILS 2.3 1.8 - 9.5 K/UL    ABS. MIXED CELLS 1.1 0.0 - 2.3 K/uL    ABS. LYMPHOCYTES 1.6 1.1 - 5.9 K/UL    DF AUTOMATED       Preliminary platelet count= 38,213. Per Nplate protocol, since pt's platelet count is 36,934 today, pt's dose will remain at 3mcg/kg (3mcg x 69kg = 207mcg). Nplate 923TKC (9.19CZ) administered as ordered SQ in the back of the patient's right upper arm. No redness or bleeding noted. Ms. Kalpesh Carney was discharged from [address] in stable condition at [phone]. She is to return on 18 at 0830 for her next appointment.     Juan Ramon Patricio RN  2018

## 2018-04-20 NOTE — PROGRESS NOTES
PT DAILY TREATMENT NOTE - [facility]     Patient Name: Bridget Queen  Date:2018  : 1950  [x]  Patient  Verified  Payor: [place] MEDICARE / Plan: [place] MEDICARE PART A & B / Product Type: Medicare /    In time: 11:33  Out time: 12:37  Total Treatment Time (min): 64  Total Timed Codes (min): 64  1:1 Treatment Time ([address] only): 40  Visit #: 7 of 12    Treatment Area: Right leg weakness [R29.898]    SUBJECTIVE  Pain Level (0-10 scale): 6  Any medication changes, allergies to medications, adverse drug reactions, diagnosis change, or new procedure performed?: [x] No    [] Yes (see summary sheet for update)  Subjective functional status/changes:   [] No changes reported  \"The pain has edis fluctuating over the last week or so. I woke up with more pain today. I don't know why\"    OBJECTIVE    29 min Therapeutic Exercise:  [x] See flow sheet :    Rationale: increase ROM and increase strength to improve the patients ability to improve ease with ADLs      25 min Neuromuscular Re-education:  [x]  See flow sheet :    Rationale: increase ROM, increase strength, improve balance and increase proprioception to improve the patients ability to improve safety with community ambulation.      10 min Manual Therapy:  DTM/TPR B l/s paraspinals and right upper glutes. Rationale: decrease pain, increase ROM and increase tissue extensibility to improve ease with ambulation. With   [] TE   [] TA   [] neuro   [] other: Patient Education: [x] Review HEP    [] Progressed/Changed HEP based on:   [] positioning   [] body mechanics   [] transfers   [] heat/ice application    [] other:      Other Objective/Functional Measures: Added 1# weight to 1/2 prone hip EXT to improve strength in the LEs. Added 1# weight to PPT right LE SLR to improve strength in the LEs. Pain Level (0-10 scale) post treatment: 4.5    ASSESSMENT/Changes in Function: Improvement in pain reported post session.  Tenderness and tightness noted in B l/s paraspinals during manual interventions. Challenged with strength with right LE SLR with 1# weight and only performed 1 set of 10 secondary to weakness in the right LE. Pt reports she has noticed improvements in walking and overall symptoms since starting therapy. Pt states she has been using the [address] less for ambulation as well. Continue POC as tolerated. Patient will continue to benefit from skilled PT services to modify and progress therapeutic interventions, address functional mobility deficits, address ROM deficits, address strength deficits, analyze and address soft tissue restrictions, analyze and cue movement patterns, analyze and modify body mechanics/ergonomics, address imbalance/dizziness and instruct in home and community integration to attain remaining goals. []  See Plan of Care  []  See progress note/recertification  []  See Discharge Summary         Progress towards goals / Updated goals:  Short Term Goals: To be accomplished in 2 weeks:  1. Pt will be independent and compliant w/ HEP to progress gains in PT. At PN:  reported partial compliance 3/6/2018  2. Pt will report < or = to 3/10 pain prior to and following session to demo improve pain management at home. At PN:  prior pain 5.5. 3/26/18   Current: progressing pain over last 3 visits 4.75/10 average 4/12/18  Long Term Goals: To be accomplished in 6 weeks:  1. Pt will improve FOTO to > or = to 51 to demo improved function. At PN: FOTO = 38, 3/20/2018  Current: Progressing: FOTO = 41, increased by 5 since [facility]. 4/4/18  2. Pt will increase MMT right hip flex, ext, and abd to > or = to 4/5 for ease w/ improved standing tolerance. At PN:  Remains, MMT (R) hip abd: 3-/5 (3/22/18)   3. Pt will increase core activation as demo'd by supine bridge w/o increased pain x10 for ease w/ bed mobility.    At PN:  Ability to achieve 50% of available AROM with pain 5-6/10, 3/20/2018  Current: Progressing, Supine bridges throughout 75% of AROM with pain 3-4/10, 4/19/2018  4. Pt will increase balance as demo'd by SLS B for > or = to 15 seconds w/o HHA for decreased fall risk. A PN: , Left SLS 5 sec, Right SLS 5 sec, 3/29/2018  5. Pt will ambulate w/ correct heel toe gait pattern for > or = to 500ft w/ LRAD for improved community navigation.    At PN: SPC with R UE with slowed sherrell and diminished terminal knee extension bilaterally during stance phase of gait, 3/14/2018     PLAN  [x]  Upgrade activities as tolerated     [x]  Continue plan of care  [x]  Update interventions per flow sheet       []  Discharge due to:_  []  Other:_      Suha Eason, PT 4/20/2018  11:53 AM    Future Appointments  Date Time Provider Nick Dixon   4/20/2018 11:30 AM Suha Eason PT [facility]   4/23/2018 9:30 AM Marylene Plain, PTA [facility]   4/25/2018 9:30 AM Andrés Painter PT [facility]   4/27/2018 8:30 AM [facility] FAST TRACK NURSE [facility]   5/4/2018 8:30 AM [facility] FAST TRACK NURSE [facility]   5/14/2018 11:45 AM Pilo Prasad MD [id number] [facility]   5/22/2018 10:20 AM Jourdan Arthur  [address]

## 2018-04-23 ENCOUNTER — HOSPITAL ENCOUNTER (OUTPATIENT)
Dept: PHYSICAL THERAPY | Age: 68
Discharge: HOME | End: 2018-04-23
Payer: MEDICARE

## 2018-04-23 PROCEDURE — 97140 MANUAL THERAPY 1/> REGIONS: CPT

## 2018-04-23 PROCEDURE — 97110 THERAPEUTIC EXERCISES: CPT

## 2018-04-23 PROCEDURE — 97112 NEUROMUSCULAR REEDUCATION: CPT

## 2018-04-23 NOTE — PROGRESS NOTES
PT DAILY TREATMENT NOTE - [facility]     Patient Name: Kenneth Sanchez  Date:2018  : 1950  [x]  Patient  Verified  Payor: [place] MEDICARE / Plan: [place] MEDICARE PART A & B / Product Type: Medicare /    In time:9:29  Out time:10:30  Total Treatment Time (min): 61  Total Timed Codes (min): 61  1:1 Treatment Time ([address] only): 40   Visit #: 8 of 12    Treatment Area: Right leg weakness [R29.898]    SUBJECTIVE  Pain Level (0-10 scale): 5  Any medication changes, allergies to medications, adverse drug reactions, diagnosis change, or new procedure performed?: [x] No    [] Yes (see summary sheet for update)  Subjective functional status/changes:   [] No changes reported  Pt reports that her pain is not that bad today. OBJECTIVE        Min Type Additional Details    [] Estim:  []Unatt       []IFC  []Premod                        []Other:  []w/ice   []w/heat  Position:  Location:    [] Estim: []Att    []TENS instruct  []NMES                    []Other:  []w/US   []w/ice   []w/heat  Position:  Location:    []  Traction: [] Cervical       []Lumbar                       [] Prone          []Supine                       []Intermittent   []Continuous Lbs:  [] before manual  [] after manual    []  Ultrasound: []Continuous   [] Pulsed                           []1MHz   []3MHz W/cm2:  Location:    []  Iontophoresis with dexamethasone         Location: [] Take home patch   [] In clinic    []  Ice     []  heat  []  Ice massage  []  Laser   []  Anodyne Position:  Location:    []  Laser with stim  []  Other:  Position:  Location:    []  Vasopneumatic Device Pressure:       [] lo [] med [] hi   Temperature: [] lo [] med [] hi   [] Skin assessment post-treatment:  []intact []redness- no adverse reaction    []redness  adverse reaction:             38 min Therapeutic Exercise:  [x] See flow sheet :   Rationale: increase ROM and increase strength to improve the patients ability to increase tolerance to activities.            15 min Neuromuscular Re-education:  [x]  See flow sheet :core activation exercises. Rationale: increase ROM and increase strength  to improve the patients ability to increase tolerance to activities.     8 min Manual Therapy:  STM/TPR to B lumbar paraspinals, upper glutes, and QLs,   Rationale: decrease pain, increase ROM, increase tissue extensibility and decrease trigger points to increase ease with ADLs.                   With   [] TE   [] TA   [] neuro   [] other: Patient Education: [x] Review HEP    [] Progressed/Changed HEP based on:   [] positioning   [] body mechanics   [] transfers   [] heat/ice application    [] other:      Other Objective/Functional Measures: SLS right 25 sec, left 22 sec      Pain Level (0-10 scale) post treatment: 4.5    ASSESSMENT/Changes in Function: Initiated supine hip flexor stretch to help decrease hip tightness. Patient will continue to benefit from skilled PT services to modify and progress therapeutic interventions, address functional mobility deficits, address ROM deficits, address strength deficits and analyze and address soft tissue restrictions to attain remaining goals. []  See Plan of Care  []  See progress note/recertification  []  See Discharge Summary         Progress towards goals / Updated goals:  Short Term Goals: To be accomplished in 2 weeks:  1. Pt will be independent and compliant w/ HEP to progress gains in PT. At PN:  reported partial compliance 3/6/2018  2. Pt will report < or = to 3/10 pain prior to and following session to demo improve pain management at home. At PN:  prior pain 5.5. 3/26/18   Current: progressing pain over last 3 visits 4.75/10 average 4/12/18  Long Term Goals: To be accomplished in 6 weeks:  1. Pt will improve FOTO to > or = to 51 to demo improved function. At PN: FOTO = 38, 3/20/2018  Current: Progressing: FOTO = 41, increased by 5 since [facility]. 4/4/18  2.  Pt will increase MMT right hip flex, ext, and abd to > or = to 4/5 for ease w/ improved standing tolerance. At PN:  Remains, MMT (R) hip abd: 3-/5 (3/22/18)   3. Pt will increase core activation as demo'd by supine bridge w/o increased pain x10 for ease w/ bed mobility. At PN:  Ability to achieve 50% of available AROM with pain 5-6/10, 3/20/2018  Current: Progressing, Supine bridges throughout 75% of AROM with pain 3-4/10, 4/19/2018  4. Pt will increase balance as demo'd by SLS B for > or = to 15 seconds w/o HHA for decreased fall risk. A PN: , Left SLS 5 sec, Right SLS 5 sec, 3/29/2018  Current: Goal met : SLS right 25 sec, left 22 sec 4/23/18  5. Pt will ambulate w/ correct heel toe gait pattern for > or = to 500ft w/ LRAD for improved community navigation.    At PN: SPC with R UE with slowed sherrell and diminished terminal knee extension bilaterally during stance phase of gait, 3/14/2018    PLAN  []  Upgrade activities as tolerated     [x]  Continue plan of care  []  Update interventions per flow sheet       []  Discharge due to:_  []  Other:_      Greg Kolb, PTA 4/23/2018  9:35 AM    Future Appointments  Date Time Provider Nick Dixon   4/25/2018 9:30 AM Hank Stone, PT [facility]   4/27/2018 8:30 AM [facility] FAST TRACK NURSE [facility]   5/4/2018 8:30 AM [facility] FAST TRACK NUR[facility]   5/14/2018 11:45 AM MD Radha [address]   5/22/2018 10:20 AM Morenita Lowe  [address]

## 2018-04-24 ENCOUNTER — OFFICE VISIT (OUTPATIENT)
Dept: ORTHOPEDIC SURGERY | Age: 68
End: 2018-04-24

## 2018-04-24 VITALS
RESPIRATION RATE: 16 BRPM | DIASTOLIC BLOOD PRESSURE: 64 MMHG | TEMPERATURE: 96.7 F | SYSTOLIC BLOOD PRESSURE: 106 MMHG | HEIGHT: 66 IN

## 2018-04-24 DIAGNOSIS — M21.612 BUNION, LEFT: ICD-10-CM

## 2018-04-24 DIAGNOSIS — B35.1 NAIL FUNGUS: Primary | ICD-10-CM

## 2018-04-24 NOTE — PROCEDURES
RADIOGRAPHIC INTERPRETATION    The impression for this/these radiograph(s) will be found in the office visit progress note for this encounter from 4/24/2018.     Queenie Jack MD  4/24/2018  11:25 AM

## 2018-04-24 NOTE — PROGRESS NOTES
AMBULATORY PROGRESS NOTE      Patient: Jorgito Guevara             MRN: [id number]     SSN: [id number] There is no height or weight on file to calculate BMI. YOB: 1950     AGE: 74 y.o. EX: female    PCP: Ghazal Quiroz MD    IMPRESSION/DIAGNOSIS AND TREATMENT PLAN     DIAGNOSES  1. Nail fungus    2. Bunion, left        Orders Placed This Encounter    [[id number]] Foot Min 3V      Jorgito Guevara understands her diagnoses and the proposed plan. My impression is a 63-year-old female who has pain and discomfort in her left great toe. She has the diagnoses of:     1. Mild left great toe OA and bunion of the left great toe. 2. Painful left great toenail plate fungal infection. Recommendation is to remove the left great toenail plate, as well as a matrix excision to the proximal portion of this left great toe to help prevent the left great toenail plate from wanting to regrow. I spoke to her in the past about this in the fall of 2017, but she is here today for reassessment. She had x-rays today, weightbearing films of her left foot, that show a moderate bunion deformity, increased AMILCAR angle, and a flat, first TMT joint. There is mild OA of the left great toe first MTP, valgus alignment of the left great toe IP, and what looks like a component of hallucis valgus interphalangeus. There is generalized osteopenia, and some mild OA of the left number two and three TMT joint articulation and planovalgus alignment. She had nonweightbearing films that reveal the same, with some OA to the midfoot, additionally, but no fractures, no subluxations, and no dislocations are seen on these x-rays. Plan:    1) Surgical Scheduling for Partial Nailplate removal of great toe. 2) WB XR of the Bilateral foot. RTO - Surgical Scheduling.     HPI AND EXAMINATION     Serena Pagan IS A 74 y.o. female who presents to my outpatient office for follow up of a fungal infection of the toenail, and left foot pain. At last visit, I scheduled surgery for a partial removal of the nailbed. Since we saw her last, Ms. Brenden Montalvo reports she has experienced some soreness along the great toe. Notably, she has experienced pain along the medial aspect of the great toe. She has a fungal infection along the great toe nail, and notes it has spread across other toes. Surgical procedures for partial removal of the nailbed has been discussed with the patient. She refused antibiotic treatment as she states she has \"enough problems\" going on and would like have her great toe treated surgically. Additionally, she complains of a left bunion that has caused some discomfort. Xr imaging has been reviewed with the patient showing moderate OA of the left great toe. Left ANKLE and FOOT        Gait: Normal   Tenderness: mild tenderness along the medial great toe MTP. Cutaneous: No rashes, skin patches, wounds, or abrasions to the lower legs                                Warm and Normal color. No regions of expressible drainage. Medial Border of Tibia Region: absent                                Skin color, texture, turgor normal. Normal.                                Great toe nail plate fungal infection. Hypertrophic great toe nail plate. Joint Motion: ROM Ankle:Normal , Hindfoot: (ST,TN,CC Normal}, Forefoot toes:Normal  Neurologic Exam: Neuro: Motor: normal 5/5 strength in all tested muscle groups and Sensory : no sensory deficits noted. Contractures: Gastrocnemius or Achilles Contractures absent  Joint / Tendon Stability: No Ankle or Subtalar instability or joint laxity.                                                                  No peroneal sublux ability or dislocation  Alignment:  Normal Foot Alignment and  Flexible                                  Bunion deformity  Vascular: Normal Pulses/ NL Capillary refill, No evidence of DVT seen on physical exam. No calf swelling, no tenderness to calf muscles. Lymphatic:  No Evidence of Lymphedema. CHART REVIEW     Past Medical History:   Diagnosis Date    Antineoplastic chemotherapy induced anemia     Arrhythmia     Atrial Fib    Arthritis     [facility]     Cancer ([facility] 1999    Breast    [facility]     Chronic ITP (idiopathic thrombocytopenia) (HCC) 10/31/2016    Secondary to Anemia from Chemo    Diabetes [facility]     borderline, no meds    Esophageal cancer ([facility] 2005    treated with chemo    Heart failure (HCC)      Current Outpatient Prescriptions   Medication Sig    oxyCODONE-acetaminophen (PERCOCET)  mg per tablet Take 1 Tab by mouth every six (6) hours as needed for Pain. Max Daily Amount: 4 Tabs. Indications: Pain, ACUTE POST OP PAIN    ergocalciferol (VITAMIN D2) 50,000 unit capsule Take 1 Cap by mouth every seven (7) days.  topiramate (TOPAMAX) 50 mg tablet     temazepam (RESTORIL) 30 mg capsule     LINZESS 145 mcg cap capsule TAKE 1 CAPSULE BY MOUTH DAILY 30 MINUTES BEFORE BREAKFAST    lisinopril (PRINIVIL, ZESTRIL) 10 mg tablet Take 10 mg by mouth daily. Indications: hypertension    zolpidem (AMBIEN) 10 mg tablet TAKE 1 TABLET BY MOUTH NIGHTLY AS NEEDED FOR SLEEP. MAX DAILY AMOUNT 10 MG    loratadine 10 mg cap Take 10 mg by mouth daily.  digoxin (LANOXIN) 0.125 mg tablet Take 0.125 mg by mouth daily.  carvedilol (COREG) 3.125 mg tablet Take 3.125 mg by mouth two (2) times daily (with meals).  meloxicam (MOBIC) 7.5 mg tablet Take 7.5 mg by mouth daily.  amiodarone (CORDARONE) 200 mg tablet Take 200 mg by mouth daily. Indications: PREVENTION OF VENTRICULAR FIBRILLATION    gabapentin (NEURONTIN) 300 mg capsule Take 1 po q am and 2 po q pm (Patient taking differently: Take 300 mg by mouth two (2) times a day. Take 1 po q am and 2 po q pm)    rivaroxaban (XARELTO) 10 mg tablet Take 10 mg by mouth daily.     lidocaine-prilocaine (EMLA) topical cream APPLY OVER PORT 1 HOUR PRIOR TO CHEMOTHERAPY THAN COVER WITH SARAN WRAP    magic mouthwash solution Take 5 mL by mouth three (3) times daily as needed for Stomatitis. Magic mouth wash   Maalox  Lidocaine 2% viscous   Diphenhydramine oral solution     Pharmacy to mix equal portions of ingredients to a total volume as indicated in the dispense amount.  furosemide (LASIX) 40 mg tablet Take 40 mg by mouth daily. Indications: Edema    dronabinol (MARINOL) 5 mg capsule Take 1 Cap by mouth two (2) times a day.  KLOR-CON M20 20 mEq tablet TAKE ONE TABLET BY MOUTH ONCE A DAY    aluminum-magnesium hydroxide 200-200 mg/5 mL susp 5 mL, diphenhydrAMINE 12.5 mg/5 mL liqd 12.5 mg, lidocaine 2 % soln 5 mL 5 mL by Swish and Spit route two (2) times a day. Magic mouth wash   Maalox  Lidocaine 2% viscous   Diphenhydramine oral solution     Pharmacy to mix equal portions of ingredients to a total volume as indicated in the dispense amount.  potassium chloride (K-DUR, KLOR-CON) 20 mEq tablet Take 2 Tabs by mouth daily.  albuterol (PROAIR HFA) 90 mcg/actuation inhaler 1-2 puffs every 4-6 hrs. (Patient taking differently: Take 2 Puffs by inhalation every four (4) hours as needed for Shortness of Breath. 1-2 puffs every 4-6 hrs.)    senna (SENNA) 8.6 mg tablet Take 1 Tab by mouth daily.  nabumetone (RELAFEN) 750 mg tablet Take 750 mg by mouth daily. Indications: OSTEOARTHRITIS    ondansetron hcl (ZOFRAN) 8 mg tablet Take 1 Tab by mouth every eight (8) hours as needed for Nausea.  IRON AG&FUM/C/FA/MV CMB11/CA-T (PRUVATE 21-7 PO) Take 325 mg by mouth daily. Indications: iron deficiency     No current facility-administered medications for this visit. Allergies   Allergen Reactions    Aspirin Swelling     Pt states her whole body swells when she takes aspirin.     Adhesive Unable to Obtain    Pcn [Penicillins] Rash     Past Surgical History:   Procedure Laterality Date    BREAST SURGERY PROCEDURE UNLISTED      COLONOSCOPY N/A 7/27/2016    COLONOSCOPY performed [facility]land [facility] ENDOSCOPY    HX APPENDECTOMY      HX HEENT      Tonsillectomy    HX ORTHOPAEDIC      HX TUBAL LIGATION      HX VASCULAR ACCESS      NEUROLOGICAL PROCEDURE UNLISTED      Lumbar sx     Social History     Occupational History    Not on file. Social History Main Topics    Smoking status: Never Smoker    Smokeless tobacco: Never Used    Alcohol use No    Drug use: No    Sexual activity: Not on file     No family history on file. REVIEW OF SYSTEMS : 4/24/2018  ALL BELOW ARE Negative except : SEE HPI       Constitutional: Negative for fever, chills and weight loss. Neg Weigh Loss  Cardiovascular: Negative for chest pain, claudication and leg swelling. SOB, MOODY   Gastrointestinal: Negative for  pain, N/V/D/C, Blood in stool or urine,dysuria, hematuria,        Incontinence, pelvic pain  Musculoskeletal: see HPI. Neurological: Negative for dizziness and weakness. Negative for headaches,Visual Changes, Confusion, Seizures,   Psychiatric/Behavioral: Negative for depression, memory loss and substance abuse. Extremities:  Negative for  hair changes, rash or skin lesion changes. Hematologic: Negative for Bleeding problems, bruising, pallor or swollen lymph nodes. Peripheral Vascular: No calf pain, vascular vein tenderness to calf pain              No calf throbbing, posterior knee throbbing pain    DIAGNOSTIC IMAGING     RADIOGRAPHIC INTERPRETATION    The impression for this/these radiograph(s) will be found in the office visit progress note for this encounter from 4/24/2018. Lady Dave MD  4/24/2018  11:25 AM          Written by Enrique Linda, as dictated by Deisy Friedman MD. IDr., Deisy Friedman MD, confirm that all documentation is accurate.

## 2018-04-24 NOTE — MR AVS SNAPSHOT
[address] [place] [address], [address] [facility] 
[phone] Patient: Annemarie Oh MRN: [id number] DTW:2/89/2842 Visit Information Date & Time Provider Department Dept. Phone Encounter #  
 4/24/2018 10:50 AM Queenie Jack MD [facility] [phone] Your Appointments 5/14/2018 11:45 AM  
Office Visit with Esthela De Leon MD  
[address]  On[facility] Appt Note: 4 weeks; 4 weeks [address] [place] [address], [facility] [address] [facility]  
  
    
 5/22/2018 10:20 AM  
Follow Up with Ethan Knowles NP  
[facility] Appt Note: 6 WK F/U NP  
 [address] [facility] [address]  
[phone]  
  
   
 [address] [facility] [facility] [address] Upcoming Health Maintenance Date Due Hepatitis C Screening 1950 DTaP/Tdap/Td series (1 - Tdap) 5/14/1973 FOBT Q 1 YEAR AGE 50-75 5/14/2002 ZOSTER VACCINE AGE 60> 3/14/2012 BREAST CANCER SCRN MAMMOGRAM 4/4/2015 GLAUCOMA SCREENING Q2Y 5/14/2017 Bone Densitometry (Dexa) Screening 5/14/2017 Pneumococcal 65+ High/Highest Risk (1 of 2 - PCV13) 5/14/2017 Influenza Age 5 to Adult 8/1/2017 MEDICARE YEARLY EXAM 3/16/2018 Allergies as of 4/24/2018  Review Complete On: 4/20/2018 By: Adan Madden RN Severity Noted Reaction Type Reactions Aspirin High 08/07/2013   Systemic Swelling Pt states her whole body swells when she takes aspirin. Adhesive    Unable to Obtain Pcn [Penicillins]  08/20/2012    Rash Current Immunizations  Reviewed on 4/20/2018 No immunizations on file. Not reviewed this visit You Were Diagnosed With   
  
 Codes Comments Bunion, left    -  Primary ICD-10-CM: K64.967 ICD-9-CM: 727.1 Nail fungus     ICD-10-CM: B35.1 ICD-9-CM: 110.1 Vitals BP Temp Resp Height(growth percentile) OB Status Smoking Status 106/64 96.7 °F (35.9 °C) (Oral) 16 5' 6\" (1.676 m) Postmenopausal Never Smoker Preferred Pharmacy Pharmacy Name Phone [id number] [address] [phone] Your Updated Medication List  
  
   
This list is accurate as of 4/24/18 11:24 AM.  Always use your most recent med list.  
  
  
  
  
 albuterol 90 mcg/actuation inhaler Commonly known as:  PROAIR HFA  
1-2 puffs every 4-6 hrs. aluminum-magnesium hydroxide 200-200 mg/5 mL susp 5 mL, diphenhydrAMINE 12.5 mg/5 mL liqd 12.5 mg, lidocaine 2 % soln 5 mL  
5 mL by Swish and Spit route two (2) times a day. Magic mouth wash  Maalox Lidocaine 2% viscous  Diphenhydramine oral solution   Pharmacy to mix equal portions of ingredients to a total volume as indicated in the dispense amount. amiodarone 200 mg tablet Commonly known as:  CORDARONE Take 200 mg by mouth daily. Indications: PREVENTION OF VENTRICULAR FIBRILLATION  
  
 COREG 3.125 mg tablet Generic drug:  carvedilol Take 3.125 mg by mouth two (2) times daily (with meals). digoxin 0.125 mg tablet Commonly known as:  LANOXIN Take 0.125 mg by mouth daily. dronabinol 5 mg capsule Commonly known as:  Luisa Eth Take 1 Cap by mouth two (2) times a day. ergocalciferol 50,000 unit capsule Commonly known as:  VITAMIN D2 Take 1 Cap by mouth every seven (7) days. furosemide 40 mg tablet Commonly known as:  LASIX Take 40 mg by mouth daily. Indications: Edema  
  
 gabapentin 300 mg capsule Commonly known as:  NEURONTIN Take 1 po q am and 2 po q pm  
  
 lidocaine-prilocaine topical cream  
Commonly known as:  EMLA  
APPLY OVER PORT 1 HOUR PRIOR TO CHEMOTHERAPY THAN COVER WITH [place] WRAP  
  
 LINZESS 145 mcg Cap capsule Generic drug:  linaclotide TAKE 1 CAPSULE BY MOUTH DAILY 30 MINUTES BEFORE BREAKFAST  
  
 lisinopril 10 mg tablet Commonly known as:  Pecola Cypher Take 10 mg by mouth daily. Indications: hypertension  
  
 loratadine 10 mg Cap Take 10 mg by mouth daily. magic mouthwash solution Take 5 mL by mouth three (3) times daily as needed for Stomatitis. Magic mouth wash  Maalox Lidocaine 2% viscous  Diphenhydramine oral solution   Pharmacy to mix equal portions of ingredients to a total volume as indicated in the dispense amount. meloxicam 7.5 mg tablet Commonly known as:  MOBIC Take 7.5 mg by mouth daily. nabumetone 750 mg tablet Commonly known as:  RELAFEN Take 750 mg by mouth daily. Indications: OSTEOARTHRITIS  
  
 ondansetron hcl 8 mg tablet Commonly known as:  Rama Million Take 1 Tab by mouth every eight (8) hours as needed for Nausea. oxyCODONE-acetaminophen  mg per tablet Commonly known as:  PERCOCET Take 1 Tab by mouth every six (6) hours as needed for Pain. Max Daily Amount: 4 Tabs. Indications: Pain, ACUTE POST OP PAIN * potassium chloride 20 mEq tablet Commonly known as:  K-DUR, KLOR-CON Take 2 Tabs by mouth daily. * KLOR-CON M20 20 mEq tablet Generic drug:  potassium chloride TAKE ONE TABLET BY MOUTH ONCE A DAY  
  
 PRUVATE 21-7 PO Take 325 mg by mouth daily. Indications: iron deficiency Senna 8.6 mg tablet Generic drug:  senna Take 1 Tab by mouth daily. temazepam 30 mg capsule Commonly known as:  RESTORIL  
  
 topiramate 50 mg tablet Commonly known as:  TOPAMAX XARELTO 10 mg tablet Generic drug:  rivaroxaban Take 10 mg by mouth daily. zolpidem 10 mg tablet Commonly known as:  AMBIEN  
TAKE 1 TABLET BY MOUTH NIGHTLY AS NEEDED FOR SLEEP. MAX DAILY AMOUNT 10 MG  
  
 * Notice:   This list has 2 medication(s) that are the same as other medications prescribed for you. Read the directions carefully, and ask your doctor or other care provider to review them with you. We Performed the Following AMB POC XRAY, FOOT; COMPLETE, 3+ VIEW [16101 CPT(R)] To-Do List   
 04/25/2018 9:30 AM  
  Appointment with Simon Saleem, PT at [facility] ([phone])  
  
 04/27/2018 8:30 AM  
  Appointment with [facility] NURSE at [facility] ([phone]) 05/04/2018 8:30 AM  
  Appointment with [facility] FA[facility] TRACK NURSE at [facility] ([phone]) Patient Instructions Please follow up with Edward Joel for Pre-op scheduling. You are advised to contact us if your condition worsens. Toenail Fungus: Care Instructions Your Care Instructions A toenail that is infected by a fungus usually turns white or yellow. As the fungus spreads, the nail turns a darker color and gets thicker, and its edges start to turn ragged and crumble. A bad infection can cause toe pain, and the nail may pull away from the toe. Toenails that are exposed to moisture and warmth a lot are more likely to get infected by a fungus. This can happen from wearing sweaty shoes often and from walking barefoot on shower floors. It is hard to treat toenail fungus, and the infection can return after it has cleared up. But medicines can sometimes get rid of toenail fungus for good. If the infection is very bad, or if it causes a lot of pain, you may need to have the nail removed. Follow-up care is a key part of your treatment and safety. Be sure to make and go to all appointments, and call your doctor if you are having problems. It's also a good idea to know your test results and keep a list of the medicines you take. How can you care for yourself at home? · Take your medicines exactly as prescribed. Call your doctor if you have any problems with your medicine. You will get more details on the specific medicines your doctor prescribes. · If your doctor gave you a cream or liquid to put on your toenail, use it exactly as directed. · Wash your feet often, and wash your hands after touching your feet. · Keep your toenails clean and dry. Dry your feet completely after you bathe and before you put on shoes and socks. · Keep your toenails trimmed. · Change socks often. Wear dry socks that absorb moisture. · Do not go barefoot in public places. · Use a spray or powder that fights fungus on your feet and in your shoes. · Do not pick at the skin around your nails. · Do not use nail polish or fake nails on your toenails. When should you call for help? Call your doctor now or seek immediate medical care if: 
? · You have signs of infection, such as: 
¨ Increased pain, swelling, warmth, or redness. ¨ Red streaks leading from the site. ¨ Pus draining from the site. ¨ A fever. ? · You have new or increased toe pain. ? Watch closely for changes in your health, and be sure to contact your doctor if: 
? · You do not get better as expected. Where can you learn more? Go to http://leon-edinson.info/. Enter D202 in the search box to learn more about \"Toenail Fungus: Care Instructions. \" Current as of: October 13, 2016 Content Version: 11.4 © 5701-8491 [employer]. Care instructions adapted under license by [employer] (which disclaims liability or warranty for this information). If you have questions about a medical condition or this instruction, always ask your healthcare professional. [place] [address] any warranty or liability for your use of this information. Please provide this summary of care documentation to your next provider. Your primary care clinician is listed as Shelley Rolon. If you have any questions after today's visit, please call [phone].

## 2018-04-24 NOTE — PATIENT INSTRUCTIONS
Please follow up with Torsten Tang for Pre-op scheduling. You are advised to contact us if your condition worsens. Toenail Fungus: Care Instructions  Your Care Instructions  A toenail that is infected by a fungus usually turns white or yellow. As the fungus spreads, the nail turns a darker color and gets thicker, and its edges start to turn ragged and crumble. A bad infection can cause toe pain, and the nail may pull away from the toe. Toenails that are exposed to moisture and warmth a lot are more likely to get infected by a fungus. This can happen from wearing sweaty shoes often and from walking barefoot on shower floors. It is hard to treat toenail fungus, and the infection can return after it has cleared up. But medicines can sometimes get rid of toenail fungus for good. If the infection is very bad, or if it causes a lot of pain, you may need to have the nail removed. Follow-up care is a key part of your treatment and safety. Be sure to make and go to all appointments, and call your doctor if you are having problems. It's also a good idea to know your test results and keep a list of the medicines you take. How can you care for yourself at home? · Take your medicines exactly as prescribed. Call your doctor if you have any problems with your medicine. You will get more details on the specific medicines your doctor prescribes. · If your doctor gave you a cream or liquid to put on your toenail, use it exactly as directed. · Wash your feet often, and wash your hands after touching your feet. · Keep your toenails clean and dry. Dry your feet completely after you bathe and before you put on shoes and socks. · Keep your toenails trimmed. · Change socks often. Wear dry socks that absorb moisture. · Do not go barefoot in public places. · Use a spray or powder that fights fungus on your feet and in your shoes. · Do not pick at the skin around your nails.   · Do not use nail polish or fake nails on your toenails. When should you call for help? Call your doctor now or seek immediate medical care if:  ? · You have signs of infection, such as:  ¨ Increased pain, swelling, warmth, or redness. ¨ Red streaks leading from the site. ¨ Pus draining from the site. ¨ A fever. ? · You have new or increased toe pain. ? Watch closely for changes in your health, and be sure to contact your doctor if:  ? · You do not get better as expected. Where can you learn more? Go to http://leon-ednison.info/. Enter D202 in the search box to learn more about \"Toenail Fungus: Care Instructions. \"  Current as of: October 13, 2016  Content Version: 11.4  © 0602-0762 [employer]. Care instructions adapted under license by [employer] (which disclaims liability or warranty for this information). If you have questions about a medical condition or this instruction, always ask your healthcare professional. [place] [address] any warranty or liability for your use of this information.

## 2018-04-25 ENCOUNTER — HOSPITAL ENCOUNTER (OUTPATIENT)
Dept: PHYSICAL THERAPY | Age: 68
Discharge: HOME | End: 2018-04-25
Payer: MEDICARE

## 2018-04-25 PROCEDURE — G8979 MOBILITY GOAL STATUS: HCPCS

## 2018-04-25 PROCEDURE — 97112 NEUROMUSCULAR REEDUCATION: CPT

## 2018-04-25 PROCEDURE — 97140 MANUAL THERAPY 1/> REGIONS: CPT

## 2018-04-25 PROCEDURE — G8978 MOBILITY CURRENT STATUS: HCPCS

## 2018-04-25 NOTE — PROGRESS NOTES
PT DAILY TREATMENT NOTE - [facility]     Patient Name: Franchesca Maya  Date:2018  : 1950  [x]  Patient  Verified  Payor: [place] MED[place]ARE / Plan: [place] MEDICARE PART A & B / Product Type: Medicare /    In OITR:2640  Out time:1036  Total Treatment Time (min): 60  Total Timed Codes (min): 60  1:1 Treatment Time ( only): 26   Visit #: 9 of 12    Treatment Area: Right leg weakness [R29.898]    SUBJECTIVE  Pain Level (0-10 scale): 4-5  Any medication changes, allergies to medications, adverse drug reactions, diagnosis change, or new procedure performed?: [x] No    [] Yes (see summary sheet for update)  Subjective functional status/changes:   [] No changes reported  Since SoC patient reports an overall significant improvement in symptoms with patient reporting reduction in use of SPC    OBJECTIVE    22 min Therapeutic Exercise:  [x] See flow sheet : Emphasis placed on improving available lumbar and hip AROM and strength of the gluteal musculature   Rationale: increase ROM and increase strength to improve the patients ability to improve ease with household ADLs      30 min Neuromuscular Re-education:  [x]  See flow sheet : Emphasis placed on improving pelvic proprioceptive awareness and recruitment and activation of the anterior abdominal musculature, Emphasis placed on improving static and dynamic weightbearing stability and balance   Rationale: increase ROM, increase strength, improve balance and increase proprioception  to improve the patients ability to improve safety with community ambulation.      8 min Manual Therapy:    Left Sidelying, Left Lumbar Paraspinal STM  Left Sidelying, Lumbar Grade II-III Physiological Mobilization   Rationale: decrease pain, increase ROM and increase tissue extensibility to improve ease with stair management.         With   [] TE   [] TA   [] neuro   [] other: Patient Education: [x] Review HEP    [] Progressed/Changed HEP based on:   [] positioning   [] body mechanics   [] transfers   [] heat/ice application    [] other:      Pain Level (0-10 scale) post treatment: 4    ASSESSMENT/Changes in Function: Since SoC patient subjectively reports an 80-85% improvement in functional mobility and activity tolerance with patient reporting use of SPC with only long-distance ambulation and improved tolerance to household and community ADLs. Patient reports continued difficulty with forward flexion secondary to pain with continued imbalance reported, without a subjective report of falls. Patient objectively has demonstrated a significant improvement in static stability bilaterally but continues to demonstrate limitations in hip strength. Patient with limitations with progression within clinic secondary to poor activity tolerance. Patient would benefit from the continuation of PT services in order to improve overall activity tolerance, safety and stability. Patient will continue to benefit from skilled PT services to modify and progress therapeutic interventions, address functional mobility deficits, address ROM deficits, address strength deficits, analyze and address soft tissue restrictions, analyze and cue movement patterns, analyze and modify body mechanics/ergonomics and assess and modify postural abnormalities to attain remaining goals. []  See Plan of Care  []  See progress note/recertification  []  See Discharge Summary         Progress towards goals / Updated goals:    Short Term Goals: To be accomplished in 2 weeks:  1. Pt will be independent and compliant w/ HEP to progress gains in PT. At PN:  reported partial compliance 3/6/2018  Current; Remains, Partial compliance reported, 4/25/2018  2. Pt will report < or = to 3/10 pain prior to and following session to demo improve pain management at home. At PN:  prior pain 5.5. 3/26/18   Current: Progressing, Pain over last 3 visits 4.75/10 average 4/12/18  Long Term Goals: To be accomplished in 6 weeks:  1.  Pt will improve FOTO to > or = to 51 to demo improved function. At PN: FOTO = 38, 3/20/2018  Current: Met, FOTO = 51, 4/25/2018  2. Pt will increase MMT right hip flex, ext, and abd to > or = to 4/5 for ease w/ improved standing tolerance. At PN:  Remains, MMT (R) hip abd: 3-/5 (3/22/18)  Current: Remains, Right Hip Abduction 3-/5, 4/25/2018  3. Pt will increase core activation as demo'd by supine bridge w/o increased pain x10 for ease w/ bed mobility. At PN:  Ability to achieve 50% of available AROM with pain 5-6/10, 3/20/2018  Current: Progressing, Supine bridges throughout 75% of AROM with pain 3-4/10, 4/19/2018  4. Pt will increase balance as demo'd by SLS B for > or = to 15 seconds w/o HHA for decreased fall risk. A PN: , Left SLS 5 sec, Right SLS 5 sec, 3/29/2018  Current: Goal met : SLS right 25 sec, left 22 sec 4/23/18  5. Pt will ambulate w/ correct heel toe gait pattern for > or = to 500ft w/ LRAD for improved community navigation.    At PN: SPC with R UE with slowed sherrell and diminished terminal knee extension bilaterally during stance phase of gait, 3/14/2018   Current: Progressing, Without AD decreased right stance phase time, 4/25/2018    PLAN  [x]  Upgrade activities as tolerated     [x]  Continue plan of care  []  Update interventions per flow sheet       []  Discharge due to:_  []  Other:_      Liz Joyce PT 4/25/2018  8:08 AM    Future Appointments  Date Time Provider Nick Dixon   4/25/2018 9:30 AM Liz Joyce PT [facility]   4/27/2018 8:30 AM [facility] FAST TRACK NURSE [facility]   5/4/2018 8:30 AM [facility] FAST TRACK NURSE [facility]   5/14/2018 11:45 AM Schuyler Gomez MD [address]   5/22/2018 10:20 AM Erin Caldera  [address]

## 2018-04-25 NOTE — PROGRESS NOTES
[facility]              [address] [place], [address]   [phone] fax    Medicare Progress Report    Patient name: Emmanuel Payton Start of Care: 3/1/2018   Referral source: Som Coffey MD : 1950   Medical/Treatment Diagnosis: Right leg weakness [R29.898] Onset Date:2018     Prior Hospitalization: see medical history Provider#: [id number]   Medications: Verified on Patient Summary List    Comorbidities: allergies, back pain, CA, CHF or heart disease, GI disease, prior surgery, sleep dysfunction    Prior Level of Function: hx of back pain and L5-6 fusion, ambulating w/ FWW and n/t throughout R LE  Visits from Start of Care: 19    Missed Visits: 5    Reporting Period: 3/29/2018 to 2018    Subjective Reports:    Short Term Goals: To be accomplished in 2 weeks:  1. Pt will be independent and compliant w/ HEP to progress gains in PT. At Last PN:  Reported partial compliance  Current: Remains, Partial complance reported  2. Pt will report < or = to 3/10 pain prior to and following session to demo improve pain management at home. At Last PN:  Prior pain 5.5  At PN: Progressing, Pain over last 3 visits 4.75/10 average   Long Term Goals: To be accomplished in 6 weeks:  1. Pt will improve FOTO to > or = to 51 to demo improved function. At Last PN: FOTO = 38  At PN: Met, FOTO = 51  2. Pt will increase MMT right hip flex, ext, and abd to > or = to 4/5 for ease w/ improved standing tolerance. At Last PN:  Remains, MMT (R) hip abd: 3-/5   At PN: Remains, Right Hip Abduction 3-/5  3. Pt will increase core activation as demo'd by supine bridge w/o increased pain x10 for ease w/ bed mobility. At Last PN:  Ability to achieve 50% of available AROM with pain 5-6/10  At PN: Progressing, Supine bridges throughout 75% of AROM with pain 3-4/10  4.  Pt will increase balance as demo'd by SLS B for > or = to 15 seconds w/o HHA for decreased fall risk.   At Last PN: , Left SLS 5 sec, Right SLS 5 sec  At PN: Goal met : SLS right 25 sec, left 22 sec  5. Pt will ambulate w/ correct heel toe gait pattern for > or = to 500ft w/ LRAD for improved community navigation. At Last PN: SPC with R UE with slowed sherrell and diminished terminal knee extension bilaterally during stance phase of gait   At PN: Progressing, Without AD decreased right stance phase time    Key functional changes: See above goals. Problems/ barriers to goal attainment: Diminished endurance and activity tolerance. Assessment / Recommendations:    Since SoC patient subjectively reports an 80-85% improvement in functional mobility and activity tolerance with patient reporting use of SPC with only long-distance ambulation and improved tolerance to household and community ADLs. Patient reports continued difficulty with forward flexion secondary to pain with continued imbalance reported, without a subjective report of falls. Patient objectively has demonstrated a significant improvement in static stability bilaterally but continues to demonstrate limitations in hip strength. Patient with limitations with progression within clinic secondary to poor activity tolerance.  Patient would benefit from the continuation of PT services in order to improve overall activity tolerance, safety and stability.      Patient will continue to benefit from skilled PT services to modify and progress therapeutic interventions, address functional mobility deficits, address ROM deficits, address strength deficits, analyze and address soft tissue restrictions, analyze and cue movement patterns, analyze and modify body mechanics/ergonomics and assess and modify postural abnormalities to attain remaining goals.      Problem List: pain affecting function, decrease ROM, decrease strength, impaired gait/ balance, decrease ADL/ functional abilitiies, decrease activity tolerance, decrease flexibility/ joint mobility and decrease transfer abilities   Treatment Plan: Therapeutic exercise, Therapeutic activities, Neuromuscular re-education, Physical agent/modality, Gait/balance training, Manual therapy, Patient education, Self Care training, Functional mobility training, Home safety training and Stair training    Updated Goals: Continue with unmet goals above. Frequency / Duration: Patient to be seen 2 times per week for 3 weeks:    G-Codes (GP)  Mobility  [id number] Current  CK= 40-59%   Goal  CK= 40-59%    The severity rating is based on clinical judgment and the FOTO score.       Loida Summers, PT 4/25/2018 8:10 AM

## 2018-04-27 ENCOUNTER — HOSPITAL ENCOUNTER (OUTPATIENT)
Dept: INFUSION THERAPY | Age: 68
Discharge: HOME | End: 2018-04-27
Payer: MEDICARE

## 2018-04-27 VITALS — TEMPERATURE: 97.6 F | DIASTOLIC BLOOD PRESSURE: 71 MMHG | RESPIRATION RATE: 18 BRPM | SYSTOLIC BLOOD PRESSURE: 107 MMHG

## 2018-04-27 DIAGNOSIS — C50.911 CARCINOMA OF RIGHT BREAST METASTATIC TO AXILLARY LYMPH NODE (HCC): Primary | ICD-10-CM

## 2018-04-27 DIAGNOSIS — C77.3 CARCINOMA OF RIGHT BREAST METASTATIC TO AXILLARY LYMPH NODE (HCC): Primary | ICD-10-CM

## 2018-04-27 LAB
BASO+EOS+MONOS # BLD AUTO: 1.2 K/UL (ref 0–2.3)
BASO+EOS+MONOS # BLD AUTO: 20 % (ref 0.1–17)
DIFFERENTIAL METHOD BLD: ABNORMAL
ERYTHROCYTE [DISTWIDTH] IN BLOOD BY AUTOMATED COUNT: 16.9 % (ref 11.5–14.5)
HCT VFR BLD AUTO: 30.9 % (ref 36–48)
HGB BLD-MCNC: 10.1 G/DL (ref 12–16)
LYMPHOCYTES # BLD: 1.8 K/UL (ref 1.1–5.9)
LYMPHOCYTES NFR BLD: 31 % (ref 14–44)
MCH RBC QN AUTO: 27.7 PG (ref 25–35)
MCHC RBC AUTO-ENTMCNC: 32.7 G/DL (ref 31–37)
MCV RBC AUTO: 84.9 FL (ref 78–102)
NEUTS SEG # BLD: 2.8 K/UL (ref 1.8–9.5)
NEUTS SEG NFR BLD: 49 % (ref 40–70)
PLATELET # BLD AUTO: 130 K/UL (ref 135–420)
RBC # BLD AUTO: 3.64 M/UL (ref 4.1–5.1)
WBC # BLD AUTO: 5.8 K/UL (ref 4.5–13)

## 2018-04-27 PROCEDURE — 74011250636 HC RX REV CODE- 250/636: Performed by: NURSE PRACTITIONER

## 2018-04-27 PROCEDURE — 74011250636 HC RX REV CODE- 250/636

## 2018-04-27 PROCEDURE — 85025 COMPLETE CBC W/AUTO DIFF WBC: CPT | Performed by: INTERNAL MEDICINE

## 2018-04-27 PROCEDURE — 96372 THER/PROPH/DIAG INJ SC/IM: CPT

## 2018-04-27 PROCEDURE — 85049 AUTOMATED PLATELET COUNT: CPT | Performed by: INTERNAL MEDICINE

## 2018-04-27 RX ORDER — WATER FOR INJECTION,STERILE
VIAL (ML) INJECTION ONCE
Status: DISPENSED | OUTPATIENT
Start: 2018-04-27 | End: 2018-04-27

## 2018-04-27 RX ORDER — LIDOCAINE AND PRILOCAINE 25; 25 MG/G; MG/G
CREAM TOPICAL
Qty: 30 G | Refills: 6 | Status: SHIPPED | OUTPATIENT
Start: 2018-04-27 | End: 2019-01-01 | Stop reason: SDUPTHER

## 2018-04-27 RX ADMIN — ROMIPLOSTIM 207 MCG: 250 INJECTION, POWDER, LYOPHILIZED, FOR SOLUTION SUBCUTANEOUS at 09:05

## 2018-04-27 NOTE — PROGRESS NOTES
[facility] Progress Note    Date: 2018    Name: Adriana Flores    MRN: [id number]         : 1950     Weekly N-Plate Injection      Ms. Pagan to [facility], ambulatory with slow gait at 0835. Pt was assessed and education was provided. She reports all of her joints ache today. She has not taken any pain medication yet, but states she will when she gets home. Ms. Aleksander Nielsen vitals were reviewed and patient was observed for 5 minutes prior to treatment. Visit Vitals    /71 (BP 1 Location: Right arm, BP Patient Position: Sitting)    Temp 97.6 °F (36.4 °C)    Resp 18       Blood obtained peripherally from right AC using butterfly needle without difficulty by Benjamin Kingston and run on Sysmex for CBC per written orders. No bleeding or hematoma noted at site. Guaze and coban applied. Preliminary PLT results = 123,000. Per sliding scale, patient's N-Plate dose to remain the same. Patient currently receiving 3 mcg/kg (3 mcg x 69 kg actual body weight at initiation = 207 mcg). N-Plate 312 mcg (8.87 ml) was administered subcutaneous in  Back of right upper arm. No irritation or bleeding noted at site. Ms. Jesus Ramsey tolerated well, and had no complaints. Patient armband removed and shredded. Ms. Jesus Ramsey was discharged from [place] [address] in stable condition at 0910. She is to return on 18 at 0830 for her next N-Plate appointment.     Abdirizak Franklin RN  2018

## 2018-05-03 ENCOUNTER — HOSPITAL ENCOUNTER (OUTPATIENT)
Dept: PHYSICAL THERAPY | Age: 68
Discharge: HOME | End: 2018-05-03
Payer: MEDICARE

## 2018-05-03 PROCEDURE — 97112 NEUROMUSCULAR REEDUCATION: CPT

## 2018-05-03 PROCEDURE — 97140 MANUAL THERAPY 1/> REGIONS: CPT

## 2018-05-03 PROCEDURE — 97110 THERAPEUTIC EXERCISES: CPT

## 2018-05-03 NOTE — PROGRESS NOTES
PT DAILY TREATMENT NOTE - [facility]     Patient Name: Ahsan Tay  Date:5/3/2018  : 1950  [x]  Patient  Verified  Payor: [place] MEDICARE / Plan: [place] MEDICARE PART A & B / Product Type: Medicare /    In time:1200  Out time:1258  Total Treatment Time (min): 58  Total Timed Codes (min): 58  1:1 Treatment Time (MC only): 48   Visit #: 1 of 6 (signed PN)    Treatment Area: Right leg weakness [R29.898]    SUBJECTIVE  Pain Level (0-10 scale): 5  Any medication changes, allergies to medications, adverse drug reactions, diagnosis change, or new procedure performed?: [x] No    [] Yes (see summary sheet for update)  Subjective functional status/changes:   [] No changes reported  Patient reports continued diminished endurance. OBJECTIVE    20 min Therapeutic Exercise:  [x] See flow sheet : Emphasis placed on improving available lumbar and hip AROM and strength of the gluteal musculature   Rationale: increase ROM and increase strength to improve the patients ability to improve ease with household ADLs      30 min Neuromuscular Re-education:  [x]  See flow sheet : Emphasis placed on improving pelvic proprioceptive awareness and recruitment and activation of the anterior abdominal musculature, Emphasis placed on improving static and dynamic weightbearing stability and balance   Rationale: increase ROM, increase strength, improve balance and increase proprioception  to improve the patients ability to improve safety with community ambulation.      8 min Manual Therapy:    Left Sidelying, Left Lumbar Paraspinal STM  Left Sidelying, Lumbar Grade II-III Physiological Mobilization   Rationale: decrease pain, increase ROM and increase tissue extensibility to improve ease with stair management.           With   [] TE   [] TA   [] neuro   [] other: Patient Education: [x] Review HEP    [] Progressed/Changed HEP based on:   [] positioning   [] body mechanics   [] transfers   [] heat/ice application    [] other:      Pain Level (0-10 scale) post treatment: 5    ASSESSMENT/Changes in Function: Significant improvement in observed right hip PROM demonstrated with greater emphasis placed on muscular limitations of the lumbar paraspinal musculature with significant TTP noted. Patient with continued limitations in supine bridge secondary to pain. Continued limitations in progression secondary to diminished endurance. Patient will continue to benefit from skilled PT services to modify and progress therapeutic interventions, address functional mobility deficits, address ROM deficits, address strength deficits, analyze and address soft tissue restrictions, analyze and cue movement patterns and analyze and modify body mechanics/ergonomics to attain remaining goals. []  See Plan of Care  []  See progress note/recertification  []  See Discharge Summary         Progress towards goals / Updated goals:    Short Term Goals: To be accomplished in 2 weeks:  1. Pt will be independent and compliant w/ HEP to progress gains in PT. At PN: Remains, Partial compliance reported, 4/25/2018  2. Pt will report < or = to 3/10 pain prior to and following session to demo improve pain management at home. At PN: Progressing, Pain over last 3 visits 4.75/10 average 4/12/18  Long Term Goals: To be accomplished in 6 weeks:  1. Pt will improve FOTO to > or = to 51 to demo improved function. At PN: Met, FOTO = 51, 4/25/2018  2. Pt will increase MMT right hip flex, ext, and abd to > or = to 4/5 for ease w/ improved standing tolerance. At PN: Remains, Right Hip Abduction 3-/5, 4/25/2018  3. Pt will increase core activation as demo'd by supine bridge w/o increased pain x10 for ease w/ bed mobility. At PN: Progressing, Supine bridges throughout 75% of AROM with pain 3-4/10, 4/19/2018   Current: Remains, Supine bridges throughout 75% of AROM with pain 5-6/10, 5/3/2018  4.  Pt will increase balance as demo'd by SLS B for > or = to 15 seconds w/o HHA for decreased fall risk.   A PN: Goal met : SLS right 25 sec, left 22 sec 4/23/18  5. Pt will ambulate w/ correct heel toe gait pattern for > or = to 500ft w/ LRAD for improved community navigation.    At PN: Progressing, Without AD decreased right stance phase time, 4/25/2018    PLAN  [x]  Upgrade activities as tolerated     [x]  Continue plan of care  []  Update interventions per flow sheet       []  Discharge due to:_  []  Other:_      Jose Cota, PT 5/3/2018  11:59 AM    Future Appointments  Date Time Provider Nick Dixon   5/3/2018 12:00 PM Jose Cota, PT [facility]   5/4/2018 8:30 AM [facility] FAST TRACK NURSE [facility]   5/11/2018 8:30 AM [facility] FAST TRACK NURSE [facility]   5/14/2018 11:45 AM Jose Lozano MD [id number] [facility]   5/18/2018 8:30 AM [facility] FAST TRACK NURSE [facility]   5/22/2018 10:20 AM Pamela Kennedy  [address]   5/25/2018 8:30 AM [facility] FAST TRACK NURSE [facility]   6/1/2018 8:30 AM [facility] FAST TRACK NURSE [facility]

## 2018-05-04 ENCOUNTER — HOSPITAL ENCOUNTER (OUTPATIENT)
Dept: INFUSION THERAPY | Age: 68
Discharge: HOME | End: 2018-05-04
Payer: MEDICARE

## 2018-05-04 VITALS
TEMPERATURE: 97.7 F | HEART RATE: 60 BPM | RESPIRATION RATE: 18 BRPM | SYSTOLIC BLOOD PRESSURE: 111 MMHG | DIASTOLIC BLOOD PRESSURE: 73 MMHG

## 2018-05-04 LAB
BASO+EOS+MONOS # BLD AUTO: 0.8 K/UL (ref 0–2.3)
BASO+EOS+MONOS # BLD AUTO: 17 % (ref 0.1–17)
DIFFERENTIAL METHOD BLD: ABNORMAL
ERYTHROCYTE [DISTWIDTH] IN BLOOD BY AUTOMATED COUNT: 17.1 % (ref 11.5–14.5)
HCT VFR BLD AUTO: 32.2 % (ref 36–48)
HGB BLD-MCNC: 10.7 G/DL (ref 12–16)
LYMPHOCYTES # BLD: 1.7 K/UL (ref 1.1–5.9)
LYMPHOCYTES NFR BLD: 33 % (ref 14–44)
MCH RBC QN AUTO: 27.7 PG (ref 25–35)
MCHC RBC AUTO-ENTMCNC: 33.2 G/DL (ref 31–37)
MCV RBC AUTO: 83.4 FL (ref 78–102)
NEUTS SEG # BLD: 2.6 K/UL (ref 1.8–9.5)
NEUTS SEG NFR BLD: 50 % (ref 40–70)
PLATELET # BLD AUTO: 110 K/UL (ref 135–420)
RBC # BLD AUTO: 3.86 M/UL (ref 4.1–5.1)
WBC # BLD AUTO: 5.1 K/UL (ref 4.5–13)

## 2018-05-04 PROCEDURE — 74011000250 HC RX REV CODE- 250

## 2018-05-04 PROCEDURE — 36415 COLL VENOUS BLD VENIPUNCTURE: CPT

## 2018-05-04 PROCEDURE — 85025 COMPLETE CBC W/AUTO DIFF WBC: CPT | Performed by: INTERNAL MEDICINE

## 2018-05-04 PROCEDURE — 74011250636 HC RX REV CODE- 250/636

## 2018-05-04 PROCEDURE — 74011250636 HC RX REV CODE- 250/636: Performed by: INTERNAL MEDICINE

## 2018-05-04 PROCEDURE — 96372 THER/PROPH/DIAG INJ SC/IM: CPT

## 2018-05-04 RX ORDER — SODIUM CHLORIDE 0.9 % (FLUSH) 0.9 %
10-40 SYRINGE (ML) INJECTION AS NEEDED
Status: DISCONTINUED | OUTPATIENT
Start: 2018-05-04 | End: 2018-05-08 | Stop reason: HOSPADM

## 2018-05-04 RX ORDER — WATER FOR INJECTION,STERILE
VIAL (ML) INJECTION
Status: COMPLETED
Start: 2018-05-04 | End: 2018-05-04

## 2018-05-04 RX ORDER — HEPARIN 100 UNIT/ML
SYRINGE INTRAVENOUS
Status: DISPENSED
Start: 2018-05-04 | End: 2018-05-04

## 2018-05-04 RX ORDER — HEPARIN 100 UNIT/ML
500 SYRINGE INTRAVENOUS ONCE
Status: ACTIVE | OUTPATIENT
Start: 2018-05-04 | End: 2018-05-04

## 2018-05-04 RX ADMIN — ROMIPLOSTIM 207 MCG: 250 INJECTION, POWDER, LYOPHILIZED, FOR SOLUTION SUBCUTANEOUS at 09:07

## 2018-05-04 RX ADMIN — WATER 10 ML: 1 INJECTION INTRAMUSCULAR; INTRAVENOUS; SUBCUTANEOUS at 09:07

## 2018-05-04 NOTE — PROGRESS NOTES
[facility] Progress Note    Date: May 4, 2018    Name: Rashaad Patiño    MRN: [id number]         : 1950      Nplate Injection / Procrit    Ms. Genevie Boast arrived to [facility] at 9011. Ms. Genevie Boast was assessed and education was provided. Ms. Brandi Lindsey vitals were reviewed. Visit Vitals    /73 (BP 1 Location: Right arm, BP Patient Position: Sitting)    Pulse 60    Temp 97.7 °F (36.5 °C)    Resp 18    Breastfeeding No       Pt was observed for 5 minutes after obtaining vital signs prior to initiating treatment. Blood drawn via peripheral lab draw via the right AC x1 attempt for CBC. Gauze and coban applied to the site. Lab results obtained and reviewed. (Preliminary results scanned into media tab.)  Recent Results (from the past 12 hour(s))   CBC WITH 3 PART DIFF    Collection Time: 18  8:51 AM   Result Value Ref Range    WBC 5.1 4.5 - 13.0 K/uL    RBC 3.86 (L) 4.10 - 5.10 M/uL    HGB 10.7 (L) 12.0 - 16 g/dL    HCT 32.2 (L) 36 - 48 %    MCV 83.4 78 - 102 FL    MCH 27.7 25.0 - 35.0 PG    MCHC 33.2 31 - 37 g/dL    RDW 17.1 (H) 11.5 - 14.5 %    NEUTROPHILS 50 40 - 70 %    MIXED CELLS 17 0.1 - 17 %    LYMPHOCYTES 33 14 - 44 %    ABS. NEUTROPHILS 2.6 1.8 - 9.5 K/UL    ABS. MIXED CELLS 0.8 0.0 - 2.3 K/uL    ABS. LYMPHOCYTES 1.7 1.1 - 5.9 K/UL    DF AUTOMATED       Preliminary platelet count= 79,870. Per Nplate protocol, since pt's platelet count is 76,889 today, pt's dose will remain at 3mcg/kg (3mcg x 69kg = 207mcg). Nplate 305HHA (1.59WR) administered as ordered SQ in the back of the patient's right upper arm. No redness or bleeding noted. Procrit HELD today per ordered parameters. Pt declined port flush today and states she will do it next week when she has the numbing cream on her port. Ms. Genevie Boast was discharged from [place] [address] in stable condition at 5. She is to return on 18 at 0830 for her next appointment.     Radha Sena RN  May 4, 2018

## 2018-05-08 DIAGNOSIS — F40.9 INSOMNIA DUE TO ANXIETY AND FEAR: ICD-10-CM

## 2018-05-08 DIAGNOSIS — F51.05 INSOMNIA DUE TO ANXIETY AND FEAR: ICD-10-CM

## 2018-05-08 RX ORDER — ZOLPIDEM TARTRATE 10 MG/1
TABLET ORAL
Qty: 30 TAB | Refills: 2 | Status: SHIPPED | OUTPATIENT
Start: 2018-05-08 | End: 2018-06-14 | Stop reason: SDUPTHER

## 2018-05-08 NOTE — TELEPHONE ENCOUNTER
Called patient @4:37 to let her know prescription for (Ambien) was ready for . Patient stated will come by office on Wed.

## 2018-05-11 ENCOUNTER — HOSPITAL ENCOUNTER (OUTPATIENT)
Dept: INFUSION THERAPY | Age: 68
Discharge: HOME | End: 2018-05-11
Payer: MEDICARE

## 2018-05-11 VITALS
TEMPERATURE: 97.2 F | SYSTOLIC BLOOD PRESSURE: 100 MMHG | HEART RATE: 70 BPM | RESPIRATION RATE: 18 BRPM | OXYGEN SATURATION: 100 % | DIASTOLIC BLOOD PRESSURE: 68 MMHG

## 2018-05-11 LAB
BASO+EOS+MONOS # BLD AUTO: 0.8 K/UL (ref 0–2.3)
BASO+EOS+MONOS # BLD AUTO: 12 % (ref 0.1–17)
DIFFERENTIAL METHOD BLD: ABNORMAL
ERYTHROCYTE [DISTWIDTH] IN BLOOD BY AUTOMATED COUNT: 17.2 % (ref 11.5–14.5)
HCT VFR BLD AUTO: 30.3 % (ref 36–48)
HGB BLD-MCNC: 9.9 G/DL (ref 12–16)
LYMPHOCYTES # BLD: 1.6 K/UL (ref 1.1–5.9)
LYMPHOCYTES NFR BLD: 24 % (ref 14–44)
MCH RBC QN AUTO: 27.3 PG (ref 25–35)
MCHC RBC AUTO-ENTMCNC: 32.7 G/DL (ref 31–37)
MCV RBC AUTO: 83.7 FL (ref 78–102)
NEUTS SEG # BLD: 4.2 K/UL (ref 1.8–9.5)
NEUTS SEG NFR BLD: 64 % (ref 40–70)
PLATELET # BLD AUTO: 80 K/UL (ref 135–420)
RBC # BLD AUTO: 3.62 M/UL (ref 4.1–5.1)
WBC # BLD AUTO: 6.6 K/UL (ref 4.5–13)

## 2018-05-11 PROCEDURE — 74011250636 HC RX REV CODE- 250/636: Performed by: INTERNAL MEDICINE

## 2018-05-11 PROCEDURE — 36591 DRAW BLOOD OFF VENOUS DEVICE: CPT

## 2018-05-11 PROCEDURE — 77030012965 HC NDL HUBR BBMI -A

## 2018-05-11 PROCEDURE — 96372 THER/PROPH/DIAG INJ SC/IM: CPT

## 2018-05-11 PROCEDURE — 74011000250 HC RX REV CODE- 250

## 2018-05-11 PROCEDURE — 85049 AUTOMATED PLATELET COUNT: CPT | Performed by: INTERNAL MEDICINE

## 2018-05-11 PROCEDURE — 85025 COMPLETE CBC W/AUTO DIFF WBC: CPT | Performed by: INTERNAL MEDICINE

## 2018-05-11 PROCEDURE — 74011250636 HC RX REV CODE- 250/636

## 2018-05-11 RX ORDER — WATER FOR INJECTION,STERILE
VIAL (ML) INJECTION
Status: COMPLETED
Start: 2018-05-11 | End: 2018-05-11

## 2018-05-11 RX ORDER — HEPARIN 100 UNIT/ML
500 SYRINGE INTRAVENOUS AS NEEDED
Status: DISCONTINUED | OUTPATIENT
Start: 2018-05-11 | End: 2018-05-15 | Stop reason: HOSPADM

## 2018-05-11 RX ORDER — SODIUM CHLORIDE 0.9 % (FLUSH) 0.9 %
10-40 SYRINGE (ML) INJECTION AS NEEDED
Status: DISCONTINUED | OUTPATIENT
Start: 2018-05-11 | End: 2018-05-15 | Stop reason: HOSPADM

## 2018-05-11 RX ADMIN — WATER 10 ML: 1 INJECTION INTRAMUSCULAR; INTRAVENOUS; SUBCUTANEOUS at 09:57

## 2018-05-11 RX ADMIN — Medication 30 ML: at 09:20

## 2018-05-11 RX ADMIN — Medication 400 UNITS: at 09:25

## 2018-05-11 RX ADMIN — ROMIPLOSTIM 207 MCG: 250 INJECTION, POWDER, LYOPHILIZED, FOR SOLUTION SUBCUTANEOUS at 09:57

## 2018-05-11 RX ADMIN — Medication 10 ML: at 09:23

## 2018-05-11 RX ADMIN — Medication 500 UNITS: at 09:23

## 2018-05-11 NOTE — PROGRESS NOTES
[address] [facility] Progress Note    Date: May 11, 2018    Name: Margarita Amaya    MRN: [id number]         : 1950     Monthly port flush with Weekly N Plate injection      Ms. Debra Gao was assessed and education was provided. Ms. Cici Carbone vitals were reviewed and patient was observed for 5 minutes prior to treatment. Visit Vitals    /68 (BP 1 Location: Left arm, BP Patient Position: Sitting)    Pulse 70    Temp 97.2 °F (36.2 °C)    Resp 18    SpO2 100%     Dual Mediport at right upper chest clean and dry. Medial port accessed with difficulty locating center w/20 gauge, 1 inch Garay needle. Blood return obtained initially, but unable to flush port. Needle location adjusted, able to flush port with strong pressure w/30 ml NS and 4 ml of 100 units/ml Heparin. Port de-accessed no bleeding or irritation noted at site. Lateral port accessed with 20 gauge, 1 inch Garay needle w/o difficulty. Good blood return obtained,labs drawn, after 7 ml discard. porst flushed with 20 ml NS and 5 ml of 100 units/ml Heparin. Port de-accessed, no bleeding or irritation noted at site. 2x2 gauze placed and covered with Tegaderm dressing. Lab results were obtained and reviewed. Recent Results (from the past 12 hour(s))   CBC WITH 3 PART DIFF    Collection Time: 18  9:10 AM   Result Value Ref Range    WBC 6.6 4.5 - 13.0 K/uL    RBC 3.62 (L) 4.10 - 5.10 M/uL    HGB 9.9 (L) 12.0 - 16 g/dL    HCT 30.3 (L) 36 - 48 %    MCV 83.7 78 - 102 FL    MCH 27.3 25.0 - 35.0 PG    MCHC 32.7 31 - 37 g/dL    RDW 17.2 (H) 11.5 - 14.5 %    NEUTROPHILS 64 40 - 70 %    MIXED CELLS 12 0.1 - 17 %    LYMPHOCYTES 24 14 - 44 %    ABS. NEUTROPHILS 4.2 1.8 - 9.5 K/UL    ABS. MIXED CELLS 0.8 0.0 - 2.3 K/uL    ABS. LYMPHOCYTES 1.6 1.1 - 5.9 K/UL    DF AUTOMATED     PLATELET COUNT    Collection Time: 18  9:15 AM   Result Value Ref Range    PLATELET 80 (L) 357 - 420 K/uL     Preliminary platelet count was 71.       N Plate 936 mcg/ 3.254 ml sterile water was administered subcutaneously in in back of right upper arm. No irritation noted at site, band aid applied. Ms. Albertina Epps tolerated well, and had no complaints. Patient armband removed and shredded. Ms. Albertina Epps was discharged from [place] [address] in stable condition at 1005. She is to return on 5/18/2018 at 0830 for her next appointment for CBC and N Plate injection. Pharmacy to contact Dr. Stephanie Birch regarding consistent dose of N Plate over 4 weeks so that CBC can be done monthly instead of weekly.     Constantin Mosley RN  May 11, 2018  11:53 AM

## 2018-05-11 NOTE — PROGRESS NOTES
Tato Boby Martinez called in to say that the gauze pad over her port was bloody. Instructed her to removed the dressing and if it is still bleeding a lot to go to the ED. Called patient back after 10 minutes and she told me that it was minimal now and not a problem she couldn't handle.

## 2018-05-14 ENCOUNTER — HOSPITAL ENCOUNTER (OUTPATIENT)
Dept: ONCOLOGY | Age: 68
Discharge: HOME | End: 2018-05-14

## 2018-05-14 ENCOUNTER — HOSPITAL ENCOUNTER (OUTPATIENT)
Dept: LAB | Age: 68
Discharge: HOME | End: 2018-05-14
Payer: MEDICARE

## 2018-05-14 ENCOUNTER — OFFICE VISIT (OUTPATIENT)
Dept: ONCOLOGY | Age: 68
End: 2018-05-14

## 2018-05-14 VITALS
WEIGHT: 156 LBS | BODY MASS INDEX: 25.18 KG/M2 | HEART RATE: 72 BPM | TEMPERATURE: 97.8 F | SYSTOLIC BLOOD PRESSURE: 108 MMHG | DIASTOLIC BLOOD PRESSURE: 65 MMHG

## 2018-05-14 DIAGNOSIS — D69.3 CHRONIC ITP (IDIOPATHIC THROMBOCYTOPENIA) (HCC): ICD-10-CM

## 2018-05-14 DIAGNOSIS — R60.0 BILATERAL LOWER EXTREMITY EDEMA: ICD-10-CM

## 2018-05-14 DIAGNOSIS — F51.01 PRIMARY INSOMNIA: ICD-10-CM

## 2018-05-14 DIAGNOSIS — C50.911 CARCINOMA OF RIGHT BREAST METASTATIC TO AXILLARY LYMPH NODE (HCC): ICD-10-CM

## 2018-05-14 DIAGNOSIS — D50.8 IRON DEFICIENCY ANEMIA SECONDARY TO INADEQUATE DIETARY IRON INTAKE: ICD-10-CM

## 2018-05-14 DIAGNOSIS — G89.3 CANCER ASSOCIATED PAIN: ICD-10-CM

## 2018-05-14 DIAGNOSIS — R42 VERTIGO: ICD-10-CM

## 2018-05-14 DIAGNOSIS — R64 CACHEXIA (HCC): ICD-10-CM

## 2018-05-14 DIAGNOSIS — E55.9 VITAMIN D DEFICIENCY: ICD-10-CM

## 2018-05-14 DIAGNOSIS — C77.3 CARCINOMA OF RIGHT BREAST METASTATIC TO AXILLARY LYMPH NODE (HCC): ICD-10-CM

## 2018-05-14 DIAGNOSIS — R53.1 WEAKNESS: ICD-10-CM

## 2018-05-14 DIAGNOSIS — D69.3 CHRONIC ITP (IDIOPATHIC THROMBOCYTOPENIA) (HCC): Primary | ICD-10-CM

## 2018-05-14 LAB
ALBUMIN SERPL-MCNC: 3.6 G/DL (ref 3.4–5)
ALBUMIN/GLOB SERPL: 0.8 {RATIO} (ref 0.8–1.7)
ALP SERPL-CCNC: 83 U/L (ref 45–117)
ALT SERPL-CCNC: 17 U/L (ref 13–56)
ANION GAP SERPL CALC-SCNC: 7 MMOL/L (ref 3–18)
AST SERPL-CCNC: 26 U/L (ref 15–37)
BASO+EOS+MONOS # BLD AUTO: 1.1 K/UL (ref 0–2.3)
BASO+EOS+MONOS # BLD AUTO: 18 % (ref 0.1–17)
BILIRUB SERPL-MCNC: 0.3 MG/DL (ref 0.2–1)
BUN SERPL-MCNC: 24 MG/DL (ref 7–18)
BUN/CREAT SERPL: 26 (ref 12–20)
CALCIUM SERPL-MCNC: 9.1 MG/DL (ref 8.5–10.1)
CHLORIDE SERPL-SCNC: 102 MMOL/L (ref 100–108)
CO2 SERPL-SCNC: 33 MMOL/L (ref 21–32)
CREAT SERPL-MCNC: 0.92 MG/DL (ref 0.6–1.3)
DIFFERENTIAL METHOD BLD: ABNORMAL
ERYTHROCYTE [DISTWIDTH] IN BLOOD BY AUTOMATED COUNT: 17.1 % (ref 11.5–14.5)
FERRITIN SERPL-MCNC: 346 NG/ML (ref 8–388)
GLOBULIN SER CALC-MCNC: 4.3 G/DL (ref 2–4)
GLUCOSE SERPL-MCNC: 84 MG/DL (ref 74–99)
HCT VFR BLD AUTO: 30.2 % (ref 36–48)
HGB BLD-MCNC: 10 G/DL (ref 12–16)
IRON SATN MFR SERPL: 26 %
IRON SERPL-MCNC: 53 UG/DL (ref 50–175)
LYMPHOCYTES # BLD: 1.4 K/UL (ref 1.1–5.9)
LYMPHOCYTES NFR BLD: 22 % (ref 14–44)
MCH RBC QN AUTO: 27.6 PG (ref 25–35)
MCHC RBC AUTO-ENTMCNC: 33.1 G/DL (ref 31–37)
MCV RBC AUTO: 83.4 FL (ref 78–102)
NEUTS SEG # BLD: 3.9 K/UL (ref 1.8–9.5)
NEUTS SEG NFR BLD: 60 % (ref 40–70)
PLATELET # BLD AUTO: 90 K/UL (ref 135–420)
POTASSIUM SERPL-SCNC: 3.8 MMOL/L (ref 3.5–5.5)
PROT SERPL-MCNC: 7.9 G/DL (ref 6.4–8.2)
RBC # BLD AUTO: 3.62 M/UL (ref 4.1–5.1)
SODIUM SERPL-SCNC: 142 MMOL/L (ref 136–145)
TIBC SERPL-MCNC: 206 UG/DL (ref 250–450)
WBC # BLD AUTO: 6.4 K/UL (ref 4.5–13)

## 2018-05-14 PROCEDURE — 80053 COMPREHEN METABOLIC PANEL: CPT | Performed by: INTERNAL MEDICINE

## 2018-05-14 PROCEDURE — 86300 IMMUNOASSAY TUMOR CA 15-3: CPT | Performed by: INTERNAL MEDICINE

## 2018-05-14 PROCEDURE — 83540 ASSAY OF IRON: CPT | Performed by: INTERNAL MEDICINE

## 2018-05-14 PROCEDURE — 82306 VITAMIN D 25 HYDROXY: CPT | Performed by: INTERNAL MEDICINE

## 2018-05-14 PROCEDURE — 82728 ASSAY OF FERRITIN: CPT | Performed by: INTERNAL MEDICINE

## 2018-05-14 PROCEDURE — 36415 COLL VENOUS BLD VENIPUNCTURE: CPT | Performed by: INTERNAL MEDICINE

## 2018-05-14 RX ORDER — MECLIZINE HCL 12.5 MG 12.5 MG/1
12.5 TABLET ORAL
Qty: 90 TAB | Refills: 3 | Status: SHIPPED | OUTPATIENT
Start: 2018-05-14 | End: 2018-05-24

## 2018-05-14 NOTE — PATIENT INSTRUCTIONS
Idiopathic Thrombocytopenic Purpura: Care Instructions  Your Care Instructions    Idiopathic thrombocytopenic purpura, or ITP, is a blood problem. It happens when your immune system does not work as it should and destroys platelets in your blood. Platelets are a kind of cell in your blood. They have a sticky surface that helps them form clots to stop bleeding. Your blood can't clot if you don't have enough platelets. This causes abnormal bleeding. Bleeding can get worse over time, or it can come on fast.  To treat ITP, you may need to take medicines to help stop the destruction of platelets. You may need platelets added to your blood. Or you may need surgery to remove your spleen. Your spleen's job is to remove platelets from your body. So taking out the spleen helps increase your platelet count. Follow-up care is a key part of your treatment and safety. Be sure to make and go to all appointments, and call your doctor if you are having problems. It's also a good idea to know your test results and keep a list of the medicines you take. How can you care for yourself at home? · Be safe with medicines. Take your medicines exactly as prescribed. Call your doctor if you think you are having a problem with your medicine. · Before you start any new over-the-counter or prescribed medicine, tell your doctor if:  ¨ You have had a bad reaction to any medicines in the past.  ¨ You take other medicines, such as over-the-counter medicines, vitamins, or herbal supplements. ¨ You have other health problems. ¨ You are or could be pregnant. · Do not take aspirin or other anti-inflammatory medicines (such as ibuprofen or naproxen) without first talking to your doctor. Ask your doctor if it is okay to use acetaminophen (Tylenol). · Do not take two or more pain medicines at the same time unless the doctor told you to. Many pain medicines have acetaminophen, which is Tylenol.  Too much acetaminophen (Tylenol) can be harmful. · Get plenty of rest.  · \"Think safety\" and protect yourself from injury. Do not lift anything heavy. · Do not donate blood. · Do not drink alcohol or use illegal drugs. When should you call for help? Call 911 anytime you think you may need emergency care. For example, call if:  ? · You passed out (lost consciousness). ? · You have signs of severe bleeding, which includes:  ¨ You have a severe headache that is different from past headaches. ¨ You vomit blood or what looks like coffee grounds. ¨ Your stools are maroon or very bloody. ?Call your doctor now or seek immediate medical care if:  ? · You are dizzy or lightheaded, or you feel like you may faint. ? · You have abnormal bleeding, such as:  ¨ Your stools are black and look like tar, or they have streaks of blood. ¨ You have blood in your urine. ¨ You have joint pain. ¨ You have bruises or blood spots under your skin. ? Watch closely for changes in your health, and be sure to contact your doctor if:  ? · You do not get better as expected. Where can you learn more? Go to http://leon-edinson.info/. Enter C348 in the search box to learn more about \"Idiopathic Thrombocytopenic Purpura: Care Instructions. \"  Current as of: October 13, 2016  Content Version: 11.4  © 0950-2905 [employer]. Care instructions adapted under license by [employer] (which disclaims liability or warranty for this information). If you have questions about a medical condition or this instruction, always ask your healthcare professional. [place] [address] any warranty or liability for your use of this information.

## 2018-05-14 NOTE — PROGRESS NOTES
Hematology/Oncology  Progress Note    Name: Jorgito Guevara  Date: 2018  : 1950    PCP: Bridgette Cole MD     Ms. Jayson Rodriguez is a 79 y.o. female who was seen for management of her slowly progressive ITP and metastatic breast cancer with associated iron deficiency anemia. Current therapy: Active surveillance regarding breast cancer: N-plate as needed as a primary treatment for ITP  Subjective:     Ms. Jayson Rodriguez is a 59-year-old -American woman with history of metastatic breast cancer. The patient reports that she has been doing reasonably well. She has gained about 30 pounds weight over the past few months, due to the steroid that she has to take for her ITP. She is now actively trying to lose some weight. She denies having any unexplained bruising or bleeding. She continues to have arthritic discomfort but she is ambulating without the use of a cane or walker. The patient informed me that she is currently seeing a spine specialist and may need to get steroid injections or subdural injection to control the pain. She is continuing to take the n-plate as the primary treatment modality for her ITP. Over the past several weeks she has noticed a slowly progressive decline in her platelet count. Past medical history, family history, and social history: these were reviewed and remains unchanged.     Past Medical History:   Diagnosis Date    Antineoplastic chemotherapy induced anemia     Arrhythmia     Atrial Fib    Arthritis     Breast cancer ([address]     Cancer ([address]     Breast    [facility]     Chronic ITP (idiopathic thrombocytopenia) (HCC) 10/31/2016    Secondary to Anemia from Chemo    Diabetes ([address]     borderline, no meds    Esophageal cancer ([address]     treated with chemo    Heart failure [facility])      Past Surgical History:   Procedure Laterality Date    BREAST SURGERY PROCEDURE UNLISTED      COLONOSCOPY N/A 2016    COLONOS[facility] performed [facility] Galileo [facility] at [facility] ENDOSCOPY    HX APPENDECTOMY      HX HEENT      Tonsillectomy    HX ORTHOPAEDIC      HX TUBAL LIGATION      HX VASCULAR ACCESS      NEUROLOGICAL PROCEDURE UNLISTED      Lumbar sx     Social History     Social History    Marital status:      Spouse name: N/A    Number of children: N/A    Years of education: N/A     Occupational History    Not on file. Social History Main Topics    Smoking status: Never Smoker    Smokeless tobacco: Never Used    Alcohol use No    Drug use: No    Sexual activity: Not on file     Other Topics Concern    Not on file     Social History Narrative     No family history on file. Current Outpatient Prescriptions   Medication Sig Dispense Refill    meclizine (ANTIVERT) 12.5 mg tablet Take 1 Tab by mouth three (3) times daily as needed for up to 10 days. 90 Tab 3    zolpidem (AMBIEN) 10 mg tablet TAKE 1 TABLET BY MOUTH NIGHTLY AS NEEDED FOR SLEEP. MAX DAILY AMOUNT 10 MG 30 Tab 2    lidocaine-prilocaine (EMLA) topical cream APPLY OVER PORT 1 HOUR PRIOR TO CHEMOTHERAPY THAN COVER WITH SARAN WRAP 30 g 6    oxyCODONE-acetaminophen (PERCOCET)  mg per tablet Take 1 Tab by mouth every six (6) hours as needed for Pain. Max Daily Amount: 4 Tabs. Indications: Pain, ACUTE POST OP PAIN 120 Tab 0    ergocalciferol (VITAMIN D2) 50,000 unit capsule Take 1 Cap by mouth every seven (7) days. 12 Cap 1    topiramate (TOPAMAX) 50 mg tablet   5    temazepam (RESTORIL) 30 mg capsule   1    LINZESS 145 mcg cap capsule TAKE 1 CAPSULE BY MOUTH DAILY 30 MINUTES BEFORE BREAKFAST  0    lisinopril (PRINIVIL, ZESTRIL) 10 mg tablet Take 10 mg by mouth daily. Indications: hypertension      loratadine 10 mg cap Take 10 mg by mouth daily.  digoxin (LANOXIN) 0.125 mg tablet Take 0.125 mg by mouth daily.  carvedilol (COREG) 3.125 mg tablet Take 3.125 mg by mouth two (2) times daily (with meals).  meloxicam (MOBIC) 7.5 mg tablet Take 7.5 mg by mouth daily.       amiodarone (CORDARONE) 200 mg tablet Take 200 mg by mouth daily. Indications: PREVENTION OF VENTRICULAR FIBRILLATION      gabapentin (NEURONTIN) 300 mg capsule Take 1 po q am and 2 po q pm (Patient taking differently: Take 300 mg by mouth two (2) times a day. Take 1 po q am and 2 po q pm) 90 Cap 1    rivaroxaban (XARELTO) 10 mg tablet Take 10 mg by mouth daily.  magic mouthwash solution Take 5 mL by mouth three (3) times daily as needed for Stomatitis. Magic mouth wash   Maalox  Lidocaine 2% viscous   Diphenhydramine oral solution     Pharmacy to mix equal portions of ingredients to a total volume as indicated in the dispense amount. 236 mL 0    furosemide (LASIX) 40 mg tablet Take 40 mg by mouth daily. Indications: Edema      dronabinol (MARINOL) 5 mg capsule Take 1 Cap by mouth two (2) times a day. 60 Cap 1    KLOR-CON M20 20 mEq tablet TAKE ONE TABLET BY MOUTH ONCE A DAY 30 Tab 3    aluminum-magnesium hydroxide 200-200 mg/5 mL susp 5 mL, diphenhydrAMINE 12.5 mg/5 mL liqd 12.5 mg, lidocaine 2 % soln 5 mL 5 mL by Swish and Spit route two (2) times a day. Magic mouth wash   Maalox  Lidocaine 2% viscous   Diphenhydramine oral solution     Pharmacy to mix equal portions of ingredients to a total volume as indicated in the dispense amount. 240 mL 1    potassium chloride (K-DUR, KLOR-CON) 20 mEq tablet Take 2 Tabs by mouth daily. 30 Tab 3    albuterol (PROAIR HFA) 90 mcg/actuation inhaler 1-2 puffs every 4-6 hrs. (Patient taking differently: Take 2 Puffs by inhalation every four (4) hours as needed for Shortness of Breath. 1-2 puffs every 4-6 hrs.) 1 Inhaler 1    senna (SENNA) 8.6 mg tablet Take 1 Tab by mouth daily.  nabumetone (RELAFEN) 750 mg tablet Take 750 mg by mouth daily. Indications: OSTEOARTHRITIS      ondansetron hcl (ZOFRAN) 8 mg tablet Take 1 Tab by mouth every eight (8) hours as needed for Nausea. 30 Tab 3    IRON AG&FUM/C/FA/MV CMB11/CA-T (PRUVATE 21-7 PO) Take 325 mg by mouth daily. Indications: iron deficiency       Facility-Administered Medications Ordered in Other Visits   Medication Dose Route Frequency Provider Last Rate Last Dose    heparin (porcine) pf 500 Units  500 Units InterCATHeter PRN Minna Wu MD   400 Units at 05/11/18 0925    sodium chloride (NS) flush 10-40 mL  10-40 mL IntraVENous PRN Minna Wu MD   10 mL at 05/11/18 1994       Review of Systems  Constitutional: The patient has no acute distress or discomfort. HEENT: The patient denies recent head trauma, eye pain, blurred vision,  hearing deficit, oropharyngeal mucosal pain or lesions, and the patient denies throat pain or discomfort. Lymphatics: The patient denies palpable peripheral lymphadenopathy. Hematologic: The patient denies having bruising, bleeding, or progressive fatigue. Respiratory: Patient denies having shortness of breath, cough, sputum production, fever, or dyspnea on exertion. Cardiovascular: The patient denies having leg pain, leg swelling, heart palpitations, chest permit, chest pain, or lightheadedness. The patient denies having dyspnea on exertion. Gastrointestinal: The patient denies having nausea, emesis, or diarrhea. The patient denies having any hematemesis or blood in the stool. Genitourinary: Patient denies having urinary urgency, frequency, or dysuria. The patient denies having blood in the urine. Psychological: The patient denies having symptoms of nervousness, anxiety, depression, or thoughts of harming self. Skin: Patient denies having skin rashes, skin, ulcerations, or unexplained itching or pruritus. Musculoskeletal: The patient used to complain of arthritic discomfort in her joints particularly the knees, hips, and shoulders. The patient reports that she has a large bruise on her right lower extremity.       Objective:     Visit Vitals    /65    Pulse 72    Temp 97.8 °F (36.6 °C)    Wt 70.8 kg (156 lb)    BMI 25.18 kg/m2     ECOG PS=1, pain score=0/10  Physical Exam:   Gen. Appearance: The patient is in no acute distress. Skin: There is no bruise or rash. HEENT: The exam is unremarkable. Neck: Supple without lymphadenopathy or thyromegaly. Lungs: Clear to auscultation and percussion; there are no wheezes or rhonchi. Heart: Regular rate and rhythm; there are no murmurs, gallops, or rubs. Abdomen: Bowel sounds are present and normal.  There is no guarding, tenderness, or hepatosplenomegaly. Extremities: There is no clubbing, cyanosis, or edema. There is a 6 cm area of bruising on the right anterior lateral calf. There is no evidence of skin breakdown or ulceration. Neurologic: There are no focal neurologic deficits. Lymphatics: There is no palpable peripheral lymphadenopathy. Musculoskeletal: The patient has full range of motion at all joints. There is no evidence of joint deformity or effusions. There is no focal joint tenderness. Psychological/psychiatric: There is no clinical evidence of anxiety, depression, or melancholy. Lab data:      Results for orders placed or performed during the hospital encounter of 05/14/18   CBC WITH 3 PART DIFF     Status: Abnormal   Result Value Ref Range Status    WBC 6.4 4.5 - 13.0 K/uL Final    RBC 3.62 (L) 4.10 - 5.10 M/uL Final    HGB 10.0 (L) 12.0 - 16 g/dL Final    HCT 30.2 (L) 36 - 48 % Final    MCV 83.4 78 - 102 FL Final    MCH 27.6 25.0 - 35.0 PG Final    MCHC 33.1 31 - 37 g/dL Final    RDW 17.1 (H) 11.5 - 14.5 % Final    NEUTROPHILS 60 40 - 70 % Final    MIXED CELLS 18 (H) 0.1 - 17 % Final    LYMPHOCYTES 22 14 - 44 % Final    ABS. NEUTROPHILS 3.9 1.8 - 9.5 K/UL Final    ABS. MIXED CELLS 1.1 0.0 - 2.3 K/uL Final    ABS. LYMPHOCYTES 1.4 1.1 - 5.9 K/UL Final     Comment: Test performed at [place] [address] Location. Results Reviewed by Medical Director. DF AUTOMATED   Final      I have explained to the patient that the preliminary platelet count today is 87,000. Assessment:     1. Chronic ITP (idiopathic thrombocytopenia) (HCC)    2. Carcinoma of right breast metastatic to axillary lymph node ([address]    3. Vitamin D deficiency    4. Cachexia ([address]    5. Weakness    6. Cancer associated pain    7. Vertigo    8. Bilateral lower extremity edema    9. Primary insomnia    10. Iron deficiency anemia secondary to inadequate dietary iron intake          Plan:   Chronic ITP/thrombocytopenia (progression of disease): I have informed the patient that her CBC today shows that her preliminary platelet count is 01,304. At this time I am recommending we continue the n-Plate at a dose of 1 mcg/kg subcutaneous weekly. At this time I will recheck her platelet level will plan to see her back in clinic in about 8 weeks. Vitamin D deficiency: I have informed the patient and her vitamin D level on 12/18/2017 was 24.9 ng/mL. at the time she was prescribed 50,000 units of vitamin D for an additional 12 weeks. I will recheck her vitamin D levels at this time. Iron deficiency anemia/chronic anemia: The current CBC shows a WBC count of 6.4, hemoglobin is 10 g/dL, hematocrit is 30.2%, and the platelet count is currently 87,000. I have recommended that the patient continue taking the ferrous sulfate 1 tablet twice daily. At this time I will recheck her iron profile and ferritin levels. Bilateral lower extremity edema: The leg edema has significantly improved. Metastatic breast cancer, left breast metastasized to lymph nodes and other sites: At this time I will check a CA-27-29 level. The most recent CA-35-27 level was normal.  Clinically the patient has no evidence of disease progression. .    Cachexia, weakness, and muscular deconditioning: I am recommending that she continue physical therapy 4 times weekly. She will continue to use a walker or cane as needed for mobility support. However today she is ambulating without the use of a cane or walker.       Primary insomnia: The patient was instructed to continue to use the Ambien 10 mg at bedtime. Vertigo (controlled problem): I have recommended that she continue to use the Antivert 12.5 mg every 8 hours, as needed. .  I have advised the patient to take this for 3-5 days as needed, whenever she develops vertigo or dizziness. .    I will see her back in clinic for complete assessment again in 12 weeks. Orders Placed This Encounter    COMPLETE CBC & AUTO DIFF WBC    InHouse CBC ([facility])     Standing Status:   Future     Number of Occurrences:   1     Standing Expiration Date:   0/36/3817    METABOLIC PANEL, COMPREHENSIVE     Standing Status:   Future     Number of Occurrences:   1     Standing Expiration Date:   5/15/2019    IRON PROFILE     Standing Status:   Future     Number of Occurrences:   1     Standing Expiration Date:   5/15/2019    FERRITIN     Standing Status:   Future     Number of Occurrences:   1     Standing Expiration Date:   5/15/2019    VITAMIN D, 25 HYDROXY     Standing Status:   Future     Number of Occurrences:   1     Standing Expiration Date:   5/15/2019    CA 27.29     Standing Status:   Future     Number of Occurrences:   1     Standing Expiration Date:   5/15/2019    meclizine (ANTIVERT) 12.5 mg tablet     Sig: Take 1 Tab by mouth three (3) times daily as needed for up to 10 days. Dispense:  90 Tab     Refill:  3       Shraddha Jama MD  5/14/2018      Please note: This document has been produced using voice recognition software. Unrecognized errors in transcription may be present.

## 2018-05-14 NOTE — PROGRESS NOTES
Pharmacy Note     Name: Celine Mcmahon  : 1950  Estimated body surface area is 1.82 meters squared as calculated from the following:    Height as of 18: 167.6 cm (66\"). Weight as of 18: 70.8 kg (156 lb). Diagnosis: ITP  Treatment Plan: NPlate  Start Date:     Lab Results   Component Value Date/Time    WBC 6.4 2018 12:25 PM    PLATELET 80 (L)  09:15 AM     Lab Results   Component Value Date/Time    ABS. NEUTROPHILS 3.9 2018 12:25 PM     Lab Results   Component Value Date/Time    Creatinine 1.14 2018 11:57 AM     Most Recent Creatinine Clearance:  CrCl: 49.0 mL/min (based on Adjusted Body Weight)  Creatinine: 1.14 mg/dL (2018)      Pharmacy Intervention:  · Patients plates have remained stable () for greater than 4 weeks. Per Dr. Srini Rosen, new orders written to maintain weekly dose of 207 mcg (3 mcg/kg) and monitor CBC monthly.     Pharmacist: Gracie Landa, [facility]

## 2018-05-15 ENCOUNTER — HOSPITAL ENCOUNTER (OUTPATIENT)
Dept: PHYSICAL THERAPY | Age: 68
End: 2018-05-15
Payer: MEDICARE

## 2018-05-15 LAB
25(OH)D3 SERPL-MCNC: 42.1 NG/ML (ref 30–100)
CANCER AG27-29 SERPL-ACNC: 33.9 U/ML (ref 0–38.6)

## 2018-05-18 ENCOUNTER — HOSPITAL ENCOUNTER (OUTPATIENT)
Dept: INFUSION THERAPY | Age: 68
Discharge: HOME | End: 2018-05-18
Payer: MEDICARE

## 2018-05-18 VITALS
DIASTOLIC BLOOD PRESSURE: 71 MMHG | HEART RATE: 58 BPM | TEMPERATURE: 97.6 F | RESPIRATION RATE: 18 BRPM | SYSTOLIC BLOOD PRESSURE: 106 MMHG

## 2018-05-18 LAB
BASO+EOS+MONOS # BLD AUTO: 1.3 K/UL (ref 0–2.3)
BASO+EOS+MONOS # BLD AUTO: 20 % (ref 0.1–17)
DIFFERENTIAL METHOD BLD: ABNORMAL
ERYTHROCYTE [DISTWIDTH] IN BLOOD BY AUTOMATED COUNT: 17.5 % (ref 11.5–14.5)
HCT VFR BLD AUTO: 30.7 % (ref 36–48)
HGB BLD-MCNC: 9.9 G/DL (ref 12–16)
LYMPHOCYTES # BLD: 2 K/UL (ref 1.1–5.9)
LYMPHOCYTES NFR BLD: 30 % (ref 14–44)
MCH RBC QN AUTO: 26.9 PG (ref 25–35)
MCHC RBC AUTO-ENTMCNC: 32.2 G/DL (ref 31–37)
MCV RBC AUTO: 83.4 FL (ref 78–102)
NEUTS SEG # BLD: 3.3 K/UL (ref 1.8–9.5)
NEUTS SEG NFR BLD: 50 % (ref 40–70)
PLATELET # BLD AUTO: 104 K/UL (ref 135–420)
RBC # BLD AUTO: 3.68 M/UL (ref 4.1–5.1)
WBC # BLD AUTO: 6.6 K/UL (ref 4.5–13)

## 2018-05-18 PROCEDURE — 85049 AUTOMATED PLATELET COUNT: CPT | Performed by: INTERNAL MEDICINE

## 2018-05-18 PROCEDURE — 74011250636 HC RX REV CODE- 250/636: Performed by: INTERNAL MEDICINE

## 2018-05-18 PROCEDURE — 74011000250 HC RX REV CODE- 250

## 2018-05-18 PROCEDURE — 96372 THER/PROPH/DIAG INJ SC/IM: CPT

## 2018-05-18 PROCEDURE — 74011250636 HC RX REV CODE- 250/636

## 2018-05-18 PROCEDURE — 85025 COMPLETE CBC W/AUTO DIFF WBC: CPT | Performed by: INTERNAL MEDICINE

## 2018-05-18 PROCEDURE — 36415 COLL VENOUS BLD VENIPUNCTURE: CPT

## 2018-05-18 RX ORDER — WATER FOR INJECTION,STERILE
VIAL (ML) INJECTION
Status: COMPLETED
Start: 2018-05-18 | End: 2018-05-18

## 2018-05-18 RX ADMIN — ROMIPLOSTIM 207 MCG: 250 INJECTION, POWDER, LYOPHILIZED, FOR SOLUTION SUBCUTANEOUS at 09:07

## 2018-05-18 RX ADMIN — WATER 10 ML: 1 INJECTION INTRAMUSCULAR; INTRAVENOUS; SUBCUTANEOUS at 09:07

## 2018-05-18 NOTE — PROGRESS NOTES
[facility] Progress Note    Date: May 18, 2018    Name: Ankita Hooks    MRN: [id number]         : 1950      Nplate Injection     Ms. Marilyn Simeon [facility] to [facility] at 2453. Ms. Marilyn Simeon was assessed and education was provided. Ms. Jose Dumont vitals were reviewed. Visit Vitals    /71 (BP 1 Location: Left arm, BP Patient Position: Sitting)    Pulse (!) 58    Temp 97.6 °F (36.4 °C)    Resp 18       Pt was observed for 5 minutes after obtaining vital signs prior to initiating treatment. Blood drawn via peripheral lab draw via the right AC x1 attempt for CBC. Gauze and coban applied to the site. Lab results obtained and reviewed. (Preliminary results scanned into media tab.)  Recent Results (from the past 12 hour(s))   CBC WITH 3 PART DIFF    Collection Time: 18  8:50 AM   Result Value Ref Range    WBC 6.6 4.5 - 13.0 K/uL    RBC 3.68 (L) 4.10 - 5.10 M/uL    HGB 9.9 (L) 12.0 - 16 g/dL    HCT 30.7 (L) 36 - 48 %    MCV 83.4 78 - 102 FL    MCH 26.9 25.0 - 35.0 PG    MCHC 32.2 31 - 37 g/dL    RDW 17.5 (H) 11.5 - 14.5 %    NEUTROPHILS 50 40 - 70 %    MIXED CELLS 20 (H) 0.1 - 17 %    LYMPHOCYTES 30 14 - 44 %    ABS. NEUTROPHILS 3.3 1.8 - 9.5 K/UL    ABS. MIXED CELLS 1.3 0.0 - 2.3 K/uL    ABS. LYMPHOCYTES 2.0 1.1 - 5.9 K/UL    DF AUTOMATED       Preliminary platelet count= 89,862. Per Nplate protocol, since pt's platelet count is 75,794 today, pt's dose will remain at 3mcg/kg (3mcg x 69kg = 207mcg). Nplate 661XMF (3.39US) administered as ordered SQ in the back of the patient's right upper arm. No redness or bleeding noted. Ms. Marilyn Simeon was discharged from [place] [address] in stable condition at 0910. She is to return on 18 at 0830 for her next appointment.     Yasir Rodriguez RN  May 18, 2018

## 2018-05-21 ENCOUNTER — OFFICE VISIT (OUTPATIENT)
Dept: ORTHOPEDIC SURGERY | Age: 68
End: 2018-05-21

## 2018-05-21 ENCOUNTER — HOSPITAL ENCOUNTER (OUTPATIENT)
Dept: PHYSICAL THERAPY | Age: 68
End: 2018-05-21
Payer: MEDICARE

## 2018-05-21 VITALS
HEIGHT: 66 IN | OXYGEN SATURATION: 97 % | WEIGHT: 168.6 LBS | BODY MASS INDEX: 27.1 KG/M2 | DIASTOLIC BLOOD PRESSURE: 68 MMHG | HEART RATE: 58 BPM | TEMPERATURE: 97.1 F | SYSTOLIC BLOOD PRESSURE: 122 MMHG | RESPIRATION RATE: 16 BRPM

## 2018-05-21 DIAGNOSIS — L60.0 INGROWN NAIL OF SECOND TOE OF LEFT FOOT: ICD-10-CM

## 2018-05-21 DIAGNOSIS — M79.671 RIGHT FOOT PAIN: ICD-10-CM

## 2018-05-21 DIAGNOSIS — L60.0 INGROWN NAIL OF GREAT TOE OF LEFT FOOT: Primary | ICD-10-CM

## 2018-05-21 DIAGNOSIS — Z01.818 PRE-OP EXAM: ICD-10-CM

## 2018-05-21 RX ORDER — OXYCODONE AND ACETAMINOPHEN 5; 325 MG/1; MG/1
TABLET ORAL
Qty: 50 TAB | Refills: 0 | Status: SHIPPED | OUTPATIENT
Start: 2018-05-21 | End: 2018-06-06

## 2018-05-21 RX ORDER — PROMETHAZINE HYDROCHLORIDE 25 MG/1
25 TABLET ORAL
Qty: 30 TAB | Refills: 0 | Status: SHIPPED | OUTPATIENT
Start: 2018-05-21 | End: 2018-01-01

## 2018-05-21 NOTE — PATIENT INSTRUCTIONS
Dr. Sanchez Manual Pre-operative Instructions:      Patient: Annemarie Oh   :  1950     I understand I am to stop taking oral birth control pills, hormonal replacement therapy and all Aspirin, Aspirin containing medications, Non-Steroidal Anti-Inflammatory medications (such as Advil, Aleve, Motrin, Ibuprofen) and or Blood thinner medication such as Coumadin, Plavix, Heparin or others 5 days prior to surgery. I understand I am to STOP taking these Medications 5 days prior to surgery:  I am to get instructions from my prescribing physician. 1. __As listed above_______________________  2. _____________________________________  3. _____________________________________  4. _____________________________________    I understand that if I am taking daily medications for high blood pressure, I can take them the morning of surgery with a small sip of water. I will consult my prescribing physician or call [facility] with specific questions. I also understand that:     I am to report important observations or changes that may occur prior to surgery. If I have any changes in my physical condition, such as a rash, a fever, sore throat, abscess, ulcers, nausea, vomiting, or diarrhea. I am to call the office and I am to consult my primary care physician to assess and treat the problem.  I am not to eat or drink anything after midnight the night before my surgery.  I am not to drink alcoholic beverages 24 hours prior to surgery.  I am not to do any illegal drugs prior to surgery.  I am not to smoke at least 24 hours prior to surgery.  I am able and will shower or bathe before surgery. I will use the Hibiclens solution on my surgical site only. The hibiclens directions are one packet a day starting two days before surgery.  I will remove any nail polish, make-up or jewelry prior to arriving for my surgery.       If I wear glasses, contact lenses or dentures they must be removed prior to going to the operating room.  All body piercing and artifical eye-lashes must be removed prior to surgery     I will not wear any aerosol sprays, perfumes or skin creams.  I am to make arrangements for a family member or friend to accompany me to surgery and take me home after my surgery as I will not be allowed to leave the hospital alone. A cab or bus will not be acceptable. Please make arrange for someone to stay with you for 24 hours after surgery.  Patient has expressed understanding of the diagnosis, treatment and planned surgery        Surgery: What to Expect at [address]  This care sheet gives you a general idea about how long it will take for you to recover from your surgery. But each person recovers at a different pace. How can you care for yourself at home? Activity  · Allow your body to heal. Don't move quickly or lift anything heavy until you are feeling better. · Rest when you feel tired. · Your doctor may give you specific instructions on when you can do your normal activities again, such as driving and going back to work. · Be active. Walking is a good choice. Diet  · You can eat your normal diet when you feel well. If your stomach is upset, try bland, low-fat foods like plain rice, broiled chicken, toast, and yogurt. · If your bowel movements are not regular right after surgery, try to avoid constipation and straining. Drink plenty of water. Your doctor may suggest fiber, a stool softener, or a mild laxative. Medicines  · Your doctor will tell you if and when you can restart your medicines. He or she will also give you instructions about taking any new medicines. · If you take blood thinners, such as warfarin (Coumadin), clopidogrel (Plavix), or aspirin, be sure to talk to your doctor. He or she will tell you if and when to start taking those medicines again. Make sure that you understand exactly what your doctor wants you to do. · Be safe with medicines.  Read and follow all instructions on the label. ¨ If the doctor gave you a prescription medicine for pain, take it as prescribed. ¨ If you are not taking a prescription pain medicine, ask your doctor if you can take an over-the-counter medicine. Incision care  · You will have a dressing over the cut (incision). A dressing helps the incision heal and protects it. Your doctor will tell you how to take care of this. · If you have strips of tape on the cut the doctor made, leave the tape on for a week or until it falls off. · If you had stitches, your doctor will tell you when to come back to have them removed. · If you have skin adhesive on the cut, leave it on until it falls off. Skin adhesive is also called liquid stitches. · Change the bandage every day. · Wash the area daily with warm, soapy water, and pat it dry. Don't use hydrogen peroxide or alcohol. They can slow healing. · You may cover the area with a gauze bandage if it oozes fluid or rubs against clothing. · You may shower 24 to 48 hours after surgery. Pat the incision dry. Don't swim or take a bath for the first 2 weeks, or until your doctor tells you it is okay. Follow-up care is a key part of your treatment and safety. Be sure to make and go to all appointments, and call your doctor if you are having problems. It's also a good idea to know your test results and keep a list of the medicines you take. When should you call for help? Call 911 anytime you think you may need emergency care. For example, call if:  · You passed out (lost consciousness). · You have severe trouble breathing. · You have sudden chest pain and shortness of breath, or you cough up blood. Call your doctor now or seek immediate medical care if:  · You have pain that does not get better after you take pain medicine. · You have loose stitches, or your incision comes open. · You are bleeding through your dressing.  A small amount of blood is normal.  · You have signs of infection, such as:  ¨ Increased pain, swelling, warmth, or redness. ¨ Red streaks leading from the incision. ¨ Pus draining from the incision. ¨ A fever. · You have symptoms of a blood clot in your arm or leg (called a deep vein thrombosis). These may include:  ¨ Pain in your calf, back of the knee, thigh, or groin. ¨ Redness and swelling in the arm, leg, or groin. Watch closely for any changes in your health, and be sure to contact your doctor if:  · You do not have a bowel movement after taking a laxative. Where can you learn more? Go to http://leon-edinson.info/  Enter A708 in the search box to learn more about \"Surgery: What to Expect at Home. \"  © 2874-5295 [employer]. Care instructions adapted under license by [employer] (which disclaims liability or warranty for this information). This care instruction is for use with your licensed healthcare professional. If you have questions about a medical condition or this instruction, always ask your healthcare professional. [place] [address] any warranty or liability for your use of this information. Content Version: 63.5.357081; Current as of: November 20, 2015          Acute Pain After Surgery: Care Instructions  Your Care Instructions      It's common to have some pain after surgery. Pain doesn't mean that something is wrong or that the surgery didn't go well. But when the pain is severe, it's important to work with your doctor to manage it. It's also important to be aware of a few facts about pain and pain medicine. · You are the only person who knows what your pain feels like. So be sure to tell your doctor when you are in pain or when the pain changes. Then he or she will know how to adjust your medicines. · Pain is often easier to control right after it starts. So it may be better to take regular doses of pain medicine and not wait until the pain gets bad. · Medicine can help control pain.  But this doesn't mean you'll have no pain. Medicine works to keep the pain at a level you can live with. With time, you will feel better. Follow-up care is a key part of your treatment and safety. Be sure to make and go to all appointments, and call your doctor if you are having problems. It's also a good idea to know your test results and keep a list of the medicines you take. How can you care for yourself at home? · Be safe with medicines. Read and follow all instructions on the label. ¨ If the doctor gave you a prescription medicine for pain, take it as prescribed. ¨ If you are not taking a prescription pain medicine, ask your doctor if you can take an over-the-counter medicine. · If you take an over-the-counter pain medicine, such as acetaminophen (Tylenol), ibuprofen (Advil, Motrin), or naproxen (Aleve), read and follow all instructions on the label. · Do not take two or more pain medicines at the same time unless the doctor told you to. · Do not drink alcohol while you are taking pain medicines. · Try to walk each day if your doctor recommends it. Start by walking a little more than you did the day before. Bit by bit, increase the amount you walk. Walking increases blood flow. It also helps prevent pneumonia and constipation. · To prevent constipation from opioid pain medicines:  ¨ Talk to your doctor about a laxative. ¨ Include fruits, vegetables, beans, and whole grains in your diet each day. These foods are high in fiber. ¨ Drink plenty of fluids, enough so that your urine is light yellow or clear like water. Drink water, fruit juice, or other drinks that do not contain caffeine or alcohol. If you have kidney, heart, or liver disease and have to limit fluids, talk with your doctor before you increase the amount of fluids you drink. ¨ Take a fiber supplement, such as Citrucel or Metamucil, every day if needed. Read and follow all instructions on the label.  If you take pain medicine for more than a few days, talk to your doctor before you take fiber. When should you call for help? Call your doctor now or seek immediate medical care if:  ? · Your pain gets worse. ? · Your pain is not controlled by medicine. ? Watch closely for changes in your health, and be sure to contact your doctor if you have any problems. Where can you learn more? Go to http://leon-edinson.info/. Enter [phone] in the search box to learn more about \"Acute Pain After Surgery: Care Instructions. \"  Current as of: March 20, 2017  Content Version: 11.4  © 9928-7209 [employer]. Care instructions adapted under license by [employer] (which disclaims liability or warranty for this information). If you have questions about a medical condition or this instruction, always ask your healthcare professional. [address] any warranty or liability for your use of this information. Ingrown Toenail: Care Instructions  Your Care Instructions    An ingrown toenail often occurs because a nail is not trimmed correctly or because shoes are too tight. An ingrown nail can cause an infection. If your toe is infected, your doctor may prescribe antibiotics. Most ingrown toenails can be treated at home. You should trim toenails straight across, so the ends of the nail grow over the skin and not into it. Good nail care can prevent ingrown toenails. Follow-up care is a key part of your treatment and safety. Be sure to make and go to all appointments, and call your doctor if you are having problems. It's also a good idea to know your test results and keep a list of the medicines you take. How can you care for yourself at home? · Trim the nails straight across. Leave the corners a little longer so they do not cut into the skin. To do this when you have an ingrown nail:  ¨ Soak your foot in warm water for about 15 minutes to soften the nail.   ¨ Wedge a small piece of wet cotton under the corner of the nail to cushion the nail and lift it slightly. This keeps it from cutting the skin. ¨ Repeat daily until the nail has grown out and can be trimmed. · Do not use manicure scissors to dig under the ingrown nail. You might stab your toe, which could get infected. · Do not trim your toenails too short. · Check with your doctor before trimming your own toenails if you have been diagnosed with diabetes or peripheral arterial disease. These conditions increase the risk of an infection, because you may have decreased sensation in your toes and cut yourself without knowing it. · Wear roomy, comfortable shoes. · If your doctor prescribed antibiotics, take them as directed. Do not stop taking them just because you feel better. You need to take the full course of antibiotics. When should you call for help? Call your doctor now or seek immediate medical care if:  ? · You have signs of infection, such as:  ¨ Increased pain, swelling, warmth, or redness. ¨ Red streaks leading from the toe. ¨ Pus draining from the toe. ¨ A fever. ? Watch closely for changes in your health, and be sure to contact your doctor if:  ? · You do not get better as expected. Where can you learn more? Go to http://leon-edinson.info/. Enter R135 in the search box to learn more about \"Ingrown Toenail: Care Instructions. \"  Current as of: October 13, 2016  Content Version: 11.4  © 7848-0476 [employer]. Care instructions adapted under license by [employer] (which disclaims liability or warranty for this information). If you have questions about a medical condition or this instruction, always ask your healthcare professional. [place] [address] any warranty or liability for your use of this information.

## 2018-05-21 NOTE — PROGRESS NOTES
AMBULATORY PROGRESS NOTE      Patient: Edson Ponce             MRN: [id number]     SSN: [id number] Body mass index is 27.21 kg/(m^2). YOB: 1950     AGE: 79 y.o. EX: female    PCP: Dee Bowen MD    IMPRESSION/DIAGNOSIS AND TREATMENT PLAN     DIAGNOSES    1. Ingrown nail of great toe of left foot    2. Ingrown nail of second toe of left foot    3. Right foot pain    4. Pre-op exam        Orders Placed This Encounter    Generic Supply Order    [27250] Foot Min 3V    [54921] Foot 2V    PROTHROMBIN TIME + INR    oxyCODONE-acetaminophen (PERCOCET) 5-325 mg per tablet    promethazine (PHENERGAN) 25 mg tablet      Edson Ponce understands her diagnoses and the proposed plan. Plan:    1) Follow up with Sharif Bowens for Pre-Op scheduling. RTO - After Surgery    HPI AND EXAMINATION     Serena Pagan IS A 79 y.o. female who presents to my outpatient office for follow up of a fungal infection of the toenail, and left foot pain. At last visit, I scheduled surgery for a partial nail plate removal of the grea toe. Since we saw her last, Ms. Diaz Reed notes she has ingrowing of her second and third toenails. She was previously interested in having her nail plates removed in the past but was unable to due to personal reasons. At this time, she would like to have her nail plate removed along her first and second toes to help with the discomfort that the ingrown nails cause. Surgical procedures for intervention have been discussed with the patient. Left ANKLE and FOOT         Gait: Normal   Tenderness: mild tenderness along the medial great toe MTP. Cutaneous: No rashes, skin patches, wounds, or abrasions to the lower legs                       Warm and Normal color.  No regions of expressible drainage.                       Medial Border of [place] Region: absent                       Skin color, texture, turgor normal. Normal.                       Great toe nail plate fungal infection.                       Hypertrophic great toe nail plate and second toe. Slight thickening of the nailplate of the third toe. Joint Motion: ROM Ankle:Normal , Hindfoot: (ST,TN,CC Normal}, Forefoot toes:Normal  Neurologic Exam: Neuro: Motor: normal 5/5 strength in all tested muscle groups and Sensory : no sensory deficits noted. Contractures: Gastrocnemius or Achilles Contractures absent  Joint / Tendon Stability: No Ankle or Subtalar instability or joint laxity.                                                               No peroneal sublux ability or dislocation  Alignment:  Normal Foot Alignment and  Flexible                                  Bunion deformity  Vascular: Normal Pulses/ NL Capillary refill, No evidence of DVT seen on physical exam.                        No calf swelling, no tenderness to calf muscles. Lymphatic:  No Evidence of Lymphedema. CHART REVIEW     Past Medical History:   Diagnosis Date    Antineoplastic chemotherapy induced anemia     Arrhythmia     Atrial Fib    Arthritis     [facility]     Cancer ([facility] 1999    Breast    Cardiomyopathy [facility]     [facility]ocyt[facility] 10/31/2016    Secondary to Anemia from Chemo    Congestive heart failure (HCC)     Diabetes (HCC)     borderline, no meds    Esophageal cancer ([facility] [facility] 2005    treated with chemo    Heart failure ([facility])     SOB (shortness of breath)      Current Outpatient Prescriptions   Medication Sig    oxyCODONE-acetaminophen (PERCOCET) 5-325 mg per tablet TAKE ONE TO TWO TABLETS PO Q 6 HRS AS NEEDED FOR PAIN **AFTER SURGERY** DO NOT TAKE BEFORE SURGERY  Indications: Pain    promethazine (PHENERGAN) 25 mg tablet Take 1 Tab by mouth every six (6) hours as needed for Nausea.  meclizine (ANTIVERT) 12.5 mg tablet Take 1 Tab by mouth three (3) times daily as needed for up to 10 days.     zolpidem (AMBIEN) 10 mg tablet TAKE 1 TABLET BY MOUTH NIGHTLY AS NEEDED FOR SLEEP. MAX DAILY AMOUNT 10 MG    lidocaine-prilocaine (EMLA) topical cream APPLY OVER PORT 1 HOUR PRIOR TO CHEMOTHERAPY THAN COVER WITH SARAN WRAP    oxyCODONE-acetaminophen (PERCOCET)  mg per tablet Take 1 Tab by mouth every six (6) hours as needed for Pain. Max Daily Amount: 4 Tabs. Indications: Pain, ACUTE POST OP PAIN    ergocalciferol (VITAMIN D2) 50,000 unit capsule Take 1 Cap by mouth every seven (7) days.  topiramate (TOPAMAX) 50 mg tablet     temazepam (RESTORIL) 30 mg capsule     LINZESS 145 mcg cap capsule TAKE 1 CAPSULE BY MOUTH DAILY 30 MINUTES BEFORE BREAKFAST    lisinopril (PRINIVIL, ZESTRIL) 10 mg tablet Take 10 mg by mouth daily. Indications: hypertension    loratadine 10 mg cap Take 10 mg by mouth daily.  digoxin (LANOXIN) 0.125 mg tablet Take 0.125 mg by mouth daily.  carvedilol (COREG) 3.125 mg tablet Take 3.125 mg by mouth two (2) times daily (with meals).  meloxicam (MOBIC) 7.5 mg tablet Take 7.5 mg by mouth daily.  amiodarone (CORDARONE) 200 mg tablet Take 200 mg by mouth daily. Indications: PREVENTION OF VENTRICULAR FIBRILLATION    gabapentin (NEURONTIN) 300 mg capsule Take 1 po q am and 2 po q pm (Patient taking differently: Take 300 mg by mouth two (2) times a day. Take 1 po q am and 2 po q pm)    rivaroxaban (XARELTO) 10 mg tablet Take 10 mg by mouth daily.  magic mouthwash solution Take 5 mL by mouth three (3) times daily as needed for Stomatitis. Magic mouth wash   Maalox  Lidocaine 2% viscous   Diphenhydramine oral solution     Pharmacy to mix equal portions of ingredients to a total volume as indicated in the dispense amount.  furosemide (LASIX) 40 mg tablet Take 40 mg by mouth daily. Indications: Edema    dronabinol (MARINOL) 5 mg capsule Take 1 Cap by mouth two (2) times a day.     KLOR-CON M20 20 mEq tablet TAKE ONE TABLET BY MOUTH ONCE A DAY    aluminum-magnesium hydroxide 200-200 mg/5 mL susp 5 mL, diphenhydrAMINE 12.5 mg/5 mL liqd 12.5 mg, lidocaine 2 % soln 5 mL 5 mL by Swish and Spit route two (2) times a day. Magic mouth wash   Maalox  Lidocaine 2% viscous   Diphenhydramine oral solution     Pharmacy to mix equal portions of ingredients to a total volume as indicated in the dispense amount.  potassium chloride (K-DUR, KLOR-CON) 20 mEq tablet Take 2 Tabs by mouth daily.  albuterol (PROAIR HFA) 90 mcg/actuation inhaler 1-2 puffs every 4-6 hrs. (Patient taking differently: Take 2 Puffs by inhalation every four (4) hours as needed for Shortness of Breath. 1-2 puffs every 4-6 hrs.)    senna (SENNA) 8.6 mg tablet Take 1 Tab by mouth daily.  nabumetone (RELAFEN) 750 mg tablet Take 750 mg by mouth daily. Indications: OSTEOARTHRITIS    ondansetron hcl (ZOFRAN) 8 mg tablet Take 1 Tab by mouth every eight (8) hours as needed for Nausea.  IRON AG&FUM/C/FA/MV CMB11/CA-T (PRUVATE 21-7 PO) Take 325 mg by mouth daily. Indications: iron deficiency     No current facility-administered medications for this visit. Allergies   Allergen Reactions    Aspirin Swelling     Pt states her whole body swells when she takes aspirin.  Adhesive Unable to Obtain    Nickel Rash    Pcn [Penicillins] Rash     Past Surgical History:   Procedure Laterality Date    BREAST SURGERY PROCEDURE UNLISTED      COLONOSCOPY N/A 7/27/2016    COL[facility]COPY [facility]    HX APPENDECTOMY      HX HEENT      Tonsillectomy    HX ORTHOPAEDIC      HX TUBAL LIGATION      HX VASCULAR ACCESS      NEUROLOGICAL PROCEDURE UNLISTED      Lumbar sx     Social History     Occupational History    Not on file. Social History Main Topics    Smoking status: Never Smoker    Smokeless tobacco: Never Used    Alcohol use No    Drug use: No    Sexual activity: Not on file     History reviewed. No pertinent family history.     REVIEW OF SYSTEMS : 5/21/2018  ALL BELOW ARE Negative except : SEE HPI       Constitutional: Negative for fever, chills and weight loss. Neg Weigh Loss  Cardiovascular: Negative for chest pain, claudication and leg swelling. SOB, MOODY   Gastrointestinal: Negative for  pain, N/V/D/C, Blood in stool or urine,dysuria, hematuria,        Incontinence, pelvic pain  Musculoskeletal: see HPI. Neurological: Negative for dizziness and weakness. Negative for headaches,Visual Changes, Confusion, Seizures,   Psychiatric/Behavioral: Negative for depression, memory loss and substance abuse. Extremities:  Negative for  hair changes, rash or skin lesion changes. Hematologic: Negative for Bleeding problems, bruising, pallor or swollen lymph nodes. Peripheral Vascular: No calf pain, vascular vein tenderness to calf pain              No calf throbbing, posterior knee throbbing pain    DIAGNOSTIC IMAGING     No notes on file    Written by Hiram Rosales, as dictated by Urmila Barclay MD. I, , Urmila Barclay MD, confirm that all documentation is accurate.

## 2018-05-21 NOTE — H&P
FOOT AND ANKLE HISTORY AND PHYSICAL      Patient: Ahsan Tay                   MRN: [id number]         SSN: [id number]  YOB: 1950                AGE: 79 y.o. SEX: female    Patient scheduled for:  Partial nail plate excision of left great toe and second toe  Date of surgery: 5/25/18   [facility]   Surgeon: Hailee Crowley. MD Ross  ANESTHESIA TYPE:  General,           PRESCRIPTIONS AND/OR ORDERS PROVIDED DURING H&P:    Orders Placed This Encounter    Generic Supply Order    [63070] Foot Min 3V    [30387] Foot 2V    PROTHROMBIN TIME + INR    oxyCODONE-acetaminophen (PERCOCET) 5-325 mg per tablet    promethazine (PHENERGAN) 25 mg tablet              HISTORY:     The patient was seen in the office today for a preoperative history and physical for an upcoming above listed surgery. The patient is a pleasant 79 y.o. female who has a history of  fungal infection of the toenail, and left foot pain. Since we saw her last, Ms. Ny Haro notes she has ingrowing of her second and third toenails. She was previously interested in having her nail plates removed in the past but was unable to due to personal reasons. At this time, she would like to have her nail plate removed along her first and second toes to help with the discomfort that the ingrown nails cause. Surgical procedures for intervention have been discussed with the patient.     Due to the current findings, affected activity of daily living and continued pain and discomfort, surgical intervention is indicated.  The alternatives, risks, and complications, including but not limited to infection, blood loss, need for blood transfusion, neurovascular damage, long-incisional numbness, subcutaneous hematoma, bone fracture, anesthetic complications, DVT, PE, death, RSD, postoperative stiffness and pain, possible surgical scar, delayed healing and nonhealing, reflexive sympathetic dystrophy, damage to blood vessels and nerves, need for more surgery, MI, and stroke have been discussed. The patient understands and wishes to proceed with surgery. Patient states that she has has a history of breast cancer and has had chemo treatment x 4. She has been diagnosed with CHF. She is followed by Dr. Dang Varela in cardiology for Afib. We will contact Dr. Dang Varela for guidance on holding her Xarelto blood thinner prior to surgery. PAST MEDICAL HISTORY:     Past Medical History:   Diagnosis Date    Antineoplastic chemotherapy induced anemia     Arrhythmia     Atrial Fib    Arthritis     [facility]     Cancer ([facility] 1999    Breast    [facility]     Chronic ITP (idiopathic thrombocytopenia) (HCC) 10/31/2016    Secondary to Anemia from Chemo    Congestive heart failure (HCC)     Diabetes (HCC)     borderline, no meds    [facility]    treated with chemo    Heart failure (HCC)     SOB (shortness of breath)        CURRENT MEDICATIONS:     Current Outpatient Prescriptions   Medication Sig Dispense Refill    oxyCODONE-acetaminophen (PERCOCET) 5-325 mg per tablet TAKE ONE TO TWO TABLETS PO Q 6 HRS AS NEEDED FOR PAIN **AFTER SURGERY** DO NOT TAKE BEFORE SURGERY  Indications: Pain 50 Tab 0    promethazine (PHENERGAN) 25 mg tablet Take 1 Tab by mouth every six (6) hours as needed for Nausea. 30 Tab 0    meclizine (ANTIVERT) 12.5 mg tablet Take 1 Tab by mouth three (3) times daily as needed for up to 10 days. 90 Tab 3    zolpidem (AMBIEN) 10 mg tablet TAKE 1 TABLET BY MOUTH NIGHTLY AS NEEDED FOR SLEEP. MAX DAILY AMOUNT 10 MG 30 Tab 2    lidocaine-prilocaine (EMLA) topical cream APPLY OVER PORT 1 HOUR PRIOR TO CHEMOTHERAPY THAN COVER WITH SARAN WRAP 30 g 6    oxyCODONE-acetaminophen (PERCOCET)  mg per tablet Take 1 Tab by mouth every six (6) hours as needed for Pain. Max Daily Amount: 4 Tabs.  Indications: Pain, ACUTE POST OP PAIN 120 Tab 0    ergocalciferol (VITAMIN D2) 50,000 unit capsule Take 1 Cap by mouth every seven (7) days. 12 Cap 1    topiramate (TOPAMAX) 50 mg tablet   5    temazepam (RESTORIL) 30 mg capsule   1    LINZESS 145 mcg cap capsule TAKE 1 CAPSULE BY MOUTH DAILY 30 MINUTES BEFORE BREAKFAST  0    lisinopril (PRINIVIL, ZESTRIL) 10 mg tablet Take 10 mg by mouth daily. Indications: hypertension      loratadine 10 mg cap Take 10 mg by mouth daily.  digoxin (LANOXIN) 0.125 mg tablet Take 0.125 mg by mouth daily.  carvedilol (COREG) 3.125 mg tablet Take 3.125 mg by mouth two (2) times daily (with meals).  meloxicam (MOBIC) 7.5 mg tablet Take 7.5 mg by mouth daily.  amiodarone (CORDARONE) 200 mg tablet Take 200 mg by mouth daily. Indications: PREVENTION OF VENTRICULAR FIBRILLATION      gabapentin (NEURONTIN) 300 mg capsule Take 1 po q am and 2 po q pm (Patient taking differently: Take 300 mg by mouth two (2) times a day. Take 1 po q am and 2 po q pm) 90 Cap 1    rivaroxaban (XARELTO) 10 mg tablet Take 10 mg by mouth daily.  magic mouthwash solution Take 5 mL by mouth three (3) times daily as needed for Stomatitis. Magic mouth wash   Maalox  Lidocaine 2% viscous   Diphenhydramine oral solution     Pharmacy to mix equal portions of ingredients to a total volume as indicated in the dispense amount. 236 mL 0    furosemide (LASIX) 40 mg tablet Take 40 mg by mouth daily. Indications: Edema      dronabinol (MARINOL) 5 mg capsule Take 1 Cap by mouth two (2) times a day. 60 Cap 1    KLOR-CON M20 20 mEq tablet TAKE ONE TABLET BY MOUTH ONCE A DAY 30 Tab 3    aluminum-magnesium hydroxide 200-200 mg/5 mL susp 5 mL, diphenhydrAMINE 12.5 mg/5 mL liqd 12.5 mg, lidocaine 2 % soln 5 mL 5 mL by Swish and Spit route two (2) times a day. Magic mouth wash   Maalox  Lidocaine 2% viscous   Diphenhydramine oral solution     Pharmacy to mix equal portions of ingredients to a total volume as indicated in the dispense amount.  240 mL 1    potassium chloride (K-DUR, KLOR-CON) 20 mEq tablet Take 2 Tabs by mouth daily. 30 Tab 3    albuterol (PROAIR HFA) 90 mcg/actuation inhaler 1-2 puffs every 4-6 hrs. (Patient taking differently: Take 2 Puffs by inhalation every four (4) hours as needed for Shortness of Breath. 1-2 puffs every 4-6 hrs.) 1 Inhaler 1    senna (SENNA) 8.6 mg tablet Take 1 Tab by mouth daily.  nabumetone (RELAFEN) 750 mg tablet Take 750 mg by mouth daily. Indications: OSTEOARTHRITIS      ondansetron hcl (ZOFRAN) 8 mg tablet Take 1 Tab by mouth every eight (8) hours as needed for Nausea. 30 Tab 3    IRON AG&FUM/C/FA/MV CMB11/CA-T (PRUVATE 21-7 PO) Take 325 mg by mouth daily. Indications: iron deficiency         ALLERGIES:     Allergies   Allergen Reactions    Aspirin Swelling     Pt states her whole body swells when she takes aspirin.  Adhesive Unable to Obtain    Nickel Rash    Pcn [Penicillins] Rash         SURGICAL HISTORY:     Past Surgical History:   Procedure Laterality Date    BREAST SURGERY PROCEDURE UNLISTED      COLONOSCOPY N/A 7/27/2016    COL[facility] ENDOSCOPY    HX APPENDECTOMY      HX HEENT      Tonsillectomy    HX ORTHOPAEDIC      HX TUBAL LIGATION      HX VASCULAR ACCESS      NEUROLOGICAL PROCEDURE UNLISTED      Lumbar sx       SOCIAL HISTORY:     Social History     Social History    Marital status:      Spouse name: N/A    Number of children: N/A    Years of education: N/A     Social History Main Topics    Smoking status: Never Smoker    Smokeless tobacco: Never Used    Alcohol use No    Drug use: No    Sexual activity: Not Asked     Other Topics Concern    None     Social History Narrative       FAMILY HISTORY:     History reviewed. No pertinent family history. REVIEW OF SYSTEMS:     Negative for fevers, chills, chest pain, shortness of breath, weight loss, recent illness     General: Negative for fever and chills. No unexpected change in weight. Denies fatigue. No change in appetite. Skin: Negative for rash or itching. HEENT: Negative for congestion, sore throat, neck pain and neck stiffness. No change in vision or hearing. Hasn't noted any enlarged lymph nodes in the neck. Cardiovascular:  Negative for chest pain and palpitations. Has not noted pedal edema. Respiratory: Negative for cough, colds, sinus, hemoptysis, shortness of breath and wheezing. Gastrointestinal: Negative for nausea and vomiting, rectal bleeding, coffee ground emesis, abdominal pain, diarrhea and constipation. Genitourinary: Negative for dysuria, frequency urgency, or burning on micturition. No flank pain, no foul smelling urine, no difficulty with initiating urination. Hematological: Negative for bleeding or easy bruising. Musculoskeletal: Negative  for arthralgias, back pain or neck pain. Neurological: Negative for dizziness, seizures or syncopal episodes. Denies headaches. Endocrine: Denies excessive thirst.  No heat/cold intolerance. Psychiatric: Negative for depression or insomnia. PHYSICAL EXAMINATION:     VITALS:   Visit Vitals    /68    Pulse (!) 58    Temp 97.1 °F (36.2 °C) (Oral)    Resp 16    Ht 5' 6\" (1.676 m)    Wt 168 lb 9.6 oz (76.5 kg)    SpO2 97%    BMI 27.21 kg/m2       GEN:  Well developed, well nourished 79 y.o. female in no acute distress. PSYCH: Alert an oriented to person, place and time. Mood, memory, affect, behavior and judgment normal  HEENT: Normocephalic and atraumatic. Eyes: Conjunctivae and EOM are normal.Pupils are equal, round, and reactive to light. External ear normal appearance, external nose normal appearing. Mouth/Throat: Oropharynx is clear and moist, able to handle oral secretions w/out difficulty, airway patent  NECK: Supple. Normal ROM, No lymphadenopathy. Trachea is midline. No bruising, swelling or deformity  RESP: Clear to auscultation bilaterally. No wheezes, rales, rhonchi. Normal effort and breath sounds.  No respiratory distress  CARDIO: Bradycardia noted, normal heart sounds. No MGR. ABDOMEN: Soft, non-tender, non-distended, normoactive bowel sounds in all four quadrants. There is no tenderness. There is no rebound and no guarding. BACK: No CVA or spinal tenderness  BREAST:  Deferred  PELVIC:    Deferred   RECTAL:  Deferred   :           Deferred  EXTREMITIES: EXAMINATION OF:   Left ANKLE and FOOT         Gait: Normal   Tenderness: mild tenderness along the medial great toe MTP. Cutaneous: No rashes, skin patches, wounds, or abrasions to the lower legs                       Warm and Normal color. No regions of expressible drainage.                       Medial Border of [place] Region: absent                       Skin color, texture, turgor normal. Normal.                       Great toe nail plate fungal infection.                       Hypertrophic great toe nail plate and second toe. Slight thickening of the nailplate of the third toe. Joint Motion: ROM Ankle:Normal , Hindfoot: (ST,TN,CC Normal}, Forefoot toes:Normal  Neurologic Exam: Neuro: Motor: normal 5/5 strength in all tested muscle groups and Sensory : no sensory deficits noted. Contractures: Gastrocnemius or Achilles Contractures absent  Joint / Tendon Stability: No Ankle or Subtalar instability or joint laxity.                                                               No peroneal sublux ability or dislocation  Alignment:  Normal Foot Alignment and  Flexible                                  Bunion deformity  Vascular: Normal Pulses/ NL Capillary refill, No evidence of DVT seen on physical exam.                        No calf swelling, no tenderness to calf muscles.   Lymphatic:  No Evidence of Lymphedema.     RADIOGRAPHS & DIAGNOSTIC STUDIES:     No notes on file    LABS:     Pending    Sinus rhythm with 1st degree AV block   Minimal voltage criteria for LVH, may be normal variant   Cannot rule out Anterior infarct (cited on or before 28-DEC-2017)   Abnormal ECG   When compared with ECG of 28-DEC-2017 12:25,   Questionable change in initial forces of Septal leads   Nonspecific T wave abnormality now evident in Inferior leads   Confirmed by Glenn Hilario ([id number]) on 1/13/2018 8:30:44 AM     ASSESSMENT:       Encounter Diagnoses   Name Primary?  Ingrown nail of great toe of left foot Yes    Ingrown nail of second toe of left foot     Right foot pain     Pre-op exam        PLAN:     Again, the alternatives, risks, and complications, as well as expected outcome were discussed. The patient understands and agrees to proceed with the above listed surgery pending completion of labs and discussion of patient upcoming surgery with Dr. Feliberto Lima. Patient has been given Hibiclens wash with instructions and prescriptions and or orders listed above.     Allyssa Landry PA-C  5/21/2018  2:00 PM

## 2018-05-21 NOTE — MR AVS SNAPSHOT
[facility] [address], [address] [facility] 
[phone] Patient: Jose Prado MRN: [id number] Visit Information Date & Time Provider Department Dept. Phone Encounter #  
 5/21/2018  1:10 PM Da Perkins MD [facility] [phone] [id number] Your Appointments 5/22/2018 10:20 AM  
Follow Up with Morenita Lowe NP  
[facility] ([address] [place] [address]) Appt Note: 6 WK F/U NP  
 [address] [place] [address]  
[phone]  
  
   
 [address] [facility] [address] [facility] [address] Upcoming Health Maintenance Date Due Hepatitis C Screening 1950 DTaP/Tdap/Td series (1 - Tdap) 5/14/1973 FOBT Q 1 YEAR AGE 50-75 5/14/2002 ZOSTER VACCINE AGE 60> 3/14/2012 BREAST CANCER SCRN MAMMOGRAM 4/4/2015 GLAUCOMA SCREENING Q2Y 5/14/2017 Bone Densitometry (Dexa) Screening 5/14/2017 Pneumococcal 65+ High/Highest Risk (1 of 2 - PCV13) 5/14/2017 MEDICARE YEARLY EXAM 3/16/2018 Influenza Age 5 to Adult 8/1/2018 Allergies as of 5/21/2018  Review Complete On: 5/18/2018 By: Rosa Saul RN Severity Noted Reaction Type Reactions Aspirin High 08/07/2013   Systemic Swelling Pt states her whole body swells when she takes aspirin. Adhesive    Unable to Obtain Pcn [Penicillins]  08/20/2012    Rash Current Immunizations  Reviewed on 4/20/2018 No immunizations on file. Not reviewed this visit You Were Diagnosed With   
  
 Codes Comments Ingrown nail of great toe of left foot    -  Primary ICD-10-CM: L60.0 ICD-9-CM: 703.0 Ingrown nail of second toe of left foot     ICD-10-CM: L60.0 ICD-9-CM: 703.0 Vitals BP Pulse Temp Resp Height(growth percentile) Weight(growth percentile)  122/68 (!) 58 97.1 °F (36.2 °C) (Oral) 16 5' 6\" (1.676 m) 168 lb 9.6 oz (76.5 kg) SpO2 BMI OB Status Smoking Status 97% 27.21 kg/m2 Postmenopausal Never Smoker Vitals History BMI and BSA Data Body Mass Index Body Surface Area  
 27.21 kg/m 2 1.89 m 2 Your Updated Medication List  
  
   
This list is accurate as of 5/21/18  1:44 PM.  Always use your most recent med list.  
  
  
  
  
 albuterol 90 mcg/actuation inhaler Commonly known as:  PROAIR HFA  
1-2 puffs every 4-6 hrs. aluminum-magnesium hydroxide 200-200 mg/5 mL susp 5 mL, diphenhydrAMINE 12.5 mg/5 mL liqd 12.5 mg, lidocaine 2 % soln 5 mL  
5 mL by Swish and Spit route two (2) times a day. Magic mouth wash  Maalox Lidocaine 2% viscous  Diphenhydramine oral solution   Pharmacy to mix equal portions of ingredients to a total volume as indicated in the dispense amount. amiodarone 200 mg tablet Commonly known as:  CORDARONE Take 200 mg by mouth daily. Indications: PREVENTION OF VENTRICULAR FIBRILLATION  
  
 COREG 3.125 mg tablet Generic drug:  carvedilol Take 3.125 mg by mouth two (2) times daily (with meals). digoxin 0.125 mg tablet Commonly known as:  LANOXIN Take 0.125 mg by mouth daily. dronabinol 5 mg capsule Commonly known as:  Garrie [place] Take 1 Cap by mouth two (2) times a day. ergocalciferol 50,000 unit capsule Commonly known as:  VITAMIN D2 Take 1 Cap by mouth every seven (7) days. furosemide 40 mg tablet Commonly known as:  LASIX Take 40 mg by mouth daily. Indications: Edema  
  
 gabapentin 300 mg capsule Commonly known as:  NEURONTIN Take 1 po q am and 2 po q pm  
  
 lidocaine-prilocaine topical cream  
Commonly known as:  EMLA  
APPLY OVER PORT 1 HOUR PRIOR TO CHEMOTHERAPY THAN COVER WITH [place] WRAP LINZESS 145 mcg Cap capsule Generic drug:  linaclotide TAKE 1 CAPSULE BY MOUTH DAILY 30 MINUTES BEFORE BREAKFAST  
  
 lisinopril 10 mg tablet Commonly known as:  [place]  
 Take 10 mg by mouth daily. Indications: hypertension  
  
 loratadine 10 mg Cap Take 10 mg by mouth daily. magic mouthwash solution Take 5 mL by mouth three (3) times daily as needed for Stomatitis. Magic mouth wash  Maalox Lidocaine 2% viscous  Diphenhydramine oral solution   Pharmacy to mix equal portions of ingredients to a total volume as indicated in the dispense amount. meclizine 12.5 mg tablet Commonly known as:  ANTIVERT Take 1 Tab by mouth three (3) times daily as needed for up to 10 days. meloxicam 7.5 mg tablet Commonly known as:  MOBIC Take 7.5 mg by mouth daily. nabumetone 750 mg tablet Commonly known as:  RELAFEN Take 750 mg by mouth daily. Indications: OSTEOARTHRITIS  
  
 ondansetron hcl 8 mg tablet Commonly known as:  Matthias Mighty Take 1 Tab by mouth every eight (8) hours as needed for Nausea. oxyCODONE-acetaminophen  mg per tablet Commonly known as:  PERCOCET Take 1 Tab by mouth every six (6) hours as needed for Pain. Max Daily Amount: 4 Tabs. Indications: Pain, ACUTE POST OP PAIN * potassium chloride 20 mEq tablet Commonly known as:  K-DUR, KLOR-CON Take 2 Tabs by mouth daily. * KLOR-CON M20 20 mEq tablet Generic drug:  potassium chloride TAKE ONE TABLET BY MOUTH ONCE A DAY  
  
 PRUVATE 21-7 PO Take 325 mg by mouth daily. Indications: iron deficiency Senna 8.6 mg tablet Generic drug:  senna Take 1 Tab by mouth daily. temazepam 30 mg capsule Commonly known as:  RESTORIL  
  
 topiramate 50 mg tablet Commonly known as:  TOPAMAX XARELTO 10 mg tablet Generic drug:  rivaroxaban Take 10 mg by mouth daily. zolpidem 10 mg tablet Commonly known as:  AMBIEN  
TAKE 1 TABLET BY MOUTH NIGHTLY AS NEEDED FOR SLEEP. MAX DAILY AMOUNT 10 MG  
  
 * Notice: This list has 2 medication(s) that are the same as other medications prescribed for you.  Read the directions carefully, and ask your doctor or other care provider to review them with you. To-Do List   
 05/25/2018 8:30 AM  
  Appointment with [facility] NURSE at [facility] ([phone])  
  
 06/01/2018 8:30 AM  
  Appointment with [facility] TR[facility] NURSE at [facility] ([phone]) Patient Instructions Please follow up with Mckenna Orr. You are advised to contact us if your condition worsens. Ingrown Toenail: Care Instructions Your Care Instructions An ingrown toenail often occurs because a nail is not trimmed correctly or because shoes are too tight. An ingrown nail can cause an infection. If your toe is infected, your doctor may prescribe antibiotics. Most ingrown toenails can be treated at home. You should trim toenails straight across, so the ends of the nail grow over the skin and not into it. Good nail care can prevent ingrown toenails. Follow-up care is a key part of your treatment and safety. Be sure to make and go to all appointments, and call your doctor if you are having problems. It's also a good idea to know your test results and keep a list of the medicines you take. How can you care for yourself at home? · Trim the nails straight across. Leave the corners a little longer so they do not cut into the skin. To do this when you have an ingrown nail: 
¨ Soak your foot in warm water for about 15 minutes to soften the nail. ¨ Wedge a small piece of wet cotton under the corner of the nail to cushion the nail and lift it slightly. This keeps it from cutting the skin. ¨ Repeat daily until the nail has grown out and can be trimmed. · Do not use manicure scissors to dig under the ingrown nail. You might stab your toe, which could get infected. · Do not trim your toenails too short. · Check with your doctor before trimming your own toenails if you have been diagnosed with diabetes or peripheral arterial disease.  These conditions increase the risk of an infection, because you may have decreased sensation in your toes and cut yourself without knowing it. · Wear roomy, comfortable shoes. · If your doctor prescribed antibiotics, take them as directed. Do not stop taking them just because you feel better. You need to take the full course of antibiotics. When should you call for help? Call your doctor now or seek immediate medical care if: 
? · You have signs of infection, such as: 
¨ Increased pain, swelling, warmth, or redness. ¨ Red streaks leading from the toe. ¨ Pus draining from the toe. ¨ A fever. ? Watch closely for changes in your health, and be sure to contact your doctor if: 
? · You do not get better as expected. Where can you learn more? Go to http://leon-edinson.info/. Enter R135 in the search box to learn more about \"Ingrown Toenail: Care Instructions. \" Current as of: October 13, 2016 Content Version: 11.4 © 0669-5459 [employer]. Care instructions adapted under license by [employer] (which disclaims liability or warranty for this information). If you have questions about a medical condition or this instruction, always ask your healthcare professional. [place] [address] any warranty or liability for your use of this information. Please provide this summary of care documentation to your next provider. Your primary care clinician is listed as Nadia Ash. If you have any questions after today's visit, please call [phone].

## 2018-05-25 ENCOUNTER — HOSPITAL ENCOUNTER (OUTPATIENT)
Dept: INFUSION THERAPY | Age: 68
Discharge: HOME | End: 2018-05-25
Payer: MEDICARE

## 2018-05-25 VITALS
DIASTOLIC BLOOD PRESSURE: 73 MMHG | HEART RATE: 65 BPM | RESPIRATION RATE: 20 BRPM | TEMPERATURE: 97.8 F | SYSTOLIC BLOOD PRESSURE: 110 MMHG

## 2018-05-25 PROCEDURE — 74011250636 HC RX REV CODE- 250/636: Performed by: INTERNAL MEDICINE

## 2018-05-25 PROCEDURE — 74011000250 HC RX REV CODE- 250: Performed by: INTERNAL MEDICINE

## 2018-05-25 PROCEDURE — 74011250636 HC RX REV CODE- 250/636

## 2018-05-25 PROCEDURE — 96372 THER/PROPH/DIAG INJ SC/IM: CPT

## 2018-05-25 RX ORDER — WATER FOR INJECTION,STERILE
VIAL (ML) INJECTION ONCE
Status: COMPLETED | OUTPATIENT
Start: 2018-05-25 | End: 2018-05-25

## 2018-05-25 RX ADMIN — WATER 1.2 ML: 1 INJECTION INTRAMUSCULAR; INTRAVENOUS; SUBCUTANEOUS at 08:55

## 2018-05-25 RX ADMIN — ROMIPLOSTIM 207 MCG: 250 INJECTION, POWDER, LYOPHILIZED, FOR SOLUTION SUBCUTANEOUS at 08:55

## 2018-05-25 NOTE — PROGRESS NOTES
[facility] Progress Note    Date: May 25, 2018    Name: Amrita Bethea    MRN: [id number]         : 1950      Nplate Injection     Ms. Latricia Luis arrived to [facility] at 2712. Ms. Latricia Luis was assessed and education was provided. Ms. Isabelle Boyd vitals were reviewed. Visit Vitals    /73 (BP 1 Location: Right arm, BP Patient Position: Sitting)    Pulse 65    Temp 97.8 °F (36.6 °C)    Resp 20       Pt was observed for 5 minutes after obtaining vital signs prior to initiating treatment. Per Dr. Marychuy Mims order no CBC due today,     Per Dr. Marychuy Mims order standard dose of NPlate to be given weekly 207mcg. Nplate 492WHQ (5.35DJ) administered as ordered SQ in the back of the patient's right upper arm. No redness or bleeding noted. Ms. Latricia Luis was discharged from [place] [address] in stable condition at 1012. She is to return on 18 at 0830 for her next appointment.     Eliazar Gusman RN  May 25, 2018

## 2018-05-25 NOTE — MR AVS SNAPSHOT
Pt arrived from the or intubated with 7.5 et tube @ 24cm. Et tube secured, pt placed on 840 vent with setting shown on flowsheet.   Visit Information Date & Time Provider Department Dept. Phone Encounter #  
 12/18/2017 10:15 AM Earnstine Bence, MD [facility] On[facility] [phone] [id number] Follow-up Instructions Return in about 3 months (around 3/18/2018). Your Appointments 1/22/2018 10:30 AM  
POST OP with Lucille Guy NP  
[facility] ([facility]) Appt Note: sx 1-8  
 [address] [place] [address]  
[phone]  
  
   
 [address]  
  
    
 2/20/2018 10:15 AM  
POST OP with Taurus Mukherjee MD  
[facility]) Appt Note: 6 wk fu sx 1-8  
 [address] [place] [address]  
[phone]  
  
   
 [address] [facility] [address] Upcoming Health Maintenance Date Due Hepatitis C Screening 1950 DTaP/Tdap/Td series (1 - Tdap) 5/14/1973 FOBT Q 1 YEAR AGE 50-75 5/14/2002 ZOSTER VACCINE AGE 60> 3/14/2012 GLAUCOMA SCREENING Q2Y 5/14/2017 OSTEOPOROSIS SCREENING (DEXA) 5/14/2017 Pneumococcal 65+ High/Highest Risk (1 of 2 - PCV13) 5/14/2017 MEDICARE YEARLY EXAM 5/14/2017 Influenza Age 5 to Adult 8/1/2017 Allergies as of 12/18/2017  Review Complete On: 12/15/2017 By: Thao Lawrence RN Severity Noted Reaction Type Reactions Aspirin High 08/07/2013   Systemic Swelling Pt states her whole body swells when she takes aspirin. Adhesive    Unable to Obtain Pcn [Penicillins]  08/20/2012    Rash Current Immunizations  Reviewed on 11/27/2017 No immunizations on file. Not reviewed this visit You Were Diagnosed With   
  
 Codes Comments Chronic ITP (idiopathic thrombocytopenia) (HCC)    -  Primary ICD-10-CM: A56.4 ICD-9-CM: 287.31  Carcinoma of left breast metastatic to axillary lymph node ([facility] [address] ICD-10-CM: C50.912, C77.3 ICD-9-CM: 174.9, 196.3 Vitamin D deficiency     ICD-10-CM: E55.9 ICD-9-CM: 268.9 Cachexia ([address]     ICD-10-CM: R64 
ICD-9-CM: 799.4 Weakness     ICD-10-CM: R53.1 ICD-9-CM: 780.79 Bilateral lower extremity edema     ICD-10-CM: R60.0 ICD-9-CM: 782.3 Iron deficiency anemia secondary to inadequate dietary iron intake     ICD-10-CM: D50.8 ICD-9-CM: 280. 1 Chronic anemia     ICD-10-CM: D64.9 ICD-9-CM: 796. 9 Vitals BP Pulse Temp Weight(growth percentile) BMI OB Status 116/69 (BP 1 Location: Right arm, BP Patient Position: Sitting) 74 97.1 °F (36.2 °C) (Oral) 165 lb 12.8 oz (75.2 kg) 26.76 kg/m2 Postmenopausal  
 Smoking Status Never Smoker Vitals History BMI and BSA Data Body Mass Index Body Surface Area  
 26.76 kg/m 2 1.87 m 2 Preferred Pharmacy Pharmacy Name [id number] [address] [phone] Your Updated Medication List  
  
   
This list is accurate as of: 12/18/17 10:58 AM.  Always use your most recent med list.  
  
  
  
  
 albuterol 90 mcg/actuation inhaler Commonly known as:  PROAIR HFA  
1-2 puffs every 4-6 hrs. aluminum-magnesium hydroxide 200-200 mg/5 mL susp 5 mL, diphenhydrAMINE 12.5 mg/5 mL liqd 12.5 mg, lidocaine 2 % soln 5 mL  
5 mL by Swish and Spit route two (2) times a day. Magic mouth wash  Maalox Lidocaine 2% viscous  Diphenhydramine oral solution   Pharmacy to mix equal portions of ingredients to a total volume as indicated in the dispense amount. amiodarone 200 mg tablet Commonly known as:  CORDARONE Take 200 mg by mouth. CeleBREX 200 mg capsule Generic drug:  celecoxib Take  by mouth two (2) times a day. COREG 3.125 mg tablet Generic drug:  carvedilol Take 3.125 mg by mouth two (2) times daily (with meals). cyclobenzaprine 5 mg tablet Commonly known as:  FLEXERIL  
  
 digoxin 0.125 mg tablet Commonly known as:  LANOXIN Take 0.125 mg by mouth daily. dronabinol 5 mg capsule Commonly known as:  Manuel Montilla Take 1 Cap by mouth two (2) times a day. furosemide 40 mg tablet Commonly known as:  LASIX Take  by mouth daily. * gabapentin 300 mg capsule Commonly known as:  NEURONTIN Take 300 mg by mouth three (3) times daily. * gabapentin 300 mg capsule Commonly known as:  NEURONTIN Take 1 po q am and 2 po q pm  
  
 lidocaine-prilocaine topical cream  
Commonly known as:  EMLA  
APPLY OVER PORT 1 HOUR PRIOR TO CHEMOTHERAPY THAN COVER WITH SARAN WRAP  
  
 lisinopril 10 mg tablet Commonly known as:  Gissel Pinch Take 10 mg by mouth daily. loratadine 10 mg Cap Take 10 mg by mouth daily. magic mouthwash solution Take 5 mL by mouth three (3) times daily as needed for Stomatitis. Magic mouth wash  Maalox Lidocaine 2% viscous  Diphenhydramine oral solution   Pharmacy to mix equal portions of ingredients to a total volume as indicated in the dispense amount. meloxicam 7.5 mg tablet Commonly known as:  MOBIC Take 7.5 mg by mouth daily. nabumetone 750 mg tablet Commonly known as:  RELAFEN  
  
 ondansetron hcl 8 mg tablet Commonly known as:  ZOFRAN (AS HYDROCHLORIDE) Take 1 Tab by mouth every eight (8) hours as needed for Nausea. oxyCODONE-acetaminophen 7.5-325 mg per tablet Commonly known as:  PERCOCET Take 1 tablet every 6 hours as needed for pain * potassium chloride 20 mEq tablet Commonly known as:  K-DUR, KLOR-CON Take 2 Tabs by mouth daily. * KLOR-CON M20 20 mEq tablet Generic drug:  potassium chloride TAKE ONE TABLET BY MOUTH ONCE A DAY  
  
 PRUVATE 21-7 PO Take  by mouth. Senna 8.6 mg tablet Generic drug:  senna Take 1 Tab by mouth daily. XARELTO 10 mg tablet Generic drug:  rivaroxaban Take 10 mg by mouth daily. zolpidem 10 mg tablet Commonly known as:  AMBIEN  
TAKE 1 TABLET BY MOUTH NIGHTLY AS NEEDED FOR SLEEP *MAX DAILY AMOUNT 10MG* * Notice: This list has 4 medication(s) that are the same as other medications prescribed for you. Read the directions carefully, and ask your doctor or other care provider to review them with you. Prescriptions Printed Refills  
 oxyCODONE-acetaminophen (PERCOCET) 7.5-325 mg per tablet 0 Sig: Take 1 tablet every 6 hours as needed for pain  
 Class: Print We Performed the Following COMPLETE CBC & AUTO DIFF WBC [22886 CPT(R)] Follow-up Instructions Return in about 3 months (around 3/18/2018). To-Do List   
 12/18/2017 Lab:  CBC WITH 3 PART DIFF   
  
 12/22/2017 8:30 AM  
  Appointment with [facility] FAST TRACK NURSE at [facility] OP INFUSION ([phone]) Patient Instructions Breast Cancer: Care Instructions Your Care Instructions Breast cancer occurs when abnormal cells grow out of control in the breast. These cancer cells can spread within the breast, to nearby lymph nodes and other tissues, and to other parts of the body. Being treated for cancer can weaken your body, and you may feel very tired. Get the rest your body needs so you can feel better. Finding out that you have cancer is scary. You may feel many emotions and may need some help coping. Seek out family, friends, and counselors for support. You also can do things at home to make yourself feel better while you go through treatment. Call the [employer] (6-755.720.3070) or visit its website at [address] [employer] for more information. Follow-up care is a key part of your treatment and safety. Be sure to make and go to all appointments, and call your doctor if you are having problems. It's also a good idea to know your test results and keep a list of the medicines you take. How can you care for yourself at home? · Take your medicines exactly as prescribed. Call your doctor if you think you are having a problem with your medicine. You may get medicine for nausea and vomiting if you have these side effects. · Follow your doctor's instructions to relieve pain. Pain from cancer and surgery can almost always be controlled. Use pain medicine when you first notice pain, before it becomes severe. · Eat healthy food. If you do not feel like eating, try to eat food that has protein and extra calories to keep up your strength and prevent weight loss. Drink liquid meal replacements for extra calories and protein. Try to eat your main meal early. · Get some physical activity every day, but do not get too tired. Keep doing the hobbies you enjoy as your energy allows. · Do not smoke. Smoking can make your cancer worse. If you need help quitting, talk to your doctor about stop-smoking programs and medicines. These can increase your chances of quitting for good. · Take steps to control your stress and workload. Learn relaxation techniques. ¨ Share your feelings. Stress and tension affect our emotions. By expressing your feelings to others, you may be able to understand and cope with them. ¨ Consider joining a support group. Talking about a problem with your spouse, a good friend, or other people with similar problems is a good way to reduce tension and stress. ¨ Express yourself through art. Try writing, crafts, dance, or art to relieve stress. Some dance, writing, or art groups may be available just for people who have cancer. ¨ Be kind to your body and mind. Getting enough sleep, eating a healthy diet, and taking time to do things you enjoy can contribute to an overall feeling of balance in your life and can help reduce stress. ¨ Get help if you need it. Discuss your concerns with your doctor or counselor. · If you are vomiting or have diarrhea: ¨ Drink plenty of fluids (enough so that your urine is light yellow or clear like water) to prevent dehydration. Choose water and other caffeine-free clear liquids. If you have kidney, heart, or liver disease and have to limit fluids, talk with your doctor before you increase the amount of fluids you drink. ¨ When you are able to eat, try clear soups, mild foods, and liquids until all symptoms are gone for 12 to 48 hours. Other good choices include dry toast, crackers, cooked cereal, and gelatin dessert, such as Jell-O. · If you have not already done so, prepare a list of advance directives. Advance directives are instructions to your doctor and family members about what kind of care you want if you become unable to speak or express yourself. When should you call for help? Call 911 anytime you think you may need emergency care. For example, call if: 
? · You passed out (lost consciousness). ?Call your doctor now or seek immediate medical care if: 
? · You have a fever. ? · You have abnormal bleeding. ? · You think you have an infection. ? · You have new or worse pain. ? · You have new symptoms, such as a cough, belly pain, vomiting, diarrhea, or a rash. ? Watch closely for changes in your health, and be sure to contact your doctor if: 
? · You are much more tired than usual.  
? · You have swollen glands in your armpits, groin, or neck. ? · You do not get better as expected. Where can you learn more? Go to http://leon-edinson.info/. Enter V321 in the search box to learn more about \"Breast Cancer: Care Instructions. \" Current as of: May 12, 2017 Content Version: 11.4 © 3941-0458 [employer]. Care instructions adapted under license by [employer] (which disclaims liability or warranty for this information). If you have questions about a medical condition or this instruction, always ask your healthcare professional. [address] any warranty or liability for your use of this information. Introducing [facility]! Dario Barrios introduces [employer] patient portal. Now you can access parts of your medical record, email your doctor's office, and request medication refills online. 1. In your internet browser, go to [email or web address] 2. Click on the First Time User? Click Here link in the Sign In box. You will see the New Member Sign Up page. 3. Enter your [employer] Access Code exactly as it appears below. You will not need to use this code after youve completed the sign-up process. If you do not sign up before the expiration date, you must request a new code. · [employer] Access Code: [email or web address] Expires: 12/20/2017  9:36 AM 
 
4. Enter the last four digits of your Social Security Number (xxxx) and Date of Birth (mm/dd/yyyy) as indicated and click Submit. You will be taken to the next sign-up page. 5. Create a [employer] ID. This will be your [employer] login ID and cannot be changed, so think of one that is secure and easy to remember. 6. Create a [employer] password. You can change your password at any time. 7. Enter your Password Reset Question and Answer. This can be used at a later time if you forget your password. 8. Enter your e-mail address. You will receive e-mail notification when new information is available in [address] 9. Click Sign Up. You can now view and download portions of your medical record. 10. Click the Download Summary menu link to download a portable copy of your medical information. If you have questions, please visit the Frequently Asked Questions section of the [employer] website. Remember, [employer] is NOT to be used for urgent needs. For medical emergencies, dial 911. Now available from your iPhone and Android! Please provide this summary of care documentation to your next provider. Your primary care clinician is listed as Clarence Almanza. If you have any questions after today's visit, please call [phone].

## 2018-05-31 ENCOUNTER — ANESTHESIA EVENT (OUTPATIENT)
Dept: SURGERY | Age: 68
End: 2018-05-31
Payer: MEDICARE

## 2018-05-31 ENCOUNTER — HOSPITAL ENCOUNTER (OUTPATIENT)
Dept: PHYSICAL THERAPY | Age: 68
Discharge: HOME | End: 2018-05-31
Payer: MEDICARE

## 2018-05-31 PROCEDURE — 97110 THERAPEUTIC EXERCISES: CPT

## 2018-05-31 PROCEDURE — G8980 MOBILITY D/C STATUS: HCPCS

## 2018-05-31 PROCEDURE — G8979 MOBILITY GOAL STATUS: HCPCS

## 2018-05-31 NOTE — PROGRESS NOTES
PT DAILY TREATMENT NOTE - [facility]     Patient Name: Norah Kapoor  Date:2018  : 1950  [x]  Patient  Verified  Payor: [place] MEDICARE / Plan: VA MEDICARE PART A & B / Product Type: Medicare /    In time:1:25  Out time:2:04  Total Treatment Time (min): 39  Total Timed Codes (min): 39  1:1 Treatment Time ( [address] only): 44   Visit #: 2 of 6    Treatment Area: Right leg weakness [R29.898]    SUBJECTIVE  Pain Level (0-10 scale): 3  Any medication changes, allergies to medications, adverse drug reactions, diagnosis change, or new procedure performed?: [x] No    [] Yes (see summary sheet for update)  Subjective functional status/changes:   [] No changes reported  Pt reports that she has been going to the gym more regularly. OBJECTIVE        Min Type Additional Details    [] Estim:  []Unatt       []IFC  []Premod                        []Other:  []w/ice   []w/heat  Position:  Location:    [] Estim: []Att    []TENS instruct  []NMES                    []Other:  []w/US   []w/ice   []w/heat  Position:  Location:    []  Traction: [] Cervical       []Lumbar                       [] Prone          []Supine                       []Intermittent   []Continuous Lbs:  [] before manual  [] after manual    []  Ultrasound: []Continuous   [] Pulsed                           []1MHz   []3MHz W/cm2:  Location:    []  Iontophoresis with dexamethasone         Location: [] Take home patch   [] In clinic    []  Ice     []  heat  []  Ice massage  []  Laser   []  Anodyne Position:  Location:    []  Laser with stim  []  Other:  Position:  Location:    []  Vasopneumatic Device Pressure:       [] lo [] med [] hi   Temperature: [] lo [] med [] hi   [] Skin assessment post-treatment:  []intact []redness- no adverse reaction    []redness  adverse reaction:       39 min Therapeutic Exercise:  [x] See flow sheet :   Rationale: increase ROM and increase strength to improve the patients ability to increase ease with ADLs.                 With   [] TE   [] TA   [] neuro   [] other: Patient Education: [x] Review HEP    [] Progressed/Changed HEP based on:   [] positioning   [] body mechanics   [] transfers   [] heat/ice application    [] other:      Other Objective/Functional Measures: see goals. Pain Level (0-10 scale) post treatment: 4    ASSESSMENT/Changes in Function: Pt reports 75[place] [facility]. Pt repots she feels she can ambulate without Assistive device and is more confident with ambulation. Pt reports she has been more consistent with going to the gym and performing exercises. Pt has progressed bridges through full AROM without pain. Pt is able to ambulate 250 ft with no AD with normal heel toe gait. []  See Plan of Care  []  See progress note/recertification  [x]  See Discharge Summary         Progress towards goals / Updated goals:  Short Term Goals: To be accomplished in 2 weeks:  1. Pt will be independent and compliant w/ HEP to progress gains in PT. At PN: Remains, Partial compliance reported, 4/25/2018  Current: Not met: pt reports not performing HEP everyday. 5/31/18  2. Pt will report < or = to 3/10 pain prior to and following session to demo improve pain management at home. At PN: Progressing, Pain over last 3 visits 4.75/10 average 4/12/18  Current: not met; Pt reports on average her pain is 5-6/10 5/31/18  Long Term Goals: To be accomplished in 6 weeks:  1. Pt will improve FOTO to > or = to 51 to demo improved function. At PN: Met, FOTO = 51, 4/25/2018  Current: not met: FOTO =43, increased by 7 since [facility]. 5/31/18  2. Pt will increase MMT right hip flex, ext, and abd to > or = to 4/5 for ease w/ improved standing tolerance. At PN: Remains, Right Hip Abduction 3-/5, 4/25/2018  Current: Progressing: Right hip flexion 3+/5, hip abd 3/5, hip ext pt  PD 5/31/18  3. Pt will increase core activation as demo'd by supine bridge w/o increased pain x10 for ease w/ bed mobility.    At PN: Progressing, Supine bridges throughout 75% of AROM with pain 3-4/10, 2018   Current: Remains, Supine bridges through full AROM without increase in pain. 18  4. Pt will increase balance as demo'd by SLS B for > or = to 15 seconds w/o HHA for decreased fall risk. A PN: Goal met : SLS right 25 sec, left 22 sec 18  5. Pt will ambulate w/ correct heel toe gait pattern for > or = to 500ft w/ LRAD for improved community navigation.    At PN: Progressing, Without AD decreased right stance phase time, 2018  Current: Progressin ft with out AD with normal heel toe gait pattern.        PLAN  []  Upgrade activities as tolerated     []  Continue plan of care  []  Update interventions per flow sheet       [x]  Discharge due to:_Pt continue strengthening at  Home.   []  Other:_      Lee Espinosa PTA 2018  1:27 PM    Future Appointments  Date Time Provider Nick Sultanai   2018 1:30 PM Lee Espinosa PTA [facility]   2018 8:30 AM [facility] FAST TRACK NURSE [facility]   2018 2:15  Nikhil Montoya PA-C [facility]   2018 8:30 AM [facility] FAST TRACK NURSE [facility]   6/15/2018 8:30 AM HBV FAST TRACK NURSE [facility]   2018 8:30 AM [facility] FAST TRACK NURSE [facility]   2018 8:30 AM [facility] FAST TRACK NURSE [facility]   2018 10:15 AM Milagros Rayo  [address]

## 2018-05-31 NOTE — PROGRESS NOTES
[facility]              [address]        [place], [address] [place]   [phone] fax      Discharge Summary  Patient name: Erica Solis Start of Care: 3/1/2018   Referral source: Chago Lo MD : 1950   Medical/Treatment Diagnosis: Right leg weakness [R29.898] Onset Date:2018     Prior Hospitalization: see medical history Provider#: [id number]   Medications: Verified on Patient Summary List    Comorbidities: allergies, back pain, CA, CHF or heart disease, GI disease, prior surgery, sleep dysfunction    Prior Level of Function: hx of back pain and L5-6 fusion, ambulating w/ FWW and n/t throughout R LE  Visits from Start of Care: 21    Missed Visits: 6  Reporting Period : 2018 to 2018      Summary of Care:    Short Term Goals: To be accomplished in 2 weeks:  1. Pt will be independent and compliant w/ HEP to progress gains in PT. At PN: Remains, Partial compliance reported  At DC: Not met: pt reports not performing HEP everyday  2. Pt will report < or = to 3/10 pain prior to and following session to demo improve pain management at home. At PN: Progressing, Pain over last 3 visits 4.75/10 average  At DC: not met; Pt reports on average her pain is 5-6/10  Long Term Goals: To be accomplished in 6 weeks:  1. Pt will improve FOTO to > or = to 51 to demo improved function. At PN: Met, FOTO = 51  At DC: not met: FOTO =43, increased by 7 [facility]  2. Pt will increase MMT right hip flex, ext, and abd to > or = to 4/5 for ease w/ improved standing tolerance. At PN: Remains, Right Hip Abduction 3-/5  At DC: Progressing: Right hip flexion 3+/5, hip abd 3/5, hip ext pt  PD  3. Pt will increase core activation as demo'd by supine bridge w/o increased pain x10 for ease w/ bed mobility. At PN: Progressing, Supine bridges throughout 75% of AROM with pain 3-4/10   At DC: Remains, Supine bridges through full AROM without increase in pain  4.  Pt will increase balance as demo'd by SLS B for > or = to 15 seconds w/o HHA for decreased fall risk. A PN: Goal met : SLS right 25 sec, left 22 sec  5. Pt will ambulate w/ correct heel toe gait pattern for > or = to 500ft w/ LRAD for improved community navigation. At PN: Progressing, Without AD decreased right stance phase time  At DC: Progressin ft with out AD with normal heel toe gait pattern. Codes (GP)  Mobility   [id number] Goal  CK= 40-59%  D/C  CK= 40-59%    The severity rating is based on clinical judgment and the FOTO Score score. ASSESSMENT/RECOMMENDATIONS:    Pt reports 75% improvement since [facility]. Pt repots she feels she can ambulate without Assistive device and is more confident with ambulation. Pt reports she has been more consistent with going to the gym and performing exercises. Pt has progressed bridges through full AROM without pain. Pt is able to ambulate 250 ft with no AD with normal heel toe gait. At this time patient suitable for discharge with continuation of prescribed HEP as indicated.     [x]Discontinue therapy: [x]Patient has reached or is progressing toward set goals      []Patient is non-compliant or has abdicated      []Due to lack of appreciable progress towards set 55 Taylor Koch, PT 2018 5:21 PM

## 2018-06-01 ENCOUNTER — ANESTHESIA (OUTPATIENT)
Dept: SURGERY | Age: 68
End: 2018-06-01
Payer: MEDICARE

## 2018-06-01 ENCOUNTER — HOSPITAL ENCOUNTER (OUTPATIENT)
Age: 68
Setting detail: OUTPATIENT SURGERY
Discharge: HOME | End: 2018-06-01
Attending: ORTHOPAEDIC SURGERY | Admitting: ORTHOPAEDIC SURGERY
Payer: MEDICARE

## 2018-06-01 ENCOUNTER — HOSPITAL ENCOUNTER (OUTPATIENT)
Dept: INFUSION THERAPY | Age: 68
Discharge: HOME | End: 2018-06-01
Payer: MEDICARE

## 2018-06-01 VITALS
SYSTOLIC BLOOD PRESSURE: 119 MMHG | DIASTOLIC BLOOD PRESSURE: 71 MMHG | WEIGHT: 165.4 LBS | BODY MASS INDEX: 26.58 KG/M2 | HEART RATE: 61 BPM | OXYGEN SATURATION: 100 % | HEIGHT: 66 IN | RESPIRATION RATE: 18 BRPM | TEMPERATURE: 96.7 F

## 2018-06-01 VITALS
TEMPERATURE: 97.7 F | SYSTOLIC BLOOD PRESSURE: 110 MMHG | HEART RATE: 71 BPM | OXYGEN SATURATION: 97 % | DIASTOLIC BLOOD PRESSURE: 68 MMHG | RESPIRATION RATE: 18 BRPM

## 2018-06-01 LAB — GLUCOSE BLD STRIP.AUTO-MCNC: 82 MG/DL (ref 70–110)

## 2018-06-01 PROCEDURE — 76210000020 HC REC RM PH II FIRST 0.5 HR: Performed by: ORTHOPAEDIC SURGERY

## 2018-06-01 PROCEDURE — 74011250637 HC RX REV CODE- 250/637: Performed by: NURSE ANESTHETIST, CERTIFIED REGISTERED

## 2018-06-01 PROCEDURE — 77030012422 HC DRN WND COVD -A: Performed by: ORTHOPAEDIC SURGERY

## 2018-06-01 PROCEDURE — 77030013179 HC SHOE PSTOP OPN DJOR -A: Performed by: ORTHOPAEDIC SURGERY

## 2018-06-01 PROCEDURE — 74011250636 HC RX REV CODE- 250/636: Performed by: INTERNAL MEDICINE

## 2018-06-01 PROCEDURE — 76210000016 HC OR PH I REC 1 TO 1.5 HR: Performed by: ORTHOPAEDIC SURGERY

## 2018-06-01 PROCEDURE — 77030010509 HC AIRWY LMA MSK TELE -A: Performed by: ANESTHESIOLOGY

## 2018-06-01 PROCEDURE — 74011000250 HC RX REV CODE- 250: Performed by: INTERNAL MEDICINE

## 2018-06-01 PROCEDURE — 77030032490 HC SLV COMPR SCD KNE COVD -B: Performed by: ORTHOPAEDIC SURGERY

## 2018-06-01 PROCEDURE — 74011250636 HC RX REV CODE- 250/636

## 2018-06-01 PROCEDURE — 74011000250 HC RX REV CODE- 250

## 2018-06-01 PROCEDURE — 74011000250 HC RX REV CODE- 250: Performed by: PHYSICIAN ASSISTANT

## 2018-06-01 PROCEDURE — 82962 GLUCOSE BLOOD TEST: CPT

## 2018-06-01 PROCEDURE — 74011000258 HC RX REV CODE- 258: Performed by: PHYSICIAN ASSISTANT

## 2018-06-01 PROCEDURE — 76010000149 HC OR TIME 1 TO 1.5 HR: Performed by: ORTHOPAEDIC SURGERY

## 2018-06-01 PROCEDURE — 77030018836 HC SOL IRR NACL ICUM -A: Performed by: ORTHOPAEDIC SURGERY

## 2018-06-01 PROCEDURE — 96372 THER/PROPH/DIAG INJ SC/IM: CPT

## 2018-06-01 PROCEDURE — 77030011640 HC PAD GRND REM COVD -A: Performed by: ORTHOPAEDIC SURGERY

## 2018-06-01 PROCEDURE — 77030019605: Performed by: ORTHOPAEDIC SURGERY

## 2018-06-01 PROCEDURE — 76060000033 HC ANESTHESIA 1 TO 1.5 HR: Performed by: ORTHOPAEDIC SURGERY

## 2018-06-01 PROCEDURE — 74011250636 HC RX REV CODE- 250/636: Performed by: ANESTHESIOLOGY

## 2018-06-01 PROCEDURE — 77030002933 HC SUT MCRYL J&J -A: Performed by: ORTHOPAEDIC SURGERY

## 2018-06-01 PROCEDURE — 77030020782 HC GWN BAIR PAWS FLX 3M -B: Performed by: ORTHOPAEDIC SURGERY

## 2018-06-01 PROCEDURE — 74011000250 HC RX REV CODE- 250: Performed by: ORTHOPAEDIC SURGERY

## 2018-06-01 PROCEDURE — 77030013079 HC BLNKT BAIR HGGR 3M -A: Performed by: ANESTHESIOLOGY

## 2018-06-01 RX ORDER — FAMOTIDINE 20 MG/1
20 TABLET, FILM COATED ORAL ONCE
Status: COMPLETED | OUTPATIENT
Start: 2018-06-01 | End: 2018-06-01

## 2018-06-01 RX ORDER — WATER FOR INJECTION,STERILE
10 VIAL (ML) INJECTION ONCE
Status: COMPLETED | OUTPATIENT
Start: 2018-06-01 | End: 2018-06-01

## 2018-06-01 RX ORDER — SODIUM CHLORIDE 0.9 % (FLUSH) 0.9 %
5-10 SYRINGE (ML) INJECTION AS NEEDED
Status: DISCONTINUED | OUTPATIENT
Start: 2018-06-01 | End: 2018-06-01 | Stop reason: HOSPADM

## 2018-06-01 RX ORDER — FENTANYL CITRATE 50 UG/ML
50 INJECTION, SOLUTION INTRAMUSCULAR; INTRAVENOUS AS NEEDED
Status: DISCONTINUED | OUTPATIENT
Start: 2018-06-01 | End: 2018-06-01 | Stop reason: HOSPADM

## 2018-06-01 RX ORDER — MIDAZOLAM HYDROCHLORIDE 1 MG/ML
INJECTION, SOLUTION INTRAMUSCULAR; INTRAVENOUS AS NEEDED
Status: DISCONTINUED | OUTPATIENT
Start: 2018-06-01 | End: 2018-06-01 | Stop reason: HOSPADM

## 2018-06-01 RX ORDER — BUPIVACAINE HYDROCHLORIDE 5 MG/ML
INJECTION, SOLUTION EPIDURAL; INTRACAUDAL AS NEEDED
Status: DISCONTINUED | OUTPATIENT
Start: 2018-06-01 | End: 2018-06-01 | Stop reason: HOSPADM

## 2018-06-01 RX ORDER — SODIUM CHLORIDE, SODIUM LACTATE, POTASSIUM CHLORIDE, CALCIUM CHLORIDE 600; 310; 30; 20 MG/100ML; MG/100ML; MG/100ML; MG/100ML
75 INJECTION, SOLUTION INTRAVENOUS CONTINUOUS
Status: DISCONTINUED | OUTPATIENT
Start: 2018-06-01 | End: 2018-06-01 | Stop reason: HOSPADM

## 2018-06-01 RX ORDER — SODIUM CHLORIDE 0.9 % (FLUSH) 0.9 %
5-10 SYRINGE (ML) INJECTION EVERY 8 HOURS
Status: DISCONTINUED | OUTPATIENT
Start: 2018-06-01 | End: 2018-06-01 | Stop reason: HOSPADM

## 2018-06-01 RX ORDER — ONDANSETRON 2 MG/ML
INJECTION INTRAMUSCULAR; INTRAVENOUS AS NEEDED
Status: DISCONTINUED | OUTPATIENT
Start: 2018-06-01 | End: 2018-06-01 | Stop reason: HOSPADM

## 2018-06-01 RX ORDER — PROPOFOL 10 MG/ML
INJECTION, EMULSION INTRAVENOUS AS NEEDED
Status: DISCONTINUED | OUTPATIENT
Start: 2018-06-01 | End: 2018-06-01 | Stop reason: HOSPADM

## 2018-06-01 RX ORDER — SODIUM CHLORIDE 9 MG/ML
75 INJECTION, SOLUTION INTRAVENOUS CONTINUOUS
Status: DISCONTINUED | OUTPATIENT
Start: 2018-06-01 | End: 2018-06-01 | Stop reason: HOSPADM

## 2018-06-01 RX ORDER — NALOXONE HYDROCHLORIDE 0.4 MG/ML
0.1 INJECTION, SOLUTION INTRAMUSCULAR; INTRAVENOUS; SUBCUTANEOUS ONCE
Status: DISCONTINUED | OUTPATIENT
Start: 2018-06-01 | End: 2018-06-01 | Stop reason: HOSPADM

## 2018-06-01 RX ORDER — MAGNESIUM SULFATE 100 %
4 CRYSTALS MISCELLANEOUS AS NEEDED
Status: DISCONTINUED | OUTPATIENT
Start: 2018-06-01 | End: 2018-06-01 | Stop reason: HOSPADM

## 2018-06-01 RX ORDER — INSULIN LISPRO 100 [IU]/ML
INJECTION, SOLUTION INTRAVENOUS; SUBCUTANEOUS ONCE
Status: DISCONTINUED | OUTPATIENT
Start: 2018-06-01 | End: 2018-06-01 | Stop reason: HOSPADM

## 2018-06-01 RX ORDER — DEXTROSE 50 % IN WATER (D50W) INTRAVENOUS SYRINGE
25-50 AS NEEDED
Status: DISCONTINUED | OUTPATIENT
Start: 2018-06-01 | End: 2018-06-01 | Stop reason: HOSPADM

## 2018-06-01 RX ORDER — LIDOCAINE HYDROCHLORIDE 20 MG/ML
INJECTION, SOLUTION EPIDURAL; INFILTRATION; INTRACAUDAL; PERINEURAL AS NEEDED
Status: DISCONTINUED | OUTPATIENT
Start: 2018-06-01 | End: 2018-06-01 | Stop reason: HOSPADM

## 2018-06-01 RX ORDER — FENTANYL CITRATE 50 UG/ML
INJECTION, SOLUTION INTRAMUSCULAR; INTRAVENOUS AS NEEDED
Status: DISCONTINUED | OUTPATIENT
Start: 2018-06-01 | End: 2018-06-01 | Stop reason: HOSPADM

## 2018-06-01 RX ADMIN — WATER 10 ML: 1 INJECTION INTRAMUSCULAR; INTRAVENOUS; SUBCUTANEOUS at 08:41

## 2018-06-01 RX ADMIN — ONDANSETRON 4 MG: 2 INJECTION INTRAMUSCULAR; INTRAVENOUS at 13:25

## 2018-06-01 RX ADMIN — ROMIPLOSTIM 207 MCG: 250 INJECTION, POWDER, LYOPHILIZED, FOR SOLUTION SUBCUTANEOUS at 08:41

## 2018-06-01 RX ADMIN — SODIUM CHLORIDE 600 MG: 900 INJECTION, SOLUTION INTRAVENOUS at 13:13

## 2018-06-01 RX ADMIN — PROPOFOL 160 MG: 10 INJECTION, EMULSION INTRAVENOUS at 13:19

## 2018-06-01 RX ADMIN — FAMOTIDINE 20 MG: 20 TABLET ORAL at 11:01

## 2018-06-01 RX ADMIN — SODIUM CHLORIDE, SODIUM LACTATE, POTASSIUM CHLORIDE, AND CALCIUM CHLORIDE 75 ML/HR: 600; 310; 30; 20 INJECTION, SOLUTION INTRAVENOUS at 11:16

## 2018-06-01 RX ADMIN — LIDOCAINE HYDROCHLORIDE 40 MG: 20 INJECTION, SOLUTION EPIDURAL; INFILTRATION; INTRACAUDAL; PERINEURAL at 13:19

## 2018-06-01 RX ADMIN — MIDAZOLAM HYDROCHLORIDE 2 MG: 1 INJECTION, SOLUTION INTRAMUSCULAR; INTRAVENOUS at 13:11

## 2018-06-01 RX ADMIN — FENTANYL CITRATE 100 MCG: 50 INJECTION, SOLUTION INTRAMUSCULAR; INTRAVENOUS at 13:19

## 2018-06-01 RX ADMIN — PROPOFOL 40 MG: 10 INJECTION, EMULSION INTRAVENOUS at 13:37

## 2018-06-01 NOTE — ANESTHESIA POSTPROCEDURE EVALUATION
Post-Anesthesia Evaluation and Assessment    Patient: Hanna Jhaveri MRN: [id number]  SSN: [id number]    YOB: 1950  Age: 79 y.o. Sex: female     VS from flow sheet    Cardiovascular Function/Vital Signs  Visit Vitals    /77    Pulse 66    Temp 36.6 °C (97.8 °F)    Resp 16    Ht 5' 6\" (1.676 m)    Wt 75 kg (165 lb 6.4 oz)    SpO2 100%    BMI 26.7 kg/m2       Patient is status post general anesthesia for Procedure(s):  LEFT GREAT AND SECOND TOE PARTIAL NAIL PLATE EXCISION/NERVE BLOCK. Nausea/Vomiting: None    Postoperative hydration reviewed and adequate. Pain:  Pain Scale 1: Visual (06/01/18 1433)  Pain Intensity 1: 0 (06/01/18 1433)   Managed    Neurological Status:   Neuro (WDL): Within Defined Limits (06/01/18 1433)   At baseline    Mental Status and Level of Consciousness: Arousable    Pulmonary Status:   O2 Device: CO2 nasal cannula (06/01/18 1439)   Adequate oxygenation and airway patent    Complications related to anesthesia: None    Post-anesthesia assessment completed.  No concerns    Signed By: Yareli Hou MD     June 1, 2018

## 2018-06-01 NOTE — BRIEF OP NOTE
BRIEF OPERATIVE NOTE    Date of Procedure: 6/1/2018   Preoperative Diagnosis: L60.0 LEFT GREAT AND SECOND INGROWN NAIL  Postoperative Diagnosis: L60.0 LEFT GREAT AND SECOND INGROWN NAIL    Procedure(s):  LEFT GREAT TOE NAIL PLATE REMOVAL AND NAIL MATRIX EXCISION  LEFT SECOND TOE NAIL PLATE REMOVAL AND NAIL MATRIX EXCISION  Surgeon(s) and Role:     * Serena Abdul MD - Primary         Surgical Assistant:      Surgical Staff:  Circ-1: Marielle Edouard RN  Scrub Tech-1: formerly [facility]  Surg Asst-1: [facility]  No case tracking events are documented in the log.   Anesthesia: General and local 1/2 plain Marcaine  Estimated Blood Loss: 5 ml  Specimens: * No specimens in log *  RITU ORTA PA:  ASSISTANT  IV FLUIDS:  750 ml IVF  Tourniquet Time:  38 minutes ankle eschmarch time  Findings: ingrown fungal nail plate infections left 1/2 toes   Complications: none  Implants: * No implants in log *

## 2018-06-01 NOTE — DISCHARGE INSTRUCTIONS
1)  Follow up with Dr. Kb Schafer. Penny Castro in 5 days. 2)  PARTIAL WEIGHT BEAR LEFT FOOT// LEFT HEEL to the Left lower extremity  3)  Elevate the Left lower extremity on 1 pillow. Place the pillow horizontal so that no pressure is on the back of your heel. 4)  Leave your current dressings and in place. 5)   Call [phone] to confirm your appointment. 6)   Keep your dressings clean and dry to the: Left lower extremity  7)  Start taking your pain medications when you get home  8 ) Follow up with Primary Care Provider in 7 to 10 days. 9)  Please call [phone] ([address]) if any: fever, shakes, chills, intractable pain, or for any questions you have regarding your care/medical condition. 10)  If you experience any calf pain or swelling, or are having any shortness of breath, chest pain, or extremity swelling, or bleeding thru any surgical dressings, or Bleeding at any body location while you are taking on any blood thinners. ie (mouth,nose, skin sites:)  Please go to closest ER  ASAP for assessment to rule out a leg clot and to Assess any bleeding.    11) Pain Rx have been written for you Tang Rayo. There are no outpatient Patient Instructions on file for this admission. Farhana Moody MD   6/1/2018  2:33 PM      DISCHARGE SUMMARY from Nurse    PATIENT INSTRUCTIONS:    After general anesthesia or intravenous sedation, for 24 hours or while taking prescription Narcotics:  · Limit your activities  · Do not drive and operate hazardous machinery  · Do not make important personal or business decisions  · Do  not drink alcoholic beverages  · If you have not urinated within 8 hours after discharge, please contact your surgeon on call.     Report the following to your surgeon:  · Excessive pain, swelling, redness or odor of or around the surgical area  · Temperature over 100.5  · Nausea and vomiting lasting longer than 4 hours or if unable to take medications  · Any signs of decreased circulation or nerve impairment to extremity: change in color, persistent  numbness, tingling, coldness or increase pain  · Any questions    What to do at Home:  Recommended activity: Activity as tolerated and no driving for today    These are general instructions for a healthy lifestyle:    No smoking/ No tobacco products/ Avoid exposure to second hand smoke  Surgeon General's Warning:  Quitting smoking now greatly reduces serious risk to your health. Obesity, smoking, and sedentary lifestyle greatly increases your risk for illness    A healthy diet, regular physical exercise & weight monitoring are important for maintaining a healthy lifestyle    You may be retaining fluid if you have a history of heart failure or if you experience any of the following symptoms:  Weight gain of 3 pounds or more overnight or 5 pounds in a week, increased swelling in our hands or feet or shortness of breath while lying flat in bed. Please call your doctor as soon as you notice any of these symptoms; do not wait until your next office visit. Recognize signs and symptoms of STROKE:    F-face looks uneven    A-arms unable to move or move unevenly    S-speech slurred or non-existent    T-time-call 911 as soon as signs and symptoms begin-DO NOT go       Back to bed or wait to see if you get better-TIME IS BRAIN. Warning Signs of HEART ATTACK     Call 911 if you have these symptoms:   Chest discomfort. Most heart attacks involve discomfort in the center of the chest that lasts more than a few minutes, or that goes away and comes back. It can feel like uncomfortable pressure, squeezing, fullness, or pain.  Discomfort in other areas of the upper body. Symptoms can include pain or discomfort in one or both arms, the back, neck, jaw, or stomach.  Shortness of breath with or without chest discomfort.  Other signs may include breaking out in a cold sweat, nausea, or lightheadedness.   Don't wait more than five minutes to call 911 - MINUTES MATTER! Fast action can save your life. Calling 911 is almost always the fastest way to get lifesaving treatment. Emergency Medical Services staff can begin treatment when they arrive -- up to an hour sooner than if someone gets to the hospital by car. The discharge information has been reviewed with the patient. The patient verbalized understanding. Discharge medications reviewed with the patient and appropriate educational materials and side effects teaching were provided.   ___________________________________________________________________________________________________________________________________

## 2018-06-01 NOTE — INTERVAL H&P NOTE
H&P Update:  Nila Zamorano was seen and examined. History and physical has been reviewed. The patient has been examined. There have been no significant clinical changes since the completion of the originally dated History and Physical.  Patient identified by surgeon; surgical site was confirmed by patient and surgeon.     Signed By: Gutierrez Brown MD     June 1, 2018 1:20 PM

## 2018-06-01 NOTE — H&P (VIEW-ONLY)
FOOT AND ANKLE HISTORY AND PHYSICAL      Patient: Edyta Rondon                   MRN: [id number]         SSN: [id number]  YOB: 1950                AGE: 79 y.o. SEX: female    Patient scheduled for:  Partial nail plate excision of left great toe and second toe  Date of surgery: 5/25/18   [facility]   Surgeon: Sánchez Jennings. MD Ross  ANESTHESIA TYPE:  General,           PRESCRIPTIONS AND/OR ORDERS PROVIDED DURING H&P:    Orders Placed This Encounter    Generic Supply Order    [25982] Foot Min 3V    [71139] Foot 2V    PROTHROMBIN TIME + INR    oxyCODONE-acetaminophen (PERCOCET) 5-325 mg per tablet    promethazine (PHENERGAN) 25 mg tablet              HISTORY:     The patient was seen in the office today for a preoperative history and physical for an upcoming above listed surgery. The patient is a pleasant 79 y.o. female who has a history of  fungal infection of the toenail, and left foot pain. Since we saw her last, Ms. Mary Terrazas notes she has ingrowing of her second and third toenails. She was previously interested in having her nail plates removed in the past but was unable to due to personal reasons. At this time, she would like to have her nail plate removed along her first and second toes to help with the discomfort that the ingrown nails cause. Surgical procedures for intervention have been discussed with the patient.     Due to the current findings, affected activity of daily living and continued pain and discomfort, surgical intervention is indicated.  The alternatives, risks, and complications, including but not limited to infection, blood loss, need for blood transfusion, neurovascular damage, long-incisional numbness, subcutaneous hematoma, bone fracture, anesthetic complications, DVT, PE, death, RSD, postoperative stiffness and pain, possible surgical scar, delayed healing and nonhealing, reflexive sympathetic dystrophy, damage to blood vessels and nerves, need for more surgery, MI, and stroke have been discussed. The patient understands and wishes to proceed with surgery. Patient states that she has has a history of breast cancer and has had chemo treatment x 4. She has been diagnosed with CHF. She is followed by Dr. Angelique Peterson in cardiology for Afib. We will contact Dr. Angelique Peterson for guidance on holding her Xarelto blood thinner prior to surgery. PAST MEDICAL HISTORY:     Past Medical History:   Diagnosis Date    Antineoplastic chemotherapy induced anemia     Arrhythmia     Atrial Fib    Arthritis     [facility]     [facility]    Breast    [facility]     Chronic ITP (idiopathic thrombocytopenia) (HCC) 10/31/2016    Secondary to Anemia from Chemo    Congestive heart failure (HCC)     Diabetes (HCC)     borderline, no meds    [facility]    treated with chemo    Heart failure (HCC)     SOB (shortness of breath)        CURRENT MEDICATIONS:     Current Outpatient Prescriptions   Medication Sig Dispense Refill    oxyCODONE-acetaminophen (PERCOCET) 5-325 mg per tablet TAKE ONE TO TWO TABLETS PO Q 6 HRS AS NEEDED FOR PAIN **AFTER SURGERY** DO NOT TAKE BEFORE SURGERY  Indications: Pain 50 Tab 0    promethazine (PHENERGAN) 25 mg tablet Take 1 Tab by mouth every six (6) hours as needed for Nausea. 30 Tab 0    meclizine (ANTIVERT) 12.5 mg tablet Take 1 Tab by mouth three (3) times daily as needed for up to 10 days. 90 Tab 3    zolpidem (AMBIEN) 10 mg tablet TAKE 1 TABLET BY MOUTH NIGHTLY AS NEEDED FOR SLEEP. MAX DAILY AMOUNT 10 MG 30 Tab 2    lidocaine-prilocaine (EMLA) topical cream APPLY OVER PORT 1 HOUR PRIOR TO CHEMOTHERAPY THAN COVER WITH SARAN WRAP 30 g 6    oxyCODONE-acetaminophen (PERCOCET)  mg per tablet Take 1 Tab by mouth every six (6) hours as needed for Pain. Max Daily Amount: 4 Tabs.  Indications: Pain, ACUTE POST OP PAIN 120 Tab 0    ergocalciferol (VITAMIN D2) 50,000 unit capsule Take 1 Cap by mouth every seven (7) days. 12 Cap 1    topiramate (TOPAMAX) 50 mg tablet   5    temazepam (RESTORIL) 30 mg capsule   1    LINZESS 145 mcg cap capsule TAKE 1 CAPSULE BY MOUTH DAILY 30 MINUTES BEFORE BREAKFAST  0    lisinopril (PRINIVIL, ZESTRIL) 10 mg tablet Take 10 mg by mouth daily. Indications: hypertension      loratadine 10 mg cap Take 10 mg by mouth daily.  digoxin (LANOXIN) 0.125 mg tablet Take 0.125 mg by mouth daily.  carvedilol (COREG) 3.125 mg tablet Take 3.125 mg by mouth two (2) times daily (with meals).  meloxicam (MOBIC) 7.5 mg tablet Take 7.5 mg by mouth daily.  amiodarone (CORDARONE) 200 mg tablet Take 200 mg by mouth daily. Indications: PREVENTION OF VENTRICULAR FIBRILLATION      gabapentin (NEURONTIN) 300 mg capsule Take 1 po q am and 2 po q pm (Patient taking differently: Take 300 mg by mouth two (2) times a day. Take 1 po q am and 2 po q pm) 90 Cap 1    rivaroxaban (XARELTO) 10 mg tablet Take 10 mg by mouth daily.  magic mouthwash solution Take 5 mL by mouth three (3) times daily as needed for Stomatitis. Magic mouth wash   Maalox  Lidocaine 2% viscous   Diphenhydramine oral solution     Pharmacy to mix equal portions of ingredients to a total volume as indicated in the dispense amount. 236 mL 0    furosemide (LASIX) 40 mg tablet Take 40 mg by mouth daily. Indications: Edema      dronabinol (MARINOL) 5 mg capsule Take 1 Cap by mouth two (2) times a day. 60 Cap 1    KLOR-CON M20 20 mEq tablet TAKE ONE TABLET BY MOUTH ONCE A DAY 30 Tab 3    aluminum-magnesium hydroxide 200-200 mg/5 mL susp 5 mL, diphenhydrAMINE 12.5 mg/5 mL liqd 12.5 mg, lidocaine 2 % soln 5 mL 5 mL by Swish and Spit route two (2) times a day. Magic mouth wash   Maalox  Lidocaine 2% viscous   Diphenhydramine oral solution     Pharmacy to mix equal portions of ingredients to a total volume as indicated in the dispense amount.  240 mL 1    potassium chloride (K-DUR, KLOR-CON) 20 mEq tablet Take 2 Tabs by mouth daily. 30 Tab 3    albuterol (PROAIR HFA) 90 mcg/actuation inhaler 1-2 puffs every 4-6 hrs. (Patient taking differently: Take 2 Puffs by inhalation every four (4) hours as needed for Shortness of Breath. 1-2 puffs every 4-6 hrs.) 1 Inhaler 1    senna (SENNA) 8.6 mg tablet Take 1 Tab by mouth daily.  nabumetone (RELAFEN) 750 mg tablet Take 750 mg by mouth daily. Indications: OSTEOARTHRITIS      ondansetron hcl (ZOFRAN) 8 mg tablet Take 1 Tab by mouth every eight (8) hours as needed for Nausea. 30 Tab 3    IRON AG&FUM/C/FA/MV CMB11/CA-T (PRUVATE 21-7 PO) Take 325 mg by mouth daily. Indications: iron deficiency         ALLERGIES:     Allergies   Allergen Reactions    Aspirin Swelling     Pt states her whole body swells when she takes aspirin.  Adhesive Unable to Obtain    Nickel Rash    Pcn [Penicillins] Rash         SURGICAL HISTORY:     Past Surgical History:   Procedure Laterality Date    BREAST SURGERY PROCEDURE UNLISTED      COLONOSCOPY N/A 7/27/2016    COL[facility] performed [facility] at [facility]COPY    HX APPENDECTOMY      HX HEENT      Tonsillectomy    HX ORTHOPAEDIC      HX TUBAL LIGATION      HX VASCULAR ACCESS      NEUROLOGICAL PROCEDURE UNLISTED      Lumbar sx       SOCIAL HISTORY:     Social History     Social History    Marital status:      Spouse name: N/A    Number of children: N/A    Years of education: N/A     Social History Main Topics    Smoking status: Never Smoker    Smokeless tobacco: Never Used    Alcohol use No    Drug use: No    Sexual activity: Not Asked     Other Topics Concern    None     Social History Narrative       FAMILY HISTORY:     History reviewed. No pertinent family history. REVIEW OF SYSTEMS:     Negative for fevers, chills, chest pain, shortness of breath, weight loss, recent illness     General: Negative for fever and chills. No unexpected change in weight. Denies fatigue. No change in appetite. Skin: Negative for rash or itching. HEENT: Negative for congestion, sore throat, neck pain and neck stiffness. No change in vision or hearing. Hasn't noted any enlarged lymph nodes in the neck. Cardiovascular:  Negative for chest pain and palpitations. Has not noted pedal edema. Respiratory: Negative for cough, colds, sinus, hemoptysis, shortness of breath and wheezing. Gastrointestinal: Negative for nausea and vomiting, rectal bleeding, coffee ground emesis, abdominal pain, diarrhea and constipation. Genitourinary: Negative for dysuria, frequency urgency, or burning on micturition. No flank pain, no foul smelling urine, no difficulty with initiating urination. Hematological: Negative for bleeding or easy bruising. Musculoskeletal: Negative  for arthralgias, back pain or neck pain. Neurological: Negative for dizziness, seizures or syncopal episodes. Denies headaches. Endocrine: Denies excessive thirst.  No heat/cold intolerance. Psychiatric: Negative for depression or insomnia. PHYSICAL EXAMINATION:     VITALS:   Visit Vitals    /68    Pulse (!) 58    Temp 97.1 °F (36.2 °C) (Oral)    Resp 16    Ht 5' 6\" (1.676 m)    Wt 168 lb 9.6 oz (76.5 kg)    SpO2 97%    BMI 27.21 kg/m2       GEN:  Well developed, well nourished 79 y.o. female in no acute distress. PSYCH: Alert an oriented to person, place and time. Mood, memory, affect, behavior and judgment normal  HEENT: Normocephalic and atraumatic. Eyes: Conjunctivae and EOM are normal.Pupils are equal, round, and reactive to light. External ear normal appearance, external nose normal appearing. Mouth/Throat: Oropharynx is clear and moist, able to handle oral secretions w/out difficulty, airway patent  NECK: Supple. Normal ROM, No lymphadenopathy. Trachea is midline. No bruising, swelling or deformity  RESP: Clear to auscultation bilaterally. No wheezes, rales, rhonchi. Normal effort and breath sounds.  No respiratory distress  CARDIO: Bradycardia noted, normal heart sounds. No MGR. ABDOMEN: Soft, non-tender, non-distended, normoactive bowel sounds in all four quadrants. There is no tenderness. There is no rebound and no guarding. BACK: No CVA or spinal tenderness  BREAST:  Deferred  PELVIC:    Deferred   RECTAL:  Deferred   :           Deferred  EXTREMITIES: EXAMINATION OF:   Left ANKLE and FOOT         Gait: Normal   Tenderness: mild tenderness along the medial great toe MTP. Cutaneous: No rashes, skin patches, wounds, or abrasions to the lower legs                       Warm and Normal color. No regions of expressible drainage.                       Medial Border of [place] Region: absent                       Skin color, texture, turgor normal. Normal.                       Great toe nail plate fungal infection.                       Hypertrophic great toe nail plate and second toe. Slight thickening of the nailplate of the third toe. Joint Motion: ROM Ankle:Normal , Hindfoot: (ST,TN,CC Normal}, Forefoot toes:Normal  Neurologic Exam: Neuro: Motor: normal 5/5 strength in all tested muscle groups and Sensory : no sensory deficits noted. Contractures: Gastrocnemius or Achilles Contractures absent  Joint / Tendon Stability: No Ankle or Subtalar instability or joint laxity.                                                               No peroneal sublux ability or dislocation  Alignment:  Normal Foot Alignment and  Flexible                                  Bunion deformity  Vascular: Normal Pulses/ NL Capillary refill, No evidence of DVT seen on physical exam.                        No calf swelling, no tenderness to calf muscles.   Lymphatic:  No Evidence of Lymphedema.     RADIOGRAPHS & DIAGNOSTIC STUDIES:     No notes on file    LABS:     Pending    Sinus rhythm with 1st degree AV block   Minimal voltage criteria for LVH, may be normal variant   Cannot rule out Anterior infarct (cited on or before 28-DEC-2017)   Abnormal ECG   When compared with ECG of 28-DEC-2017 12:25,   Questionable change in initial forces of Septal leads   Nonspecific T wave abnormality now evident in Inferior leads   Confirmed by Atlee [place] ([id number]) on 1/13/2018 8:30:44 AM     ASSESSMENT:       Encounter Diagnoses   Name Primary?  Ingrown nail of great toe of left foot Yes    Ingrown nail of second toe of left foot     Right foot pain     Pre-op exam        PLAN:     Again, the alternatives, risks, and complications, as well as expected outcome were discussed. The patient understands and agrees to proceed with the above listed surgery pending completion of labs and discussion of patient upcoming surgery with Dr. Shaun Eric. Patient has been given Hibiclens wash with instructions and prescriptions and or orders listed above.     Gurwinder Cotto PA-C  5/21/2018  2:00 PM

## 2018-06-01 NOTE — IP AVS SNAPSHOT
[facility] 
 
 
 [address] Patient: Sugey Ogden MRN: [id number] About your hospitalization You were admitted on:  June 1, 2018 You last received care in the:  [facility] PHASE 2 RECOVERY You were discharged on:  June 1, 2018 Why you were hospitalized Your primary diagnosis was:  Not on File Follow-up Information Follow up With Details Comments Contact Info Joselito Jones MD   [address] [facility] [address] [facility] [address] 
[phone] Daniel Saunders MD  Follow up in 5 days [address] 1 [place] [id number] 
[phone] Your Scheduled Appointments Wednesday June 06, 2018  2:15 PM EDT  
POST OP with Guillermo Whitlock PA-C  
[address] ([facility]) [facility] [address], [address] [facility] [address]  
[phone] Friday June 08, 2018  8:30 AM EDT INFUSION 30 with HBV FAST TRACK NURSE HBV OP INFUSION ([facility]) [address] [facility] [address]  
[phone] Note: Patient must have a  if they take medication(s) that impairs their ability to operate a motor vehicle Friday Amy 15, 2018  8:30 AM EDT INFUSION 30 with HBV FAST TRACK NURSE HBV OP INFUSION ([place] [facility]) [address] [facility] [address]  
[phone] Note: Patient must have a  if they take medication(s) that impairs their ability to operate a motor vehicle Friday June 22, 2018  8:30 AM EDT INFUSION 30 with HBV FAST TRACK NURSE HBV OP INFUSION ([facility]) [address] 0[address] [facility] [address]  
[phone] Note: Patient must have a  if they take medication(s) that impairs their ability to operate a motor vehicle  Friday June 29, 2018  8:30 AM EDT  
 INFUSION 30 with HBV FAST TRACK NURSE HBV OP INFUSION ([place]) [address] [facility]  
[phone] Note: Patient must have a  if they take medication(s) that impairs their ability to operate a motor vehicle Friday July 06, 2018 10:15 AM EDT Follow Up with Baylee Christie MD  
[address] [facility], [address] and [facility]) [address] [place] [address]  
[phone] Discharge Orders None A check sierra indicates which time of day the medication should be taken. My Medications CHANGE how you take these medications Instructions Each Dose to Equal  
 Morning Noon Evening Bedtime  
 gabapentin 300 mg capsule Commonly known as:  NEURONTIN What changed:   
- how much to take 
- how to take this - when to take this 
- additional instructions Your last dose was: Your next dose is: Take 1 po q am and 2 po q pm  
     
   
   
   
  
  
CONTINUE taking these medications Instructions Each Dose to Equal  
 Morning Noon Evening Bedtime  
 amiodarone 200 mg tablet Commonly known as:  CORDARONE Your last dose was: Your next dose is: Take 200 mg by mouth daily. Indications: PREVENTION OF VENTRICULAR FIBRILLATION  
 200 mg COREG 3.125 mg tablet Generic drug:  carvedilol Your last dose was: Your next dose is: Take 3.125 mg by mouth two (2) times daily (with meals). 3.125 mg  
    
   
   
   
  
 digoxin 0.125 mg tablet Commonly known as:  LANOXIN Your last dose was: Your next dose is: Take 0.125 mg by mouth daily. 0.125 mg  
    
   
   
   
  
 dronabinol 5 mg capsule Commonly known as:  Denny Valderrama Your last dose was: Your next dose is: Take 1 Cap by mouth two (2) times a day. 5 mg ergocalciferol 50,000 unit capsule Commonly known as:  VITAMIN D2 Your last dose was: Your next dose is: Take 1 Cap by mouth every seven (7) days. [address]33 Units  
    
   
   
   
  
 furosemide 40 mg tablet Commonly known as:  LASIX Your last dose was: Your next dose is: Take 40 mg by mouth daily. Indications: Edema 40 mg  
    
   
   
   
  
 LINZESS 145 mcg Cap capsule Generic drug:  linaclotide Your last dose was: Your next dose is: TAKE 1 CAPSULE BY MOUTH DAILY 30 MINUTES BEFORE BREAKFAST  
     
   
   
   
  
 lisinopril 10 mg tablet Commonly known as:  Therisa Semen Your last dose was: Your next dose is: Take 10 mg by mouth daily. Indications: hypertension 10 mg  
    
   
   
   
  
 loratadine 10 mg Cap Your last dose was: Your next dose is: Take 10 mg by mouth daily. 10 mg  
    
   
   
   
  
 * potassium chloride 20 mEq tablet Commonly known as:  K-DUR, KLOR-CON Your last dose was: Your next dose is: Take 2 Tabs by mouth daily. 40 mEq * KLOR-CON M20 20 mEq tablet Generic drug:  potassium chloride Your last dose was: Your next dose is: TAKE ONE TABLET BY MOUTH ONCE A DAY  
     
   
   
   
  
 PRUVATE 21-7 PO Your last dose was: Your next dose is: Take 325 mg by mouth daily. Indications: iron deficiency 325 mg Senna 8.6 mg tablet Generic drug:  senna Your last dose was: Your next dose is: Take 1 Tab by mouth daily. 1 Tab  
    
   
   
   
  
 temazepam 30 mg capsule Commonly known as:  RESTORIL Your last dose was: Your next dose is:    
   
   
      
   
   
   
  
 topiramate 50 mg tablet Commonly known as:  TOPAMAX Your last dose was: Your next dose is:    
   
   
      
   
   
   
  
 zolpidem 10 mg tablet Commonly known as:  AMBIEN Your last dose was: Your next dose is: TAKE 1 TABLET BY MOUTH NIGHTLY AS NEEDED FOR SLEEP. MAX DAILY AMOUNT 10 MG  
     
   
   
   
  
 * Notice: This list has 2 medication(s) that are the same as other medications prescribed for you. Read the directions carefully, and ask your doctor or other care provider to review them with you. ASK your doctor about these medications Instructions Each Dose to Equal  
 Morning Noon Evening Bedtime  
 albuterol 90 mcg/actuation inhaler Commonly known as:  PROAIR HFA Your last dose was: Your next dose is:    
   
   
 1-2 puffs every 4-6 hrs. aluminum-magnesium hydroxide 200-200 mg/5 mL susp 5 mL, diphenhydrAMINE 12.5 mg/5 mL liqd 12.5 mg, lidocaine 2 % soln 5 mL Your last dose was: Your next dose is:    
   
   
 5 mL by Swish and Spit route two (2) times a day. Magic mouth wash  Maalox Lidocaine 2% viscous  Diphenhydramine oral solution   Pharmacy to mix equal portions of ingredients to a total volume as indicated in the dispense amount. 5 mL  
    
   
   
   
  
 lidocaine-prilocaine topical cream  
Commonly known as:  EMLA Your last dose was: Your next dose is:    
   
   
 APPLY OVER PORT 1 HOUR PRIOR TO CHEMOTHERAPY THAN COVER WITH SARAN WRAP  
     
   
   
   
  
 magic mouthwash solution Your last dose was: Your next dose is: Take 5 mL by mouth three (3) times daily as needed for Stomatitis. Magic mouth wash  Maalox Lidocaine 2% viscous  Diphenhydramine oral solution   Pharmacy to mix equal portions of ingredients to a total volume as indicated in the dispense amount. 5 mL  
    
   
   
   
  
 meloxicam 7.5 mg tablet Commonly known as:  MOBIC Your last dose was: Your next dose is: Take 7.5 mg by mouth daily. 7.5 mg  
    
   
   
   
  
 nabumetone 750 mg tablet Commonly known as:  RELAFEN Your last dose was: Your next dose is: Take 750 mg by mouth daily. Indications: OSTEOARTHRITIS  
 750 mg  
    
   
   
   
  
 ondansetron hcl 8 mg tablet Commonly known as:  Feliciano Bogota Your last dose was: Your next dose is: Take 1 Tab by mouth every eight (8) hours as needed for Nausea. 8 mg * oxyCODONE-acetaminophen  mg per tablet Commonly known as:  PERCOCET Your last dose was: Your next dose is: Take 1 Tab by mouth every six (6) hours as needed for Pain. Max Daily Amount: 4 Tabs. Indications: Pain, ACUTE POST OP PAIN  
 1 Tab * oxyCODONE-acetaminophen 5-325 mg per tablet Commonly known as:  PERCOCET Your last dose was: Your next dose is: TAKE ONE TO TWO TABLETS PO Q 6 HRS AS NEEDED FOR PAIN **AFTER SURGERY** DO NOT TAKE BEFORE SURGERY  Indications: Pain  
     
   
   
   
  
 promethazine 25 mg tablet Commonly known as:  PHENERGAN Your last dose was: Your next dose is: Take 1 Tab by mouth every six (6) hours as needed for Nausea. 25 mg XARELTO 10 mg tablet Generic drug:  rivaroxaban Your last dose was: Your next dose is: Take 10 mg by mouth daily. 10 mg  
    
   
   
   
  
 * Notice: This list has 2 medication(s) that are the same as other medications prescribed for you. Read the directions carefully, and ask your doctor or other care provider to review them with you. Opioid Education  Prescription Opioids: What You Need to Know: 
 
Prescription opioids can be used to help relieve moderate-to-severe pain and are often prescribed following a surgery or injury, or for certain health conditions. These medications can be an important part of treatment but also come with serious risks. Opioids are strong pain medicines. Examples include hydrocodone, oxycodone, fentanyl, and morphine. Heroin is an example of an illegal opioid. It is important to work with your health care provider to make sure you are getting the safest, most effective care. WHAT ARE THE RISKS AND SIDE EFFECTS OF OPIOID USE? Prescription opioids carry serious risks of addiction and overdose, especially with prolonged use. An opioid overdose, often marked by slow breathing, can cause sudden death. The use of prescription opioids can have a number of side effects as well, even when taken as directed. · Tolerance-meaning you might need to take more of a medication for the same pain relief · Physical dependence-meaning you have symptoms of withdrawal when the medication is stopped. Withdrawal symptoms can include nausea, sweating, chills, diarrhea, stomach cramps, and muscle aches. Withdrawal can last up to several weeks, depending on which drug you took and how long you took it. · Increased sensitivity to pain · Constipation · Nausea, vomiting, and dry mouth · Sleepiness and dizziness · Confusion · Depression · Low levels of testosterone that can result in lower sex drive, energy, and strength · Itching and sweating RISKS ARE GREATER WITH:      
· History of drug misuse, substance use disorder, or overdose · Mental health conditions (such as depression or anxiety) · Sleep apnea · Older age (72 years or older) · Pregnancy Avoid alcohol while taking prescription opioids. Also, unless specifically advised by your health care provider, medications to avoid include: · Benzodiazepines (such as Xanax or Valium) · Muscle relaxants (such as Soma or Flexeril) · Hypnotics (such as Ambien or Lunesta) · Other prescription opioids KNOW YOUR OPTIONS Talk to your health care provider about ways to manage your pain that don't involve prescription opioids. Some of these options may actually work better and have fewer risks and side effects. Options may include: 
· Pain relievers such as acetaminophen, ibuprofen, and naproxen · Some medications that are also used for depression or seizures · Physical therapy and exercise · Counseling to help patients learn how to cope better with triggers of pain and stress. · Application of heat or cold compress · Massage therapy · Relaxation techniques Be Informed Make sure you know the name of your medication, how much and how often to take it, and its potential risks & side effects. IF YOU ARE PRESCRIBED OPIOIDS FOR PAIN: 
· Never take opioids in greater amounts or more often than prescribed. Remember the goal is not to be pain-free but to manage your pain at a tolerable level. · Follow up with your primary care provider to: · Work together to create a plan on how to manage your pain. · Talk about ways to help manage your pain that don't involve prescription opioids. · Talk about any and all concerns and side effects. · Help prevent misuse and abuse. · Never sell or share prescription opioids · Help prevent misuse and abuse. · Store prescription opioids in a secure place and out of reach of others (this may include visitors, children, friends, and family). · Safely dispose of unused/unwanted prescription opioids: Find your community drug take-back program or your pharmacy mail-back program, or flush them down the toilet, following guidance from the Food and Drug Administration (www.fda.gov/Drugs/ResourcesForYou). · Visit www.cdc.gov/drugoverdose to learn about the risks of opioid abuse and overdose. · If you believe you may be struggling with addiction, tell your health care provider and ask for guidance or call [facility] at 2-905-136-CXDW. Discharge Instructions 1)  Follow up with  [facility]. Sherri Bunch in 5 days. 2)  PARTIAL WEIGHT BEAR LEFT FOOT// LEFT HEEL to the Left lower extremity 3)  Elevate the Left lower extremity on 1 pillow. Place the pillow horizontal so that no pressure is on the back of your heel. 4)  Leave your current dressings and in place. 5)   Call [phone] to confirm your appointment. 6)   Keep your dressings clean and dry to the: Left lower extremity 7)  Start taking your pain medications when you get home 8 ) Follow up with Primary Care Provider in 7 to 10 days. 9)  Please call [phone] ([address]) if any: fever, shakes, chills, intractable pain, or for any questions you have regarding your care/medical condition. 10)  If you experience any calf pain or swelling, or are having any shortness of breath, chest pain, or extremity swelling, or bleeding thru any surgical dressings, or Bleeding at any body location while you are taking on any blood thinners. ie (mouth,nose, skin sites:) Please go to closest ER  ASAP for assessment to rule out a leg clot and to Assess any bleeding.   
11) Pain Rx have been written for you Emmanuel Payton. There are no outpatient Patient Instructions on file for this admission. Dom Murphy MD  
6/1/2018 
2:33 PM 
 
 
DISCHARGE SUMMARY from Nurse PATIENT INSTRUCTIONS: 
 
After general anesthesia or intravenous sedation, for 24 hours or while taking prescription Narcotics: · Limit your activities · Do not drive and operate hazardous machinery · Do not make important personal or business decisions · Do  not drink alcoholic beverages · If you have not urinated within 8 hours after discharge, please contact your surgeon on call. Report the following to your surgeon: 
· Excessive pain, swelling, redness or odor of or around the surgical area · Temperature over 100.5 · Nausea and vomiting lasting longer than 4 hours or if unable to take medications · Any signs of decreased circulation or nerve impairment to extremity: change in color, persistent  numbness, tingling, coldness or increase pain · Any questions What to do at Home: 
Recommended activity: Activity as tolerated and no driving for today These are general instructions for a healthy lifestyle: No smoking/ No tobacco products/ Avoid exposure to second hand smoke Surgeon General's Warning:  Quitting smoking now greatly reduces serious risk to your health. Obesity, smoking, and sedentary lifestyle greatly increases your risk for illness A healthy diet, regular physical exercise & weight monitoring are important for maintaining a healthy lifestyle You may be retaining fluid if you have a history of heart failure or if you experience any of the following symptoms:  Weight gain of 3 pounds or more overnight or 5 pounds in a week, increased swelling in our hands or feet or shortness of breath while lying flat in bed. Please call your doctor as soon as you notice any of these symptoms; do not wait until your next office visit. Recognize signs and symptoms of STROKE: 
 
F-face looks uneven A-arms unable to move or move unevenly S-speech slurred or non-existent T-time-call 911 as soon as signs and symptoms begin-DO NOT go Back to bed or wait to see if you get better-TIME IS BRAIN. Warning Signs of HEART ATTACK Call 911 if you have these symptoms: 
? Chest discomfort. Most heart attacks involve discomfort in the center of the chest that lasts more than a few minutes, or that goes away and comes back. It can feel like uncomfortable pressure, squeezing, fullness, or pain. ? Discomfort in other areas of the upper body. Symptoms can include pain or discomfort in one or both arms, the back, neck, jaw, or stomach. ? Shortness of breath with or without chest discomfort. ? Other signs may include breaking out in a cold sweat, nausea, or lightheadedness. Don't wait more than five minutes to call [address]! Fast action can save your life. Calling 911 is almost always the fastest way to get lifesaving treatment. Emergency Medical Services staff can begin treatment when they arrive  up to an hour sooner than if someone gets to the hospital by car. The discharge information has been reviewed with the patient. The patient verbalized understanding. Discharge medications reviewed with the patient and appropriate educational materials and side effects teaching were provided. ___________________________________________________________________________________________________________________________________ Introducing Dawson Morrissey As a [facility] patient, I wanted to make you aware of our electronic visit tool called Dawson Morrissey. "[employer] allows you to connect within minutes with a medical provider 24 hours a day, seven days a week via a mobile device or tablet or logging into a secure website from your computer. You can access Dawson Morrissey from anywhere in the [place]. A virtual visit might be right for you when you have a simple condition and feel like you just dont want to get out of bed, or cant get away from work for an appointment, when your regular [facility] provider is not available (evenings, weekends or holidays), or when youre out of town and need minor care. Electronic visits cost only $49 and if the "[employer] provider determines a prescription is needed to treat your condition, one can be electronically transmitted to a nearby pharmacy*. Please take a moment to enroll today if you have not already done so. The enrollment process is free and takes just a few minutes.   To enroll, please download the "[employer]" shari to your tablet or phone, or visit [email or web address] to enroll on your computer. And, as an [address] [place] [address] patient with a [employer] account, the results of your visits will be scanned into your electronic medical record and your primary care provider will be able to view the scanned results. We urge you to continue to see your regular [facility] provider for your ongoing medical care. And while your primary care provider may not be the one available when you seek a [facility] [employer] virtual visit, the peace of mind you get from getting a real diagnosis real time can be priceless. For more information on [employer], view our Frequently Asked Questions (FAQs) at [email or web address]. Sincerely, 
 
Cassi Santiago MD 
Chief Medical Officer [address] *:  certain medications cannot be prescribed via [employer] Providers Seen During Your Hospitalization Provider Specialty Primary office phone Tran Zuleta, [address] [facility] Orthopedic Surgery [phone] Your Primary Care Physician (PCP) Primary Care Physician Office Phone Office Fax Dayana Putnam [phone] You are allergic to the following Allergen Reactions Aspirin Swelling Pt states her whole body swells when she takes aspirin. Adhesive Unable to Obtain Nickel Rash Pcn (Penicillins) Rash Recent Documentation Height Weight BMI OB Status Smoking Status 1.676 m 75 kg 26.7 kg/m2 Postmenopausal Never Smoker Emergency Contacts Name Discharge Info Relation Home Work Mobile [address] CAREGIVER [3] Daughter [21] [phone] Corby Pagan DISCHARGE CAREGIVER [3] Son [22] [phone] Jerrod Pagan DISCHARGE CAREGIVER [3] Spouse [3] [phone] Patient Belongings The following personal items are in your possession at time of discharge: Dental Appliances: None  Visual Aid: Glasses      Home Medications: None   Jewelry: None  Clothing: Pants, Shirt, Undergarments, Footwear, Socks    Other Valuables: Cell Phone, Eyeglasses, Purse  Personal Items Sent to Safe: belongings Please provide this summary of care documentation to your next provider. Signatures-by signing, you are acknowledging that this After Visit Summary has been reviewed with you and you have received a copy. Patient Signature:  ____________________________________________________________ Date:  ____________________________________________________________  
  
[facility] Provider Signature:  ____________________________________________________________ Date:  ____________________________________________________________

## 2018-06-01 NOTE — OP NOTES
[facility]  OPERATIVE REPORT    Alexa Soler  MR#: [id number]  : 1950  ACCOUNT #: [de-identified]   DATE OF SERVICE: 2018    PREOPERATIVE DIAGNOSES:    1. Chronic fungal infections of the left great toe, left #2 toe. 2.  Chronic ingrowing nail fungal infections, without bacterial infection. POSTOPERATIVE DIAGNOSES:  1. Chronic fungal infections of the left great toe, left #2 toe. 2.  Chronic ingrowing nail fungal infections, without bacterial infection. PROCEDURES PERFORMED:    1. Left great toenail plate removal with matrix excision. 2.  Left #2 toenail plate removal with matrix excision. SURGEON:  Ricardo Mccann MD    ASSISTANT:  Darius Ayala. Kae, first assistant, Palomo Beverly PA-C, second assistant. FLUIDS:  750 mL crystalloid. TOURNIQUET TIME:  Ankle Esmarch tourniquet time 38 minutes. COMPLICATIONS:  None. ANESTHESIA:  General with 0.5% plain Marcaine local at the end of the case, approximately 13 mL used. ESTIMATED BLOOD LOSS:  5 mL. SPECIMENS REMOVED:  None sent    IMPLANTS:  none    FORMAL TIMEOUT DONE:  Yes. CLINICAL HISTORY:  Patient is a 61-year-old female who has a chronic nail plate infection, fungal infection of the left great toe and her left #2 toe. Because her continued pain and discomfort in the toes, difficulty wearing shoes, constant tearing of her pantyhose, and difficulty wearing socks, recommendations to have the left great toenail plate removed and the left #2 toenail plate removed as well, as well as matrix excisions in order to help minimize the risk of this toenail plate from regrowing. She understands even with a matrixectomy, proximal matrixectomy, that the toenail plate can still regrow a small residual nail plate.   Risks of surgery include but are not limited to bleeding, infection, DVT, PE, death, RSD, paresthesias, numbness, subcutaneous hematoma, possible surgical scar, possibility of delayed healing, nonhealing, possible wound complications, possible osteomyelitis, possible infection. She voiced understanding of the risks of surgery. An informed decision was made to proceed. OPERATIVE NOTE:  The patient was taken to the operating room today on 06/01/2018. She was positioned supine after general anesthesia. The entire left lower extremity from the tip of the toes going up to the left knee was fully prepped with chlorhexidine gluconate scrub and chlorhexidine gluconate yellow prep stick. At this point in time, a sterile Webril was placed in the left distal tibia. A formal timeout was conducted, identifying correct patient, correct limb, correct location for surgery, and confirmation that patient did receive antibiotics. All members of the surgical team agreed to the assigned signed time out. As such, I elected to proceed. I then made 2 oblique incisions in the left great toe, in a proximal portion of the left great toe just proximal to the lunula. It was a medial incision and a lateral incision, oblique, about a 1 cm incision, made through skin and subcutaneous tissue directly down to the pearly white germinal white matrix tissue. I then carefully undermined the nail plate. A very thickened fungal infected nail plate was removed after I undermined the nail plate, being careful not to violate the underlying nail bed, and I did not injure the nail bed. Next, I then removed a horseshoe section of the nail matrix, starting at medial distal, going across, and then going from proximal lateral, such that I removed the germinal tissue directly down to bone. I then thoroughly irrigated my  surgical incision of this great toe, then used nylon suture to close my incisions and to re approximate the peripheral edge of the nail bed to the medial and lateral soft tissues, respectively. Of note, I did remove this tissue using a 15 blade and a rongeur, making sure I dissected directly down to bone.       Next, the same exact procedure was performed to the left #2 toe making small oblique incisions to the left #2 toe, again incising through skin and subcutaneous tissue directly on the pearly white germinal matrix tissue. I then removed the nail plate, this fungal infected thick nail plate of the left #2 toe, then removed a horseshoe section of the germinal matrix, going from distal medial, going proximal, then going across, then going from proximal to lateral to distal lateral.  I used a 15 blade and rongeur to remove the tissue directly down to bone. After thorough irrigation, I then closed the tissues using 3-0 nylon. The nylon was used to close my surgical incision and as well to close the tissues along the medial and lateral aspect of the nail bed. Next, the tourniquet was deflated. Pressure was held to each toe. The toes were nice and pink. A small amount of bleeding did occur. Sterile dressings, Xeroform, 4 x 4's, Kerlix, and Ace wrap. Patient was extubated and transferred to the recovery room in stable condition. No complications.       MD KEY Aviles / JULIANE  D: 06/01/2018 14:43     T: 06/01/2018 15:45  JOB #: [id number]

## 2018-06-01 NOTE — PROGRESS NOTES
[facility]IC Progress Note    Date: 2018    Name: Elsy Arango    MRN: [id number]         : 1950      Nplate Injection     Ms. Maria Teresa Padilla arrived to [facility] at 1454. Ms. Maria Teresa Padilla was assessed and education was provided. Ms. Meryle Hawking vitals were reviewed. Visit Vitals    /68 (BP 1 Location: Right arm, BP Patient Position: Sitting)    Pulse 71    Temp 97.7 °F (36.5 °C)    Resp 18    SpO2 97%       Pt was observed for 5 minutes after obtaining vital signs prior to initiating treatment. Patient due for her procrit today but declined to have CBC drawn. States she will do it next week when she gets her port flushed. Per Dr. Diane Morton order standard dose of NPlate to be given weekly 207mcg. Nplate 877HSB (3.25FV) administered as ordered SQ in the back of the patient's right upper arm. No redness or bleeding noted. Ms. Maria Teresa Padilla was discharged from [place] [address] in stable condition at [address] [place] [address]. She is to return on 18 at 0830 for her next appointment.     Korey Gonzalez, RN  2018

## 2018-06-06 ENCOUNTER — OFFICE VISIT (OUTPATIENT)
Dept: ORTHOPEDIC SURGERY | Age: 68
End: 2018-06-06

## 2018-06-06 VITALS
SYSTOLIC BLOOD PRESSURE: 138 MMHG | DIASTOLIC BLOOD PRESSURE: 71 MMHG | OXYGEN SATURATION: 97 % | WEIGHT: 170 LBS | BODY MASS INDEX: 27.32 KG/M2 | HEART RATE: 51 BPM | RESPIRATION RATE: 16 BRPM | TEMPERATURE: 95.4 F | HEIGHT: 66 IN

## 2018-06-06 DIAGNOSIS — L60.0 INGROWN NAIL OF GREAT TOE OF LEFT FOOT: Primary | ICD-10-CM

## 2018-06-06 DIAGNOSIS — Z98.890 POST-OPERATIVE STATE: ICD-10-CM

## 2018-06-06 DIAGNOSIS — L60.0 INGROWN NAIL OF SECOND TOE OF LEFT FOOT: ICD-10-CM

## 2018-06-06 RX ORDER — OXYCODONE AND ACETAMINOPHEN 7.5; 325 MG/1; MG/1
TABLET ORAL
Qty: 50 TAB | Refills: 0 | Status: SHIPPED | OUTPATIENT
Start: 2018-06-06 | End: 2018-10-19 | Stop reason: DRUGHIGH

## 2018-06-06 NOTE — PATIENT INSTRUCTIONS
· Dressing: changed in the office today. Patient placed in post op shoe    · Keep the current dressings on and in place. You need to keep this incision clean and dry. If you have a splint or cast on please keep this clean and dry as well. · You should expect swelling and bruising in the area over the next several days. You may elevate the left extremity on 1 pillow. Place the pillow horizontal so that no pressure is on the back of your heel. You may apply an icepack on top of the dressing to help minimize the swelling. · Keep all pets away from  any wound present in order to prevent infection. · Continue Activity modification    · Weight bearing status:  partial weight bearing to the left lower extremity    · No lifting, twisting, squatting, deep bending, driving or strenuous activity. PLEASE SEEK IMMEDIATE ASSESSMENT BY ER PHYSICIAN IF ANY OF THE FOLLOWING EXIST:     Excessive pain, swelling, redness or odor of or around the surgical area   Temperature over 100.5   Nausea and vomiting lasting longer than 4 hours or if unable to take medications   Any signs of decreased circulation or nerve impairment to extremity: change in color, persistent numbness, tingling, coldness or increase pain   If any calf pain, calf tightness, shortness of breath, chest pain   Any difficulty breathing at rest or with ambulation, any chest tightness/soreness  Severe intractable pain, persistent swelling or drainage, development of a wound, incisional redness, finger/toe swelling or color changes, or CALF PAIN    · Perform ankle pumps with non-surgical/non-injured extremity to help reduce the risk of blood clots    · Please follow up in 2 weeks          Acute Pain After Surgery: Care Instructions  Your Care Instructions    It's common to have some pain after surgery. Pain doesn't mean that something is wrong or that the surgery didn't go well.  But when the pain is severe, it's important to work with your doctor to manage it. It's also important to be aware of a few facts about pain and pain medicine. · You are the only person who knows what your pain feels like. So be sure to tell your doctor when you are in pain or when the pain changes. Then he or she will know how to adjust your medicines. · Pain is often easier to control right after it starts. So it may be better to take regular doses of pain medicine and not wait until the pain gets bad. · Medicine can help control pain. But this doesn't mean you'll have no pain. Medicine works to keep the pain at a level you can live with. With time, you will feel better. Follow-up care is a key part of your treatment and safety. Be sure to make and go to all appointments, and call your doctor if you are having problems. It's also a good idea to know your test results and keep a list of the medicines you take. How can you care for yourself at home? · Be safe with medicines. Read and follow all instructions on the label. ¨ If the doctor gave you a prescription medicine for pain, take it as prescribed. ¨ If you are not taking a prescription pain medicine, ask your doctor if you can take an over-the-counter medicine. · If you take an over-the-counter pain medicine, such as acetaminophen (Tylenol), ibuprofen (Advil, Motrin), or naproxen (Aleve), read and follow all instructions on the label. · Do not take two or more pain medicines at the same time unless the doctor told you to. · Do not drink alcohol while you are taking pain medicines. · Try to walk each day if your doctor recommends it. Start by walking a little more than you did the day before. Bit by bit, increase the amount you walk. Walking increases blood flow. It also helps prevent pneumonia and constipation. · To prevent constipation from opioid pain medicines:  ¨ Talk to your doctor about a laxative. ¨ Include fruits, vegetables, beans, and whole grains in your diet each day. These foods are high in fiber.   ¨ Drink plenty of fluids, enough so that your urine is light yellow or clear like water. Drink water, fruit juice, or other drinks that do not contain caffeine or alcohol. If you have kidney, heart, or liver disease and have to limit fluids, talk with your doctor before you increase the amount of fluids you drink. ¨ Take a fiber supplement, such as Citrucel or Metamucil, every day if needed. Read and follow all instructions on the label. If you take pain medicine for more than a few days, talk to your doctor before you take fiber. When should you call for help? Call your doctor now or seek immediate medical care if:  ? · Your pain gets worse. ? · Your pain is not controlled by medicine. ? Watch closely for changes in your health, and be sure to contact your doctor if you have any problems. Where can you learn more? Go to http://leon-edinson.info/. Enter ([phone] 168-817 in the search box to learn more about \"Acute Pain After Surgery: Care Instructions. \"  Current as of: March 20, 2017  Content Version: 11.4  © 4469-6825 [employer]. Care instructions adapted under license by [employer] (which disclaims liability or warranty for this information). If you have questions about a medical condition or this instruction, always ask your healthcare professional. [address] any warranty or liability for your use of this information.

## 2018-06-06 NOTE — MR AVS SNAPSHOT
[address], [address] [facility] 
[phone] Patient: Helene Espinoza MRN: [id number] FGI:3/59/9416 Visit Information Date & Time Provider Department Dept. Phone Encounter #  
 6/6/2018  2:15 PM Venessa Chávez [address] [facility] [phone] Follow-up Instructions Return in about 2 weeks (around 6/20/2018) for follow up evaluation. Your Appointments 7/6/2018 10:15 AM  
Follow Up with Milagros Rayo MD  
[facility] [address]) Appt Note: 6 WK F/U NP; 3 mo fu refused to see lb  
 [address] [facility] [address] [phone]  
  
   
 [address] [facility] [address] [facility] [address] Upcoming Health Maintenance Date Due Hepatitis C Screening 1950 DTaP/Tdap/Td series (1 - Tdap) 5/14/1973 FOBT Q 1 YEAR AGE 50-75 5/14/2002 ZOSTER VACCINE AGE 60> 3/14/2012 BREAST CANCER SCRN MAMMOGRAM 4/4/2015 GLAUCOMA SCREENING Q2Y 5/14/2017 Bone Densitometry (Dexa) Screening 5/14/2017 Pneumococcal 65+ High/Highest Risk (1 of 2 - PCV13) 5/14/2017 MEDICARE YEARLY EXAM 3/16/2018 Influenza Age 5 to Adult 8/1/2018 Allergies as of 6/6/2018  Review Complete On: 6/6/2018 By: Anson Hickey PA-C Severity Noted Reaction Type Reactions Aspirin High 08/07/2013   Systemic Swelling Pt states her whole body swells when she takes aspirin. Adhesive    Unable to Obtain Nickel  05/21/2018    Rash Pcn [Penicillins]  08/20/2012    Rash Current Immunizations  Reviewed on 4/20/2018 No immunizations on file. Not reviewed this visit You Were Diagnosed With   
  
 Codes Comments Ingrown nail of great toe of left foot    -  Primary ICD-10-CM: L60.0 ICD-9-CM: 703.0 Ingrown nail of second toe of left foot     ICD-10-CM: L60.0 ICD-9-CM: 703.0 Post-operative state     ICD-10-CM: U95.789 ICD-9-CM: V45.89 Vitals BP Pulse Temp Resp Height(growth percentile) Weight(growth percentile) 138/71 (BP 1 Location: Left arm, BP Patient Position: Sitting) (!) 51 95.4 °F (35.2 °C) (Oral) 16 5' 6\" (1.676 m) 170 lb (77.1 kg) SpO2 BMI OB Status Smoking Status 97% 27.44 kg/m2 Postmenopausal Never Smoker BMI and BSA Data Body Mass Index Body Surface Area  
 27.44 kg/m 2 1.89 m 2 Your Updated Medication List  
  
   
This list is accurate as of 6/6/18  2:57 PM.  Always use your most recent med list.  
  
  
  
  
 albuterol 90 mcg/actuation inhaler Commonly known as:  PROAIR HFA  
1-2 puffs every 4-6 hrs. aluminum-magnesium hydroxide 200-200 mg/5 mL susp 5 mL, diphenhydrAMINE 12.5 mg/5 mL liqd 12.5 mg, lidocaine 2 % soln 5 mL  
5 mL by Swish and Spit route two (2) times a day. Magic mouth wash  Maalox Lidocaine 2% viscous  Diphenhydramine oral solution   Pharmacy to mix equal portions of ingredients to a total volume as indicated in the dispense amount. amiodarone 200 mg tablet Commonly known as:  CORDARONE Take 200 mg by mouth daily. Indications: PREVENTION OF VENTRICULAR FIBRILLATION  
  
 COREG 3.125 mg tablet Generic drug:  carvedilol Take 3.125 mg by mouth two (2) times daily (with meals). digoxin 0.125 mg tablet Commonly known as:  LANOXIN Take 0.125 mg by mouth daily. dronabinol 5 mg capsule Commonly known as:  Denny Valderrama Take 1 Cap by mouth two (2) times a day. ergocalciferol 50,000 unit capsule Commonly known as:  VITAMIN D2 Take 1 Cap by mouth every seven (7) days. furosemide 40 mg tablet Commonly known as:  LASIX Take 40 mg by mouth daily. Indications: Edema  
  
 gabapentin 300 mg capsule Commonly known as:  NEURONTIN Take 1 po q am and 2 po q pm  
  
 lidocaine-prilocaine topical cream  
Commonly known as:  EMLA APPLY OVER PORT 1 HOUR PRIOR TO CHEMOTHERAPY THAN COVER WITH [place] WRAP LINZESS 145 mcg Cap capsule Generic drug:  linaclotide TAKE 1 CAPSULE BY MOUTH DAILY 30 MINUTES BEFORE BREAKFAST  
  
 lisinopril 10 mg tablet Commonly known as:  Helon Estrin Take 10 mg by mouth daily. Indications: hypertension  
  
 loratadine 10 mg Cap Take 10 mg by mouth daily. magic mouthwash solution Take 5 mL by mouth three (3) times daily as needed for Stomatitis. Magic mouth wash  Maalox Lidocaine 2% viscous  Diphenhydramine oral solution   Pharmacy to mix equal portions of ingredients to a total volume as indicated in the dispense amount. meloxicam 7.5 mg tablet Commonly known as:  MOBIC Take 7.5 mg by mouth daily. nabumetone 750 mg tablet Commonly known as:  RELAFEN Take 750 mg by mouth daily. Indications: OSTEOARTHRITIS  
  
 ondansetron hcl 8 mg tablet Commonly known as:  Ezequiel Espinoza Take 1 Tab by mouth every eight (8) hours as needed for Nausea. * oxyCODONE-acetaminophen  mg per tablet Commonly known as:  PERCOCET Take 1 Tab by mouth every six (6) hours as needed for Pain. Max Daily Amount: 4 Tabs. Indications: Pain, ACUTE POST OP PAIN  
  
 * oxyCODONE-acetaminophen 5-325 mg per tablet Commonly known as:  PERCOCET TAKE ONE TO TWO TABLETS PO Q 6 HRS AS NEEDED FOR PAIN **AFTER SURGERY** DO NOT TAKE BEFORE SURGERY  Indications: Pain * potassium chloride 20 mEq tablet Commonly known as:  K-DUR, KLOR-CON Take 2 Tabs by mouth daily. * KLOR-CON M20 20 mEq tablet Generic drug:  potassium chloride TAKE ONE TABLET BY MOUTH ONCE A DAY promethazine 25 mg tablet Commonly known as:  PHENERGAN Take 1 Tab by mouth every six (6) hours as needed for Nausea. PRUVATE 21-7 PO Take 325 mg by mouth daily. Indications: iron deficiency Senna 8.6 mg tablet Generic drug:  senna Take 1 Tab by mouth daily. temazepam 30 mg capsule Commonly known as:  RESTORIL  
  
 topiramate 50 mg tablet Commonly known as:  TOPAMAX XARELTO 10 mg tablet Generic drug:  rivaroxaban Take 10 mg by mouth daily. zolpidem 10 mg tablet Commonly known as:  AMBIEN  
TAKE 1 TABLET BY MOUTH NIGHTLY AS NEEDED FOR SLEEP. MAX DAILY AMOUNT 10 MG  
  
 * Notice: This list has 4 medication(s) that are the same as other medications prescribed for you. Read the directions carefully, and ask your doctor or other care provider to review them with you. Follow-up Instructions Return in about 2 weeks (around 6/20/2018) for follow up evaluation. To-Do List   
 06/08/2018  8:30 AM  
  Appointment with [facility] FA[facility] TRACK NURSE at [facility] ([phone])  
  
 06/15/2018 8:30 AM  
  Appointment with [facility] FAST TRACK NURSE at [facility] ([phone])  
  
 06/22/2018 8:30 AM  
  Appointment with [facility] FA[facility] TRACK NURSE at [facility] ([phone])  
  
 06/29/2018 8:30 AM  
  Appointment with [facility] FA[facility] TRACK NURSE at [facility] ([phone]) Patient Instructions · Dressing: changed in the office today. Patient placed in post op shoe · Keep the current dressings on and in place. You need to keep this incision clean and dry. If you have a splint or cast on please keep this clean and dry as well. · You should expect swelling and bruising in the area over the next several days. You may elevate the left extremity on 1 pillow. Place the pillow horizontal so that no pressure is on the back of your heel. You may apply an icepack on top of the dressing to help minimize the swelling. · Keep all pets away from  any wound present in order to prevent infection. · Continue Activity modification · Weight bearing status:  partial weight bearing to the left lower extremity · No lifting, twisting, squatting, deep bending, driving or strenuous activity. PLEASE SEEK IMMEDIATE ASSESSMENT BY ER PHYSICIAN IF ANY OF THE FOLLOWING EXIST:  
 
Excessive pain, swelling, redness or odor of or around the surgical area Temperature over 100.5 Nausea and vomiting lasting longer than 4 hours or if unable to take medications Any signs of decreased circulation or nerve impairment to extremity: change in color, persistent numbness, tingling, coldness or increase pain If any calf pain, calf tightness, shortness of breath, chest pain Any difficulty breathing at rest or with ambulation, any chest tightness/soreness Severe intractable pain, persistent swelling or drainage, development of a wound, incisional redness, finger/toe swelling or color changes, or CALF PAIN 
 
· Perform ankle pumps with non-surgical/non-injured extremity to help reduce the risk of blood clots · Please follow up in 2 weeks Acute Pain After Surgery: Care Instructions Your Care Instructions It's common to have some pain after surgery. Pain doesn't mean that something is wrong or that the surgery didn't go well. But when the pain is severe, it's important to work with your doctor to manage it. It's also important to be aware of a few facts about pain and pain medicine. · You are the only person who knows what your pain feels like. So be sure to tell your doctor when you are in pain or when the pain changes. Then he or she will know how to adjust your medicines. · Pain is often easier to control right after it starts. So it may be better to take regular doses of pain medicine and not wait until the pain gets bad. · Medicine can help control pain. But this doesn't mean you'll have no pain. Medicine works to keep the pain at a level you can live with. With time, you will feel better. Follow-up care is a key part of your treatment and safety.  Be sure to make and go to all appointments, and call your doctor if you are having problems. It's also a good idea to know your test results and keep a list of the medicines you take. How can you care for yourself at home? · Be safe with medicines. Read and follow all instructions on the label. ¨ If the doctor gave you a prescription medicine for pain, take it as prescribed. ¨ If you are not taking a prescription pain medicine, ask your doctor if you can take an over-the-counter medicine. · If you take an over-the-counter pain medicine, such as acetaminophen (Tylenol), ibuprofen (Advil, Motrin), or naproxen (Aleve), read and follow all instructions on the label. · Do not take two or more pain medicines at the same time unless the doctor told you to. · Do not drink alcohol while you are taking pain medicines. · Try to walk each day if your doctor recommends it. Start by walking a little more than you did the day before. Bit by bit, increase the amount you walk. Walking increases blood flow. It also helps prevent pneumonia and constipation. · To prevent constipation from opioid pain medicines: ¨ Talk to your doctor about a laxative. ¨ Include fruits, vegetables, beans, and whole grains in your diet each day. These foods are high in fiber. ¨ Drink plenty of fluids, enough so that your urine is light yellow or clear like water. Drink water, fruit juice, or other drinks that do not contain caffeine or alcohol. If you have kidney, heart, or liver disease and have to limit fluids, talk with your doctor before you increase the amount of fluids you drink. ¨ Take a fiber supplement, such as Citrucel or Metamucil, every day if needed. Read and follow all instructions on the label. If you take pain medicine for more than a few days, talk to your doctor before you take fiber. When should you call for help? Call your doctor now or seek immediate medical care if: 
? · Your pain gets worse. ? · Your pain is not controlled by medicine. ?Watch closely for changes in your health, and be sure to contact your doctor if you have any problems. Where can you learn more? Go to http://leon-edinosn.info/. Enter ([phone] 012-073 in the search box to learn more about \"Acute Pain After Surgery: Care Instructions. \" Current as of: March 20, 2017 Content Version: 11.4 © 6636-1196 [employer]. Care instructions adapted under license by [employer] (which disclaims liability or warranty for this information). If you have questions about a medical condition or this instruction, always ask your healthcare professional. [address] any warranty or liability for your use of this information. Please provide this summary of care documentation to your next provider. Your primary care clinician is listed as Ricky Hanson. If you have any questions after today's visit, please call [phone].

## 2018-06-06 NOTE — PROGRESS NOTES
Patient: Helene Espinoza                MRN: [id number]       SSN: [id number]  YOB: 1950              AGE: 79 y.o. SEX: female    Harmony Ojeda MD    POST OP OFFICE NOTE  DOS: 6/1/18    Chief Complaint:   Chief Complaint   Patient presents with    Follow-up     Left toe       HPI:     The patient is a 79 y.o. female who presents today for follow up 5 days s/p     PROCEDURES PERFORMED:    1. Left great toenail plate removal with matrix excision. 2.  Left #2 toenail plate removal with matrix excision.     Patient has been PWB to the left lower extremity. Patient reports doing well other than post op pain not well controlled. Patient denies any fever, chills, chest pain, shortness of breath or calf pain. There are no new illness or injuries to report since last seen in the office. PHYSICAL EXAM:     Visit Vitals    /71 (BP 1 Location: Left arm, BP Patient Position: Sitting)    Pulse (!) 51    Temp 95.4 °F (35.2 °C) (Oral)    Resp 16    Ht 5' 6\" (1.676 m)    Wt 170 lb (77.1 kg)    SpO2 97%    BMI 27.44 kg/m2         Pain Scale: /10      GEN:  Alert, well developed, well nourished, well appearing 79 y.o. female in no acute distress. PSYCH:  Normal affect, mood, and conduct. alert, oriented x 3 alert, oriented x 3, no dementia  M/S EXAMINATION OF: left great toe and second toe  DRESSINGS: CDI other than mild dry soilage on dressing  DRAINAGE: none  INCISION: Incision looks good, skin well approximated, no dehiscence, nylon in place without disruption. SKIN: mild edema , no erythema, mild ecchymosis, no warmth    TENDERNESS:  moderate tenderness to palpation (as expected after surgery)  NEUROVASCULAR:  grossly intact. Positive distal pulses and capillary refill. DVT ASSESSMENT:  The calf is not tender to palpation.  No evidence of DVT seen on physical exam.  ROM: not tested       RADIOGRAPHS & DIAGNOSTIC STUDIES     No results found for any visits on 06/06/18. IMPRESSION:     Encounter Diagnoses     ICD-10-CM ICD-9-CM   1. Ingrown nail of great toe of left foot L60.0 703.0   2. Ingrown nail of second toe of left foot L60.0 703.0   3. Post-operative state Z98.890 V45.89       PLAN:         Orders Placed This Encounter    oxyCODONE-acetaminophen (PERCOCET) 7.5-325 mg per tablet                  Patient Instructions               · Dressing: changed in the office today. Patient placed in post op shoe    · Keep the current dressings on and in place. You need to keep this incision clean and dry. If you have a splint or cast on please keep this clean and dry as well. · You should expect swelling and bruising in the area over the next several days. You may elevate the left extremity on 1 pillow. Place the pillow horizontal so that no pressure is on the back of your heel. You may apply an icepack on top of the dressing to help minimize the swelling. · Keep all pets away from  any wound present in order to prevent infection. · Continue Activity modification    · Weight bearing status:  partial weight bearing to the left lower extremity    · No lifting, twisting, squatting, deep bending, driving or strenuous activity.     PLEASE SEEK IMMEDIATE ASSESSMENT BY ER PHYSICIAN IF ANY OF THE FOLLOWING EXIST:     Excessive pain, swelling, redness or odor of or around the surgical area   Temperature over 100.5   Nausea and vomiting lasting longer than 4 hours or if unable to take medications   Any signs of decreased circulation or nerve impairment to extremity: change in color, persistent numbness, tingling, coldness or increase pain   If any calf pain, calf tightness, shortness of breath, chest pain   Any difficulty breathing at rest or with ambulation, any chest tightness/soreness  Severe intractable pain, persistent swelling or drainage, development of a wound, incisional redness, finger/toe swelling or color changes, or CALF PAIN    · Perform ankle pumps with non-surgical/non-injured extremity to help reduce the risk of blood clots    · Please follow up in 2 weeks          Acute Pain After Surgery: Care Instructions  Your Care Instructions    It's common to have some pain after surgery. Pain doesn't mean that something is wrong or that the surgery didn't go well. But when the pain is severe, it's important to work with your doctor to manage it. It's also important to be aware of a few facts about pain and pain medicine. · You are the only person who knows what your pain feels like. So be sure to tell your doctor when you are in pain or when the pain changes. Then he or she will know how to adjust your medicines. · Pain is often easier to control right after it starts. So it may be better to take regular doses of pain medicine and not wait until the pain gets bad. · Medicine can help control pain. But this doesn't mean you'll have no pain. Medicine works to keep the pain at a level you can live with. With time, you will feel better. Follow-up care is a key part of your treatment and safety. Be sure to make and go to all appointments, and call your doctor if you are having problems. It's also a good idea to know your test results and keep a list of the medicines you take. How can you care for yourself at home? · Be safe with medicines. Read and follow all instructions on the label. ¨ If the doctor gave you a prescription medicine for pain, take it as prescribed. ¨ If you are not taking a prescription pain medicine, ask your doctor if you can take an over-the-counter medicine. · If you take an over-the-counter pain medicine, such as acetaminophen (Tylenol), ibuprofen (Advil, Motrin), or naproxen (Aleve), read and follow all instructions on the label. · Do not take two or more pain medicines at the same time unless the doctor told you to. · Do not drink alcohol while you are taking pain medicines. · Try to walk each day if your doctor recommends it.  Start by walking a little more than you did the day before. Bit by bit, increase the amount you walk. Walking increases blood flow. It also helps prevent pneumonia and constipation. · To prevent constipation from opioid pain medicines:  ¨ Talk to your doctor about a laxative. ¨ Include fruits, vegetables, beans, and whole grains in your diet each day. These foods are high in fiber. ¨ Drink plenty of fluids, enough so that your urine is light yellow or clear like water. Drink water, fruit juice, or other drinks that do not contain caffeine or alcohol. If you have kidney, heart, or liver disease and have to limit fluids, talk with your doctor before you increase the amount of fluids you drink. ¨ Take a fiber supplement, such as Citrucel or Metamucil, every day if needed. Read and follow all instructions on the label. If you take pain medicine for more than a few days, talk to your doctor before you take fiber. When should you call for help? Call your doctor now or seek immediate medical care if:  ? · Your pain gets worse. ? · Your pain is not controlled by medicine. ? Watch closely for changes in your health, and be sure to contact your doctor if you have any problems. Where can you learn more? Go to http://leon-edinson.info/. Enter [phone] in the search box to learn more about \"Acute Pain After Surgery: Care Instructions. \"  Current as of: March 20, 2017  Content Version: 11.4  © 1289-0937 [employer]. Care instructions adapted under license by [employer] (which disclaims liability or warranty for this information). If you have questions about a medical condition or this instruction, always ask your healthcare professional. [address] any warranty or liability for your use of this information.                · Dressing: changed in the office today. Patient placed in post op shoe    · Keep the current dressings on and in place.  You need to keep this incision clean and dry. If you have a splint or cast on please keep this clean and dry as well. · You should expect swelling and bruising in the area over the next several days. You may elevate the left extremity on 1 pillow. Place the pillow horizontal so that no pressure is on the back of your heel. You may apply an icepack on top of the dressing to help minimize the swelling. · Keep all pets away from  any wound present in order to prevent infection. · Continue Activity modification    · Weight bearing status:  partial weight bearing to the left lower extremity    · No lifting, twisting, squatting, deep bending, driving or strenuous activity. PLEASE SEEK IMMEDIATE ASSESSMENT BY ER PHYSICIAN IF ANY OF THE FOLLOWING EXIST:     Excessive pain, swelling, redness or odor of or around the surgical area   Temperature over 100.5   Nausea and vomiting lasting longer than 4 hours or if unable to take medications   Any signs of decreased circulation or nerve impairment to extremity: change in color, persistent numbness, tingling, coldness or increase pain   If any calf pain, calf tightness, shortness of breath, chest pain   Any difficulty breathing at rest or with ambulation, any chest tightness/soreness  Severe intractable pain, persistent swelling or drainage, development of a wound, incisional redness, finger/toe swelling or color changes, or CALF PAIN    · Perform ankle pumps with non-surgical/non-injured extremity to help reduce the risk of blood clots    · Please follow up in 2 weeks     · Prescription for Percocet 7.5/325 has been provided. Please take medication as directed    Hanna Jhaveri presents today for post operative follow up and pain that is secondary to post operative state. Since surgery, the patient's pain has not changed. She describes the pain as aching, pulsating, throbbing. Since surgery, the patient is not able to perform normal daily activities.   She reports the following adverse side effects from treatment: none. Today - Pain Scale: /10  Least pain over the last week has been 5/10. Worst pain over the last week has been 10/10. · DO NOT DRIVE WHILE TAKING NARCOTIC PAIN MEDICINE      Patient has been discussed with Dr. Burke Raymond during this visit and he agrees with the assessment and plan    Patient expresses understanding of the plan. Patient education provided on post surgical care. REVIEW OF SYSTEMS:     Otherwise as noted in HPI      PAST MEDICAL HISTORY:     Past Medical History:   Diagnosis Date    Antineoplastic chemotherapy induced anemia     Arrhythmia     Atrial Fib    Arthritis     [facility] ([facility]     Cancer ([facility] 1999    Breast    Cardiomyopathy ([facility]     Chronic ITP [facility]idiopathic thrombocytopenia) ([facility]) 10/31/2016    Secondary to Anemia from Chemo    Congestive heart failure ([facility])     Diabetes ([facility]     borderline, no meds    Esophageal cancer ([facility] 2005    treated with chemo    Heart failure ([facility])     SOB (shortness of breath)        MEDICATIONS:     Current Outpatient Prescriptions   Medication Sig    oxyCODONE-acetaminophen (PERCOCET) 7.5-325 mg per tablet TAKE ONE TO TWO TABLETS BY MOUTH Q 4 - 6 AS NEEDED FOR PAIN **AFTER SURGERY**  Indications: Pain    promethazine (PHENERGAN) 25 mg tablet Take 1 Tab by mouth every six (6) hours as needed for Nausea.  zolpidem (AMBIEN) 10 mg tablet TAKE 1 TABLET BY MOUTH NIGHTLY AS NEEDED FOR SLEEP. MAX DAILY AMOUNT 10 MG    lidocaine-prilocaine (EMLA) topical cream APPLY OVER PORT 1 HOUR PRIOR TO CHEMOTHERAPY THAN COVER WITH SARAN WRAP    oxyCODONE-acetaminophen (PERCOCET)  mg per tablet Take 1 Tab by mouth every six (6) hours as needed for Pain. Max Daily Amount: 4 Tabs. Indications: Pain, ACUTE POST OP PAIN    ergocalciferol (VITAMIN D2) 50,000 unit capsule Take 1 Cap by mouth every seven (7) days.     topiramate (TOPAMAX) 50 mg tablet     temazepam (RESTORIL) 30 mg capsule     LINZESS 145 mcg cap capsule TAKE 1 CAPSULE BY MOUTH DAILY 30 MINUTES BEFORE BREAKFAST    lisinopril (PRINIVIL, ZESTRIL) 10 mg tablet Take 10 mg by mouth daily. Indications: hypertension    loratadine 10 mg cap Take 10 mg by mouth daily.  digoxin (LANOXIN) 0.125 mg tablet Take 0.125 mg by mouth daily.  carvedilol (COREG) 3.125 mg tablet Take 3.125 mg by mouth two (2) times daily (with meals).  meloxicam (MOBIC) 7.5 mg tablet Take 7.5 mg by mouth daily.  amiodarone (CORDARONE) 200 mg tablet Take 200 mg by mouth daily. Indications: PREVENTION OF VENTRICULAR FIBRILLATION    gabapentin (NEURONTIN) 300 mg capsule Take 1 po q am and 2 po q pm (Patient taking differently: Take 300 mg by mouth two (2) times a day. Take 1 po q am and 2 po q pm)    rivaroxaban (XARELTO) 10 mg tablet Take 10 mg by mouth daily.  magic mouthwash solution Take 5 mL by mouth three (3) times daily as needed for Stomatitis. Magic mouth wash   Maalox  Lidocaine 2% viscous   Diphenhydramine oral solution     Pharmacy to mix equal portions of ingredients to a total volume as indicated in the dispense amount.  furosemide (LASIX) 40 mg tablet Take 40 mg by mouth daily. Indications: Edema    dronabinol (MARINOL) 5 mg capsule Take 1 Cap by mouth two (2) times a day.  KLOR-CON M20 20 mEq tablet TAKE ONE TABLET BY MOUTH ONCE A DAY    aluminum-magnesium hydroxide 200-200 mg/5 mL susp 5 mL, diphenhydrAMINE 12.5 mg/5 mL liqd 12.5 mg, lidocaine 2 % soln 5 mL 5 mL by Swish and Spit route two (2) times a day. Magic mouth wash   Maalox  Lidocaine 2% viscous   Diphenhydramine oral solution     Pharmacy to mix equal portions of ingredients to a total volume as indicated in the dispense amount.  potassium chloride (K-DUR, KLOR-CON) 20 mEq tablet Take 2 Tabs by mouth daily.  albuterol (PROAIR HFA) 90 mcg/actuation inhaler 1-2 puffs every 4-6 hrs. (Patient taking differently: Take 2 Puffs by inhalation every four (4) hours as needed for Shortness of Breath. 1-2 puffs every 4-6 hrs.)    senna (SENNA) 8.6 mg tablet Take 1 Tab by mouth daily.  nabumetone (RELAFEN) 750 mg tablet Take 750 mg by mouth daily. Indications: OSTEOARTHRITIS    ondansetron hcl (ZOFRAN) 8 mg tablet Take 1 Tab by mouth every eight (8) hours as needed for Nausea.  IRON AG&FUM/C/FA/MV CMB11/CA-T (PRUVATE 21-7 PO) Take 325 mg by mouth daily. Indications: iron deficiency     No current facility-administered medications for this visit. Facility-Administered Medications Ordered in Other Visits   Medication Dose Route Frequency    sodium chloride (NS) flush 10-40 mL  10-40 mL IntraVENous PRN    heparin (porcine) pf 500 Units  500 Units InterCATHeter ONCE    sterile water (preservative free) injection 10 mL  10 mL SubCUTAneous ONCE    heparin (porcine) pf 100 unit/mL           ALLERGIES:     Allergies   Allergen Reactions    Aspirin Swelling     Pt states her whole body swells when she takes aspirin.  Adhesive Unable to Obtain    Nickel Rash    Pcn [Penicillins] Rash         PAST SURGICAL HISTORY:     Past Surgical History:   Procedure Laterality Date    BREAST SURGERY PROCEDURE UNLISTED Left 1990s    lumpectomy    COLONOSCOPY N/A 7/27/2016    COLONOS[facility] Марина Collazo [facility] ENDOSCOPY    HX APPENDECTOMY      HX HEENT      Tonsillectomy    HX ORTHOPAEDIC      HX TUBAL LIGATION      HX VASCULAR ACCESS      NEUROLOGICAL PROCEDURE UNLISTED  01/08/2018    Lumbar fusion       SOCIAL HISTORY:     Social History     Social History    Marital status:      Spouse name: N/A    Number of children: N/A    Years of education: N/A     Occupational History    Not on file. Social History Main Topics    Smoking status: Never Smoker    Smokeless tobacco: Never Used    Alcohol use No    Drug use: No    Sexual activity: Not on file     Other Topics Concern    Not on file     Social History Narrative       FAMILY HISTORY:     History reviewed.  No pertinent family history.         Ady Morales PA-C  6/8/2018

## 2018-06-08 ENCOUNTER — HOSPITAL ENCOUNTER (OUTPATIENT)
Dept: INFUSION THERAPY | Age: 68
Discharge: HOME | End: 2018-06-08
Payer: MEDICARE

## 2018-06-08 VITALS — SYSTOLIC BLOOD PRESSURE: 122 MMHG | DIASTOLIC BLOOD PRESSURE: 76 MMHG | RESPIRATION RATE: 18 BRPM | HEART RATE: 75 BPM

## 2018-06-08 DIAGNOSIS — L60.0 INGROWING NAIL: Primary | ICD-10-CM

## 2018-06-08 LAB
BASO+EOS+MONOS # BLD AUTO: 0.7 K/UL (ref 0–2.3)
BASO+EOS+MONOS # BLD AUTO: 9 % (ref 0.1–17)
DIFFERENTIAL METHOD BLD: ABNORMAL
ERYTHROCYTE [DISTWIDTH] IN BLOOD BY AUTOMATED COUNT: 17.5 % (ref 11.5–14.5)
HCT VFR BLD AUTO: 28.3 % (ref 36–48)
HGB BLD-MCNC: 9.4 G/DL (ref 12–16)
LYMPHOCYTES # BLD: 1.5 K/UL (ref 1.1–5.9)
LYMPHOCYTES NFR BLD: 20 % (ref 14–44)
MCH RBC QN AUTO: 27.9 PG (ref 25–35)
MCHC RBC AUTO-ENTMCNC: 33.2 G/DL (ref 31–37)
MCV RBC AUTO: 84 FL (ref 78–102)
NEUTS SEG # BLD: 5.4 K/UL (ref 1.8–9.5)
NEUTS SEG NFR BLD: 71 % (ref 40–70)
PLATELET # BLD AUTO: 105 K/UL (ref 135–420)
RBC # BLD AUTO: 3.37 M/UL (ref 4.1–5.1)
WBC # BLD AUTO: 7.6 K/UL (ref 4.5–13)

## 2018-06-08 PROCEDURE — 85049 AUTOMATED PLATELET COUNT: CPT | Performed by: INTERNAL MEDICINE

## 2018-06-08 PROCEDURE — 85025 COMPLETE CBC W/AUTO DIFF WBC: CPT | Performed by: INTERNAL MEDICINE

## 2018-06-08 PROCEDURE — 74011250636 HC RX REV CODE- 250/636

## 2018-06-08 PROCEDURE — 77030012965 HC NDL HUBR BBMI -A

## 2018-06-08 PROCEDURE — 74011250636 HC RX REV CODE- 250/636: Performed by: INTERNAL MEDICINE

## 2018-06-08 PROCEDURE — 74011250636 HC RX REV CODE- 250/636: Performed by: NURSE PRACTITIONER

## 2018-06-08 PROCEDURE — 36591 DRAW BLOOD OFF VENOUS DEVICE: CPT

## 2018-06-08 PROCEDURE — 96372 THER/PROPH/DIAG INJ SC/IM: CPT

## 2018-06-08 RX ORDER — WATER FOR INJECTION,STERILE
10 VIAL (ML) INJECTION ONCE
Status: DISPENSED | OUTPATIENT
Start: 2018-06-08 | End: 2018-06-08

## 2018-06-08 RX ORDER — SODIUM CHLORIDE 0.9 % (FLUSH) 0.9 %
10-40 SYRINGE (ML) INJECTION AS NEEDED
Status: DISCONTINUED | OUTPATIENT
Start: 2018-06-08 | End: 2018-06-12 | Stop reason: HOSPADM

## 2018-06-08 RX ORDER — HEPARIN 100 UNIT/ML
500 SYRINGE INTRAVENOUS ONCE
Status: ACTIVE | OUTPATIENT
Start: 2018-06-08 | End: 2018-06-08

## 2018-06-08 RX ORDER — HEPARIN 100 UNIT/ML
SYRINGE INTRAVENOUS
Status: DISPENSED
Start: 2018-06-08 | End: 2018-06-08

## 2018-06-08 RX ADMIN — ROMIPLOSTIM 207 MCG: 250 INJECTION, POWDER, LYOPHILIZED, FOR SOLUTION SUBCUTANEOUS at 09:09

## 2018-06-08 RX ADMIN — ERYTHROPOIETIN 40000 UNITS: 40000 INJECTION, SOLUTION INTRAVENOUS; SUBCUTANEOUS at 09:09

## 2018-06-08 RX ADMIN — ERYTHROPOIETIN 20000 UNITS: 20000 INJECTION, SOLUTION INTRAVENOUS; SUBCUTANEOUS at 09:09

## 2018-06-08 NOTE — PROGRESS NOTES
[facility] Progress Note    Date: 2018    Name: Jerica Gómez    MRN: [id number]         : 1950      Port flush/ Nplate Injection / Diaz Norma    Ms. Griffiths arrived to [facility] at 9993. Ms. Griffiths was assessed and education was provided. Ms. [place] vitals were reviewed. Visit Vitals    /76 (BP 1 Location: Right arm, BP Patient Position: Sitting)    Pulse 75    Resp 18       Pt was observed for 5 minutes after obtaining vital signs prior to initiating treatment. Patients right chest double lumen mediport accessed using sterile technique with 20G 1 inch haas needles. Medial lumen positive for blood return but very sluggish to flush. Lateral lumen flushes without difficulty but no blood return. Blood drawn off medial lumen and sent to lab for CBC. Lumens flushed with NS and heparin before de accessing. Gauze and tegaderm applied per patient request.     Lab results obtained and reviewed. (Preliminary results scanned into media tab.)  Recent Results (from the past 12 hour(s))   CBC WITH 3 PART DIFF    Collection Time: 18  8:55 AM   Result Value Ref Range    WBC 7.6 4.5 - 13.0 K/uL    RBC 3.37 (L) 4.10 - 5.10 M/uL    HGB 9.4 (L) 12.0 - 16 g/dL    HCT 28.3 (L) 36 - 48 %    MCV 84.0 78 - 102 FL    MCH 27.9 25.0 - 35.0 PG    MCHC 33.2 31 - 37 g/dL    RDW 17.5 (H) 11.5 - 14.5 %    NEUTROPHILS 71 (H) 40 - 70 %    MIXED CELLS 9 0.1 - 17 %    LYMPHOCYTES 20 14 - 44 %    ABS. NEUTROPHILS 5.4 1.8 - 9.5 K/UL    ABS. MIXED CELLS 0.7 0.0 - 2.3 K/uL    ABS. LYMPHOCYTES 1.5 1.1 - 5.9 K/UL    DF AUTOMATED       Preliminary platelet count= 42,999. Per Nplate protocol, since pt's platelet count is 34,939 today, pt's dose will remain at 3mcg/kg (3mcg x 69kg = 207mcg). Nplate 372VNQ (6.43CR) administered as ordered SQ in the back of the patient's right upper arm. No redness or bleeding noted. Results within parameters for Procrit today.  Procrit 60,000 units administered SQ in the back of the right arm.      Ms. Maria Teresa Padilla was discharged from [place] [address] in stable condition at [place] [address]. She is to return on 6/15/18 at 0830 for her next appointment.     Korey Gonzalez, RN  June 8, 2018

## 2018-06-14 DIAGNOSIS — Z98.1 S/P LUMBAR FUSION: ICD-10-CM

## 2018-06-14 DIAGNOSIS — F51.05 INSOMNIA DUE TO ANXIETY AND FEAR: ICD-10-CM

## 2018-06-14 DIAGNOSIS — F40.9 INSOMNIA DUE TO ANXIETY AND FEAR: ICD-10-CM

## 2018-06-14 DIAGNOSIS — M43.16 SPONDYLOLISTHESIS OF LUMBAR REGION: ICD-10-CM

## 2018-06-14 DIAGNOSIS — L60.0 INGROWN NAIL OF SECOND TOE OF LEFT FOOT: ICD-10-CM

## 2018-06-14 DIAGNOSIS — G89.3 CANCER ASSOCIATED PAIN: ICD-10-CM

## 2018-06-14 DIAGNOSIS — L60.0 INGROWN NAIL OF GREAT TOE OF LEFT FOOT: ICD-10-CM

## 2018-06-14 RX ORDER — ZOLPIDEM TARTRATE 10 MG/1
TABLET ORAL
Qty: 30 TAB | Refills: 2 | Status: CANCELLED | OUTPATIENT
Start: 2018-06-14

## 2018-06-14 RX ORDER — OXYCODONE AND ACETAMINOPHEN 10; 325 MG/1; MG/1
1 TABLET ORAL
Qty: 120 TAB | Refills: 0 | Status: CANCELLED | OUTPATIENT
Start: 2018-06-14

## 2018-06-14 RX ORDER — OXYCODONE AND ACETAMINOPHEN 10; 325 MG/1; MG/1
1 TABLET ORAL
Qty: 120 TAB | Refills: 0 | Status: SHIPPED | OUTPATIENT
Start: 2018-06-14 | End: 2018-07-16 | Stop reason: SDUPTHER

## 2018-06-14 RX ORDER — ZOLPIDEM TARTRATE 10 MG/1
TABLET ORAL
Qty: 30 TAB | Refills: 2 | Status: SHIPPED | OUTPATIENT
Start: 2018-06-14 | End: 2018-07-16 | Stop reason: SDUPTHER

## 2018-06-15 ENCOUNTER — HOSPITAL ENCOUNTER (OUTPATIENT)
Dept: INFUSION THERAPY | Age: 68
Discharge: HOME | End: 2018-06-15
Payer: MEDICARE

## 2018-06-15 VITALS
HEART RATE: 74 BPM | DIASTOLIC BLOOD PRESSURE: 70 MMHG | SYSTOLIC BLOOD PRESSURE: 111 MMHG | OXYGEN SATURATION: 99 % | RESPIRATION RATE: 18 BRPM | TEMPERATURE: 98 F

## 2018-06-15 PROCEDURE — 74011000250 HC RX REV CODE- 250: Performed by: INTERNAL MEDICINE

## 2018-06-15 PROCEDURE — 74011250636 HC RX REV CODE- 250/636: Performed by: INTERNAL MEDICINE

## 2018-06-15 PROCEDURE — 74011250636 HC RX REV CODE- 250/636

## 2018-06-15 PROCEDURE — 96372 THER/PROPH/DIAG INJ SC/IM: CPT

## 2018-06-15 RX ORDER — WATER FOR INJECTION,STERILE
10 VIAL (ML) INJECTION ONCE
Status: COMPLETED | OUTPATIENT
Start: 2018-06-15 | End: 2018-06-15

## 2018-06-15 RX ADMIN — WATER 10 ML: 1 INJECTION INTRAMUSCULAR; INTRAVENOUS; SUBCUTANEOUS at 08:46

## 2018-06-15 RX ADMIN — ROMIPLOSTIM 207 MCG: 250 INJECTION, POWDER, LYOPHILIZED, FOR SOLUTION SUBCUTANEOUS at 08:46

## 2018-06-15 NOTE — PROGRESS NOTES
[facility] Progress Note    Date: Amy 15, 2018    Name: Caprice Jackson    MRN: [id number]         : 1950      Nplate Injection     Ms. Aster Castro arrived to [facility] at 2749. Ms. Aster Castro was assessed and education was provided. Ms. [place] Valle vitals were reviewed. Visit Vitals    /70 (BP 1 Location: Right arm, BP Patient Position: Sitting)    Pulse 74    Temp 98 °F (36.7 °C)    Resp 18    SpO2 99%       Pt was observed for 5 minutes after obtaining vital signs prior to initiating treatment. Per Dr. Beto Espinosa order standard dose of NPlate to be given weekly 207mcg. Nplate 370AZV (2.70OB) administered as ordered SQ in the back of the patient's right upper arm. No redness or bleeding noted. Ms. Aster Castro was discharged from [place] [address] in stable condition at 12. She is to return on 18 at 0830 for her next appointment.     Tanya Tineo RN  Amy 15, 2018

## 2018-06-20 ENCOUNTER — OFFICE VISIT (OUTPATIENT)
Dept: ORTHOPEDIC SURGERY | Age: 68
End: 2018-06-20

## 2018-06-20 VITALS
BODY MASS INDEX: 26.84 KG/M2 | HEIGHT: 66 IN | RESPIRATION RATE: 16 BRPM | HEART RATE: 58 BPM | SYSTOLIC BLOOD PRESSURE: 106 MMHG | TEMPERATURE: 97 F | WEIGHT: 167 LBS | DIASTOLIC BLOOD PRESSURE: 68 MMHG

## 2018-06-20 DIAGNOSIS — Z98.890 POST-OPERATIVE STATE: ICD-10-CM

## 2018-06-20 DIAGNOSIS — M79.671 RIGHT FOOT PAIN: ICD-10-CM

## 2018-06-20 DIAGNOSIS — L60.0 INGROWN NAIL OF GREAT TOE OF LEFT FOOT: Primary | ICD-10-CM

## 2018-06-20 DIAGNOSIS — L60.0 INGROWN NAIL OF SECOND TOE OF LEFT FOOT: ICD-10-CM

## 2018-06-20 NOTE — PROGRESS NOTES
Patient: Jena Ferguson                MRN: [id number]       SSN: [id number]  YOB: 1950              AGE: 79 y.o. SEX: female    Shan Hutchinson MD    POST OP OFFICE NOTE  DOS: 6/1/18    Chief Complaint:   Chief Complaint   Patient presents with    Toe Pain     left big and second toe       HPI:     The patient is a 79 y.o. female who presents today for follow up 19 days s/p     PROCEDURES PERFORMED:    1. Left great toenail plate removal with matrix excision. 2.  Left #2 toenail plate removal with matrix excision.     Patient has been PWB to the left lower extremity. Patient reports doing well other than post op pain not well controlled. Patient denies any fever, chills, chest pain, shortness of breath or calf pain. There are no new illness or injuries to report since last seen in the office. PHYSICAL EXAM:     Visit Vitals    /68    Pulse (!) 58    Temp 97 °F (36.1 °C) (Oral)    Resp 16    Ht 5' 6\" (1.676 m)    Wt 167 lb (75.8 kg)    BMI 26.95 kg/m2         Pain Scale: /10      GEN:  Alert, well developed, well nourished, well appearing 79 y.o. female in no acute distress. PSYCH:  Normal affect, mood, and conduct. alert, oriented x 3 alert, oriented x 3, no dementia  M/S EXAMINATION OF: left great toe and second toe  DRESSINGS: CDI other than mild dry soilage on dressing  DRAINAGE: none  INCISION: Incision looks good, skin well approximated, no dehiscence, nylon in place without disruption. SKIN: mild edema , no erythema, mild ecchymosis, no warmth , some slight maceration of tissue noted to great toe from moisture  TENDERNESS:  moderate tenderness to palpation   NEUROVASCULAR:  grossly intact. Positive distal pulses and capillary refill. DVT ASSESSMENT:  The calf is not tender to palpation. No evidence of DVT seen on physical exam.  ROM: not tested       RADIOGRAPHS & DIAGNOSTIC STUDIES     No results found for any visits on 06/20/18.       IMPRESSION: Encounter Diagnoses     ICD-10-CM ICD-9-CM   1. Ingrown nail of great toe of left foot L60.0 703.0   2. Ingrown nail of second toe of left foot L60.0 703.0   3. Post-operative state Z98.890 V45.89   4. Right foot pain M79.671 729.5       PLAN:         No orders of the defined types were placed in this encounter.                   · Sutures removed. You may shower in 2 days, no submerging in any water. Allow incision to air when in a protected environment    · Keep all pets away from  any wound present in order to prevent infection. · Continue Activity modification    · Weight bearing status:  partial weight bearing to the left lower extremity in CAM boot    · No lifting, twisting, squatting, deep bending, driving or strenuous activity. · Please follow up in 2 weeks               · DO NOT DRIVE WHILE TAKING NARCOTIC PAIN MEDICINE      REVIEW OF SYSTEMS:     Otherwise as noted in HPI      PAST MEDICAL HISTORY:     Past Medical History:   Diagnosis Date    Antineoplastic chemotherapy induced anemia     Arrhythmia     Atrial Fib    Arthritis     [facility]     Cancer ([facility] 1999    Breast    Cardiomyopathy ([facility]     Chronic ITP (idiopathic thrombocytopenia) ([facility] 10/31/2016    Secondary to Anemia from Chemo    Congestive heart failure (HCC)     Diabetes (HCC)     borderline, no meds    Esophageal cancer ([facility] 2005    treated with chemo    Heart failure (HCC)     SOB (shortness of breath)        MEDICATIONS:     Current Outpatient Prescriptions   Medication Sig    zolpidem (AMBIEN) 10 mg tablet TAKE 1 TABLET BY MOUTH NIGHTLY AS NEEDED FOR SLEEP. MAX DAILY AMOUNT 10 MG    oxyCODONE-acetaminophen (PERCOCET)  mg per tablet Take 1 Tab by mouth every six (6) hours as needed for Pain. Max Daily Amount: 4 Tabs.  Indications: Pain, ACUTE POST OP PAIN    oxyCODONE-acetaminophen (PERCOCET) 7.5-325 mg per tablet TAKE ONE TO TWO TABLETS BY MOUTH Q 4 - 6 AS NEEDED FOR PAIN **AFTER SURGERY** Indications: Pain    promethazine (PHENERGAN) 25 mg tablet Take 1 Tab by mouth every six (6) hours as needed for Nausea.  lidocaine-prilocaine (EMLA) topical cream APPLY OVER PORT 1 HOUR PRIOR TO CHEMOTHERAPY THAN COVER WITH SARAN WRAP    ergocalciferol (VITAMIN D2) 50,000 unit capsule Take 1 Cap by mouth every seven (7) days.  topiramate (TOPAMAX) 50 mg tablet     temazepam (RESTORIL) 30 mg capsule     LINZESS 145 mcg cap capsule TAKE 1 CAPSULE BY MOUTH DAILY 30 MINUTES BEFORE BREAKFAST    lisinopril (PRINIVIL, ZESTRIL) 10 mg tablet Take 10 mg by mouth daily. Indications: hypertension    loratadine 10 mg cap Take 10 mg by mouth daily.  digoxin (LANOXIN) 0.125 mg tablet Take 0.125 mg by mouth daily.  carvedilol (COREG) 3.125 mg tablet Take 3.125 mg by mouth two (2) times daily (with meals).  meloxicam (MOBIC) 7.5 mg tablet Take 7.5 mg by mouth daily.  amiodarone (CORDARONE) 200 mg tablet Take 200 mg by mouth daily. Indications: PREVENTION OF VENTRICULAR FIBRILLATION    gabapentin (NEURONTIN) 300 mg capsule Take 1 po q am and 2 po q pm (Patient taking differently: Take 300 mg by mouth two (2) times a day. Take 1 po q am and 2 po q pm)    rivaroxaban (XARELTO) 10 mg tablet Take 10 mg by mouth daily.  magic mouthwash solution Take 5 mL by mouth three (3) times daily as needed for Stomatitis. Magic mouth wash   Maalox  Lidocaine 2% viscous   Diphenhydramine oral solution     Pharmacy to mix equal portions of ingredients to a total volume as indicated in the dispense amount.  furosemide (LASIX) 40 mg tablet Take 40 mg by mouth daily. Indications: Edema    dronabinol (MARINOL) 5 mg capsule Take 1 Cap by mouth two (2) times a day.  KLOR-CON M20 20 mEq tablet TAKE ONE TABLET BY MOUTH ONCE A DAY    aluminum-magnesium hydroxide 200-200 mg/5 mL susp 5 mL, diphenhydrAMINE 12.5 mg/5 mL liqd 12.5 mg, lidocaine 2 % soln 5 mL 5 mL by Swish and Spit route two (2) times a day.  Magic mouth wash Maalox  Lidocaine 2% viscous   Diphenhydramine oral solution     Pharmacy to mix equal portions of ingredients to a total volume as indicated in the dispense amount.  potassium chloride (K-DUR, KLOR-CON) 20 mEq tablet Take 2 Tabs by mouth daily.  albuterol (PROAIR HFA) 90 mcg/actuation inhaler 1-2 puffs every 4-6 hrs. (Patient taking differently: Take 2 Puffs by inhalation every four (4) hours as needed for Shortness of Breath. 1-2 puffs every 4-6 hrs.)    senna (SENNA) 8.6 mg tablet Take 1 Tab by mouth daily.  nabumetone (RELAFEN) 750 mg tablet Take 750 mg by mouth daily. Indications: OSTEOARTHRITIS    ondansetron hcl (ZOFRAN) 8 mg tablet Take 1 Tab by mouth every eight (8) hours as needed for Nausea.  IRON AG&FUM/C/FA/MV CMB11/CA-T (PRUVATE 21-7 PO) Take 325 mg by mouth daily. Indications: iron deficiency     No current facility-administered medications for this visit. ALLERGIES:     Allergies   Allergen Reactions    Aspirin Swelling     Pt states her whole body swells when she takes aspirin.  Adhesive Unable to Obtain    Nickel Rash    Pcn [Penicillins] Rash         PAST SURGICAL HISTORY:     Past Surgical History:   Procedure Laterality Date    BREAST SURGERY PROCEDURE UNLISTED Left 1990s    lumpectomy    COLONOSCOPY N/A 7/27/2016    COLONOSCOPY performed [facility]ie [facility]on [facility] at [facility] ENDOSCOPY    HX APPENDECTOMY      HX HEENT      Tonsillectomy    HX ORTHOPAEDIC      HX TUBAL LIGATION      HX VASCULAR ACCESS      NEUROLOGICAL PROCEDURE UNLISTED  01/08/2018    Lumbar fusion       SOCIAL HISTORY:     Social History     Social History    Marital status:      Spouse name: N/A    Number of children: N/A    Years of education: N/A     Occupational History    Not on file.      Social History Main Topics    Smoking status: Never Smoker    Smokeless tobacco: Never Used    Alcohol use No    Drug use: No    Sexual activity: Not on file     Other Topics Concern    Not on file     Social History Narrative       FAMILY HISTORY:     History reviewed. No pertinent family history.         Priscilla Harris PA-C  6/20/2018

## 2018-06-20 NOTE — PATIENT INSTRUCTIONS
· Sutures removed. You may shower in 2 days, no submerging in any water. Allow incision to air when in a protected environment    · Keep all pets away from  any wound present in order to prevent infection. · Continue Activity modification    · Weight bearing status:  partial weight bearing to the left lower extremity    · No lifting, twisting, squatting, deep bending, driving or strenuous activity. · Please follow up in 2 weeks          Acute Pain After Surgery: Care Instructions  Your Care Instructions    It's common to have some pain after surgery. Pain doesn't mean that something is wrong or that the surgery didn't go well. But when the pain is severe, it's important to work with your doctor to manage it. It's also important to be aware of a few facts about pain and pain medicine. · You are the only person who knows what your pain feels like. So be sure to tell your doctor when you are in pain or when the pain changes. Then he or she will know how to adjust your medicines. · Pain is often easier to control right after it starts. So it may be better to take regular doses of pain medicine and not wait until the pain gets bad. · Medicine can help control pain. But this doesn't mean you'll have no pain. Medicine works to keep the pain at a level you can live with. With time, you will feel better. Follow-up care is a key part of your treatment and safety. Be sure to make and go to all appointments, and call your doctor if you are having problems. It's also a good idea to know your test results and keep a list of the medicines you take. How can you care for yourself at home? · Be safe with medicines. Read and follow all instructions on the label. ¨ If the doctor gave you a prescription medicine for pain, take it as prescribed. ¨ If you are not taking a prescription pain medicine, ask your doctor if you can take an over-the-counter medicine.   · If you take an over-the-counter pain medicine, such as acetaminophen (Tylenol), ibuprofen (Advil, Motrin), or naproxen (Aleve), read and follow all instructions on the label. · Do not take two or more pain medicines at the same time unless the doctor told you to. · Do not drink alcohol while you are taking pain medicines. · Try to walk each day if your doctor recommends it. Start by walking a little more than you did the day before. Bit by bit, increase the amount you walk. Walking increases blood flow. It also helps prevent pneumonia and constipation. · To prevent constipation from opioid pain medicines:  ¨ Talk to your doctor about a laxative. ¨ Include fruits, vegetables, beans, and whole grains in your diet each day. These foods are high in fiber. ¨ Drink plenty of fluids, enough so that your urine is light yellow or clear like water. Drink water, fruit juice, or other drinks that do not contain caffeine or alcohol. If you have kidney, heart, or liver disease and have to limit fluids, talk with your doctor before you increase the amount of fluids you drink. ¨ Take a fiber supplement, such as Citrucel or Metamucil, every day if needed. Read and follow all instructions on the label. If you take pain medicine for more than a few days, talk to your doctor before you take fiber. When should you call for help? Call your doctor now or seek immediate medical care if:  ? · Your pain gets worse. ? · Your pain is not controlled by medicine. ? Watch closely for changes in your health, and be sure to contact your doctor if you have any problems. Where can you learn more? Go to http://leon-edinson.info/. Enter ([phone] [phone] in the search box to learn more about \"Acute Pain After Surgery: Care Instructions. \"  Current as of: March 20, 2017  Content Version: 11.4  © 3010-1760 [employer]. Care instructions adapted under license by [employer] (which disclaims liability or warranty for this information).  If you have questions about a medical condition or this instruction, always ask your healthcare professional. [place] [address] any warranty or liability for your use of this information.

## 2018-06-20 NOTE — MR AVS SNAPSHOT
[address] [place], [address] [facility] 
[phone] Patient: Adrián Flores MRN: [id number] KDQ:[id number] Visit Information Date & Time Provider Department Dept. Phone Encounter #  
 6/20/2018 11:15 AM Venessa Humphries [address] [facility] [id number] Follow-up Instructions Return in about 2 weeks (around 7/4/2018) for follow up evaluation. Your Appointments 7/6/2018 10:15 AM  
Follow Up with Alona Edmonds MD  
[facility] [[email or web address]] ONE [facility]) Appt Note: 6 WK F/U NP; 3 mo fu refused to see lb  
 [address] [facility] [address] [phone]  
  
   
 [address] [facility] [address] Upcoming Health Maintenance Date Due Hepatitis C Screening 1950 DTaP/Tdap/Td series (1 - Tdap) 5/14/1973 FOBT Q 1 YEAR AGE 50-75 5/14/2002 ZOSTER VACCINE AGE 60> 3/14/2012 BREAST CANCER SCRN MAMMOGRAM 4/4/2015 GLAUCOMA SCREENING Q2Y 5/14/2017 Bone Densitometry (Dexa) Screening 5/14/2017 Pneumococcal 65+ High/Highest Risk (1 of 2 - PCV13) 5/14/2017 MEDICARE YEARLY EXAM 3/16/2018 Influenza Age 5 to Adult 8/1/2018 Allergies as of 6/20/2018  Review Complete On: 6/20/2018 By: Gurwinder Cotto PA-C Severity Noted Reaction Type Reactions Aspirin High 08/07/2013   Systemic Swelling Pt states her whole body swells when she takes aspirin. Adhesive    Unable to Obtain Nickel  05/21/2018    Rash Pcn [Penicillins]  08/20/2012    Rash Current Immunizations  Reviewed on 4/20/2018 No immunizations on file. Not reviewed this visit You Were Diagnosed With   
  
 Codes Comments Ingrown nail of great toe of left foot    -  Primary ICD-10-CM: L60.0 ICD-9-CM: 703.0 Ingrown nail of second toe of left foot     ICD-10-CM: L60.0 ICD-9-CM: 703.0 Post-operative state     ICD-10-CM: M97.232 ICD-9-CM: V45.89 Right foot pain     ICD-10-CM: M79.671 ICD-9-CM: 729.5 Vitals BP Pulse Temp Resp Height(growth percentile) Weight(growth percentile) 106/68 (!) 58 97 °F (36.1 °C) (Oral) 16 5' 6\" (1.676 m) 167 lb (75.8 kg) BMI OB Status Smoking Status 26.95 kg/m2 Postmenopausal Never Smoker BMI and BSA Data Body Mass Index Body Surface Area  
 26.95 kg/m 2 1.88 m 2 Your Updated Medication List  
  
   
This list is accurate as of 6/20/18 12:29 PM.  Always use your most recent med list.  
  
  
  
  
 albuterol 90 mcg/actuation inhaler Commonly known as:  PROAIR HFA  
1-2 puffs every 4-6 hrs. aluminum-magnesium hydroxide 200-200 mg/5 mL susp 5 mL, diphenhydrAMINE 12.5 mg/5 mL liqd 12.5 mg, lidocaine 2 % soln 5 mL  
5 mL by Swish and Spit route two (2) times a day. Magic mouth wash  Maalox Lidocaine 2% viscous  Diphenhydramine oral solution   Pharmacy to mix equal portions of ingredients to a total volume as indicated in the dispense amount. amiodarone 200 mg tablet Commonly known as:  CORDARONE Take 200 mg by mouth daily. Indications: PREVENTION OF VENTRICULAR FIBRILLATION  
  
 COREG 3.125 mg tablet Generic drug:  carvedilol Take 3.125 mg by mouth two (2) times daily (with meals). digoxin 0.125 mg tablet Commonly known as:  LANOXIN Take 0.125 mg by mouth daily. dronabinol 5 mg capsule Commonly known as:  [place] Shaker Take 1 Cap by mouth two (2) times a day. ergocalciferol 50,000 unit capsule Commonly known as:  VITAMIN D2 Take 1 Cap by mouth every seven (7) days. furosemide 40 mg tablet Commonly known as:  LASIX Take 40 mg by mouth daily. Indications: Edema  
  
 gabapentin 300 mg capsule Commonly known as:  NEURONTIN Take 1 po q am and 2 po q pm  
  
 lidocaine-prilocaine topical cream  
Commonly known as:  EMLA APPLY OVER PORT 1 HOUR PRIOR TO CHEMOTHERAPY THAN COVER WITH [place] WRAP LINZESS 145 mcg Cap capsule Generic drug:  linaclotide TAKE 1 CAPSULE BY MOUTH DAILY 30 MINUTES BEFORE BREAKFAST  
  
 lisinopril 10 mg tablet Commonly known as:  Nadia Husbands Take 10 mg by mouth daily. Indications: hypertension  
  
 loratadine 10 mg Cap Take 10 mg by mouth daily. magic mouthwash solution Take 5 mL by mouth three (3) times daily as needed for Stomatitis. Magic mouth wash  Maalox Lidocaine 2% viscous  Diphenhydramine oral solution   Pharmacy to mix equal portions of ingredients to a total volume as indicated in the dispense amount. meloxicam 7.5 mg tablet Commonly known as:  MOBIC Take 7.5 mg by mouth daily. nabumetone 750 mg tablet Commonly known as:  RELAFEN Take 750 mg by mouth daily. Indications: OSTEOARTHRITIS  
  
 ondansetron hcl 8 mg tablet Commonly known as:  Mitra Maxin Take 1 Tab by mouth every eight (8) hours as needed for Nausea. * oxyCODONE-acetaminophen 7.5-325 mg per tablet Commonly known as:  PERCOCET TAKE ONE TO TWO TABLETS BY MOUTH Q 4 - 6 AS NEEDED FOR PAIN **AFTER SURGERY**  Indications: Pain * oxyCODONE-acetaminophen  mg per tablet Commonly known as:  PERCOCET Take 1 Tab by mouth every six (6) hours as needed for Pain. Max Daily Amount: 4 Tabs. Indications: Pain, ACUTE POST OP PAIN * potassium chloride 20 mEq tablet Commonly known as:  K-DUR, KLOR-CON Take 2 Tabs by mouth daily. * KLOR-CON M20 20 mEq tablet Generic drug:  potassium chloride TAKE ONE TABLET BY MOUTH ONCE A DAY promethazine 25 mg tablet Commonly known as:  PHENERGAN Take 1 Tab by mouth every six (6) hours as needed for Nausea. PRUVATE 21-7 PO Take 325 mg by mouth daily. Indications: iron deficiency Senna 8.6 mg tablet Generic drug:  senna Take 1 Tab by mouth daily. temazepam 30 mg capsule Commonly known as:  RESTORIL  
  
 topiramate 50 mg tablet Commonly known as:  TOPAMAX XARELTO 10 mg tablet Generic drug:  rivaroxaban Take 10 mg by mouth daily. zolpidem 10 mg tablet Commonly known as:  AMBIEN  
TAKE 1 TABLET BY MOUTH NIGHTLY AS NEEDED FOR SLEEP. MAX DAILY AMOUNT 10 MG  
  
 * Notice: This list has 4 medication(s) that are the same as other medications prescribed for you. Read the directions carefully, and ask your doctor or other care provider to review them with you. Follow-up Instructions Return in about 2 weeks (around 7/4/2018) for follow up evaluation. To-Do List   
 06/22/2018  8:30 AM  
  Appointment with [facility] FAST TRACK NURSE at [facility] ([phone])  
  
 06/29/2018 8:30 AM  
  Appointment with [facility] FAST TRACK NURSE at [facility] ([phone]) Patient Instructions · Sutures removed. You may shower in 2 days, no submerging in any water. Allow incision to air when in a protected environment · Keep all pets away from  any wound present in order to prevent infection. · Continue Activity modification · Weight bearing status:  partial weight bearing to the left lower extremity · No lifting, twisting, squatting, deep bending, driving or strenuous activity. · Please follow up in 2 weeks Acute Pain After Surgery: Care Instructions Your Care Instructions It's common to have some pain after surgery. Pain doesn't mean that something is wrong or that the surgery didn't go well. But when the pain is severe, it's important to work with your doctor to manage it. It's also important to be aware of a few facts about pain and pain medicine. · You are the only person who knows what your pain feels like. So be sure to tell your doctor when you are in pain or when the pain changes. Then he or she will know how to adjust your medicines. · Pain is often easier to control right after it starts.  So it may be better to take regular doses of pain medicine and not wait until the pain gets bad. · Medicine can help control pain. But this doesn't mean you'll have no pain. Medicine works to keep the pain at a level you can live with. With time, you will feel better. Follow-up care is a key part of your treatment and safety. Be sure to make and go to all appointments, and call your doctor if you are having problems. It's also a good idea to know your test results and keep a list of the medicines you take. How can you care for yourself at home? · Be safe with medicines. Read and follow all instructions on the label. ¨ If the doctor gave you a prescription medicine for pain, take it as prescribed. ¨ If you are not taking a prescription pain medicine, ask your doctor if you can take an over-the-counter medicine. · If you take an over-the-counter pain medicine, such as acetaminophen (Tylenol), ibuprofen (Advil, Motrin), or naproxen (Aleve), read and follow all instructions on the label. · Do not take two or more pain medicines at the same time unless the doctor told you to. · Do not drink alcohol while you are taking pain medicines. · Try to walk each day if your doctor recommends it. Start by walking a little more than you did the day before. Bit by bit, increase the amount you walk. Walking increases blood flow. It also helps prevent pneumonia and constipation. · To prevent constipation from opioid pain medicines: ¨ Talk to your doctor about a laxative. ¨ Include fruits, vegetables, beans, and whole grains in your diet each day. These foods are high in fiber. ¨ Drink plenty of fluids, enough so that your urine is light yellow or clear like water. Drink water, fruit juice, or other drinks that do not contain caffeine or alcohol. If you have kidney, heart, or liver disease and have to limit fluids, talk with your doctor before you increase the amount of fluids you drink. ¨ Take a fiber supplement, such as Citrucel or Metamucil, every day if needed. Read and follow all instructions on the label. If you take pain medicine for more than a few days, talk to your doctor before you take fiber. When should you call for help? Call your doctor now or seek immediate medical care if: 
? · Your pain gets worse. ? · Your pain is not controlled by medicine. ? Watch closely for changes in your health, and be sure to contact your doctor if you have any problems. Where can you learn more? Go to http://leon-edinson.info/. Enter ([phone] in the search box to learn more about \"Acute Pain After Surgery: Care Instructions. \" Current as of: March 20, 2017 Content Version: 11.4 © 0713-2873 [employer]. Care instructions adapted under license by [employer] (which disclaims liability or warranty for this information). If you have questions about a medical condition or this instruction, always ask your healthcare professional. [address] any warranty or liability for your use of this information. Please provide this summary of care documentation to your next provider. Your primary care clinician is listed as Elvia Holter. If you have any questions after today's visit, please call [phone].

## 2018-06-22 ENCOUNTER — HOSPITAL ENCOUNTER (OUTPATIENT)
Dept: INFUSION THERAPY | Age: 68
Discharge: HOME | End: 2018-06-22
Payer: MEDICARE

## 2018-06-22 VITALS
DIASTOLIC BLOOD PRESSURE: 64 MMHG | RESPIRATION RATE: 18 BRPM | TEMPERATURE: 97.8 F | HEART RATE: 71 BPM | SYSTOLIC BLOOD PRESSURE: 108 MMHG

## 2018-06-22 PROCEDURE — 74011250636 HC RX REV CODE- 250/636

## 2018-06-22 PROCEDURE — 74011250636 HC RX REV CODE- 250/636: Performed by: INTERNAL MEDICINE

## 2018-06-22 PROCEDURE — 96372 THER/PROPH/DIAG INJ SC/IM: CPT

## 2018-06-22 PROCEDURE — 74011000250 HC RX REV CODE- 250: Performed by: INTERNAL MEDICINE

## 2018-06-22 RX ORDER — WATER FOR INJECTION,STERILE
10 VIAL (ML) INJECTION ONCE
Status: COMPLETED | OUTPATIENT
Start: 2018-06-22 | End: 2018-06-22

## 2018-06-22 RX ADMIN — ROMIPLOSTIM 207 MCG: 250 INJECTION, POWDER, LYOPHILIZED, FOR SOLUTION SUBCUTANEOUS at 08:46

## 2018-06-22 RX ADMIN — WATER 10 ML: 1 INJECTION INTRAMUSCULAR; INTRAVENOUS; SUBCUTANEOUS at 08:46

## 2018-06-22 NOTE — PROGRESS NOTES
[address] \[facility] [place] [facility]\"" Progress Note    Date: 2018    Name: Zahra Trevino    MRN: [id number]         : 1950      Nplate Injection     Ms. Carlos Martin arrived to [facility] at 2156. Ms. Carlos Martin was assessed and education was provided. Ms. Violet Patel vitals were reviewed. Visit Vitals    /64 (BP 1 Location: Right arm, BP Patient Position: Sitting)    Pulse 71    Temp 97.8 °F (36.6 °C)    Resp 18       Pt was observed for 5 minutes after obtaining vital signs prior to initiating treatment. Per Dr. Alin Patel order standard dose of NPlate to be given weekly 207mcg. Nplate 955QSH (1.01KK) administered as ordered SQ in the back of the patient's right upper arm. No redness or bleeding noted. Ms. Carlos Martin was discharged from [place] [address] in stable condition at 80. She is to return on 18 at 0830 for her next appointment.     Ana Issa RN  2018

## 2018-06-29 ENCOUNTER — HOSPITAL ENCOUNTER (OUTPATIENT)
Dept: INFUSION THERAPY | Age: 68
Discharge: HOME | End: 2018-06-29
Payer: MEDICARE

## 2018-06-29 VITALS
DIASTOLIC BLOOD PRESSURE: 61 MMHG | HEART RATE: 57 BPM | RESPIRATION RATE: 18 BRPM | TEMPERATURE: 97.6 F | OXYGEN SATURATION: 100 % | SYSTOLIC BLOOD PRESSURE: 93 MMHG

## 2018-06-29 PROCEDURE — 96372 THER/PROPH/DIAG INJ SC/IM: CPT

## 2018-06-29 PROCEDURE — 74011250636 HC RX REV CODE- 250/636

## 2018-06-29 PROCEDURE — 74011250636 HC RX REV CODE- 250/636: Performed by: INTERNAL MEDICINE

## 2018-06-29 PROCEDURE — 74011000250 HC RX REV CODE- 250

## 2018-06-29 RX ORDER — HEPARIN 100 UNIT/ML
SYRINGE INTRAVENOUS
Status: DISCONTINUED
Start: 2018-06-29 | End: 2018-06-29

## 2018-06-29 RX ORDER — SODIUM CHLORIDE 0.9 % (FLUSH) 0.9 %
10-40 SYRINGE (ML) INJECTION AS NEEDED
Status: DISCONTINUED | OUTPATIENT
Start: 2018-06-29 | End: 2018-06-29

## 2018-06-29 RX ORDER — HEPARIN 100 UNIT/ML
500 SYRINGE INTRAVENOUS AS NEEDED
Status: DISCONTINUED | OUTPATIENT
Start: 2018-06-29 | End: 2018-06-29

## 2018-06-29 RX ORDER — WATER FOR INJECTION,STERILE
VIAL (ML) INJECTION
Status: COMPLETED
Start: 2018-06-29 | End: 2018-06-29

## 2018-06-29 RX ADMIN — WATER: 1 INJECTION INTRAMUSCULAR; INTRAVENOUS; SUBCUTANEOUS at 10:00

## 2018-06-29 RX ADMIN — ROMIPLOSTIM 207 MCG: 250 INJECTION, POWDER, LYOPHILIZED, FOR SOLUTION SUBCUTANEOUS at 09:15

## 2018-07-03 ENCOUNTER — OFFICE VISIT (OUTPATIENT)
Dept: ORTHOPEDIC SURGERY | Age: 68
End: 2018-07-03

## 2018-07-03 VITALS
WEIGHT: 158 LBS | DIASTOLIC BLOOD PRESSURE: 59 MMHG | BODY MASS INDEX: 25.39 KG/M2 | RESPIRATION RATE: 16 BRPM | SYSTOLIC BLOOD PRESSURE: 93 MMHG | HEART RATE: 66 BPM | HEIGHT: 66 IN

## 2018-07-03 DIAGNOSIS — L60.0 INGROWN NAIL OF SECOND TOE OF LEFT FOOT: ICD-10-CM

## 2018-07-03 DIAGNOSIS — Z98.890 POST-OPERATIVE STATE: ICD-10-CM

## 2018-07-03 DIAGNOSIS — B35.1 NAIL FUNGUS: Primary | ICD-10-CM

## 2018-07-03 DIAGNOSIS — L60.0 INGROWN NAIL OF GREAT TOE OF LEFT FOOT: ICD-10-CM

## 2018-07-03 NOTE — PATIENT INSTRUCTIONS
You may shower no submerging. Pat dry. Apply gentle moisturizer twice daily. Follow up in 2 weeks or sooner as needed         Ingrown Toenail: Care Instructions  Your Care Instructions    An ingrown toenail often occurs because a nail is not trimmed correctly or because shoes are too tight. An ingrown nail can cause an infection. If your toe is infected, your doctor may prescribe antibiotics. Most ingrown toenails can be treated at home. You should trim toenails straight across, so the ends of the nail grow over the skin and not into it. Good nail care can prevent ingrown toenails. Follow-up care is a key part of your treatment and safety. Be sure to make and go to all appointments, and call your doctor if you are having problems. It's also a good idea to know your test results and keep a list of the medicines you take. How can you care for yourself at home? · Trim the nails straight across. Leave the corners a little longer so they do not cut into the skin. To do this when you have an ingrown nail:  ¨ Soak your foot in warm water for about 15 minutes to soften the nail. ¨ Wedge a small piece of wet cotton under the corner of the nail to cushion the nail and lift it slightly. This keeps it from cutting the skin. ¨ Repeat daily until the nail has grown out and can be trimmed. · Do not use manicure scissors to dig under the ingrown nail. You might stab your toe, which could get infected. · Do not trim your toenails too short. · Check with your doctor before trimming your own toenails if you have been diagnosed with diabetes or peripheral arterial disease. These conditions increase the risk of an infection, because you may have decreased sensation in your toes and cut yourself without knowing it. · Wear roomy, comfortable shoes. · If your doctor prescribed antibiotics, take them as directed. Do not stop taking them just because you feel better.  You need to take the full course of antibiotics. When should you call for help? Call your doctor now or seek immediate medical care if:  ? · You have signs of infection, such as:  ¨ Increased pain, swelling, warmth, or redness. ¨ Red streaks leading from the toe. ¨ Pus draining from the toe. ¨ A fever. ? Watch closely for changes in your health, and be sure to contact your doctor if:  ? · You do not get better as expected. Where can you learn more? Go to http://leon-edinson.info/. Enter R135 in the search box to learn more about \"Ingrown Toenail: Care Instructions. \"  Current as of: October 13, 2016  Content Version: 11.4  © 9764-7327 [employer]. Care instructions adapted under license by [employer] (which disclaims liability or warranty for this information). If you have questions about a medical condition or this instruction, always ask your healthcare professional. [address] any warranty or liability for your use of this information.

## 2018-07-03 NOTE — MR AVS SNAPSHOT
[address], [address] 200 [facility] 
[phone] Patient: Bernice Bee MRN: [id number] KJO:[id number] Visit Information Date & Time Provider Department Dept. Phone Encounter #  
 7/3/2018 11:00  Nikhil Montoya, [address] [facility] [phone] [id number] Follow-up Instructions Return in about 2 weeks (around 7/17/2018) for follow up evaluation. Your Appointments 7/16/2018  9:15 AM  
Office Visit with Ej Quevedo MD  
[facility] [facility]  On[facility]) [address], [address] [place] [address] Upcoming Health Maintenance Date Due Hepatitis C Screening 1950 DTaP/Tdap/Td series (1 - Tdap) 5/14/1973 FOBT Q 1 YEAR AGE 50-75 5/14/2002 ZOSTER VACCINE AGE 60> 3/14/2012 BREAST CANCER SCRN MAMMOGRAM 4/4/2015 GLAUCOMA SCREENING Q2Y 5/14/2017 Bone Densitometry (Dexa) Screening 5/14/2017 Pneumococcal 65+ High/Highest Risk (1 of 2 - PCV13) 5/14/2017 MEDICARE YEARLY EXAM 3/16/2018 Influenza Age 5 to Adult 8/1/2018 Allergies as of 7/3/2018  Review Complete On: 7/3/2018 By: Lovely Madison Severity Noted Reaction Type Reactions Aspirin High 08/07/2013   Systemic Swelling Pt states her whole body swells when she takes aspirin. Adhesive    Unable to Obtain Nickel  05/21/2018    Rash Pcn [Penicillins]  08/20/2012    Rash Current Immunizations  Reviewed on 6/29/2018 No immunizations on file. Not reviewed this visit You Were Diagnosed With   
  
 Codes Comments Nail fungus    -  Primary ICD-10-CM: B35.1 ICD-9-CM: 110.1 Ingrown nail of great toe of left foot     ICD-10-CM: L60.0 ICD-9-CM: 703.0 Ingrown nail of second toe of left foot     ICD-10-CM: L60.0 ICD-9-CM: 703.0 Post-operative state     ICD-10-CM: X53.103 ICD-9-CM: V45.89 Vitals BP Pulse Resp Height(growth percentile) Weight(growth percentile) BMI  
 93/59 66 16 5' 6\" (1.676 m) 158 lb (71.7 kg) 25.5 kg/m2 OB Status Smoking Status Postmenopausal Never Smoker BMI and BSA Data Body Mass Index Body Surface Area 25.5 kg/m 2 1.83 m 2 Your Updated Medication List  
  
   
This list is accurate as of 7/3/18 12:33 PM.  Always use your most recent med list.  
  
  
  
  
 albuterol 90 mcg/actuation inhaler Commonly known as:  PROAIR HFA  
1-2 puffs every 4-6 hrs. aluminum-magnesium hydroxide 200-200 mg/5 mL susp 5 mL, diphenhydrAMINE 12.5 mg/5 mL liqd 12.5 mg, lidocaine 2 % soln 5 mL  
5 mL by Swish and Spit route two (2) times a day. Magic mouth wash  Maalox Lidocaine 2% viscous  Diphenhydramine oral solution   Pharmacy to mix equal portions of ingredients to a total volume as indicated in the dispense amount. amiodarone 200 mg tablet Commonly known as:  CORDARONE Take 200 mg by mouth daily. Indications: PREVENTION OF VENTRICULAR FIBRILLATION  
  
 COREG 3.125 mg tablet Generic drug:  carvedilol Take 3.125 mg by mouth two (2) times daily (with meals). digoxin 0.125 mg tablet Commonly known as:  LANOXIN Take 0.125 mg by mouth daily. dronabinol 5 mg capsule Commonly known as:  Gila Galea Take 1 Cap by mouth two (2) times a day. ergocalciferol 50,000 unit capsule Commonly known as:  VITAMIN D2 Take 1 Cap by mouth every seven (7) days. furosemide 40 mg tablet Commonly known as:  LASIX Take 40 mg by mouth daily. Indications: Edema  
  
 gabapentin 300 mg capsule Commonly known as:  NEURONTIN Take 1 po q am and 2 po q pm  
  
 lidocaine-prilocaine topical cream  
Commonly known as:  EMLA  
APPLY OVER PORT 1 HOUR PRIOR TO CHEMOTHERAPY THAN COVER WITH [place] WRAP LINZESS 145 mcg Cap capsule Generic drug:  linaclotide TAKE 1 CAPSULE BY MOUTH DAILY 30 MINUTES BEFORE BREAKFAST  
  
 lisinopril 10 mg tablet Commonly known as:  Yvette Escort Take 10 mg by mouth daily. Indications: hypertension  
  
 loratadine 10 mg Cap Take 10 mg by mouth daily. magic mouthwash solution Take 5 mL by mouth three (3) times daily as needed for Stomatitis. Magic mouth wash  Maalox Lidocaine 2% viscous  Diphenhydramine oral solution   Pharmacy to mix equal portions of ingredients to a total volume as indicated in the dispense amount. meloxicam 7.5 mg tablet Commonly known as:  MOBIC Take 7.5 mg by mouth daily. nabumetone 750 mg tablet Commonly known as:  RELAFEN Take 750 mg by mouth daily. Indications: OSTEOARTHRITIS  
  
 ondansetron hcl 8 mg tablet Commonly known as:  Dougherty Lose Take 1 Tab by mouth every eight (8) hours as needed for Nausea. * oxyCODONE-acetaminophen 7.5-325 mg per tablet Commonly known as:  PERCOCET TAKE ONE TO TWO TABLETS BY MOUTH Q 4 - 6 AS NEEDED FOR PAIN **AFTER SURGERY**  Indications: Pain * oxyCODONE-acetaminophen  mg per tablet Commonly known as:  PERCOCET Take 1 Tab by mouth every six (6) hours as needed for Pain. Max Daily Amount: 4 Tabs. Indications: Pain, ACUTE POST OP PAIN * potassium chloride 20 mEq tablet Commonly known as:  K-DUR, KLOR-CON Take 2 Tabs by mouth daily. * KLOR-CON M20 20 mEq tablet Generic drug:  potassium chloride TAKE ONE TABLET BY MOUTH ONCE A DAY promethazine 25 mg tablet Commonly known as:  PHENERGAN Take 1 Tab by mouth every six (6) hours as needed for Nausea. PRUVATE 21-7 PO Take 325 mg by mouth daily. Indications: iron deficiency Senna 8.6 mg tablet Generic drug:  senna Take 1 Tab by mouth daily. temazepam 30 mg capsule Commonly known as:  RESTORIL  
  
 topiramate 50 mg tablet Commonly known as:  TOPAMAX XARELTO 10 mg tablet Generic drug:  rivaroxaban Take 10 mg by mouth daily. zolpidem 10 mg tablet Commonly known as:  AMBIEN  
TAKE 1 TABLET BY MOUTH NIGHTLY AS NEEDED FOR SLEEP. MAX DAILY AMOUNT 10 MG  
  
 * Notice: This list has 4 medication(s) that are the same as other medications prescribed for you. Read the directions carefully, and ask your doctor or other care provider to review them with you. Follow-up Instructions Return in about 2 weeks (around 7/17/2018) for follow up evaluation. To-Do List   
 07/06/2018  8:30 AM  
  Appointment with [facility] NURSE at [facility] ([phone])  
  
 07/13/2018 8:30 AM  
  Appointment with [facility] TRACK NURSE at [facility] ([phone])  
  
 07/20/2018 8:30 AM  
  Appointment with [facility] NURSE at [facility] ([phone])  
  
 07/27/2018 8:30 AM  
  Appointment with [facility] TRACK NURSE at [facility] ([phone]) Patient Instructions You may shower no submerging. Pat dry. Apply gentle moisturizer twice daily. Follow up in 2 weeks or sooner as needed Ingrown Toenail: Care Instructions Your Care Instructions An ingrown toenail often occurs because a nail is not trimmed correctly or because shoes are too tight. An ingrown nail can cause an infection. If your toe is infected, your doctor may prescribe antibiotics. Most ingrown toenails can be treated at home. You should trim toenails straight across, so the ends of the nail grow over the skin and not into it. Good nail care can prevent ingrown toenails. Follow-up care is a key part of your treatment and safety. Be sure to make and go to all appointments, and call your doctor if you are having problems. It's also a good idea to know your test results and keep a list of the medicines you take. How can you care for yourself at home? · Trim the nails straight across.  Leave the corners a little longer so they do not cut into the skin. To do this when you have an ingrown nail: 
¨ Soak your foot in warm water for about 15 minutes to soften the nail. ¨ Wedge a small piece of wet cotton under the corner of the nail to cushion the nail and lift it slightly. This keeps it from cutting the skin. ¨ Repeat daily until the nail has grown out and can be trimmed. · Do not use manicure scissors to dig under the ingrown nail. You might stab your toe, which could get infected. · Do not trim your toenails too short. · Check with your doctor before trimming your own toenails if you have been diagnosed with diabetes or peripheral arterial disease. These conditions increase the risk of an infection, because you may have decreased sensation in your toes and cut yourself without knowing it. · Wear roomy, comfortable shoes. · If your doctor prescribed antibiotics, take them as directed. Do not stop taking them just because you feel better. You need to take the full course of antibiotics. When should you call for help? Call your doctor now or seek immediate medical care if: 
? · You have signs of infection, such as: 
¨ Increased pain, swelling, warmth, or redness. ¨ Red streaks leading from the toe. ¨ Pus draining from the toe. ¨ A fever. ? Watch closely for changes in your health, and be sure to contact your doctor if: 
? · You do not get better as expected. Where can you learn more? Go to http://leon-edinson.info/. Enter R135 in the search box to learn more about \"Ingrown Toenail: Care Instructions. \" Current as of: October 13, 2016 Content Version: 11.4 © 9360-0907 [employer]. Care instructions adapted under license by [employer] (which disclaims liability or warranty for this information).  If you have questions about a medical condition or this instruction, always ask your healthcare professional. [employer] disclaims any warranty or liability for your use of this information. Please provide this summary of care documentation to your next provider. Your primary care clinician is listed as Lelo Hoyos. If you have any questions after today's visit, please call [phone].

## 2018-07-03 NOTE — PROGRESS NOTES
Patient: Keira Hudson                MRN: [id number]       SSN: [id number]  YOB: 1950              AGE: 79 y.o. SEX: female    Eliazar Smith MD    POST OP OFFICE NOTE  DOS: 6/1/18    Chief Complaint:   Chief Complaint   Patient presents with    Toe Pain     left big and second toe        HPI:     The patient is a 79 y.o. female who presents today for follow up 32 days s/p     PROCEDURES PERFORMED:    1. Left great toenail plate removal with matrix excision. 2.  Left #2 toenail plate removal with matrix excision.     Patient has been PWB to the left lower extremity. Patient reports doing well other than post op pain not well controlled. Patient denies any fever, chills, chest pain, shortness of breath or calf pain. There are no new illness or injuries to report since last seen in the office. Patient is concerned with scabbing of her toes. PHYSICAL EXAM:     Visit Vitals    BP 93/59    Pulse 66    Resp 16    Ht 5' 6\" (1.676 m)    Wt 158 lb (71.7 kg)    BMI 25.5 kg/m2         Pain Scale: /10      GEN:  Alert, well developed, well nourished, well appearing 79 y.o. female in no acute distress. PSYCH:  Normal affect, mood, and conduct. alert, oriented x 3 alert, oriented x 3, no dementia  M/S EXAMINATION OF: left great toe and second toe  DRESSINGS: CDI   DRAINAGE: none  INCISION: Incision looks good, skin well approximated, no dehiscence, 2 remaining nylon sutures removed  SKIN: mild edema , no erythema, mild ecchymosis, no warmth , some scabbing noted to great toe and second toe. Some of the scabbing was debrided in the office, area cleansed and gentle moisturizer applied. TENDERNESS:  mild tenderness to palpation   NEUROVASCULAR:  grossly intact. Positive distal pulses and capillary refill. DVT ASSESSMENT:  The calf is not tender to palpation.  No evidence of DVT seen on physical exam.  ROM: not tested       RADIOGRAPHS & DIAGNOSTIC STUDIES     No results found for any visits on 07/03/18. IMPRESSION:     Encounter Diagnoses     ICD-10-CM ICD-9-CM   1. Nail fungus B35.1 110.1   2. Ingrown nail of great toe of left foot L60.0 703.0   3. Ingrown nail of second toe of left foot L60.0 703.0   4. Post-operative state Z98.890 V45.89       PLAN:         No orders of the defined types were placed in this encounter.             You may shower, no submerging. Pat dry. Apply gentle moisturizer twice daily. Follow up in 2 weeks or sooner as needed    · DO NOT DRIVE WHILE TAKING NARCOTIC PAIN MEDICINE      REVIEW OF SYSTEMS:     Otherwise as noted in HPI      PAST MEDICAL HISTORY:     Past Medical History:   Diagnosis Date    Antineoplastic chemotherapy induced anemia     Arrhythmia     Atrial Fib    Arthritis     [facility]     Cancer ([facility] 1999    Breast    Cardiomyopathy ([facility]     Chronic ITP (idiopathic thrombocytopenia) ([facility] 10/31/2016    Secondary to Anemia from Chemo    Congestive heart failure (HCC)     Diabetes (HCC)     borderline, no meds    Esophageal cancer ([facility] 2005    treated with chemo    Heart failure (HCC)     SOB (shortness of breath)        MEDICATIONS:     Current Outpatient Prescriptions   Medication Sig    zolpidem (AMBIEN) 10 mg tablet TAKE 1 TABLET BY MOUTH NIGHTLY AS NEEDED FOR SLEEP. MAX DAILY AMOUNT 10 MG    oxyCODONE-acetaminophen (PERCOCET) 7.5-325 mg per tablet TAKE ONE TO TWO TABLETS BY MOUTH Q 4 - 6 AS NEEDED FOR PAIN **AFTER SURGERY**  Indications: Pain    promethazine (PHENERGAN) 25 mg tablet Take 1 Tab by mouth every six (6) hours as needed for Nausea.  lidocaine-prilocaine (EMLA) topical cream APPLY OVER PORT 1 HOUR PRIOR TO CHEMOTHERAPY THAN COVER WITH SARAN WRAP    ergocalciferol (VITAMIN D2) 50,000 unit capsule Take 1 Cap by mouth every seven (7) days.     topiramate (TOPAMAX) 50 mg tablet     temazepam (RESTORIL) 30 mg capsule     LINZESS 145 mcg cap capsule TAKE 1 CAPSULE BY MOUTH DAILY 30 MINUTES BEFORE BREAKFAST    lisinopril (PRINIVIL, ZESTRIL) 10 mg tablet Take 10 mg by mouth daily. Indications: hypertension    loratadine 10 mg cap Take 10 mg by mouth daily.  digoxin (LANOXIN) 0.125 mg tablet Take 0.125 mg by mouth daily.  carvedilol (COREG) 3.125 mg tablet Take 3.125 mg by mouth two (2) times daily (with meals).  meloxicam (MOBIC) 7.5 mg tablet Take 7.5 mg by mouth daily.  amiodarone (CORDARONE) 200 mg tablet Take 200 mg by mouth daily. Indications: PREVENTION OF VENTRICULAR FIBRILLATION    gabapentin (NEURONTIN) 300 mg capsule Take 1 po q am and 2 po q pm (Patient taking differently: Take 300 mg by mouth two (2) times a day. Take 1 po q am and 2 po q pm)    rivaroxaban (XARELTO) 10 mg tablet Take 10 mg by mouth daily.  magic mouthwash solution Take 5 mL by mouth three (3) times daily as needed for Stomatitis. Magic mouth wash   Maalox  Lidocaine 2% viscous   Diphenhydramine oral solution     Pharmacy to mix equal portions of ingredients to a total volume as indicated in the dispense amount.  furosemide (LASIX) 40 mg tablet Take 40 mg by mouth daily. Indications: Edema    dronabinol (MARINOL) 5 mg capsule Take 1 Cap by mouth two (2) times a day.  KLOR-CON M20 20 mEq tablet TAKE ONE TABLET BY MOUTH ONCE A DAY    aluminum-magnesium hydroxide 200-200 mg/5 mL susp 5 mL, diphenhydrAMINE 12.5 mg/5 mL liqd 12.5 mg, lidocaine 2 % soln 5 mL 5 mL by Swish and Spit route two (2) times a day. Magic mouth wash   Maalox  Lidocaine 2% viscous   Diphenhydramine oral solution     Pharmacy to mix equal portions of ingredients to a total volume as indicated in the dispense amount.  potassium chloride (K-DUR, KLOR-CON) 20 mEq tablet Take 2 Tabs by mouth daily.  albuterol (PROAIR HFA) 90 mcg/actuation inhaler 1-2 puffs every 4-6 hrs. (Patient taking differently: Take 2 Puffs by inhalation every four (4) hours as needed for Shortness of Breath.  1-2 puffs every 4-6 hrs.)    senna (SENNA) 8.6 mg tablet Take 1 Tab by mouth daily.  nabumetone (RELAFEN) 750 mg tablet Take 750 mg by mouth daily. Indications: OSTEOARTHRITIS    ondansetron hcl (ZOFRAN) 8 mg tablet Take 1 Tab by mouth every eight (8) hours as needed for Nausea.  IRON AG&FUM/C/FA/MV CMB11/CA-T (PRUVATE 21-7 PO) Take 325 mg by mouth daily. Indications: iron deficiency    oxyCODONE-acetaminophen (PERCOCET)  mg per tablet Take 1 Tab by mouth every six (6) hours as needed for Pain. Max Daily Amount: 4 Tabs. Indications: Pain, ACUTE POST OP PAIN     No current facility-administered medications for this visit. ALLERGIES:     Allergies   Allergen Reactions    Aspirin Swelling     Pt states her whole body swells when she takes aspirin.  Adhesive Unable to Obtain    Nickel Rash    Pcn [Penicillins] Rash         PAST SURGICAL HISTORY:     Past Surgical History:   Procedure Laterality Date    BREAST SURGERY PROCEDURE UNLISTED Left 1990s    lumpectomy    COLONOSCOPY N/A 7/27/2016    [facility]    HX APPENDECTOMY      HX HEENT      Tonsillectomy    HX ORTHOPAEDIC      HX TUBAL LIGATION      HX VASCULAR ACCESS      NEUROLOGICAL PROCEDURE UNLISTED  01/08/2018    Lumbar fusion       SOCIAL HISTORY:     Social History     Social History    Marital status:      Spouse name: N/A    Number of children: N/A    Years of education: N/A     Occupational History    Not on file. Social History Main Topics    Smoking status: Never Smoker    Smokeless tobacco: Never Used    Alcohol use No    Drug use: No    Sexual activity: Not on file     Other Topics Concern    Not on file     Social History Narrative       FAMILY HISTORY:     No family history on file.         Wilson Sumner PA-C  7/3/2018

## 2018-07-06 ENCOUNTER — HOSPITAL ENCOUNTER (OUTPATIENT)
Dept: INFUSION THERAPY | Age: 68
Discharge: HOME | End: 2018-07-06
Payer: MEDICARE

## 2018-07-06 VITALS
HEART RATE: 65 BPM | SYSTOLIC BLOOD PRESSURE: 100 MMHG | DIASTOLIC BLOOD PRESSURE: 65 MMHG | TEMPERATURE: 97.4 F | RESPIRATION RATE: 18 BRPM

## 2018-07-06 LAB
BASO+EOS+MONOS # BLD AUTO: 0.9 K/UL (ref 0–2.3)
BASO+EOS+MONOS # BLD AUTO: 16 % (ref 0.1–17)
DIFFERENTIAL METHOD BLD: ABNORMAL
ERYTHROCYTE [DISTWIDTH] IN BLOOD BY AUTOMATED COUNT: 16.8 % (ref 11.5–14.5)
HCT VFR BLD AUTO: 33.5 % (ref 36–48)
HGB BLD-MCNC: 11.1 G/DL (ref 12–16)
LYMPHOCYTES # BLD: 1.3 K/UL (ref 1.1–5.9)
LYMPHOCYTES NFR BLD: 23 % (ref 14–44)
MCH RBC QN AUTO: 27.7 PG (ref 25–35)
MCHC RBC AUTO-ENTMCNC: 33.1 G/DL (ref 31–37)
MCV RBC AUTO: 83.5 FL (ref 78–102)
NEUTS SEG # BLD: 3.6 K/UL (ref 1.8–9.5)
NEUTS SEG NFR BLD: 61 % (ref 40–70)
PLATELET # BLD AUTO: 102 K/UL (ref 135–420)
RBC # BLD AUTO: 4.01 M/UL (ref 4.1–5.1)
WBC # BLD AUTO: 5.8 K/UL (ref 4.5–13)

## 2018-07-06 PROCEDURE — 74011250636 HC RX REV CODE- 250/636: Performed by: INTERNAL MEDICINE

## 2018-07-06 PROCEDURE — 74011000250 HC RX REV CODE- 250

## 2018-07-06 PROCEDURE — 85049 AUTOMATED PLATELET COUNT: CPT | Performed by: INTERNAL MEDICINE

## 2018-07-06 PROCEDURE — 36591 DRAW BLOOD OFF VENOUS DEVICE: CPT

## 2018-07-06 PROCEDURE — 77030012965 HC NDL HUBR BBMI -A

## 2018-07-06 PROCEDURE — 85025 COMPLETE CBC W/AUTO DIFF WBC: CPT | Performed by: INTERNAL MEDICINE

## 2018-07-06 PROCEDURE — 74011250636 HC RX REV CODE- 250/636

## 2018-07-06 PROCEDURE — 96372 THER/PROPH/DIAG INJ SC/IM: CPT

## 2018-07-06 RX ORDER — HEPARIN 100 UNIT/ML
500 SYRINGE INTRAVENOUS AS NEEDED
Status: DISPENSED | OUTPATIENT
Start: 2018-07-06 | End: 2018-07-07

## 2018-07-06 RX ORDER — WATER FOR INJECTION,STERILE
VIAL (ML) INJECTION
Status: COMPLETED
Start: 2018-07-06 | End: 2018-07-06

## 2018-07-06 RX ORDER — SODIUM CHLORIDE 0.9 % (FLUSH) 0.9 %
10-40 SYRINGE (ML) INJECTION AS NEEDED
Status: DISCONTINUED | OUTPATIENT
Start: 2018-07-06 | End: 2018-07-10 | Stop reason: HOSPADM

## 2018-07-06 RX ADMIN — ROMIPLOSTIM 207 MCG: 250 INJECTION, POWDER, LYOPHILIZED, FOR SOLUTION SUBCUTANEOUS at 09:55

## 2018-07-06 RX ADMIN — WATER 10 ML: 1 INJECTION INTRAMUSCULAR; INTRAVENOUS; SUBCUTANEOUS at 09:56

## 2018-07-06 NOTE — PROGRESS NOTES
[address] [facility] Progress Note    Date: 2018    Name: Jerica Gómez    MRN: [id number]         : 1950     Monthly port flush with Weekly N Plate injection      Ms. Griffiths was assessed and education was provided. Ms. Giles Hill vitals were reviewed and patient was observed for 5 minutes prior to treatment. Visit Vitals    /65 (BP 1 Location: Right arm, BP Patient Position: At rest;Sitting)    Pulse 65    Temp 97.4 °F (36.3 °C)    Resp 18     Dual Mediport at right upper chest clean and dry. Medial port  accessed x 1 attempts  w/20 gauge, 1 inch Garay needle. No blood return, but was able to flush patient port. Patient was  reacessed by Alexa Jean Baptiste RN x 1 attempt . Unable to flush or obtain blood return from port. Red Jeter NP informed, \" She stated she would refer patient to Radiology for further assessment. Lateral port accessed By Howard ROSARIO  with 20 gauge, 1 inch Garay needle w/o difficulty. Good blood return obtained,labs drawn, after 7 ml discard. Port  flushed with 20 ml NS and 5 ml of 100 units/ml Heparin. Port de-accessed,  Slight amount of bleeding  noted at site. 2x2 gauze placed and covered with Tegaderm dressing. Lab results were obtained and reviewed. Recent Results (from the past 12 hour(s))   CBC WITH 3 PART DIFF    Collection Time: 18  9:05 AM   Result Value Ref Range    WBC 5.8 4.5 - 13.0 K/uL    RBC 4.01 (L) 4.10 - 5.10 M/uL    HGB 11.1 (L) 12.0 - 16 g/dL    HCT 33.5 (L) 36 - 48 %    MCV 83.5 78 - 102 FL    MCH 27.7 25.0 - 35.0 PG    MCHC 33.1 31 - 37 g/dL    RDW 16.8 (H) 11.5 - 14.5 %    NEUTROPHILS 61 40 - 70 %    MIXED CELLS 16 0.1 - 17 %    LYMPHOCYTES 23 14 - 44 %    ABS. NEUTROPHILS 3.6 1.8 - 9.5 K/UL    ABS. MIXED CELLS 0.9 0.0 - 2.3 K/uL    ABS. LYMPHOCYTES 1.3 1.1 - 5.9 K/UL    DF AUTOMATED       Preliminary platelet count was 590. Informed Red Jeter NP.      N Plate 373 mcg/ 0.4 ml sterile water was administered subcutaneously in in back of right upper arm.  No irritation noted at site, band aid applied. Procrit 60,000 units held due to patient H& H greater than 10 and HCT grater than 30. HGB : 11.1 HCT : 33.5    Ms. Pagan tolerated well, and had no complaints. Patient armband removed and shredded. Ms. Aster Castro was discharged from [place] [address] in stable condition at 1000. She is to return on 7/13/2018 at 8:30am  for her next appointment for N Plate injection. Pharmacy Stated,\" She would obtain a new order from Dr. Camilo Shen on Monday  concerning drawing patients labs monthly vs weekly \".      Terry Vásquez RN  July 6, 2018

## 2018-07-13 ENCOUNTER — HOSPITAL ENCOUNTER (OUTPATIENT)
Dept: INFUSION THERAPY | Age: 68
Discharge: HOME | End: 2018-07-13
Payer: MEDICARE

## 2018-07-13 VITALS
DIASTOLIC BLOOD PRESSURE: 73 MMHG | TEMPERATURE: 97.5 F | RESPIRATION RATE: 18 BRPM | SYSTOLIC BLOOD PRESSURE: 108 MMHG | HEART RATE: 62 BPM

## 2018-07-13 PROCEDURE — 74011250636 HC RX REV CODE- 250/636: Performed by: INTERNAL MEDICINE

## 2018-07-13 PROCEDURE — 96372 THER/PROPH/DIAG INJ SC/IM: CPT

## 2018-07-13 PROCEDURE — 74011000250 HC RX REV CODE- 250

## 2018-07-13 PROCEDURE — 74011250636 HC RX REV CODE- 250/636

## 2018-07-13 RX ORDER — WATER FOR INJECTION,STERILE
VIAL (ML) INJECTION
Status: COMPLETED
Start: 2018-07-13 | End: 2018-07-13

## 2018-07-13 RX ADMIN — WATER 10 ML: 1 INJECTION INTRAMUSCULAR; INTRAVENOUS; SUBCUTANEOUS at 08:56

## 2018-07-13 RX ADMIN — ROMIPLOSTIM 207 MCG: 250 INJECTION, POWDER, LYOPHILIZED, FOR SOLUTION SUBCUTANEOUS at 08:55

## 2018-07-16 ENCOUNTER — OFFICE VISIT (OUTPATIENT)
Dept: ONCOLOGY | Age: 68
End: 2018-07-16

## 2018-07-16 ENCOUNTER — HOSPITAL ENCOUNTER (OUTPATIENT)
Dept: ONCOLOGY | Age: 68
Discharge: HOME | End: 2018-07-16

## 2018-07-16 ENCOUNTER — HOSPITAL ENCOUNTER (OUTPATIENT)
Dept: LAB | Age: 68
Discharge: HOME | End: 2018-07-16
Payer: MEDICARE

## 2018-07-16 VITALS
DIASTOLIC BLOOD PRESSURE: 70 MMHG | WEIGHT: 155 LBS | TEMPERATURE: 97.4 F | HEART RATE: 66 BPM | SYSTOLIC BLOOD PRESSURE: 111 MMHG | BODY MASS INDEX: 25.02 KG/M2

## 2018-07-16 DIAGNOSIS — D69.3 CHRONIC ITP (IDIOPATHIC THROMBOCYTOPENIA) (HCC): Primary | ICD-10-CM

## 2018-07-16 DIAGNOSIS — D69.3 CHRONIC ITP (IDIOPATHIC THROMBOCYTOPENIA) (HCC): ICD-10-CM

## 2018-07-16 DIAGNOSIS — G89.3 CANCER ASSOCIATED PAIN: ICD-10-CM

## 2018-07-16 DIAGNOSIS — C77.3 CARCINOMA OF RIGHT BREAST METASTATIC TO AXILLARY LYMPH NODE (HCC): ICD-10-CM

## 2018-07-16 DIAGNOSIS — D64.9 CHRONIC ANEMIA: ICD-10-CM

## 2018-07-16 DIAGNOSIS — D50.8 IRON DEFICIENCY ANEMIA SECONDARY TO INADEQUATE DIETARY IRON INTAKE: ICD-10-CM

## 2018-07-16 DIAGNOSIS — R64 CACHEXIA (HCC): ICD-10-CM

## 2018-07-16 DIAGNOSIS — Z98.1 S/P LUMBAR FUSION: ICD-10-CM

## 2018-07-16 DIAGNOSIS — E55.9 VITAMIN D DEFICIENCY: ICD-10-CM

## 2018-07-16 DIAGNOSIS — D69.6 THROMBOCYTOPENIA (HCC): ICD-10-CM

## 2018-07-16 DIAGNOSIS — F51.05 INSOMNIA DUE TO ANXIETY AND FEAR: ICD-10-CM

## 2018-07-16 DIAGNOSIS — F40.9 INSOMNIA DUE TO ANXIETY AND FEAR: ICD-10-CM

## 2018-07-16 DIAGNOSIS — M43.16 SPONDYLOLISTHESIS OF LUMBAR REGION: ICD-10-CM

## 2018-07-16 DIAGNOSIS — R42 VERTIGO: ICD-10-CM

## 2018-07-16 DIAGNOSIS — C50.911 CARCINOMA OF RIGHT BREAST METASTATIC TO AXILLARY LYMPH NODE (HCC): ICD-10-CM

## 2018-07-16 DIAGNOSIS — R60.0 BILATERAL LOWER EXTREMITY EDEMA: ICD-10-CM

## 2018-07-16 LAB
ALBUMIN SERPL-MCNC: 3.8 G/DL (ref 3.4–5)
ALBUMIN/GLOB SERPL: 0.9 {RATIO} (ref 0.8–1.7)
ALP SERPL-CCNC: 79 U/L (ref 45–117)
ALT SERPL-CCNC: 30 U/L (ref 13–56)
ANION GAP SERPL CALC-SCNC: 7 MMOL/L (ref 3–18)
AST SERPL-CCNC: 36 U/L (ref 15–37)
BASO+EOS+MONOS # BLD AUTO: 1.3 K/UL (ref 0–2.3)
BASO+EOS+MONOS # BLD AUTO: 19 % (ref 0.1–17)
BILIRUB SERPL-MCNC: 0.2 MG/DL (ref 0.2–1)
BUN SERPL-MCNC: 24 MG/DL (ref 7–18)
BUN/CREAT SERPL: 21 (ref 12–20)
CALCIUM SERPL-MCNC: 9.2 MG/DL (ref 8.5–10.1)
CHLORIDE SERPL-SCNC: 101 MMOL/L (ref 100–108)
CO2 SERPL-SCNC: 33 MMOL/L (ref 21–32)
CREAT SERPL-MCNC: 1.13 MG/DL (ref 0.6–1.3)
DIFFERENTIAL METHOD BLD: ABNORMAL
ERYTHROCYTE [DISTWIDTH] IN BLOOD BY AUTOMATED COUNT: 16.4 % (ref 11.5–14.5)
FERRITIN SERPL-MCNC: 437 NG/ML (ref 8–388)
GLOBULIN SER CALC-MCNC: 4.2 G/DL (ref 2–4)
GLUCOSE SERPL-MCNC: 80 MG/DL (ref 74–99)
HCT VFR BLD AUTO: 33.3 % (ref 36–48)
HGB BLD-MCNC: 10.7 G/DL (ref 12–16)
IRON SATN MFR SERPL: 41 %
IRON SERPL-MCNC: 73 UG/DL (ref 50–175)
LYMPHOCYTES # BLD: 1.7 K/UL (ref 1.1–5.9)
LYMPHOCYTES NFR BLD: 27 % (ref 14–44)
MCH RBC QN AUTO: 26.8 PG (ref 25–35)
MCHC RBC AUTO-ENTMCNC: 32.1 G/DL (ref 31–37)
MCV RBC AUTO: 83.3 FL (ref 78–102)
NEUTS SEG # BLD: 3.5 K/UL (ref 1.8–9.5)
NEUTS SEG NFR BLD: 54 % (ref 40–70)
PLATELET # BLD AUTO: 96 K/UL (ref 135–420)
POTASSIUM SERPL-SCNC: 3.8 MMOL/L (ref 3.5–5.5)
PROT SERPL-MCNC: 8 G/DL (ref 6.4–8.2)
RBC # BLD AUTO: 4 M/UL (ref 4.1–5.1)
SODIUM SERPL-SCNC: 141 MMOL/L (ref 136–145)
TIBC SERPL-MCNC: 177 UG/DL (ref 250–450)
WBC # BLD AUTO: 6.5 K/UL (ref 4.5–13)

## 2018-07-16 PROCEDURE — 83540 ASSAY OF IRON: CPT | Performed by: INTERNAL MEDICINE

## 2018-07-16 PROCEDURE — 80053 COMPREHEN METABOLIC PANEL: CPT | Performed by: INTERNAL MEDICINE

## 2018-07-16 PROCEDURE — 86300 IMMUNOASSAY TUMOR CA 15-3: CPT | Performed by: INTERNAL MEDICINE

## 2018-07-16 PROCEDURE — 82728 ASSAY OF FERRITIN: CPT | Performed by: INTERNAL MEDICINE

## 2018-07-16 PROCEDURE — 36415 COLL VENOUS BLD VENIPUNCTURE: CPT | Performed by: INTERNAL MEDICINE

## 2018-07-16 PROCEDURE — 82306 VITAMIN D 25 HYDROXY: CPT | Performed by: INTERNAL MEDICINE

## 2018-07-16 RX ORDER — ZOLPIDEM TARTRATE 10 MG/1
TABLET ORAL
Qty: 30 TAB | Refills: 2 | Status: SHIPPED | OUTPATIENT
Start: 2018-07-16 | End: 2018-09-17 | Stop reason: SDUPTHER

## 2018-07-16 RX ORDER — OXYCODONE AND ACETAMINOPHEN 10; 325 MG/1; MG/1
1 TABLET ORAL
Qty: 120 TAB | Refills: 0 | Status: SHIPPED | OUTPATIENT
Start: 2018-07-16 | End: 2018-09-17 | Stop reason: SDUPTHER

## 2018-07-16 NOTE — MR AVS SNAPSHOT
[address] [address] [address] [facility] 
[phone] Patient: Elfego Koch MRN: [id number] XRQ:[id number]/06/[id number] Visit Information Date & Time Provider Department Dept. Phone Encounter #  
 7/16/2018  9:15 AM Pietro Gavin MD [facility] [phone] [id number] Follow-up Instructions Return in about 2 months (around 9/16/2018). Your Appointments 7/17/2018 11:00 AM  
Follow Up with Marilyn Anthony PA-C  
[facility] ([address]) Appt Note: left foot 2wk fu  
 [address], [address] 200 [facility]  
[phone] [address] [place] [address] 9/17/2018  9:30 AM  
Office Visit with Pietro Gavin MD  
[facility]  Oncology [address] [facility]) Appt Note: OV  
 [address], [address] [place] [address] Upcoming Health Maintenance Date Due Hepatitis C Screening 1950 DTaP/Tdap/Td series (1 - Tdap) 5/14/1973 FOBT Q 1 YEAR AGE 50-75 5/14/2002 ZOSTER VACCINE AGE 60> 3/14/2012 BREAST CANCER SCRN MAMMOGRAM 4/4/2015 GLAUCOMA SCREENING Q2Y 5/14/2017 Bone Densitometry (Dexa) Screening 5/14/2017 Pneumococcal 65+ High/Highest Risk (1 of 2 - PCV13) 5/14/2017 MEDICARE YEARLY EXAM 3/16/2018 Influenza Age 5 to Adult 8/1/2018 Allergies as of 7/16/2018  Review Complete On: 7/16/2018 By: Pietro Gavin MD  
  
 Severity Noted Reaction Type Reactions Aspirin High 08/07/2013   Systemic Swelling Pt states her whole body swells when she takes aspirin. Adhesive    Unable to Obtain Nickel  05/21/2018    Rash Pcn [Penicillins]  08/20/2012    Rash Current Immunizations  Reviewed on 7/13/2018 No immunizations on file. Not reviewed this visit You Were Diagnosed With   
  
 Codes Comments Chronic ITP (idiopathic thrombocytopenia) (HCC)    -  Primary ICD-10-CM: O32.0 ICD-9-CM: 287.31 Carcinoma of right breast metastatic to axillary lymph node ([facility]     ICD-10-CM: C50.911, C77.3 ICD-9-CM: 174.9, 196.3 Vitamin D deficiency     ICD-10-CM: E55.9 ICD-9-CM: 268.9 Cachexia ([facility].)     ICD-10-CM: R64 
ICD-9-CM: 799.4 Cancer associated pain     ICD-10-CM: G89.3 ICD-9-CM: 338. 3 Bilateral lower extremity edema     ICD-10-CM: R60.0 ICD-9-CM: 894. 3 Thrombocytopenia ([facility].)     ICD-10-CM: D69.6 ICD-9-CM: 287.5 Chronic anemia     ICD-10-CM: D64.9 ICD-9-CM: 285.9 Iron deficiency anemia secondary to inadequate dietary iron intake     ICD-10-CM: D50.8 ICD-9-CM: 280.1 Vertigo     ICD-10-CM: Y28 ICD-9-CM: 780.4 Spondylolisthesis of lumbar region     ICD-10-CM: M43.16 
ICD-9-CM: 738.4 S/P lumbar fusion     ICD-10-CM: Z98.1 ICD-9-CM: V45.4 Insomnia due to anxiety and fear     ICD-10-CM: F51.05, F40.9 ICD-9-CM: 300.20, 327.02 Vitals BP Pulse Temp Weight(growth percentile) BMI OB Status 111/70 66 97.4 °F (36.3 °C) (Oral) 155 lb (70.3 kg) 25.02 kg/m2 Postmenopausal  
 Smoking Status Never Smoker BMI and BSA Data Body Mass Index Body Surface Area 25.02 kg/m 2 1.81 m 2 Your Updated Medication List  
  
   
This list is accurate as of 7/16/18 10:14 AM.  Always use your most recent med list.  
  
  
  
  
 albuterol 90 mcg/actuation inhaler Commonly known as:  PROAIR HFA  
1-2 puffs every 4-6 hrs. aluminum-magnesium hydroxide 200-200 mg/5 mL susp 5 mL, diphenhydrAMINE 12.5 mg/5 mL liqd 12.5 mg, lidocaine 2 % soln 5 mL  
5 mL by Swish and Spit route two (2) times a day.  Magic mouth wash  Maalox Lidocaine 2% viscous  Diphenhydramine oral solution   Pharmacy to mix equal portions of ingredients to a total volume as indicated in the dispense amount. amiodarone 200 mg tablet Commonly known as:  CORDARONE Take 200 mg by mouth daily. Indications: PREVENTION OF VENTRICULAR FIBRILLATION  
  
 COREG 3.125 mg tablet Generic drug:  carvedilol Take 3.125 mg by mouth two (2) times daily (with meals). digoxin 0.125 mg tablet Commonly known as:  LANOXIN Take 0.125 mg by mouth daily. dronabinol 5 mg capsule Commonly known as:  Manuel Eladia Take 1 Cap by mouth two (2) times a day. ergocalciferol 50,000 unit capsule Commonly known as:  VITAMIN D2 Take 1 Cap by mouth every seven (7) days. furosemide 40 mg tablet Commonly known as:  LASIX Take 40 mg by mouth daily. Indications: Edema  
  
 gabapentin 300 mg capsule Commonly known as:  NEURONTIN Take 1 po q am and 2 po q pm  
  
 lidocaine-prilocaine topical cream  
Commonly known as:  EMLA  
APPLY OVER PORT 1 HOUR PRIOR TO CHEMOTHERAPY THAN COVER WITH [place] WRAP LINZESS 145 mcg Cap capsule Generic drug:  linaclotide TAKE 1 CAPSULE BY MOUTH DAILY 30 MINUTES BEFORE BREAKFAST  
  
 lisinopril 10 mg tablet Commonly known as:  Gissel Pinch Take 10 mg by mouth daily. Indications: hypertension  
  
 loratadine 10 mg Cap Take 10 mg by mouth daily. magic mouthwash solution Take 5 mL by mouth three (3) times daily as needed for Stomatitis. Magic mouth wash  Maalox Lidocaine 2% viscous  Diphenhydramine oral solution   Pharmacy to mix equal portions of ingredients to a total volume as indicated in the dispense amount. meloxicam 7.5 mg tablet Commonly known as:  MOBIC Take 7.5 mg by mouth daily. nabumetone 750 mg tablet Commonly known as:  RELAFEN Take 750 mg by mouth daily. Indications: OSTEOARTHRITIS  
  
 ondansetron hcl 8 mg tablet Commonly known as:  Lurlean Precise Take 1 Tab by mouth every eight (8) hours as needed for Nausea. * oxyCODONE-acetaminophen 7.5-325 mg per tablet Commonly known as:  PERCOCET  
 TAKE ONE TO TWO TABLETS BY MOUTH Q 4 - 6 AS NEEDED FOR PAIN **AFTER SURGERY**  Indications: Pain * oxyCODONE-acetaminophen  mg per tablet Commonly known as:  PERCOCET Take 1 Tab by mouth every six (6) hours as needed for Pain. Max Daily Amount: 4 Tabs. Indications: Pain, ACUTE POST OP PAIN * potassium chloride 20 mEq tablet Commonly known as:  K-DUR, KLOR-CON Take 2 Tabs by mouth daily. * KLOR-CON M20 20 mEq tablet Generic drug:  potassium chloride TAKE ONE TABLET BY MOUTH ONCE A DAY promethazine 25 mg tablet Commonly known as:  PHENERGAN Take 1 Tab by mouth every six (6) hours as needed for Nausea. PRUVATE 21-7 PO Take 325 mg by mouth daily. Indications: iron deficiency Senna 8.6 mg tablet Generic drug:  senna Take 1 Tab by mouth daily. temazepam 30 mg capsule Commonly known as:  RESTORIL  
  
 topiramate 50 mg tablet Commonly known as:  TOPAMAX XARELTO 10 mg tablet Generic drug:  rivaroxaban Take 10 mg by mouth daily. zolpidem 10 mg tablet Commonly known as:  AMBIEN  
TAKE 1 TABLET BY MOUTH NIGHTLY AS NEEDED FOR SLEEP. MAX DAILY AMOUNT 10 MG  
  
 * Notice: This list has 4 medication(s) that are the same as other medications prescribed for you. Read the directions carefully, and ask your doctor or other care provider to review them with you. Prescriptions Printed Refills  
 oxyCODONE-acetaminophen (PERCOCET)  mg per tablet 0 Sig: Take 1 Tab by mouth every six (6) hours as needed for Pain. Max Daily Amount: 4 Tabs. Indications: Pain, ACUTE POST OP PAIN Class: Print Route: Oral  
 zolpidem (AMBIEN) 10 mg tablet 2 Sig: TAKE 1 TABLET BY MOUTH NIGHTLY AS NEEDED FOR SLEEP. MAX DAILY AMOUNT 10 MG Class: Print We Performed the Following COMPLETE CBC & AUTO DIFF WBC [88323 CPT(R)] Follow-up Instructions Return in about 2 months (around 9/16/2018). To-Do List   
 07/16/2018 Lab:  CBC WITH 3 PART DIFF   
  
 07/20/2018 8:30 AM  
  Appointment with [facility] NURSE at [facility] ([phone])  
  
 08/10/2018 8:30 AM  
  Appointment with [facility] FA[facility] TRACK NURSE at [facility] ([phone]) Patient Instructions Iron Deficiency Anemia: Care Instructions Your Care Instructions Anemia means that you do not have enough red blood cells. Red blood cells carry oxygen around your body. When you have anemia, it can make you pale, weak, and tired. Many things can cause anemia. The most common cause is loss of blood. This can happen if you have heavy menstrual periods. It can also happen if you have bleeding in your stomach or bowel. You can also get anemia if you don't have enough iron in your diet or if it's hard for your body to absorb iron. In some cases, pregnancy causes anemia. That's because a pregnant woman needs more iron. Your doctor may do more tests to find the cause of your anemia. If a disease or other health problem is causing it, your doctor will treat that problem. It's important to follow up with your doctor to make sure that your iron level returns to normal. 
Follow-up care is a key part of your treatment and safety. Be sure to make and go to all appointments, and call your doctor if you are having problems. It's also a good idea to know your test results and keep a list of the medicines you take. How can you care for yourself at home? · If your doctor recommended iron pills, take them as directed. ¨ Try to take the pills on an empty stomach. You can do this about 1 hour before or 2 hours after meals. But you may need to take iron with food to avoid an upset stomach. ¨ Do not take antacids or drink milk or anything with caffeine within 2 hours of when you take your iron. They can keep your body from absorbing the iron well. ¨ Vitamin C helps your body absorb iron.  You may want to take iron pills with a glass of orange juice or some other food high in vitamin C. 
¨ Iron pills may cause stomach problems. These include heartburn, nausea, diarrhea, constipation, and cramps. It can help to drink plenty of fluids and include fruits, vegetables, and fiber in your diet. ¨ It's normal for iron pills to make your stool a greenish or grayish black. But internal bleeding can also cause dark stool. So it's important to tell your doctor about any color changes. ¨ Call your doctor if you think you are having a problem with your iron pills. Even after you start to feel better, it will take several months for your body to build up its supply of iron. ¨ If you miss a pill, don't take a double dose. ¨ Keep iron pills out of the reach of small children. Too much iron can be very dangerous. · Eat foods with a lot of iron. These include red meat, shellfish, poultry, and eggs. They also include beans, raisins, whole-grain bread, and leafy green vegetables. · Steam your vegetables. This is the best way to prepare them if you want to get as much iron as possible. · Be safe with medicines. Do not take nonsteroidal anti-inflammatory pain relievers unless your doctor tells you to. These include aspirin, naproxen (Aleve), and ibuprofen (Advil, Motrin). · Liquid iron can stain your teeth. But you can mix it with water or juice and drink it with a straw. Then it won't get on your teeth. When should you call for help? Call 911 anytime you think you may need emergency care. For example, call if: 
  · You passed out (lost consciousness).  
 Call your doctor now or seek immediate medical care if: 
  · You are short of breath.  
  · You are dizzy or light-headed, or you feel like you may faint.  
  · You have new or worse bleeding.  
 Watch closely for changes in your health, and be sure to contact your doctor if: 
  · You feel weaker or more tired than usual.  
  · You do not get better as expected. Where can you learn more? Go to http://leon-edinson.info/. Enter Q768 in the search box to learn more about \"Iron Deficiency Anemia: Care Instructions. \" Current as of: October 9, 2017 Content Version: 11.7 © 0762-8340 [employer]. Care instructions adapted under license by [employer] (which disclaims liability or warranty for this information). If you have questions about a medical condition or this instruction, always ask your healthcare professional. [facility] any warranty or liability for your use of this information. Breast Cancer: Care Instructions Your Care Instructions Breast cancer occurs when abnormal cells grow out of control in the breast. These cancer cells can spread within the breast, to nearby lymph nodes and other tissues, and to other parts of the body. Being treated for cancer can weaken your body, and you may feel very tired. Get the rest your body needs so you can feel better. Finding out that you have cancer is scary. You may feel many emotions and may need some help coping. Seek out family, friends, and counselors for support. You also can do things at home to make yourself feel better while you go through treatment. Call the [employer] (7-280.207.2609) or visit its website at [employer] for more information. Follow-up care is a key part of your treatment and safety. Be sure to make and go to all appointments, and call your doctor if you are having problems. It's also a good idea to know your test results and keep a list of the medicines you take. How can you care for yourself at home? · Take your medicines exactly as prescribed. Call your doctor if you think you are having a problem with your medicine. You may get medicine for nausea and vomiting if you have these side effects. · Follow your doctor's instructions to relieve pain.  Pain from cancer and surgery can almost always be controlled. Use pain medicine when you first notice pain, before it becomes severe. · Eat healthy food. If you do not feel like eating, try to eat food that has protein and extra calories to keep up your strength and prevent weight loss. Drink liquid meal replacements for extra calories and protein. Try to eat your main meal early. · Get some physical activity every day, but do not get too tired. Keep doing the hobbies you enjoy as your energy allows. · Do not smoke. Smoking can make your cancer worse. If you need help quitting, talk to your doctor about stop-smoking programs and medicines. These can increase your chances of quitting for good. · Take steps to control your stress and workload. Learn relaxation techniques. ¨ Share your feelings. Stress and tension affect our emotions. By expressing your feelings to others, you may be able to understand and cope with them. ¨ Consider joining a support group. Talking about a problem with your spouse, a good friend, or other people with similar problems is a good way to reduce tension and stress. ¨ Express yourself through art. Try writing, crafts, dance, or art to relieve stress. Some dance, writing, or art groups may be available just for people who have cancer. ¨ Be kind to your body and mind. Getting enough sleep, eating a healthy diet, and taking time to do things you enjoy can contribute to an overall feeling of balance in your life and can help reduce stress. ¨ Get help if you need it. Discuss your concerns with your doctor or counselor. · If you are vomiting or have diarrhea: ¨ Drink plenty of fluids (enough so that your urine is light yellow or clear like water) to prevent dehydration. Choose water and other caffeine-free clear liquids. If you have kidney, heart, or liver disease and have to limit fluids, talk with your doctor before you increase the amount of fluids you drink. ¨ When you are able to eat, try clear soups, mild foods, and liquids until all symptoms are gone for 12 to 48 hours. Other good choices include dry toast, crackers, cooked cereal, and gelatin dessert, such as Jell-O. · If you have not already done so, prepare a list of advance directives. Advance directives are instructions to your doctor and family members about what kind of care you want if you become unable to speak or express yourself. When should you call for help? Call 911 anytime you think you may need emergency care. For example, call if: 
  · You passed out (lost consciousness).  
 Call your doctor now or seek immediate medical care if: 
  · You have a fever.  
  · You have abnormal bleeding.  
  · You think you have an infection.  
  · You have new or worse pain.  
  · You have new symptoms, such as a cough, belly pain, vomiting, diarrhea, or a rash.  
 Watch closely for changes in your health, and be sure to contact your doctor if: 
  · You are much more tired than usual.  
  · You have swollen glands in your armpits, groin, or neck.  
  · You do not get better as expected. Where can you learn more? Go to http://leon-edinson.info/. Enter V321 in the search box to learn more about \"Breast Cancer: Care Instructions. \" Current as of: May 12, 2017 Content Version: 11.7 © 0317-2903 [employer], [employer]. Care instructions adapted under license by [employer] (which disclaims liability or warranty for this information). If you have questions about a medical condition or this instruction, always ask your healthcare professional. [address] [address] any warranty or liability for your use of this information. Please provide this summary of care documentation to your next provider. Your primary care clinician is listed as Randi Washington. If you have any questions after today's visit, please call [phone].

## 2018-07-16 NOTE — PATIENT INSTRUCTIONS
Iron Deficiency Anemia: Care Instructions Your Care Instructions Anemia means that you do not have enough red blood cells. Red blood cells carry oxygen around your body. When you have anemia, it can make you pale, weak, and tired. Many things can cause anemia. The most common cause is loss of blood. This can happen if you have heavy menstrual periods. It can also happen if you have bleeding in your stomach or bowel. You can also get anemia if you don't have enough iron in your diet or if it's hard for your body to absorb iron. In some cases, pregnancy causes anemia. That's because a pregnant woman needs more iron. Your doctor may do more tests to find the cause of your anemia. If a disease or other health problem is causing it, your doctor will treat that problem. It's important to follow up with your doctor to make sure that your iron level returns to normal. 
Follow-up care is a key part of your treatment and safety. Be sure to make and go to all appointments, and call your doctor if you are having problems. It's also a good idea to know your test results and keep a list of the medicines you take. How can you care for yourself at home? · If your doctor recommended iron pills, take them as directed. ¨ Try to take the pills on an empty stomach. You can do this about 1 hour before or 2 hours after meals. But you may need to take iron with food to avoid an upset stomach. ¨ Do not take antacids or drink milk or anything with caffeine within 2 hours of when you take your iron. They can keep your body from absorbing the iron well. ¨ Vitamin C helps your body absorb iron. You may want to take iron pills with a glass of orange juice or some other food high in vitamin C. 
¨ Iron pills may cause stomach problems. These include heartburn, nausea, diarrhea, constipation, and cramps. It can help to drink plenty of fluids and include fruits, vegetables, and fiber in your diet.  
¨ It's normal for iron pills to make your stool a greenish or grayish black. But internal bleeding can also cause dark stool. So it's important to tell your doctor about any color changes. ¨ Call your doctor if you think you are having a problem with your iron pills. Even after you start to feel better, it will take several months for your body to build up its supply of iron. ¨ If you miss a pill, don't take a double dose. ¨ Keep iron pills out of the reach of small children. Too much iron can be very dangerous. · Eat foods with a lot of iron. These include red meat, shellfish, poultry, and eggs. They also include beans, raisins, whole-grain bread, and leafy green vegetables. · Steam your vegetables. This is the best way to prepare them if you want to get as much iron as possible. · Be safe with medicines. Do not take nonsteroidal anti-inflammatory pain relievers unless your doctor tells you to. These include aspirin, naproxen (Aleve), and ibuprofen (Advil, Motrin). · Liquid iron can stain your teeth. But you can mix it with water or juice and drink it with a straw. Then it won't get on your teeth. When should you call for help? Call 911 anytime you think you may need emergency care. For example, call if: 
  · You passed out (lost consciousness).  
 Call your doctor now or seek immediate medical care if: 
  · You are short of breath.  
  · You are dizzy or light-headed, or you feel like you may faint.  
  · You have new or worse bleeding.  
 Watch closely for changes in your health, and be sure to contact your doctor if: 
  · You feel weaker or more tired than usual.  
  · You do not get better as expected. Where can you learn more? Go to http://leon-edinson.info/. Enter K924 in the search box to learn more about \"Iron Deficiency Anemia: Care Instructions. \" Current as of: October 9, 2017 Content Version: 11.7 © 3070-0493 [employer].  Care instructions adapted under license by [employer] (which disclaims liability or warranty for this information). If you have questions about a medical condition or this instruction, always ask your healthcare professional. [address] any warranty or liability for your use of this information. Breast Cancer: Care Instructions Your Care Instructions Breast cancer occurs when abnormal cells grow out of control in the breast. These cancer cells can spread within the breast, to nearby lymph nodes and other tissues, and to other parts of the body. Being treated for cancer can weaken your body, and you may feel very tired. Get the rest your body needs so you can feel better. Finding out that you have cancer is scary. You may feel many emotions and may need some help coping. Seek out family, friends, and counselors for support. You also can do things at home to make yourself feel better while you go through treatment. Call the [employer] Doris Martel ([phone]) or visit its website at [address] for more information. Follow-up care is a key part of your treatment and safety. Be sure to make and go to all appointments, and call your doctor if you are having problems. It's also a good idea to know your test results and keep a list of the medicines you take. How can you care for yourself at home? · Take your medicines exactly as prescribed. Call your doctor if you think you are having a problem with your medicine. You may get medicine for nausea and vomiting if you have these side effects. · Follow your doctor's instructions to relieve pain. Pain from cancer and surgery can almost always be controlled. Use pain medicine when you first notice pain, before it becomes severe. · Eat healthy food. If you do not feel like eating, try to eat food that has protein and extra calories to keep up your strength and prevent weight loss. Drink liquid meal replacements for extra calories and protein. Try to eat your main meal early.  
· Get some physical activity every day, but do not get too tired. Keep doing the hobbies you enjoy as your energy allows. · Do not smoke. Smoking can make your cancer worse. If you need help quitting, talk to your doctor about stop-smoking programs and medicines. These can increase your chances of quitting for good. · Take steps to control your stress and workload. Learn relaxation techniques. ¨ Share your feelings. Stress and tension affect our emotions. By expressing your feelings to others, you may be able to understand and cope with them. ¨ Consider joining a support group. Talking about a problem with your spouse, a good friend, or other people with similar problems is a good way to reduce tension and stress. ¨ Express yourself through art. Try writing, crafts, dance, or art to relieve stress. Some dance, writing, or art groups may be available just for people who have cancer. ¨ Be kind to your body and mind. Getting enough sleep, eating a healthy diet, and taking time to do things you enjoy can contribute to an overall feeling of balance in your life and can help reduce stress. ¨ Get help if you need it. Discuss your concerns with your doctor or counselor. · If you are vomiting or have diarrhea: ¨ Drink plenty of fluids (enough so that your urine is light yellow or clear like water) to prevent dehydration. Choose water and other caffeine-free clear liquids. If you have kidney, heart, or liver disease and have to limit fluids, talk with your doctor before you increase the amount of fluids you drink. ¨ When you are able to eat, try clear soups, mild foods, and liquids until all symptoms are gone for 12 to 48 hours. Other good choices include dry toast, crackers, cooked cereal, and gelatin dessert, such as Jell-O. · If you have not already done so, prepare a list of advance directives.  Advance directives are instructions to your doctor and family members about what kind of care you want if you become unable to speak or express yourself. When should you call for help? Call 911 anytime you think you may need emergency care. For example, call if: 
  · You passed out (lost consciousness).  
 Call your doctor now or seek immediate medical care if: 
  · You have a fever.  
  · You have abnormal bleeding.  
  · You think you have an infection.  
  · You have new or worse pain.  
  · You have new symptoms, such as a cough, belly pain, vomiting, diarrhea, or a rash.  
 Watch closely for changes in your health, and be sure to contact your doctor if: 
  · You are much more tired than usual.  
  · You have swollen glands in your armpits, groin, or neck.  
  · You do not get better as expected. Where can you learn more? Go to http://leon-edinson.info/. Enter V321 in the search box to learn more about \"Breast Cancer: Care Instructions. \" Current as of: May 12, 2017 Content Version: 11.7 © 4365-0591 [employer]. Care instructions adapted under license by [employer] (which disclaims liability or warranty for this information). If you have questions about a medical condition or this instruction, always ask your healthcare professional. [place] [address] any warranty or liability for your use of this information.

## 2018-07-16 NOTE — PROGRESS NOTES
Hematology/Oncology  Progress Note    Name: Dania Cee  Date: 2000  : 1950    PCP: Miguel Angel Rose MD     Ms. Vickie Quiñones is a 79 y.o. female who was seen for management of her slowly progressive ITP and metastatic breast cancer with associated iron deficiency anemia. Current therapy: Active surveillance regarding breast cancer: N-plate as needed as a primary treatment for ITP  Subjective:     Ms. Vickie Quiñones is a 51-year-old -American woman with history of metastatic breast cancer. The patient reports that she has been doing reasonably well. She has gained about 30 pounds weight over the past few months, due to the steroid that she has to take for her ITP. She is now actively trying to lose some weight. She denies having any unexplained bruising or bleeding. She continues to have arthritic discomfort but she is ambulating without the use of a cane or walker. The patient informed me that she is currently seeing a spine specialist and may need to get steroid injections or subdural injection to control the pain. She is continuing to take the n-plate as the primary treatment modality for her ITP. Patient that she has noticed that her platelet count rebounded over the past several weeks. Since her last clinic visit she did have ingrown toenails on her right foot surgically removed and the foot is healing well. Past medical history, family history, and social history: these were reviewed and remains unchanged.     Past Medical History:   Diagnosis Date    Antineoplastic chemotherapy induced anemia     Arrhythmia     Atrial Fib    Arthritis     [facility]     Cancer [facility] [facility]     Breast    Cardiomyopathy [facility] [facility]     Chronic ITP (idiopathic thrombocytopenia) (HCC) 10/31/2016    Secondary to Anemia from Chemo    Congestive heart failure (HCC)     Diabetes (HCC)     borderline, no meds    Esophageal cancer ([address]     treated with chemo    Heart failure (HCC)     SOB (shortness of breath)      Past Surgical History:   Procedure Laterality Date    BREAST SURGERY PROCEDURE UNLISTED Left 1990s    lumpectomy    COLONOSCOPY N/A 7/27/2016    COLONOSCOPY performed by Terrie Kayser, [facility] at [facility] ENDOSCOPY    HX APPENDECTOMY      HX HEENT      Tonsillectomy    HX ORTHOPAEDIC      HX TUBAL LIGATION      HX VASCULAR ACCESS      NEUROLOGICAL PROCEDURE UNLISTED  01/08/2018    Lumbar fusion     Social History     Social History    Marital status:      Spouse name: N/A    Number of children: N/A    Years of education: N/A     Occupational History    Not on file. Social History Main Topics    Smoking status: Never Smoker    Smokeless tobacco: Never Used    Alcohol use No    Drug use: No    Sexual activity: Not on file     Other Topics Concern    Not on file     Social History Narrative     No family history on file. Current Outpatient Prescriptions   Medication Sig Dispense Refill    oxyCODONE-acetaminophen (PERCOCET)  mg per tablet Take 1 Tab by mouth every six (6) hours as needed for Pain. Max Daily Amount: 4 Tabs. Indications: Pain, ACUTE POST OP PAIN 120 Tab 0    zolpidem (AMBIEN) 10 mg tablet TAKE 1 TABLET BY MOUTH NIGHTLY AS NEEDED FOR SLEEP. MAX DAILY AMOUNT 10 MG 30 Tab 2    oxyCODONE-acetaminophen (PERCOCET) 7.5-325 mg per tablet TAKE ONE TO TWO TABLETS BY MOUTH Q 4 - 6 AS NEEDED FOR PAIN **AFTER SURGERY**  Indications: Pain 50 Tab 0    promethazine (PHENERGAN) 25 mg tablet Take 1 Tab by mouth every six (6) hours as needed for Nausea. 30 Tab 0    lidocaine-prilocaine (EMLA) topical cream APPLY OVER PORT 1 HOUR PRIOR TO CHEMOTHERAPY THAN COVER WITH SARAN WRAP 30 g 6    ergocalciferol (VITAMIN D2) 50,000 unit capsule Take 1 Cap by mouth every seven (7) days.  12 Cap 1    topiramate (TOPAMAX) 50 mg tablet   5    temazepam (RESTORIL) 30 mg capsule   1    LINZESS 145 mcg cap capsule TAKE 1 CAPSULE BY MOUTH DAILY 30 MINUTES BEFORE BREAKFAST  0    lisinopril (PRINIVIL, ZESTRIL) 10 mg tablet Take 10 mg by mouth daily. Indications: hypertension      loratadine 10 mg cap Take 10 mg by mouth daily.  digoxin (LANOXIN) 0.125 mg tablet Take 0.125 mg by mouth daily.  carvedilol (COREG) 3.125 mg tablet Take 3.125 mg by mouth two (2) times daily (with meals).  meloxicam (MOBIC) 7.5 mg tablet Take 7.5 mg by mouth daily.  amiodarone (CORDARONE) 200 mg tablet Take 200 mg by mouth daily. Indications: PREVENTION OF VENTRICULAR FIBRILLATION      gabapentin (NEURONTIN) 300 mg capsule Take 1 po q am and 2 po q pm (Patient taking differently: Take 300 mg by mouth two (2) times a day. Take 1 po q am and 2 po q pm) 90 Cap 1    rivaroxaban (XARELTO) 10 mg tablet Take 10 mg by mouth daily.  magic mouthwash solution Take 5 mL by mouth three (3) times daily as needed for Stomatitis. Magic mouth wash   Maalox  Lidocaine 2% viscous   Diphenhydramine oral solution     Pharmacy to mix equal portions of ingredients to a total volume as indicated in the dispense amount. 236 mL 0    furosemide (LASIX) 40 mg tablet Take 40 mg by mouth daily. Indications: Edema      dronabinol (MARINOL) 5 mg capsule Take 1 Cap by mouth two (2) times a day. 60 Cap 1    KLOR-CON M20 20 mEq tablet TAKE ONE TABLET BY MOUTH ONCE A DAY 30 Tab 3    aluminum-magnesium hydroxide 200-200 mg/5 mL susp 5 mL, diphenhydrAMINE 12.5 mg/5 mL liqd 12.5 mg, lidocaine 2 % soln 5 mL 5 mL by Swish and Spit route two (2) times a day. Magic mouth wash   Maalox  Lidocaine 2% viscous   Diphenhydramine oral solution     Pharmacy to mix equal portions of ingredients to a total volume as indicated in the dispense amount. 240 mL 1    potassium chloride (K-DUR, KLOR-CON) 20 mEq tablet Take 2 Tabs by mouth daily. 30 Tab 3    albuterol (PROAIR HFA) 90 mcg/actuation inhaler 1-2 puffs every 4-6 hrs. (Patient taking differently: Take 2 Puffs by inhalation every four (4) hours as needed for Shortness of Breath.  1-2 puffs every 4-6 hrs.) 1 Inhaler 1    senna (SENNA) 8.6 mg tablet Take 1 Tab by mouth daily.  nabumetone (RELAFEN) 750 mg tablet Take 750 mg by mouth daily. Indications: OSTEOARTHRITIS      ondansetron hcl (ZOFRAN) 8 mg tablet Take 1 Tab by mouth every eight (8) hours as needed for Nausea. 30 Tab 3    IRON AG&FUM/C/FA/MV CMB11/CA-T (PRUVATE 21-7 PO) Take 325 mg by mouth daily. Indications: iron deficiency         Review of Systems  Constitutional: The patient has no acute distress or discomfort. HEENT: The patient denies recent head trauma, eye pain, blurred vision,  hearing deficit, oropharyngeal mucosal pain or lesions, and the patient denies throat pain or discomfort. Lymphatics: The patient denies palpable peripheral lymphadenopathy. Hematologic: The patient denies having bruising, bleeding, or progressive fatigue. Respiratory: Patient denies having shortness of breath, cough, sputum production, fever, or dyspnea on exertion. Cardiovascular: The patient denies having leg pain, leg swelling, heart palpitations, chest permit, chest pain, or lightheadedness. The patient denies having dyspnea on exertion. Gastrointestinal: The patient denies having nausea, emesis, or diarrhea. The patient denies having any hematemesis or blood in the stool. Genitourinary: Patient denies having urinary urgency, frequency, or dysuria. The patient denies having blood in the urine. Psychological: The patient denies having symptoms of nervousness, anxiety, depression, or thoughts of harming self. Skin: Patient denies having skin rashes, skin, ulcerations, or unexplained itching or pruritus. Musculoskeletal: The patient used to complain of arthritic discomfort in her joints particularly the knees, hips, and shoulders. The patient reports that she has a large bruise on her right lower extremity.       Objective:     Visit Vitals    /70    Pulse 66    Temp 97.4 °F (36.3 °C) (Oral)    Wt 70.3 kg (155 lb)    BMI 25.02 kg/m2     ECOG PS=1, pain score=0/10  Physical Exam:   Gen. Appearance: The patient is in no acute distress. Skin: There is no bruise or rash. HEENT: The exam is unremarkable. Neck: Supple without lymphadenopathy or thyromegaly. Lungs: Clear to auscultation and percussion; there are no wheezes or rhonchi. Heart: Regular rate and rhythm; there are no murmurs, gallops, or rubs. Abdomen: Bowel sounds are present and normal.  There is no guarding, tenderness, or hepatosplenomegaly. Extremities: There is no clubbing, cyanosis, or edema. There is a 6 cm area of bruising on the right anterior lateral calf. There is no evidence of skin breakdown or ulceration. Neurologic: There are no focal neurologic deficits. Lymphatics: There is no palpable peripheral lymphadenopathy. Musculoskeletal: The patient has full range of motion at all joints. There is no evidence of joint deformity or effusions. There is no focal joint tenderness. Psychological/psychiatric: There is no clinical evidence of anxiety, depression, or melancholy. Lab data:      Results for orders placed or performed during the hospital encounter of 07/16/18   CBC WITH 3 PART DIFF     Status: Abnormal   Result Value Ref Range Status    WBC 6.5 4.5 - 13.0 K/uL Final    RBC 4.00 (L) 4.10 - 5.10 M/uL Final    HGB 10.7 (L) 12.0 - 16 g/dL Final    HCT 33.3 (L) 36 - 48 % Final    MCV 83.3 78 - 102 FL Final    MCH 26.8 25.0 - 35.0 PG Final    MCHC 32.1 31 - 37 g/dL Final    RDW 16.4 (H) 11.5 - 14.5 % Final    NEUTROPHILS 54 40 - 70 % Final    MIXED CELLS 19 (H) 0.1 - 17 % Final    LYMPHOCYTES 27 14 - 44 % Final    ABS. NEUTROPHILS 3.5 1.8 - 9.5 K/UL Final    ABS. MIXED CELLS 1.3 0.0 - 2.3 K/uL Final    ABS. LYMPHOCYTES 1.7 1.1 - 5.9 K/UL Final     Comment: Test performed at [place] [address] Location. Results Reviewed by Medical Director.     DF AUTOMATED   Final      I have explained to the patient that the preliminary platelet count today is 87,000. Assessment:     1. Chronic ITP (idiopathic thrombocytopenia) (HCC)    2. Carcinoma of right breast metastatic to axillary lymph node ([facility] [address]    3. Vitamin D deficiency    4. Cachexia ([address]    5. Cancer associated pain    6. Bilateral lower extremity edema    7. Thrombocytopenia ([address]    8. Chronic anemia    9. Iron deficiency anemia secondary to inadequate dietary iron intake    10. Vertigo    11. Spondylolisthesis of lumbar region    12. S/P lumbar fusion    13. Insomnia due to anxiety and fear          Plan:   Chronic ITP/thrombocytopenia (progression of disease): I have informed the patient that her CBC today shows that her preliminary platelet count is 334,350. At this time I am recommending we continue the n-Plate at a dose of 1 mcg/kg subcutaneous weekly. At this time I will recheck her platelet level will plan to see her back in clinic in about 8 weeks. Vitamin D deficiency: I will recheck her vitamin D levels at this time. If the vitamin D level is low she will be offered vitamin D 50,000 units weekly for 12 weeks. Iron deficiency anemia/chronic anemia: The current CBC shows a WBC count of 6.5, hemoglobin is 10.7 g/dL, hematocrit is 33.3%, platelet count is 1 00,825. .  I have recommended that the patient continue taking the ferrous sulfate 1 tablet twice daily. At this time I will recheck her iron profile and ferritin levels. Bilateral lower extremity edema: The leg edema has significantly improved. Metastatic breast cancer, left breast metastasized to lymph nodes and other sites: At this time I will check a CA-27-29 level. The most recent CA-35-27 level was normal.  Clinically the patient has no evidence of disease progression. .    Cachexia, weakness, and muscular deconditioning: I am recommending that she continue physical therapy 4 times weekly. She will continue to use a walker or cane as needed for mobility support.   However today she is ambulating without the use of a cane or walker. Primary insomnia: The patient was instructed to continue to use the Ambien 10 mg at bedtime. Vertigo (controlled problem): I have recommended that she continue to use the Antivert 12.5 mg every 8 hours, as needed. .  I have advised the patient to take this for 3-5 days as needed, whenever she develops vertigo or dizziness. .    I will see her back in clinic for complete assessment again in 12 weeks. Orders Placed This Encounter    COMPLETE CBC & AUTO DIFF WBC    InHouse CBC ([facility])     Standing Status:   Future     Number of Occurrences:   1     Standing Expiration Date:   3/57/8382    METABOLIC PANEL, COMPREHENSIVE     Standing Status:   Future     Standing Expiration Date:   7/17/2019    IRON PROFILE     Standing Status:   Future     Standing Expiration Date:   7/17/2019    FERRITIN     Standing Status:   Future     Standing Expiration Date:   7/17/2019    CA 27.29     Standing Status:   Future     Standing Expiration Date:   7/17/2019    VITAMIN D, 25 HYDROXY     Standing Status:   Future     Standing Expiration Date:   7/17/2019    oxyCODONE-acetaminophen (PERCOCET)  mg per tablet     Sig: Take 1 Tab by mouth every six (6) hours as needed for Pain. Max Daily Amount: 4 Tabs. Indications: Pain, ACUTE POST OP PAIN     Dispense:  120 Tab     Refill:  0    zolpidem (AMBIEN) 10 mg tablet     Sig: TAKE 1 TABLET BY MOUTH NIGHTLY AS NEEDED FOR SLEEP. MAX DAILY AMOUNT 10 MG     Dispense:  30 Tab     Refill:  2       Fan Khan MD  7/16/2018      Please note: This document has been produced using voice recognition software. Unrecognized errors in transcription may be present.

## 2018-07-17 ENCOUNTER — OFFICE VISIT (OUTPATIENT)
Dept: ORTHOPEDIC SURGERY | Age: 68
End: 2018-07-17

## 2018-07-17 VITALS
HEIGHT: 66 IN | TEMPERATURE: 96.4 F | HEART RATE: 71 BPM | OXYGEN SATURATION: 96 % | BODY MASS INDEX: 25.88 KG/M2 | DIASTOLIC BLOOD PRESSURE: 62 MMHG | RESPIRATION RATE: 16 BRPM | SYSTOLIC BLOOD PRESSURE: 109 MMHG | WEIGHT: 161 LBS

## 2018-07-17 DIAGNOSIS — B35.1 NAIL FUNGUS: Primary | ICD-10-CM

## 2018-07-17 DIAGNOSIS — Z98.890 POST-OPERATIVE STATE: ICD-10-CM

## 2018-07-17 DIAGNOSIS — L60.0 INGROWN NAIL OF GREAT TOE OF LEFT FOOT: ICD-10-CM

## 2018-07-17 DIAGNOSIS — L60.0 INGROWN NAIL OF SECOND TOE OF LEFT FOOT: ICD-10-CM

## 2018-07-17 LAB — 25(OH)D3 SERPL-MCNC: 37.6 NG/ML (ref 30–100)

## 2018-07-17 NOTE — PATIENT INSTRUCTIONS
You may shower no submerging. Pat dry. Apply gentle moisturizer twice daily. Use compression sock or Tubigrip for swelling  Follow up in 3 weeks or sooner as needed         Ingrown Toenail: Care Instructions  Your Care Instructions    An ingrown toenail often occurs because a nail is not trimmed correctly or because shoes are too tight. An ingrown nail can cause an infection. If your toe is infected, your doctor may prescribe antibiotics. Most ingrown toenails can be treated at home. You should trim toenails straight across, so the ends of the nail grow over the skin and not into it. Good nail care can prevent ingrown toenails. Follow-up care is a key part of your treatment and safety. Be sure to make and go to all appointments, and call your doctor if you are having problems. It's also a good idea to know your test results and keep a list of the medicines you take. How can you care for yourself at home? · Trim the nails straight across. Leave the corners a little longer so they do not cut into the skin. To do this when you have an ingrown nail:  ¨ Soak your foot in warm water for about 15 minutes to soften the nail. ¨ Wedge a small piece of wet cotton under the corner of the nail to cushion the nail and lift it slightly. This keeps it from cutting the skin. ¨ Repeat daily until the nail has grown out and can be trimmed. · Do not use manicure scissors to dig under the ingrown nail. You might stab your toe, which could get infected. · Do not trim your toenails too short. · Check with your doctor before trimming your own toenails if you have been diagnosed with diabetes or peripheral arterial disease. These conditions increase the risk of an infection, because you may have decreased sensation in your toes and cut yourself without knowing it. · Wear roomy, comfortable shoes. · If your doctor prescribed antibiotics, take them as directed. Do not stop taking them just because you feel better.  You need to take the full course of antibiotics. When should you call for help? Call your doctor now or seek immediate medical care if:  ? · You have signs of infection, such as:  ¨ Increased pain, swelling, warmth, or redness. ¨ Red streaks leading from the toe. ¨ Pus draining from the toe. ¨ A fever. ? Watch closely for changes in your health, and be sure to contact your doctor if:  ? · You do not get better as expected. Where can you learn more? Go to http://leon-edinson.info/. Enter R135 in the search box to learn more about \"Ingrown Toenail: Care Instructions. \"  Current as of: October 13, 2016  Content Version: 11.4  © 3405-8179 [employer]. Care instructions adapted under license by [employer] (which disclaims liability or warranty for this information). If you have questions about a medical condition or this instruction, always ask your healthcare professional. [place] [address] any warranty or liability for your use of this information.

## 2018-07-17 NOTE — PROGRESS NOTES
Patient: Curtis Byrd                MRN: [id number]       SSN: [id number]  YOB: 1950              AGE: 79 y.o. SEX: female    Arun Lindsey MD    POST OP OFFICE NOTE  DOS: 6/1/18    Chief Complaint:   Chief Complaint   Patient presents with    Foot Pain     left foot f/u        HPI:     The patient is a 79 y.o. female who presents today for follow up 52 days s/p     PROCEDURES PERFORMED:    1. Left great toenail plate removal with matrix excision. 2.  Left #2 toenail plate removal with matrix excision.     Patient has been WB to the left lower extremity. Patient reports doing well other than persistent swelling preventing her to wear her normal shoes. Patient denies any fever, chills, chest pain, shortness of breath or calf pain. There are no new illness or injuries to report since last seen in the office. Patient is going on vacation next week and is not happy with the fact she cannot wear her normal shoes. She states that the scabbing has improved but not completely yet. PHYSICAL EXAM:     Visit Vitals    /62    Pulse 71    Temp 96.4 °F (35.8 °C) (Oral)    Resp 16    Ht 5' 6\" (1.676 m)    Wt 161 lb (73 kg)    SpO2 96%    BMI 25.99 kg/m2         Pain Scale: /10      GEN:  Alert, well developed, well nourished, well appearing 79 y.o. female in no acute distress. PSYCH:  Normal affect, mood, and conduct. alert, oriented x 3 alert, oriented x 3, no dementia  M/S EXAMINATION OF: left great toe and second toe  DRESSINGS: CDI   DRAINAGE: none  INCISION: Incision looks better, less scabbing. Will allow scabs to continue to resolve on their own as they are not quite ready to be removed. If they are still present at [facility], will compete additional debridement. SKIN: mild edema , no erythema, mild ecchymosis, no warmth , some scabbing noted to great toe and second toe. Some of the scabbing was debrided in the office, area cleansed and gentle moisturizer applied. TENDERNESS:  mild tenderness to palpation   NEUROVASCULAR:  grossly intact. Positive distal pulses and capillary refill. DVT ASSESSMENT:  The calf is not tender to palpation. No evidence of DVT seen on physical exam.  ROM: not tested       RADIOGRAPHS & DIAGNOSTIC STUDIES     No results found for any visits on 07/17/18. IMPRESSION:     Encounter Diagnoses     ICD-10-CM ICD-9-CM   1. Nail fungus B35.1 110.1   2. Ingrown nail of great toe of left foot L60.0 703.0   3. Ingrown nail of second toe of left foot L60.0 703.0   4. Post-operative state Z98.890 V45.89       PLAN:         Orders Placed This Encounter    Generic Supply Order              You may shower, no submerging. Pat dry. Apply gentle moisturizer twice daily. Follow up in 2 weeks or sooner as needed    · DO NOT DRIVE WHILE TAKING NARCOTIC PAIN MEDICINE      REVIEW OF SYSTEMS:     Otherwise as noted in HPI      PAST MEDICAL HISTORY:     Past Medical History:   Diagnosis Date    Antineoplastic chemotherapy induced anemia     Arrhythmia     Atrial Fib    Arthritis     [facility]     Cancer ([facility] 1999    Breast    Cardiomyopathy [facility]     Chronic ITP (idiopathic thrombocytopenia) (HCC) 10/31/2016    Secondary to Anemia from Chemo    Congestive heart failure (HCC)     Diabetes (HCC)     borderline, no meds    Esophageal cancer ([facility] 2005    treated with chemo    Heart failure (HCC)     SOB (shortness of breath)        MEDICATIONS:     Current Outpatient Prescriptions   Medication Sig    oxyCODONE-acetaminophen (PERCOCET)  mg per tablet Take 1 Tab by mouth every six (6) hours as needed for Pain. Max Daily Amount: 4 Tabs. Indications: Pain, ACUTE POST OP PAIN    zolpidem (AMBIEN) 10 mg tablet TAKE 1 TABLET BY MOUTH NIGHTLY AS NEEDED FOR SLEEP.  MAX DAILY AMOUNT 10 MG    oxyCODONE-acetaminophen (PERCOCET) 7.5-325 mg per tablet TAKE ONE TO TWO TABLETS BY MOUTH Q 4 - 6 AS NEEDED FOR PAIN **AFTER SURGERY**  Indications: Pain    promethazine (PHENERGAN) 25 mg tablet Take 1 Tab by mouth every six (6) hours as needed for Nausea.  lidocaine-prilocaine (EMLA) topical cream APPLY OVER PORT 1 HOUR PRIOR TO CHEMOTHERAPY THAN COVER WITH SARAN WRAP    ergocalciferol (VITAMIN D2) 50,000 unit capsule Take 1 Cap by mouth every seven (7) days.  topiramate (TOPAMAX) 50 mg tablet     temazepam (RESTORIL) 30 mg capsule     LINZESS 145 mcg cap capsule TAKE 1 CAPSULE BY MOUTH DAILY 30 MINUTES BEFORE BREAKFAST    lisinopril (PRINIVIL, ZESTRIL) 10 mg tablet Take 10 mg by mouth daily. Indications: hypertension    loratadine 10 mg cap Take 10 mg by mouth daily.  digoxin (LANOXIN) 0.125 mg tablet Take 0.125 mg by mouth daily.  carvedilol (COREG) 3.125 mg tablet Take 3.125 mg by mouth two (2) times daily (with meals).  meloxicam (MOBIC) 7.5 mg tablet Take 7.5 mg by mouth daily.  amiodarone (CORDARONE) 200 mg tablet Take 200 mg by mouth daily. Indications: PREVENTION OF VENTRICULAR FIBRILLATION    gabapentin (NEURONTIN) 300 mg capsule Take 1 po q am and 2 po q pm (Patient taking differently: Take 300 mg by mouth two (2) times a day. Take 1 po q am and 2 po q pm)    rivaroxaban (XARELTO) 10 mg tablet Take 10 mg by mouth daily.  magic mouthwash solution Take 5 mL by mouth three (3) times daily as needed for Stomatitis. Magic mouth wash   Maalox  Lidocaine 2% viscous   Diphenhydramine oral solution     Pharmacy to mix equal portions of ingredients to a total volume as indicated in the dispense amount.  furosemide (LASIX) 40 mg tablet Take 40 mg by mouth daily. Indications: Edema    dronabinol (MARINOL) 5 mg capsule Take 1 Cap by mouth two (2) times a day.  KLOR-CON M20 20 mEq tablet TAKE ONE TABLET BY MOUTH ONCE A DAY    aluminum-magnesium hydroxide 200-200 mg/5 mL susp 5 mL, diphenhydrAMINE 12.5 mg/5 mL liqd 12.5 mg, lidocaine 2 % soln 5 mL 5 mL by Swish and Spit route two (2) times a day.  Magic mouth wash   Maalox  Lidocaine 2% viscous   Diphenhydramine oral solution     Pharmacy to mix equal portions of ingredients to a total volume as indicated in the dispense amount.  potassium chloride (K-DUR, KLOR-CON) 20 mEq tablet Take 2 Tabs by mouth daily.  albuterol (PROAIR HFA) 90 mcg/actuation inhaler 1-2 puffs every 4-6 hrs. (Patient taking differently: Take 2 Puffs by inhalation every four (4) hours as needed for Shortness of Breath. 1-2 puffs every 4-6 hrs.)    senna (SENNA) 8.6 mg tablet Take 1 Tab by mouth daily.  nabumetone (RELAFEN) 750 mg tablet Take 750 mg by mouth daily. Indications: OSTEOARTHRITIS    ondansetron hcl (ZOFRAN) 8 mg tablet Take 1 Tab by mouth every eight (8) hours as needed for Nausea.  IRON AG&FUM/C/FA/MV CMB11/CA-T (PRUVATE 21-7 PO) Take 325 mg by mouth daily. Indications: iron deficiency     No current facility-administered medications for this visit. ALLERGIES:     Allergies   Allergen Reactions    Aspirin Swelling     Pt states her whole body swells when she takes aspirin.  Adhesive Unable to Obtain    Nickel Rash    Pcn [Penicillins] Rash         PAST SURGICAL HISTORY:     Past Surgical History:   Procedure Laterality Date    BREAST SURGERY PROCEDURE UNLISTED Left 1990s    lumpectomy    COLONOSCOPY N/A 7/27/2016    COLONOS[facility] by Corinne Sleigh[facility] at [facility] ENDOSCOPY    HX APPENDECTOMY      HX HEENT      Tonsillectomy    HX ORTHOPAEDIC      HX TUBAL LIGATION      HX VASCULAR ACCESS      NEUROLOGICAL PROCEDURE UNLISTED  01/08/2018    Lumbar fusion       SOCIAL HISTORY:     Social History     Social History    Marital status:      Spouse name: N/A    Number of children: N/A    Years of education: N/A     Occupational History    Not on file.      Social History Main Topics    Smoking status: Never Smoker    Smokeless tobacco: Never Used    Alcohol use No    Drug use: No    Sexual activity: Not on file     Other Topics Concern    Not on file     Social History Narrative       FAMILY HISTORY:     History reviewed. No pertinent family history.         Hanane Zavaleta PA-C  7/17/2018

## 2018-07-17 NOTE — MR AVS SNAPSHOT
[address], [address] 200 [facility] 
[phone] Patient: Jocelyne Wallace MRN: [id number] YEK:[id number] Visit Information Date & Time Provider Department Dept. Phone Encounter #  
 7/17/2018 11:00 AM Sandra Lipscomb [address] [facility] [phone] [id number] Follow-up Instructions Return in about 3 weeks (around 8/7/2018) for follow up evaluation. Your Appointments 9/17/2018  9:30 AM  
Office Visit with Chuy Rosen MD  
[address]  [facility] Appt Note: OV  
 [address] [facility] [address] [place] [address] [facility] 3[address] [facility], [address] [place] [address] Upcoming Health Maintenance Date Due Hepatitis C Screening 1950 DTaP/Tdap/Td series (1 - Tdap) 5/14/1973 FOBT Q 1 YEAR AGE 50-75 5/14/2002 ZOSTER VACCINE AGE 60> 3/14/2012 BREAST CANCER SCRN MAMMOGRAM 4/4/2015 GLAUCOMA SCREENING Q2Y 5/14/2017 Bone Densitometry (Dexa) Screening 5/14/2017 Pneumococcal 65+ High/Highest Risk (1 of 2 - PCV13) 5/14/2017 MEDICARE YEARLY EXAM 3/16/2018 Influenza Age 5 to Adult 8/1/2018 Allergies as of 7/17/2018  Review Complete On: 7/17/2018 By: Dirk Lopez Severity Noted Reaction Type Reactions Aspirin High 08/07/2013   Systemic Swelling Pt states her whole body swells when she takes aspirin. Adhesive    Unable to Obtain Nickel  05/21/2018    Rash Pcn [Penicillins]  08/20/2012    Rash Current Immunizations  Reviewed on 7/13/2018 No immunizations on file. Not reviewed this visit You Were Diagnosed With   
  
 Codes Comments Nail fungus    -  Primary ICD-10-CM: B35.1 ICD-9-CM: 110.1 Ingrown nail of great toe of left foot     ICD-10-CM: L60.0 ICD-9-CM: 703.0 Ingrown nail of second toe of left foot     ICD-10-CM: L60.0 ICD-9-CM: 703.0 Post-operative state     ICD-10-CM: Y85.716 ICD-9-CM: V45.89 Vitals BP Pulse Temp Resp Height(growth percentile) Weight(growth percentile) 109/62 71 96.4 °F (35.8 °C) (Oral) 16 5' 6\" (1.676 m) 161 lb (73 kg) SpO2 BMI OB Status Smoking Status 96% 25.99 kg/m2 Postmenopausal Never Smoker Vitals History BMI and BSA Data Body Mass Index Body Surface Area  
 25.99 kg/m 2 1.84 m 2 Your Updated Medication List  
  
   
This list is accurate as of 7/17/18 11:53 AM.  Always use your most recent med list.  
  
  
  
  
 albuterol 90 mcg/actuation inhaler Commonly known as:  PROAIR HFA  
1-2 puffs every 4-6 hrs. aluminum-magnesium hydroxide 200-200 mg/5 mL susp 5 mL, diphenhydrAMINE 12.5 mg/5 mL liqd 12.5 mg, lidocaine 2 % soln 5 mL  
5 mL by Swish and Spit route two (2) times a day. Magic mouth wash  Maalox Lidocaine 2% viscous  Diphenhydramine oral solution   Pharmacy to mix equal portions of ingredients to a total volume as indicated in the dispense amount. amiodarone 200 mg tablet Commonly known as:  CORDARONE Take 200 mg by mouth daily. Indications: PREVENTION OF VENTRICULAR FIBRILLATION  
  
 COREG 3.125 mg tablet Generic drug:  carvedilol Take 3.125 mg by mouth two (2) times daily (with meals). digoxin 0.125 mg tablet Commonly known as:  LANOXIN Take 0.125 mg by mouth daily. dronabinol 5 mg capsule Commonly known as:  Jonathan Fish Take 1 Cap by mouth two (2) times a day. ergocalciferol 50,000 unit capsule Commonly known as:  VITAMIN D2 Take 1 Cap by mouth every seven (7) days. furosemide 40 mg tablet Commonly known as:  LASIX Take 40 mg by mouth daily. Indications: Edema  
  
 gabapentin 300 mg capsule Commonly known as:  NEURONTIN Take 1 po q am and 2 po q pm  
  
 lidocaine-prilocaine topical cream  
Commonly known as:  EMLA APPLY OVER PORT 1 HOUR PRIOR TO CHEMOTHERAPY THAN COVER WITH [place] WRAP LINZESS 145 mcg Cap capsule Generic drug:  linaclotide TAKE 1 CAPSULE BY MOUTH DAILY 30 MINUTES BEFORE BREAKFAST  
  
 lisinopril 10 mg tablet Commonly known as:  Lia Hu Take 10 mg by mouth daily. Indications: hypertension  
  
 loratadine 10 mg Cap Take 10 mg by mouth daily. magic mouthwash solution Take 5 mL by mouth three (3) times daily as needed for Stomatitis. Magic mouth wash  Maalox Lidocaine 2% viscous  Diphenhydramine oral solution   Pharmacy to mix equal portions of ingredients to a total volume as indicated in the dispense amount. meloxicam 7.5 mg tablet Commonly known as:  MOBIC Take 7.5 mg by mouth daily. nabumetone 750 mg tablet Commonly known as:  RELAFEN Take 750 mg by mouth daily. Indications: OSTEOARTHRITIS  
  
 ondansetron hcl 8 mg tablet Commonly known as:  Marrion Mend Take 1 Tab by mouth every eight (8) hours as needed for Nausea. * oxyCODONE-acetaminophen 7.5-325 mg per tablet Commonly known as:  PERCOCET TAKE ONE TO TWO TABLETS BY MOUTH Q 4 - 6 AS NEEDED FOR PAIN **AFTER SURGERY**  Indications: Pain * oxyCODONE-acetaminophen  mg per tablet Commonly known as:  PERCOCET Take 1 Tab by mouth every six (6) hours as needed for Pain. Max Daily Amount: 4 Tabs. Indications: Pain, ACUTE POST OP PAIN * potassium chloride 20 mEq tablet Commonly known as:  K-DUR, KLOR-CON Take 2 Tabs by mouth daily. * KLOR-CON M20 20 mEq tablet Generic drug:  potassium chloride TAKE ONE TABLET BY MOUTH ONCE A DAY promethazine 25 mg tablet Commonly known as:  PHENERGAN Take 1 Tab by mouth every six (6) hours as needed for Nausea. PRUVATE 21-7 PO Take 325 mg by mouth daily. Indications: iron deficiency Senna 8.6 mg tablet Generic drug:  senna Take 1 Tab by mouth daily. temazepam 30 mg capsule Commonly known as:  RESTORIL  
  
 topiramate 50 mg tablet Commonly known as:  TOPAMAX XARELTO 10 mg tablet Generic drug:  rivaroxaban Take 10 mg by mouth daily. zolpidem 10 mg tablet Commonly known as:  AMBIEN  
TAKE 1 TABLET BY MOUTH NIGHTLY AS NEEDED FOR SLEEP. MAX DAILY AMOUNT 10 MG  
  
 * Notice: This list has 4 medication(s) that are the same as other medications prescribed for you. Read the directions carefully, and ask your doctor or other care provider to review them with you. We Performed the Following AMB SUPPLY ORDER [[id number] Custom] Comments:  
 7/17/2018 11:52 AM 
 
Compression socks, 8 -10 mm Hg Follow-up Instructions Return in about 3 weeks (around 8/7/2018) for follow up evaluation. To-Do List   
 07/20/2018 8:30 AM  
  Appointment with [facility] NURSE at [facility] ([phone])  
  
 08/10/2018 8:30 AM  
  Appointment with [facility] TR[facility] NURSE at [facility] ([phone]) Patient Instructions You may shower no submerging. Pat dry. Apply gentle moisturizer twice daily. Use compression sock or Tubigrip for swelling Follow up in 3 weeks or sooner as needed Ingrown Toenail: Care Instructions Your Care Instructions An ingrown toenail often occurs because a nail is not trimmed correctly or because shoes are too tight. An ingrown nail can cause an infection. If your toe is infected, your doctor may prescribe antibiotics. Most ingrown toenails can be treated at home. You should trim toenails straight across, so the ends of the nail grow over the skin and not into it. Good nail care can prevent ingrown toenails. Follow-up care is a key part of your treatment and safety. Be sure to make and go to all appointments, and call your doctor if you are having problems. It's also a good idea to know your test results and keep a list of the medicines you take. How can you care for yourself at home? · Trim the nails straight across. Leave the corners a little longer so they do not cut into the skin. To do this when you have an ingrown nail: 
¨ Soak your foot in warm water for about 15 minutes to soften the nail. ¨ Wedge a small piece of wet cotton under the corner of the nail to cushion the nail and lift it slightly. This keeps it from cutting the skin. ¨ Repeat daily until the nail has grown out and can be trimmed. · Do not use manicure scissors to dig under the ingrown nail. You might stab your toe, which could get infected. · Do not trim your toenails too short. · Check with your doctor before trimming your own toenails if you have been diagnosed with diabetes or peripheral arterial disease. These conditions increase the risk of an infection, because you may have decreased sensation in your toes and cut yourself without knowing it. · Wear roomy, comfortable shoes. · If your doctor prescribed antibiotics, take them as directed. Do not stop taking them just because you feel better. You need to take the full course of antibiotics. When should you call for help? Call your doctor now or seek immediate medical care if: 
? · You have signs of infection, such as: 
¨ Increased pain, swelling, warmth, or redness. ¨ Red streaks leading from the toe. ¨ Pus draining from the toe. ¨ A fever. ? Watch closely for changes in your health, and be sure to contact your doctor if: 
? · You do not get better as expected. Where can you learn more? Go to http://leon-edinson.info/. Enter R135 in the search box to learn more about \"Ingrown Toenail: Care Instructions. \" Current as of: October 13, 2016 Content Version: 11.4 © 9715-7631 [employer]. Care instructions adapted under license by [employer] (which disclaims liability or warranty for this information).  If you have questions about a medical condition or this instruction, always ask your healthcare professional. [address] any warranty or liability for your use of this information. Please provide this summary of care documentation to your next provider. Your primary care clinician is listed as Sheldon Ramirez. If you have any questions after today's visit, please call [phone].

## 2018-07-19 LAB — CANCER AG27-29 SERPL-ACNC: 35.3 U/ML (ref 0–38.6)

## 2018-07-20 ENCOUNTER — APPOINTMENT (OUTPATIENT)
Dept: INFUSION THERAPY | Age: 68
End: 2018-07-20
Payer: MEDICARE

## 2018-07-20 ENCOUNTER — HOSPITAL ENCOUNTER (OUTPATIENT)
Dept: INFUSION THERAPY | Age: 68
Discharge: HOME | End: 2018-07-20
Payer: MEDICARE

## 2018-07-20 VITALS
HEART RATE: 83 BPM | TEMPERATURE: 97.9 F | SYSTOLIC BLOOD PRESSURE: 114 MMHG | DIASTOLIC BLOOD PRESSURE: 75 MMHG | RESPIRATION RATE: 18 BRPM

## 2018-07-20 LAB
BASO+EOS+MONOS # BLD AUTO: 1.2 K/UL (ref 0–2.3)
BASO+EOS+MONOS # BLD AUTO: 20 % (ref 0.1–17)
DIFFERENTIAL METHOD BLD: ABNORMAL
ERYTHROCYTE [DISTWIDTH] IN BLOOD BY AUTOMATED COUNT: 16.5 % (ref 11.5–14.5)
HCT VFR BLD AUTO: 32.5 % (ref 36–48)
HGB BLD-MCNC: 10.6 G/DL (ref 12–16)
LYMPHOCYTES # BLD: 1.7 K/UL (ref 1.1–5.9)
LYMPHOCYTES NFR BLD: 28 % (ref 14–44)
MCH RBC QN AUTO: 27.2 PG (ref 25–35)
MCHC RBC AUTO-ENTMCNC: 32.6 G/DL (ref 31–37)
MCV RBC AUTO: 83.3 FL (ref 78–102)
NEUTS SEG # BLD: 3.3 K/UL (ref 1.8–9.5)
NEUTS SEG NFR BLD: 53 % (ref 40–70)
PLATELET # BLD AUTO: 149 K/UL (ref 135–420)
RBC # BLD AUTO: 3.9 M/UL (ref 4.1–5.1)
WBC # BLD AUTO: 6.2 K/UL (ref 4.5–13)

## 2018-07-20 PROCEDURE — 74011250636 HC RX REV CODE- 250/636

## 2018-07-20 PROCEDURE — 85025 COMPLETE CBC W/AUTO DIFF WBC: CPT | Performed by: INTERNAL MEDICINE

## 2018-07-20 PROCEDURE — 36415 COLL VENOUS BLD VENIPUNCTURE: CPT

## 2018-07-20 PROCEDURE — 96372 THER/PROPH/DIAG INJ SC/IM: CPT

## 2018-07-20 PROCEDURE — 85049 AUTOMATED PLATELET COUNT: CPT | Performed by: INTERNAL MEDICINE

## 2018-07-20 PROCEDURE — 74011000250 HC RX REV CODE- 250

## 2018-07-20 PROCEDURE — 74011250636 HC RX REV CODE- 250/636: Performed by: INTERNAL MEDICINE

## 2018-07-20 RX ORDER — WATER FOR INJECTION,STERILE
VIAL (ML) INJECTION
Status: COMPLETED
Start: 2018-07-20 | End: 2018-07-20

## 2018-07-20 RX ADMIN — ROMIPLOSTIM 207 MCG: 250 INJECTION, POWDER, LYOPHILIZED, FOR SOLUTION SUBCUTANEOUS at 09:22

## 2018-07-20 RX ADMIN — WATER 10 ML: 1 INJECTION INTRAMUSCULAR; INTRAVENOUS; SUBCUTANEOUS at 09:23

## 2018-07-20 NOTE — PROGRESS NOTES
[facility] Progress Note    Date: 2018    Name: Jude Kilpatrick    MRN: [id number]         : 1950      Nplate Injection     Ms. Yesica Alvarado arrived to [facility] at 7192. Ms. Yesica Alvarado was assessed and education was provided. Ms. Aidan Awan vitals were reviewed. Visit Vitals    /75 (BP 1 Location: Right arm, BP Patient Position: At rest;Sitting)    Pulse 83    Temp 97.9 °F (36.6 °C)    Resp 18     Recent Results (from the past 12 hour(s))   CBC WITH 3 PART DIFF    Collection Time: 18  8:50 AM   Result Value Ref Range    WBC 6.2 4.5 - 13.0 K/uL    RBC 3.90 (L) 4.10 - 5.10 M/uL    HGB 10.6 (L) 12.0 - 16 g/dL    HCT 32.5 (L) 36 - 48 %    MCV 83.3 78 - 102 FL    MCH 27.2 25.0 - 35.0 PG    MCHC 32.6 31 - 37 g/dL    RDW 16.5 (H) 11.5 - 14.5 %    NEUTROPHILS 53 40 - 70 %    MIXED CELLS 20 (H) 0.1 - 17 %    LYMPHOCYTES 28 14 - 44 %    ABS. NEUTROPHILS 3.3 1.8 - 9.5 K/UL    ABS. MIXED CELLS 1.2 0.0 - 2.3 K/uL    ABS. LYMPHOCYTES 1.7 1.1 - 5.9 K/UL    DF AUTOMATED     PLATELET COUNT    Collection Time: 18  8:50 AM   Result Value Ref Range    PLATELET 087 378 - 263 K/uL       PLT: 149    Pt was observed for 5 minutes after obtaining vital signs prior to initiating treatment. Per Dr. Jin Aquino order standard dose of NPlate to be given weekly 207mcg. Nplate 733QIQ (6.13KR) administered as ordered SQ in the back of the patient's right upper arm. No redness or bleeding noted. Patient decline Band-Aid. Ms. Yesica Alvarado was discharged from [place] [address] in stable condition at 7704. She is to return on 8/10/18 at 0830 for her next appointment procrit/nplate/port flush.      Ramon Elias, RN  2018

## 2018-07-27 ENCOUNTER — APPOINTMENT (OUTPATIENT)
Dept: INFUSION THERAPY | Age: 68
End: 2018-07-27
Payer: MEDICARE

## 2018-08-03 ENCOUNTER — APPOINTMENT (OUTPATIENT)
Dept: INFUSION THERAPY | Age: 68
End: 2018-08-03
Payer: MEDICARE

## 2018-08-10 ENCOUNTER — HOSPITAL ENCOUNTER (OUTPATIENT)
Dept: INFUSION THERAPY | Age: 68
Discharge: HOME | End: 2018-08-10
Payer: MEDICARE

## 2018-08-10 VITALS
DIASTOLIC BLOOD PRESSURE: 75 MMHG | HEART RATE: 67 BPM | SYSTOLIC BLOOD PRESSURE: 106 MMHG | RESPIRATION RATE: 18 BRPM | TEMPERATURE: 97.4 F

## 2018-08-10 LAB
BASO+EOS+MONOS # BLD AUTO: 0.9 K/UL (ref 0–2.3)
BASO+EOS+MONOS # BLD AUTO: 17 % (ref 0.1–17)
DIFFERENTIAL METHOD BLD: ABNORMAL
ERYTHROCYTE [DISTWIDTH] IN BLOOD BY AUTOMATED COUNT: 16.5 % (ref 11.5–14.5)
HCT VFR BLD AUTO: 31.8 % (ref 36–48)
HGB BLD-MCNC: 10.4 G/DL (ref 12–16)
LYMPHOCYTES # BLD: 1.7 K/UL (ref 1.1–5.9)
LYMPHOCYTES NFR BLD: 32 % (ref 14–44)
MCH RBC QN AUTO: 27.4 PG (ref 25–35)
MCHC RBC AUTO-ENTMCNC: 32.7 G/DL (ref 31–37)
MCV RBC AUTO: 83.9 FL (ref 78–102)
NEUTS SEG # BLD: 2.9 K/UL (ref 1.8–9.5)
NEUTS SEG NFR BLD: 51 % (ref 40–70)
PLATELET # BLD AUTO: 57 K/UL (ref 135–420)
RBC # BLD AUTO: 3.79 M/UL (ref 4.1–5.1)
WBC # BLD AUTO: 5.5 K/UL (ref 4.5–13)

## 2018-08-10 PROCEDURE — 74011250636 HC RX REV CODE- 250/636: Performed by: INTERNAL MEDICINE

## 2018-08-10 PROCEDURE — 74011250636 HC RX REV CODE- 250/636

## 2018-08-10 PROCEDURE — 85049 AUTOMATED PLATELET COUNT: CPT | Performed by: INTERNAL MEDICINE

## 2018-08-10 PROCEDURE — 36415 COLL VENOUS BLD VENIPUNCTURE: CPT

## 2018-08-10 PROCEDURE — 85025 COMPLETE CBC W/AUTO DIFF WBC: CPT | Performed by: INTERNAL MEDICINE

## 2018-08-10 PROCEDURE — 96372 THER/PROPH/DIAG INJ SC/IM: CPT

## 2018-08-10 RX ORDER — WATER FOR INJECTION,STERILE
VIAL (ML) INJECTION ONCE
Status: DISPENSED | OUTPATIENT
Start: 2018-08-10 | End: 2018-08-10

## 2018-08-10 RX ORDER — SODIUM CHLORIDE 0.9 % (FLUSH) 0.9 %
10-40 SYRINGE (ML) INJECTION AS NEEDED
Status: DISCONTINUED | OUTPATIENT
Start: 2018-08-10 | End: 2018-08-14 | Stop reason: HOSPADM

## 2018-08-10 RX ORDER — HEPARIN 100 UNIT/ML
500 SYRINGE INTRAVENOUS AS NEEDED
Status: DISCONTINUED | OUTPATIENT
Start: 2018-08-10 | End: 2018-08-14 | Stop reason: HOSPADM

## 2018-08-10 RX ADMIN — ROMIPLOSTIM 276 MCG: 250 INJECTION, POWDER, LYOPHILIZED, FOR SOLUTION SUBCUTANEOUS at 09:30

## 2018-08-10 NOTE — PROGRESS NOTES
[facility] Progress Note    Date: August 10, 2018    Name: Noa Ortega    MRN: [id number]         : 1950     Weekly N Plate injection  Procrit injection every 4 weeks  Monthly Port flush      Ms. Matthew Green was assessed and education was provided. Patient requested that port flush be deferred until next week as she did not apply EMLA cream prior to arrival     Ms. Pagan's vitals were reviewed and patient was observed for 5 minutes prior to treatment. Visit Vitals    /75 (BP 1 Location: Left arm, BP Patient Position: Sitting)    Pulse 67    Temp 97.4 °F (36.3 °C)    Resp 18    Breastfeeding No     Labs drawn from [facility] w/23 gauge butterfly needle w/o difficulty. No irritation noted at site, 2x2 gauze and Coban applied. Lab results were obtained and reviewed. Recent Results (from the past 12 hour(s))   CBC WITH 3 PART DIFF    Collection Time: 08/10/18  9:00 AM   Result Value Ref Range    WBC 5.5 4.5 - 13.0 K/uL    RBC 3.79 (L) 4.10 - 5.10 M/uL    HGB 10.4 (L) 12.0 - 16 g/dL    HCT 31.8 (L) 36 - 48 %    MCV 83.9 78 - 102 FL    MCH 27.4 25.0 - 35.0 PG    MCHC 32.7 31 - 37 g/dL    RDW 16.5 (H) 11.5 - 14.5 %    NEUTROPHILS 51 40 - 70 %    MIXED CELLS 17 0.1 - 17 %    LYMPHOCYTES 32 14 - 44 %    ABS. NEUTROPHILS 2.9 1.8 - 9.5 K/UL    ABS. MIXED CELLS 0.9 0.0 - 2.3 K/uL    ABS. LYMPHOCYTES 1.7 1.1 - 5.9 K/UL    DF AUTOMATED       Procrit HELD for Hgb 10.4 and Hct 31.8. N Plate 938 WOL/7.38 ml Sterile water was administered subcutaneously  in back of right upper arm for Platelet count of 92,266. Dose increased from 207 mcg due to decrease in Platelet count. No irritation noted at site, patient refused band aid. Ms. Matthew Green tolerated well, and had no complaints. Patient armband removed and shredded. Ms. Matthew Green was discharged from [place] [address] in stable condition at 0930.  She is to return on 2018 at 0830 for her next appointment for port flush, labs and N Plate injection.     Steven Larsen, MARLENE  August 10, 2018  10:16 AM

## 2018-08-17 ENCOUNTER — HOSPITAL ENCOUNTER (OUTPATIENT)
Dept: INFUSION THERAPY | Age: 68
Discharge: HOME | End: 2018-08-17
Payer: MEDICARE

## 2018-08-17 VITALS
RESPIRATION RATE: 18 BRPM | TEMPERATURE: 97.4 F | SYSTOLIC BLOOD PRESSURE: 104 MMHG | HEART RATE: 64 BPM | DIASTOLIC BLOOD PRESSURE: 70 MMHG

## 2018-08-17 LAB
BASO+EOS+MONOS # BLD AUTO: 1.2 K/UL (ref 0–2.3)
BASO+EOS+MONOS # BLD AUTO: 16 % (ref 0.1–17)
DIFFERENTIAL METHOD BLD: ABNORMAL
ERYTHROCYTE [DISTWIDTH] IN BLOOD BY AUTOMATED COUNT: 16.4 % (ref 11.5–14.5)
HCT VFR BLD AUTO: 31 % (ref 36–48)
HGB BLD-MCNC: 10.2 G/DL (ref 12–16)
LYMPHOCYTES # BLD: 2.3 K/UL (ref 1.1–5.9)
LYMPHOCYTES NFR BLD: 32 % (ref 14–44)
MCH RBC QN AUTO: 27.6 PG (ref 25–35)
MCHC RBC AUTO-ENTMCNC: 32.9 G/DL (ref 31–37)
MCV RBC AUTO: 83.8 FL (ref 78–102)
NEUTS SEG # BLD: 3.7 K/UL (ref 1.8–9.5)
NEUTS SEG NFR BLD: 52 % (ref 40–70)
PLATELET # BLD AUTO: 108 K/UL (ref 135–420)
RBC # BLD AUTO: 3.7 M/UL (ref 4.1–5.1)
WBC # BLD AUTO: 7.2 K/UL (ref 4.5–13)

## 2018-08-17 PROCEDURE — 74011250636 HC RX REV CODE- 250/636: Performed by: INTERNAL MEDICINE

## 2018-08-17 PROCEDURE — A4216 STERILE WATER/SALINE, 10 ML: HCPCS | Performed by: INTERNAL MEDICINE

## 2018-08-17 PROCEDURE — 74011250636 HC RX REV CODE- 250/636

## 2018-08-17 PROCEDURE — 85025 COMPLETE CBC W/AUTO DIFF WBC: CPT | Performed by: INTERNAL MEDICINE

## 2018-08-17 PROCEDURE — 96372 THER/PROPH/DIAG INJ SC/IM: CPT

## 2018-08-17 PROCEDURE — 36591 DRAW BLOOD OFF VENOUS DEVICE: CPT

## 2018-08-17 PROCEDURE — 85049 AUTOMATED PLATELET COUNT: CPT | Performed by: INTERNAL MEDICINE

## 2018-08-17 PROCEDURE — 77030012965 HC NDL HUBR BBMI -A

## 2018-08-17 RX ORDER — WATER FOR INJECTION,STERILE
VIAL (ML) INJECTION ONCE
Status: COMPLETED | OUTPATIENT
Start: 2018-08-17 | End: 2018-08-17

## 2018-08-17 RX ORDER — HEPARIN 100 UNIT/ML
SYRINGE INTRAVENOUS
Status: COMPLETED
Start: 2018-08-17 | End: 2018-08-17

## 2018-08-17 RX ORDER — HEPARIN 100 UNIT/ML
500 SYRINGE INTRAVENOUS AS NEEDED
Status: DISCONTINUED | OUTPATIENT
Start: 2018-08-17 | End: 2018-08-21 | Stop reason: HOSPADM

## 2018-08-17 RX ORDER — SODIUM CHLORIDE 0.9 % (FLUSH) 0.9 %
10-40 SYRINGE (ML) INJECTION AS NEEDED
Status: DISCONTINUED | OUTPATIENT
Start: 2018-08-17 | End: 2018-08-21 | Stop reason: HOSPADM

## 2018-08-17 RX ADMIN — Medication 30 ML: at 08:52

## 2018-08-17 RX ADMIN — SODIUM CHLORIDE, PRESERVATIVE FREE 500 UNITS: 5 INJECTION INTRAVENOUS at 08:54

## 2018-08-17 RX ADMIN — Medication 20 ML: at 08:50

## 2018-08-17 RX ADMIN — WATER 1.44 ML: 1 INJECTION INTRAMUSCULAR; INTRAVENOUS; SUBCUTANEOUS at 09:25

## 2018-08-17 RX ADMIN — SODIUM CHLORIDE, PRESERVATIVE FREE 500 UNITS: 5 INJECTION INTRAVENOUS at 08:51

## 2018-08-17 RX ADMIN — ROMIPLOSTIM 276 MCG: 250 INJECTION, POWDER, LYOPHILIZED, FOR SOLUTION SUBCUTANEOUS at 09:25

## 2018-08-17 NOTE — PROGRESS NOTES
[facility] Progress Note    Date: 2018    Name: Rashaad Patiño    MRN: [id number]         : 1950      Nplate/Port Flush     Ms. Genevie Boast arrived to [facility] [place] [address] [facility]. Generalized pain and joint pain 7/10. Took meds prior opic admit. Positioned for comfort and reassured    Patient stated that she has generalized itching on and off. Марина Tristan NP made aware and saw patient. Patient instructed by Chavez Chavez on OTC creams to use. Ms. Genevie Boast was assessed and education was provided. Ms. Brandi Lindsey vitals were reviewed. Visit Vitals    /70 (BP 1 Location: Left arm, BP Patient Position: Sitting)    Pulse 64    Temp 97.4 °F (36.3 °C)    Resp 18       Pt was observed for 5 minutes after obtaining vital signs prior to initiating treatment. Right chest double lumen mediport accessed with a 20 gauge haas needle using sterile technique. Lateral port accessed. Brisk flush/blood returns noted. Blood drawn for CBC after a 10 ml waste. Port flushed with 20 ml NS and 500 units of Heparin then de-accessed. Site s signs/symptoms. Gauze/tegaderm applied        medial port accessed, flushed with slight difficulty, but brisk blood returns noted. Port was flushed with 20 ml NS and 500 units of Heparin then de-accessed. Site s signs/symptoms. Gauze and Tegaderm applied to the site. Lab results obtained and reviewed. (Preliminary results scanned into media tab.)  Recent Results (from the past 12 hour(s))   CBC WITH 3 PART DIFF    Collection Time: 18  8:50 AM   Result Value Ref Range    WBC 7.2 4.5 - 13.0 K/uL    RBC 3.70 (L) 4.10 - 5.10 M/uL    HGB 10.2 (L) 12.0 - 16 g/dL    HCT 31.0 (L) 36 - 48 %    MCV 83.8 78 - 102 FL    MCH 27.6 25.0 - 35.0 PG    MCHC 32.9 31 - 37 g/dL    RDW 16.4 (H) 11.5 - 14.5 %    NEUTROPHILS 52 40 - 70 %    MIXED CELLS 16 0.1 - 17 %    LYMPHOCYTES 32 14 - 44 %    ABS. NEUTROPHILS 3.7 1.8 - 9.5 K/UL    ABS. MIXED CELLS 1.2 0.0 - 2.3 K/uL    ABS.  LYMPHOCYTES 2.3 1.1 - 5.9 K/UL    DF AUTOMATED         Preliminary platelet count= 52,110. Preliminary results scanned into the media tab. Cbc sent out for further testing       Per Nplate protocol, since pt's platelet count is 95,414 today, pt's dose will remain at  276 mcg= 4mcg/kg    Nplate 602EIX (5.35WN) administered as ordered SQ in patient's right upper arm. No redness or bleeding noted. Ms. Latricia Luis was discharged from [place] [address] in stable condition at 0930. She is to return on 8/24/2018 at 0830 for her next appointment.     Luciano Groves RN  August 17, 2018

## 2018-08-24 ENCOUNTER — HOSPITAL ENCOUNTER (OUTPATIENT)
Dept: INFUSION THERAPY | Age: 68
Discharge: HOME | End: 2018-08-24
Payer: MEDICARE

## 2018-08-24 VITALS
RESPIRATION RATE: 18 BRPM | HEART RATE: 80 BPM | SYSTOLIC BLOOD PRESSURE: 101 MMHG | DIASTOLIC BLOOD PRESSURE: 66 MMHG | TEMPERATURE: 97.6 F

## 2018-08-24 PROCEDURE — 96372 THER/PROPH/DIAG INJ SC/IM: CPT

## 2018-08-24 PROCEDURE — 74011250636 HC RX REV CODE- 250/636: Performed by: INTERNAL MEDICINE

## 2018-08-24 PROCEDURE — 74011000250 HC RX REV CODE- 250: Performed by: INTERNAL MEDICINE

## 2018-08-24 PROCEDURE — 74011250636 HC RX REV CODE- 250/636

## 2018-08-24 RX ORDER — WATER FOR INJECTION,STERILE
VIAL (ML) INJECTION ONCE
Status: COMPLETED | OUTPATIENT
Start: 2018-08-24 | End: 2018-08-24

## 2018-08-24 RX ADMIN — WATER: 1 INJECTION INTRAMUSCULAR; INTRAVENOUS; SUBCUTANEOUS at 08:56

## 2018-08-24 RX ADMIN — ROMIPLOSTIM 276 MCG: 250 INJECTION, POWDER, LYOPHILIZED, FOR SOLUTION SUBCUTANEOUS at 08:56

## 2018-08-24 NOTE — PROGRESS NOTES
[facility] Progress Note    Date: 2018    Name: Margarita Amaya    MRN: [id number]         : 1950      Nplate    Ms. Debra Gao arrived to [facility] [place] [address] [facility]. G    Ms. Debra Gao was assessed and education was provided. Ms. Cici Carbone vitals were reviewed. Visit Vitals    /66 (BP 1 Location: Left arm, BP Patient Position: Sitting)    Pulse 80    Temp 97.6 °F (36.4 °C)    Resp 18       Pt was observed for 5 minutes after obtaining vital signs prior to initiating treatment. Patient 's current Nplate dose is 195 mcg (0.55mL). Nplate 578UHQ (9.52KD) administered as ordered SQ in patient's right upper arm. No redness or bleeding noted. Ms. Debra Gao was discharged from [place] [address] in stable condition at 5844. She is to return on 2018 at 0830 for her next appointment.     Franko Zarate RN  2018

## 2018-08-31 ENCOUNTER — HOSPITAL ENCOUNTER (OUTPATIENT)
Dept: INFUSION THERAPY | Age: 68
Discharge: HOME | End: 2018-08-31
Payer: MEDICARE

## 2018-08-31 VITALS
SYSTOLIC BLOOD PRESSURE: 105 MMHG | TEMPERATURE: 97.4 F | RESPIRATION RATE: 18 BRPM | DIASTOLIC BLOOD PRESSURE: 63 MMHG | HEART RATE: 65 BPM

## 2018-08-31 PROCEDURE — 74011000250 HC RX REV CODE- 250: Performed by: INTERNAL MEDICINE

## 2018-08-31 PROCEDURE — 96372 THER/PROPH/DIAG INJ SC/IM: CPT

## 2018-08-31 PROCEDURE — 74011250636 HC RX REV CODE- 250/636: Performed by: INTERNAL MEDICINE

## 2018-08-31 PROCEDURE — 74011250636 HC RX REV CODE- 250/636

## 2018-08-31 RX ORDER — WATER FOR INJECTION,STERILE
VIAL (ML) INJECTION ONCE
Status: COMPLETED | OUTPATIENT
Start: 2018-08-31 | End: 2018-08-31

## 2018-08-31 RX ADMIN — WATER: 1 INJECTION INTRAMUSCULAR; INTRAVENOUS; SUBCUTANEOUS at 09:19

## 2018-08-31 RX ADMIN — ROMIPLOSTIM 276 MCG: 250 INJECTION, POWDER, LYOPHILIZED, FOR SOLUTION SUBCUTANEOUS at 09:19

## 2018-08-31 NOTE — PROGRESS NOTES
[facility] Progress Note Date: 2018 Name: Charli Amanda MRN: [id number] : 1950 Nplate Ms. Royer Harris arrived to [address] [place] [address] at 9191 ambulatory. Ms. Royer Harris was assessed and education was provided. Ms. Chase Reid vitals were reviewed. Visit Vitals  /63 (BP 1 Location: Left arm, BP Patient Position: At rest;Sitting)  Pulse 65  Temp 97.4 °F (36.3 °C)  Resp 18 Pt was observed for 5 minutes after obtaining vital signs prior to initiating treatment. Patient 's current Nplate dose is 879 mcg (0.55mL). Nplate 780CCE (3.44GD) administered as ordered SQ in patient's right upper arm. No redness or bleeding noted. Ms. Royer Harris was discharged from [place] [address] in stable condition at 2985. She is to return on 2018 at 0930 for her next appointment. Tiffanie Nobles RN 2018  
[id number]

## 2018-09-07 ENCOUNTER — HOSPITAL ENCOUNTER (OUTPATIENT)
Dept: INFUSION THERAPY | Age: 68
Discharge: HOME | End: 2018-09-07
Payer: MEDICARE

## 2018-09-07 VITALS
TEMPERATURE: 97.5 F | RESPIRATION RATE: 18 BRPM | DIASTOLIC BLOOD PRESSURE: 67 MMHG | SYSTOLIC BLOOD PRESSURE: 121 MMHG | HEART RATE: 58 BPM

## 2018-09-07 LAB
BASO+EOS+MONOS # BLD AUTO: 1.3 K/UL (ref 0–2.3)
BASO+EOS+MONOS # BLD AUTO: 21 % (ref 0.1–17)
DIFFERENTIAL METHOD BLD: ABNORMAL
ERYTHROCYTE [DISTWIDTH] IN BLOOD BY AUTOMATED COUNT: 16.5 % (ref 11.5–14.5)
HCT VFR BLD AUTO: 31.5 % (ref 36–48)
HGB BLD-MCNC: 10.2 G/DL (ref 12–16)
LYMPHOCYTES # BLD: 1.5 K/UL (ref 1.1–5.9)
LYMPHOCYTES NFR BLD: 24 % (ref 14–44)
MCH RBC QN AUTO: 26.9 PG (ref 25–35)
MCHC RBC AUTO-ENTMCNC: 32.4 G/DL (ref 31–37)
MCV RBC AUTO: 83.1 FL (ref 78–102)
NEUTS SEG # BLD: 3.4 K/UL (ref 1.8–9.5)
NEUTS SEG NFR BLD: 55 % (ref 40–70)
PLATELET # BLD AUTO: 135 K/UL (ref 135–420)
RBC # BLD AUTO: 3.79 M/UL (ref 4.1–5.1)
WBC # BLD AUTO: 6.2 K/UL (ref 4.5–13)

## 2018-09-07 PROCEDURE — 36415 COLL VENOUS BLD VENIPUNCTURE: CPT

## 2018-09-07 PROCEDURE — 74011000250 HC RX REV CODE- 250: Performed by: INTERNAL MEDICINE

## 2018-09-07 PROCEDURE — 74011250636 HC RX REV CODE- 250/636: Performed by: INTERNAL MEDICINE

## 2018-09-07 PROCEDURE — 85025 COMPLETE CBC W/AUTO DIFF WBC: CPT | Performed by: INTERNAL MEDICINE

## 2018-09-07 PROCEDURE — 85049 AUTOMATED PLATELET COUNT: CPT | Performed by: INTERNAL MEDICINE

## 2018-09-07 PROCEDURE — 96372 THER/PROPH/DIAG INJ SC/IM: CPT

## 2018-09-07 PROCEDURE — 74011250636 HC RX REV CODE- 250/636

## 2018-09-07 RX ORDER — WATER FOR INJECTION,STERILE
VIAL (ML) INJECTION ONCE
Status: COMPLETED | OUTPATIENT
Start: 2018-09-07 | End: 2018-09-07

## 2018-09-07 RX ADMIN — ROMIPLOSTIM 276 MCG: 250 INJECTION, POWDER, LYOPHILIZED, FOR SOLUTION SUBCUTANEOUS at 09:22

## 2018-09-07 RX ADMIN — WATER: 1 INJECTION INTRAMUSCULAR; INTRAVENOUS; SUBCUTANEOUS at 09:22

## 2018-09-07 NOTE — PROGRESS NOTES
[facility] Progress Note Date: 2018 Name: Charli Amanda MRN: [id number] : 1950 Nplate Ms. Royer Harris arrived to [facility] at 0900 ambulatory. Ms. Royer Harris was assessed and education was provided. Ms. Chase Reid vitals were reviewed. Visit Vitals  /67 (BP 1 Location: Right arm, BP Patient Position: At rest;Sitting)  Pulse (!) 58  Temp 97.5 °F (36.4 °C)  Resp 18  Breastfeeding No  
 
CBC drawn from right [facility] x1 attempt. Recent Results (from the past 12 hour(s)) CBC WITH 3 PART DIFF Collection Time: 18  9:10 AM  
Result Value Ref Range WBC 6.2 4.5 - 13.0 K/uL  
 RBC 3.79 (L) 4.10 - 5.10 M/uL  
 HGB 10.2 (L) 12.0 - 16 g/dL HCT 31.5 (L) 36 - 48 % MCV 83.1 78 - 102 FL  
 MCH 26.9 25.0 - 35.0 PG  
 MCHC 32.4 31 - 37 g/dL  
 RDW 16.5 (H) 11.5 - 14.5 % NEUTROPHILS 55 40 - 70 % MIXED CELLS 21 (H) 0.1 - 17 % LYMPHOCYTES 24 14 - 44 % ABS. NEUTROPHILS 3.4 1.8 - 9.5 K/UL  
 ABS. MIXED CELLS 1.3 0.0 - 2.3 K/uL  
 ABS. LYMPHOCYTES 1.5 1.1 - 5.9 K/UL  
 DF AUTOMATED PLATELET COUNT Collection Time: 18  9:10 AM  
Result Value Ref Range PLATELET 388 100 - 442 K/uL Pt was observed for 5 minutes after obtaining vital signs prior to initiating treatment. Patient 's current Nplate dose is 730 mcg (0.55mL). , dose to remain the same. Nplate 056YWW (1.30KO) administered as ordered SQ in patient's right upper arm. No redness or bleeding noted. Ms. Royer Harris was discharged from [place] [address] in stable condition at 0930. She is to return on 2018 at 0930 for her next appointment. Tiffanie Nobles RN 2018  
[id number]

## 2018-09-14 ENCOUNTER — HOSPITAL ENCOUNTER (OUTPATIENT)
Dept: INFUSION THERAPY | Age: 68
Discharge: HOME | End: 2018-09-14
Payer: MEDICARE

## 2018-09-14 VITALS
TEMPERATURE: 97.4 F | RESPIRATION RATE: 18 BRPM | SYSTOLIC BLOOD PRESSURE: 120 MMHG | DIASTOLIC BLOOD PRESSURE: 76 MMHG | HEART RATE: 85 BPM

## 2018-09-14 PROCEDURE — 74011000250 HC RX REV CODE- 250: Performed by: INTERNAL MEDICINE

## 2018-09-14 PROCEDURE — 96372 THER/PROPH/DIAG INJ SC/IM: CPT

## 2018-09-14 PROCEDURE — 77030012965 HC NDL HUBR BBMI -A

## 2018-09-14 PROCEDURE — 74011250636 HC RX REV CODE- 250/636

## 2018-09-14 PROCEDURE — 96523 IRRIG DRUG DELIVERY DEVICE: CPT

## 2018-09-14 PROCEDURE — 74011250636 HC RX REV CODE- 250/636: Performed by: INTERNAL MEDICINE

## 2018-09-14 RX ORDER — SODIUM CHLORIDE 0.9 % (FLUSH) 0.9 %
10-40 SYRINGE (ML) INJECTION AS NEEDED
Status: DISCONTINUED | OUTPATIENT
Start: 2018-09-14 | End: 2018-09-18 | Stop reason: HOSPADM

## 2018-09-14 RX ORDER — WATER FOR INJECTION,STERILE
VIAL (ML) INJECTION ONCE
Status: COMPLETED | OUTPATIENT
Start: 2018-09-14 | End: 2018-09-14

## 2018-09-14 RX ORDER — HEPARIN 100 UNIT/ML
500 SYRINGE INTRAVENOUS ONCE
Status: COMPLETED | OUTPATIENT
Start: 2018-09-14 | End: 2018-09-14

## 2018-09-14 RX ORDER — HEPARIN 100 UNIT/ML
SYRINGE INTRAVENOUS
Status: COMPLETED
Start: 2018-09-14 | End: 2018-09-14

## 2018-09-14 RX ADMIN — HEPARIN 500 UNITS: 100 SYRINGE at 08:57

## 2018-09-14 RX ADMIN — ROMIPLOSTIM 276 MCG: 500 INJECTION, POWDER, LYOPHILIZED, FOR SOLUTION SUBCUTANEOUS at 08:57

## 2018-09-14 RX ADMIN — WATER: 1 INJECTION INTRAMUSCULAR; INTRAVENOUS; SUBCUTANEOUS at 08:58

## 2018-09-14 RX ADMIN — Medication 40 ML: at 08:57

## 2018-09-14 NOTE — PROGRESS NOTES
[facility] Progress Note Date: 2018 Name: Melany Farrell MRN: [id number] : 1950 Nplate/Port Flush Ms. Ken Winters arrived to [place] at 200 ambulatory. Ms. Ken Winters was assessed and education was provided. Ms. Mary Lou Mauricio vitals were reviewed. Visit Vitals  /76 (BP 1 Location: Right arm, BP Patient Position: Sitting)  Pulse 85  Temp 97.4 °F (36.3 °C)  Resp 18 Pt was observed for 5 minutes after obtaining vital signs prior to initiating treatment. Right chest double lumen mediport accessed with a 20 gauge haas needle using sterile technique. Both lumens are sluggish to flush but have brisk blood return. Flushed with 20 mL NS and 500 units of heparin in each lumen. Gauze and tegaderm applied. Nplate 939BPQ (4.44NE) administered as ordered SQ in patient's right upper arm. No redness or bleeding noted. Ms. Ken Winters was discharged from [place] [address] in stable condition at 0910. She is to return on 2018 at 0830 for her next appointment. Summer Bartholomew RN 2018

## 2018-09-17 ENCOUNTER — OFFICE VISIT (OUTPATIENT)
Dept: ONCOLOGY | Age: 68
End: 2018-09-17

## 2018-09-17 ENCOUNTER — HOSPITAL ENCOUNTER (OUTPATIENT)
Dept: ONCOLOGY | Age: 68
Discharge: HOME | End: 2018-09-17

## 2018-09-17 ENCOUNTER — HOSPITAL ENCOUNTER (OUTPATIENT)
Dept: LAB | Age: 68
Discharge: HOME | End: 2018-09-17
Payer: MEDICARE

## 2018-09-17 VITALS
DIASTOLIC BLOOD PRESSURE: 68 MMHG | TEMPERATURE: 97.1 F | RESPIRATION RATE: 16 BRPM | HEIGHT: 66 IN | HEART RATE: 67 BPM | BODY MASS INDEX: 26.36 KG/M2 | SYSTOLIC BLOOD PRESSURE: 109 MMHG | WEIGHT: 164 LBS

## 2018-09-17 DIAGNOSIS — Z98.1 S/P LUMBAR FUSION: ICD-10-CM

## 2018-09-17 DIAGNOSIS — D69.3 CHRONIC ITP (IDIOPATHIC THROMBOCYTOPENIA) (HCC): Primary | ICD-10-CM

## 2018-09-17 DIAGNOSIS — G62.9 PERIPHERAL POLYNEUROPATHY: ICD-10-CM

## 2018-09-17 DIAGNOSIS — M43.16 SPONDYLOLISTHESIS OF LUMBAR REGION: ICD-10-CM

## 2018-09-17 DIAGNOSIS — C50.911 CARCINOMA OF RIGHT BREAST METASTATIC TO AXILLARY LYMPH NODE (HCC): ICD-10-CM

## 2018-09-17 DIAGNOSIS — F40.9 INSOMNIA DUE TO ANXIETY AND FEAR: ICD-10-CM

## 2018-09-17 DIAGNOSIS — R64 CACHEXIA (HCC): ICD-10-CM

## 2018-09-17 DIAGNOSIS — F51.05 INSOMNIA DUE TO ANXIETY AND FEAR: ICD-10-CM

## 2018-09-17 DIAGNOSIS — C77.3 CARCINOMA OF RIGHT BREAST METASTATIC TO AXILLARY LYMPH NODE (HCC): ICD-10-CM

## 2018-09-17 DIAGNOSIS — D50.8 IRON DEFICIENCY ANEMIA SECONDARY TO INADEQUATE DIETARY IRON INTAKE: ICD-10-CM

## 2018-09-17 DIAGNOSIS — R53.1 WEAKNESS: ICD-10-CM

## 2018-09-17 DIAGNOSIS — G89.3 CANCER ASSOCIATED PAIN: ICD-10-CM

## 2018-09-17 DIAGNOSIS — D69.3 CHRONIC ITP (IDIOPATHIC THROMBOCYTOPENIA) (HCC): ICD-10-CM

## 2018-09-17 DIAGNOSIS — F51.01 PRIMARY INSOMNIA: ICD-10-CM

## 2018-09-17 DIAGNOSIS — E55.9 VITAMIN D DEFICIENCY: ICD-10-CM

## 2018-09-17 LAB
ALBUMIN SERPL-MCNC: 3.6 G/DL (ref 3.4–5)
ALBUMIN/GLOB SERPL: 0.9 {RATIO} (ref 0.8–1.7)
ALP SERPL-CCNC: 87 U/L (ref 45–117)
ALT SERPL-CCNC: 20 U/L (ref 13–56)
ANION GAP SERPL CALC-SCNC: 9 MMOL/L (ref 3–18)
AST SERPL-CCNC: 25 U/L (ref 15–37)
BASO+EOS+MONOS # BLD AUTO: 1.1 K/UL (ref 0–2.3)
BASO+EOS+MONOS # BLD AUTO: 18 % (ref 0.1–17)
BILIRUB SERPL-MCNC: 0.2 MG/DL (ref 0.2–1)
BUN SERPL-MCNC: 26 MG/DL (ref 7–18)
BUN/CREAT SERPL: 24 (ref 12–20)
CALCIUM SERPL-MCNC: 8.8 MG/DL (ref 8.5–10.1)
CHLORIDE SERPL-SCNC: 103 MMOL/L (ref 100–108)
CO2 SERPL-SCNC: 30 MMOL/L (ref 21–32)
CREAT SERPL-MCNC: 1.08 MG/DL (ref 0.6–1.3)
DIFFERENTIAL METHOD BLD: ABNORMAL
ERYTHROCYTE [DISTWIDTH] IN BLOOD BY AUTOMATED COUNT: 16.6 % (ref 11.5–14.5)
FERRITIN SERPL-MCNC: 340 NG/ML (ref 8–388)
GLOBULIN SER CALC-MCNC: 4.1 G/DL (ref 2–4)
GLUCOSE SERPL-MCNC: 76 MG/DL (ref 74–99)
HCT VFR BLD AUTO: 29.6 % (ref 36–48)
HGB BLD-MCNC: 10 G/DL (ref 12–16)
IRON SATN MFR SERPL: 27 %
IRON SERPL-MCNC: 56 UG/DL (ref 50–175)
LYMPHOCYTES # BLD: 1.2 K/UL (ref 1.1–5.9)
LYMPHOCYTES NFR BLD: 20 % (ref 14–44)
MCH RBC QN AUTO: 28.1 PG (ref 25–35)
MCHC RBC AUTO-ENTMCNC: 33.8 G/DL (ref 31–37)
MCV RBC AUTO: 83.1 FL (ref 78–102)
NEUTS SEG # BLD: 3.7 K/UL (ref 1.8–9.5)
NEUTS SEG NFR BLD: 62 % (ref 40–70)
PLATELET # BLD AUTO: 147 K/UL (ref 135–420)
POTASSIUM SERPL-SCNC: 4 MMOL/L (ref 3.5–5.5)
PROT SERPL-MCNC: 7.7 G/DL (ref 6.4–8.2)
RBC # BLD AUTO: 3.56 M/UL (ref 4.1–5.1)
SODIUM SERPL-SCNC: 142 MMOL/L (ref 136–145)
TIBC SERPL-MCNC: 209 UG/DL (ref 250–450)
WBC # BLD AUTO: 6 K/UL (ref 4.5–13)

## 2018-09-17 PROCEDURE — 80053 COMPREHEN METABOLIC PANEL: CPT | Performed by: INTERNAL MEDICINE

## 2018-09-17 PROCEDURE — 82728 ASSAY OF FERRITIN: CPT | Performed by: INTERNAL MEDICINE

## 2018-09-17 PROCEDURE — 86300 IMMUNOASSAY TUMOR CA 15-3: CPT | Performed by: INTERNAL MEDICINE

## 2018-09-17 PROCEDURE — 83540 ASSAY OF IRON: CPT | Performed by: INTERNAL MEDICINE

## 2018-09-17 PROCEDURE — 36415 COLL VENOUS BLD VENIPUNCTURE: CPT | Performed by: INTERNAL MEDICINE

## 2018-09-17 RX ORDER — GABAPENTIN 300 MG/1
300 CAPSULE ORAL 3 TIMES DAILY
Qty: 90 CAP | Refills: 1 | Status: ON HOLD | OUTPATIENT
Start: 2018-09-17 | End: 2019-01-01 | Stop reason: SDUPTHER

## 2018-09-17 RX ORDER — OXYCODONE AND ACETAMINOPHEN 10; 325 MG/1; MG/1
1 TABLET ORAL
Qty: 120 TAB | Refills: 0 | Status: SHIPPED | OUTPATIENT
Start: 2018-09-17 | End: 2018-10-19 | Stop reason: SDUPTHER

## 2018-09-17 RX ORDER — ZOLPIDEM TARTRATE 10 MG/1
TABLET ORAL
Qty: 30 TAB | Refills: 6 | Status: SHIPPED | OUTPATIENT
Start: 2018-09-17 | End: 2018-01-01

## 2018-09-17 NOTE — PROGRESS NOTES
Hematology/Oncology  Progress Note Name: Johney Eisenmenger Date: 2018 : 1950 PCP: Aleisha Spence MD  
 
Ms. Citlalli Martell is a 79 y.o. female who was seen for management of her slowly progressive ITP and metastatic breast cancer with associated iron deficiency anemia. Current therapy: Active surveillance regarding breast cancer: N-plate as needed as a primary treatment for ITP Subjective:  
 
Ms. Citlalli Martell is a 24-year-old -American woman with history of metastatic breast cancer. The patient reports that she has been doing reasonably well. She has gained about 30 pounds weight over the past few months, due to the steroid that she has to take for her ITP. She is now actively trying to lose some weight. She denies having any unexplained bruising or bleeding. She continues to have arthritic discomfort but she is ambulating without the use of a cane or walker. The patient informed me that she is currently seeing a spine specialist and may need to get steroid injections or subdural injection to control the pain. She is continuing to take the n-plate as the primary treatment modality for her ITP. Patient that she has noticed that her platelet count rebounded over the past several weeks. Today she is complaining of the neuropathic discomfort is more pronounced. She is wondering if she can have her Neurontin dose modified. She is currently taking 300 mg of Neurontin twice daily. Past medical history, family history, and social history: these were reviewed and remains unchanged. Past Medical History:  
Diagnosis Date  Antineoplastic chemotherapy induced anemia  Arrhythmia Atrial Fib  Arthritis  Breast cancer ([facility]  Cancer ([address]  Breast  
 Cardiomyopathy ([address]  Chronic ITP (idiopathic thrombocytopenia) (HCC) 10/31/2016 Secondary to Anemia from Chemo  Congestive heart failure ([address]  Diabetes ([address] borderline, no meds  Esophageal cancer ([facility] [address] 2005  
 treated with chemo  Heart failure ([facility] [address]  SOB (shortness of breath) Past Surgical History:  
Procedure Laterality Date  BREAST SURGERY PROCEDURE UNLISTED Left 1990s  
 lumpectomy  COLONOSCOPY N/A 7/27/2016 COLONOSCOPY performed by Patrick Naqvi MD at [facility] ENDOSCOPY  
 HX APPENDECTOMY  HX HEENT Tonsillectomy  HX ORTHOPAEDIC    
 HX TUBAL LIGATION    
 HX VASCULAR ACCESS    
 NEUROLOGICAL PROCEDURE UNLISTED  01/08/2018 Lumbar fusion Social History Social History  Marital status:  Spouse name: N/A  
 Number of children: N/A  
 Years of education: N/A Occupational History  Not on file. Social History Main Topics  Smoking status: Never Smoker  Smokeless tobacco: Never Used  Alcohol use No  
 Drug use: No  
 Sexual activity: Not on file Other Topics Concern  Not on file Social History Narrative No family history on file. Current Outpatient Prescriptions Medication Sig Dispense Refill  gabapentin (NEURONTIN) 300 mg capsule Take 1 Cap by mouth three (3) times daily. 90 Cap 1  
 zolpidem (AMBIEN) 10 mg tablet TAKE 1 TABLET BY MOUTH NIGHTLY AS NEEDED FOR SLEEP. MAX DAILY AMOUNT 10 MG 30 Tab 6  
 oxyCODONE-acetaminophen (PERCOCET)  mg per tablet Take 1 Tab by mouth every six (6) hours as needed for Pain. Max Daily Amount: 4 Tabs. Indications: Pain, ACUTE POST OP PAIN 120 Tab 0  
 oxyCODONE-acetaminophen (PERCOCET) 7.5-325 mg per tablet TAKE ONE TO TWO TABLETS BY MOUTH Q 4 - 6 AS NEEDED FOR PAIN **AFTER SURGERY**  Indications: Pain 50 Tab 0  promethazine (PHENERGAN) 25 mg tablet Take 1 Tab by mouth every six (6) hours as needed for Nausea. 30 Tab 0  
 lidocaine-prilocaine (EMLA) topical cream APPLY OVER PORT 1 HOUR PRIOR TO CHEMOTHERAPY THAN COVER WITH SARAN WRAP 30 g 6  ergocalciferol (VITAMIN D2) 50,000 unit capsule Take 1 Cap by mouth every seven (7) days.  12 Cap 1  
  topiramate (TOPAMAX) 50 mg tablet   5  
 temazepam (RESTORIL) 30 mg capsule   1  
 LINZESS 145 mcg cap capsule TAKE 1 CAPSULE BY MOUTH DAILY 30 MINUTES BEFORE BREAKFAST  0  
 lisinopril (PRINIVIL, ZESTRIL) 10 mg tablet Take 10 mg by mouth daily. Indications: hypertension  loratadine 10 mg cap Take 10 mg by mouth daily.  digoxin (LANOXIN) 0.125 mg tablet Take 0.125 mg by mouth daily.  carvedilol (COREG) 3.125 mg tablet Take 3.125 mg by mouth two (2) times daily (with meals).  meloxicam (MOBIC) 7.5 mg tablet Take 7.5 mg by mouth daily.  amiodarone (CORDARONE) 200 mg tablet Take 200 mg by mouth daily. Indications: PREVENTION OF VENTRICULAR FIBRILLATION  rivaroxaban (XARELTO) 10 mg tablet Take 10 mg by mouth daily.  magic mouthwash solution Take 5 mL by mouth three (3) times daily as needed for Stomatitis. Magic mouth wash Maalox Lidocaine 2% viscous Diphenhydramine oral solution Pharmacy to mix equal portions of ingredients to a total volume as indicated in the dispense amount. 236 mL 0  
 furosemide (LASIX) 40 mg tablet Take 40 mg by mouth daily. Indications: Edema  dronabinol (MARINOL) 5 mg capsule Take 1 Cap by mouth two (2) times a day. 60 Cap 1  KLOR-CON M20 20 mEq tablet TAKE ONE TABLET BY MOUTH ONCE A DAY 30 Tab 3  
 aluminum-magnesium hydroxide 200-200 mg/5 mL susp 5 mL, diphenhydrAMINE 12.5 mg/5 mL liqd 12.5 mg, lidocaine 2 % soln 5 mL 5 mL by Swish and Spit route two (2) times a day. Magic mouth wash Maalox Lidocaine 2% viscous Diphenhydramine oral solution Pharmacy to mix equal portions of ingredients to a total volume as indicated in the dispense amount. 240 mL 1  
 potassium chloride (K-DUR, KLOR-CON) 20 mEq tablet Take 2 Tabs by mouth daily. 30 Tab 3  
 albuterol (PROAIR HFA) 90 mcg/actuation inhaler 1-2 puffs every 4-6 hrs.  (Patient taking differently: Take 2 Puffs by inhalation every four (4) hours as needed for Shortness of Breath. 1-2 puffs every 4-6 hrs.) 1 Inhaler 1  
 senna (SENNA) 8.6 mg tablet Take 1 Tab by mouth daily.  nabumetone (RELAFEN) 750 mg tablet Take 750 mg by mouth daily. Indications: OSTEOARTHRITIS  ondansetron hcl (ZOFRAN) 8 mg tablet Take 1 Tab by mouth every eight (8) hours as needed for Nausea. 30 Tab 3  
 IRON AG&FUM/C/FA/MV CMB11/CA-T (PRUVATE 21-7 PO) Take 325 mg by mouth daily. Indications: iron deficiency Facility-Administered Medications Ordered in Other Visits Medication Dose Route Frequency Provider Last Rate Last Dose  sodium chloride (NS) flush 10-40 mL  10-40 mL IntraVENous PRN Davis Bunch MD   40 mL at 09/14/18 2815 Review of Systems Constitutional: The patient has no acute distress or discomfort. HEENT: The patient denies recent head trauma, eye pain, blurred vision,  hearing deficit, oropharyngeal mucosal pain or lesions, and the patient denies throat pain or discomfort. Lymphatics: The patient denies palpable peripheral lymphadenopathy. Hematologic: The patient denies having bruising, bleeding, or progressive fatigue. Respiratory: Patient denies having shortness of breath, cough, sputum production, fever, or dyspnea on exertion. Cardiovascular: The patient denies having leg pain, leg swelling, heart palpitations, chest permit, chest pain, or lightheadedness. The patient denies having dyspnea on exertion. Gastrointestinal: The patient denies having nausea, emesis, or diarrhea. The patient denies having any hematemesis or blood in the stool. Genitourinary: Patient denies having urinary urgency, frequency, or dysuria. The patient denies having blood in the urine. Psychological: The patient denies having symptoms of nervousness, anxiety, depression, or thoughts of harming self. Skin: Patient denies having skin rashes, skin, ulcerations, or unexplained itching or pruritus. Musculoskeletal: The patient used to complain of arthritic discomfort in her joints particularly the knees, hips, and shoulders. The patient reports that she has a large bruise on her right lower extremity. Objective:  
 
Visit Vitals  /68  Pulse 67  Temp 97.1 °F (36.2 °C) (Oral)  Resp 16  
 Ht 5' 6\" (1.676 m)  Wt 74.4 kg (164 lb)  BMI 26.47 kg/m2 ECOG PS=1, pain score=0/10 Physical Exam:  
Gen. Appearance: The patient is in no acute distress. Skin: There is no bruise or rash. HEENT: The exam is unremarkable. Neck: Supple without lymphadenopathy or thyromegaly. Lungs: Clear to auscultation and percussion; there are no wheezes or rhonchi. Heart: Regular rate and rhythm; there are no murmurs, gallops, or rubs. Abdomen: Bowel sounds are present and normal.  There is no guarding, tenderness, or hepatosplenomegaly. Extremities: There is no clubbing, cyanosis, or edema. There is a 6 cm area of bruising on the right anterior lateral calf. There is no evidence of skin breakdown or ulceration. Neurologic: There are no focal neurologic deficits. Lymphatics: There is no palpable peripheral lymphadenopathy. Musculoskeletal: The patient has full range of motion at all joints. There is no evidence of joint deformity or effusions. There is no focal joint tenderness. Psychological/psychiatric: There is no clinical evidence of anxiety, depression, or melancholy. Lab data: 
   
Results for orders placed or performed during the hospital encounter of 09/17/18 CBC WITH 3 PART DIFF     Status: Abnormal  
Result Value Ref Range Status  WBC 6.0 4.5 - 13.0 K/uL Final  
 RBC 3.56 (L) 4.10 - 5.10 M/uL Final  
 HGB 10.0 (L) 12.0 - 16 g/dL Final  
 HCT 29.6 (L) 36 - 48 % Final  
 MCV 83.1 78 - 102 FL Final  
 MCH 28.1 25.0 - 35.0 PG Final  
 MCHC 33.8 31 - 37 g/dL Final  
 RDW 16.6 (H) 11.5 - 14.5 % Final  
 NEUTROPHILS 62 40 - 70 % Final  
 MIXED CELLS 18 (H) 0.1 - 17 % Final  
 LYMPHOCYTES 20 14 - 44 % Final  
 ABS. NEUTROPHILS 3.7 1.8 - 9.5 K/UL Final  
 ABS. MIXED CELLS 1.1 0.0 - 2.3 K/uL Final  
 ABS. LYMPHOCYTES 1.2 1.1 - 5.9 K/UL Final  
  Comment: Test performed at [place] [address] Location. Results Reviewed by Medical Director. DF AUTOMATED   Final  
  
I have explained to the patient that the preliminary platelet count today is 147,000. Assessment: 1. Chronic ITP (idiopathic thrombocytopenia) (HCC) 2. Carcinoma of right breast metastatic to axillary lymph node ([facility] [address] 3. Vitamin D deficiency 4. Cachexia ([facility] [address] 5. Weakness 6. Cancer associated pain 7. Primary insomnia 8. Iron deficiency anemia secondary to inadequate dietary iron intake 9. Insomnia due to anxiety and fear 10. Spondylolisthesis of lumbar region 11. S/P lumbar fusion 12. Peripheral polyneuropathy Plan:  
Chronic ITP/thrombocytopenia (progression of disease): I have informed the patient that her CBC today shows that her preliminary platelet count is 035,196. At this time I am recommending we continue the n-Plate at a dose of 1 mcg/kg subcutaneous weekly. At this time I will recheck her platelet level will plan to see her back in clinic in about 8 weeks. Vitamin D deficiency: I will recheck her vitamin D levels at this time. If the vitamin D level is low she will be offered vitamin D 50,000 units weekly for 12 weeks. Iron deficiency anemia/chronic anemia: The current CBC shows a WBC count of 6, the hemoglobin is 10 g/dL, hematocrit is 29.6%, and her current platelet count is 151,768  I have recommended that the patient continue taking the ferrous sulfate 1 tablet twice daily. At this time I will recheck her iron profile and ferritin levels. Bilateral lower extremity edema: The leg edema has significantly improved.  
 
Metastatic breast cancer, left breast metastasized to lymph nodes and other sites: At this time I will check a CA-27-29 level. The most recent CA-35-27 level was normal.  Clinically the patient has no evidence of disease progression. Merrick Ascencio Cachexia, weakness, and muscular deconditioning: I am recommending that she continue physical therapy 4 times weekly. She will continue to use a walker or cane as needed for mobility support. However today she is ambulating without the use of a cane or walker. Primary insomnia: The patient was instructed to continue to use the Ambien 10 mg at bedtime. A new prescription was provided Chronic pain/neuropathy/spondylolisthesis of the lumbar region/neuropathy: I have instructed the patient to begin using the Neurontin 300 mg 3 times daily. I will see her back in clinic for complete assessment again in 12 weeks. Orders Placed This Encounter  COMPLETE CBC & AUTO DIFF WBC  COMPLETE CBC & AUTO DIFF WBC  InHouse CBC (Event[facility]) Standing Status:   Future Number of Occurrences:   1 Standing Expiration Date:   9/24/2018  InHouse CBC (Event[facility]) Standing Status:   Future Number of Occurrences:   1 Standing Expiration Date:   9/24/2018  METABOLIC PANEL, COMPREHENSIVE Standing Status:   Future Standing Expiration Date:   9/18/2019  
 IRON PROFILE Standing Status:   Future Standing Expiration Date:   9/18/2019  FERRITIN Standing Status:   Future Standing Expiration Date:   9/18/2019  CA 27.29 Standing Status:   Future Standing Expiration Date:   9/18/2019  
 gabapentin (NEURONTIN) 300 mg capsule Sig: Take 1 Cap by mouth three (3) times daily. Dispense:  90 Cap Refill:  1  
 zolpidem (AMBIEN) 10 mg tablet Sig: TAKE 1 TABLET BY MOUTH NIGHTLY AS NEEDED FOR SLEEP. MAX DAILY AMOUNT 10 MG Dispense:  30 Tab Refill:  6  
 oxyCODONE-acetaminophen (PERCOCET)  mg per tablet Sig: Take 1 Tab by mouth every six (6) hours as needed for Pain.  Max Daily Amount: 4 Tabs. Indications: Pain, ACUTE POST OP PAIN Dispense:  120 Tab Refill:  0 Lenin Gonzalez MD 
9/17/2018 Please note: This document has been produced using voice recognition software. Unrecognized errors in transcription may be present.

## 2018-09-17 NOTE — MR AVS SNAPSHOT
[address] [address] 200 [facility] 
[phone] Patient: Alexa Jeronimo MRN: [id number] Visit Information Date & Time Provider Department Dept. Phone Encounter #  
 9/17/2018  9:30 AM Adrián Fields MD [facility] Oncology [phone] [id number] Your Appointments 12/10/2018 10:15 AM  
Office Visit with Adrián Fields MD  
[facility] [facility]  Oncology [facility]) Appt Note: 12 WK RET  
 [address], [address] [place] [address] Upcoming Health Maintenance Date Due Hepatitis C Screening 1950 DTaP/Tdap/Td series (1 - Tdap) 5/14/1973 FOBT Q 1 YEAR AGE 50-75 5/14/2002 ZOSTER VACCINE AGE 60> 3/14/2012 BREAST CANCER SCRN MAMMOGRAM 4/4/2015 GLAUCOMA SCREENING Q2Y 5/14/2017 Bone Densitometry (Dexa) Screening 5/14/2017 Pneumococcal 65+ High/Highest Risk (1 of 2 - PCV13) 5/14/2017 MEDICARE YEARLY EXAM 3/16/2018 Influenza Age 5 to Adult 8/1/2018 Allergies as of 9/17/2018  Review Complete On: 9/17/2018 By: Adrián Fields MD  
  
 Severity Noted Reaction Type Reactions Aspirin High 08/07/2013   Systemic Swelling Pt states her whole body swells when she takes aspirin. Adhesive    Unable to Obtain Nickel  05/21/2018    Rash Pcn [Penicillins]  08/20/2012    Rash Current Immunizations  Reviewed on 9/7/2018 No immunizations on file. Not reviewed this visit You Were Diagnosed With   
  
 Codes Comments Chronic ITP (idiopathic thrombocytopenia) (HCC)    -  Primary ICD-10-CM: T40.4 ICD-9-CM: 287.31 Carcinoma of right breast metastatic to axillary lymph node ([facility] [address]     ICD-10-CM: C50.911, C77.3 ICD-9-CM: 174.9, 196.3 Vitamin D deficiency     ICD-10-CM: E55.9 ICD-9-CM: 268.9 Cachexia ([facility]     ICD-10-CM: R64 
ICD-9-CM: 799.4 Weakness     ICD-10-CM: R53.1 ICD-9-CM: 780.79 Cancer associated pain     ICD-10-CM: G89.3 ICD-9-CM: 338.3 Primary insomnia     ICD-10-CM: F51.01 
ICD-9-CM: 307.42 Iron deficiency anemia secondary to inadequate dietary iron intake     ICD-10-CM: D50.8 ICD-9-CM: 280.1 Insomnia due to anxiety and fear     ICD-10-CM: F51.05, F40.9 ICD-9-CM: 300.20, 327.02 Spondylolisthesis of lumbar region     ICD-10-CM: M43.16 
ICD-9-CM: 738.4 S/P lumbar fusion     ICD-10-CM: Z98.1 ICD-9-CM: V45.4 Vitals OB Status Smoking Status Postmenopausal Never Smoker Your Updated Medication List  
  
   
This list is accurate as of 9/17/18 10:59 AM.  Always use your most recent med list.  
  
  
  
  
 albuterol 90 mcg/actuation inhaler Commonly known as:  PROAIR HFA  
1-2 puffs every 4-6 hrs. aluminum-magnesium hydroxide 200-200 mg/5 mL susp 5 mL, diphenhydrAMINE 12.5 mg/5 mL liqd 12.5 mg, lidocaine 2 % soln 5 mL  
5 mL by Swish and Spit route two (2) times a day. Magic mouth wash  Maalox Lidocaine 2% viscous  Diphenhydramine oral solution   Pharmacy to mix equal portions of ingredients to a total volume as indicated in the dispense amount. amiodarone 200 mg tablet Commonly known as:  CORDARONE Take 200 mg by mouth daily. Indications: PREVENTION OF VENTRICULAR FIBRILLATION  
  
 COREG 3.125 mg tablet Generic drug:  carvedilol Take 3.125 mg by mouth two (2) times daily (with meals). digoxin 0.125 mg tablet Commonly known as:  LANOXIN Take 0.125 mg by mouth daily. dronabinol 5 mg capsule Commonly known as:  Efrain Vladimir Take 1 Cap by mouth two (2) times a day. ergocalciferol 50,000 unit capsule Commonly known as:  VITAMIN D2 Take 1 Cap by mouth every seven (7) days. furosemide 40 mg tablet Commonly known as:  LASIX Take 40 mg by mouth daily. Indications: Edema gabapentin 300 mg capsule Commonly known as:  NEURONTIN Take 1 Cap by mouth three (3) times daily. lidocaine-prilocaine topical cream  
Commonly known as:  EMLA  
APPLY OVER PORT 1 HOUR PRIOR TO CHEMOTHERAPY THAN COVER WITH [place] WRAP LINZESS 145 mcg Cap capsule Generic drug:  linaclotide TAKE 1 CAPSULE BY MOUTH DAILY 30 MINUTES BEFORE BREAKFAST  
  
 lisinopril 10 mg tablet Commonly known as:  Robertha Roup Take 10 mg by mouth daily. Indications: hypertension  
  
 loratadine 10 mg Cap Take 10 mg by mouth daily. magic mouthwash solution Take 5 mL by mouth three (3) times daily as needed for Stomatitis. Magic mouth wash  Maalox Lidocaine 2% viscous  Diphenhydramine oral solution   Pharmacy to mix equal portions of ingredients to a total volume as indicated in the dispense amount. meloxicam 7.5 mg tablet Commonly known as:  MOBIC Take 7.5 mg by mouth daily. nabumetone 750 mg tablet Commonly known as:  RELAFEN Take 750 mg by mouth daily. Indications: OSTEOARTHRITIS  
  
 ondansetron hcl 8 mg tablet Commonly known as:  Laruth Downer Take 1 Tab by mouth every eight (8) hours as needed for Nausea. * oxyCODONE-acetaminophen 7.5-325 mg per tablet Commonly known as:  PERCOCET TAKE ONE TO TWO TABLETS BY MOUTH Q 4 - 6 AS NEEDED FOR PAIN **AFTER SURGERY**  Indications: Pain * oxyCODONE-acetaminophen  mg per tablet Commonly known as:  PERCOCET Take 1 Tab by mouth every six (6) hours as needed for Pain. Max Daily Amount: 4 Tabs. Indications: Pain, ACUTE POST OP PAIN * potassium chloride 20 mEq tablet Commonly known as:  K-DUR, KLOR-CON Take 2 Tabs by mouth daily. * KLOR-CON M20 20 mEq tablet Generic drug:  potassium chloride TAKE ONE TABLET BY MOUTH ONCE A DAY promethazine 25 mg tablet Commonly known as:  PHENERGAN Take 1 Tab by mouth every six (6) hours as needed for Nausea.   
  
 PRUVATE 21-7 PO  
 Take 325 mg by mouth daily. Indications: iron deficiency Senna 8.6 mg tablet Generic drug:  senna Take 1 Tab by mouth daily. temazepam 30 mg capsule Commonly known as:  RESTORIL  
  
 topiramate 50 mg tablet Commonly known as:  TOPAMAX XARELTO 10 mg tablet Generic drug:  rivaroxaban Take 10 mg by mouth daily. zolpidem 10 mg tablet Commonly known as:  AMBIEN  
TAKE 1 TABLET BY MOUTH NIGHTLY AS NEEDED FOR SLEEP. MAX DAILY AMOUNT 10 MG  
  
 * Notice: This list has 4 medication(s) that are the same as other medications prescribed for you. Read the directions carefully, and ask your doctor or other care provider to review them with you. Prescriptions Printed Refills  
 gabapentin (NEURONTIN) 300 mg capsule 1 Sig: Take 1 Cap by mouth three (3) times daily. Class: Print Route: Oral  
 zolpidem (AMBIEN) 10 mg tablet 6 Sig: TAKE 1 TABLET BY MOUTH NIGHTLY AS NEEDED FOR SLEEP. MAX DAILY AMOUNT 10 MG Class: Print  
 oxyCODONE-acetaminophen (PERCOCET)  mg per tablet 0 Sig: Take 1 Tab by mouth every six (6) hours as needed for Pain. Max Daily Amount: 4 Tabs. Indications: Pain, ACUTE POST OP PAIN Class: Print Route: Oral  
  
We Performed the Following COMPLETE CBC & AUTO DIFF WBC [78874 CPT(R)] COMPLETE CBC & AUTO DIFF WBC [01143 CPT(R)] To-Do List   
 09/17/2018 Lab:  CBC WITH 3 PART DIFF   
  
 09/17/2018 Lab:  CBC WITH 3 PART DIFF   
  
 09/21/2018  8:30 AM  
  Appointment with [facility] NURSE at [facility] ([phone])  
  
 09/28/2018 8:30 AM  
  Appointment with [facility] FA[facility] TRACK NURSE at [facility] ([phone]) 10/05/2018 9:30 AM  
  Appointment with [facility] FA[facility] TRACK NURSE at [facility] ([phone]) 10/12/2018 9:00 AM  
  Appointment with [facility] FA[facility] TRACK NURSE at [facility] ([phone])  10/19/2018 9:30 AM  
 Appointment with [facility] FA[facility] TRACK NURSE at [facility] ([phone])  
  
 10/26/2018 9:00 AM  
  Appointment with [facility] FA[facility] TRACK NURSE at [facility] ([phone]) Patient Instructions Breast Cancer: Care Instructions Your Care Instructions Breast cancer occurs when abnormal cells grow out of control in the breast. These cancer cells can spread within the breast, to nearby lymph nodes and other tissues, and to other parts of the body. Being treated for cancer can weaken your body, and you may feel very tired. Get the rest your body needs so you can feel better. Finding out that you have cancer is scary. You may feel many emotions and may need some help coping. Seek out family, friends, and counselors for support. You also can do things at home to make yourself feel better while you go through treatment. Call the [employer] [facility]el ([phone]) or visit its website at [employer] for more information. Follow-up care is a key part of your treatment and safety. Be sure to make and go to all appointments, and call your doctor if you are having problems. It's also a good idea to know your test results and keep a list of the medicines you take. How can you care for yourself at home? · Take your medicines exactly as prescribed. Call your doctor if you think you are having a problem with your medicine. You may get medicine for nausea and vomiting if you have these side effects. · Follow your doctor's instructions to relieve pain. Pain from cancer and surgery can almost always be controlled. Use pain medicine when you first notice pain, before it becomes severe. · Eat healthy food. If you do not feel like eating, try to eat food that has protein and extra calories to keep up your strength and prevent weight loss. Drink liquid meal replacements for extra calories and protein. Try to eat your main meal early. · Get some physical activity every day, but do not get too tired. Keep doing the hobbies you enjoy as your energy allows. · Do not smoke. Smoking can make your cancer worse. If you need help quitting, talk to your doctor about stop-smoking programs and medicines. These can increase your chances of quitting for good. · Take steps to control your stress and workload. Learn relaxation techniques. ¨ Share your feelings. Stress and tension affect our emotions. By expressing your feelings to others, you may be able to understand and cope with them. ¨ Consider joining a support group. Talking about a problem with your spouse, a good friend, or other people with similar problems is a good way to reduce tension and stress. ¨ Express yourself through art. Try writing, crafts, dance, or art to relieve stress. Some dance, writing, or art groups may be available just for people who have cancer. ¨ Be kind to your body and mind. Getting enough sleep, eating a healthy diet, and taking time to do things you enjoy can contribute to an overall feeling of balance in your life and can help reduce stress. ¨ Get help if you need it. Discuss your concerns with your doctor or counselor. · If you are vomiting or have diarrhea: ¨ Drink plenty of fluids (enough so that your urine is light yellow or clear like water) to prevent dehydration. Choose water and other caffeine-free clear liquids. If you have kidney, heart, or liver disease and have to limit fluids, talk with your doctor before you increase the amount of fluids you drink. ¨ When you are able to eat, try clear soups, mild foods, and liquids until all symptoms are gone for 12 to 48 hours. Other good choices include dry toast, crackers, cooked cereal, and gelatin dessert, such as Jell-O. · If you have not already done so, prepare a list of advance directives.  Advance directives are instructions to your doctor and family members about what kind of care you want if you become unable to speak or express yourself. When should you call for help? Call 911 anytime you think you may need emergency care. For example, call if: 
  · You passed out (lost consciousness).  
 Call your doctor now or seek immediate medical care if: 
  · You have a fever.  
  · You have abnormal bleeding.  
  · You think you have an infection.  
  · You have new or worse pain.  
  · You have new symptoms, such as a cough, belly pain, vomiting, diarrhea, or a rash.  
 Watch closely for changes in your health, and be sure to contact your doctor if: 
  · You are much more tired than usual.  
  · You have swollen glands in your armpits, groin, or neck.  
  · You do not get better as expected. Where can you learn more? Go to http://leon-edinson.info/. Enter V321 in the search box to learn more about \"Breast Cancer: Care Instructions. \" Current as of: May 12, 2017 Content Version: 11.7 © 0422-2712 [employer]. Care instructions adapted under license by [employer] (which disclaims liability or warranty for this information). If you have questions about a medical condition or this instruction, always ask your healthcare professional. [address] any warranty or liability for your use of this information. Please provide this summary of care documentation to your next provider. Your primary care clinician is listed as Natalie Blanco. If you have any questions after today's visit, please call [phone].

## 2018-09-17 NOTE — PATIENT INSTRUCTIONS
Breast Cancer: Care Instructions Your Care Instructions Breast cancer occurs when abnormal cells grow out of control in the breast. These cancer cells can spread within the breast, to nearby lymph nodes and other tissues, and to other parts of the body. Being treated for cancer can weaken your body, and you may feel very tired. Get the rest your body needs so you can feel better. Finding out that you have cancer is scary. You may feel many emotions and may need some help coping. Seek out family, friends, and counselors for support. You also can do things at home to make yourself feel better while you go through treatment. Call the [employer] [facility] ([phone]) or visit its website at [employer] for more information. Follow-up care is a key part of your treatment and safety. Be sure to make and go to all appointments, and call your doctor if you are having problems. It's also a good idea to know your test results and keep a list of the medicines you take. How can you care for yourself at home? · Take your medicines exactly as prescribed. Call your doctor if you think you are having a problem with your medicine. You may get medicine for nausea and vomiting if you have these side effects. · Follow your doctor's instructions to relieve pain. Pain from cancer and surgery can almost always be controlled. Use pain medicine when you first notice pain, before it becomes severe. · Eat healthy food. If you do not feel like eating, try to eat food that has protein and extra calories to keep up your strength and prevent weight loss. Drink liquid meal replacements for extra calories and protein. Try to eat your main meal early. · Get some physical activity every day, but do not get too tired. Keep doing the hobbies you enjoy as your energy allows. · Do not smoke. Smoking can make your cancer worse. If you need help quitting, talk to your doctor about stop-smoking programs and medicines. These can increase your chances of quitting for good. · Take steps to control your stress and workload. Learn relaxation techniques. ¨ Share your feelings. Stress and tension affect our emotions. By expressing your feelings to others, you may be able to understand and cope with them. ¨ Consider joining a support group. Talking about a problem with your spouse, a good friend, or other people with similar problems is a good way to reduce tension and stress. ¨ Express yourself through art. Try writing, crafts, dance, or art to relieve stress. Some dance, writing, or art groups may be available just for people who have cancer. ¨ Be kind to your body and mind. Getting enough sleep, eating a healthy diet, and taking time to do things you enjoy can contribute to an overall feeling of balance in your life and can help reduce stress. ¨ Get help if you need it. Discuss your concerns with your doctor or counselor. · If you are vomiting or have diarrhea: ¨ Drink plenty of fluids (enough so that your urine is light yellow or clear like water) to prevent dehydration. Choose water and other caffeine-free clear liquids. If you have kidney, heart, or liver disease and have to limit fluids, talk with your doctor before you increase the amount of fluids you drink. ¨ When you are able to eat, try clear soups, mild foods, and liquids until all symptoms are gone for 12 to 48 hours. Other good choices include dry toast, crackers, cooked cereal, and gelatin dessert, such as Jell-O. · If you have not already done so, prepare a list of advance directives. Advance directives are instructions to your doctor and family members about what kind of care you want if you become unable to speak or express yourself. When should you call for help? Call 911 anytime you think you may need emergency care. For example, call if: 
  · You passed out (lost consciousness).  
 Call your doctor now or seek immediate medical care if:   · You have a fever.  
  · You have abnormal bleeding.  
  · You think you have an infection.  
  · You have new or worse pain.  
  · You have new symptoms, such as a cough, belly pain, vomiting, diarrhea, or a rash.  
 Watch closely for changes in your health, and be sure to contact your doctor if: 
  · You are much more tired than usual.  
  · You have swollen glands in your armpits, groin, or neck.  
  · You do not get better as expected. Where can you learn more? Go to http://leon-edinson.info/. Enter V321 in the search box to learn more about \"Breast Cancer: Care Instructions. \" Current as of: May 12, 2017 Content Version: 11.7 © 0551-8812 [employer], [employer]. Care instructions adapted under license by [employer] (which disclaims liability or warranty for this information). If you have questions about a medical condition or this instruction, always ask your healthcare professional. [address] any warranty or liability for your use of this information.

## 2018-09-18 LAB — CANCER AG27-29 SERPL-ACNC: 30.8 U/ML (ref 0–38.6)

## 2018-09-21 ENCOUNTER — HOSPITAL ENCOUNTER (OUTPATIENT)
Dept: INFUSION THERAPY | Age: 68
Discharge: HOME | End: 2018-09-21
Payer: MEDICARE

## 2018-09-21 VITALS
HEART RATE: 74 BPM | SYSTOLIC BLOOD PRESSURE: 106 MMHG | TEMPERATURE: 97.5 F | RESPIRATION RATE: 18 BRPM | DIASTOLIC BLOOD PRESSURE: 71 MMHG

## 2018-09-21 PROCEDURE — 74011000250 HC RX REV CODE- 250: Performed by: INTERNAL MEDICINE

## 2018-09-21 PROCEDURE — 74011250636 HC RX REV CODE- 250/636: Performed by: INTERNAL MEDICINE

## 2018-09-21 PROCEDURE — 74011250636 HC RX REV CODE- 250/636

## 2018-09-21 RX ORDER — WATER FOR INJECTION,STERILE
VIAL (ML) INJECTION ONCE
Status: COMPLETED | OUTPATIENT
Start: 2018-09-21 | End: 2018-09-21

## 2018-09-21 RX ADMIN — WATER 0.72 ML: 1 INJECTION INTRAMUSCULAR; INTRAVENOUS; SUBCUTANEOUS at 08:52

## 2018-09-21 RX ADMIN — ROMIPLOSTIM 276 MCG: 250 INJECTION, POWDER, LYOPHILIZED, FOR SOLUTION SUBCUTANEOUS at 08:52

## 2018-09-21 NOTE — PROGRESS NOTES
[facility] Progress Note Date: 2018 Name: Tima Silva MRN: [id number] : 1950 Nplate Ms. Josselyn Birmingham arrived to [place] at [address] [facility]. Ms. Josselyn Birmingham was assessed and education was provided. Ms. Viki Vega vitals were reviewed. Visit Vitals  /71 (BP 1 Location: Right arm, BP Patient Position: At rest;Sitting)  Pulse 74  Temp 97.5 °F (36.4 °C)  Resp 18 Pt was observed for 5 minutes after obtaining vital signs prior to initiating treatment. Patient 's current Nplate dose is 779 mcg (0.55mL).  on 18, dose to remain the same. Nplate 931YMO (6.89GH) administered as ordered SQ in patient's right upper arm. No redness or bleeding noted. Ms. Josselyn Birmingham was discharged from [address] [place] [address] in stable condition at 0900. She is to return on 10/05/2018 at 0930 for her next appointment. Alvin Leon RN 2018  
[id number]

## 2018-09-28 ENCOUNTER — HOSPITAL ENCOUNTER (OUTPATIENT)
Dept: INFUSION THERAPY | Age: 68
Discharge: HOME | End: 2018-09-28
Payer: MEDICARE

## 2018-09-28 VITALS
SYSTOLIC BLOOD PRESSURE: 101 MMHG | TEMPERATURE: 97.5 F | DIASTOLIC BLOOD PRESSURE: 68 MMHG | HEART RATE: 55 BPM | RESPIRATION RATE: 18 BRPM

## 2018-09-28 PROCEDURE — 74011250636 HC RX REV CODE- 250/636: Performed by: INTERNAL MEDICINE

## 2018-09-28 PROCEDURE — 74011000250 HC RX REV CODE- 250: Performed by: INTERNAL MEDICINE

## 2018-09-28 PROCEDURE — 74011250636 HC RX REV CODE- 250/636

## 2018-09-28 PROCEDURE — 96372 THER/PROPH/DIAG INJ SC/IM: CPT

## 2018-09-28 RX ORDER — WATER FOR INJECTION,STERILE
VIAL (ML) INJECTION ONCE
Status: COMPLETED | OUTPATIENT
Start: 2018-09-28 | End: 2018-09-28

## 2018-09-28 RX ADMIN — WATER: 1 INJECTION INTRAMUSCULAR; INTRAVENOUS; SUBCUTANEOUS at 08:59

## 2018-09-28 RX ADMIN — ROMIPLOSTIM 276 MCG: 500 INJECTION, POWDER, LYOPHILIZED, FOR SOLUTION SUBCUTANEOUS at 08:58

## 2018-09-28 NOTE — IP AVS SNAPSHOT
[address] [place] [address] 
[phone] Patient: Zahra Trevino MRN: [id number] EUR:5[birth year]05[birth year]39 About your hospitalization You were admitted on:  September 28, 2018 You last received care in the:  [facility] OP INFUSION You were discharged on:  September 28, 2018 Why you were hospitalized Your primary diagnosis was:  Not on File Follow-up Information None Your Scheduled Appointments Friday October 05, 2018  9:30 AM EDT INFUSION 30 with HBV FAST TRACK NURSE [facility] OP INFUSION (Al Dodsonnie) [address] 592 706 [facility]  
[phone] Note: Patient must have a  if they take medication(s) that impairs their ability to operate a motor vehicle Friday October 12, 2018  9:00 AM EDT INFUSION 30 with HBV FAST TRACK NURSE HBV OP INFUSION (Al Dodsonnie) [address] 654 510 [facility]  
[phone] Note: Patient must have a  if they take medication(s) that impairs their ability to operate a motor vehicle Friday October 19, 2018  9:30 AM EDT INFUSION 30 with HBV FAST TRACK NURSE HBV OP INFUSION (Al Dodsonnie) [address] 612 964 [facility]  
[phone] Note: Patient must have a  if they take medication(s) that impairs their ability to operate a motor vehicle Friday October 26, 2018  9:00 AM EDT INFUSION 30 with HBV FAST TRACK NURSE HBV OP INFUSION ([facility] Abhijit) [address] 701 602 [facility]  
[phone] Note: Patient must have a  if they take medication(s) that impairs their ability to operate a motor vehicle Discharge Orders None A check sierra indicates which time of day the medication should be taken. My Medications ASK your doctor about these medications Instructions Each Dose to Equal  
 Morning Noon Evening Bedtime  
 albuterol 90 mcg/actuation inhaler Commonly known as:  PROAIR HFA Your last dose was: Your next dose is:    
   
   
 1-2 puffs every 4-6 hrs. aluminum-magnesium hydroxide 200-200 mg/5 mL susp 5 mL, diphenhydrAMINE 12.5 mg/5 mL liqd 12.5 mg, lidocaine 2 % soln 5 mL Your last dose was: Your next dose is:    
   
   
 5 mL by Swish and Spit route two (2) times a day. Magic mouth wash  Maalox Lidocaine 2% viscous  Diphenhydramine oral solution   Pharmacy to mix equal portions of ingredients to a total volume as indicated in the dispense amount. 5 mL  
    
   
   
   
  
 amiodarone 200 mg tablet Commonly known as:  CORDARONE Your last dose was: Your next dose is: Take 200 mg by mouth daily. Indications: PREVENTION OF VENTRICULAR FIBRILLATION  
 200 mg COREG 3.125 mg tablet Generic drug:  carvedilol Your last dose was: Your next dose is: Take 3.125 mg by mouth two (2) times daily (with meals). 3.125 mg  
    
   
   
   
  
 digoxin 0.125 mg tablet Commonly known as:  LANOXIN Your last dose was: Your next dose is: Take 0.125 mg by mouth daily. 0.125 mg  
    
   
   
   
  
 dronabinol 5 mg capsule Commonly known as:  Tran Pack Your last dose was: Your next dose is: Take 1 Cap by mouth two (2) times a day. 5 mg  
    
   
   
   
  
 ergocalciferol 50,000 unit capsule Commonly known as:  VITAMIN D2 Your last dose was: Your next dose is: Take 1 Cap by mouth every seven (7) days. 11113 Units  
    
   
   
   
  
 furosemide 40 mg tablet Commonly known as:  LASIX Your last dose was: Your next dose is: Take 40 mg by mouth daily. Indications: Edema 40 mg  
    
   
   
   
  
 gabapentin 300 mg capsule Commonly known as:  NEURONTIN Your last dose was: Your next dose is: Take 1 Cap by mouth three (3) times daily. 300 mg  
    
   
   
   
  
 lidocaine-prilocaine topical cream  
Commonly known as:  EMLA Your last dose was: Your next dose is:    
   
   
 APPLY OVER PORT 1 HOUR PRIOR TO CHEMOTHERAPY THAN COVER WITH [place] WRAP LINZESS 145 mcg Cap capsule Generic drug:  linaclotide Your last dose was: Your next dose is: TAKE 1 CAPSULE BY MOUTH DAILY 30 MINUTES BEFORE BREAKFAST  
     
   
   
   
  
 lisinopril 10 mg tablet Commonly known as:  Mj [place]ton Your last dose was: Your next dose is: Take 10 mg by mouth daily. Indications: hypertension 10 mg  
    
   
   
   
  
 loratadine 10 mg Cap Your last dose was: Your next dose is: Take 10 mg by mouth daily. 10 mg  
    
   
   
   
  
 magic mouthwash solution Your last dose was: Your next dose is: Take 5 mL by mouth three (3) times daily as needed for Stomatitis. Magic mouth wash  Maalox Lidocaine 2% viscous  Diphenhydramine oral solution   Pharmacy to mix equal portions of ingredients to a total volume as indicated in the dispense amount. 5 mL  
    
   
   
   
  
 meloxicam 7.5 mg tablet Commonly known as:  MOBIC Your last dose was: Your next dose is: Take 7.5 mg by mouth daily. 7.5 mg  
    
   
   
   
  
 nabumetone 750 mg tablet Commonly known as:  RELAFEN Your last dose was: Your next dose is: Take 750 mg by mouth daily. Indications: OSTEOARTHRITIS  
 750 mg  
    
   
   
   
  
 ondansetron hcl 8 mg tablet Commonly known as:  Severiano Lundborg Your last dose was: Your next dose is: Take 1 Tab by mouth every eight (8) hours as needed for Nausea. 8 mg * oxyCODONE-acetaminophen 7.5-325 mg per tablet Commonly known as:  PERCOCET Your last dose was: Your next dose is: TAKE ONE TO TWO TABLETS BY MOUTH Q 4 - 6 AS NEEDED FOR PAIN **AFTER SURGERY**  Indications: Pain * oxyCODONE-acetaminophen  mg per tablet Commonly known as:  PERCOCET Your last dose was: Your next dose is: Take 1 Tab by mouth every six (6) hours as needed for Pain. Max Daily Amount: 4 Tabs. Indications: Pain, ACUTE POST OP PAIN  
 1 Tab * potassium chloride 20 mEq tablet Commonly known as:  K-DUR, KLOR-CON Your last dose was: Your next dose is: Take 2 Tabs by mouth daily. 40 mEq * KLOR-CON M20 20 mEq tablet Generic drug:  potassium chloride Your last dose was: Your next dose is: TAKE ONE TABLET BY MOUTH ONCE A DAY promethazine 25 mg tablet Commonly known as:  PHENERGAN Your last dose was: Your next dose is: Take 1 Tab by mouth every six (6) hours as needed for Nausea. 25 mg  
    
   
   
   
  
 PRUVATE 21-7 PO Your last dose was: Your next dose is: Take 325 mg by mouth daily. Indications: iron deficiency 325 mg Senna 8.6 mg tablet Generic drug:  senna Your last dose was: Your next dose is: Take 1 Tab by mouth daily. 1 Tab  
    
   
   
   
  
 temazepam 30 mg capsule Commonly known as:  RESTORIL Your last dose was: Your next dose is:    
   
   
      
   
   
   
  
 topiramate 50 mg tablet Commonly known as:  TOPAMAX Your last dose was: Your next dose is: XARELTO 10 mg tablet Generic drug:  rivaroxaban Your last dose was: Your next dose is: Take 10 mg by mouth daily. 10 mg  
    
   
   
   
  
 zolpidem 10 mg tablet Commonly known as:  AMBIEN Your last dose was: Your next dose is: TAKE 1 TABLET BY MOUTH NIGHTLY AS NEEDED FOR SLEEP. MAX DAILY AMOUNT 10 MG  
     
   
   
   
  
 * Notice: This list has 4 medication(s) that are the same as other medications prescribed for you. Read the directions carefully, and ask your doctor or other care provider to review them with you. Opioid Education Prescription Opioids: What You Need to Know: 
 
Prescription opioids can be used to help relieve moderate-to-severe pain and are often prescribed following a surgery or injury, or for certain health conditions. These medications can be an important part of treatment but also come with serious risks. Opioids are strong pain medicines. Examples include hydrocodone, oxycodone, fentanyl, and morphine. Heroin is an example of an illegal opioid. It is important to work with your health care provider to make sure you are getting the safest, most effective care. WHAT ARE THE RISKS AND SIDE EFFECTS OF OPIOID USE? Prescription opioids carry serious risks of addiction and overdose, especially with prolonged use. An opioid overdose, often marked by slow breathing, can cause sudden death. The use of prescription opioids can have a number of side effects as well, even when taken as directed. · Tolerance-meaning you might need to take more of a medication for the same pain relief · Physical dependence-meaning you have symptoms of withdrawal when the medication is stopped. Withdrawal symptoms can include nausea, sweating, chills, diarrhea, stomach cramps, and muscle aches. Withdrawal can last up to several weeks, depending on which drug you took and how long you took it. · Increased sensitivity to pain · Constipation · Nausea, vomiting, and dry mouth · Sleepiness and dizziness · Confusion · Depression · Low levels of testosterone that can result in lower sex drive, energy, and strength · Itching and sweating RISKS ARE GREATER WITH:      
· History of drug misuse, substance use disorder, or overdose · Mental health conditions (such as depression or anxiety) · Sleep apnea · Older age (72 years or older) · Pregnancy Avoid alcohol while taking prescription opioids. Also, unless specifically advised by your health care provider, medications to avoid include: · Benzodiazepines (such as Xanax or Valium) · Muscle relaxants (such as Soma or Flexeril) · Hypnotics (such as Ambien or Lunesta) · Other prescription opioids KNOW YOUR OPTIONS Talk to your health care provider about ways to manage your pain that don't involve prescription opioids. Some of these options may actually work better and have fewer risks and side effects. Consult your physician before adding or stopping any medications, treatments, or physical activity. Options may include: 
· Pain relievers such as acetaminophen, ibuprofen, and naproxen · Some medications that are also used for depression or seizures · Physical therapy and exercise · Counseling to help patients learn how to cope better with triggers of pain and stress. · Application of heat or cold compress · Massage therapy · Relaxation techniques Be Informed Make sure you know the name of your medication, how much and how often to take it, and its potential risks & side effects. IF YOU ARE PRESCRIBED OPIOIDS FOR PAIN: 
· Never take opioids in greater amounts or more often than prescribed. Remember the goal is not to be pain-free but to manage your pain at a tolerable level. · Follow up with your primary care provider to: · Work together to create a plan on how to manage your pain. · Talk about ways to help manage your pain that don't involve prescription opioids. · Talk about any and all concerns and side effects. · Help prevent misuse and abuse. · Never sell or share prescription opioids · Help prevent misuse and abuse. · Store prescription opioids in a secure place and out of reach of others (this may include visitors, children, friends, and family). · Safely dispose of unused/unwanted prescription opioids: Find your community drug take-back program or your pharmacy mail-back program, or flush them down the toilet, following guidance from the Food and Drug Administration (www.fda.gov/Drugs/ResourcesForYou). · Visit www.cdc.gov/drugoverdose to learn about the risks of opioid abuse and overdose. · If you believe you may be struggling with addiction, tell your health care provider and ask for guidance or call [address] [place], [place] [address]rose [facility] at 8-312-518-RZIM. Discharge Instructions None Introducing Dawson Morrissey As a [employer] patient, I wanted to make you aware of our electronic visit tool called Dawson Morrissey. [employer] 24/7 allows you to connect within minutes with a medical provider 24 hours a day, seven days a week via a mobile device or tablet or logging into a secure website from your computer. You can access Dawson Morrissey from anywhere in the [place]. A virtual visit might be right for you when you have a simple condition and feel like you just dont want to get out of bed, or cant get away from work for an appointment, when your regular [employer] provider is not available (evenings, weekends or holidays), or when youre out of town and need minor care. Electronic visits cost only $49 and if the [employer] 24/7 provider determines a prescription is needed to treat your condition, one can be electronically transmitted to a nearby pharmacy*. Please take a moment to enroll today if you have not already done so.   The enrollment process is free and takes just a few minutes. To enroll, please download the [employer] 24/7 shari to your tablet or phone, or visit [email or web address] to enroll on your computer. And, as an [address] [place] [address] patient with a [employer] account, the results of your visits will be scanned into your electronic medical record and your primary care provider will be able to view the scanned results. We urge you to continue to see your regular [facility] provider for your ongoing medical care. And while your primary care provider may not be the one available when you seek a [employer] virtual visit, the peace of mind you get from getting a real diagnosis real time can be priceless. For more information on [employer], view our Frequently Asked Questions (FAQs) at [email or web address]. Sincerely, 
 
Leona Ramirez MD 
Chief Medical Officer Niles Montoya *:  certain medications cannot be prescribed via [employer] Your Primary Care Physician (PCP) Primary Care Physician Office Phone Office Fax Leah Angel [phone] You are allergic to the following Allergen Reactions Aspirin Swelling Pt states her whole body swells when she takes aspirin. Adhesive Unable to Obtain Nickel Rash Pcn (Penicillins) Rash Recent Documentation OB Status Smoking Status Postmenopausal Never Smoker Emergency Contacts Name Discharge Info Relation Home Work Mobile [address] CAREGIVER [3] Daughter [21] [phone] Corby Pagan DISCHARGE CAREGIVER [3] Son [22] [phone] Jerrod Pagna DISCHARGE CAREGIVER [3] Spouse [3] [phone] Pinky Fernandez  Child [2] Patient Belongings The following personal items are in your possession at time of discharge: Please provide this summary of care documentation to your next provider. Signatures-by signing, you are acknowledging that this After Visit Summary has been reviewed with you and you have received a copy. Patient Signature:  ____________________________________________________________ Date:  ____________________________________________________________  
  
Peg Crooked Provider Signature:  ____________________________________________________________ Date:  ____________________________________________________________

## 2018-09-28 NOTE — IP AVS SNAPSHOT
303 [facility] 
 
 
 [address] 979 200 [facility] 
[phone] Patient: Jose Padron MRN: [id number] SANTANA:[birth year] A check sierra indicates which time of day the medication should be taken. My Medications ASK your doctor about these medications Instructions Each Dose to Equal  
 Morning Noon Evening Bedtime  
 albuterol 90 mcg/actuation inhaler Commonly known as:  PROAIR HFA Your last dose was: Your next dose is:    
   
   
 1-2 puffs every 4-6 hrs. aluminum-magnesium hydroxide 200-200 mg/5 mL susp 5 mL, diphenhydrAMINE 12.5 mg/5 mL liqd 12.5 mg, lidocaine 2 % soln 5 mL Your last dose was: Your next dose is:    
   
   
 5 mL by Swish and Spit route two (2) times a day. Magic mouth wash  Maalox Lidocaine 2% viscous  Diphenhydramine oral solution   Pharmacy to mix equal portions of ingredients to a total volume as indicated in the dispense amount. 5 mL  
    
   
   
   
  
 amiodarone 200 mg tablet Commonly known as:  CORDARONE Your last dose was: Your next dose is: Take 200 mg by mouth daily. Indications: PREVENTION OF VENTRICULAR FIBRILLATION  
 200 mg COREG 3.125 mg tablet Generic drug:  carvedilol Your last dose was: Your next dose is: Take 3.125 mg by mouth two (2) times daily (with meals). 3.125 mg  
    
   
   
   
  
 digoxin 0.125 mg tablet Commonly known as:  LANOXIN Your last dose was: Your next dose is: Take 0.125 mg by mouth daily. 0.125 mg  
    
   
   
   
  
 dronabinol 5 mg capsule Commonly known as:  Andrae Gift Your last dose was: Your next dose is: Take 1 Cap by mouth two (2) times a day. 5 mg  
    
   
   
   
  
 ergocalciferol 50,000 unit capsule Commonly known as:  VITAMIN D2 Your last dose was: Your next dose is: Take 1 Cap by mouth every seven (7) days. 72188 Units  
    
   
   
   
  
 furosemide 40 mg tablet Commonly known as:  LASIX Your last dose was: Your next dose is: Take 40 mg by mouth daily. Indications: Edema 40 mg  
    
   
   
   
  
 gabapentin 300 mg capsule Commonly known as:  NEURONTIN Your last dose was: Your next dose is: Take 1 Cap by mouth three (3) times daily. 300 mg  
    
   
   
   
  
 lidocaine-prilocaine topical cream  
Commonly known as:  EMLA Your last dose was: Your next dose is:    
   
   
 APPLY OVER PORT 1 HOUR PRIOR TO CHEMOTHERAPY THAN COVER WITH [place] WRAP LINZESS 145 mcg Cap capsule Generic drug:  linaclotide Your last dose was: Your next dose is: TAKE 1 CAPSULE BY MOUTH DAILY 30 MINUTES BEFORE BREAKFAST  
     
   
   
   
  
 lisinopril 10 mg tablet Commonly known as:  Cathryne Demar Your last dose was: Your next dose is: Take 10 mg by mouth daily. Indications: hypertension 10 mg  
    
   
   
   
  
 loratadine 10 mg Cap Your last dose was: Your next dose is: Take 10 mg by mouth daily. 10 mg  
    
   
   
   
  
 magic mouthwash solution Your last dose was: Your next dose is: Take 5 mL by mouth three (3) times daily as needed for Stomatitis. Magic mouth wash  Maalox Lidocaine 2% viscous  Diphenhydramine oral solution   Pharmacy to mix equal portions of ingredients to a total volume as indicated in the dispense amount. 5 mL  
    
   
   
   
  
 meloxicam 7.5 mg tablet Commonly known as:  MOBIC Your last dose was: Your next dose is: Take 7.5 mg by mouth daily. 7.5 mg  
    
   
   
   
  
 nabumetone 750 mg tablet Commonly known as:  RELAFEN Your last dose was: Your next dose is: Take 750 mg by mouth daily. Indications: OSTEOARTHRITIS  
 750 mg  
    
   
   
   
  
 ondansetron hcl 8 mg tablet Commonly known as:  Yokasta Kidney Your last dose was: Your next dose is: Take 1 Tab by mouth every eight (8) hours as needed for Nausea. 8 mg * oxyCODONE-acetaminophen 7.5-325 mg per tablet Commonly known as:  PERCOCET Your last dose was: Your next dose is: TAKE ONE TO TWO TABLETS BY MOUTH Q 4 - 6 AS NEEDED FOR PAIN **AFTER SURGERY**  Indications: Pain * oxyCODONE-acetaminophen  mg per tablet Commonly known as:  PERCOCET Your last dose was: Your next dose is: Take 1 Tab by mouth every six (6) hours as needed for Pain. Max Daily Amount: 4 Tabs. Indications: Pain, ACUTE POST OP PAIN  
 1 Tab * potassium chloride 20 mEq tablet Commonly known as:  K-DUR, KLOR-CON Your last dose was: Your next dose is: Take 2 Tabs by mouth daily. 40 mEq * KLOR-CON M20 20 mEq tablet Generic drug:  potassium chloride Your last dose was: Your next dose is: TAKE ONE TABLET BY MOUTH ONCE A DAY promethazine 25 mg tablet Commonly known as:  PHENERGAN Your last dose was: Your next dose is: Take 1 Tab by mouth every six (6) hours as needed for Nausea. 25 mg  
    
   
   
   
  
 PRUVATE 21-7 PO Your last dose was: Your next dose is: Take 325 mg by mouth daily. Indications: iron deficiency 325 mg Senna 8.6 mg tablet Generic drug:  senna Your last dose was: Your next dose is: Take 1 Tab by mouth daily. 1 Tab  
    
   
   
   
  
 temazepam 30 mg capsule Commonly known as:  RESTORIL Your last dose was: Your next dose is:    
   
   
      
   
   
   
  
 topiramate 50 mg tablet Commonly known as:  TOPAMAX Your last dose was: Your next dose is: XARELTO 10 mg tablet Generic drug:  rivaroxaban Your last dose was: Your next dose is: Take 10 mg by mouth daily. 10 mg  
    
   
   
   
  
 zolpidem 10 mg tablet Commonly known as:  AMBIEN Your last dose was: Your next dose is: TAKE 1 TABLET BY MOUTH NIGHTLY AS NEEDED FOR SLEEP. MAX DAILY AMOUNT 10 MG  
     
   
   
   
  
 * Notice: This list has 4 medication(s) that are the same as other medications prescribed for you. Read the directions carefully, and ask your doctor or other care provider to review them with you.

## 2018-09-28 NOTE — PROGRESS NOTES
[facility] Progress Note Date: 2018 Name: Tima Silva MRN: [id number] : 1950 Nplate Ms. Josselyn Birmingham arrived to [facility] at 7584 ambulatory. Ms. Josselyn Birmingham was assessed and education was provided. Ms. Viki Vega vitals were reviewed. Visit Vitals  /68 (BP 1 Location: Right arm, BP Patient Position: At rest;Sitting)  Pulse (!) 55  Temp 97.5 °F (36.4 °C)  Resp 18 Pt was observed for 5 minutes after obtaining vital signs prior to initiating treatment. Patient 's current Nplate dose is 500 mcg (0.55mL).  on 18, dose to remain the same. Nplate 839XZU (3.04MJ) administered as ordered SQ in patient's right upper arm. No redness or bleeding noted. Ms. Josselyn Birmingham was discharged from [place] [address] in stable condition at [address] [place] [address]. She is to return on 10/05/2018 at 0930 for her next appointment. Alvin Leon RN 2018  
[id number]

## 2018-10-05 ENCOUNTER — HOSPITAL ENCOUNTER (OUTPATIENT)
Dept: GENERAL RADIOLOGY | Age: 68
Discharge: HOME | End: 2018-10-05
Payer: MEDICARE

## 2018-10-05 ENCOUNTER — HOSPITAL ENCOUNTER (OUTPATIENT)
Dept: INFUSION THERAPY | Age: 68
Discharge: HOME | End: 2018-10-05
Payer: MEDICARE

## 2018-10-05 VITALS
RESPIRATION RATE: 18 BRPM | HEART RATE: 70 BPM | SYSTOLIC BLOOD PRESSURE: 95 MMHG | DIASTOLIC BLOOD PRESSURE: 65 MMHG | TEMPERATURE: 97.6 F

## 2018-10-05 DIAGNOSIS — K59.00 CONSTIPATION: ICD-10-CM

## 2018-10-05 DIAGNOSIS — R74.8 ABNORMAL LIVER ENZYMES: ICD-10-CM

## 2018-10-05 LAB
BASO+EOS+MONOS # BLD AUTO: 1.2 K/UL (ref 0–2.3)
BASO+EOS+MONOS # BLD AUTO: 17 % (ref 0.1–17)
DIFFERENTIAL METHOD BLD: ABNORMAL
ERYTHROCYTE [DISTWIDTH] IN BLOOD BY AUTOMATED COUNT: 16.8 % (ref 11.5–14.5)
HCT VFR BLD AUTO: 30.6 % (ref 36–48)
HGB BLD-MCNC: 10.1 G/DL (ref 12–16)
LYMPHOCYTES # BLD: 1.5 K/UL (ref 1.1–5.9)
LYMPHOCYTES NFR BLD: 22 % (ref 14–44)
MCH RBC QN AUTO: 27.7 PG (ref 25–35)
MCHC RBC AUTO-ENTMCNC: 33 G/DL (ref 31–37)
MCV RBC AUTO: 84.1 FL (ref 78–102)
NEUTS SEG # BLD: 4.3 K/UL (ref 1.8–9.5)
NEUTS SEG NFR BLD: 61 % (ref 40–70)
PLATELET # BLD AUTO: 155 K/UL (ref 135–420)
RBC # BLD AUTO: 3.64 M/UL (ref 4.1–5.1)
WBC # BLD AUTO: 7 K/UL (ref 4.5–13)

## 2018-10-05 PROCEDURE — 85049 AUTOMATED PLATELET COUNT: CPT | Performed by: INTERNAL MEDICINE

## 2018-10-05 PROCEDURE — 74019 RADEX ABDOMEN 2 VIEWS: CPT

## 2018-10-05 PROCEDURE — 74011250636 HC RX REV CODE- 250/636: Performed by: INTERNAL MEDICINE

## 2018-10-05 PROCEDURE — 74011250636 HC RX REV CODE- 250/636

## 2018-10-05 PROCEDURE — 85025 COMPLETE CBC W/AUTO DIFF WBC: CPT | Performed by: INTERNAL MEDICINE

## 2018-10-05 PROCEDURE — 36415 COLL VENOUS BLD VENIPUNCTURE: CPT | Performed by: INTERNAL MEDICINE

## 2018-10-05 PROCEDURE — 36415 COLL VENOUS BLD VENIPUNCTURE: CPT

## 2018-10-05 PROCEDURE — 96372 THER/PROPH/DIAG INJ SC/IM: CPT

## 2018-10-05 RX ORDER — HEPARIN 100 UNIT/ML
SYRINGE INTRAVENOUS
Status: DISPENSED
Start: 2018-10-05 | End: 2018-10-05

## 2018-10-05 RX ORDER — SODIUM CHLORIDE 0.9 % (FLUSH) 0.9 %
10-40 SYRINGE (ML) INJECTION AS NEEDED
Status: DISCONTINUED | OUTPATIENT
Start: 2018-10-05 | End: 2018-10-09 | Stop reason: HOSPADM

## 2018-10-05 RX ORDER — HEPARIN 100 UNIT/ML
500 SYRINGE INTRAVENOUS ONCE
Status: ACTIVE | OUTPATIENT
Start: 2018-10-05 | End: 2018-10-05

## 2018-10-05 RX ORDER — WATER FOR INJECTION,STERILE
VIAL (ML) INJECTION ONCE
Status: DISPENSED | OUTPATIENT
Start: 2018-10-05 | End: 2018-10-05

## 2018-10-05 RX ORDER — ALBUTEROL SULFATE 90 UG/1
AEROSOL, METERED RESPIRATORY (INHALATION)
Qty: 1 INHALER | Refills: 1 | Status: SHIPPED | OUTPATIENT
Start: 2018-10-05 | End: 2019-01-01

## 2018-10-05 RX ADMIN — ROMIPLOSTIM 276 MCG: 250 INJECTION, POWDER, LYOPHILIZED, FOR SOLUTION SUBCUTANEOUS at 09:26

## 2018-10-05 NOTE — PROGRESS NOTES
[facility] Progress Note Date: 2018 Name: Geovanny Coleman MRN: [id number] : 1950 Weekly N Plate injection Procrit injection every 4 weeks Monthly Port flush Ms. Angella Silva was assessed and education was provided. Patient requested that port flush be deferred until next week as she did not apply EMLA cream prior to arrival  
 
Ms. Pagan's vitals were reviewed and patient was observed for 5 minutes prior to treatment. Visit Vitals  BP 95/65 (BP 1 Location: Right arm, BP Patient Position: Sitting)  Pulse 70  Temp 97.6 °F (36.4 °C)  Resp 18  Breastfeeding No  
 
Lab drawn from right hand x 2 attempts  No irritation noted at site, 2x2 gauze and Coban applied. Lab results were obtained and reviewed. Recent Results (from the past 12 hour(s)) CBC WITH 3 PART DIFF Collection Time: 10/05/18  9:11 AM  
Result Value Ref Range WBC 7.0 4.5 - 13.0 K/uL  
 RBC 3.64 (L) 4.10 - 5.10 M/uL  
 HGB 10.1 (L) 12.0 - 16 g/dL HCT 30.6 (L) 36 - 48 % MCV 84.1 78 - 102 FL  
 MCH 27.7 25.0 - 35.0 PG  
 MCHC 33.0 31 - 37 g/dL  
 RDW 16.8 (H) 11.5 - 14.5 % NEUTROPHILS 61 40 - 70 % MIXED CELLS 17 0.1 - 17 % LYMPHOCYTES 22 14 - 44 % ABS. NEUTROPHILS 4.3 1.8 - 9.5 K/UL  
 ABS. MIXED CELLS 1.2 0.0 - 2.3 K/uL  
 ABS. LYMPHOCYTES 1.5 1.1 - 5.9 K/UL  
 DF AUTOMATED Procrit HELD for Hgb 10.1 and Hct 30.6. N Plate 912 WQI/5.07 ml Sterile water was administered subcutaneously  in back of right upper arm. Ms. Angella Silva tolerated well, and had no complaints. Patient armband removed and shredded. Ms. Angella Silva was discharged from [place] [address] in stable condition at 0930. She is to return on 10/12/2018 at 0900 for her next appointment for port flush, labs and N Plate injection. Juancho Ruiz RN 2018

## 2018-10-12 ENCOUNTER — HOSPITAL ENCOUNTER (OUTPATIENT)
Dept: INFUSION THERAPY | Age: 68
Discharge: HOME | End: 2018-10-12
Payer: MEDICARE

## 2018-10-12 VITALS
RESPIRATION RATE: 18 BRPM | TEMPERATURE: 98 F | DIASTOLIC BLOOD PRESSURE: 70 MMHG | HEART RATE: 84 BPM | SYSTOLIC BLOOD PRESSURE: 106 MMHG

## 2018-10-12 PROCEDURE — 74011250636 HC RX REV CODE- 250/636: Performed by: INTERNAL MEDICINE

## 2018-10-12 PROCEDURE — 77030012965 HC NDL HUBR BBMI -A

## 2018-10-12 PROCEDURE — 96372 THER/PROPH/DIAG INJ SC/IM: CPT

## 2018-10-12 PROCEDURE — 74011250636 HC RX REV CODE- 250/636

## 2018-10-12 RX ORDER — HEPARIN 100 UNIT/ML
500 SYRINGE INTRAVENOUS ONCE
Status: COMPLETED | OUTPATIENT
Start: 2018-10-12 | End: 2018-10-12

## 2018-10-12 RX ORDER — HEPARIN 100 UNIT/ML
SYRINGE INTRAVENOUS
Status: COMPLETED
Start: 2018-10-12 | End: 2018-10-12

## 2018-10-12 RX ORDER — WATER FOR INJECTION,STERILE
VIAL (ML) INJECTION ONCE
Status: DISPENSED | OUTPATIENT
Start: 2018-10-12 | End: 2018-10-12

## 2018-10-12 RX ADMIN — SODIUM CHLORIDE, PRESERVATIVE FREE 500 UNITS: 5 INJECTION INTRAVENOUS at 10:55

## 2018-10-12 NOTE — PROGRESS NOTES
[facility] Progress Note Date: 2018 Name: Elsy Arango MRN: [id number] : 1950 Weekly N Plate injection Procrit injection every 4 weeks Monthly Port flush Ms. Maria Teresa Padilla was assessed and education was provided. Ms. Meryle Hawking vitals were reviewed and patient was observed for 5 minutes prior to treatment. Visit Vitals  /70 (BP 1 Location: Right arm, BP Patient Position: At rest)  Pulse 84  Temp 98 °F (36.7 °C)  Resp 18 Right double lumen medi port was accessed using 20 G haas needle. Both lumens were flushed with 20 ML NS and heparin 500 units. Positive for blood return. Both ports were de accssed and gauze and paper tape applied to site. Lab results were obtained and reviewed. No results found for this or any previous visit (from the past 12 hour(s)). N Plate 675 XHI/0.86 ml Sterile water was administered subcutaneously  in back of right upper arm. Ms. Maria Teresa Padilla tolerated well, and had no complaints. Patient armband removed and shredded. Ms. Maria Teresa Padilla was discharged from [place] [address] in stable condition at 1050. She is to return on 10/19/2018 at 0930 for her next appointment for port flush, labs and N Plate injection. Rica Fong RN 2018

## 2018-10-19 ENCOUNTER — HOSPITAL ENCOUNTER (OUTPATIENT)
Dept: INFUSION THERAPY | Age: 68
Discharge: HOME | End: 2018-10-19
Payer: MEDICARE

## 2018-10-19 VITALS
DIASTOLIC BLOOD PRESSURE: 69 MMHG | RESPIRATION RATE: 18 BRPM | TEMPERATURE: 98.1 F | SYSTOLIC BLOOD PRESSURE: 109 MMHG | HEART RATE: 62 BPM

## 2018-10-19 DIAGNOSIS — G89.3 CANCER ASSOCIATED PAIN: ICD-10-CM

## 2018-10-19 DIAGNOSIS — M43.16 SPONDYLOLISTHESIS OF LUMBAR REGION: ICD-10-CM

## 2018-10-19 DIAGNOSIS — C50.911 CARCINOMA OF RIGHT BREAST METASTATIC TO AXILLARY LYMPH NODE (HCC): Primary | ICD-10-CM

## 2018-10-19 DIAGNOSIS — Z98.1 S/P LUMBAR FUSION: ICD-10-CM

## 2018-10-19 DIAGNOSIS — C77.3 CARCINOMA OF RIGHT BREAST METASTATIC TO AXILLARY LYMPH NODE (HCC): Primary | ICD-10-CM

## 2018-10-19 PROCEDURE — 74011250636 HC RX REV CODE- 250/636: Performed by: INTERNAL MEDICINE

## 2018-10-19 PROCEDURE — 74011000250 HC RX REV CODE- 250: Performed by: INTERNAL MEDICINE

## 2018-10-19 PROCEDURE — 96372 THER/PROPH/DIAG INJ SC/IM: CPT

## 2018-10-19 PROCEDURE — 74011250636 HC RX REV CODE- 250/636

## 2018-10-19 RX ORDER — OXYCODONE AND ACETAMINOPHEN 10; 325 MG/1; MG/1
1 TABLET ORAL
Qty: 120 TAB | Refills: 0 | Status: SHIPPED | OUTPATIENT
Start: 2018-10-19 | End: 2018-01-01

## 2018-10-19 RX ORDER — WATER FOR INJECTION,STERILE
VIAL (ML) INJECTION ONCE
Status: COMPLETED | OUTPATIENT
Start: 2018-10-19 | End: 2018-10-19

## 2018-10-19 RX ADMIN — WATER 1.44 ML: 1 INJECTION INTRAMUSCULAR; INTRAVENOUS; SUBCUTANEOUS at 09:36

## 2018-10-19 RX ADMIN — ROMIPLOSTIM 276 MCG: 250 INJECTION, POWDER, LYOPHILIZED, FOR SOLUTION SUBCUTANEOUS at 09:35

## 2018-10-26 ENCOUNTER — HOSPITAL ENCOUNTER (OUTPATIENT)
Dept: INFUSION THERAPY | Age: 68
Discharge: HOME | End: 2018-10-26
Payer: MEDICARE

## 2018-10-26 VITALS
SYSTOLIC BLOOD PRESSURE: 100 MMHG | HEART RATE: 74 BPM | DIASTOLIC BLOOD PRESSURE: 67 MMHG | RESPIRATION RATE: 18 BRPM | TEMPERATURE: 97.8 F

## 2018-10-26 PROCEDURE — 96372 THER/PROPH/DIAG INJ SC/IM: CPT

## 2018-10-26 PROCEDURE — 74011250636 HC RX REV CODE- 250/636: Performed by: INTERNAL MEDICINE

## 2018-10-26 PROCEDURE — 74011250636 HC RX REV CODE- 250/636

## 2018-10-26 PROCEDURE — 74011000250 HC RX REV CODE- 250: Performed by: INTERNAL MEDICINE

## 2018-10-26 RX ORDER — WATER FOR INJECTION,STERILE
VIAL (ML) INJECTION ONCE
Status: COMPLETED | OUTPATIENT
Start: 2018-10-26 | End: 2018-10-26

## 2018-10-26 RX ADMIN — WATER: 1 INJECTION INTRAMUSCULAR; INTRAVENOUS; SUBCUTANEOUS at 09:40

## 2018-10-26 RX ADMIN — ROMIPLOSTIM 276 MCG: 250 INJECTION, POWDER, LYOPHILIZED, FOR SOLUTION SUBCUTANEOUS at 09:41

## 2018-10-26 NOTE — PROGRESS NOTES
[facility] Progress Note Date: 2018 Name: Carlos Ruffin MRN: [id number] : 1950 Nplate Ms. Brian Mayo arrived to [facility] at 56 ambulatory. Complained of generalized pain today. Took pain medication today. Ms. Brian Mayo was assessed and education was provided. Next cbc due 18. Same dose nplate given last 3 times. Ms. Caridad Bates vitals were reviewed. Visit Vitals /67 (BP 1 Location: Right arm, BP Patient Position: Sitting) Pulse 74 Temp 97.8 °F (36.6 °C) Resp 18 Pt was observed for 5 minutes after obtaining vital signs prior to initiating treatment. Patient 's current Nplate dose is 196 mcg (0.55mL).  on 10/5/18, dose to remain the same. Nplate 582GGR (1.66SJ) administered as ordered SQ in patient's right upper arm. No redness or bleeding noted. Ms. Brian Mayo was discharged from [place] [address] in stable condition at [facility]. She is to return on 2018 at 0830 for her next appointment. Maria Guadalupe Torres RN 2018  
[phone]

## 2018-11-02 ENCOUNTER — HOSPITAL ENCOUNTER (INPATIENT)
Age: 68
LOS: 7 days | Discharge: HOME HEALTH | DRG: 683 | End: 2018-11-09
Attending: EMERGENCY MEDICINE | Admitting: INTERNAL MEDICINE
Payer: MEDICARE

## 2018-11-02 ENCOUNTER — HOSPITAL ENCOUNTER (OUTPATIENT)
Dept: INFUSION THERAPY | Age: 68
Discharge: HOME | End: 2018-11-02
Payer: MEDICARE

## 2018-11-02 ENCOUNTER — APPOINTMENT (OUTPATIENT)
Dept: GENERAL RADIOLOGY | Age: 68
DRG: 683 | End: 2018-11-02
Attending: EMERGENCY MEDICINE
Payer: MEDICARE

## 2018-11-02 VITALS — TEMPERATURE: 97.8 F | RESPIRATION RATE: 18 BRPM | HEART RATE: 71 BPM

## 2018-11-02 DIAGNOSIS — A41.9 SEPSIS, DUE TO UNSPECIFIED ORGANISM: Primary | ICD-10-CM

## 2018-11-02 DIAGNOSIS — E27.2 ADRENAL CRISIS (HCC): ICD-10-CM

## 2018-11-02 LAB
ALBUMIN SERPL-MCNC: 3.4 G/DL (ref 3.4–5)
ALBUMIN/GLOB SERPL: 0.9 {RATIO} (ref 0.8–1.7)
ALP SERPL-CCNC: 87 U/L (ref 45–117)
ALT SERPL-CCNC: 21 U/L (ref 13–56)
ANION GAP SERPL CALC-SCNC: 10 MMOL/L (ref 3–18)
APPEARANCE UR: CLEAR
AST SERPL-CCNC: 24 U/L (ref 15–37)
ATRIAL RATE: 227 BPM
BASOPHILS # BLD: 0 K/UL (ref 0–0.1)
BASOPHILS NFR BLD: 0 % (ref 0–2)
BILIRUB SERPL-MCNC: 0.2 MG/DL (ref 0.2–1)
BILIRUB UR QL: NEGATIVE
BUN SERPL-MCNC: 61 MG/DL (ref 7–18)
BUN/CREAT SERPL: 24 (ref 12–20)
CALCIUM SERPL-MCNC: 8.5 MG/DL (ref 8.5–10.1)
CALCULATED R AXIS, ECG10: -21 DEGREES
CALCULATED T AXIS, ECG11: 16 DEGREES
CHLORIDE SERPL-SCNC: 98 MMOL/L (ref 100–108)
CO2 SERPL-SCNC: 33 MMOL/L (ref 21–32)
COLOR UR: YELLOW
CREAT SERPL-MCNC: 2.57 MG/DL (ref 0.6–1.3)
DIAGNOSIS, 93000: NORMAL
DIFFERENTIAL METHOD BLD: ABNORMAL
EOSINOPHIL # BLD: 0.3 K/UL (ref 0–0.4)
EOSINOPHIL NFR BLD: 5 % (ref 0–5)
ERYTHROCYTE [DISTWIDTH] IN BLOOD BY AUTOMATED COUNT: 15.3 % (ref 11.6–14.5)
GLOBULIN SER CALC-MCNC: 4 G/DL (ref 2–4)
GLUCOSE SERPL-MCNC: 81 MG/DL (ref 74–99)
GLUCOSE UR STRIP.AUTO-MCNC: NEGATIVE MG/DL
HCT VFR BLD AUTO: 29.3 % (ref 35–45)
HGB BLD-MCNC: 9.7 G/DL (ref 12–16)
HGB UR QL STRIP: NEGATIVE
KETONES UR QL STRIP.AUTO: NEGATIVE MG/DL
LACTATE BLD-SCNC: 0.51 MMOL/L (ref 0.4–2)
LEUKOCYTE ESTERASE UR QL STRIP.AUTO: NEGATIVE
LYMPHOCYTES # BLD: 1.8 K/UL (ref 0.9–3.6)
LYMPHOCYTES NFR BLD: 27 % (ref 21–52)
MCH RBC QN AUTO: 26.8 PG (ref 24–34)
MCHC RBC AUTO-ENTMCNC: 33.1 G/DL (ref 31–37)
MCV RBC AUTO: 80.9 FL (ref 74–97)
MONOCYTES # BLD: 0.9 K/UL (ref 0.05–1.2)
MONOCYTES NFR BLD: 14 % (ref 3–10)
NEUTS SEG # BLD: 3.6 K/UL (ref 1.8–8)
NEUTS SEG NFR BLD: 54 % (ref 40–73)
NITRITE UR QL STRIP.AUTO: NEGATIVE
PH UR STRIP: 7 [PH] (ref 5–8)
PLATELET # BLD AUTO: 135 K/UL (ref 135–420)
PLATELET COMMENTS,PCOM: ABNORMAL
POTASSIUM SERPL-SCNC: 3.4 MMOL/L (ref 3.5–5.5)
PROT SERPL-MCNC: 7.4 G/DL (ref 6.4–8.2)
PROT UR STRIP-MCNC: NEGATIVE MG/DL
Q-T INTERVAL, ECG07: 476 MS
QRS DURATION, ECG06: 104 MS
QTC CALCULATION (BEZET), ECG08: 451 MS
RBC # BLD AUTO: 3.62 M/UL (ref 4.2–5.3)
RBC MORPH BLD: ABNORMAL
SODIUM SERPL-SCNC: 141 MMOL/L (ref 136–145)
SP GR UR REFRACTOMETRY: 1.01 (ref 1–1.03)
T4 FREE SERPL-MCNC: 1.3 NG/DL (ref 0.7–1.5)
TSH SERPL DL<=0.05 MIU/L-ACNC: 0.54 UIU/ML (ref 0.36–3.74)
UROBILINOGEN UR QL STRIP.AUTO: 1 EU/DL (ref 0.2–1)
VENTRICULAR RATE, ECG03: 54 BPM
WBC # BLD AUTO: 6.6 K/UL (ref 4.6–13.2)

## 2018-11-02 PROCEDURE — 80053 COMPREHEN METABOLIC PANEL: CPT | Performed by: EMERGENCY MEDICINE

## 2018-11-02 PROCEDURE — 87076 CULTURE ANAEROBE IDENT EACH: CPT

## 2018-11-02 PROCEDURE — 96375 TX/PRO/DX INJ NEW DRUG ADDON: CPT

## 2018-11-02 PROCEDURE — 83605 ASSAY OF LACTIC ACID: CPT

## 2018-11-02 PROCEDURE — 74011250636 HC RX REV CODE- 250/636: Performed by: EMERGENCY MEDICINE

## 2018-11-02 PROCEDURE — 87086 URINE CULTURE/COLONY COUNT: CPT | Performed by: EMERGENCY MEDICINE

## 2018-11-02 PROCEDURE — 84443 ASSAY THYROID STIM HORMONE: CPT | Performed by: EMERGENCY MEDICINE

## 2018-11-02 PROCEDURE — 96367 TX/PROPH/DG ADDL SEQ IV INF: CPT

## 2018-11-02 PROCEDURE — 82533 TOTAL CORTISOL: CPT | Performed by: EMERGENCY MEDICINE

## 2018-11-02 PROCEDURE — 74011250637 HC RX REV CODE- 250/637: Performed by: EMERGENCY MEDICINE

## 2018-11-02 PROCEDURE — 74011000258 HC RX REV CODE- 258: Performed by: EMERGENCY MEDICINE

## 2018-11-02 PROCEDURE — 96368 THER/DIAG CONCURRENT INF: CPT

## 2018-11-02 PROCEDURE — 96366 THER/PROPH/DIAG IV INF ADDON: CPT

## 2018-11-02 PROCEDURE — 94761 N-INVAS EAR/PLS OXIMETRY MLT: CPT

## 2018-11-02 PROCEDURE — 99211 OFF/OP EST MAY X REQ PHY/QHP: CPT

## 2018-11-02 PROCEDURE — 87040 BLOOD CULTURE FOR BACTERIA: CPT | Performed by: EMERGENCY MEDICINE

## 2018-11-02 PROCEDURE — 65660000004 HC RM CVT STEPDOWN

## 2018-11-02 PROCEDURE — 85025 COMPLETE CBC W/AUTO DIFF WBC: CPT | Performed by: EMERGENCY MEDICINE

## 2018-11-02 PROCEDURE — 71045 X-RAY EXAM CHEST 1 VIEW: CPT

## 2018-11-02 PROCEDURE — 84439 ASSAY OF FREE THYROXINE: CPT | Performed by: EMERGENCY MEDICINE

## 2018-11-02 PROCEDURE — 93005 ELECTROCARDIOGRAM TRACING: CPT

## 2018-11-02 PROCEDURE — 81003 URINALYSIS AUTO W/O SCOPE: CPT | Performed by: EMERGENCY MEDICINE

## 2018-11-02 PROCEDURE — 96365 THER/PROPH/DIAG IV INF INIT: CPT

## 2018-11-02 PROCEDURE — 74011250636 HC RX REV CODE- 250/636: Performed by: INTERNAL MEDICINE

## 2018-11-02 PROCEDURE — 99285 EMERGENCY DEPT VISIT HI MDM: CPT

## 2018-11-02 RX ORDER — LEVOFLOXACIN 5 MG/ML
750 INJECTION, SOLUTION INTRAVENOUS
Status: DISCONTINUED | OUTPATIENT
Start: 2018-11-04 | End: 2018-11-04

## 2018-11-02 RX ORDER — LEVOFLOXACIN 5 MG/ML
750 INJECTION, SOLUTION INTRAVENOUS EVERY 24 HOURS
Status: DISCONTINUED | OUTPATIENT
Start: 2018-11-02 | End: 2018-11-02

## 2018-11-02 RX ORDER — SODIUM CHLORIDE 0.9 % (FLUSH) 0.9 %
5-10 SYRINGE (ML) INJECTION AS NEEDED
Status: DISCONTINUED | OUTPATIENT
Start: 2018-11-02 | End: 2018-01-01 | Stop reason: SDUPTHER

## 2018-11-02 RX ORDER — VANCOMYCIN/0.9 % SOD CHLORIDE 1.5G/250ML
1500 PLASTIC BAG, INJECTION (ML) INTRAVENOUS ONCE
Status: DISCONTINUED | OUTPATIENT
Start: 2018-11-02 | End: 2018-11-02

## 2018-11-02 RX ORDER — ACETAMINOPHEN 325 MG/1
650 TABLET ORAL
Status: COMPLETED | OUTPATIENT
Start: 2018-11-02 | End: 2018-11-02

## 2018-11-02 RX ORDER — HYDROCORTISONE SODIUM SUCCINATE 100 MG/2ML
100 INJECTION, POWDER, FOR SOLUTION INTRAMUSCULAR; INTRAVENOUS ONCE
Status: COMPLETED | OUTPATIENT
Start: 2018-11-02 | End: 2018-11-02

## 2018-11-02 RX ADMIN — HYDROCORTISONE SODIUM SUCCINATE 100 MG: 100 INJECTION, POWDER, FOR SOLUTION INTRAMUSCULAR; INTRAVENOUS at 16:14

## 2018-11-02 RX ADMIN — SODIUM CHLORIDE 259 ML: 900 INJECTION, SOLUTION INTRAVENOUS at 16:28

## 2018-11-02 RX ADMIN — PIPERACILLIN, TAZOBACTAM 4.5 G: 4; .5 INJECTION, POWDER, LYOPHILIZED, FOR SOLUTION INTRAVENOUS at 15:46

## 2018-11-02 RX ADMIN — PIPERACILLIN SODIUM,TAZOBACTAM SODIUM 2.25 G: 2; .25 INJECTION, POWDER, FOR SOLUTION INTRAVENOUS at 21:15

## 2018-11-02 RX ADMIN — SODIUM CHLORIDE 1000 ML: 900 INJECTION, SOLUTION INTRAVENOUS at 15:46

## 2018-11-02 RX ADMIN — ACETAMINOPHEN 650 MG: 325 TABLET ORAL at 21:13

## 2018-11-02 RX ADMIN — LEVOFLOXACIN 750 MG: 5 INJECTION, SOLUTION INTRAVENOUS at 16:11

## 2018-11-02 RX ADMIN — VANCOMYCIN HYDROCHLORIDE 1000 MG: 1 INJECTION, POWDER, LYOPHILIZED, FOR SOLUTION INTRAVENOUS at 21:36

## 2018-11-02 RX ADMIN — SODIUM CHLORIDE 1000 ML: 900 INJECTION, SOLUTION INTRAVENOUS at 16:11

## 2018-11-02 RX ADMIN — VANCOMYCIN HYDROCHLORIDE 1000 MG: 1 INJECTION, POWDER, LYOPHILIZED, FOR SOLUTION INTRAVENOUS at 17:04

## 2018-11-02 NOTE — PROGRESS NOTES
Pt seen today for CBC/NPLATE. Ambulatory with walker. Complains of weakness, blurred vision, headache and fatigue. BP 79/53 automatic. BP retaken. 71/48. Joey Flores NP notified. Based on examination, received order to call 911 for emergency services. Pt agrees. Per NP, will not access port. Report given to EMS. Pt left via stretcher with EMS and all of her belongings. Pt declined for RN to notify family.

## 2018-11-02 NOTE — ED NOTES
Patient urinated, Charlann Cousin discloded. Cleaned patient and changed linen and gown. Recheck rectal temp- increased to 97.3. South hugger discontinued; Warm blankets given.

## 2018-11-02 NOTE — ED PROVIDER NOTES
EMERGENCY DEPARTMENT HISTORY AND PHYSICAL EXAM 
 
3:10 PM 
 
 
Date: 11/2/2018 Patient Name: Bella Escamilla History of Presenting Illness Chief Complaint Patient presents with  Hypotension History Provided By: Patient Chief Complaint: Hypotension Duration: 2 Days Timing:  Constant Quality: Decreased 88/66 Severity: Moderate Modifying Factors: No medication was taken for blood pressure Associated Symptoms: headache, blurred vision, dizziness, unstable gait \"cant't walk straight\" Additional History (Context): Bella Escamilla is a 79 y.o. female with PMHx significant for heart failure, SOB, CHF, diabetes, arthritis, breast cancer who presents via EMS with constant moderate hypotension that started about x 2 days ago. No medication taken for blood pressure. Reports her last blood pressure taken was 88/66 and that her blood pressure has been in the \"80s\" for the past x 2 days. Associated Sx include a headache, blurred vision, dizziness, and an unstable gait. Pt states \"I can't walk straight\". Pt states she was at an infusion center today for an N-plate injection. Her last chemotherapy treatment was x 3 years ago. She has Hx of lung cancer, stomach, and breast cancer. Denies fever, abdominal pain. Denies any further complaints or symptoms at the moment. PCP: Maria Ines Dewitt MD 
 
Current Facility-Administered Medications Medication Dose Route Frequency Provider Last Rate Last Dose  sodium chloride (NS) flush 5-10 mL  5-10 mL IntraVENous PRN Bella Meehan MD      
 vancomycin (VANCOCIN) 1,000 mg in 0.9% sodium chloride (MBP/ADV) 250 mL adv  1,000 mg IntraVENous Q2H Bella Meehan  mL/hr at 11/02/18 1704 1,000 mg at 11/02/18 1704  [START ON 11/4/2018] levoFLOXacin (LEVAQUIN) 750 mg in D5W IVPB  750 mg IntraVENous Q48H Tima Cottrell MD      
 piperacillin-tazobactam (ZOSYN) 2.25 g in 0.9% sodium chloride (MBP/ADV) 50 mL MBP  2.25 g IntraVENous Q6H Juliana Fernández MD      
 
Current Outpatient Medications Medication Sig Dispense Refill  oxyCODONE-acetaminophen (PERCOCET)  mg per tablet Take 1 Tab by mouth every six (6) hours as needed for Pain. Max Daily Amount: 4 Tabs. 120 Tab 0  
 magic mouthwash solution Take 5 mL by mouth three (3) times daily as needed for Stomatitis. Magic mouth wash Maalox Lidocaine 2% viscous Diphenhydramine oral solution Pharmacy to mix equal portions of ingredients to a total volume as indicated in the dispense amount. 236 mL 1  
 albuterol (PROAIR HFA) 90 mcg/actuation inhaler 1-2 puffs every 4-6 hrs. 1 Inhaler 1  
 gabapentin (NEURONTIN) 300 mg capsule Take 1 Cap by mouth three (3) times daily. 90 Cap 1  
 zolpidem (AMBIEN) 10 mg tablet TAKE 1 TABLET BY MOUTH NIGHTLY AS NEEDED FOR SLEEP. MAX DAILY AMOUNT 10 MG 30 Tab 6  promethazine (PHENERGAN) 25 mg tablet Take 1 Tab by mouth every six (6) hours as needed for Nausea. 30 Tab 0  
 lidocaine-prilocaine (EMLA) topical cream APPLY OVER PORT 1 HOUR PRIOR TO CHEMOTHERAPY THAN COVER WITH SARAN WRAP 30 g 6  ergocalciferol (VITAMIN D2) 50,000 unit capsule Take 1 Cap by mouth every seven (7) days. 12 Cap 1  
 topiramate (TOPAMAX) 50 mg tablet   5  
 temazepam (RESTORIL) 30 mg capsule   1  
 LINZESS 145 mcg cap capsule TAKE 1 CAPSULE BY MOUTH DAILY 30 MINUTES BEFORE BREAKFAST  0  
 lisinopril (PRINIVIL, ZESTRIL) 10 mg tablet Take 10 mg by mouth daily. Indications: hypertension  loratadine 10 mg cap Take 10 mg by mouth daily.  digoxin (LANOXIN) 0.125 mg tablet Take 0.125 mg by mouth daily.  carvedilol (COREG) 3.125 mg tablet Take 3.125 mg by mouth two (2) times daily (with meals).  meloxicam (MOBIC) 7.5 mg tablet Take 7.5 mg by mouth daily.  amiodarone (CORDARONE) 200 mg tablet Take 200 mg by mouth daily.  Indications: PREVENTION OF VENTRICULAR FIBRILLATION    
  rivaroxaban (XARELTO) 10 mg tablet Take 10 mg by mouth daily.  furosemide (LASIX) 40 mg tablet Take 40 mg by mouth daily. Indications: Edema  dronabinol (MARINOL) 5 mg capsule Take 1 Cap by mouth two (2) times a day. 60 Cap 1  KLOR-CON M20 20 mEq tablet TAKE ONE TABLET BY MOUTH ONCE A DAY 30 Tab 3  
 aluminum-magnesium hydroxide 200-200 mg/5 mL susp 5 mL, diphenhydrAMINE 12.5 mg/5 mL liqd 12.5 mg, lidocaine 2 % soln 5 mL 5 mL by Swish and Spit route two (2) times a day. Magic mouth wash Maalox Lidocaine 2% viscous Diphenhydramine oral solution Pharmacy to mix equal portions of ingredients to a total volume as indicated in the dispense amount. 240 mL 1  
 potassium chloride (K-DUR, KLOR-CON) 20 mEq tablet Take 2 Tabs by mouth daily. 30 Tab 3  
 senna (SENNA) 8.6 mg tablet Take 1 Tab by mouth daily.  nabumetone (RELAFEN) 750 mg tablet Take 750 mg by mouth daily. Indications: OSTEOARTHRITIS  ondansetron hcl (ZOFRAN) 8 mg tablet Take 1 Tab by mouth every eight (8) hours as needed for Nausea. 30 Tab 3  
 IRON AG&FUM/C/FA/MV CMB11/CA-T (PRUVATE 21-7 PO) Take 325 mg by mouth daily. Indications: iron deficiency Past History Past Medical History: 
Past Medical History:  
Diagnosis Date  Antineoplastic chemotherapy induced anemia  Arrhythmia Atrial Fib  Arthritis  Breast cancer ([facility]  Cancer ([facility] 1999 Breast  
 Cardiomyopathy ([facility]  Chronic ITP (idiopathic thrombocytopenia) (HCC) 10/31/2016 Secondary to Anemia from Chemo  Congestive heart failure ([address]  Diabetes ([address] borderline, no meds  Esophageal cancer ([address] 2005  
 treated with chemo  Heart failure ([address]  SOB (shortness of breath) Past Surgical History: 
Past Surgical History:  
Procedure Laterality Date  BREAST SURGERY PROCEDURE UNLISTED Left 1990s  
 lumpectomy  HX APPENDECTOMY  HX HEENT Tonsillectomy  HX ORTHOPAEDIC    
  HX TUBAL LIGATION    
 HX VASCULAR ACCESS    
 NEUROLOGICAL PROCEDURE UNLISTED  01/08/2018 Lumbar fusion Family History: 
History reviewed. No pertinent family history. Social History: 
Social History Tobacco Use  Smoking status: Never Smoker  Smokeless tobacco: Never Used Substance Use Topics  Alcohol use: No  
 Drug use: No  
 
 
Allergies: Allergies Allergen Reactions  Aspirin Swelling Pt states her whole body swells when she takes aspirin.  Adhesive Unable to Obtain  Nickel Rash  Pcn [Penicillins] Rash Review of Systems Review of Systems Constitutional: Negative for chills, fatigue and fever. HENT: Negative for congestion, rhinorrhea and sore throat. Eyes: Negative for visual disturbance. Positive for blurred vision Respiratory: Negative for cough, shortness of breath and wheezing. Cardiovascular: Negative for chest pain, palpitations and leg swelling. Gastrointestinal: Negative for abdominal pain, diarrhea, nausea and vomiting. Genitourinary: Negative for difficulty urinating and dysuria. Musculoskeletal: Positive for gait problem. Negative for arthralgias. Skin: Negative for rash. Neurological: Positive for dizziness and headaches. Negative for weakness. All other systems reviewed and are negative. Physical Exam  
 
Visit Vitals BP 99/68 Pulse 91 Temp 97.3 °F (36.3 °C) Resp 29 Ht 5' 6\" (1.676 m) Wt 75.3 kg (166 lb) SpO2 100% BMI 26.79 kg/m² Physical Exam  
Constitutional: She is oriented to person, place, and time. She appears well-developed and well-nourished. No distress. HENT:  
Head: Normocephalic and atraumatic. Mouth/Throat: Oropharynx is clear and moist and mucous membranes are normal. No oropharyngeal exudate. Eyes: Conjunctivae and EOM are normal. No scleral icterus. Neck: Normal range of motion. Neck supple. No JVD present. No thyromegaly present. Cardiovascular: Normal rate, regular rhythm, S1 normal, S2 normal, normal heart sounds and intact distal pulses. Exam reveals no gallop and no friction rub. No murmur heard. Pulmonary/Chest: Effort normal and breath sounds normal. No accessory muscle usage. No respiratory distress. She has no wheezes. She has no rhonchi. She has no rales. She exhibits no tenderness. Abdominal: Soft. Normal appearance and bowel sounds are normal. She exhibits no distension and no pulsatile midline mass. There is no tenderness. There is no rebound and no guarding. Musculoskeletal: Normal range of motion. Lymphadenopathy:  
     Head (right side): No submandibular adenopathy present. She has no cervical adenopathy. Neurological: She is alert and oriented to person, place, and time. Moving all extremities. No obvious deficits or facial asymmetry. Skin: Skin is warm, dry and intact. No rash noted. No erythema. Psychiatric: She has a normal mood and affect. Her speech is normal and behavior is normal.  
Nursing note and vitals reviewed. Diagnostic Study Results Labs - Recent Results (from the past 12 hour(s)) EKG, 12 LEAD, INITIAL Collection Time: 11/02/18  2:54 PM  
Result Value Ref Range Ventricular Rate 54 BPM  
 Atrial Rate 227 BPM  
 QRS Duration 104 ms Q-T Interval 476 ms QTC Calculation (Bezet) 451 ms Calculated R Axis -21 degrees Calculated T Axis 16 degrees Diagnosis Atrial fibrillation with slow ventricular response RSR' or QR pattern in V1 suggests right ventricular conduction delay Septal infarct (cited on or before 28-DEC-2017) Abnormal ECG Confirmed by Jessa Peña MD, Yohan Rojas ([id number]) on 11/2/2018 8:80:19 PM 
  
METABOLIC PANEL, COMPREHENSIVE Collection Time: 11/02/18  3:21 PM  
Result Value Ref Range Sodium 141 136 - 145 mmol/L Potassium 3.4 (L) 3.5 - 5.5 mmol/L  Chloride 98 (L) 100 - 108 mmol/L  
 CO2 33 (H) 21 - 32 mmol/L  
 Anion gap 10 3.0 - 18 mmol/L Glucose 81 74 - 99 mg/dL BUN 61 (H) 7.0 - 18 MG/DL Creatinine 2.57 (H) 0.6 - 1.3 MG/DL  
 BUN/Creatinine ratio 24 (H) 12 - 20 GFR est AA 23 (L) >60 ml/min/1.73m2 GFR est non-AA 19 (L) >60 ml/min/1.73m2 Calcium 8.5 8.5 - 10.1 MG/DL Bilirubin, total 0.2 0.2 - 1.0 MG/DL  
 ALT (SGPT) 21 13 - 56 U/L  
 AST (SGOT) 24 15 - 37 U/L Alk. phosphatase 87 45 - 117 U/L Protein, total 7.4 6.4 - 8.2 g/dL Albumin 3.4 3.4 - 5.0 g/dL Globulin 4.0 2.0 - 4.0 g/dL A-G Ratio 0.9 0.8 - 1.7    
CBC WITH AUTOMATED DIFF Collection Time: 11/02/18  3:21 PM  
Result Value Ref Range WBC 6.6 4.6 - 13.2 K/uL  
 RBC 3.62 (L) 4.20 - 5.30 M/uL HGB 9.7 (L) 12.0 - 16.0 g/dL HCT 29.3 (L) 35.0 - 45.0 % MCV 80.9 74.0 - 97.0 FL  
 MCH 26.8 24.0 - 34.0 PG  
 MCHC 33.1 31.0 - 37.0 g/dL  
 RDW 15.3 (H) 11.6 - 14.5 % PLATELET 863 614 - 622 K/uL NEUTROPHILS 54 40 - 73 % LYMPHOCYTES 27 21 - 52 % MONOCYTES 14 (H) 3 - 10 % EOSINOPHILS 5 0 - 5 % BASOPHILS 0 0 - 2 %  
 ABS. NEUTROPHILS 3.6 1.8 - 8.0 K/UL  
 ABS. LYMPHOCYTES 1.8 0.9 - 3.6 K/UL  
 ABS. MONOCYTES 0.9 0.05 - 1.2 K/UL  
 ABS. EOSINOPHILS 0.3 0.0 - 0.4 K/UL  
 ABS. BASOPHILS 0.0 0.0 - 0.1 K/UL  
 DF SMEAR SCANNED    
 PLATELET COMMENTS ADEQUATE PLATELETS    
 RBC COMMENTS NORMOCYTIC, NORMOCHROMIC    
TSH 3RD GENERATION Collection Time: 11/02/18  3:21 PM  
Result Value Ref Range TSH 0.54 0.36 - 3.74 uIU/mL POC LACTIC ACID Collection Time: 11/02/18  3:26 PM  
Result Value Ref Range Lactic Acid (POC) 0.51 0.40 - 2.00 mmol/L  
URINALYSIS W/ RFLX MICROSCOPIC Collection Time: 11/02/18  5:40 PM  
Result Value Ref Range Color YELLOW Appearance CLEAR Specific gravity 1.007 1.005 - 1.030    
 pH (UA) 7.0 5.0 - 8.0 Protein NEGATIVE  NEG mg/dL Glucose NEGATIVE  NEG mg/dL Ketone NEGATIVE  NEG mg/dL Bilirubin NEGATIVE  NEG  Blood NEGATIVE  NEG    
 Urobilinogen 1.0 0.2 - 1.0 EU/dL Nitrites NEGATIVE  NEG Leukocyte Esterase NEGATIVE  NEG Radiologic Studies - Xr Chest [facility] Result Date: 11/2/2018 EXAM:  XR CHEST PORT INDICATION:   Sepsis COMPARISON: 12/28/2017. FINDINGS: Stable right jugular MediPort. Stable mild enlargement of the cardiac silhouette. Mild retrocardiac opacity and chronic blunting the left costophrenic angle. No pneumothorax. No significant pleural effusion. Stable osseous structures. IMPRESSION: Stable mild enlargement of the cardiac silhouette. Mild retrocardiac opacity could represent atelectasis or infiltrate. Chronic blunting of the left costophrenic angle probably pleural scarring. Medical Decision Making I am the first provider for this patient. I reviewed the vital signs, available nursing notes, past medical history, past surgical history, family history and social history. Vital Signs-Reviewed the patient's vital signs. Pulse Oximetry Analysis -  96 % on RA, normal  
 
EKG: Interpreted by the EP. Time Interpreted: 14:54 Rate: 54 bpm  
 Rhythm: Sinus Bradycardia Interpretation: lots of artifacts at baseline appears to to be A-fib, no obvious ST or T-wave changes. Q-wave in V2, V3. Comparison: 01/12/18, A-fib and bradycardia are new Records Reviewed: Nursing Notes and Old Medical Records (Time of Review: 3:10 PM) Provider Notes (Medical Decision Making): ASSESSMENT / PLAN: 
     67y/o AA woman, complex PMhx including multiple cancers in past (breast, esophageal, lymph, all in remission per pt), DM, CHF (LVEF 45%), Chronic ITP due to chemo (on N-plat infusions -CSF stimulator) who presents via EMS for low bp. She reprots general malasie over past few days, body aches, headaches, joint pains. She checked BP yest and it was low at ~80. Today, she went to get her infusion of N-Plat and was sent here when her BP was found to be low at ~80. On arrival, pt's BP ~68 systolic, HR only ~77 (not on b-blockers), Temp 95 rectal, RR mid 20's. HEENT w/dry mucus membranes, CV regular, 2+ Bilat LE pitting edema, lugns CTAB, abd benign. Port in [place] c/d/i, nontender. Presentation concerning for Sepsis. Patient has 2/4 SIRS (RR and low temp) criteria at this time but BP low. Unsure of source. Potential sources include Pulmonary (Pneumonia, Empyema, Malignant Effusion), Genitourinary (Cystitis, Pyelonephritis, Urosepsis), Blood (Bacteremia, Endocarditis), Interabdominal (Appendicitis, cholecystitis, ascending cholangitis, colitis). No obvious skin lesions/cellulitis or joints concerning for septic arthritis. No meningeal signs at this time to suggest meningitis. This could also be due to viral causes such as influenza. I am concerned about her low bp and low hr. She reports being on chronic steroids for her ITP and came off of them over the summer time. This could be adrenal crisis as well. -IV access 
-Monitor 
-EKG 
-Labs: CBC, CMP, Coags, UA, Lactate 
-Cultures: BC x 2, UCx x 1 
-Imaging: CXR 
-IV Fluids:  30cc/kg NS bolus  
-Medications:   
   -Tylenol/Ibuprofen for fever 
   -Antibiotics:  Vanc/levo/zosyn for source control 
-Re-assess: Anticipate Admission 
-If BP doesn't rise with IVF bolus, will start Hydrocort 100mg IV, if still low, will start Levophed. Heidi Jensen MD 
EM-IM Physician ED Course: Progress Notes, Reevaluation, and Consults: 
 
UPDATE  
-CBC nml 
-CMP w/JUAN cre ~2.5 from normal baseline 
-CXR w/possible retrocardiac ASD 
-UA neg 
-BP did not rise with IVF 
-Gave 100mg IV Hydrocortisone and BP back to ~540 sysotlic now, pt more alert at this time.  
-I spoke with Dr. Valentino Jarrell at [facility] who gracioulsy accepted for admission. I added TSh, Free T4 and PM Cortisol level per her request.  
 
Gloria Garcia MD 
EM-IM Physician 6:23 PM Consult: I discussed care with Dr. Lizbeth Reid (Hospitalist). It was a standard discussion including patient history, chief complaint, available diagnostic results, and predicted treatment course. Agrees to accept admission for pt. Critical Care Time: The services I provided to this patient were to treat and/or prevent clinically significant deterioration that could result in the failure of one or more body systems and/or organ systems due to sepsis. Services included the following: 
-reviewing nursing notes and old charts 
-vital sign assessments 
-direct patient care 
-medication orders and management 
-interpreting and reviewing diagnostic studies/labs 
-re-evaluations 
-documentation time Aggregate critical care time was 40 minutes, which includes only time during which I was engaged in work directly related to the patient's care as described above, whether I was at bedside or elsewhere in the Emergency Department. It did not include time spent performing other reported procedures or the services of residents, students, nurses, or advance practice providers. Chanda Kehr, MD 
 
6:39 PM 
 
 
Diagnosis Clinical Impression: 1. Sepsis, due to unspecified organism ([facility] 2. Adrenal crisis ([facility] Disposition: adm Follow-up Information None Medication List  
  
ASK your doctor about these medications   
albuterol 90 mcg/actuation inhaler Commonly known as:  PROAIR HFA 
1-2 puffs every 4-6 hrs. aluminum-magnesium hydroxide 200-200 mg/5 mL susp 5 mL, diphenhydrAMINE 12.5 mg/5 mL liqd 12.5 mg, lidocaine 2 % soln 5 mL 
5 mL by Swish and Spit route two (2) times a day. Magic mouth wash Maalox Lidocaine 2% viscous Diphenhydramine oral solution Pharmacy to mix equal portions of ingredients to a total volume as indicated in the dispense amount. amiodarone 200 mg tablet Commonly known as:  CORDARONE 
  
COREG 3.125 mg tablet Generic drug:  carvedilol 
  
digoxin 0.125 mg tablet Commonly known as:  LANOXIN 
  
dronabinol 5 mg capsule Commonly known as:  Emilee Shark Take 1 Cap by mouth two (2) times a day. ergocalciferol 50,000 unit capsule Commonly known as:  VITAMIN D2 Take 1 Cap by mouth every seven (7) days. furosemide 40 mg tablet Commonly known as:  LASIX 
  
gabapentin 300 mg capsule Commonly known as:  NEURONTIN Take 1 Cap by mouth three (3) times daily. lidocaine-prilocaine topical cream 
Commonly known as:  EMLA 
APPLY OVER PORT 1 HOUR PRIOR TO CHEMOTHERAPY THAN COVER WITH "[employer] WRAP LINZESS 145 mcg Cap capsule Generic drug:  linaclotide 
  
lisinopril 10 mg tablet Commonly known as:  PRINIVIL, ZESTRIL 
  
loratadine 10 mg Cap 
  
magic mouthwash solution Take 5 mL by mouth three (3) times daily as needed for Stomatitis. Magic mouth wash Maalox Lidocaine 2% viscous Diphenhydramine oral solution Pharmacy to mix equal portions of ingredients to a total volume as indicated in the dispense amount. meloxicam 7.5 mg tablet Commonly known as:  MOBIC 
  
nabumetone 750 mg tablet Commonly known as:  RELAFEN 
  
ondansetron hcl 8 mg tablet Commonly known as:  Jenean Broach Take 1 Tab by mouth every eight (8) hours as needed for Nausea. oxyCODONE-acetaminophen  mg per tablet Commonly known as:  PERCOCET Take 1 Tab by mouth every six (6) hours as needed for Pain. Max Daily Amount: 4 Tabs. * potassium chloride 20 mEq tablet Commonly known as:  K-DUR, KLOR-CON Take 2 Tabs by mouth daily. * KLOR-CON M20 20 mEq tablet Generic drug:  potassium chloride TAKE ONE TABLET BY MOUTH ONCE A DAY promethazine 25 mg tablet Commonly known as:  PHENERGAN Take 1 Tab by mouth every six (6) hours as needed for Nausea. PRUVATE 21-7 PO Senna 8.6 mg tablet Generic drug:  senna 
  
temazepam 30 mg capsule Commonly known as:  RESTORIL 
  
topiramate 50 mg tablet Commonly known as:  TOPAMAX XARELTO 10 mg tablet Generic drug:  rivaroxaban 
  
zolpidem 10 mg tablet Commonly known as:  AMBIEN 
TAKE 1 TABLET BY MOUTH NIGHTLY AS NEEDED FOR SLEEP. MAX DAILY AMOUNT 10 MG * This list has 2 medication(s) that are the same as other medications prescribed for you. Read the directions carefully, and ask your doctor or other care provider to review them with you.  
  
  
  
 
_______________________________ Attestations: 
Scribe Attestation Tati De La O (Aj) acting as a scribe for and in the presence of Queenie Gupta MD     
November 02, 2018 at 3:10 PM 
    
Provider Attestation:     
I personally performed the services described in the documentation, reviewed the documentation, as recorded by the scribe in my presence, and it accurately and completely records my words and actions. November 02, 2018 at 3:10 PM - Queenie Gupta MD   
_______________________________

## 2018-11-02 NOTE — ED TRIAGE NOTES
Pt  From outpt  Infusion center  For lab draw and ? Infusion. On arrival there  Noted BP was in the 70's sys. Pt has been  Complaining of  HA x 3 days

## 2018-11-03 PROBLEM — J18.9 PNEUMONIA: Status: ACTIVE | Noted: 2018-11-03

## 2018-11-03 PROBLEM — C50.919 METASTATIC BREAST CANCER (HCC): Status: ACTIVE | Noted: 2018-11-03

## 2018-11-03 PROBLEM — I95.9 HYPOTENSION: Status: ACTIVE | Noted: 2018-11-03

## 2018-11-03 LAB
ANION GAP SERPL CALC-SCNC: 8 MMOL/L (ref 3–18)
BASOPHILS # BLD: 0 K/UL (ref 0–0.1)
BASOPHILS NFR BLD: 0 % (ref 0–2)
BUN SERPL-MCNC: 49 MG/DL (ref 7–18)
BUN/CREAT SERPL: 28 (ref 12–20)
CALCIUM SERPL-MCNC: 8.3 MG/DL (ref 8.5–10.1)
CHLORIDE SERPL-SCNC: 105 MMOL/L (ref 100–108)
CK MB CFR SERPL CALC: 1.1 % (ref 0–4)
CK MB SERPL-MCNC: 1.3 NG/ML (ref 5–25)
CK SERPL-CCNC: 122 U/L (ref 26–192)
CO2 SERPL-SCNC: 29 MMOL/L (ref 21–32)
CORTIS PM SERPL-MCNC: 4.4 UG/DL (ref 3.09–16.66)
CREAT SERPL-MCNC: 1.78 MG/DL (ref 0.6–1.3)
DIFFERENTIAL METHOD BLD: ABNORMAL
DIGOXIN SERPL-MCNC: 0.1 NG/ML (ref 0.9–2)
DIGOXIN SERPL-MCNC: 0.7 NG/ML (ref 0.9–2)
EOSINOPHIL # BLD: 0 K/UL (ref 0–0.4)
EOSINOPHIL NFR BLD: 0 % (ref 0–5)
ERYTHROCYTE [DISTWIDTH] IN BLOOD BY AUTOMATED COUNT: 15.4 % (ref 11.6–14.5)
GLUCOSE BLD STRIP.AUTO-MCNC: 96 MG/DL (ref 70–110)
GLUCOSE BLD STRIP.AUTO-MCNC: 98 MG/DL (ref 70–110)
GLUCOSE SERPL-MCNC: 100 MG/DL (ref 74–99)
HCT VFR BLD AUTO: 28.1 % (ref 35–45)
HGB BLD-MCNC: 9.5 G/DL (ref 12–16)
LYMPHOCYTES # BLD: 0.9 K/UL (ref 0.9–3.6)
LYMPHOCYTES NFR BLD: 17 % (ref 21–52)
MAGNESIUM SERPL-MCNC: 2.4 MG/DL (ref 1.6–2.6)
MCH RBC QN AUTO: 26.7 PG (ref 24–34)
MCHC RBC AUTO-ENTMCNC: 33.8 G/DL (ref 31–37)
MCV RBC AUTO: 78.9 FL (ref 74–97)
MONOCYTES # BLD: 0.8 K/UL (ref 0.05–1.2)
MONOCYTES NFR BLD: 15 % (ref 3–10)
NEUTS SEG # BLD: 3.5 K/UL (ref 1.8–8)
NEUTS SEG NFR BLD: 68 % (ref 40–73)
PLATELET # BLD AUTO: 146 K/UL (ref 135–420)
PLATELET COMMENTS,PCOM: ABNORMAL
POTASSIUM SERPL-SCNC: 3.8 MMOL/L (ref 3.5–5.5)
RBC # BLD AUTO: 3.56 M/UL (ref 4.2–5.3)
RBC MORPH BLD: ABNORMAL
RBC MORPH BLD: ABNORMAL
SODIUM SERPL-SCNC: 142 MMOL/L (ref 136–145)
TROPONIN I SERPL-MCNC: 0.06 NG/ML (ref 0–0.04)
VANCOMYCIN SERPL-MCNC: 21.7 UG/ML (ref 5–40)
WBC # BLD AUTO: 5.2 K/UL (ref 4.6–13.2)

## 2018-11-03 PROCEDURE — 74011000250 HC RX REV CODE- 250: Performed by: INTERNAL MEDICINE

## 2018-11-03 PROCEDURE — 83735 ASSAY OF MAGNESIUM: CPT | Performed by: INTERNAL MEDICINE

## 2018-11-03 PROCEDURE — 94640 AIRWAY INHALATION TREATMENT: CPT

## 2018-11-03 PROCEDURE — 74011250637 HC RX REV CODE- 250/637: Performed by: INTERNAL MEDICINE

## 2018-11-03 PROCEDURE — 80162 ASSAY OF DIGOXIN TOTAL: CPT | Performed by: INTERNAL MEDICINE

## 2018-11-03 PROCEDURE — 80048 BASIC METABOLIC PNL TOTAL CA: CPT | Performed by: INTERNAL MEDICINE

## 2018-11-03 PROCEDURE — 74011250636 HC RX REV CODE- 250/636: Performed by: INTERNAL MEDICINE

## 2018-11-03 PROCEDURE — 65660000004 HC RM CVT STEPDOWN

## 2018-11-03 PROCEDURE — 80202 ASSAY OF VANCOMYCIN: CPT | Performed by: INTERNAL MEDICINE

## 2018-11-03 PROCEDURE — 82550 ASSAY OF CK (CPK): CPT | Performed by: INTERNAL MEDICINE

## 2018-11-03 PROCEDURE — 65620000000 HC RM CCU GENERAL

## 2018-11-03 PROCEDURE — 36415 COLL VENOUS BLD VENIPUNCTURE: CPT | Performed by: INTERNAL MEDICINE

## 2018-11-03 PROCEDURE — 82962 GLUCOSE BLOOD TEST: CPT

## 2018-11-03 PROCEDURE — 74011000258 HC RX REV CODE- 258: Performed by: INTERNAL MEDICINE

## 2018-11-03 PROCEDURE — 85025 COMPLETE CBC W/AUTO DIFF WBC: CPT | Performed by: INTERNAL MEDICINE

## 2018-11-03 PROCEDURE — 93005 ELECTROCARDIOGRAM TRACING: CPT

## 2018-11-03 RX ORDER — POTASSIUM CHLORIDE 20 MEQ/1
20 TABLET, EXTENDED RELEASE ORAL DAILY
Status: DISCONTINUED | OUTPATIENT
Start: 2018-11-03 | End: 2018-11-03

## 2018-11-03 RX ORDER — PROMETHAZINE HYDROCHLORIDE 25 MG/1
25 TABLET ORAL
Status: DISCONTINUED | OUTPATIENT
Start: 2018-11-03 | End: 2018-11-03

## 2018-11-03 RX ORDER — IPRATROPIUM BROMIDE AND ALBUTEROL SULFATE 2.5; .5 MG/3ML; MG/3ML
3 SOLUTION RESPIRATORY (INHALATION)
Status: DISCONTINUED | OUTPATIENT
Start: 2018-11-03 | End: 2018-01-01 | Stop reason: HOSPADM

## 2018-11-03 RX ORDER — ONDANSETRON 2 MG/ML
4 INJECTION INTRAMUSCULAR; INTRAVENOUS
Status: DISCONTINUED | OUTPATIENT
Start: 2018-11-03 | End: 2018-01-01 | Stop reason: HOSPADM

## 2018-11-03 RX ORDER — DIPHENHYDRAMINE HYDROCHLORIDE 50 MG/ML
12.5 INJECTION, SOLUTION INTRAMUSCULAR; INTRAVENOUS
Status: DISCONTINUED | OUTPATIENT
Start: 2018-11-03 | End: 2018-11-03

## 2018-11-03 RX ORDER — DICLOFENAC SODIUM 10 MG/G
2 GEL TOPICAL
Status: DISCONTINUED | OUTPATIENT
Start: 2018-11-03 | End: 2018-01-01 | Stop reason: HOSPADM

## 2018-11-03 RX ORDER — TRAMADOL HYDROCHLORIDE AND ACETAMINOPHEN 37.5; 325 MG/1; MG/1
1 TABLET ORAL
Status: DISCONTINUED | OUTPATIENT
Start: 2018-11-03 | End: 2018-01-01

## 2018-11-03 RX ORDER — SODIUM CHLORIDE 0.9 % (FLUSH) 0.9 %
5-10 SYRINGE (ML) INJECTION AS NEEDED
Status: DISCONTINUED | OUTPATIENT
Start: 2018-11-03 | End: 2018-01-01 | Stop reason: HOSPADM

## 2018-11-03 RX ORDER — MORPHINE SULFATE 2 MG/ML
2 INJECTION, SOLUTION INTRAMUSCULAR; INTRAVENOUS
Status: DISCONTINUED | OUTPATIENT
Start: 2018-11-03 | End: 2018-11-03

## 2018-11-03 RX ORDER — NALOXONE HYDROCHLORIDE 0.4 MG/ML
0.4 INJECTION, SOLUTION INTRAMUSCULAR; INTRAVENOUS; SUBCUTANEOUS AS NEEDED
Status: DISCONTINUED | OUTPATIENT
Start: 2018-11-03 | End: 2018-01-01 | Stop reason: HOSPADM

## 2018-11-03 RX ORDER — MIDODRINE HYDROCHLORIDE 5 MG/1
5 TABLET ORAL 2 TIMES DAILY WITH MEALS
Status: DISCONTINUED | OUTPATIENT
Start: 2018-11-03 | End: 2018-11-03

## 2018-11-03 RX ORDER — SODIUM CHLORIDE 9 MG/ML
500 INJECTION, SOLUTION INTRAVENOUS ONCE
Status: COMPLETED | OUTPATIENT
Start: 2018-11-03 | End: 2018-11-03

## 2018-11-03 RX ORDER — POTASSIUM CHLORIDE 20 MEQ/1
40 TABLET, EXTENDED RELEASE ORAL DAILY
Status: DISCONTINUED | OUTPATIENT
Start: 2018-11-03 | End: 2018-11-03

## 2018-11-03 RX ORDER — ALBUTEROL SULFATE 90 UG/1
2 AEROSOL, METERED RESPIRATORY (INHALATION)
Status: DISCONTINUED | OUTPATIENT
Start: 2018-11-03 | End: 2018-11-03 | Stop reason: CLARIF

## 2018-11-03 RX ORDER — LORATADINE 10 MG/1
10 TABLET ORAL DAILY
Status: DISCONTINUED | OUTPATIENT
Start: 2018-11-03 | End: 2018-01-01 | Stop reason: HOSPADM

## 2018-11-03 RX ORDER — SENNOSIDES 8.6 MG/1
1 TABLET ORAL DAILY
Status: DISCONTINUED | OUTPATIENT
Start: 2018-11-03 | End: 2018-11-04

## 2018-11-03 RX ORDER — AMIODARONE HYDROCHLORIDE 200 MG/1
200 TABLET ORAL DAILY
Status: DISCONTINUED | OUTPATIENT
Start: 2018-11-03 | End: 2018-11-04

## 2018-11-03 RX ORDER — ACETAMINOPHEN 325 MG/1
650 TABLET ORAL
Status: DISCONTINUED | OUTPATIENT
Start: 2018-11-03 | End: 2018-01-01 | Stop reason: HOSPADM

## 2018-11-03 RX ORDER — DOCUSATE SODIUM 100 MG/1
100 CAPSULE, LIQUID FILLED ORAL DAILY
Status: DISCONTINUED | OUTPATIENT
Start: 2018-11-03 | End: 2018-01-01 | Stop reason: HOSPADM

## 2018-11-03 RX ORDER — LANOLIN ALCOHOL/MO/W.PET/CERES
3 CREAM (GRAM) TOPICAL
Status: DISCONTINUED | OUTPATIENT
Start: 2018-11-03 | End: 2018-01-01 | Stop reason: HOSPADM

## 2018-11-03 RX ORDER — DOPAMINE HYDROCHLORIDE 160 MG/100ML
2.5 INJECTION, SOLUTION INTRAVENOUS CONTINUOUS
Status: DISCONTINUED | OUTPATIENT
Start: 2018-11-03 | End: 2018-01-01

## 2018-11-03 RX ORDER — OXYCODONE AND ACETAMINOPHEN 10; 325 MG/1; MG/1
1 TABLET ORAL
Status: DISCONTINUED | OUTPATIENT
Start: 2018-11-03 | End: 2018-11-03

## 2018-11-03 RX ORDER — DOCUSATE SODIUM 50 MG/5ML
100 LIQUID ORAL DAILY
Status: DISCONTINUED | OUTPATIENT
Start: 2018-11-03 | End: 2018-11-03

## 2018-11-03 RX ORDER — ALBUTEROL SULFATE 0.83 MG/ML
2.5 SOLUTION RESPIRATORY (INHALATION)
Status: DISCONTINUED | OUTPATIENT
Start: 2018-11-03 | End: 2018-11-04

## 2018-11-03 RX ORDER — DIGOXIN 125 MCG
0.12 TABLET ORAL DAILY
Status: DISCONTINUED | OUTPATIENT
Start: 2018-11-03 | End: 2018-11-04

## 2018-11-03 RX ORDER — DRONABINOL 2.5 MG/1
2.5 CAPSULE ORAL 2 TIMES DAILY
Status: DISCONTINUED | OUTPATIENT
Start: 2018-11-03 | End: 2018-11-03

## 2018-11-03 RX ORDER — SODIUM CHLORIDE 9 MG/ML
250 INJECTION, SOLUTION INTRAVENOUS ONCE
Status: COMPLETED | OUTPATIENT
Start: 2018-11-03 | End: 2018-11-03

## 2018-11-03 RX ORDER — LEVOFLOXACIN 5 MG/ML
750 INJECTION, SOLUTION INTRAVENOUS EVERY 24 HOURS
Status: DISCONTINUED | OUTPATIENT
Start: 2018-11-03 | End: 2018-11-03 | Stop reason: SDUPTHER

## 2018-11-03 RX ORDER — TOPIRAMATE 25 MG/1
50 TABLET ORAL DAILY
Status: DISCONTINUED | OUTPATIENT
Start: 2018-11-03 | End: 2018-01-01 | Stop reason: HOSPADM

## 2018-11-03 RX ORDER — SODIUM CHLORIDE 9 MG/ML
75 INJECTION, SOLUTION INTRAVENOUS CONTINUOUS
Status: DISPENSED | OUTPATIENT
Start: 2018-11-03 | End: 2018-11-04

## 2018-11-03 RX ORDER — SODIUM CHLORIDE 0.9 % (FLUSH) 0.9 %
5-10 SYRINGE (ML) INJECTION EVERY 8 HOURS
Status: DISCONTINUED | OUTPATIENT
Start: 2018-11-03 | End: 2018-01-01 | Stop reason: HOSPADM

## 2018-11-03 RX ORDER — GABAPENTIN 300 MG/1
300 CAPSULE ORAL 3 TIMES DAILY
Status: DISCONTINUED | OUTPATIENT
Start: 2018-11-03 | End: 2018-11-03

## 2018-11-03 RX ORDER — GABAPENTIN 100 MG/1
200 CAPSULE ORAL 3 TIMES DAILY
Status: DISCONTINUED | OUTPATIENT
Start: 2018-11-03 | End: 2018-01-01 | Stop reason: HOSPADM

## 2018-11-03 RX ORDER — TEMAZEPAM 15 MG/1
30 CAPSULE ORAL
Status: DISCONTINUED | OUTPATIENT
Start: 2018-11-03 | End: 2018-11-03

## 2018-11-03 RX ORDER — DRONABINOL 2.5 MG/1
5 CAPSULE ORAL 2 TIMES DAILY
Status: DISCONTINUED | OUTPATIENT
Start: 2018-11-03 | End: 2018-11-03

## 2018-11-03 RX ADMIN — ONDANSETRON 4 MG: 2 INJECTION INTRAMUSCULAR; INTRAVENOUS at 11:56

## 2018-11-03 RX ADMIN — GABAPENTIN 200 MG: 100 CAPSULE ORAL at 21:38

## 2018-11-03 RX ADMIN — DRONABINOL 5 MG: 2.5 CAPSULE ORAL at 09:13

## 2018-11-03 RX ADMIN — TOPIRAMATE 50 MG: 25 TABLET, FILM COATED ORAL at 09:13

## 2018-11-03 RX ADMIN — SODIUM CHLORIDE 75 ML/HR: 900 INJECTION, SOLUTION INTRAVENOUS at 21:43

## 2018-11-03 RX ADMIN — RIVAROXABAN 10 MG: 10 TABLET, FILM COATED ORAL at 09:13

## 2018-11-03 RX ADMIN — LINACLOTIDE 145 MCG: 145 CAPSULE, GELATIN COATED ORAL at 09:00

## 2018-11-03 RX ADMIN — AZTREONAM 1 G: 1 INJECTION, POWDER, LYOPHILIZED, FOR SOLUTION INTRAMUSCULAR; INTRAVENOUS at 11:09

## 2018-11-03 RX ADMIN — Medication 10 ML: at 14:00

## 2018-11-03 RX ADMIN — DIGOXIN 0.12 MG: 125 TABLET ORAL at 09:13

## 2018-11-03 RX ADMIN — ALBUTEROL SULFATE 2.5 MG: 2.5 SOLUTION RESPIRATORY (INHALATION) at 08:00

## 2018-11-03 RX ADMIN — SODIUM CHLORIDE 250 ML: 900 INJECTION, SOLUTION INTRAVENOUS at 11:08

## 2018-11-03 RX ADMIN — ALBUTEROL SULFATE 2.5 MG: 2.5 SOLUTION RESPIRATORY (INHALATION) at 16:00

## 2018-11-03 RX ADMIN — ALBUTEROL SULFATE 2.5 MG: 2.5 SOLUTION RESPIRATORY (INHALATION) at 23:37

## 2018-11-03 RX ADMIN — AMIODARONE HYDROCHLORIDE 200 MG: 200 TABLET ORAL at 09:13

## 2018-11-03 RX ADMIN — MULTIPLE VITAMINS W/ MINERALS TAB 1 TABLET: TAB at 10:35

## 2018-11-03 RX ADMIN — SENNOSIDES 8.6 MG: 8.6 TABLET, FILM COATED ORAL at 09:13

## 2018-11-03 RX ADMIN — LORATADINE 10 MG: 10 TABLET ORAL at 09:13

## 2018-11-03 RX ADMIN — SODIUM CHLORIDE 125 ML/HR: 900 INJECTION, SOLUTION INTRAVENOUS at 03:53

## 2018-11-03 RX ADMIN — Medication 10 ML: at 06:00

## 2018-11-03 RX ADMIN — SODIUM CHLORIDE 500 ML: 900 INJECTION, SOLUTION INTRAVENOUS at 20:17

## 2018-11-03 RX ADMIN — ALBUTEROL SULFATE 2.5 MG: 2.5 SOLUTION RESPIRATORY (INHALATION) at 11:45

## 2018-11-03 RX ADMIN — ALBUTEROL SULFATE 2.5 MG: 2.5 SOLUTION RESPIRATORY (INHALATION) at 19:30

## 2018-11-03 RX ADMIN — DOPAMINE HYDROCHLORIDE IN DEXTROSE 5 MCG/KG/MIN: 1.6 INJECTION, SOLUTION INTRAVENOUS at 15:00

## 2018-11-03 RX ADMIN — MIDODRINE HYDROCHLORIDE 5 MG: 5 TABLET ORAL at 11:07

## 2018-11-03 RX ADMIN — AZTREONAM 1 G: 1 INJECTION, POWDER, LYOPHILIZED, FOR SOLUTION INTRAMUSCULAR; INTRAVENOUS at 21:42

## 2018-11-03 RX ADMIN — GABAPENTIN 300 MG: 300 CAPSULE ORAL at 09:13

## 2018-11-03 RX ADMIN — AZTREONAM 2 G: 2 INJECTION, POWDER, LYOPHILIZED, FOR SOLUTION INTRAMUSCULAR; INTRAVENOUS at 04:17

## 2018-11-03 RX ADMIN — DOCUSATE SODIUM 100 MG: 100 CAPSULE, LIQUID FILLED ORAL at 11:07

## 2018-11-03 RX ADMIN — MORPHINE SULFATE 2 MG: 2 INJECTION, SOLUTION INTRAMUSCULAR; INTRAVENOUS at 04:17

## 2018-11-03 RX ADMIN — VANCOMYCIN HYDROCHLORIDE 1000 MG: 1 INJECTION, POWDER, LYOPHILIZED, FOR SOLUTION INTRAVENOUS at 12:42

## 2018-11-03 NOTE — PROGRESS NOTES
SUBJECTIVE: 
 
Dizziness better. No chest pain currently. MOODY better. No cough. No nausea or vomiting. Chronic joint and leg pain. No abdominal pain. No dizziness. Son is at bedside. Saw dr. Jazzmine Bartholomew on 1028/18 for worsening SOB and was started on lisinopril and Zaroxolyn. After 4 days being on these medications, she started feeling weak and dizzy. OBJECTIVE: 
 
/62   Pulse (!) 51   Temp 97.3 °F (36.3 °C)   Resp 16   Ht 5' 6\" (1.676 m)   Wt 81 kg (178 lb 9.2 oz)   SpO2 100%   Breastfeeding? No   BMI 28.82 kg/m² HEENT:  No pallor. Oral mucosa is moist 
Neck: trachea is midline CVS: IRIR 
RS: Diminished in bases, no wheezes GI: NT, BS + Extremities: pedal edema CNS: moves all extremities while in bed GENERAL: NAD, Awake ASSESSMENT: 
 
1. Hypotension sec to severe dehydration caused by diuretics. Rule out ACS. [no sepsis] 2. Sinus bradycardia, asymptomatic - could be due to amiodarone use in setting of # 7 
3. 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 Chronic Afib 5. H/o lung and breast cancer 6. H/o ITP 7. JUAN due to diuretics induced dehydration 8. Peripheral neuropathy and DJD PLAN: 
 
Fluid resuscitation was provided. Started on dopamine low dose due to bradycardia. Reduce IVF Cardiology consulted ECHO ordered Can reduce dopamine at 2.5 if her SBP is over 125 and HR is more than 60 until 6 pm - d/w nursing. Discontinue antibiotic tomorrow if blood c/s remains negative Add topical pain medication BMP:  
Lab Results Component Value Date/Time  11/03/2018 05:05 AM  
 K 3.8 11/03/2018 05:05 AM  
  11/03/2018 05:05 AM  
 CO2 29 11/03/2018 05:05 AM  
 AGAP 8 11/03/2018 05:05 AM  
  (H) 11/03/2018 05:05 AM  
 BUN 49 (H) 11/03/2018 05:05 AM  
 CREA 1.78 (H) 11/03/2018 05:05 AM  
 GFRAA 35 (L) 11/03/2018 05:05 AM  
 GFRNA 29 (L) 11/03/2018 05:05 AM  
 
CBC:  
Lab Results Component Value Date/Time WBC 5.2 11/03/2018 05:05 AM  
 HGB 9.5 (L) 11/03/2018 05:05 AM  
 HCT 28.1 (L) 11/03/2018 05:05 AM  
  11/03/2018 05:05 AM

## 2018-11-03 NOTE — CONSULTS
[facility] - Consult Note Consultation request by Inga Patiño MD for advice/opinion related to evaluating Sepsis [facility]); Hypotension; Sepsis ([facility] Pneumonia;Metastatic breast cancer ([facility] Date of  Admission: 11/2/2018  2:48 PM  
Primary Care Physician:  Adithya Reinoso MD 
 
 Assessment:  
 
Patient Active Problem List  
Diagnosis Code  Hypokalemia E87.6  Breast cancer metastasized to axillary lymph node ([address] C50.919, C77.3  Anemia D64.9  
 Cachexia ([address] R64  Weakness R53.1  Vitamin D deficiency E55.9  Iron deficiency anemia D50.9  Chronic anemia D64.9  Back pain of thoracolumbar region M54.5, M54.6  Thrombocytopenia ([facility] D69.6  Chronic ITP (idiopathic thrombocytopenia) (HCC) D69.3  Muscular deconditioning R29.898  Insomnia G47.00  Bilateral lower extremity edema R60.0  Vertigo R42  Spinal stenosis of lumbar region with neurogenic claudication M48.062  Spondylisthesis M43.10  Lumbar stenosis M48.061  Lumbar stenosis with neurogenic claudication M48.062  
 S/P lumbar fusion Z98.1  Cancer associated pain G89.3  Oropharyngeal bleeding J39.2  Peripheral neuropathy G62.9  Sepsis ([address] A41.9  Pneumonia J18.9  Hypotension I95.9  Metastatic breast cancer ([facility] [address] C50.919  
cAF- on Xarelto (no ASA due to allergy) Diastolic CHF, known with volume depletion Plan:  
Symptomatic hypotension and bradycardia improving with dop gtt Likely over diuresis with JUAN, greatly improving Continue close monitoring and will likely need to hold/ adjust several meds Thank you for allowing me to participate in the care of your patient, I agree with your management and made no major changes to your current plan. I will reassess her in the morning but please call me if any problems in the interim. History of Present Illness:   
 
This is a 79 y.o. female admitted for hypotension, bradycardia that was incidentally found at the infusion center. She has a history of: Diastolic HF with recent fluid overload, cAFib, H/o metastatic breast cancer (s/p 14 cycles chemo, now in remission). She recently saw her cardiologist and was given Zaroxyln and Lisinopril. She urinated a lot and noticed improved swelling but then felt very tired. She was found to have JUAN and likely intravascular volume depletion upon admission. She BP did not improve with gentle IVF so dopamine gtt was started. Both her BP and HR are improved at the time of my evaluation. She also says she feels better. Her HR is slow AF in the 50s. She has no complaints. Primary cardiologist: Dr Angelina Cary Review of Symptoms:  Except as stated above include: 
Constitutional:  Negative Ears, nose, throat:  Negative Respiratory:  negative Cardiovascular:  negative Gastrointestinal: negative Genitourinary:  negative Musculoskeletal:  Negative Neurological:  Negative Dermatological:  Negative Hematological: Negative Endocrinological: Negative Allergy: Negative Psychological:  Negative Past Medical History:  
 
Past Medical History:  
Diagnosis Date  Antineoplastic chemotherapy induced anemia  Arrhythmia Atrial Fib  Arthritis  Breast cancer ([facility]  Cancer ([address] 1999 Breast  
 Cardiomyopathy ([address]  Chronic ITP (idiopathic thrombocytopenia) (HCC) 10/31/2016 Secondary to Anemia from Chemo  Congestive heart failure ([address]  Diabetes ([address] borderline, no meds  Esophageal cancer ([address] 2005  
 treated with chemo  Heart failure ([address]  SOB (shortness of breath) Social History:  
 
Social History Socioeconomic History  Marital status:  Spouse name: Not on file  Number of children: Not on file  Years of education: Not on file  Highest education level: Not on file Social Needs  Financial resource strain: Not on file  Food insecurity - worry: Not on file  Food insecurity - inability: Not on file  Transportation needs - medical: Not on file  Transportation needs - non-medical: Not on file Occupational History  Not on file Tobacco Use  Smoking status: Never Smoker  Smokeless tobacco: Never Used Substance and Sexual Activity  Alcohol use: No  
 Drug use: No  
 Sexual activity: Not on file Other Topics Concern  Not on file Social History Narrative  Not on file Family History:  
History reviewed. No pertinent family history. Medications: Allergies Allergen Reactions  Aspirin Swelling Pt states her whole body swells when she takes aspirin.  Adhesive Unable to Obtain  Nickel Rash  Pcn [Penicillins] Rash Current Facility-Administered Medications Medication Dose Route Frequency  amiodarone (CORDARONE) tablet 200 mg  200 mg Oral DAILY  digoxin (LANOXIN) tablet 0.125 mg  0.125 mg Oral DAILY  multivitamin, tx-iron-ca-min (THERA-M w/ IRON) tablet 1 Tab  1 Tab Oral DAILY  linaclotide (LINZESS) capsule 145 mcg  145 mcg Oral ACB  loratadine (CLARITIN) tablet 10 mg  10 mg Oral DAILY  rivaroxaban (XARELTO) tablet 10 mg  10 mg Oral DAILY  senna (SENOKOT) tablet 8.6 mg  1 Tab Oral DAILY  topiramate (TOPAMAX) tablet 50 mg  50 mg Oral DAILY  0.9% sodium chloride infusion  75 mL/hr IntraVENous CONTINUOUS  
 naloxone (NARCAN) injection 0.4 mg  0.4 mg IntraVENous PRN  
 ondansetron (ZOFRAN) injection 4 mg  4 mg IntraVENous Q4H PRN  
 sodium chloride (NS) flush 5-10 mL  5-10 mL IntraVENous Q8H  
 sodium chloride (NS) flush 5-10 mL  5-10 mL IntraVENous PRN  
 albuterol-ipratropium (DUO-NEB) 2.5 MG-0.5 MG/3 ML  3 mL Nebulization Q4H PRN  
 albuterol (PROVENTIL VENTOLIN) nebulizer solution 2.5 mg  2.5 mg Nebulization Q4H RT  
 magic mouthwash (FIRST-MOUTHWASH BLM) oral suspension 5 mL  5 mL Oral TID PRN  
 aztreonam (AZACTAM) 1 g in 0.9% sodium chloride (MBP/ADV) 100 mL MBP  1 g IntraVENous Q8H  
 docusate sodium (COLACE) capsule 100 mg  100 mg Oral DAILY  DOPamine (INTROPIN) 800 mg in dextrose 5% 500 mL infusion  5 mcg/kg/min IntraVENous CONTINUOUS  
 traMADol-acetaminophen (ULTRACET) per tablet 1 Tab  1 Tab Oral Q6H PRN  
 acetaminophen (TYLENOL) tablet 650 mg  650 mg Oral Q6H PRN  
 melatonin tablet 3 mg  3 mg Oral QHS PRN  
 diclofenac (VOLTAREN) 1 % topical gel 2 g  2 g Topical TID PRN  
 gabapentin (NEURONTIN) capsule 200 mg  200 mg Oral TID  sodium chloride (NS) flush 5-10 mL  5-10 mL IntraVENous PRN  
 [START ON 11/4/2018] levoFLOXacin (LEVAQUIN) 750 mg in D5W IVPB  750 mg IntraVENous Q48H  VANCOMYCIN INFORMATION NOTE   Other Rx Dosing/Monitoring Physical Exam:  
 
Visit Vitals /62 Pulse (!) 51 Temp 97.3 °F (36.3 °C) Resp 16 Ht 5' 6\" (1.676 m) Wt 81 kg (178 lb 9.2 oz) SpO2 100% Breastfeeding? No  
BMI 28.82 kg/m² BP Readings from Last 3 Encounters:  
11/03/18 116/62  
10/26/18 100/67  
10/19/18 109/69 Pulse Readings from Last 3 Encounters:  
11/03/18 (!) 51  
11/02/18 71  
10/26/18 74 Wt Readings from Last 3 Encounters:  
11/03/18 81 kg (178 lb 9.2 oz) 09/17/18 74.4 kg (164 lb)  
07/17/18 73 kg (161 lb) General:  alert, cooperative, no distress, appears stated age Neck:  no JVD Lungs:  clear to auscultation bilaterally Heart:  Bradycardic, S1 S2, no obvious murmurs Abdomen:  abdomen is soft without significant tenderness, masses, organomegaly or guarding Extremities:  extremities normal, atraumatic, no cyanosis. There is edema diffusely in both upper and lower extremities but not pitting or dependent. .likely her baseline Skin: warm Neuro: no focal neurological deficits, moves all extremities well, no involuntary movements, reflexes at knee and ankle intact Psych:cooperative, appropriate Data Review:  
 
Recent Labs 11/03/18 
0505 11/02/18 
1521 WBC 5.2 6.6 HGB 9.5* 9.7* HCT 28.1* 29.3*  
  135 Recent Labs 11/03/18 
0505 11/02/18 
1521  141  
K 3.8 3.4*  
 98* CO2 29 33* * 81 BUN 49* 61* CREA 1.78* 2.57* CA 8.3* 8.5 MG 2.4  --   
ALB  --  3.4 SGOT  --  24 ALT  --  21 Results for orders placed or performed during the hospital encounter of 11/02/18 EKG, 12 LEAD, INITIAL Result Value Ref Range Ventricular Rate 51 BPM  
 Atrial Rate 300 BPM  
 QRS Duration 104 ms Q-T Interval 472 ms QTC Calculation (Bezet) 435 ms Calculated R Axis -18 degrees Calculated T Axis -41 degrees Diagnosis Atrial fibrillation with slow ventricular response Incomplete right bundle branch block Inferior infarct , age undetermined Anteroseptal infarct (cited on or before 28-DEC-2017) Abnormal ECG When compared with ECG of 02-NOV-2018 14:54, 
Questionable change in initial forces of Anterior leads Inverted T waves have replaced nonspecific T wave abnormality in Inferior  
leads Nonspecific T wave abnormality now evident in Anterior leads All Cardiac Markers in the last 24 hours:  No results found for: CPK, CK, CKMMB, CKMB, RCK3, CKMBT, CKNDX, CKND1, NIMA, TROPT, TROIQ, MARGOTH, TROPT, TNIPOC, BNP, BNPP Last Lipid:  No results found for: CHOL, CHOLX, CHLST, CHOLV, HDL, LDL, LDLC, DLDLP, TGLX, TRIGL, TRIGP, CHHD, CHHDX Signed By: Riddhi Michael DO November 3, 2018

## 2018-11-03 NOTE — PROGRESS NOTES
2150 Report received from Ash Golden RN at [facility] ED. Awaiting arrival of pt. 
 
0100 Pt arrived to floor via transport. Assumed care of pat. 0130 Pt c/o 6/10 generalized and headache pain. Repositioned and offered cold pack. Pt refused cold pack. 0230 Pt c/o 8/10 Head and generalized pain. Pt repositioned and offered cold pack. Pt refused cold pack. 0420 Pt given prn pain medication for 8/10 generalized and head pain. Will reassess in hour. 0530 Pt stated pain is \"better\" and rates it at 4/10. 
 
0730 Bedside shift change report given to [address] [place] [address] (oncoming nurse) by Shanthi Schwab (offgoing nurse). Report included the following information SBAR, Kardex, MAR and Cardiac Rhythm a fib.

## 2018-11-04 ENCOUNTER — APPOINTMENT (OUTPATIENT)
Dept: NON INVASIVE DIAGNOSTICS | Age: 68
DRG: 683 | End: 2018-11-04
Attending: INTERNAL MEDICINE
Payer: MEDICARE

## 2018-11-04 LAB
ALBUMIN SERPL-MCNC: 2.8 G/DL (ref 3.4–5)
ALBUMIN/GLOB SERPL: 0.7 {RATIO} (ref 0.8–1.7)
ALP SERPL-CCNC: 91 U/L (ref 45–117)
ALT SERPL-CCNC: 27 U/L (ref 13–56)
ANION GAP SERPL CALC-SCNC: 5 MMOL/L (ref 3–18)
AST SERPL-CCNC: 34 U/L (ref 15–37)
ATRIAL RATE: 300 BPM
ATRIAL RATE: 92 BPM
BACTERIA SPEC CULT: NORMAL
BASOPHILS # BLD: 0 K/UL (ref 0–0.1)
BASOPHILS NFR BLD: 0 % (ref 0–2)
BILIRUB SERPL-MCNC: 0.2 MG/DL (ref 0.2–1)
BUN SERPL-MCNC: 45 MG/DL (ref 7–18)
BUN/CREAT SERPL: 28 (ref 12–20)
CALCIUM SERPL-MCNC: 8.3 MG/DL (ref 8.5–10.1)
CALCULATED R AXIS, ECG10: -18 DEGREES
CALCULATED R AXIS, ECG10: -9 DEGREES
CALCULATED T AXIS, ECG11: -41 DEGREES
CALCULATED T AXIS, ECG11: 19 DEGREES
CHLORIDE SERPL-SCNC: 108 MMOL/L (ref 100–108)
CK MB CFR SERPL CALC: 1.1 % (ref 0–4)
CK MB SERPL-MCNC: 1.2 NG/ML (ref 5–25)
CK SERPL-CCNC: 105 U/L (ref 26–192)
CO2 SERPL-SCNC: 29 MMOL/L (ref 21–32)
CREAT SERPL-MCNC: 1.59 MG/DL (ref 0.6–1.3)
DIAGNOSIS, 93000: NORMAL
DIAGNOSIS, 93000: NORMAL
DIFFERENTIAL METHOD BLD: ABNORMAL
ECHO AO ROOT DIAM: 2.77 CM
ECHO IVC SNIFF: 2.22 CM
ECHO LA AREA 4C: 25 CM2
ECHO LA VOL 2C: 86.67 ML (ref 22–52)
ECHO LA VOL 4C: 82.75 ML (ref 22–52)
ECHO LA VOL BP: 94.2 ML (ref 22–52)
ECHO LA VOL/BSA BIPLANE: 48.68 ML/M2 (ref 16–28)
ECHO LA VOLUME INDEX A2C: 44.79 ML/M2 (ref 16–28)
ECHO LA VOLUME INDEX A4C: 42.77 ML/M2 (ref 16–28)
ECHO LV INTERNAL DIMENSION DIASTOLIC: 5.15 CM (ref 3.9–5.3)
ECHO LV INTERNAL DIMENSION SYSTOLIC: 3.51 CM
ECHO LV IVSD: 0.67 CM (ref 0.6–0.9)
ECHO LV MASS 2D: 163.4 G (ref 67–162)
ECHO LV MASS INDEX 2D: 84.4 G/M2 (ref 43–95)
ECHO LV POSTERIOR WALL DIASTOLIC: 0.92 CM (ref 0.6–0.9)
ECHO LVOT DIAM: 1.97 CM
ECHO LVOT PEAK GRADIENT: 3.7 MMHG
ECHO LVOT PEAK VELOCITY: 96.73 CM/S
ECHO LVOT VTI: 20.71 CM
ECHO MV AREA PISA: 0.3 CM2
ECHO MV EROA PISA: 0.3 CM2
ECHO MV REGURGITANT PEAK GRADIENT: 81.3 MMHG
ECHO MV REGURGITANT PEAK VELOCITY: 450.82 CM/S
ECHO MV REGURGITANT RADIUS PISA: 0.7 CM
ECHO MV REGURGITANT VOLUME: 38.88 CC
ECHO MV REGURGITANT VTIA: 151.26 CM
ECHO PULMONARY ARTERY SYSTOLIC PRESSURE (PASP): 57 MMHG
ECHO RV TAPSE: 1.73 CM (ref 1.5–2)
ECHO TV REGURGITANT MAX VELOCITY: 324.31 CM/S
ECHO TV REGURGITANT PEAK GRADIENT: 42.1 MMHG
EOSINOPHIL # BLD: 0.1 K/UL (ref 0–0.4)
EOSINOPHIL NFR BLD: 2 % (ref 0–5)
ERYTHROCYTE [DISTWIDTH] IN BLOOD BY AUTOMATED COUNT: 15.5 % (ref 11.6–14.5)
GLOBULIN SER CALC-MCNC: 4.1 G/DL (ref 2–4)
GLUCOSE BLD STRIP.AUTO-MCNC: 84 MG/DL (ref 70–110)
GLUCOSE BLD STRIP.AUTO-MCNC: 90 MG/DL (ref 70–110)
GLUCOSE SERPL-MCNC: 108 MG/DL (ref 74–99)
HCT VFR BLD AUTO: 28.7 % (ref 35–45)
HGB BLD-MCNC: 9.5 G/DL (ref 12–16)
LYMPHOCYTES # BLD: 1.7 K/UL (ref 0.9–3.6)
LYMPHOCYTES NFR BLD: 24 % (ref 21–52)
MAGNESIUM SERPL-MCNC: 2.4 MG/DL (ref 1.6–2.6)
MCH RBC QN AUTO: 26.4 PG (ref 24–34)
MCHC RBC AUTO-ENTMCNC: 33.1 G/DL (ref 31–37)
MCV RBC AUTO: 79.7 FL (ref 74–97)
MONOCYTES # BLD: 1.2 K/UL (ref 0.05–1.2)
MONOCYTES NFR BLD: 17 % (ref 3–10)
NEUTS SEG # BLD: 3.9 K/UL (ref 1.8–8)
NEUTS SEG NFR BLD: 57 % (ref 40–73)
PLATELET # BLD AUTO: 199 K/UL (ref 135–420)
PLATELET COMMENTS,PCOM: ABNORMAL
POTASSIUM SERPL-SCNC: 3.7 MMOL/L (ref 3.5–5.5)
PROT SERPL-MCNC: 6.9 G/DL (ref 6.4–8.2)
Q-T INTERVAL, ECG07: 456 MS
Q-T INTERVAL, ECG07: 472 MS
QRS DURATION, ECG06: 104 MS
QRS DURATION, ECG06: 106 MS
QTC CALCULATION (BEZET), ECG08: 435 MS
QTC CALCULATION (BEZET), ECG08: 436 MS
RBC # BLD AUTO: 3.6 M/UL (ref 4.2–5.3)
RBC MORPH BLD: ABNORMAL
SERVICE CMNT-IMP: NORMAL
SODIUM SERPL-SCNC: 142 MMOL/L (ref 136–145)
TROPONIN I SERPL-MCNC: 0.06 NG/ML (ref 0–0.04)
VANCOMYCIN SERPL-MCNC: 18.8 UG/ML (ref 5–40)
VENTRICULAR RATE, ECG03: 51 BPM
VENTRICULAR RATE, ECG03: 55 BPM
WBC # BLD AUTO: 6.9 K/UL (ref 4.6–13.2)

## 2018-11-04 PROCEDURE — 82962 GLUCOSE BLOOD TEST: CPT

## 2018-11-04 PROCEDURE — 74011250636 HC RX REV CODE- 250/636: Performed by: INTERNAL MEDICINE

## 2018-11-04 PROCEDURE — 74011000250 HC RX REV CODE- 250: Performed by: INTERNAL MEDICINE

## 2018-11-04 PROCEDURE — 80202 ASSAY OF VANCOMYCIN: CPT | Performed by: INTERNAL MEDICINE

## 2018-11-04 PROCEDURE — 77030038269 HC DRN EXT URIN PURWCK BARD -A

## 2018-11-04 PROCEDURE — 74011250637 HC RX REV CODE- 250/637: Performed by: INTERNAL MEDICINE

## 2018-11-04 PROCEDURE — 80053 COMPREHEN METABOLIC PANEL: CPT | Performed by: INTERNAL MEDICINE

## 2018-11-04 PROCEDURE — 93306 TTE W/DOPPLER COMPLETE: CPT

## 2018-11-04 PROCEDURE — 83735 ASSAY OF MAGNESIUM: CPT | Performed by: INTERNAL MEDICINE

## 2018-11-04 PROCEDURE — 85025 COMPLETE CBC W/AUTO DIFF WBC: CPT | Performed by: INTERNAL MEDICINE

## 2018-11-04 PROCEDURE — 65660000004 HC RM CVT STEPDOWN

## 2018-11-04 PROCEDURE — 36415 COLL VENOUS BLD VENIPUNCTURE: CPT | Performed by: INTERNAL MEDICINE

## 2018-11-04 PROCEDURE — 94640 AIRWAY INHALATION TREATMENT: CPT

## 2018-11-04 PROCEDURE — 82550 ASSAY OF CK (CPK): CPT | Performed by: INTERNAL MEDICINE

## 2018-11-04 PROCEDURE — 93005 ELECTROCARDIOGRAM TRACING: CPT

## 2018-11-04 PROCEDURE — 74011000258 HC RX REV CODE- 258: Performed by: INTERNAL MEDICINE

## 2018-11-04 RX ORDER — SENNOSIDES 8.6 MG/1
1 TABLET ORAL
Status: DISCONTINUED | OUTPATIENT
Start: 2018-11-04 | End: 2018-01-01 | Stop reason: HOSPADM

## 2018-11-04 RX ORDER — ALBUTEROL SULFATE 0.83 MG/ML
2.5 SOLUTION RESPIRATORY (INHALATION)
Status: DISCONTINUED | OUTPATIENT
Start: 2018-11-04 | End: 2018-01-01 | Stop reason: HOSPADM

## 2018-11-04 RX ADMIN — GABAPENTIN 200 MG: 100 CAPSULE ORAL at 09:42

## 2018-11-04 RX ADMIN — Medication 10 ML: at 22:20

## 2018-11-04 RX ADMIN — Medication 10 ML: at 13:07

## 2018-11-04 RX ADMIN — MULTIPLE VITAMINS W/ MINERALS TAB 1 TABLET: TAB at 09:43

## 2018-11-04 RX ADMIN — RIVAROXABAN 10 MG: 10 TABLET, FILM COATED ORAL at 09:42

## 2018-11-04 RX ADMIN — ALBUTEROL SULFATE 2.5 MG: 2.5 SOLUTION RESPIRATORY (INHALATION) at 14:50

## 2018-11-04 RX ADMIN — DOPAMINE HYDROCHLORIDE IN DEXTROSE 5 MCG/KG/MIN: 1.6 INJECTION, SOLUTION INTRAVENOUS at 22:20

## 2018-11-04 RX ADMIN — DOCUSATE SODIUM 100 MG: 100 CAPSULE, LIQUID FILLED ORAL at 09:42

## 2018-11-04 RX ADMIN — AZTREONAM 1 G: 1 INJECTION, POWDER, LYOPHILIZED, FOR SOLUTION INTRAMUSCULAR; INTRAVENOUS at 12:02

## 2018-11-04 RX ADMIN — SENNOSIDES 8.6 MG: 8.6 TABLET, FILM COATED ORAL at 09:42

## 2018-11-04 RX ADMIN — GABAPENTIN 200 MG: 100 CAPSULE ORAL at 22:19

## 2018-11-04 RX ADMIN — LINACLOTIDE 145 MCG: 145 CAPSULE, GELATIN COATED ORAL at 07:30

## 2018-11-04 RX ADMIN — TOPIRAMATE 50 MG: 25 TABLET, FILM COATED ORAL at 09:48

## 2018-11-04 RX ADMIN — GABAPENTIN 200 MG: 100 CAPSULE ORAL at 16:58

## 2018-11-04 RX ADMIN — Medication 10 ML: at 05:29

## 2018-11-04 RX ADMIN — Medication 10 ML: at 00:41

## 2018-11-04 RX ADMIN — ALBUTEROL SULFATE 2.5 MG: 2.5 SOLUTION RESPIRATORY (INHALATION) at 20:03

## 2018-11-04 RX ADMIN — LORATADINE 10 MG: 10 TABLET ORAL at 09:42

## 2018-11-04 RX ADMIN — AZTREONAM 1 G: 1 INJECTION, POWDER, LYOPHILIZED, FOR SOLUTION INTRAMUSCULAR; INTRAVENOUS at 05:12

## 2018-11-04 RX ADMIN — AMIODARONE HYDROCHLORIDE 200 MG: 200 TABLET ORAL at 09:42

## 2018-11-04 RX ADMIN — DICLOFENAC SODIUM 2 G: 10 GEL TOPICAL at 16:58

## 2018-11-04 RX ADMIN — ALBUTEROL SULFATE 2.5 MG: 2.5 SOLUTION RESPIRATORY (INHALATION) at 09:49

## 2018-11-04 NOTE — PROGRESS NOTES
ADULT PROTOCOL: JET AEROSOL ASSESSMENT Patient  Kyler Vital     79 y.o.   female     11/4/2018  12:37 AM 
 
Breath Sounds Pre Procedure:  Breath Sounds Bilateral: Clear, Diminished Breath Sounds Post Procedure: Breath Sounds Bilateral: Clear, Diminished Breathing pattern: Pre procedure  Breathing Pattern: Regular Post procedure  Breathing Pattern: Regular Cough: Pre procedure  Cough: Non-productive Post procedure Cough: Non-productive Heart Rate: Pre procedure Pulse: 67 
         Post procedure Pulse: 56 Resp Rate: Pre procedure  Respirations: 18 Post procedure Nebulizer Therapy: Current medications Aerosolized Medications: Albuterol Problem List:  
Patient Active Problem List  
Diagnosis Code  Hypokalemia E87.6  Breast cancer metastasized to axillary lymph node ([facility] C50.919, C77.3  Anemia D64.9  
 Cachexia ([address] R64  Weakness R53.1  Vitamin D deficiency E55.9  Iron deficiency anemia D50.9  Chronic anemia D64.9  Back pain of thoracolumbar region M54.5, M54.6  Thrombocytopenia ([facility] D69.6  Chronic ITP (idiopathic thrombocytopenia) ([facility]) D69.3  Muscular deconditioning R29.898  Insomnia G47.00  Bilateral lower extremity edema R60.0  Vertigo R42  Spinal stenosis of lumbar region with neurogenic claudication M48.062  Spondylisthesis M43.10  Lumbar stenosis M48.061  Lumbar stenosis with neurogenic claudication M48.062  
 S/P lumbar fusion Z98.1  Cancer associated pain G89.3  Oropharyngeal bleeding J39.2  Peripheral neuropathy G62.9  Sepsis ([address] A41.9  Pneumonia J18.9  Hypotension I95.9  Metastatic breast cancer ([facility] C50.919 Patient alert and cooperative to use MDI: Yes 
 
Home Respiratory Therapy Regimen/Frequency:  YES Medication Device Frequency SEVERITY INDEX: 
 
 ITEM 0 1 2 3 4 Score Respiratory Pattern and or Rate Regular 10-19 Regular 20-24  
24-30   
30-34 Severe SOB or  
Greater than 35 0 Breath Sounds Clear Occasional Wheeze Mild Wheezing Moderate Wheezing  wheezing/Absent breath sounds 0 Shortness of Breath None Dyspnea on Exertion Dyspnea at Rest Moderate Shortness of Breath at Rest Severe Shortness of Breath - Limited Speech 0 Total Score:  0 
 
* Scoring Guidelines 0-4 pts:  PRN-BID  
5-7 pts:  BID, TID, QID 
8-9 pts:  TID, QID, Q6 
10-12 pts:  Q4-Q6 * - Guidelines used with clinical judgement. PRN Treatments can be ordered to supplement scheduled treatments. Regardless of score, frequency should not be less than normal home regimen. Recommended Order/Frequency:  TID Comments:   
 
 
 
Respiratory Therapist: Will Abreu RT

## 2018-11-04 NOTE — PROGRESS NOTES
Received a call from RN again, BP= 89/69. I examined the pt, she was comfortable, no SOB. HR=50, went up to 67 on sitting her up. O2 sat 94% on 2L. She had bilateral crackles. I would not give her more fluids tonight. I will increase her dopamine to 7mcg and transfer her to CTV ICU as step down nurses cannot take patients receiving more than 5mcg of dopamine. It would help the HR also. Will monitor her closely.

## 2018-11-04 NOTE — PROGRESS NOTES
MARLENE Tobias called me with an update on the patient. BP is doing better, will cancel the transfer to ICU order. Patient asymptomatic.

## 2018-11-04 NOTE — PROGRESS NOTES
SUBJECTIVE: 
 
Feeling better. No chest pain or abdominal pain. Dizziness better. Cough and SOB improving. No nausea or vomiting. Had a BM. OBJECTIVE: 
 
/63   Pulse 81   Temp 96 °F (35.6 °C)   Resp 21   Ht 5' 6\" (1.676 m)   Wt 83.9 kg (185 lb)   SpO2 99%   Breastfeeding? No   BMI 29.86 kg/m² HEENT:  No pallor. Oral mucosa is moist 
Neck: trachea is midline CVS: IRIR 
RS: Diminished in bases, no wheezes GI: NT, BS + Extremities: Improving pedal edema CNS: moves all extremities while in bed GENERAL: NAD, Awake ASSESSMENT: 
 
1. Hypotension sec to severe dehydration caused by diuretics. Rule out ACS. [no sepsis] 2. Sinus bradycardia - could be due to amiodarone use in setting of # 7 
3. 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 Chronic Afib 5. H/o lung and breast cancer 6. H/o ITP 7. JUAN due to diuretics induced dehydration, improving 8. Peripheral neuropathy and DJD PLAN: 
 
Cont current management Can go down on Dopamine at 3 mcg if her HR and SBP stable until 5 pm - d/w nursing D/w cardiologist - will read ECHO. Cont current management D/c antibiotic as blood c/s and urine c/s negative PT/OT from tomorrow BMP:  
Lab Results Component Value Date/Time  11/03/2018 11:35 PM  
 K 3.7 11/03/2018 11:35 PM  
  11/03/2018 11:35 PM  
 CO2 29 11/03/2018 11:35 PM  
 AGAP 5 11/03/2018 11:35 PM  
  (H) 11/03/2018 11:35 PM  
 BUN 45 (H) 11/03/2018 11:35 PM  
 CREA 1.59 (H) 11/03/2018 11:35 PM  
 GFRAA 39 (L) 11/03/2018 11:35 PM  
 GFRNA 32 (L) 11/03/2018 11:35 PM  
 
CBC:  
Lab Results Component Value Date/Time  WBC 6.9 11/03/2018 11:35 PM  
 HGB 9.5 (L) 11/03/2018 11:35 PM  
 HCT 28.7 (L) 11/03/2018 11:35 PM  
  11/03/2018 11:35 PM

## 2018-11-04 NOTE — PROGRESS NOTES
2000  Dr. Gilda Pérez notified about patient's status. Pt currently has low bp and low pulse with AFIB. Pt on Dopamine drip at 5mcg/kg/min. MD ordered a 500ml NS bolus. Will monitor bp and follow up with MD.  
 
 2200  Bp increased and sustained after bolus completion. Crackles heard on auscultation following bolus. Difficulty to obtain SpO2 noted. O2 sensor site changed to obtain SpO2 of 100%. MD called and notified of patient's status change. Dr. Gilda Pérez to come and assess patient. Will continue to monitor. 0000  Patient's maintaining at or above MAP goal of >60. Sbp in range of 80-90's. Dr. Gilda Pérez updated on patients status. Ordered to keep patient on CVT-stepdown and  Continue to monitor. Maintain Dopamine drip at 5mcg/kg/min.  
 
0200 Patient's MAP stable >60. Pt asymptomatic. Resting comfortably in bed, call bell within reach. Patient Vitals for the past 8 hrs: 
 Temp Pulse Resp BP SpO2  
11/04/18 0200  (!) 55 11 97/48 100 % 11/04/18 0148  (!) 55 11 (!) 85/57 100 % 11/04/18 0130  69 13 (!) 87/46 99 % 11/04/18 0121  (!) 52 13 (!) 86/52 100 % 11/04/18 0100  (!) 47 12 (!) 82/40 100 % 11/04/18 0030 97.5 °F (36.4 °C) (!) 56 15 97/44 100 % 11/04/18 0000  (!) 49 11 95/42 100 % 11/03/18 2330  (!) 57 18 111/66 (!) 89 % 11/03/18 2300  (!) 48 11 103/48 100 % 11/03/18 2230  (!) 50 15 106/52   
11/03/18 2200  (!) 59 17 (!) 89/69   
11/03/18 2130  (!) 58 14 101/48   
11/03/18 2100  65 14 106/50   
11/03/18 2045  62 17 103/48   
11/03/18 2034  67 13 108/47   
11/03/18 2003  65 15 (!) 76/40   
11/03/18 2000  61 15 (!) 61/34   
11/03/18 1955  75 16  94 % 11/03/18 1946 97.2 °F (36.2 °C) (!) 57 20 100/56 

## 2018-11-04 NOTE — ROUTINE PROCESS
Bedside and Verbal shift change report given to [facility] MARLENE BEE (oncoming nurse) by Dania Romero RN 
 (offgoing nurse). Report included the following information SBAR, Kardex, MAR, Recent Results and Cardiac Rhythm AFIB with slow ventricular rate. Alex Gates

## 2018-11-04 NOTE — PROGRESS NOTES
[facility] - Progress Note Admit Date: 11/2/2018 Assessment:  
 
Hospital Problems  Date Reviewed: 9/17/2018 Codes Class Noted POA Pneumonia ICD-10-CM: J18.9 ICD-9-CM: 098  11/3/2018 Unknown Hypotension ICD-10-CM: I95.9 ICD-9-CM: 458.9  11/3/2018 Unknown Metastatic breast cancer ([facility] ICD-10-CM: G67.911 ICD-9-CM: 174.9  11/3/2018 Unknown Sepsis ([facility] ICD-10-CM: A41.9 ICD-9-CM: 038.9, 995.91  11/2/2018 Unknown cAF- on Xarelto (no ASA due to allergy) Diastolic CHF, known with volume depletion Plan:  
 
- Have reduced Dopamine gtt to 2.5 mcg/kg/min, afib rates 60's-80's, /54 at the time of assessment. - Stopping Amiodarone and Digoxin, has been off Coreg since admission. If afib rates increase, can re-introduce slowly once Dopamine gtt d/c'd - Asked RN to contact cardiology if SBP <90 or HR <50 Subjective: No new complaints. No CP, shortness of breath or syncope. Objective:  
  
Patient Vitals for the past 8 hrs: 
 Temp Pulse Resp BP SpO2  
11/04/18 0950     96 % 11/04/18 0930  (!) 54 15 103/54   
11/04/18 0900  65 18 93/44   
11/04/18 0830  (!) 58 (!) 7 101/70   
11/04/18 0800 97.8 °F (36.6 °C) 61 12 93/56 100 % 11/04/18 0730  (!) 52 11 107/46 100 % 11/04/18 0700  65 (!) 0 107/50 100 % 11/04/18 0630  (!) 43  99/48 100 % 11/04/18 0600  (!) 58 12 98/45 100 % 11/04/18 0530  (!) 51 14 118/50 98 % 11/04/18 0500  62 12 107/51 100 % Patient Vitals for the past 96 hrs: 
 Weight 11/04/18 0527 185 lb 3 oz (84 kg) 11/03/18 0018 178 lb 9.2 oz (81 kg) 11/02/18 1454 166 lb (75.3 kg) Intake/Output Summary (Last 24 hours) at 11/4/2018 1203 Last data filed at 11/4/2018 3734 Gross per 24 hour Intake 1127.84 ml Output 1800 ml Net -672.16 ml Physical Exam: 
General:  alert, cooperative, no distress Neck:  nontender, no JVD Lungs:  clear to auscultation bilaterally Heart:  irregularly irregular rhythm Abdomen:  abdomen is soft without significant tenderness, masses, organomegaly or guarding Extremities:  edema 1+ bilateral LE's Data Review:  
 
Labs: Results:  
   
Chemistry Recent Labs 11/03/18 2335 11/03/18 
0505 11/02/18 
1521 * 100* 81  142 141  
K 3.7 3.8 3.4*  
 105 98* CO2 29 29 33* BUN 45* 49* 61* CREA 1.59* 1.78* 2.57* CA 8.3* 8.3* 8.5 MG 2.4 2.4  --   
AGAP 5 8 10 BUCR 28* 28* 24* AP 91  --  87  
TP 6.9  --  7.4 ALB 2.8*  --  3.4 GLOB 4.1*  --  4.0 AGRAT 0.7*  --  0.9 CBC w/Diff Recent Labs 11/03/18 2335 11/03/18 
0505 11/02/18 
1521 WBC 6.9 5.2 6.6  
RBC 3.60* 3.56* 3.62* HGB 9.5* 9.5* 9.7* HCT 28.7* 28.1* 29.3*  
 146 135 GRANS 57 68 54 LYMPH 24 17* 27 EOS 2 0 5 Cardiac Enzymes Lab Results Component Value Date/Time  11/03/2018 11:35 PM  
  11/03/2018 06:15 PM  
 CKMB 1.2 11/03/2018 11:35 PM  
 CKMB 1.3 11/03/2018 06:15 PM  
 CKND1 1.1 11/03/2018 11:35 PM  
 CKND1 1.1 11/03/2018 06:15 PM  
 TROIQ 0.06 (H) 11/03/2018 11:35 PM  
 TROIQ 0.06 (H) 11/03/2018 06:15 PM  
  
Coagulation No results for input(s): PTP, INR, APTT in the last 72 hours. No lab exists for component: INREXT Lipid Panel No results found for: CHOL, CHOLPOCT, CHOLX, CHLST, CHOLV, 637641, HDL, LDL, LDLC, DLDLP, 708416, VLDLC, VLDL, TGLX, TRIGL, TRIGP, TGLPOCT, CHHD, CHHDX  
BNP No results found for: BNP, BNPP, XBNPT Liver Enzymes Recent Labs 11/03/18 
2335  
TP 6.9 ALB 2.8* AP 91 SGOT 34 Digoxin Thyroid Studies Lab Results Component Value Date/Time TSH 0.54 11/02/2018 03:21 PM  
    
 
Signed By: Tom Dawson. Murali Cardoso PA-C November 4, 2018

## 2018-11-05 LAB
ANION GAP SERPL CALC-SCNC: 8 MMOL/L (ref 3–18)
BUN SERPL-MCNC: 32 MG/DL (ref 7–18)
BUN/CREAT SERPL: 30 (ref 12–20)
CALCIUM SERPL-MCNC: 9.4 MG/DL (ref 8.5–10.1)
CHLORIDE SERPL-SCNC: 107 MMOL/L (ref 100–108)
CO2 SERPL-SCNC: 30 MMOL/L (ref 21–32)
CREAT SERPL-MCNC: 1.05 MG/DL (ref 0.6–1.3)
GLUCOSE SERPL-MCNC: 91 MG/DL (ref 74–99)
POTASSIUM SERPL-SCNC: 3.7 MMOL/L (ref 3.5–5.5)
SODIUM SERPL-SCNC: 145 MMOL/L (ref 136–145)

## 2018-11-05 PROCEDURE — 97530 THERAPEUTIC ACTIVITIES: CPT

## 2018-11-05 PROCEDURE — 77030038269 HC DRN EXT URIN PURWCK BARD -A

## 2018-11-05 PROCEDURE — 94762 N-INVAS EAR/PLS OXIMTRY CONT: CPT

## 2018-11-05 PROCEDURE — 36415 COLL VENOUS BLD VENIPUNCTURE: CPT | Performed by: INTERNAL MEDICINE

## 2018-11-05 PROCEDURE — 74011250637 HC RX REV CODE- 250/637: Performed by: INTERNAL MEDICINE

## 2018-11-05 PROCEDURE — 65660000004 HC RM CVT STEPDOWN

## 2018-11-05 PROCEDURE — 97162 PT EVAL MOD COMPLEX 30 MIN: CPT

## 2018-11-05 PROCEDURE — 80048 BASIC METABOLIC PNL TOTAL CA: CPT | Performed by: INTERNAL MEDICINE

## 2018-11-05 PROCEDURE — 74011000250 HC RX REV CODE- 250: Performed by: INTERNAL MEDICINE

## 2018-11-05 PROCEDURE — 94640 AIRWAY INHALATION TREATMENT: CPT

## 2018-11-05 RX ADMIN — GABAPENTIN 200 MG: 100 CAPSULE ORAL at 18:39

## 2018-11-05 RX ADMIN — ALBUTEROL SULFATE 2.5 MG: 2.5 SOLUTION RESPIRATORY (INHALATION) at 21:37

## 2018-11-05 RX ADMIN — LORATADINE 10 MG: 10 TABLET ORAL at 10:00

## 2018-11-05 RX ADMIN — ACETAMINOPHEN 650 MG: 325 TABLET ORAL at 18:39

## 2018-11-05 RX ADMIN — DICLOFENAC SODIUM 2 G: 10 GEL TOPICAL at 10:12

## 2018-11-05 RX ADMIN — RIVAROXABAN 10 MG: 10 TABLET, FILM COATED ORAL at 10:00

## 2018-11-05 RX ADMIN — Medication 10 ML: at 05:35

## 2018-11-05 RX ADMIN — MULTIPLE VITAMINS W/ MINERALS TAB 1 TABLET: TAB at 10:00

## 2018-11-05 RX ADMIN — ACETAMINOPHEN 650 MG: 325 TABLET ORAL at 12:59

## 2018-11-05 RX ADMIN — TOPIRAMATE 50 MG: 25 TABLET, FILM COATED ORAL at 10:00

## 2018-11-05 RX ADMIN — ALBUTEROL SULFATE 2.5 MG: 2.5 SOLUTION RESPIRATORY (INHALATION) at 08:29

## 2018-11-05 RX ADMIN — GABAPENTIN 200 MG: 100 CAPSULE ORAL at 10:00

## 2018-11-05 RX ADMIN — GABAPENTIN 200 MG: 100 CAPSULE ORAL at 21:38

## 2018-11-05 RX ADMIN — Medication 10 ML: at 18:41

## 2018-11-05 NOTE — PROGRESS NOTES
conducted an initial consultation and Spiritual Assessment for Jose Miguel Lora, who is a 79 y.o.,female. Patients Primary Language is: [place]. According to the patients EMR Alevism Affiliation is: [facility].  
 
The reason the Patient came to the hospital is:  
Patient Active Problem List  
 Diagnosis Date Noted  Pneumonia 11/03/2018  Hypotension 11/03/2018  Metastatic breast cancer ([address] 11/03/2018  Sepsis ([address] 11/02/2018  Peripheral neuropathy 09/17/2018  Oropharyngeal bleeding 04/16/2018  Cancer associated pain 03/19/2018  S/P lumbar fusion 01/22/2018  Lumbar stenosis with neurogenic claudication 01/09/2018  Spondylisthesis 01/08/2018  Lumbar stenosis 01/08/2018  Spinal stenosis of lumbar region with neurogenic claudication 12/08/2017  Vertigo 08/02/2017  Bilateral lower extremity edema 04/19/2017  Insomnia 02/08/2017  Chronic ITP (idiopathic thrombocytopenia) ([facility]) 10/31/2016  Muscular deconditioning 10/31/2016  Thrombocytopenia ([facility] 06/14/2016  Chronic anemia 06/13/2016  Back pain of thoracolumbar region 06/13/2016  Iron deficiency anemia 12/14/2015  Vitamin D deficiency 03/04/2015  Cachexia ([address] 05/05/2014  Weakness 05/05/2014  Anemia 11/22/2013  Breast cancer metastasized to axillary lymph node ([facility] 06/03/2013  Hypokalemia 05/07/2013 The  provided the following Interventions: 
Initiated a relationship of care and support. Explored issues of víctor, belief, spirituality and Oriental orthodox/ritual needs while hospitalized. Listened empathically. Provided chaplaincy education. Provided information about Spiritual Care Services. Offered prayer and assurance of continued prayers on patient's behalf. Chart reviewed. The following outcomes where achieved: 
Patient shared limited information about both their medical narrative and spiritual journey/beliefs.  confirmed Patient's Scientologist Affiliation yamilet Castro [address]. 
Patient processed feeling about current hospitalization. Patient expressed gratitude for 's visit. Assessment: 
Patient shared that [facility] she cannot have such a positive attitude toward her chemo therapy and radiation. She disclosed that she asked God \"why\" she had cancer and God said to her,\"Why not? Look what I did for you. \" \"Patient does not have any Christian/cultural needs that will affect patients preferences in health care. There are no spiritual or Christian issues which require intervention at this time. Plan: 
Chaplains will continue to follow and will provide pastoral care on an as needed/requested basis.  recommends bedside caregivers page  on duty if patient shows signs of acute spiritual or emotional distress. Chaplain Resident Manjeet Mcqueen [facility]  
[phone]

## 2018-11-05 NOTE — PROGRESS NOTES
Reason for Admission:   \"Sepsis, JUAN, hypotension sec to severe dehydration caused by diuretics\" as per provider notes. RRAT Score:     23 Resources/supports as identified by patient/family:   The patient has Medicare Part A & B for insurance. Top Challenges facing patient (as identified by patient/family and CM): Finances/Medication cost?      She denied having problems in this area. She states that she can obtain her home medications from her pharmacy, and take her medications as directed. Transportation? Son or daughter Support system or lack thereof? Son and Sada Shaw, daughter who can be best reached at [phone]. Living arrangements? Her son: Gasper Horta lives with her Self-care/ADLs/Cognition? She's A/Ox4. She reports independence in her ADLs, and most IADLs except cleaning and cooking because she becomes short of breath with activity. She ambulates with a cane or RW; no falls reported. Current Advanced Directive/Advance Care Plan:  Patient not ready to discuss at this time. Plan for utilizing home health:    Pending PT/OT evaluations for discharge disposition recommendations. Likelihood of readmission: High 
              
Transition of Care Plan:        The patient plans to return home where her son lives with her. She states that he works full-time, but she's not concerned about being home alone while he's at work. Her only challenges are cleaning her home and cooking; she states that she can fix a small meal to eat. Her other concern is medication related; she wants to know what medications she should take once discharged. This NN told her that usually the discharging doctor reviews the medications and the discharging nurse will review them with her.  This NN reinforced the importance of following up with her PCP for transitions of care appointment after discharge- she verbalized understanding. Care Management Interventions PCP Verified by CM: Yes(Last seen by PCP 3 months ago) Mode of Transport at Discharge: Self Transition of Care Consult (CM Consult): Discharge Planning Discharge Durable Medical Equipment: (She has a cane and RW at home.) Physical Therapy Consult: Yes Occupational Therapy Consult: Yes Current Support Network: Other(Her son: Stephanie Duque lives with her) Confirm Follow Up Transport: Family Plan discussed with Pt/Family/Caregiver: Yes Discharge Location Discharge Placement: Home with family assistance

## 2018-11-05 NOTE — PROGRESS NOTES
[facility]  -  Progress Note Patient: Kingsley Rodriguez MRN: [id number]  SSN: [id number] YOB: 1950  Age: 79 y.o. Sex: female Admit Date: 11/2/2018 Assessment:  
 
Hospital Problems  Date Reviewed: 9/17/2018 Codes Class Noted POA Pneumonia ICD-10-CM: J18.9 ICD-9-CM: 946  11/3/2018 Unknown Hypotension ICD-10-CM: I95.9 ICD-9-CM: 458.9  11/3/2018 Unknown Metastatic breast cancer ([facility] ICD-10-CM: G82.895 ICD-9-CM: 174.9  11/3/2018 Unknown Sepsis ([facility] ICD-10-CM: A41.9 ICD-9-CM: 038.9, 995.91  11/2/2018 Unknown cAF-rates in the 60's with fib/flutter pattern; on Xarelto (no ASA due to allergy) Diastolic CHF, known with volume depletion -Hypotension- on dopamine drip for now 
 
-Primary cardiologist Dr. Arminda Paris Plan:  
 
Stable but with much volume overload still present. Will continue with balancing IV Lasix along with pressor for BP support. Re-evaluate in am. 
 
Subjective: No new complaints. States that her breathing is about 50% better. Objective:  
  
Patient Vitals for the past 8 hrs: 
 Temp Pulse Resp BP SpO2  
11/05/18 1000  70 18 130/59   
11/05/18 0829     100 % 11/05/18 0800 97.9 °F (36.6 °C) 72 18 94/57 100 % 11/05/18 0700  63 9 113/66   
11/05/18 0400 98.4 °F (36.9 °C) 63 15 102/53 100 % Patient Vitals for the past 96 hrs: 
 Weight 11/05/18 0400 83.3 kg (183 lb 10.3 oz) 11/04/18 1218 83.9 kg (185 lb) 11/04/18 0527 84 kg (185 lb 3 oz) 11/03/18 0018 81 kg (178 lb 9.2 oz) 11/02/18 1454 75.3 kg (166 lb) Intake/Output Summary (Last 24 hours) at 11/5/2018 1109 Last data filed at 11/4/2018 2300 Gross per 24 hour Intake 60.42 ml Output 1000 ml Net -939.58 ml Physical Exam: 
General:  alert, cooperative, no distress, appears stated age Neck:  nontender, no JVD Lungs:  Crackles at both bases without wheeze Heart:  irregularly irregular rhythm, S1, S2 normal, systolic murmur: early systolic 3/6, crescendo at apex Abdomen:  abdomen is soft without significant tenderness, masses, organomegaly or guarding Extremities:  edema 1+ Data Review:  
 
Labs: Results:  
   
Chemistry Recent Labs 11/05/18 
4914 11/03/18 
2335 11/03/18 
0505 11/02/18 
1521 GLU 91 108* 100* 81  142 142 141  
K 3.7 3.7 3.8 3.4*  
 108 105 98* CO2 30 29 29 33* BUN 32* 45* 49* 61* CREA 1.05 1.59* 1.78* 2.57* CA 9.4 8.3* 8.3* 8.5 MG  --  2.4 2.4  --   
AGAP 8 5 8 10 BUCR 30* 28* 28* 24* AP  --  91  --  87  
TP  --  6.9  --  7.4 ALB  --  2.8*  --  3.4 GLOB  --  4.1*  --  4.0 AGRAT  --  0.7*  --  0.9 CBC w/Diff Recent Labs 11/03/18 
2335 11/03/18 
0505 11/02/18 
1521 WBC 6.9 5.2 6.6  
RBC 3.60* 3.56* 3.62* HGB 9.5* 9.5* 9.7* HCT 28.7* 28.1* 29.3*  
 146 135 GRANS 57 68 54 LYMPH 24 17* 27 EOS 2 0 5 Cardiac Enzymes No results found for: CPK, CK, CKMMB, CKMB, RCK3, CKMBT, CKNDX, CKND1, NIMA, TROPT, TROIQ, MARGOTH, TROPT, TNIPOC, BNP, BNPP Coagulation No results for input(s): PTP, INR, APTT in the last 72 hours. No lab exists for component: INREXT Lipid Panel No results found for: CHOL, CHOLPOCT, CHOLX, CHLST, CHOLV, 816303, HDL, LDL, LDLC, DLDLP, 572137, VLDLC, VLDL, TGLX, TRIGL, TRIGP, TGLPOCT, CHHD, CHHDX  
BNP No results found for: BNP, BNPP, XBNPT Liver Enzymes Recent Labs 11/03/18 
2335  
TP 6.9 ALB 2.8* AP 91 SGOT 34 Digoxin Thyroid Studies Lab Results Component Value Date/Time  TSH 0.54 11/02/2018 03:21 PM

## 2018-11-05 NOTE — PROGRESS NOTES
SUBJECTIVE: 
 
Feeling better. Less headaches and dizziness. No chest pain. No more SOB. Cough better. No nausea or vomiting. OBJECTIVE: 
 
/80   Pulse 69   Temp 97.6 °F (36.4 °C)   Resp 19   Ht 5' 6\" (1.676 m)   Wt 83.3 kg (183 lb 10.3 oz)   SpO2 91%   Breastfeeding? No   BMI 29.64 kg/m² CVS: IRIR 
RS: Diminished in bases, no wheezes GI: NT, BS + Extremities: Improving pedal edema CNS: moves all extremities while in bed GENERAL: NAD, Awake ASSESSMENT: 
 
1. Hypotension sec to severe dehydration caused by diuretics. Ruled out ACS. [no sepsis]. Improving. 2. Sinus bradycardia - could be due to amiodarone use in setting of # 7. Improving. 3. 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 Chronic Afib 5. H/o lung and breast cancer 6. H/o ITP 7. JUAN due to diuretics induced dehydration, improved 8. Peripheral neuropathy and DJD PLAN: 
 
Cont current management Reduce dopamine drip and monitor. Can be stopped tomorrow. Cardiology to follow BMP:  
Lab Results Component Value Date/Time   11/05/2018 05:48 AM  
 K 3.7 11/05/2018 05:48 AM  
  11/05/2018 05:48 AM  
 CO2 30 11/05/2018 05:48 AM  
 AGAP 8 11/05/2018 05:48 AM  
 GLU 91 11/05/2018 05:48 AM  
 BUN 32 (H) 11/05/2018 05:48 AM  
 CREA 1.05 11/05/2018 05:48 AM  
 GFRAA >60 11/05/2018 05:48 AM  
 GFRNA 52 (L) 11/05/2018 05:48 AM  
 
CBC:  
No results found for: WBC, HGB, HGBEXT, HCT, HCTEXT, PLT, PLTEXT, HGBEXT, HCTEXT, PLTEXT

## 2018-11-05 NOTE — PROGRESS NOTES
NUTRITION Nutrition Consult: General Nutrition Management & Supplements RECOMMENDATIONS / PLAN:  
 
- Add nutritional supplement: Ensure Enlive TID. - Monitor PO intake and diet tolerance. - Continue RD inpatient monitoring and evaluation. NUTRITION INTERVENTIONS & DIAGNOSIS:  
 
[x] Meals/snacks: modified composition 
[x] Medical food supplement therapy: initiate Nutrition Diagnosis: Inadequate oral intake related to decreased appetite as evidenced by pt consuming 25% of current meals. ASSESSMENT:  
 
Pt with poor meal intake and appetite since and prior to admission. Consuming 25% of current meals. Pt states she has been forcing herself to eat for years and that cancer has diminished her appetite. Discussed supplements, pt agreeable to Ensure. Average po intake adequate to meet patients estimated nutritional needs:   [] Yes     [x] No   [] Unable to determine at this time Diet: DIET CARDIAC Regular Food Allergies: NKFA Current Appetite:   [] Good     [] Fair     [x] Poor     [] Other: 
Appetite/meal intake prior to admission:   [] Good     [] Fair     [x] Poor     [] Other:  
Feeding Limitations:  [] Swallowing difficulty    [] Chewing difficulty    [] Other: 
Current Meal Intake: No data found. BM: 11/5 Skin Integrity: WDL Edema:   [] No     [x] Yes Pertinent Medications: Reviewed: colace, MVI, zofran prn, dopamine drip Recent Labs 11/05/18 
5003 11/03/18 
2335 11/03/18 
0505  142 142  
K 3.7 3.7 3.8  108 105 CO2 30 29 29 GLU 91 108* 100* BUN 32* 45* 49* CREA 1.05 1.59* 1.78* CA 9.4 8.3* 8.3*  
MG  --  2.4 2.4 ALB  --  2.8*  --   
SGOT  --  34  --   
ALT  --  27  -- Intake/Output Summary (Last 24 hours) at 11/5/2018 1504 Last data filed at 11/5/2018 1200 Gross per 24 hour Intake 15.2 ml Output 2000 ml Net -1984.8 ml Anthropometrics: 
Ht Readings from Last 1 Encounters:  
11/04/18 5' 6\" (1.676 m) Last 3 Recorded Weights in this Encounter 11/04/18 0527 11/04/18 1218 11/05/18 0400 Weight: 84 kg (185 lb 3 oz) 83.9 kg (185 lb) 83.3 kg (183 lb 10.3 oz) Body mass index is 29.64 kg/m². Weight History: Pt states UBW of 145-150 lbs. Noted pt edematous. Weight Metrics 11/5/2018 11/2/2018 9/17/2018 7/17/2018 7/16/2018 7/3/2018 6/20/2018 Weight 183 lb 10.3 oz - 164 lb 161 lb 155 lb 158 lb 167 lb BMI - 29.64 kg/m2 26.47 kg/m2 25.99 kg/m2 25.02 kg/m2 25.5 kg/m2 26.95 kg/m2 Admitting Diagnosis: Sepsis ([facility] Hypotension Sepsis ([facility] Pneumonia Metastatic breast cancer ([facility] Pertinent PMHx: chemotherapy induced anemia, breast cancer, CHF, DM, esophageal cancer, SOB Education Needs:        [x] None identified  [] Identified - Not appropriate at this time  []  Identified and addressed - refer to education log Learning Limitations:   [x] None identified  [] Identified Cultural, Methodist & ethnic food preferences:  [x] None identified    [] Identified and addressed ESTIMATED NUTRITION NEEDS:  
 
Calories: 6519-9442 kcal (MSJx1.2-1.3) based on  [x] Actual BW 83 kg    [] IBW Protein:  66-83 gm (0.8-1 gm/kg) based on  [x] Actual BW      [] IBW Fluid: 1 mL/kcal 
  
MONITORING & EVALUATION:  
 
Nutrition Goal(s): 1. Po intake of meals will meet >75% of patient estimated nutritional needs within the next 7 days. Outcome:  [] Met/Ongoing    []  Not Met    [x] New/Initial Goal  
 
Monitoring:   [x] Food and beverage intake   [x] Diet order   [x] Nutrition-focused physical findings   [x] Treatment/therapy   [] Weight   [] Enteral nutrition intake Previous Recommendations (for follow-up assessments only):     []   Implemented       []   Not Implemented (RD to address)      [] No Longer Appropriate     [] No Recommendation Made Discharge Planning: Cardiac diet [x] Participated in care planning, discharge planning, & interdisciplinary rounds as appropriate Misty Randhawa Dietetic Intern Pager: [phone]

## 2018-11-05 NOTE — PROGRESS NOTES
Problem: Mobility Impaired (Adult and Pediatric) Goal: *Acute Goals and Plan of Care (Insert Text) Physical Therapy Goals Initiated 11/5/2018 and to be accomplished within 7 day(s) 1. Patient will move from supine to sit and sit to supine  in bed with supervision/set-up. 2.  Patient will transfer from bed to chair and chair to bed with minimal assistance/contact guard assist using the least restrictive device. 3.  Patient will perform sit to stand with minimal assistance/contact guard assist. 
4.  Patient will ambulate with minimal assistance/contact guard assist for 25 feet with the least restrictive device. physical Therapy EVALUATION Patient: Kyler Vital (69 y.o. female) Date: 11/5/2018 Primary Diagnosis: Sepsis ([facility] Hypotension Sepsis ([facility] Pneumonia Metastatic breast cancer ([facility] Precautions: fall ASSESSMENT : 
Based on the objective data described below, the patient presents with decreased strength, balance and activity tolerance and increased LE pain resulting in decreased independence with functional mobility. Patient's BP was monitored closely during evaluation due to hypotension throughout hospitalization. BP in supine 84/54, sitting 104/68, sitting after 5 minutes 105/65. Pt transferred supine to sit with mod A, with initial reports of dizziness which stabilized over time but did not completely go away. Pt stood with min/mod A from the bed and sidestepped 2 feet with RW and min/mod A with short shuffling steps due to LE pain with weightbearing. Pt returned to supine in bed with mod A. Pt was left in bed with needs in reach and was educated on LE exercises to perform throughout the day. Patient will benefit from skilled intervention to address the above impairments. Patients rehabilitation potential is considered to be Good Factors which may influence rehabilitation potential include:  
[]         None noted 
[]         Mental ability/status [x]         Medical condition 
[]         Home/family situation and support systems 
[]         Safety awareness [x]         Pain tolerance/management 
[]         Other: PLAN : 
Recommendations and Planned Interventions: 
[x]           Bed Mobility Training             [x]    Neuromuscular Re-Education 
[x]           Transfer Training                   []    Orthotic/Prosthetic Training 
[x]           Gait Training                          []    Modalities [x]           Therapeutic Exercises          [x]    Edema Management/Control 
[x]           Therapeutic Activities            [x]    Patient and Family Training/Education 
[]           Other (comment): Frequency/Duration: Patient will be followed by physical therapy 1-2 times per day/4-7 days per week to address goals. Discharge Recommendations: Tad Lemus Further Equipment Recommendations for Discharge: N/A  
 
G-CODES Mobility [id number] Current  CK= 40-59%   Goal  CI= 1-19%. The severity rating is based on the Level of Assistance required for Functional Mobility and ADLs. SUBJECTIVE:  
Patient stated I really want to start moving.  OBJECTIVE DATA SUMMARY:  
 
Past Medical History:  
Diagnosis Date  Antineoplastic chemotherapy induced anemia  Arrhythmia Atrial Fib  Arthritis  [facility] cancer ([facility]  Cancer ([facility] 1999 Breast  
 Cardiomyopathy ([facility]  Chronic ITP (idiopathic thrombocytopenia) ([facility]) 10/31/2016 Secondary to Anemia from Chemo  Congestive heart failure ([facility]  Diabetes ([facility] borderline, no meds  Esophageal cancer ([facility] 2005  
 treated with chemo  Heart failure ([facility]  SOB (shortness of breath) Past Surgical History:  
Procedure Laterality Date  BREAST SURGERY PROCEDURE UNLISTED Left 1990s  
 lumpectomy  HX APPENDECTOMY  HX HEENT Tonsillectomy  HX ORTHOPAEDIC    
 HX TUBAL LIGATION    
 HX VASCULAR ACCESS    
  NEUROLOGICAL PROCEDURE UNLISTED  01/08/2018 Lumbar fusion Prior Level of Function/Home Situation: pt reports being independence with mobility prior to admission however does have a walker and cane at home if needed; per patient she is alone during the day when her son is at work Home Situation Home Environment: Private residence # Steps to Enter: 3 One/Two Story Residence: One story # of Interior Steps: 0 Height of Each Step (in): 2 inches Interior Rails: Both Lift Chair Available: No 
Living Alone: No(son lives with her) Support Systems: Family member(s), Friends \ neighbors Patient Expects to be Discharged to[de-identified] Private residence Current DME Used/Available at Home: NoneCritical Behavior: 
 calm and cooperative Strength:   
Strength: Generally decreased, functional(grossly 3+/5 B LEs) Tone & Sensation:  
Tone: Normal 
 Range Of Motion: 
AROM: Generally decreased, functional 
  Functional Mobility: 
Bed Mobility: 
Supine to Sit: Moderate assistance Sit to Supine: Moderate assistance Transfers: 
Sit to Stand: Minimum assistance; Moderate assistance Stand to Sit: Minimum assistance Balance:  
Sitting - Static: Good (unsupported) Standing: With support Standing - Static: (fair-) Standing - Dynamic : (fair-/poor+)Ambulation/Gait Training: 
Distance (ft): 2 Feet (ft)(sidesteps along the edge of the bed) Assistive Device: Walker, rolling Ambulation - Level of Assistance: Minimal assistance; Moderate assistance Gait Abnormalities: Shuffling gait Therapeutic Exercises: Ankle pumps, heel slides, LAQ Pain: 
Pt reports 0/10 pain or discomfort prior to treatment.   
Pt reports 5/10 pain or discomfort post treatment. Activity Tolerance:  
Poor+ Please refer to the flowsheet for vital signs taken during this treatment. After treatment:  
[]         Patient left in no apparent distress sitting up in chair 
[x]         Patient left in no apparent distress in bed [x]         Call bell left within reach [x]         Nursing notified 
[]         Caregiver present 
[]         Bed alarm activated COMMUNICATION/EDUCATION:  
[x]         Fall prevention education was provided and the patient/caregiver indicated understanding. [x]         Patient/family have participated as able in goal setting and plan of care. [x]         Patient/family agree to work toward stated goals and plan of care. []         Patient understands intent and goals of therapy, but is neutral about his/her participation. []         Patient is unable to participate in goal setting and plan of care. Educated patient on LE exercises, transfer techniques and calling for assistance with mobility Thank you for this referral. 
Jhoana Alvarado, PT Time Calculation: 23 mins

## 2018-11-05 NOTE — PROGRESS NOTES
Bedside and Verbal shift change report given to MARIEL Magallanes RN (oncoming nurse) by  Theodore Norton RN (offgoing nurse). Report included the following information SBAR, Kardex, Intake/Output, Recent Results and Cardiac Rhythm . Geovanny Caraballo

## 2018-11-06 NOTE — ROUTINE PROCESS
1930-Bedside and verbal report from David Mcbride RN. PT resting in bed, no c/o pain, call bell within reach, pt clean/dry, BP 91/56. 
 
2030-Pt resting, NAD, no apparent pain, call bell within reach, pt clean/dry, BP 95/49.  
 
2130-Pt resting, NAD, no apparent pain, call bell within reach, no SOB, BP stable, pt clean/dry. 2230-Pt resting, NAD, no apparent pain, no SOB, pt does want a bath in the AM, BP stable, pt clean/dry. 0000-Pt sleeping, NAD, no apparent pain, no SOB, BP 95/52.  
 
0200-Pt sleeping, NAD, no apparent pain, call bell within reach, no SOB, /47.  
 
0400-Pt sleeping, NAD, no apparent pain, no SOB, call bell within reach, BP stable. 0600-Pt given complete bed bath, NAD, no apparent pain, call bell within reach, BP stable. No SOB. 0700-Bedside and verbal report given to David Mcbride RN.

## 2018-11-06 NOTE — CONSULTS
Infectious Disease Consultation Note Requested by: Dr. Rudi Silva Reason: gram positive bacteremia Current abx Prior abx Pip/tazo, levaquin 11/2 
vancomycin 11/2-11/4 Aztreonam 11/2-11/4 Lines:  
 
 
Assessment : 
 
79 y.o. female with history of: Diastolic HF with recent fluid overload, cAFib, H/o metastatic breast cancer (s/p 14 cycles chemo, now in remission), On chronic platelet therapy  admitted to [facility] on 11/2/18 for hypotension, bradycardia that was incidentally found at the infusion center. Now with gram positive bacteremia. Highly complex clinical picture. I agree that it is difficult to determine significance of gram positive bacteremia in blood cultures 11/2. After detailed history and exam; clinical probability of bloodstream infection is low. Slow growing gram positive rods in blood cultures 11/2 likely contaminant - although the bottles have been labelled as drawn 7 minutes apart; it is frequently the same draw in ED. At this time, patient seems to be clinically improving off abx for 48 hours. Hence, I would recommend to continue holding off abx, repeat blood cultures, f/u ID of gpr in blood cultures to determine final plan of care. Left ankle pain: await x ray to r/o undiagnosed infection. No erythema, no warmth argues against infection. Also, clinical exam not c/w left ankle septic arthritis. JUAN: likely due to meds, dehydration: improved Recommendations: 
 
1. Hold off abx 2. F/u id of gpr in blood cultures 11/2 3. Repeat blood cultures 4. F/u x ray left ankle 5. Monitor temp, cbc, clinically Advance Care planning: full code: discussed  with patient/surrogate decision maker:Pinky Fernandez: [phone] Thank you for consultation request. Above plan was discussed in details with patient, family and dr Rudi Silva. Please call me if any further questions or concerns. Will continue to participate in the care of this patient.  
 
HPI: 
 
 79 y.o. female with history of: Diastolic HF with recent fluid overload, cAFib, H/o metastatic breast cancer (s/p 14 cycles chemo, now in remission), On chronic platlet therapy  admitted to [facility] on 11/2/18 for hypotension, bradycardia that was incidentally found at the infusion center. She was seen at  infusion clinic for injection with bp 76/43 Given fluids and unable to bring up. Sent to [facility] where she was started on sepsis bundle and given fluids for possible retrocardiac infiltrate. Hypotension slow to improve with fluids but improved to 99 systolic post injection of Cortif ; patient has been off of chronic steroids for over one year now . Patient states for last week running low blood pressure ; feeling weak tired and dizzy and finally had to give in and use can. Also with cough and dyspnea with little exertion. She was also noted to have ARF. She was admitted for further w/u. It was felt that JUAN was due to diuretics/dehydration. Her blood cultures remained negative for 48 hours. abx were stopped. Patient was clinically improving. Now her blood cultures 11/2 are positive for GPR. I have been consulted for further recommendations. Patient denies fevers, headaches, visual disturbances, sore throat, runny nose, earaches, cp, sob, chills, cough, abdominal pain, diarrhea, burning micturition, pain or weakness in extremities. He denies back pain/flank pain. denies recent sick contacts. No h/o recent travel. No known h/o MRSA colonization or infection in the past. 
 
  
 
 
 
 
Past Medical History:  
Diagnosis Date  Antineoplastic chemotherapy induced anemia  Arrhythmia Atrial Fib  Arthritis  Breast cancer ([address]  Cancer ([address] 1999 Breast  
 Cardiomyopathy ([address]  Chronic ITP (idiopathic thrombocytopenia) (HCC) 10/31/2016 Secondary to Anemia from Chemo  Congestive heart failure ([address]  Diabetes ([address] borderline, no meds  Esophageal cancer ([address] 2005 treated with chemo  Heart failure [facility]  SOB (shortness of breath) Past Surgical History:  
Procedure Laterality Date  BREAST SURGERY PROCEDURE UNLISTED Left 1990s  
 lumpectomy  HX APPENDECTOMY  HX HEENT Tonsillectomy  HX ORTHOPAEDIC    
 HX TUBAL LIGATION    
 HX VASCULAR ACCESS    
 NEUROLOGICAL PROCEDURE UNLISTED  01/08/2018 Lumbar fusion Medication List  
  
ASK your doctor about these medications   
albuterol 90 mcg/actuation inhaler Commonly known as:  PROAIR HFA 
1-2 puffs every 4-6 hrs. aluminum-magnesium hydroxide 200-200 mg/5 mL susp 5 mL, diphenhydrAMINE 12.5 mg/5 mL liqd 12.5 mg, lidocaine 2 % soln 5 mL 
5 mL by Swish and Spit route two (2) times a day. Magic mouth wash Maalox Lidocaine 2% viscous Diphenhydramine oral solution Pharmacy to mix equal portions of ingredients to a total volume as indicated in the dispense amount. amiodarone 200 mg tablet Commonly known as:  CORDARONE 
  
COREG 3.125 mg tablet Generic drug:  carvedilol 
  
digoxin 0.125 mg tablet Commonly known as:  LANOXIN 
  
dronabinol 5 mg capsule Commonly known as:  Byrd Dial Take 1 Cap by mouth two (2) times a day. ergocalciferol 50,000 unit capsule Commonly known as:  VITAMIN D2 Take 1 Cap by mouth every seven (7) days. furosemide 40 mg tablet Commonly known as:  LASIX 
  
gabapentin 300 mg capsule Commonly known as:  NEURONTIN Take 1 Cap by mouth three (3) times daily. lidocaine-prilocaine topical cream 
Commonly known as:  EMLA 
APPLY OVER PORT 1 HOUR PRIOR TO CHEMOTHERAPY THAN COVER WITH [place] WRAP LINZESS 145 mcg Cap capsule Generic drug:  linaclotide 
  
lisinopril 10 mg tablet Commonly known as:  PRINIVIL, ZESTRIL 
  
loratadine 10 mg Cap 
  
magic mouthwash solution Take 5 mL by mouth three (3) times daily as needed for Stomatitis. Magic mouth wash Maalox Lidocaine 2% viscous Diphenhydramine oral solution Pharmacy to mix equal portions of ingredients to a total volume as indicated in the dispense amount. meloxicam 7.5 mg tablet Commonly known as:  MOBIC 
  
nabumetone 750 mg tablet Commonly known as:  RELAFEN 
  
ondansetron hcl 8 mg tablet Commonly known as:  Nano Payor Take 1 Tab by mouth every eight (8) hours as needed for Nausea. oxyCODONE-acetaminophen  mg per tablet Commonly known as:  PERCOCET Take 1 Tab by mouth every six (6) hours as needed for Pain. Max Daily Amount: 4 Tabs. * potassium chloride 20 mEq tablet Commonly known as:  K-DUR, KLOR-CON Take 2 Tabs by mouth daily. * KLOR-CON M20 20 mEq tablet Generic drug:  potassium chloride TAKE ONE TABLET BY MOUTH ONCE A DAY promethazine 25 mg tablet Commonly known as:  PHENERGAN Take 1 Tab by mouth every six (6) hours as needed for Nausea. PRUVATE 21-7 PO Senna 8.6 mg tablet Generic drug:  senna 
  
temazepam 30 mg capsule Commonly known as:  RESTORIL 
  
topiramate 50 mg tablet Commonly known as:  TOPAMAX XARELTO 10 mg tablet Generic drug:  rivaroxaban 
  
zolpidem 10 mg tablet Commonly known as:  AMBIEN 
TAKE 1 TABLET BY MOUTH NIGHTLY AS NEEDED FOR SLEEP. MAX DAILY AMOUNT 10 MG 
  
  
  
  
  
 
 
Current Facility-Administered Medications Medication Dose Route Frequency  rivaroxaban (XARELTO) tablet 20 mg  20 mg Oral DAILY  albuterol (PROVENTIL VENTOLIN) nebulizer solution 2.5 mg  2.5 mg Nebulization TID RT  
 senna (SENOKOT) tablet 8.6 mg  1 Tab Oral DAILY PRN  
 multivitamin, tx-iron-ca-min (THERA-M w/ IRON) tablet 1 Tab  1 Tab Oral DAILY  linaclotide (LINZESS) capsule 145 mcg  145 mcg Oral ACB  loratadine (CLARITIN) tablet 10 mg  10 mg Oral DAILY  topiramate (TOPAMAX) tablet 50 mg  50 mg Oral DAILY  naloxone (NARCAN) injection 0.4 mg  0.4 mg IntraVENous PRN  
 ondansetron (ZOFRAN) injection 4 mg  4 mg IntraVENous Q4H PRN  
  sodium chloride (NS) flush 5-10 mL  5-10 mL IntraVENous Q8H  
 sodium chloride (NS) flush 5-10 mL  5-10 mL IntraVENous PRN  
 albuterol-ipratropium (DUO-NEB) 2.5 MG-0.5 MG/3 ML  3 mL Nebulization Q4H PRN  
 magic mouthwash (FIRST-MOUTHWASH BLM) oral suspension 5 mL  5 mL Oral TID PRN  
 docusate sodium (COLACE) capsule 100 mg  100 mg Oral DAILY  traMADol-acetaminophen (ULTRACET) per tablet 1 Tab  1 Tab Oral Q6H PRN  
 acetaminophen (TYLENOL) tablet 650 mg  650 mg Oral Q6H PRN  
 melatonin tablet 3 mg  3 mg Oral QHS PRN  
 diclofenac (VOLTAREN) 1 % topical gel 2 g  2 g Topical TID PRN  
 gabapentin (NEURONTIN) capsule 200 mg  200 mg Oral TID  sodium chloride (NS) flush 5-10 mL  5-10 mL IntraVENous PRN Allergies: Aspirin; Adhesive; Nickel; and Pcn [penicillins] History reviewed. No pertinent family history. Social History Socioeconomic History  Marital status:  Spouse name: Not on file  Number of children: Not on file  Years of education: Not on file  Highest education level: Not on file Social Needs  Financial resource strain: Not on file  Food insecurity - worry: Not on file  Food insecurity - inability: Not on file  Transportation needs - medical: Not on file  Transportation needs - non-medical: Not on file Occupational History  Not on file Tobacco Use  Smoking status: Never Smoker  Smokeless tobacco: Never Used Substance and Sexual Activity  Alcohol use: No  
 Drug use: No  
 Sexual activity: Not on file Other Topics Concern  Not on file Social History Narrative  Not on file Social History Tobacco Use Smoking Status Never Smoker Smokeless Tobacco Never Used Temp (24hrs), Av.2 °F (36.8 °C), Min:97.3 °F (36.3 °C), Max:98.9 °F (37.2 °C) Visit Vitals /65 (BP Patient Position: Sitting) Pulse 81 Temp 97.3 °F (36.3 °C) Resp 16 Ht 5' 6\" (1.676 m) Wt 76.7 kg (169 lb 1.5 oz) SpO2 100% Breastfeeding? No  
BMI 27.29 kg/m² ROS: 12 point ROS obtained in details. Pertinent positives as mentioned in HPI,  
otherwise negative Physical Exam: 
 
General:  alert, cooperative, no distress, appears stated age Neck:  no JVD Lungs:  clear to auscultation bilaterally Heart:  S1 S2 regular, no obvious murmurs/gallops/rubs Abdomen:  abdomen is soft without significant tenderness, masses, organomegaly or guarding Extremities:  extremities normal, atraumatic, no cyanosis. There is edema diffusely in both upper and lower extremities but not pitting or dependent. .likely her baseline Skin: warm Neuro: no focal neurological deficits, moves all extremities well, no involuntary movements, reflexes at knee and ankle intact Psych:cooperative, appropriate 
  
 
 
 
Labs: Results:  
Chemistry Recent Labs 11/06/18 
0540 11/05/18 
7874 11/03/18 
2335 GLU 80 91 108*  145 142  
K 3.8 3.7 3.7  107 108 CO2 30 30 29 BUN 26* 32* 45* CREA 0.90 1.05 1.59* CA 9.2 9.4 8.3* AGAP 5 8 5 BUCR 29* 30* 28* AP  --   --  91  
TP  --   --  6.9 ALB  --   --  2.8*  
GLOB  --   --  4.1* AGRAT  --   --  0.7* CBC w/Diff Recent Labs 11/06/18 
0540 11/03/18 
2335 WBC  --  6.9  
RBC  --  3.60* HGB 9.7* 9.5* HCT 28.8* 28.7*  
PLT  --  199 GRANS  --  57  
LYMPH  --  24 EOS  --  2 Microbiology No results for input(s): CULT in the last 72 hours. RADIOLOGY: 
 
All available imaging studies/reports in [facility] for this admission were reviewed Dr. Joey Garcia, Infectious Disease Specialist 
[phone] November 6, 2018 
1:32 PM

## 2018-11-06 NOTE — PROGRESS NOTES
Problem: Pressure Injury - Risk of 
Goal: *Prevention of pressure injury Document Reggie Scale and appropriate interventions in the flowsheet. Outcome: Progressing Towards Goal 
Pressure Injury Interventions: 
Sensory Interventions: Keep linens dry and wrinkle-free, Minimize linen layers Activity Interventions: PT/OT evaluation Mobility Interventions: PT/OT evaluation, Pressure redistribution bed/mattress (bed type) Nutrition Interventions: Document food/fluid/supplement intake, Offer support with meals,snacks and hydration Friction and Shear Interventions: Apply protective barrier, creams and emollients, HOB 30 degrees or less, Lift sheet, Minimize layers, Transferring/repositioning devices Problem: Falls - Risk of 
Goal: *Absence of Falls Document Doretha Deal Fall Risk and appropriate interventions in the flowsheet. Outcome: Progressing Towards Goal 
Fall Risk Interventions: 
Mobility Interventions: Communicate number of staff needed for ambulation/transfer, Patient to call before getting OOB, OT consult for ADLs, PT Consult for mobility concerns, PT Consult for assist device competence, Strengthening exercises (ROM-active/passive) Medication Interventions: Patient to call before getting OOB, Teach patient to arise slowly, Evaluate medications/consider consulting pharmacy Elimination Interventions: Call light in reach, Patient to call for help with toileting needs Problem: Hypotension Goal: *Blood pressure within specified parameters Outcome: Progressing Towards Goal 
SBP dropped to mid 90's after decrease in dopamine rate. Continuing to monitor via tele. HR WNL. Comments: Bedside and Verbal shift change report given to Guido Correia RN (oncoming nurse) by Jacqui Schafer RN (offgoing nurse). Report included the following information SBAR, Kardex, Intake/Output, MAR, Recent Results and Cardiac Rhythm Afib controlled. [facility]

## 2018-11-06 NOTE — PROGRESS NOTES
Problem: Pressure Injury - Risk of 
Goal: *Prevention of pressure injury Document Reggie Scale and appropriate interventions in the flowsheet. Outcome: Progressing Towards Goal 
Pressure Injury Interventions: 
Sensory Interventions: Assess changes in LOC, Assess need for specialty bed, Check visual cues for pain, Discuss PT/OT consult with provider Moisture Interventions: Absorbent underpads, Apply protective barrier, creams and emollients, Assess need for specialty bed, Check for incontinence Q2 hours and as needed Activity Interventions: Increase time out of bed, Pressure redistribution bed/mattress(bed type), PT/OT evaluation Mobility Interventions: Assess need for specialty bed, HOB 30 degrees or less, Pressure redistribution bed/mattress (bed type), Suspension boots Nutrition Interventions: Document food/fluid/supplement intake Friction and Shear Interventions: Apply protective barrier, creams and emollients, Foam dressings/transparent film/skin sealants, HOB 30 degrees or less Problem: Falls - Risk of 
Goal: *Absence of Falls Document Jeanie Linares Fall Risk and appropriate interventions in the flowsheet. Outcome: Progressing Towards Goal 
Fall Risk Interventions: 
Mobility Interventions: Bed/chair exit alarm, Patient to call before getting OOB Medication Interventions: Patient to call before getting OOB, Teach patient to arise slowly Elimination Interventions: Bed/chair exit alarm, Call light in reach, Patient to call for help with toileting needs, Toilet paper/wipes in reach, Toileting schedule/hourly rounds

## 2018-11-06 NOTE — PROGRESS NOTES
Problem: Self Care Deficits Care Plan (Adult) Goal: *Acute Goals and Plan of Care (Insert Text) Occupational Therapy Goals Initiated 11/6/2018 within 7 day(s). 1.  Patient will perform lower body dressing with minimal assist and AE as needed. 2.  Patient will perform bathing with minimal assistance/contact guard assist. 
3.  Patient will perform toilet transfers with stand by assist and LRAD. 4.  Patient will perform all aspects of toileting with minimal assistance/contact guard assist. 
5.  Patient will participate in upper extremity therapeutic exercise/activities with supervision/set-up for 8 minutes. 6.  Patient will perform functional activity of choice in stance with Good static balance in order to increase I in ADLs. Occupational Therapy EVALUATION Patient: Evangelina Rod (89 y.o. female) Date: 11/6/2018 Primary Diagnosis: Sepsis ([facility] Hypotension Sepsis ([facility] Pneumonia Metastatic breast cancer ([facility] Precautions:   Fall ASSESSMENT : 
Based on the objective data described below, the patient presents with generalized weakness, decreased activity tolerance, decreased sensation in hands secondary to chemotherapy, and decreased balance impacting independence in functional everyday activities. Pt able to perform bed  mobility during session with SUP. Pt experienced dizziness and nausea once sitting edge of bed with /73 and dizziness/nausea subsiding after rest break. Pt attempted donning socks and required min A to start sock on toes. Pt demonstrates decreased B shld AROm to  degrees flex/abd and 4/5 strength overall in BUEs. Pt agreeable to [facility] transfer and performed with SPT and min A with v/c for hand placement. Pt requires extra time and demonstrates decreased step clearance during turn. Pt requested 15 min on BSC and set up with call bell and nursing notified. Patient will benefit from skilled intervention to address the above impairments. Patients rehabilitation potential is considered to be Good Factors which may influence rehabilitation potential include:  
[]             None noted []             Mental ability/status []             Medical condition [x]             Home/family situation and support systems []             Safety awareness []             Pain tolerance/management 
[]             Other: PLAN : 
Recommendations and Planned Interventions: 
[x]               Self Care Training                  [x]        Therapeutic Activities [x]               Functional Mobility Training    []        Cognitive Retraining 
[x]               Therapeutic Exercises           [x]        Endurance Activities [x]               Balance Training                   [x]        Neuromuscular Re-Education []               Visual/Perceptual Training     [x]   Home Safety Training 
[x]               Patient Education                 [x]        Family Training/Education []               Other (comment): Frequency/Duration: Patient will be followed by occupational therapy 1-2 times per day/4-7 days per week to address goals. Discharge Recommendations: [address] depending on progress with rehab Further Equipment Recommendations for Discharge: N/A Barriers to Learning/Limitations: None Compensate with: visual, verbal, tactile, kinesthetic cues/model PATIENT COMPLEXITY Eval Complexity: History: MEDIUM Complexity : Expanded review of history including physical, cognitive and psychosocial  history ; Examination: MEDIUM Complexity : 3-5 performance deficits relating to physical, cognitive , or psychosocial skils that result in activity limitations and / or participation restrictions; Decision Making:MEDIUM Complexity : Patient may present with comorbidities that affect occupational performnce.  Miniml to moderate modification of tasks or assistance (eg, physical or verbal ) with assesment(s) is necessary to enable patient to complete evaluation  Assessment: mod Complexity G-CODES:  
 
Self Care  Current  CK= 40-59%  Goal  CJ= 20-39%. The severity rating is based on the Level of Assistance required for Functional Mobility and ADLs. SUBJECTIVE:  
Patient stated All of this is new since being here.  Speaking about her dizziness and nausea OBJECTIVE DATA SUMMARY:  
 
Past Medical History:  
Diagnosis Date  Antineoplastic chemotherapy induced anemia  Arrhythmia Atrial Fib  Arthritis  Breast [facility] [address]  Cancer ([facility] 1999 Breast  
 Cardiomyopathy ([facility]  Chronic ITP (idiopathic thrombocytopenia) (HCC) 10/31/2016 Secondary to Anemia from Chemo  Congestive heart failure ([facility]  Diabetes ([facility] borderline, no meds  Esophageal cancer ([facility] 2005  
 treated with chemo  Heart failure ([facility]  SOB (shortness of breath) Past Surgical History:  
Procedure Laterality Date  BREAST SURGERY PROCEDURE UNLISTED Left 1990s  
 lumpectomy  HX APPENDECTOMY  HX HEENT Tonsillectomy  HX ORTHOPAEDIC    
 HX TUBAL LIGATION    
 HX VASCULAR ACCESS    
 NEUROLOGICAL PROCEDURE UNLISTED  01/08/2018 Lumbar fusion Prior Level of Function/Home Situation: Pt lives with son who works during the day and was MI in mobility reporting using cane at home and MI in ADLs and light meal prep Home Situation Home Environment: Private residence # Steps to Enter: 3 One/Two Story Residence: One story # of Interior Steps: 0 Height of Each Step (in): 2 inches Interior Rails: Both Lift Chair Available: No 
Living Alone: No 
Support Systems: Family member(s) Patient Expects to be Discharged to[de-identified] Unknown Current DME Used/Available at Home: Raised toilet seat, Shower chair Tub or Shower Type: Tub/Shower combination []  Right hand dominant   []  Left hand dominantCognitive/Behavioral Status: Neurologic State: Alert Orientation Level: Oriented X4 Cognition: Follows commands; Appropriate decision making; Appropriate for age attention/concentration Safety/Judgement: Fall prevention;Home safety Skin: intact BUEs Edema: none noted BUEs Coordination: 
Coordination: Generally decreased, functional(BUEs) Fine Motor Skills-Upper: Left Impaired;Right Impaired Gross Motor Skills-Upper: Left Intact; Right Intact Balance: 
Sitting: With support Sitting - Static: Fair (occasional) Standing: With support; Impaired Standing - Static: Fair Standing - Dynamic : Fair Strength: 
Strength: Generally decreased, functional(BUEs) Tone & Sensation: 
Tone: Normal(BUEs) Range of Motion: 
AROM: Generally decreased, functional(BU shlds) Functional Mobility and Transfers for ADLs: 
Bed Mobility: 
 Supine to Sit: Stand-by assistance Transfers: Toilet Transfer : Minimum assistance ADL Assessment: 
Feeding: Modified independent Oral Facial Hygiene/Grooming: Modified Independent Bathing: Moderate assistance Upper Body Dressing: Modified independent Lower Body Dressing: Moderate assistance Toileting: Moderate assistance ADL Intervention: 
 Cognitive Retraining Safety/Judgement: Fall prevention;Home safety Pain: 
Pt reports 0/10 pain or discomfort prior to treatment.   
Pt reports 0/10 pain or discomfort post treatment. Activity Tolerance:  
fair Please refer to the flowsheet for vital signs taken during this treatment. After treatment:  
[x] Patient left in no apparent distress sitting up on bed side commode 
[] Patient left in no apparent distress in bed 
[x] Call bell left within reach [x] Nursing notified 
[] Caregiver present 
[] Bed alarm activated COMMUNICATION/EDUCATION:  
[x] Home safety education was provided and the patient/caregiver indicated understanding. [x] Patient/family have participated as able in goal setting and plan of care. [x] Patient/family agree to work toward stated goals and plan of care. [] Patient understands intent and goals of therapy, but is neutral about his/her participation. [] Patient is unable to participate in goal setting and plan of care. Thank you for this referral. 
Sharath Whatley, OT Time Calculation: 16 mins

## 2018-11-06 NOTE — PROGRESS NOTES
Problem: Mobility Impaired (Adult and Pediatric) Goal: *Acute Goals and Plan of Care (Insert Text) Physical Therapy Goals Initiated 11/5/2018 and to be accomplished within 7 day(s) 1. Patient will move from supine to sit and sit to supine  in bed with supervision/set-up. 2.  Patient will transfer from bed to chair and chair to bed with minimal assistance/contact guard assist using the least restrictive device. 3.  Patient will perform sit to stand with minimal assistance/contact guard assist. 
4.  Patient will ambulate with minimal assistance/contact guard assist for 25 feet with the least restrictive device. Outcome: Progressing Towards Goal 
physical Therapy TREATMENT Patient: Asia Espitia (40 y.o. female) Date: 11/6/2018 Diagnosis: Sepsis ([address] Hypotension Sepsis ([address] Pneumonia Metastatic breast cancer ([address] <principal problem not specified> Precautions: Fall Chart, physical therapy assessment, plan of care and goals were reviewed. OBJECTIVE/ ASSESSMENT: 
Patient found supine in bed willing to work with PT. Pt's BP in supine is found to be 110/78. Pt sat at EOB and does report dizziness. BP found to be 126/71. Pt stood and BP found to be 140/86. Pt continues to report dizziness, but wants to attempt ambulation. Pt took steps forward and backward equaling 8 ft total. Pt sat then returned to supine. Pt's BP in supine is 124/65. Pt reports increased pain in the lateral aspect of her left ankle, Pt has notable swelling in this area and increased warmth compared to other parts of her LE. Pt states pressure to the area is more painful, but declines increased pain when actively moving this joint. Pt reports 8/10 pain at rest and 10/10 pain with pressure / WB. Nurse Jadon Vergara notified. Education: therex, gait, transfers. Progression toward goals: 
[]      Improving appropriately and progressing toward goals [x]      Improving slowly and progressing toward goals []      Not making progress toward goals and plan of care will be adjusted PLAN: 
Patient continues to benefit from skilled intervention to address the above impairments. Continue treatment per established plan of care. Discharge Recommendations:  Tad Lemus Further Equipment Recommendations for Discharge:  rolling walker SUBJECTIVE:  
Patient stated I can't roll my foot to the side because it hurts too much.  OBJECTIVE DATA SUMMARY:  
Critical Behavior: 
Neurologic State: Alert Orientation Level: Oriented X4 Cognition: Follows commands, Appropriate decision making, Appropriate for age attention/concentration Safety/Judgement: Fall prevention, Home safety Functional Mobility Training: 
Bed Mobility: 
Supine to Sit: Stand-by assistance; Additional time Sit to Supine: Minimum assistance Transfers: 
Sit to Stand: Stand-by assistance Stand to Sit: Stand-by assistance Balance: 
Sitting: Intact Sitting - Static: Fair (occasional) Standing: Impaired; With support Standing - Static: Fair(+) Standing - Dynamic : FairAmbulation/Gait Training: 
Distance (ft): 8 Feet (ft) Assistive Device: Walker, rolling Ambulation - Level of Assistance: Contact guard assistance;Minimal assistance Gait Abnormalities: Decreased step clearance Therapeutic Exercises:  
 
Pain: 
Pre tx pain: 8Post tx pain: 8Pain Scale 1: Numeric (0 - 10) Pain Intensity 1: 8 Pain Location 1: Hand Pain Description 1: Aching Activity Tolerance:  
Fair Please refer to the flowsheet for vital signs taken during this treatment. After treatment:  
[] Patient left in no apparent distress sitting up in chair 
[x] Patient left in no apparent distress in bed 
[x] Call bell left within reach [x] Nursing notified 
[] Caregiver present 
[] Bed alarm activated 
[] SCDs applied 
[] Ice applied Nury Titus, [place] Time Calculation: 30 mins Mobility [id number] Current  CJ= 20-39%.   The severity rating is based on the Level of Assistance required for Functional Mobility and ADLs. Mobility   Goal  CI= 1-19%. The severity rating is based on the Level of Assistance required for Functional Mobility and ADLs.

## 2018-11-06 NOTE — PROGRESS NOTES
Met with pt at bedside to confirm discharge plan is home with family assisting. Pt is agreeable to [facility], if ordered. FOC obtained from pt. Pt has a walker at home without wheels and is not sure if she has received this walker in the past 5 yrs. Will need to inquire with [employer] rep. CM following. Joey Orta, [phone]

## 2018-11-06 NOTE — PROGRESS NOTES
[facility] - Progress Note Admit Date: 11/2/2018 Assessment:  
 
Hospital Problems  Date Reviewed: 9/17/2018 Codes Class Noted POA Pneumonia ICD-10-CM: J18.9 ICD-9-CM: 445  11/3/2018 Unknown Hypotension ICD-10-CM: I95.9 ICD-9-CM: 458.9  11/3/2018 Unknown Metastatic breast cancer ([email or web address] ICD-10-CM: Y11.822 ICD-9-CM: 174.9  11/3/2018 Unknown Sepsis ([email or web address] ICD-10-CM: A41.9 ICD-9-CM: 038.9, 995.91  11/2/2018 Unknown  
   
  
 
 
 
-cAF-rates in the 60's with fib/flutter pattern; on Xarelto (no ASA due to allergy). On Amiodarone, coreg and digoxin as outpatient. Currently rate controlling meds on hold given bradycardia. -Diastolic CHF, now with volume depletion. EF 56-60% on echo this admission. 
-Hypotension likely component of volume depletion- on dopamine drip for now. -Moderate pulmonary HTN. -Moderate MR. 
-H/o lung and breast cancer 
-H/o ITP 
 
-Primary cardiologist Dr. Anahy Montalvo Plan:  
 
BP and HR improving. Weaning off dopamine and trending hemodynamics. Amio, dig and coreg remain on hold given bradycardia earlier this admission. Renal function improving, lasix and lisinopril remain on hold. Appears to be maintaining negative fluid balance off diuretics, will continue to follow fluid status closely. Anticoagulated with Xarelto. Subjective: No CP, No SOB, no palpitations, no dizziness. Tele afib with HR 50 Objective:  
  
Patient Vitals for the past 8 hrs: 
 Temp Pulse Resp BP SpO2  
11/06/18 0738 98 °F (36.7 °C) 66 16 106/59 100 % 11/06/18 0348 98.4 °F (36.9 °C) 66 16 100/66 98 % 11/06/18 0342    100/61  Patient Vitals for the past 96 hrs: 
 Weight 11/05/18 0400 83.3 kg (183 lb 10.3 oz) 11/04/18 1218 83.9 kg (185 lb) 11/04/18 0527 84 kg (185 lb 3 oz) 11/03/18 0018 81 kg (178 lb 9.2 oz) 11/02/18 1454 75.3 kg (166 lb) Intake/Output Summary (Last 24 hours) at 11/6/2018 6475 Last data filed at 11/6/2018 2671 Gross per 24 hour Intake 868.99 ml Output 2600 ml Net -1731.01 ml Physical Exam: 
General:  alert, cooperative, no distress, appears stated age Neck:  no JVD Lungs:  clear to auscultation bilaterally Heart:  irregularly irregular rhythm Abdomen:  abdomen is soft without significant tenderness, masses, organomegaly or guarding Extremities:  Edema 1+ Data Review:  
 
Labs: Results:  
   
Chemistry Recent Labs 11/06/18 
0540 11/05/18 
2213 11/03/18 
2335 GLU 80 91 108*  145 142  
K 3.8 3.7 3.7  107 108 CO2 30 30 29 BUN 26* 32* 45* CREA 0.90 1.05 1.59* CA 9.2 9.4 8.3*  
MG  --   --  2.4 AGAP 5 8 5 BUCR 29* 30* 28* AP  --   --  91  
TP  --   --  6.9 ALB  --   --  2.8*  
GLOB  --   --  4.1* AGRAT  --   --  0.7* CBC w/Diff Recent Labs 11/06/18 
0540 11/03/18 
2335 WBC  --  6.9  
RBC  --  3.60* HGB 9.7* 9.5* HCT 28.8* 28.7*  
PLT  --  199 GRANS  --  57  
LYMPH  --  24 EOS  --  2 Cardiac Enzymes No results found for: CPK, CK, CKMMB, CKMB, RCK3, CKMBT, CKNDX, CKND1, NIMA, TROPT, TROIQ, MARGOTH, TROPT, TNIPOC, BNP, BNPP Coagulation No results for input(s): PTP, INR, APTT in the last 72 hours. No lab exists for component: INREXT Lipid Panel No results found for: CHOL, CHOLPOCT, CHOLX, CHLST, CHOLV, 594407, HDL, LDL, LDLC, DLDLP, 587959, VLDLC, VLDL, TGLX, TRIGL, TRIGP, TGLPOCT, CHHD, CHHDX  
BNP No results found for: BNP, BNPP, XBNPT Liver Enzymes Recent Labs 11/03/18 
2335  
TP 6.9 ALB 2.8* AP 91 SGOT 34 Digoxin Thyroid Studies Lab Results Component Value Date/Time TSH 0.54 11/02/2018 03:21 PM  
    
 
Signed By: JESSICA Ibanez November 6, 2018

## 2018-11-06 NOTE — PROGRESS NOTES
SUBJECTIVE: 
 
Feeling much better. no dizziness. No chest pain. No SOB. Cough better. No nausea or vomiting. Chronic abdominal discomfort and nausea present. OBJECTIVE: 
 
/78   Pulse 79   Temp 97.3 °F (36.3 °C)   Resp 18   Ht 5' 6\" (1.676 m)   Wt 76.7 kg (169 lb 1.5 oz)   SpO2 100%   Breastfeeding? No   BMI 27.29 kg/m² CVS: IRIR 
RS: Diminished in bases, no wheezes GI: NT, BS + Extremities: Improving pedal edema CNS: moves all extremities while in bed GENERAL: NAD, Awake ASSESSMENT: 
 
1. Hypotension sec to severe dehydration caused by diuretics. Ruled out ACS. [no sepsis]. Improved. 2. Sinus bradycardia - could be due to amiodarone use in setting of # 7. Improved currently. 3. 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 Chronic Afib 5. H/o lung and breast cancer 6. H/o ITP 7. JUAN due to diuretics induced dehydration, improved 8. Peripheral neuropathy and DJD 
9. GPR bacteremia PLAN: 
 
Cont current management Monitor off Dopamine drip D/w dr. Adriane Duke - no PPM 
D/w dr. Kristyn Loco - doing well off antibiotic. Repeat blood c/s. No antibiotic per ID at this point. PT/OT - Elastic stocking will be ordered BMP:  
Lab Results Component Value Date/Time  11/06/2018 05:40 AM  
 K 3.8 11/06/2018 05:40 AM  
  11/06/2018 05:40 AM  
 CO2 30 11/06/2018 05:40 AM  
 AGAP 5 11/06/2018 05:40 AM  
 GLU 80 11/06/2018 05:40 AM  
 BUN 26 (H) 11/06/2018 05:40 AM  
 CREA 0.90 11/06/2018 05:40 AM  
 GFRAA >60 11/06/2018 05:40 AM  
 GFRNA >60 11/06/2018 05:40 AM  
 
CBC:  
Lab Results Component Value Date/Time  HGB 9.7 (L) 11/06/2018 05:40 AM  
 HCT 28.8 (L) 11/06/2018 05:40 AM

## 2018-11-07 NOTE — PROGRESS NOTES
SUBJECTIVE: 
 
Sitting up in chair. Intermittent abdominal discomfort with nausea. No vomiting. Left ankle pain present. No dizziness or headaches. OBJECTIVE: 
 
/72 Comment: sitting EOB after therex   Pulse 74   Temp 98.4 °F (36.9 °C)   Resp 14   Ht 5' 6\" (1.676 m)   Wt 76.7 kg (169 lb 1.5 oz)   SpO2 100%   Breastfeeding? No   BMI 27.29 kg/m² CVS: IRIR 
RS: Diminished in bases, no wheezes GI: NT, BS + Extremities: Improving pedal edema CNS: moves all extremities while in bed GENERAL: NAD, Awake ASSESSMENT: 
 
1. Hypotension sec to severe dehydration caused by diuretics. Ruled out ACS. [no sepsis]. Improved. 2. Sinus bradycardia - could be due to amiodarone use in setting of # 7. Improved currently. 3. Chronic combined systolic and diastolic CHF with EF of 40 to 45 %, compensated. 4. Chronic Afib 5. H/o lung and breast cancer 6. H/o ITP 7. JUAN due to diuretics induced dehydration, improved 8. Peripheral neuropathy and DJD 
9. GPR bacteremia could be contamination 10. Intermittent abdominal discomfort and nausea 11. Left ankle pain could be gout PLAN: 
 
Cont current management CT ordered D/w dr. Luis Guillen - monitor off off antibiotic. Repeat blood c/s pending. PT/OT to follow Allopurinol and Colchicine added BMP:  
No results found for: NA, K, CL, CO2, AGAP, GLU, BUN, CREA, GFRAA, GFRNA  
 
CBC:  
Lab Results Component Value Date/Time  WBC 5.3 11/07/2018 05:44 AM  
 HGB 9.3 (L) 11/07/2018 05:44 AM  
 HCT 27.8 (L) 11/07/2018 05:44 AM  
  11/07/2018 05:44 AM

## 2018-11-07 NOTE — PROGRESS NOTES
[facility] - Progress Note Admit Date: 11/2/2018 Assessment:  
 
Hospital Problems  Date Reviewed: 9/17/2018 Codes Class Noted POA Pneumonia ICD-10-CM: J18.9 ICD-9-CM: 780  11/3/2018 Unknown Hypotension ICD-10-CM: I95.9 ICD-9-CM: 458.9  11/3/2018 Unknown Metastatic breast cancer ([facility] ICD-10-CM: A72.345 ICD-9-CM: 174.9  11/3/2018 Unknown Sepsis ([facility] ICD-10-CM: A41.9 ICD-9-CM: 038.9, 995.91  11/2/2018 Unknown  
   
  
 
 
 
  
-cAF-rates in the 60's with fib/flutter pattern; on Xarelto (no ASA due to allergy). On Amiodarone, coreg and digoxin as outpatient. Currently rate controlling meds on hold given bradycardia. -Gram positive bacteremia 
-Diastolic CHF, now with volume depletion. EF 56-60% on echo this admission. 
-Hypotension likely component of volume depletion- weaned off dopamine. 
-Moderate pulmonary HTN. -Moderate MR. 
-H/o lung and breast cancer 
-H/o ITP 
-Hypoalbuminemia. 
  
-Primary cardiologist Dr. Sonya Lowery Plan:  
 
Hemodynamics overall stable, would attempt restart low dose coreg when BP will allow. Home amiodarone and digoxin remain on hold. Diuretics and lisinopril remain on hold, renal function improved, will continue to follow volume status. Maximize nutrition. Infectious work-up in progress. Subjective: No CP. No SOB Objective:  
  
Patient Vitals for the past 8 hrs: 
 Temp Pulse Resp BP SpO2  
11/07/18 0752 98.4 °F (36.9 °C) 79 21 101/71 100 % 11/07/18 0600  71 8 109/54   
11/07/18 0500 98.4 °F (36.9 °C) 73 10 104/71 100 % 11/07/18 0300  70  94/53 100 % Patient Vitals for the past 96 hrs: 
 Weight 11/06/18 1054 76.7 kg (169 lb 1.5 oz) 11/05/18 0400 83.3 kg (183 lb 10.3 oz) 11/04/18 1218 83.9 kg (185 lb) 11/04/18 0527 84 kg (185 lb 3 oz) Intake/Output Summary (Last 24 hours) at 11/7/2018 6584 Last data filed at 11/7/2018 0500 Gross per 24 hour Intake 493.93 ml Output 1100 ml Net -606.07 ml Physical Exam: 
General:  alert, cooperative, no distress, appears stated age Neck:  no JVD Lungs:  clear to auscultation bilaterally Heart:  irregularly irregular rhythm Abdomen:  abdomen is soft without significant tenderness, masses, organomegaly or guarding Extremities:  extremities normal, atraumatic, no cyanosis 1+ edema Data Review:  
 
Labs: Results:  
   
Chemistry Recent Labs 11/06/18 
0540 11/05/18 
0031 GLU 80 91  145  
K 3.8 3.7  107 CO2 30 30 BUN 26* 32* CREA 0.90 1.05  
CA 9.2 9.4 AGAP 5 8 BUCR 29* 30* CBC w/Diff Recent Labs 11/07/18 
0544 11/06/18 
0540 WBC 5.3  --   
RBC 3.52*  --   
HGB 9.3* 9.7* HCT 27.8* 28.8*  
  --   
GRANS 51  --   
LYMPH 31  --   
EOS 6*  --   
  
Cardiac Enzymes No results found for: CPK, CK, CKMMB, CKMB, RCK3, CKMBT, CKNDX, CKND1, NIMA, TROPT, TROIQ, MARGOTH, TROPT, TNIPOC, BNP, BNPP Coagulation No results for input(s): PTP, INR, APTT in the last 72 hours. No lab exists for component: INREXT Lipid Panel No results found for: CHOL, CHOLPOCT, CHOLX, CHLST, CHOLV, 992773, HDL, LDL, LDLC, DLDLP, 590140, VLDLC, VLDL, TGLX, TRIGL, TRIGP, TGLPOCT, CHHD, CHHDX  
BNP No results found for: BNP, BNPP, XBNPT Liver Enzymes No results for input(s): TP, ALB, TBIL, AP, SGOT, GPT in the last 72 hours. No lab exists for component: DBIL Digoxin Thyroid Studies Lab Results Component Value Date/Time TSH 0.54 11/02/2018 03:21 PM  
    
 
Signed By: JESSICA Muro November 7, 2018

## 2018-11-07 NOTE — PROGRESS NOTES
Problem: Mobility Impaired (Adult and Pediatric) Goal: *Acute Goals and Plan of Care (Insert Text) Physical Therapy Goals Initiated 11/5/2018 and to be accomplished within 7 day(s) 1. Patient will move from supine to sit and sit to supine  in bed with supervision/set-up. 2.  Patient will transfer from bed to chair and chair to bed with minimal assistance/contact guard assist using the least restrictive device. 3.  Patient will perform sit to stand with minimal assistance/contact guard assist. 
4.  Patient will ambulate with minimal assistance/contact guard assist for 25 feet with the least restrictive device. [address] up with pt upon arrival. Pt declined PT services at this time and stated she had just tried to walk with PEÑALOZA and is currently having 9/10 pain in foot. Will f/u later in day schedule permitting.

## 2018-11-07 NOTE — PROGRESS NOTES
Attending requsted this writer to discuss with pt again the possibility of going to SNF. Met with pt at [facility] and was adamant about going home-has been to SNF in the past and will not go back. Pt stated her son and grandson live with her and her home is set up so she can get what she needs easily. Has a friend and dgt who assist with meals and bathing. Pt is agreeable to [facility]. Torito Manuel, [phone]

## 2018-11-07 NOTE — PROGRESS NOTES
Problem: Self Care Deficits Care Plan (Adult) Goal: *Acute Goals and Plan of Care (Insert Text) Occupational Therapy Goals Initiated 11/6/2018 within 7 day(s). 1.  Patient will perform lower body dressing with minimal assist and AE as needed. 2.  Patient will perform bathing with minimal assistance/contact guard assist. 
3.  Patient will perform toilet transfers with stand by assist and LRAD. 4.  Patient will perform all aspects of toileting with minimal assistance/contact guard assist. 
5.  Patient will participate in upper extremity therapeutic exercise/activities with supervision/set-up for 8 minutes. 6.  Patient will perform functional activity of choice in stance with Good static balance in order to increase I in ADLs. Outcome: Progressing Towards Goal 
Occupational Therapy TREATMENT Patient: Baldemar Comer (85 y.o. female) Date: 11/7/2018 Diagnosis: Sepsis ([facility] Hypotension Sepsis ([facility] Pneumonia Metastatic breast cancer ([facility] <principal problem not specified> Precautions: Fall Chart, occupational therapy assessment, plan of care, and goals were reviewed. ASSESSMENT: 
Pt sitting on BS upon approach for therapy, pt agreeable to skilled OT services after education provided on OT scope of practice in acute care setting. Pt reported having BM and completed bladder/bowel hygiene/hand hygiene Mod I with bath wipe. Pt stood from [facility] with CGA and maintained x1 min for BP check: 98/53; pt reported mild dizziness while standing at RW. Stand-step transfer to EOB completed with Min A for safety using RW. Pt reported 8/10 pain to left ankle at beginning of session. Education provided on energy conservation techniques and completing ADL tasks seated as necessary, pt verbalized understanding. Seated EOB, pt completed UE Therex with max rest breaks and extended time 2/2 pt fatigue. BP checked post therex while seated: 110/72.  Pt requesting to sit up in chair at end of session; completed min bathroom mobility for simulated toilet transfer with Min A and RW for safety. Pt noted to limp with min weight put through left LE. Pt scooted to back of chair with Supervision and left comfortably in chair with all needs within reach. Progression toward goals: 
[x]          Improving appropriately and progressing toward goals 
[]          Improving slowly and progressing toward goals 
[]          Not making progress toward goals and plan of care will be adjusted PLAN: 
Patient continues to benefit from skilled intervention to address the above impairments. Continue treatment per established plan of care. Discharge Recommendations:  Home Health with Supervision vs [facility] Further Equipment Recommendations for Discharge:  bedside commode G-CODES:  
 
Self Care  Current  CK= 40-59%  Goal  CJ= 20-39%. The severity rating is based on the Level of Assistance required for Functional Mobility and ADLs. SUBJECTIVE:  
Patient stated I push myself for sure.  OBJECTIVE DATA SUMMARY:  
Cognitive/Behavioral Status: 
Neurologic State: Alert Orientation Level: Oriented X4 Cognition: Follows commands, Appropriate safety awareness Safety/Judgement: Fall prevention, Home safety Functional Mobility and Transfers for ADLs: 
 Transfers: 
Sit to Stand: Minimum assistance Bed to Chair: Minimum assistance Toilet Transfer : Minimum assistance Bathroom Mobility: Minimum assistance Balance: 
Sitting: Intact Sitting - Static: Good (unsupported) Standing: Impaired; With support Standing - Static: Occassional(minimum weight through left LE 2/2 pain ) ADL Intervention: 
Grooming Grooming Assistance: Modified independent Washing Hands: Modified independent Toileting Toileting Assistance: Modified independent Bladder Hygiene: Modified independent Bowel Hygiene: Modified indpendent Clothing Management: Modified independent Therapeutic Exercises:  
B UE Therex while seated EOB with visual demo for proper form and SBA, pt completed x10 reps with max rest breaks: shoulder elevation/depression, shoulder rolls (backwards), scapula retraction/protraction, and bicep curls x15 reps with min resistance. Pain: 
Pt reports 8/10 pain or discomfort prior to treatment to left ankle, 5/10 to stomach.   
Pt reports 10/10 pain or discomfort post treatment to left ankle. Activity Tolerance:   
Fair Please refer to the flowsheet for vital signs taken during this treatment. After treatment:  
[x]  Patient left in no apparent distress sitting up in chair 
[]  Patient left in no apparent distress in bed 
[x]  Call bell left within reach 
[]  Nursing notified 
[]  Caregiver present 
[]  Bed alarm activated JORI June Time Calculation: 25 mins

## 2018-11-07 NOTE — ROUTINE PROCESS
Bedside and Verbal shift change report given to [facility] (oncoming nurse) by Dorean Kayser RN (offgoing nurse). Report included the following information SBAR, Kardex, MAR, Recent Results and Cardiac Rhythm Afib controlled.

## 2018-11-07 NOTE — PROGRESS NOTES
Infectious Disease progress Note Requested by: Dr. Manuel Murdock Reason: gram positive bacteremia Current abx Prior abx Pip/tazo, levaquin 11/2 
vancomycin 11/2-11/4 Aztreonam 11/2-11/4 Lines:  
 
 
Assessment : 
 
79 y.o. female with history of: Diastolic HF with recent fluid overload, cAFib, H/o metastatic breast cancer (s/p 14 cycles chemo, now in remission), On chronic platelet therapy  admitted to [facility] on 11/2/18 for hypotension, bradycardia that was incidentally found at the infusion center. Now with gram positive bacteremia. Highly complex clinical picture. I agree that it is difficult to determine significance of gram positive bacteremia in blood cultures 11/2. After detailed history and exam; clinical probability of bloodstream infection is low. Slow growing gram positive rods in blood cultures 11/2 likely contaminant - although the bottles have been labelled as drawn 7 minutes apart; it is frequently the same draw in ED. At this time, patient seems to be clinically improving off abx for 72 hours. Hence, I would recommend to continue holding off abx, repeat blood cultures, f/u ID of gpr in blood cultures to determine final plan of care. Left ankle pain: likely soft tissue swelling related - no evidence left ankle septic arthritis clinically or per x ray 11/6 JUAN: likely due to meds, dehydration: improved Recommendations: 
 
1. Hold off abx 2. F/u id of gpr in blood cultures 11/2 
3. F/u Repeat blood cultures 4. Hopefully d/c in am if repeat blood cultures negative and no pathogens isolated from blood cultures 11/2 Advance Care planning: full code: discussed  with patient/surrogate decision maker:Pinky Fernandez: [phone] Above plan was discussed in details with patient, family and dr Manuel Murdock. Please call me if any further questions or concerns. Will continue to participate in the care of this patient. subjective: Feels good. Patient denies fevers, headaches, visual disturbances, sore throat, runny nose, earaches, cp, sob, chills, cough, abdominal pain, diarrhea, burning micturition, pain or weakness in extremities. He denies back pain/flank pain. Medication List  
  
ASK your doctor about these medications   
albuterol 90 mcg/actuation inhaler Commonly known as:  PROAIR HFA 
1-2 puffs every 4-6 hrs. aluminum-magnesium hydroxide 200-200 mg/5 mL susp 5 mL, diphenhydrAMINE 12.5 mg/5 mL liqd 12.5 mg, lidocaine 2 % soln 5 mL 
5 mL by Swish and Spit route two (2) times a day. Magic mouth wash Maalox Lidocaine 2% viscous Diphenhydramine oral solution Pharmacy to mix equal portions of ingredients to a total volume as indicated in the dispense amount. amiodarone 200 mg tablet Commonly known as:  CORDARONE 
  
COREG 3.125 mg tablet Generic drug:  carvedilol 
  
digoxin 0.125 mg tablet Commonly known as:  LANOXIN 
  
dronabinol 5 mg capsule Commonly known as:  Lora Schneiders Take 1 Cap by mouth two (2) times a day. ergocalciferol 50,000 unit capsule Commonly known as:  VITAMIN D2 Take 1 Cap by mouth every seven (7) days. furosemide 40 mg tablet Commonly known as:  LASIX 
  
gabapentin 300 mg capsule Commonly known as:  NEURONTIN Take 1 Cap by mouth three (3) times daily. lidocaine-prilocaine topical cream 
Commonly known as:  EMLA 
APPLY OVER PORT 1 HOUR PRIOR TO CHEMOTHERAPY THAN COVER WITH [employer] WRAP LINZESS 145 mcg Cap capsule Generic drug:  linaclotide 
  
lisinopril 10 mg tablet Commonly known as:  PRINIVIL, ZESTRIL 
  
loratadine 10 mg Cap 
  
magic mouthwash solution Take 5 mL by mouth three (3) times daily as needed for Stomatitis. Magic mouth wash Maalox Lidocaine 2% viscous Diphenhydramine oral solution Pharmacy to mix equal portions of ingredients to a total volume as indicated in the dispense amount. meloxicam 7.5 mg tablet Commonly known as:  MOBIC 
  
 nabumetone 750 mg tablet Commonly known as:  RELAFEN 
  
ondansetron hcl 8 mg tablet Commonly known as:  Lovette Nett Take 1 Tab by mouth every eight (8) hours as needed for Nausea. oxyCODONE-acetaminophen  mg per tablet Commonly known as:  PERCOCET Take 1 Tab by mouth every six (6) hours as needed for Pain. Max Daily Amount: 4 Tabs. * potassium chloride 20 mEq tablet Commonly known as:  K-DUR, KLOR-CON Take 2 Tabs by mouth daily. * KLOR-CON M20 20 mEq tablet Generic drug:  potassium chloride TAKE ONE TABLET BY MOUTH ONCE A DAY promethazine 25 mg tablet Commonly known as:  PHENERGAN Take 1 Tab by mouth every six (6) hours as needed for Nausea. PRUVATE 21-7 PO Senna 8.6 mg tablet Generic drug:  senna 
  
temazepam 30 mg capsule Commonly known as:  RESTORIL 
  
topiramate 50 mg tablet Commonly known as:  TOPAMAX XARELTO 10 mg tablet Generic drug:  rivaroxaban 
  
zolpidem 10 mg tablet Commonly known as:  AMBIEN 
TAKE 1 TABLET BY MOUTH NIGHTLY AS NEEDED FOR SLEEP. MAX DAILY AMOUNT 10 MG 
  
  
  
  
  
 
 
Current Facility-Administered Medications Medication Dose Route Frequency  rivaroxaban (XARELTO) tablet 20 mg  20 mg Oral DAILY  albuterol (PROVENTIL VENTOLIN) nebulizer solution 2.5 mg  2.5 mg Nebulization TID RT  
 senna (SENOKOT) tablet 8.6 mg  1 Tab Oral DAILY PRN  
 multivitamin, tx-iron-ca-min (THERA-M w/ IRON) tablet 1 Tab  1 Tab Oral DAILY  linaclotide (LINZESS) capsule 145 mcg  145 mcg Oral ACB  loratadine (CLARITIN) tablet 10 mg  10 mg Oral DAILY  topiramate (TOPAMAX) tablet 50 mg  50 mg Oral DAILY  naloxone (NARCAN) injection 0.4 mg  0.4 mg IntraVENous PRN  
 ondansetron (ZOFRAN) injection 4 mg  4 mg IntraVENous Q4H PRN  
 sodium chloride (NS) flush 5-10 mL  5-10 mL IntraVENous Q8H  
 sodium chloride (NS) flush 5-10 mL  5-10 mL IntraVENous PRN  
 albuterol-ipratropium (DUO-NEB) 2.5 MG-0.5 MG/3 ML  3 mL Nebulization Q4H PRN  
  magic mouthwash (FIRST-MOUTHWASH BLM) oral suspension 5 mL  5 mL Oral TID PRN  
 docusate sodium (COLACE) capsule 100 mg  100 mg Oral DAILY  traMADol-acetaminophen (ULTRACET) per tablet 1 Tab  1 Tab Oral Q6H PRN  
 acetaminophen (TYLENOL) tablet 650 mg  650 mg Oral Q6H PRN  
 melatonin tablet 3 mg  3 mg Oral QHS PRN  
 diclofenac (VOLTAREN) 1 % topical gel 2 g  2 g Topical TID PRN  
 gabapentin (NEURONTIN) capsule 200 mg  200 mg Oral TID  sodium chloride (NS) flush 5-10 mL  5-10 mL IntraVENous PRN Allergies: Aspirin; Adhesive; Nickel; and Pcn [penicillins] Temp (24hrs), Av °F (36.7 °C), Min:97.1 °F (36.2 °C), Max:98.6 °F (37 °C) Visit Vitals /71 Pulse 79 Temp 98.4 °F (36.9 °C) Resp 21 Ht 5' 6\" (1.676 m) Wt 76.7 kg (169 lb 1.5 oz) SpO2 100% Breastfeeding? No  
BMI 27.29 kg/m² ROS: 12 point ROS obtained in details. Pertinent positives as mentioned in HPI,  
otherwise negative Physical Exam: 
 
General:  alert, cooperative, no distress, appears stated age Neck:  no JVD Lungs:  clear to auscultation bilaterally Heart:  S1 S2 regular, no obvious murmurs/gallops/rubs Abdomen:  abdomen is soft without significant tenderness, masses, organomegaly or guarding Extremities:  extremities normal, atraumatic, no cyanosis. There is edema diffusely in both upper and lower extremities but not pitting or dependent. .likely her baseline Skin: warm Neuro: no focal neurological deficits, moves all extremities well, no involuntary movements, reflexes at knee and ankle intact Psych:cooperative, appropriate 
  
 
 
 
Labs: Results:  
Chemistry Recent Labs 18 
0540 18 
2485 GLU 80 91  145  
K 3.8 3.7  107 CO2 30 30 BUN 26* 32* CREA 0.90 1.05  
CA 9.2 9.4 AGAP 5 8 BUCR 29* 30* CBC w/Diff Recent Labs 18 
0544 18 
0540 WBC 5.3  --   
RBC 3.52*  --   
HGB 9.3* 9.7* HCT 27.8* 28.8*  
  --   
 ANAIS 51  --   
LYMPH 31  --   
EOS 6*  --   
  
Microbiology Recent Labs 11/06/18 
1255 11/06/18 
1245 CULT NO GROWTH AFTER 17 HOURS NO GROWTH AFTER 17 HOURS  
  
 
 
RADIOLOGY: 
 
All available imaging studies/reports in [facility] for this admission were reviewed Dr. Leandro Charles, Infectious Disease Specialist 
[phone] November 7, 2018 
1:32 PM

## 2018-11-08 NOTE — PROGRESS NOTES
SUBJECTIVE: 
 
Sitting up in chair. Positional dizziness - but better. No nausea or vomiting. Left ankle pain present. Abdominal discomfort present. No dizziness or headaches. OBJECTIVE: 
 
BP 90/79   Pulse 83   Temp 98.6 °F (37 °C)   Resp 17   Ht 5' 6\" (1.676 m)   Wt 79.3 kg (174 lb 13.2 oz)   SpO2 98%   Breastfeeding? No   BMI 28.22 kg/m² CVS: IRIR 
RS: CTA bilaterally, no wheezes GI: NT, BS + Extremities: Improving pedal edema CNS: moves all extremities GENERAL: NAD, Awake ASSESSMENT: 
 
1. Hypotension sec to orthostatic hypotension and/or severe dehydration caused by diuretics. Ruled out ACS. [no sepsis]. Improved. 2. Sinus bradycardia - could be due to amiodarone use in setting of # 7. Improved currently. 3. Chronic combined systolic and diastolic CHF with EF of 40 to 45 %, compensated. 4. Chronic Afib 5. H/o lung and breast cancer 6. H/o ITP 7. JUAN due to diuretics induced dehydration, improved 8. Peripheral neuropathy and DJD 9. PROPIONIBACTERIUM ACNES bacteremia could be contamination, resolved 10. Intermittent abdominal discomfort and nausea 11. Left ankle pain could be gout PLAN: 
 
Cont current management CT report reviewed Will check CTA chest for completeness of work for hypotension Midodrine trail If clinically stable, discharge home with Tamela Mcfarland tomorrow PT/OT to follow BMP:  
Lab Results Component Value Date/Time  11/08/2018 05:43 AM  
 K 3.9 11/08/2018 05:43 AM  
  11/08/2018 05:43 AM  
 CO2 26 11/08/2018 05:43 AM  
 AGAP 10 11/08/2018 05:43 AM  
 GLU 79 11/08/2018 05:43 AM  
 BUN 20 (H) 11/08/2018 05:43 AM  
 CREA 0.89 11/08/2018 05:43 AM  
 GFRAA >60 11/08/2018 05:43 AM  
 GFRNA >60 11/08/2018 05:43 AM  
  
 
CBC:  
Lab Results Component Value Date/Time  WBC 5.3 11/08/2018 05:43 AM  
 HGB 9.5 (L) 11/08/2018 05:43 AM  
 HCT 28.7 (L) 11/08/2018 05:43 AM  
  11/08/2018 05:43 AM

## 2018-11-08 NOTE — PROGRESS NOTES
[facility] - Progress Note Admit Date: 11/2/2018 Assessment:  
 
Hospital Problems  Date Reviewed: 9/17/2018 Codes Class Noted POA Pneumonia ICD-10-CM: J18.9 ICD-9-CM: 036  11/3/2018 Unknown Hypotension ICD-10-CM: I95.9 ICD-9-CM: 458.9  11/3/2018 Unknown Metastatic breast cancer ([facility] ICD-10-CM: G29.437 ICD-9-CM: 174.9  11/3/2018 Unknown Sepsis ([facility] ICD-10-CM: A41.9 ICD-9-CM: 038.9, 995.91  11/2/2018 Unknown  
   
  
 
 
-cAF-rates in the 60's with fib/flutter pattern; on Xarelto (no ASA due to allergy). On Amiodarone, coreg and digoxin as outpatient. Currently rate controlling meds on hold given bradycardia. -Gram positive bacteremia 
-Diastolic CHF, now with volume depletion. EF 56-60% on echo this admission. 
-Hypotension likely component of volume depletion- weaned off dopamine. 
-Moderate pulmonary HTN. -Moderate MR. 
-H/o lung and breast cancer 
-H/o ITP 
-Hypoalbuminemia. 
  
-Primary cardiologist Dr. Corin Edwards Plan:  
 
HR overall remains stable. BP remains low normal, unlikely able to tolerate low dose BB. Home amiodarone (no need to resume as patient appears to be chronic afib) and digoxin remain on hold given bradycardia earlier this admission. Reasonable to resume digoxin as renal function has improved. Renal function improved, diuretics and lisinopril remain on hold given low BP. Anticoagulated with Xarelto. Maximize nutrition, Infectious work up in progress. No further cardiac work-up at this time. Subjective:  
 
No SOb No CP Objective:  
  
Patient Vitals for the past 8 hrs: 
 Temp Pulse Resp BP SpO2  
11/08/18 0752 98.2 °F (36.8 °C) 75 16 99/65 98 % 11/08/18 0700  67 22 102/66   
11/08/18 0600  78 18 103/45   
11/08/18 0500  69 19 (!) 86/57   
11/08/18 0400 97.5 °F (36.4 °C) 66 16 90/56   
11/08/18 0300  73 13 96/60  Patient Vitals for the past 96 hrs: 
 Weight 11/08/18 0037 79.3 kg (174 lb 13.2 oz) 11/06/18 1054 76.7 kg (169 lb 1.5 oz) 11/05/18 0400 83.3 kg (183 lb 10.3 oz) 11/04/18 1218 83.9 kg (185 lb) Intake/Output Summary (Last 24 hours) at 11/8/2018 1001 Last data filed at 11/8/2018 0600 Gross per 24 hour Intake 600 ml Output 1500 ml Net -900 ml Physical Exam: 
General:  alert, cooperative, no distress, appears stated age Neck:  no JVD Lungs:  clear to auscultation bilaterally Heart:  irregularly irregular rhythm Abdomen:  abdomen is soft without significant tenderness, masses, organomegaly or guarding Extremities:  extremities normal, atraumatic, no cyanosis 1+ edema Data Review:  
 
Labs: Results:  
   
Chemistry Recent Labs 11/08/18 
0543 11/06/18 
0540 GLU 79 80  141  
K 3.9 3.8  106 CO2 26 30 BUN 20* 26* CREA 0.89 0.90  
CA 9.0 9.2 AGAP 10 5 BUCR 22* 29* CBC w/Diff Recent Labs 11/08/18 
0543 11/07/18 
0544 11/06/18 
0540 WBC 5.3 5.3  --   
RBC 3.65* 3.52*  --   
HGB 9.5* 9.3* 9.7* HCT 28.7* 27.8* 28.8*  
 208  --   
GRANS 48 51  --   
LYMPH 29 31  --   
EOS 6* 6*  --   
  
Cardiac Enzymes No results found for: CPK, CK, CKMMB, CKMB, RCK3, CKMBT, CKNDX, CKND1, NIMA, TROPT, TROIQ, MARGOTH, TROPT, TNIPOC, BNP, BNPP Coagulation No results for input(s): PTP, INR, APTT in the last 72 hours. No lab exists for component: INREXT Lipid Panel No results found for: CHOL, CHOLPOCT, CHOLX, CHLST, CHOLV, 267750, HDL, LDL, LDLC, DLDLP, 812196, VLDLC, VLDL, TGLX, TRIGL, TRIGP, TGLPOCT, CHHD, CHHDX  
BNP No results found for: BNP, BNPP, XBNPT Liver Enzymes No results for input(s): TP, ALB, TBIL, AP, SGOT, GPT in the last 72 hours. No lab exists for component: DBIL Digoxin Thyroid Studies Lab Results Component Value Date/Time TSH 0.54 11/02/2018 03:21 PM  
    
 
Signed By: JESSICA Owen November 8, 2018

## 2018-11-08 NOTE — PROGRESS NOTES
Problem: Pressure Injury - Risk of 
Goal: *Prevention of pressure injury Document Reggie Scale and appropriate interventions in the flowsheet. Outcome: Progressing Towards Goal 
Pressure Injury Interventions: 
Sensory Interventions: Assess changes in LOC Moisture Interventions: Absorbent underpads Activity Interventions: Increase time out of bed, Pressure redistribution bed/mattress(bed type) Mobility Interventions: Assess need for specialty bed, HOB 30 degrees or less Nutrition Interventions: Document food/fluid/supplement intake Friction and Shear Interventions: HOB 30 degrees or less Problem: Falls - Risk of 
Goal: *Absence of Falls Document [facility] Fall Risk and appropriate interventions in the flowsheet. Outcome: Progressing Towards Goal 
Fall Risk Interventions: 
Mobility Interventions: Bed/chair exit alarm, Patient to call before getting OOB Medication Interventions: Bed/chair exit alarm, Patient to call before getting OOB Elimination Interventions: Bed/chair exit alarm, Call light in reach, Patient to call for help with toileting needs

## 2018-11-08 NOTE — PROGRESS NOTES
Patient is not available to be assessed at this time. Patient asked to defer visit at the moment. Chaplain Resident Hernesto Marcelino Spiritual Care  
[phone]

## 2018-11-08 NOTE — ROUTINE PROCESS
Bedside and Verbal shift change report given to Betsy Wagner (oncoming nurse) by Radha Davidson RN (offgoing nurse). Report included the following information SBAR, Kardex, Intake/Output, MAR, Recent Results and Cardiac Rhythm Afib controlled.

## 2018-11-08 NOTE — PROGRESS NOTES
Problem: Mobility Impaired (Adult and Pediatric) Goal: *Acute Goals and Plan of Care (Insert Text) Physical Therapy Goals Initiated 11/5/2018 and to be accomplished within 7 day(s) 1. Patient will move from supine to sit and sit to supine  in bed with supervision/set-up. 2.  Patient will transfer from bed to chair and chair to bed with minimal assistance/contact guard assist using the least restrictive device. 3.  Patient will perform sit to stand with minimal assistance/contact guard assist. 
4.  Patient will ambulate with minimal assistance/contact guard assist for 25 feet with the least restrictive device. Outcome: Progressing Towards Goal 
physical Therapy TREATMENT Patient: Wing Tucker (89 y.o. female) Date: 11/8/2018 Diagnosis: Sepsis ([address] Hypotension Sepsis ([facility] Pneumonia Metastatic breast cancer ([facility] <principal problem not specified> Precautions: Fall Chart, physical therapy assessment, plan of care and goals were reviewed. OBJECTIVE/ ASSESSMENT: 
Patient found seated in recliner chair willing to work with PT. Pt was able to increase ambulation distance this tx and required less assistance with transfers. Pt stood from recliner SBA and ambulated 40ft in room CGA with rollator. Pt demonstrated a decreased sherrell, narrowed aaron, and step to gait pattern due to 6/10 pain in left foot. Pt performed standing marches CGA to simulate stair training as pt has 4 steps to enter with hand rails on both sides. Pt returned to recliner chair and discussed with pt additional LE therex to perform 3x per day. Pt was positioned to comfort in recliner chair and was left seated with all needs in reach. Education: gait, transfers, therex, activity pacing,  
Progression toward goals: 
[x]      Improving appropriately and progressing toward goals 
[]      Improving slowly and progressing toward goals 
[]      Not making progress toward goals and plan of care will be adjusted PLAN: 
 Patient continues to benefit from skilled intervention to address the above impairments. Continue treatment per established plan of care. Discharge Recommendations:  Home Health with assistance vs SNF Further Equipment Recommendations for Discharge:  rolling walker SUBJECTIVE:  
Patient stated I'm doing much better but I just have a bit of a limp OBJECTIVE DATA SUMMARY:  
Critical Behavior: 
Neurologic State: Alert Orientation Level: Oriented X4 Cognition: Appropriate decision making Safety/Judgement: Fall prevention, Home safety Functional Mobility Training: 
Transfers: 
Sit to Stand: Stand-by assistance Stand to Sit: Stand-by assistance Balance: 
Sitting: Intact Standing - Static: Good Standing - Dynamic : FairAmbulation/Gait Training: 
Distance (ft): 40 Feet (ft) Assistive Device: Walker, rollator Ambulation - Level of Assistance: Contact guard assistance Gait Abnormalities: Decreased step clearance; Antalgic; Step to gait Base of Support: Center of gravity altered;Narrowed Speed/Gege: Pace decreased (<100 feet/min); Slow Step Length: Right shortened;Left shortened Therapeutic Exercises:  
 
 
EXERCISE Sets Reps Active Active Assist  
Passive Self- assited ROM Comments Ankle Pumps    [] [] [] [] Quad Sets   [] [] [] [] Glut Sets   [] [] [] [] Short Arc Quads   [] [] [] [] Heel Slides   [] [] [] [] Straight Leg Raises   [] [] [] [] Hip Abd   [] [] [] [] Long Arc Quads   [] [] [] [] Seated Marching   [] [] [] [] Seated Knee Flexion   [] [] [] []   
Standing Marching 1 10 [x] [] [] []   
 
Pain: 
Pre tx pain: 6/10Post tx pain: 6/10Activity Tolerance:  
fair Please refer to the flowsheet for vital signs taken during this treatment. After treatment:  
[x] Patient left in no apparent distress sitting up in chair 
[] Patient left in no apparent distress in bed 
[x] Call bell left within reach 
[] Nursing notified 
[] Caregiver present [] Bed alarm activated 
[] SCDs applied 
[] Ice applied Anoop Dawson PTA Time Calculation: 24 mins Mobility [id number] Current  CI= 1-19%. The severity rating is based on the Level of Assistance required for Functional Mobility and ADLs. Mobility   Goal  CI= 1-19%. The severity rating is based on the Level of Assistance required for Functional Mobility and ADLs.

## 2018-11-08 NOTE — PROGRESS NOTES
Infectious Disease progress Note Requested by: Dr. Sabi Butts Reason: gram positive bacteremia Current abx Prior abx Pip/tazo, levaquin 11/2 
vancomycin 11/2-11/4 Aztreonam 11/2-11/4 Lines:  
 
 
Assessment : 
 
79 y.o. female with history of: Diastolic HF with recent fluid overload, cAFib, H/o metastatic breast cancer (s/p 14 cycles chemo, now in remission), On chronic platelet therapy  admitted to [facility] on 11/2/18 for hypotension, bradycardia that was incidentally found at the infusion center. Now with gram positive bacteremia. Highly complex clinical picture. I agree that it is difficult to determine significance of gram positive bacteremia in blood cultures 11/2. After detailed history and exam; clinical probability of bloodstream infection is low. Slow growing gram positive rods in blood cultures 11/2 likely contaminant. D/w micro lab: probably diphtheroids. Will perform further testing to determine ID. Left ankle pain: likely soft tissue swelling related - no evidence left ankle septic arthritis clinically or per x ray 11/6 JUAN: likely due to meds, dehydration: improved Patient is clinically fine off abx for >4 days. Low clinical probability of infection as mentioned earlier. Recommendations: 
 
1. Hold off abx 2.ok to d/c patient from Id standpoint Advance Care planning: full code: discussed  with patient/surrogate decision maker:Pinky Fernandez: [phone] Above plan was discussed in details with patient, family and dr Sabi Butts. Please call me if any further questions or concerns. Will continue to participate in the care of this patient. subjective: 
 
Feels good. Patient denies fevers, headaches, visual disturbances, sore throat, runny nose, earaches, cp, sob, chills, cough, abdominal pain, diarrhea, burning micturition, pain or weakness in extremities. He denies back pain/flank pain. Medication List  
  
ASK your doctor about these medications albuterol 90 mcg/actuation inhaler Commonly known as:  PROAIR HFA 
1-2 puffs every 4-6 hrs. aluminum-magnesium hydroxide 200-200 mg/5 mL susp 5 mL, diphenhydrAMINE 12.5 mg/5 mL liqd 12.5 mg, lidocaine 2 % soln 5 mL 
5 mL by Swish and Spit route two (2) times a day. Magic mouth wash Maalox Lidocaine 2% viscous Diphenhydramine oral solution Pharmacy to mix equal portions of ingredients to a total volume as indicated in the dispense amount. amiodarone 200 mg tablet Commonly known as:  CORDARONE 
  
COREG 3.125 mg tablet Generic drug:  carvedilol 
  
digoxin 0.125 mg tablet Commonly known as:  LANOXIN 
  
dronabinol 5 mg capsule Commonly known as:  Charu Oquendo Take 1 Cap by mouth two (2) times a day. ergocalciferol 50,000 unit capsule Commonly known as:  VITAMIN D2 Take 1 Cap by mouth every seven (7) days. furosemide 40 mg tablet Commonly known as:  LASIX 
  
gabapentin 300 mg capsule Commonly known as:  NEURONTIN Take 1 Cap by mouth three (3) times daily. lidocaine-prilocaine topical cream 
Commonly known as:  EMLA 
APPLY OVER PORT 1 HOUR PRIOR TO CHEMOTHERAPY THAN COVER WITH [place] WRAP LINZESS 145 mcg Cap capsule Generic drug:  linaclotide 
  
lisinopril 10 mg tablet Commonly known as:  PRINIVIL, ZESTRIL 
  
loratadine 10 mg Cap 
  
magic mouthwash solution Take 5 mL by mouth three (3) times daily as needed for Stomatitis. Magic mouth wash Maalox Lidocaine 2% viscous Diphenhydramine oral solution Pharmacy to mix equal portions of ingredients to a total volume as indicated in the dispense amount. meloxicam 7.5 mg tablet Commonly known as:  MOBIC 
  
nabumetone 750 mg tablet Commonly known as:  RELAFEN 
  
ondansetron hcl 8 mg tablet Commonly known as:  Atcho Flattery Take 1 Tab by mouth every eight (8) hours as needed for Nausea. oxyCODONE-acetaminophen  mg per tablet Commonly known as:  PERCOCET 
 Take 1 Tab by mouth every six (6) hours as needed for Pain. Max Daily Amount: 4 Tabs. * potassium chloride 20 mEq tablet Commonly known as:  K-DUR, KLOR-CON Take 2 Tabs by mouth daily. * KLOR-CON M20 20 mEq tablet Generic drug:  potassium chloride TAKE ONE TABLET BY MOUTH ONCE A DAY promethazine 25 mg tablet Commonly known as:  PHENERGAN Take 1 Tab by mouth every six (6) hours as needed for Nausea. PRUVATE 21-7 PO Senna 8.6 mg tablet Generic drug:  senna 
  
temazepam 30 mg capsule Commonly known as:  RESTORIL 
  
topiramate 50 mg tablet Commonly known as:  TOPAMAX XARELTO 10 mg tablet Generic drug:  rivaroxaban 
  
zolpidem 10 mg tablet Commonly known as:  AMBIEN 
TAKE 1 TABLET BY MOUTH NIGHTLY AS NEEDED FOR SLEEP. MAX DAILY AMOUNT 10 MG 
  
  
  
  
  
 
 
Current Facility-Administered Medications Medication Dose Route Frequency  allopurinol (ZYLOPRIM) tablet 100 mg  100 mg Oral DAILY  colchicine (MITIGARE) capsule 0.6 mg  0.6 mg Oral DAILY  rivaroxaban (XARELTO) tablet 20 mg  20 mg Oral DAILY  albuterol (PROVENTIL VENTOLIN) nebulizer solution 2.5 mg  2.5 mg Nebulization TID RT  
 senna (SENOKOT) tablet 8.6 mg  1 Tab Oral DAILY PRN  
 multivitamin, tx-iron-ca-min (THERA-M w/ IRON) tablet 1 Tab  1 Tab Oral DAILY  linaclotide (LINZESS) capsule 145 mcg  145 mcg Oral ACB  loratadine (CLARITIN) tablet 10 mg  10 mg Oral DAILY  topiramate (TOPAMAX) tablet 50 mg  50 mg Oral DAILY  naloxone (NARCAN) injection 0.4 mg  0.4 mg IntraVENous PRN  
 ondansetron (ZOFRAN) injection 4 mg  4 mg IntraVENous Q4H PRN  
 sodium chloride (NS) flush 5-10 mL  5-10 mL IntraVENous Q8H  
 sodium chloride (NS) flush 5-10 mL  5-10 mL IntraVENous PRN  
 albuterol-ipratropium (DUO-NEB) 2.5 MG-0.5 MG/3 ML  3 mL Nebulization Q4H PRN  
 magic mouthwash (FIRST-MOUTHWASH BLM) oral suspension 5 mL  5 mL Oral TID PRN  
 docusate sodium (COLACE) capsule 100 mg  100 mg Oral DAILY  traMADol-acetaminophen (ULTRACET) per tablet 1 Tab  1 Tab Oral Q6H PRN  
 acetaminophen (TYLENOL) tablet 650 mg  650 mg Oral Q6H PRN  
 melatonin tablet 3 mg  3 mg Oral QHS PRN  
 diclofenac (VOLTAREN) 1 % topical gel 2 g  2 g Topical TID PRN  
 gabapentin (NEURONTIN) capsule 200 mg  200 mg Oral TID  sodium chloride (NS) flush 5-10 mL  5-10 mL IntraVENous PRN Allergies: Aspirin; Adhesive; Nickel; and Pcn [penicillins] Temp (24hrs), Av.8 °F (36.6 °C), Min:97.5 °F (36.4 °C), Max:98.4 °F (36.9 °C) Visit Vitals BP 99/65 Pulse 75 Temp 98.2 °F (36.8 °C) Resp 16 Ht 5' 6\" (1.676 m) Wt 79.3 kg (174 lb 13.2 oz) SpO2 98% Breastfeeding? No  
BMI 28.22 kg/m² ROS: 12 point ROS obtained in details. Pertinent positives as mentioned in HPI,  
otherwise negative Physical Exam: 
 
General:  alert, cooperative, no distress, appears stated age Neck:  no JVD Lungs:  clear to auscultation bilaterally Heart:  S1 S2 regular, no obvious murmurs/gallops/rubs Abdomen:  abdomen is soft without significant tenderness, masses, organomegaly or guarding Extremities:  extremities normal, atraumatic, no cyanosis. Some tenderness lateral aspect left ankle. No pain on movements of left ankle. Skin: warm Neuro: no focal neurological deficits, moves all extremities well, no involuntary movements, reflexes at knee and ankle intact Psych:cooperative, appropriate 
  
 
 
 
Labs: Results:  
Chemistry Recent Labs 18 
0543 18 
0540 GLU 79 80  141  
K 3.9 3.8  106 CO2 26 30 BUN 20* 26* CREA 0.89 0.90  
CA 9.0 9.2 AGAP 10 5 BUCR 22* 29* CBC w/Diff Recent Labs 18 
0543 18 
0544 18 
0540 WBC 5.3 5.3  --   
RBC 3.65* 3.52*  --   
HGB 9.5* 9.3* 9.7* HCT 28.7* 27.8* 28.8*  
 208  --   
GRANS 48 51  --   
LYMPH 29 31  --   
EOS 6* 6*  --   
  
Microbiology Recent Labs 18 
1255 18 
1245 CULT NO GROWTH 2 DAYS NO GROWTH 2 DAYS  
  
 
 
RADIOLOGY: 
 
All available imaging studies/reports in [facility] for this admission were reviewed Dr. Anabell High, Infectious Disease Specialist 
[phone] November 8, 2018 
1:32 PM

## 2018-11-09 NOTE — DISCHARGE SUMMARY
[facility] - INPATIENT Face to Face Encounter Patients Name: Wing Tucker    YOB: 1950 Primary Diagnosis: orthostasis, CHF, Left ankle pain Date of Face to Face:   11/9/18 Face to Face Encounter findings are related to primary reason for home care:   yes 1. I certify that the patient needs intermittent skilled nursing care, physical therapy and/or speech therapy. I will not be following this patient in the Community and Dr. Hazel Love MD  will be responsible for signing the [address] of Care. 2. Initial Orders for Care: Must be completed only if Face to Face MD will not be signing the [address] 3. I certify that this patient is homebound for the following reason(s): Requires considerable and taxing effort to leave the home , Requires the assistance of 1 or more persons to leave the home  and Only leaves the home for medical reasons or Voodoo services and are infrequent and of short duration for other reasons 4. I certify that this patient is under my care and that I, or a nurse practitioner or  [id number] working with me, had a Face-to-Face Encounter that meets the physician Face-to-Face Encounter requirements. Document the physical findings from the Face-to-Face Encounter that support the need for skilled services: Has wound that requires skilled nursing assessment and treatment , Has new diagnosis that requires skilled nursing teaching and intervention  and Has new medications that requires skilled nursing teaching and monitoring for understanding and compliance Damián Cam MD 
11/9/2018

## 2018-11-09 NOTE — PROGRESS NOTES
Problem: Self Care Deficits Care Plan (Adult) Goal: *Acute Goals and Plan of Care (Insert Text) Occupational Therapy Goals Initiated 11/6/2018 within 7 day(s). 1.  Patient will perform lower body dressing with minimal assist and AE as needed. 2.  Patient will perform bathing with minimal assistance/contact guard assist. 
3.  Patient will perform toilet transfers with stand by assist and LRAD. 4.  Patient will perform all aspects of toileting with minimal assistance/contact guard assist. 
5.  Patient will participate in upper extremity therapeutic exercise/activities with supervision/set-up for 8 minutes. 6.  Patient will perform functional activity of choice in stance with Good static balance in order to increase I in ADLs. Outcome: Progressing Towards Goal 
Occupational Therapy TREATMENT Patient: Yris Duggan (86 y.o. female) Date: 11/9/2018 Diagnosis: Sepsis ([facility] [address] Hypotension Sepsis ([facility] [address] Pneumonia Metastatic breast cancer ([facility] [address] <principal problem not specified> Precautions: Fall Chart, occupational therapy assessment, plan of care, and goals were reviewed. ASSESSMENT: 
Pt is sitting up in the chair, reports she is going home soon, and is saving her energy for the discharge. Pt was able to doff/don socks seated in the chair w/CGA w/o AE, reports she has a reacher and sock aid at home, was able to verbalize proper use of it for LB dressing. Pt reports she was able to walk to the BR all day w/HHA from nursing. Pt was educated on safety and use of AD for functional mobility to prevent falls, pt verbalized understanding. Pt participated in BUE TherEx in mult planes x10 seated in the chair, was educated on the importance of performing TherEx on her own at home to improve endurance and activity tolerance for carryover w/ADLs. Pt verbalized understanding. Progression toward goals: 
[x]          Improving appropriately and progressing toward goals []          Improving slowly and progressing toward goals 
[]          Not making progress toward goals and plan of care will be adjusted PLAN: 
Patient continues to benefit from skilled intervention to address the above impairments. Continue treatment per established plan of care. Discharge Recommendations:  Home Health w/Supervision Further Equipment Recommendations for Discharge:  rolling walker G-CODES:  
 
Self Care  Current  CJ= 20-39%  Goal  CI= 1-19%. The severity rating is based on the Level of Assistance required for Functional Mobility and ADLs. SUBJECTIVE:  
Patient stated Melania Fu will have plenty of help at home.  OBJECTIVE DATA SUMMARY:  
Cognitive/Behavioral Status: 
Neurologic State: Alert Orientation Level: Oriented X4 Cognition: Follows commands Safety/Judgement: Fall prevention, Home safety Balance: 
Sitting: Intact ADL Intervention: Lower Body Dressing Assistance Socks: Contact guard assistance Leg Crossed Method Used: Yes Therapeutic Exercises:  
See above Pain: 
Pt reports 0/10 pain or discomfort prior to treatment.   
Pt reports 0/10 pain or discomfort post treatment. Activity Tolerance:   
Fair Please refer to the flowsheet for vital signs taken during this treatment. After treatment:  
[x]  Patient left in no apparent distress sitting up in chair 
[]  Patient left in no apparent distress in bed 
[x]  Call bell left within reach [x]  Nursing notified 
[]  Caregiver present 
[]  Bed alarm activated [address]/L Time Calculation: 23 mins

## 2018-11-09 NOTE — HOME CARE
Received HH referral, Discharge noted for today, [address] will follow for SN,PT,OT ; pt states she has a RW and cane,states her son Savannah James) and grandson lives with her and assist her at home, [address] will follow. MAGALY SARGENT.

## 2018-11-09 NOTE — PROGRESS NOTES
SUBJECTIVE: 
 
Feeling better. Left ankle pain is better. No chest or abdominal pain. No SOB or cough. No nausea or vomiting. No dizziness. OBJECTIVE: 
 
/66   Pulse 86   Temp 98.4 °F (36.9 °C)   Resp 15   Ht 5' 6\" (1.676 m)   Wt 77 kg (169 lb 11.2 oz)   SpO2 97%   Breastfeeding? No   BMI 27.39 kg/m² CVS: IRIR 
RS: CTA bilaterally, no wheezes GI: NT, BS + Extremities: Improving pedal edema CNS: moves all extremities GENERAL: NAD, Awake ASSESSMENT: 
 
1. Hypotension sec to orthostatic hypotension and/or severe dehydration caused by diuretics. Ruled out ACS. [no sepsis]. Improved. 2. Sinus bradycardia - could be due to amiodarone use in setting of # 7. Improved currently. 3. Chronic combined systolic and diastolic CHF with EF of 40 to 45 %, compensated. 4. Chronic Afib 5. H/o lung and breast cancer 6. H/o ITP 7. JUAN due to diuretics induced dehydration, improved 8. Peripheral neuropathy and DJD 9. PROPIONIBACTERIUM ACNES bacteremia could be contamination, resolved 10. Intermittent abdominal discomfort and nausea, resolved currently 11. Left ankle pain could be gout, BETTER 
 
PLAN: 
 
Cont current management CT chest is negative for PE No need for further inpatient work up is required ID input noted Discharge patient home with Tamela Mcfarland, does not want to go to SNF. BMP:  
Lab Results Component Value Date/Time  11/09/2018 05:38 AM  
 K 4.1 11/09/2018 05:38 AM  
  11/09/2018 05:38 AM  
 CO2 27 11/09/2018 05:38 AM  
 AGAP 7 11/09/2018 05:38 AM  
 GLU 78 11/09/2018 05:38 AM  
 BUN 19 (H) 11/09/2018 05:38 AM  
 CREA 0.93 11/09/2018 05:38 AM  
 GFRAA >60 11/09/2018 05:38 AM  
 GFRNA >60 11/09/2018 05:38 AM  
  
  
 
Current Discharge Medication List  
  
START taking these medications Details  
allopurinol (ZYLOPRIM) 100 mg tablet Take 1 Tab by mouth daily. Qty: 30 Tab, Refills: 0 colchicine (MITIGARE) 0.6 mg capsule Take 1 Cap by mouth daily. Qty: 14 Cap, Refills: 0  
  
diclofenac (VOLTAREN) 1 % gel Apply 2 g to affected area three (3) times daily as needed for Pain. Gel should be measured and applied using the supplied dosing card. Apply dose (2 gm) to each location. If treatment site is the hands, patients should wait at least one (1) hour to wash their hands. APPLY TO ankle and knees. Qty: 100 g, Refills: 0  
  
docusate sodium (COLACE) 100 mg capsule Take 1 Cap by mouth daily for 30 days. Qty: 30 Cap, Refills: 0  
  
midodrine (PROAMITINE) 2.5 mg tablet Take 1 Tab by mouth two (2) times daily (with meals) for 15 days. Qty: 30 Tab, Refills: 0 CONTINUE these medications which have NOT CHANGED Details  
albuterol (PROAIR HFA) 90 mcg/actuation inhaler 1-2 puffs every 4-6 hrs. Qty: 1 Inhaler, Refills: 1  
  
gabapentin (NEURONTIN) 300 mg capsule Take 1 Cap by mouth three (3) times daily. Qty: 90 Cap, Refills: 1  
  
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 Qty: 12 Cap, Refills: 1  
  
topiramate (TOPAMAX) 50 mg tablet Refills: 5 LINZESS 145 mcg cap capsule TAKE 1 CAPSULE BY MOUTH DAILY 30 MINUTES BEFORE BREAKFAST Refills: 0  
  
loratadine 10 mg cap Take 10 mg by mouth daily. digoxin (LANOXIN) 0.125 mg tablet Take 0.125 mg by mouth daily. rivaroxaban (XARELTO) 10 mg tablet Take 10 mg by mouth daily. aluminum-magnesium hydroxide 200-200 mg/5 mL susp 5 mL, diphenhydrAMINE 12.5 mg/5 mL liqd 12.5 mg, lidocaine 2 % soln 5 mL 5 mL by Swish and Spit route two (2) times a day. Magic mouth wash Maalox Lidocaine 2% viscous Diphenhydramine oral solution Pharmacy to mix equal portions of ingredients to a total volume as indicated in the dispense amount. Qty: 240 mL, Refills: 1  
  
senna (SENNA) 8.6 mg tablet Take 1 Tab by mouth daily. ondansetron hcl (ZOFRAN) 8 mg tablet Take 1 Tab by mouth every eight (8) hours as needed for Nausea. Qty: 30 Tab, Refills: 3 IRON AG&FUM/C/FA/MV CMB11/CA-T (PRUVATE 21-7 PO) Take 325 mg by mouth daily. Indications: iron deficiency STOP taking these medications  
  
 oxyCODONE-acetaminophen (PERCOCET)  mg per tablet Comments:  
Reason for Stopping:   
   
 magic mouthwash solution Comments:  
Reason for Stopping:   
   
 zolpidem (AMBIEN) 10 mg tablet Comments:  
Reason for Stopping:   
   
 promethazine (PHENERGAN) 25 mg tablet Comments:  
Reason for Stopping:   
   
 temazepam (RESTORIL) 30 mg capsule Comments:  
Reason for Stopping:   
   
 lisinopril (PRINIVIL, ZESTRIL) 10 mg tablet Comments:  
Reason for Stopping:   
   
 carvedilol (COREG) 3.125 mg tablet Comments:  
Reason for Stopping:   
   
 meloxicam (MOBIC) 7.5 mg tablet Comments:  
Reason for Stopping:   
   
 amiodarone (CORDARONE) 200 mg tablet Comments:  
Reason for Stopping:   
   
 furosemide (LASIX) 40 mg tablet Comments:  
Reason for Stopping:   
   
 dronabinol (MARINOL) 5 mg capsule Comments:  
Reason for Stopping:   
   
 KLOR-CON M20 20 mEq tablet Comments:  
Reason for Stopping:   
   
 potassium chloride (K-DUR, KLOR-CON) 20 mEq tablet Comments:  
Reason for Stopping:   
   
 nabumetone (RELAFEN) 750 mg tablet Comments:  
Reason for Stopping:

## 2018-11-09 NOTE — PROGRESS NOTES
Infectious Disease progress Note Requested by: Dr. Manuel Murdock Reason: gram positive bacteremia Current abx Prior abx Pip/tazo, levaquin 11/2 
vancomycin 11/2-11/4 Aztreonam 11/2-11/4 Lines:  
 
 
Assessment : 
 
79 y.o. female with history of: Diastolic HF with recent fluid overload, cAFib, H/o metastatic breast cancer (s/p 14 cycles chemo, now in remission), On chronic platelet therapy  admitted to [facility] on 11/2/18 for hypotension, bradycardia that was incidentally found at the infusion center. Now with gram positive bacteremia. Highly complex clinical picture. I agree that it is difficult to determine significance of propionibacterium acne bacteremia in blood cultures 11/2. After detailed history and exam; clinical probability of bloodstream infection is low. Slow growing gram positive rods in blood cultures 11/2 likely contaminant. Left ankle pain: likely soft tissue swelling related - no evidence left ankle septic arthritis clinically or per x ray 11/6 JUAN: likely due to meds, dehydration: improved Patient is clinically fine off abx for >4 days. Low clinical probability of infection as mentioned earlier. Recommendations: 
 
1. Hold off abx 2.ok to d/c patient from Id standpoint Advance Care planning: full code: discussed  with patient/surrogate decision maker:Pinky Fernandez: [phone] Above plan was discussed in details with patient, family and dr Manuel Murdock. Please call me if any further questions or concerns. Will continue to participate in the care of this patient. subjective: 
 
Feels good. Patient denies fevers, headaches, visual disturbances, sore throat, runny nose, earaches, cp, sob, chills, cough, abdominal pain, diarrhea, burning micturition, pain or weakness in extremities. He denies back pain/flank pain. Medication List  
  
ASK your doctor about these medications   
albuterol 90 mcg/actuation inhaler Commonly known as:  PROAIR HFA 
 1-2 puffs every 4-6 hrs. aluminum-magnesium hydroxide 200-200 mg/5 mL susp 5 mL, diphenhydrAMINE 12.5 mg/5 mL liqd 12.5 mg, lidocaine 2 % soln 5 mL 
5 mL by Swish and Spit route two (2) times a day. Magic mouth wash Maalox Lidocaine 2% viscous Diphenhydramine oral solution Pharmacy to mix equal portions of ingredients to a total volume as indicated in the dispense amount. amiodarone 200 mg tablet Commonly known as:  CORDARONE 
  
COREG 3.125 mg tablet Generic drug:  carvedilol 
  
digoxin 0.125 mg tablet Commonly known as:  LANOXIN 
  
dronabinol 5 mg capsule Commonly known as:  [place] Stabs Take 1 Cap by mouth two (2) times a day. ergocalciferol 50,000 unit capsule Commonly known as:  VITAMIN D2 Take 1 Cap by mouth every seven (7) days. furosemide 40 mg tablet Commonly known as:  LASIX 
  
gabapentin 300 mg capsule Commonly known as:  NEURONTIN Take 1 Cap by mouth three (3) times daily. lidocaine-prilocaine topical cream 
Commonly known as:  EMLA 
APPLY OVER PORT 1 HOUR PRIOR TO CHEMOTHERAPY THAN COVER WITH [place] WRAP LINZESS 145 mcg Cap capsule Generic drug:  linaclotide 
  
lisinopril 10 mg tablet Commonly known as:  PRINIVIL, ZESTRIL 
  
loratadine 10 mg Cap 
  
magic mouthwash solution Take 5 mL by mouth three (3) times daily as needed for Stomatitis. Magic mouth wash Maalox Lidocaine 2% viscous Diphenhydramine oral solution Pharmacy to mix equal portions of ingredients to a total volume as indicated in the dispense amount. meloxicam 7.5 mg tablet Commonly known as:  MOBIC 
  
nabumetone 750 mg tablet Commonly known as:  RELAFEN 
  
ondansetron hcl 8 mg tablet Commonly known as:  [place] Stains Take 1 Tab by mouth every eight (8) hours as needed for Nausea. oxyCODONE-acetaminophen  mg per tablet Commonly known as:  PERCOCET Take 1 Tab by mouth every six (6) hours as needed for Pain. Max Daily Amount: 4 Tabs. * potassium chloride 20 mEq tablet Commonly known as:  K-DUR, KLOR-CON Take 2 Tabs by mouth daily. * KLOR-CON M20 20 mEq tablet Generic drug:  potassium chloride TAKE ONE TABLET BY MOUTH ONCE A DAY promethazine 25 mg tablet Commonly known as:  PHENERGAN Take 1 Tab by mouth every six (6) hours as needed for Nausea. PRUVATE 21-7 PO Senna 8.6 mg tablet Generic drug:  senna 
  
temazepam 30 mg capsule Commonly known as:  RESTORIL 
  
topiramate 50 mg tablet Commonly known as:  TOPAMAX XARELTO 10 mg tablet Generic drug:  rivaroxaban 
  
zolpidem 10 mg tablet Commonly known as:  AMBIEN 
TAKE 1 TABLET BY MOUTH NIGHTLY AS NEEDED FOR SLEEP. MAX DAILY AMOUNT 10 MG 
  
  
  
  
  
 
 
Current Facility-Administered Medications Medication Dose Route Frequency  midodrine (PROAMITINE) tablet 2.5 mg  2.5 mg Oral BID WITH MEALS  digoxin (LANOXIN) tablet 0.125 mg  0.125 mg Oral DAILY  hydrOXYzine HCl (ATARAX) tablet 10 mg  10 mg Oral TID PRN  
 allopurinol (ZYLOPRIM) tablet 100 mg  100 mg Oral DAILY  colchicine (MITIGARE) capsule 0.6 mg  0.6 mg Oral DAILY  rivaroxaban (XARELTO) tablet 20 mg  20 mg Oral DAILY  albuterol (PROVENTIL VENTOLIN) nebulizer solution 2.5 mg  2.5 mg Nebulization TID RT  
 senna (SENOKOT) tablet 8.6 mg  1 Tab Oral DAILY PRN  
 multivitamin, tx-iron-ca-min (THERA-M w/ IRON) tablet 1 Tab  1 Tab Oral DAILY  linaclotide (LINZESS) capsule 145 mcg  145 mcg Oral ACB  loratadine (CLARITIN) tablet 10 mg  10 mg Oral DAILY  topiramate (TOPAMAX) tablet 50 mg  50 mg Oral DAILY  naloxone (NARCAN) injection 0.4 mg  0.4 mg IntraVENous PRN  
 ondansetron (ZOFRAN) injection 4 mg  4 mg IntraVENous Q4H PRN  
 sodium chloride (NS) flush 5-10 mL  5-10 mL IntraVENous Q8H  
 sodium chloride (NS) flush 5-10 mL  5-10 mL IntraVENous PRN  
 albuterol-ipratropium (DUO-NEB) 2.5 MG-0.5 MG/3 ML  3 mL Nebulization Q4H PRN  
 magic mouthwash (FIRST-MOUTHWASH BLM) oral suspension 5 mL  5 mL Oral TID PRN  
  docusate sodium (COLACE) capsule 100 mg  100 mg Oral DAILY  acetaminophen (TYLENOL) tablet 650 mg  650 mg Oral Q6H PRN  
 melatonin tablet 3 mg  3 mg Oral QHS PRN  
 diclofenac (VOLTAREN) 1 % topical gel 2 g  2 g Topical TID PRN  
 gabapentin (NEURONTIN) capsule 200 mg  200 mg Oral TID Allergies: Aspirin; Adhesive; Nickel; and Pcn [penicillins] Temp (24hrs), Av.2 °F (36.8 °C), Min:97.9 °F (36.6 °C), Max:98.6 °F (37 °C) Visit Vitals /68 Pulse 98 Temp 98.4 °F (36.9 °C) Resp 17 Ht 5' 6\" (1.676 m) Wt 77 kg (169 lb 11.2 oz) SpO2 97% Breastfeeding? No  
BMI 27.39 kg/m² ROS: 12 point ROS obtained in details. Pertinent positives as mentioned in HPI,  
otherwise negative Physical Exam: 
 
General:  alert, cooperative, no distress, appears stated age Neck:  no JVD Lungs:  clear to auscultation bilaterally Heart:  S1 S2 regular, no obvious murmurs/gallops/rubs Abdomen:  abdomen is soft without significant tenderness, masses, organomegaly or guarding Extremities:  extremities normal, atraumatic, no cyanosis. Some tenderness lateral aspect left ankle. No pain on movements of left ankle. Skin: warm Neuro: no focal neurological deficits, moves all extremities well, no involuntary movements, reflexes at knee and ankle intact Psych:cooperative, appropriate 
  
 
 
 
Labs: Results:  
Chemistry Recent Labs 18 
1419 18 
0543 GLU 78 79  143  
K 4.1 3.9  107 CO2 27 26 BUN 19* 20* CREA 0.93 0.89 CA 9.0 9.0 AGAP 7 10 BUCR 20 22* CBC w/Diff Recent Labs 18 
0543 18 
2688 WBC 5.3 5.3  
RBC 3.65* 3.52* HGB 9.5* 9.3* HCT 28.7* 27.8*  208 GRANS 48 51 LYMPH 29 31 EOS 6* 6* Microbiology Recent Labs 18 
1255 18 
1245 CULT NO GROWTH 3 DAYS NO GROWTH 3 DAYS  
  
 
 
RADIOLOGY: 
 
All available imaging studies/reports in [facility] for this admission were reviewed Dr. Maribel Miner, Infectious Disease Specialist 
[phone] November 9, 2018 
1:32 PM

## 2018-11-09 NOTE — ROUTINE PROCESS
Bedside and Verbal shift change report given to Iwona Alatorre (oncoming nurse) Jadiel Santana (offgoing nurse). Report included the following information SBAR, Kardex, Intake/Output, MAR and Recent Results.

## 2018-11-09 NOTE — ROUTINE PROCESS
Bedside shift change report given to [facility], RN (oncoming nurse) by Yoko Lawton RN (offgoing nurse). Report included the following information SBAR, Intake/Output and MAR.

## 2018-11-09 NOTE — PROGRESS NOTES
This writer sent [facility] order to [facility] and notified staff of patient's d/c today. Checked with [employer] and Medicare will not pay for another walker for this patient for another year. Informed patient that she can buy wheels at [facility] for her existing walker. Patient stated that she was aware of that option but prefers her cane anyway. Patient's daughter will bring her clothes and transport home after she returns from [place] this afternoon. Aramis Pacheco RN, BSN  [phone]

## 2018-11-09 NOTE — DISCHARGE SUMMARY
PATIENT DISCHARGE INSTRUCTIONS PATIENT DISCHARGE INSTRUCTIONS John Mckenna / [id number] : 1950 Admitted 2018 Discharged: 2018 Dictated # [id number] · It is important that you take the medication exactly as they are prescribed. · Keep your medication in the bottles provided by the pharmacist and keep a list of the medication names, dosages, and times to be taken in your wallet. · Do not take other medications without consulting your doctor. What to do at [facility] Recommended Diet: Cardiac Diet Recommended Activity: PT/OT per Home Health Current Discharge Medication List  
  
START taking these medications Details  
allopurinol (ZYLOPRIM) 100 mg tablet Take 1 Tab by mouth daily. Qty: 30 Tab, Refills: 0  
  
colchicine (MITIGARE) 0.6 mg capsule Take 1 Cap by mouth daily. Qty: 14 Cap, Refills: 0  
  
diclofenac (VOLTAREN) 1 % gel Apply 2 g to affected area three (3) times daily as needed for Pain. Gel should be measured and applied using the supplied dosing card. Apply dose (2 gm) to each location. If treatment site is the hands, patients should wait at least one (1) hour to wash their hands. APPLY TO ankle and knees. Qty: 100 g, Refills: 0  
  
docusate sodium (COLACE) 100 mg capsule Take 1 Cap by mouth daily for 30 days. Qty: 30 Cap, Refills: 0  
  
midodrine (PROAMITINE) 2.5 mg tablet Take 1 Tab by mouth two (2) times daily (with meals) for 15 days. Qty: 30 Tab, Refills: 0 CONTINUE these medications which have NOT CHANGED Details  
albuterol (PROAIR HFA) 90 mcg/actuation inhaler 1-2 puffs every 4-6 hrs. Qty: 1 Inhaler, Refills: 1  
  
gabapentin (NEURONTIN) 300 mg capsule Take 1 Cap by mouth three (3) times daily. Qty: 90 Cap, Refills: 1  
  
lidocaine-prilocaine (EMLA) topical cream APPLY OVER PORT 1 HOUR PRIOR TO CHEMOTHERAPY THAN COVER WITH [place] WRAP Qty: 30 g, Refills: 6  Associated Diagnoses: Carcinoma of right breast metastatic to axillary lymph node ([address] ergocalciferol (VITAMIN D2) 50,000 unit capsule Take 1 Cap by mouth every seven (7) days. Qty: 12 Cap, Refills: 1  
  
topiramate (TOPAMAX) 50 mg tablet Refills: 5 LINZESS 145 mcg cap capsule TAKE 1 CAPSULE BY MOUTH DAILY 30 MINUTES BEFORE BREAKFAST Refills: 0  
  
loratadine 10 mg cap Take 10 mg by mouth daily. digoxin (LANOXIN) 0.125 mg tablet Take 0.125 mg by mouth daily. rivaroxaban (XARELTO) 10 mg tablet Take 10 mg by mouth daily. aluminum-magnesium hydroxide 200-200 mg/5 mL susp 5 mL, diphenhydrAMINE 12.5 mg/5 mL liqd 12.5 mg, lidocaine 2 % soln 5 mL 5 mL by Swish and Spit route two (2) times a day. Magic mouth wash Maalox Lidocaine 2% viscous Diphenhydramine oral solution Pharmacy to mix equal portions of ingredients to a total volume as indicated in the dispense amount. Qty: 240 mL, Refills: 1  
  
senna (SENNA) 8.6 mg tablet Take 1 Tab by mouth daily. ondansetron hcl (ZOFRAN) 8 mg tablet Take 1 Tab by mouth every eight (8) hours as needed for Nausea. Qty: 30 Tab, Refills: 3 IRON AG&FUM/C/FA/MV CMB11/CA-T (PRUVATE 21-7 PO) Take 325 mg by mouth daily. Indications: iron deficiency STOP taking these medications  
  
 oxyCODONE-acetaminophen (PERCOCET)  mg per tablet Comments:  
Reason for Stopping:   
   
 magic mouthwash solution Comments:  
Reason for Stopping:   
   
 zolpidem (AMBIEN) 10 mg tablet Comments:  
Reason for Stopping:   
   
 promethazine (PHENERGAN) 25 mg tablet Comments:  
Reason for Stopping:   
   
 temazepam (RESTORIL) 30 mg capsule Comments:  
Reason for Stopping:   
   
 lisinopril (PRINIVIL, ZESTRIL) 10 mg tablet Comments:  
Reason for Stopping:   
   
 carvedilol (COREG) 3.125 mg tablet Comments:  
Reason for Stopping:   
   
 meloxicam (MOBIC) 7.5 mg tablet Comments:  
Reason for Stopping:   
   
 amiodarone (CORDARONE) 200 mg tablet Comments:  
Reason for Stopping: furosemide (LASIX) 40 mg tablet Comments:  
Reason for Stopping:   
   
 dronabinol (MARINOL) 5 mg capsule Comments:  
Reason for Stopping:   
   
 KLOR-CON M20 20 mEq tablet Comments:  
Reason for Stopping:   
   
 potassium chloride (K-DUR, KLOR-CON) 20 mEq tablet Comments:  
Reason for Stopping:   
   
 nabumetone (RELAFEN) 750 mg tablet Comments:  
Reason for Stopping:   
   
  
 
 
 
Signed By: Maryse Hedrick MD   
 November 9, 2018

## 2018-11-10 NOTE — DISCHARGE SUMMARY
[address] [place] [address] Dr DISCHARGE SUMMARY Salina Merlos 
MR#: [id number] : 1950 ACCOUNT #: [de-identified] ADMIT DATE: 2018 DISCHARGE DATE: 2018 DISPOSITION:  Discharged to home with home health care for PT, OT, DME as needed, skilled nursing. DISCHARGE CONDITION:  Stable. DISCHARGE DIAGNOSES: 
1. Hypotension secondary to orthostatic hypotension as well as severe dehydration caused by diuretics, improved. 2.  Severe dehydration by diuretic now much better. 3.  Acute renal failure due to dehydration, resolved. 4.  Sinus bradycardia due to amiodarone in setting of renal failure, now resolved. 5.  Chronic combined systolic and diastolic congestive heart failure with EF of 40-45% is now compensated. 6.  Left ankle pain due to gout. 7.  Chronic atrial fibrillation, rate controlled. 8.  History of lung and breast cancer. 9.  History of ITP, stable platelet here. 10.  Propionibacterium sepsis due to contamination. 11.  Peripheral neuropathy. 12.  Degenerative joint disease. 13.  Intermittent abdominal pain and nausea now much better. DISCHARGE MEDICATIONS: 
1. Allopurinol 100 mg p.o. daily. 2.  Colchicine 0.6 mg daily for 2 weeks. 3.  Voltaren cream to apply to the left ankle area 3 times a day as needed p.r.n. for pain. 4.  Colace 100 mg daily. 5.  Midodrine 2.5 mg twice a day at least for 15 days until seen by PCP and Dr. Keron Steiner. 
6.  Albuterol inhaler 2 puffs q.4 hours p.r.n. for shortness of breath. 7.  Neurontin 300 mg 3 times a day. 8.  [place] p.r.n. if she was using it. 9.  Vitamin D2 at 50,000 units every 1 week. 10.  Topamax 50 mg daily. 11.  Linzess 145 mcg daily. 12.  Loratadine 10 mg daily. 13.  Digoxin 0.125 mg daily. 14.  Xarelto 20 mg daily. 15.  Senna 1 tablet daily. 16.  Zofran 8 mg q.8 hours p.r.n. for nausea. 17.  Ferrous sulfate 325 mg daily if she was taking it.  
 
IMAGING AND PROCEDURES:   
 1. CTA chest with and without contrast done. The impression was no evidence of pulmonary embolism. No significant interval change in the pulmonary fibrosis. 2.  CT abdomen and pelvis with contrast was done, the impression was no acute finding along the GI tract. Small amount of left-sided pleural effusion. 3.  X-ray of the ankle was done. The impression was no evidence of acute fracture, mild osteopenia. 4.  Chest x-ray was done at the time of admission. 5.  Echocardiogram was done, showed ejection fraction 56%-60% moderate pulmonary hypertension, mild to moderate pulmonary valve regurgitation present. LABORATORY DATA:   
1. Blood culture done at the time of admission showed anaerobic bottle positive Propionibacterium acnes. 2.  Repeat blood cultures remain negative after 3 days. CONSULTANTS: 
1. ID with Dr. Julia Nieves did not recommend any antibiotic. 2.  Cardiology with Dr. Natalie Overton and group just recommended continuing digoxin and follow up with Dr. Vivek Cunha. HOSPITAL COURSE:  The patient was admitted to the hospital on 11/02/2018 with a complaint of hypotension. Please refer to hospital admission H and P for some details. Patient was seen by her cardiologist as an outpatient on 10/28/2018 and started on lisinopril and Zaroxolyn as well as increase her home dose of Lasix per patient. Patient has history of chronic systolic and diastolic congestive heart failure. Patient started taking all this medication, started feeling dizzy. When she came, she was found out to have hypotension. She was admitted to the hospital.  Initially, the sepsis workup was done. Started on broad spectrum antibiotic. Initial blood culture 2/4 water positive for Propionibacterium acne; however, ID was consulted. They did not think this is a real infection, could be contamination. Repeat blood culture negative.   She was monitored off antibiotic for 4 days prior to the discharge and she did not develop any fever at all or leukocytosis and had no issue. ID cleared her for the discharge. Patient was started on dopamine drip initially as patient had bradycardia due to amiodarone, digoxin in setting of acute renal failure caused by diuretics. Patient did well after a few days of dopamine. Was seen by cardiologist.  Patient was taken off of amiodarone, Coreg and diuretics. Her ejection fraction is much better based on the repeat echocardiogram.  It seems like patient has some autonomic dysfunction/orthostasis due to peripheral neuropathy caused by her chemotherapy she was taking for breast and lung cancer. Patient also has history of ITP and is under care of Dr. Shabnam Markham but her platelets remained normal around 200 here. Patient was continued on digoxin and with control of her heart rate. Her blood pressure was stable. She had some orthostasis started on low dose of midodrine, which will be continued until she is being followed by Dr. Giles Gunderson. Patient also was complaining of some left ankle pain. Uric acid was elevated. X-ray was negative for fracture. She was started on Voltaren cream, allopurinol and colchicine with improvement in her symptoms. She will be continued on above-mentioned medications and followed by primary care physicians for possible gout causing this pain. Patient was continued on home medication for other comorbidities. She was on Xarelto, which was started by Dr. Giles Gunderson will be continued for stroke prophylaxis. As a part of hypotension workup, CTA chest was negative for PE. A CT abdomen and pelvis also was done as patient was complaining of some intermittent abdominal pain and nausea did not show any new findings suggestive of her symptoms and her symptoms got better. Patient will be discharged home with home health care as she does not want to go to nursing home. DISCHARGE INSTRUCTIONS: 
1. Diet:  Cardiac diet. 2.  Activity:  As tolerated. 3.  Follow up with primary care physicians in 5 days. 4.  Follow up with Dr. Yonatan Villagran in 10 days. TOTAL TIME:  Greater than 35 minutes. Miguel Fritz MD 
  
 
/RN 
D: 11/09/2018 12:15    
T: 11/10/2018 07:16 JOB #: [id number] CC: June Sicard MD 
CC: Meghan Zarate MD 
CC: ALICIA KING DO 
CC: ALICIA SULLIVAN DO

## 2018-11-19 NOTE — PROGRESS NOTES
[facility] Progress Note Date: 2018 Name: Jude Kilpatrick MRN: [id number] : 1950 Weekly N Plate/Procrit Ms. Yesica Alvarado was assessed and education was provided. Ms. Aidan Awan vitals were reviewed and patient was observed for 5 minutes prior to treatment. Visit Vitals /80 (BP 1 Location: Right arm, BP Patient Position: Sitting) Temp 98.3 °F (36.8 °C) Resp 18 Lab drawn from right hand x 2 attempts  No irritation noted at site, 2x2 gauze and Coban applied. Lab results were obtained and reviewed. Recent Results (from the past 12 hour(s)) CBC WITH 3 PART DIFF Collection Time: 18  3:31 PM  
Result Value Ref Range WBC 4.5 4.5 - 13.0 K/uL  
 RBC 3.98 (L) 4.10 - 5.10 M/uL  
 HGB 11.1 (L) 12.0 - 16 g/dL HCT 33.2 (L) 36 - 48 % MCV 83.4 78 - 102 FL  
 MCH 27.9 25.0 - 35.0 PG  
 MCHC 33.4 31 - 37 g/dL  
 RDW 15.6 (H) 11.5 - 14.5 % NEUTROPHILS 71 (H) 40 - 70 % MIXED CELLS 10 0.1 - 17 % LYMPHOCYTES 20 14 - 44 % ABS. NEUTROPHILS 3.2 1.8 - 9.5 K/UL  
 ABS. MIXED CELLS 0.4 0.0 - 2.3 K/uL  
 ABS. LYMPHOCYTES 0.9 (L) 1.1 - 5.9 K/UL  
 DF AUTOMATED Procrit HELD per order parameters. PLT=22 on preliminary cbc report.  notified and 2 units of PLTS ordered. Since PLT count is less than 50 today Nplate dose will be increased to 5mcg/cv=330 mcg= 0.7 mL Nplate 0.7 mL was administered SQ in the back of the right arm. Type and RH drawn from the right hand. Ms. Yesica Alvarado tolerated well, and had no complaints. Patient armband removed and shredded. Ms. Yesica Alvarado was discharged from [place] [address] in stable condition at [phone]. She is to return tomorrow at 11 for 2 units of PLTs. Tim Christianson RN 2018

## 2018-11-20 NOTE — PROGRESS NOTES
[facility] Progress Note Date: 2018 Name: Jerica Gómez MRN: [id number] : 1950 Ms. Griffiths arrived to [facility] at [facility]3]. Stated that she felt a little weak today. Ms. Griffiths was assessed and education was provided. Discussed risks and benefits of platelet transfusion with patient, including risk of transfusion reaction and disease transmission. Patient verbalized understanding and signed consent placed on chart. Ms. [place] vitals were reviewed. Visit Vitals BP 98/63 Pulse 73 Temp 99.8 °F (37.7 °C) Resp 18 Double lumen medi port accessed with 20 G 1 in haas needle. Medial lumen positive for blood return and flushes well. Post Cath concepcion administration of lateral lumen, it is positive for blood return, and flushes without dificulty. Normal saline initiated at [facility]. Pre-medications (Tylenol 650 mg and Benadryl 25 mg) were administered as ordered. First unit of two ordered units platelets initiated @ [facility] @ [phone]. Fifteen minutes into infusion, VS stable and pt denied c/o SOB, itching/hives, lip/tongue/facial swelling, CP or other complaints. Vital signs monitored per protocol until end of transfusion. No s/s of reaction noted. Transfusion completed at [id number]. Second unit of two ordered units of platelets initiated @ [facility] @ 1300. Fifteen minutes into infusion, VS stable and pt denied c/o SOB, itching/hives, lip/tongue/facial swelling, CP or other complaints. Vital signs monitored per protocol until end of transfusion. No s/s of reaction noted. Transfusion completed at 1340. Ms. Griffiths tolerated infusion without complaints. Both lumens flushd with 10 ML NS followed by heparin 500 units. . No irritation, bleeding, or hematoma noted at site. Band aid applied to site. Patient remained in [facility] for 60 minutes for observation. No signs of allergic reaction noted. Discharge instructions reviewed with pt.  Pt instructed to report SOB, CP, elevated temp, back pain, or other symptoms of transfusion reaction to MD or ED. Pt verbalized understanding. Patient armband removed and shredded. Ms. Matthew Green was discharged from [place] [address] in stable condition at [phone]. She is to return to [facility] on 11/30/2018 @ 1500 for her CBC/ N plate appointment. Dunia Aquino RN November 20, 2018

## 2018-11-30 NOTE — PROGRESS NOTES
Hematology/Oncology  Progress Note Name: Kavon Hopkins Date: 2018 : 1950 PCP: Rm Wilson MD  
 
Ms. Christine Jean is a 79 y.o. female who was seen for management of her slowly progressive ITP and metastatic breast cancer with associated iron deficiency anemia. Current therapy: Active surveillance regarding breast cancer: N-plate as needed as a primary treatment for ITP Subjective:  
 
Ms. Christine Jean is a 72-year-old -American woman with history of metastatic breast cancer. The patient reports that she has been doing reasonably well. She has gained about 30 pounds weight over the past few months, due to the steroid that she has to take for her ITP. She is now actively trying to lose some weight. She denies having any unexplained bruising or bleeding. She continues to have arthritic discomfort but she is ambulating without the use of a cane or walker. The patient informed me that she is currently seeing a spine specialist and may need to get steroid injections or subdural injection to control the pain. She is continuing to take the n-plate as the primary treatment modality for her ITP. Patient that she has noticed that her platelet count rebounded over the past several weeks. Today she is complaining of the neuropathic discomfort is more pronounced. Since her last clinic visit she was hospitalized for blood pressure management. Today she is requesting a new prescription for her pain medication and for medication for her insomnia. Past medical history, family history, and social history: these were reviewed and remains unchanged. Past Medical History:  
Diagnosis Date  Antineoplastic chemotherapy induced anemia  Arrhythmia Atrial Fib  Arthritis  Breast [facility] ([facility]  Cancer ([facility]  Breast  
 Cardiomyopathy ([facility]  Chronic ITP (idiopathic thrombocytopenia) (HCC) 10/31/2016 Secondary to Anemia from Chemo  Congestive heart failure ([address]  Diabetes ([facility] borderline, no meds  Esophageal cancer ([facility] 2005  
 treated with chemo  [facility]  SOB (shortness of breath) Past Surgical History:  
Procedure Laterality Date  BREAST SURGERY PROCEDURE UNLISTED Left 1990s  
 lumpectomy  COLONOSCOPY N/A 7/27/2016 COLONOSCOPY performed by Priyanka Hernandez MD at [facility]COPY  
 HX APPENDECTOMY  HX HEENT Tonsillectomy  HX ORTHOPAEDIC    
 HX TUBAL LIGATION    
 HX VASCULAR ACCESS    
 NEUROLOGICAL PROCEDURE UNLISTED  01/08/2018 Lumbar fusion Social History Socioeconomic History  Marital status:  Spouse name: Not on file  Number of children: Not on file  Years of education: Not on file  Highest education level: Not on file Social Needs  Financial resource strain: Not on file  Food insecurity - worry: Not on file  Food insecurity - inability: Not on file  Transportation needs - medical: Not on file  Transportation needs - non-medical: Not on file Occupational History  Not on file Tobacco Use  Smoking status: Never Smoker  Smokeless tobacco: Never Used Substance and Sexual Activity  Alcohol use: No  
 Drug use: No  
 Sexual activity: Not on file Other Topics Concern  Not on file Social History Narrative  Not on file History reviewed. No pertinent family history. Current Outpatient Medications Medication Sig Dispense Refill  oxyCODONE-acetaminophen (PERCOCET 10)  mg per tablet Take 1 Tab by mouth every six (6) hours as needed for Pain. Max Daily Amount: 4 Tabs. 120 Tab 0  
 LORazepam (ATIVAN) 1 mg tablet Take 1 Tab by mouth nightly as needed for Anxiety. Max Daily Amount: 1 mg. 30 Tab 3  
 allopurinol (ZYLOPRIM) 100 mg tablet Take 1 Tab by mouth daily. 30 Tab 0  
 colchicine (MITIGARE) 0.6 mg capsule Take 1 Cap by mouth daily.  14 Cap 0  
 diclofenac (VOLTAREN) 1 % gel Apply 2 g to affected area three (3) times daily as needed for Pain. Gel should be measured and applied using the supplied dosing card. Apply dose (2 gm) to each location. If treatment site is the hands, patients should wait at least one (1) hour to wash their hands. APPLY TO ankle and knees. 100 g 0  
 docusate sodium (COLACE) 100 mg capsule Take 1 Cap by mouth daily for 30 days. 30 Cap 0  
 albuterol (PROAIR HFA) 90 mcg/actuation inhaler 1-2 puffs every 4-6 hrs. (Patient taking differently: Take 2 Puffs by inhalation every four (4) hours as needed for Shortness of Breath or Wheezing.) 1 Inhaler 1  
 gabapentin (NEURONTIN) 300 mg capsule Take 1 Cap by mouth three (3) times daily. 90 Cap 1  
 lidocaine-prilocaine (EMLA) topical cream APPLY OVER PORT 1 HOUR PRIOR TO CHEMOTHERAPY THAN COVER WITH SARAN WRAP 30 g 6  ergocalciferol (VITAMIN D2) 50,000 unit capsule Take 1 Cap by mouth every seven (7) days. 12 Cap 1  
 topiramate (TOPAMAX) 50 mg tablet Take 50 mg by mouth daily. 5  
 LINZESS 145 mcg cap capsule TAKE 1 CAPSULE BY MOUTH DAILY 30 MINUTES BEFORE BREAKFAST  0  
 loratadine 10 mg cap Take 10 mg by mouth daily.  digoxin (LANOXIN) 0.125 mg tablet Take 0.125 mg by mouth daily.  rivaroxaban (XARELTO) 10 mg tablet Take 10 mg by mouth daily.  aluminum-magnesium hydroxide 200-200 mg/5 mL susp 5 mL, diphenhydrAMINE 12.5 mg/5 mL liqd 12.5 mg, lidocaine 2 % soln 5 mL 5 mL by Swish and Spit route two (2) times a day. Magic mouth wash Maalox Lidocaine 2% viscous Diphenhydramine oral solution Pharmacy to mix equal portions of ingredients to a total volume as indicated in the dispense amount. 240 mL 1  
 senna (SENNA) 8.6 mg tablet Take 1 Tab by mouth daily.  ondansetron hcl (ZOFRAN) 8 mg tablet Take 1 Tab by mouth every eight (8) hours as needed for Nausea. 30 Tab 3  
 IRON AG&FUM/C/FA/MV CMB11/CA-T (PRUVATE 21-7 PO) Take 325 mg by mouth daily. Indications: iron deficiency Review of Systems Constitutional: The patient has no acute distress or discomfort. HEENT: The patient denies recent head trauma, eye pain, blurred vision,  hearing deficit, oropharyngeal mucosal pain or lesions, and the patient denies throat pain or discomfort. Lymphatics: The patient denies palpable peripheral lymphadenopathy. Hematologic: The patient denies having bruising, bleeding, or progressive fatigue. Respiratory: Patient denies having shortness of breath, cough, sputum production, fever, or dyspnea on exertion. Cardiovascular: The patient denies having leg pain, leg swelling, heart palpitations, chest permit, chest pain, or lightheadedness. The patient denies having dyspnea on exertion. Gastrointestinal: The patient denies having nausea, emesis, or diarrhea. The patient denies having any hematemesis or blood in the stool. Genitourinary: Patient denies having urinary urgency, frequency, or dysuria. The patient denies having blood in the urine. Psychological: The patient denies having symptoms of nervousness, anxiety, depression, or thoughts of harming self. Skin: Patient denies having skin rashes, skin, ulcerations, or unexplained itching or pruritus. Musculoskeletal: The patient used to complain of arthritic discomfort in her joints particularly the knees, hips, and shoulders. The patient reports that she has a large bruise on her right lower extremity. Objective:  
 
Visit Vitals BP 97/65 Pulse 75 Temp 98.1 °F (36.7 °C) (Oral) Resp 16 Ht 5' 6\" (1.676 m) Wt 71.7 kg (158 lb) SpO2 100% BMI 25.50 kg/m² ECOG PS=1, pain score=0/10 Physical Exam:  
Gen. Appearance: The patient is in no acute distress. Skin: There is no bruise or rash. HEENT: The exam is unremarkable. Neck: Supple without lymphadenopathy or thyromegaly. Lungs: Clear to auscultation and percussion; there are no wheezes or rhonchi.   Heart: Regular rate and rhythm; there are no murmurs, gallops, or rubs. Abdomen: Bowel sounds are present and normal.  There is no guarding, tenderness, or hepatosplenomegaly. Extremities: There is no clubbing, cyanosis, or edema. There is a 6 cm area of bruising on the right anterior lateral calf. There is no evidence of skin breakdown or ulceration. Neurologic: There are no focal neurologic deficits. Lymphatics: There is no palpable peripheral lymphadenopathy. Musculoskeletal: The patient has full range of motion at all joints. There is no evidence of joint deformity or effusions. There is no focal joint tenderness. Psychological/psychiatric: There is no clinical evidence of anxiety, depression, or melancholy. Lab data: 
   
Results for orders placed or performed during the hospital encounter of 11/19/18 CBC WITH 3 PART DIFF     Status: Abnormal  
Result Value Ref Range Status WBC 4.5 4.5 - 13.0 K/uL Final  
 RBC 3.98 (L) 4.10 - 5.10 M/uL Final  
 HGB 11.1 (L) 12.0 - 16 g/dL Final  
 HCT 33.2 (L) 36 - 48 % Final  
 MCV 83.4 78 - 102 FL Final  
 MCH 27.9 25.0 - 35.0 PG Final  
 MCHC 33.4 31 - 37 g/dL Final  
 RDW 15.6 (H) 11.5 - 14.5 % Final  
 NEUTROPHILS 71 (H) 40 - 70 % Final  
 MIXED CELLS 10 0.1 - 17 % Final  
 LYMPHOCYTES 20 14 - 44 % Final  
 ABS. NEUTROPHILS 3.2 1.8 - 9.5 K/UL Final  
 ABS. MIXED CELLS 0.4 0.0 - 2.3 K/uL Final  
 ABS. LYMPHOCYTES 0.9 (L) 1.1 - 5.9 K/UL Final  
  Comment: Test performed at [place] [address] Location. Results Reviewed by Medical Director. DF AUTOMATED   Final  
  
I have explained to the patient that the preliminary platelet count today is 289,000 Assessment: 1. Carcinoma of breast metastatic to axillary lymph node, unspecified laterality ([address] 2. Anemia, unspecified type 3. Vitamin D deficiency 4. Chronic ITP (idiopathic thrombocytopenia) (HCC) 5. Cancer associated pain 6. Bilateral lower extremity edema 7. Chronic anemia 8. Metastatic [facility] ([facility] 9. Primary insomnia 10. Cachexia ([facility] Plan:  
Chronic ITP/thrombocytopenia (progression of disease): I have informed the patient that her CBC today shows that her preliminary platelet count is 511,264. At this time I am recommending we continue the n-Plate at a dose of 1 mcg/kg subcutaneous weekly. At this time I will recheck her platelet level will plan to see her back in clinic in about 8 weeks. Vitamin D deficiency: I will recheck her vitamin D levels at this time. If the vitamin D level is low she will be offered vitamin D 50,000 units weekly for 12 weeks. Iron deficiency anemia/chronic anemia: The current CBC shows a WBC count of 6.6, the hemoglobin is 10.3 g/dL, hematocrit 31.9%, and the platelet count is 688,858. I have recommended that the patient continue taking the ferrous sulfate 1 tablet twice daily. At this time I will recheck her iron profile and ferritin levels. Bilateral lower extremity edema: The leg edema has significantly improved. Metastatic breast cancer, left breast metastasized to lymph nodes and other sites: At this time I will check a CA-27-29 level. The most recent CA-35-27 level was normal.  Clinically the patient has no evidence of disease progression. [place] Cachexia, weakness, and muscular deconditioning: I am recommending that she continue physical therapy 4 times weekly. She will continue to use a walker or cane as needed for mobility support. However today she is ambulating without the use of a cane or walker. Primary insomnia: The patient was instructed to continue to use the Ativan 1 mg at bedtime. A new prescription was provided. Chronic pain/neuropathy/spondylolisthesis of the lumbar region/neuropathy: I have instructed the patient to begin using the Neurontin 300 mg 3 times daily. I will see her back in clinic for a complete assessment again in 12 weeks. Orders Placed This Encounter  COMPLETE CBC & AUTO DIFF WBC  InHouse CBC ([employer]) Standing Status:   Future Number of Occurrences:   1 Standing Expiration Date:   12/7/2018  IRON PROFILE Standing Status:   Future Standing Expiration Date:   12/1/2019  FERRITIN Standing Status:   Future Standing Expiration Date:   12/1/2019  METABOLIC PANEL, COMPREHENSIVE Standing Status:   Future Standing Expiration Date:   12/1/2019  CA 27.29 Standing Status:   Future Standing Expiration Date:   12/1/2019  
 oxyCODONE-acetaminophen (PERCOCET 10)  mg per tablet Sig: Take 1 Tab by mouth every six (6) hours as needed for Pain. Max Daily Amount: 4 Tabs. Dispense:  120 Tab Refill:  0  
 LORazepam (ATIVAN) 1 mg tablet Sig: Take 1 Tab by mouth nightly as needed for Anxiety. Max Daily Amount: 1 mg. Dispense:  30 Tab Refill:  3 Nicola Cardoso MD 
11/30/2018 Please note: This document has been produced using voice recognition software. Unrecognized errors in transcription may be present.

## 2018-11-30 NOTE — PROGRESS NOTES
[facility] Progress Note Date: 2018 Name: Sara Villanueva MRN: [id number] : 1950 Weekly N Plate Ms. Ziyad Guevara was assessed and education was provided. Ms. Rolf Gupta vitals were reviewed and patient was observed for 5 minutes prior to treatment. Visit Vitals BP 96/63 (BP 1 Location: Left arm, BP Patient Position: Sitting) Pulse 96 Temp 97.8 °F (36.6 °C) Resp 18 Lab drawn from right AC x 1 attempt by Berny. Gauze and coban applied. Lab results were obtained and reviewed. Recent Results (from the past 12 hour(s)) CBC WITH 3 PART DIFF Collection Time: 18  2:15 PM  
Result Value Ref Range WBC 7.2 4.5 - 13.0 K/uL  
 RBC 3.91 (L) 4.10 - 5.10 M/uL  
 HGB 10.6 (L) 12.0 - 16 g/dL HCT 32.1 (L) 36 - 48 % MCV 82.1 78 - 102 FL  
 MCH 27.1 25.0 - 35.0 PG  
 MCHC 33.0 31 - 37 g/dL  
 RDW 15.4 (H) 11.5 - 14.5 % NEUTROPHILS 61 40 - 70 % MIXED CELLS 21 (H) 0.1 - 17 % LYMPHOCYTES 18 14 - 44 % ABS. NEUTROPHILS 4.4 1.8 - 9.5 K/UL  
 ABS. MIXED CELLS 1.5 0.0 - 2.3 K/uL  
 ABS. LYMPHOCYTES 1.3 1.1 - 5.9 K/UL  
 DF AUTOMATED    
 
IQZ=669 today. Nplate dose will remain the same. Nplate dose 4OWR/KS=197 mcg= 0.7 mL Nplate 0.7 mL was administered SQ in the back of the right arm. Ms. Ziyad Guevara tolerated well, and had no complaints. Patient armband removed and shredded. Ms. Ziyad Guevara was discharged from [place] [address] in stable condition at 1435. She is to return on 18 at 0800 for her next appointment. Harmony Upton RN 2018

## 2018-12-07 NOTE — PROGRESS NOTES
[facility] Progress Note Date: 2018 Name: Sara Villanueva MRN: [id number] : 1950 Weekly N Plate Ms. Ziyad Guevara was assessed and education was provided. Ms. Rolf Gupta vitals were reviewed and patient was observed for 5 minutes prior to treatment. Visit Vitals Breastfeeding? No  
 
Lab drawn from right AC x 1 attempt by Berny. Gauze and coban applied. Lab results were obtained and reviewed. No results found for this or any previous visit (from the past 12 hour(s)). QCJ=677 today. Nplate dose was reduced by pharmacy per orders to 276 mcg today. Nplate dose 034 mcg = 0.552 ml Nplate 7.965 mL was administered SQ in the back of the right arm. Ms. Ziyad Guevara tolerated well, and had no complaints. Patient armband removed and shredded. Ms. Ziyad Guevara was discharged from [place] [address] in stable condition at [address] [place] [address]. She is to return on 18 at 0800 for her next appointment. Jailene Dillard RN 2018

## 2018-12-14 NOTE — PROGRESS NOTES
[facility] Progress Note    Date: 2018    Name: Geovanny Coleman    MRN: [id number]         : 1950     Weekly N Plate    Ms. Angella Silva was assessed and education was provided. Ms. Bouchra Whitt vitals were reviewed and patient was observed for 5 minutes prior to treatment. Visit Vitals  BP 96/67 (BP 1 Location: Left arm, BP Patient Position: Sitting)   Pulse 84   Temp 97.5 °F (36.4 °C)   Resp 18     Lab drawn from right AC x 1 attempt by Berny. Gauze and coban applied. Lab results were obtained and reviewed. Recent Results (from the past 12 hour(s))   CBC WITH 3 PART DIFF    Collection Time: 18  8:18 AM   Result Value Ref Range    WBC 6.8 4.5 - 13.0 K/uL    RBC 4.13 4.10 - 5.10 M/uL    HGB 11.3 (L) 12.0 - 16 g/dL    HCT 34.0 (L) 36 - 48 %    MCV 82.3 78 - 102 FL    MCH 27.4 25.0 - 35.0 PG    MCHC 33.2 31 - 37 g/dL    RDW 15.6 (H) 11.5 - 14.5 %    NEUTROPHILS 58 40 - 70 %    MIXED CELLS 19 (H) 0.1 - 17 %    LYMPHOCYTES 23 14 - 44 %    ABS. NEUTROPHILS 4.0 1.8 - 9.5 K/UL    ABS. MIXED CELLS 1.3 0.0 - 2.3 K/uL    ABS. LYMPHOCYTES 1.5 1.1 - 5.9 K/UL    DF AUTOMATED       NHZ=154 today. Nplate dose was reduced by pharmacy per orders to 207 mcg today. Nplate dose 582 mcg = 0.42 ml    Nplate 6.11 mL was administered SQ in the back of the right arm. Ms. Angella Silva tolerated well, and had no complaints. Patient armband removed and shredded. Ms. Angella Silva was discharged from [place] [address] in stable condition at [id number]. She is to return on 18 at 0800 for her next appointment.      Jeanine Kelly RN  2018

## 2018-12-21 NOTE — PROGRESS NOTES
[facility] Progress Note    Date: 2018    Name: Maritza Fink    MRN: [id number]         : 1950     Weekly N Plate    Ms. Norah Oliveira was assessed and education was provided. Ms. Orr Postal vitals were reviewed and patient was observed for 5 minutes prior to treatment. Visit Vitals  /65 (BP 1 Location: Right arm, BP Patient Position: Sitting)   Pulse 81   Temp 98 °F (36.7 °C)   Resp 18     Lab drawn from right AC x 1 attempt by Berny. Gauze and coban applied. Lab results were obtained and reviewed. Recent Results (from the past 12 hour(s))   CBC WITH 3 PART DIFF    Collection Time: 18  8:15 AM   Result Value Ref Range    WBC 7.4 4.5 - 13.0 K/uL    RBC 4.22 4.10 - 5.10 M/uL    HGB 11.6 (L) 12.0 - 16 g/dL    HCT 34.8 (L) 36 - 48 %    MCV 82.5 78 - 102 FL    MCH 27.5 25.0 - 35.0 PG    MCHC 33.3 31 - 37 g/dL    RDW 15.5 (H) 11.5 - 14.5 %    NEUTROPHILS 57 40 - 70 %    MIXED CELLS 19 (H) 0.1 - 17 %    LYMPHOCYTES 24 14 - 44 %    ABS. NEUTROPHILS 4.3 1.8 - 9.5 K/UL    ABS. MIXED CELLS 1.4 0.0 - 2.3 K/uL    ABS. LYMPHOCYTES 1.7 1.1 - 5.9 K/UL    DF AUTOMATED       PSV=114 today. Nplate held today for parameters >400. Ms. Norah Oliveira tolerated well, and had no complaints. Patient armband removed and shredded. Ms. Norah Oliveira was discharged from [place] [address] in stable condition at 0221. She is to return on 18 at 0900 for her next appointment.      James Connor RN  2018  [id number]

## 2018-12-31 NOTE — PROGRESS NOTES
[address] [facility] Progress Note Date: 2018 Name: Gwen Sumner MRN: [id number] : 1950 [place], Labs, and N-Plate Ms. Douglas Hale arrived to [place] at 8229. Stated that she felt a little weak today. Ms. Douglas Hale was assessed and education was provided. Ms. Fisher Organ vitals were reviewed. Visit Vitals /72 (BP 1 Location: Right arm, BP Patient Position: Sitting) Pulse 72 Temp 97.7 °F (36.5 °C) Resp 18 Ht 5' 6\" (1.676 m) Wt 68 kg (150 lb) BMI 24.21 kg/m² Double lumen medi port accessed with 20 G 1 in haas needle. Both lumens accessed this visit. Positive blood return and flushes easily. Blood drawn from lateral port for labs. Both lumens flushed with 20 ml NS and 5 ml heparin each. Site covered with gauze and tegaderm. Platelets 623. N-Plate 027 mcg given SQ in right upper arm. No bleeding observed from site. Discharge instructions reviewed with patient. Patient verbalized understanding. Patient armband removed and shredded. Ms. Douglas Hale was discharged from [place] [address] in stable condition at 1025. She is to return to [place] on 2019 @ 0800 for her CBC/ N plate appointment. Charo Walters RN 2018

## 2019-01-01 ENCOUNTER — HOME CARE VISIT (OUTPATIENT)
Dept: SCHEDULING | Facility: HOME HEALTH | Age: 69
End: 2019-01-01
Payer: MEDICARE

## 2019-01-01 ENCOUNTER — APPOINTMENT (OUTPATIENT)
Dept: INFUSION THERAPY | Age: 69
End: 2019-01-01
Payer: MEDICARE

## 2019-01-01 ENCOUNTER — HOSPITAL ENCOUNTER (OUTPATIENT)
Dept: INFUSION THERAPY | Age: 69
Discharge: HOME | End: 2019-01-18
Payer: MEDICARE

## 2019-01-01 ENCOUNTER — HOSPITAL ENCOUNTER (OUTPATIENT)
Dept: INFUSION THERAPY | Age: 69
Discharge: HOME | End: 2019-04-05
Payer: MEDICARE

## 2019-01-01 ENCOUNTER — HOSPITAL ENCOUNTER (OUTPATIENT)
Dept: INFUSION THERAPY | Age: 69
Discharge: HOME | End: 2019-08-19
Payer: MEDICARE

## 2019-01-01 ENCOUNTER — HOSPITAL ENCOUNTER (OUTPATIENT)
Dept: INFUSION THERAPY | Age: 69
Discharge: HOME | End: 2019-06-21
Payer: MEDICARE

## 2019-01-01 ENCOUNTER — HOSPITAL ENCOUNTER (EMERGENCY)
Age: 69
Discharge: HOME | End: 2019-09-02
Attending: EMERGENCY MEDICINE
Payer: MEDICARE

## 2019-01-01 ENCOUNTER — OFFICE VISIT (OUTPATIENT)
Dept: ONCOLOGY | Age: 69
End: 2019-01-01

## 2019-01-01 ENCOUNTER — APPOINTMENT (OUTPATIENT)
Dept: GENERAL RADIOLOGY | Age: 69
End: 2019-01-01
Attending: EMERGENCY MEDICINE
Payer: MEDICARE

## 2019-01-01 ENCOUNTER — HOSPITAL ENCOUNTER (OUTPATIENT)
Dept: INFUSION THERAPY | Age: 69
Discharge: HOME | End: 2019-07-05
Payer: MEDICARE

## 2019-01-01 ENCOUNTER — HOSPITAL ENCOUNTER (OUTPATIENT)
Dept: ONCOLOGY | Age: 69
Discharge: HOME | End: 2019-02-20

## 2019-01-01 ENCOUNTER — HOSPITAL ENCOUNTER (OUTPATIENT)
Dept: ONCOLOGY | Age: 69
Discharge: HOME | End: 2019-05-20

## 2019-01-01 ENCOUNTER — HOSPITAL ENCOUNTER (OUTPATIENT)
Dept: INFUSION THERAPY | Age: 69
End: 2019-01-01
Payer: MEDICARE

## 2019-01-01 ENCOUNTER — APPOINTMENT (OUTPATIENT)
Dept: GENERAL RADIOLOGY | Age: 69
DRG: 312 | End: 2019-01-01
Attending: PHYSICIAN ASSISTANT
Payer: MEDICARE

## 2019-01-01 ENCOUNTER — HOSPITAL ENCOUNTER (OUTPATIENT)
Dept: INFUSION THERAPY | Age: 69
Discharge: HOME | End: 2019-08-09
Payer: MEDICARE

## 2019-01-01 ENCOUNTER — HOSPITAL ENCOUNTER (OUTPATIENT)
Dept: INFUSION THERAPY | Age: 69
Discharge: HOME | End: 2019-09-13
Payer: MEDICARE

## 2019-01-01 ENCOUNTER — HOSPITAL ENCOUNTER (OUTPATIENT)
Dept: INTERVENTIONAL RADIOLOGY/VASCULAR | Age: 69
Discharge: HOME | End: 2019-08-06
Attending: RADIOLOGY
Payer: MEDICARE

## 2019-01-01 ENCOUNTER — HOSPITAL ENCOUNTER (OUTPATIENT)
Dept: INFUSION THERAPY | Age: 69
Discharge: HOME | End: 2019-03-15
Payer: MEDICARE

## 2019-01-01 ENCOUNTER — TELEPHONE (OUTPATIENT)
Dept: ORTHOPEDIC SURGERY | Age: 69
End: 2019-01-01

## 2019-01-01 ENCOUNTER — HOME CARE VISIT (OUTPATIENT)
Dept: HOME HEALTH SERVICES | Facility: HOME HEALTH | Age: 69
End: 2019-01-01
Payer: MEDICARE

## 2019-01-01 ENCOUNTER — HOSPITAL ENCOUNTER (EMERGENCY)
Age: 69
Discharge: HOME | End: 2019-05-18
Attending: EMERGENCY MEDICINE
Payer: MEDICARE

## 2019-01-01 ENCOUNTER — HOSPITAL ENCOUNTER (OUTPATIENT)
Dept: INFUSION THERAPY | Age: 69
Discharge: HOME | End: 2019-05-31
Payer: MEDICARE

## 2019-01-01 ENCOUNTER — HOSPITAL ENCOUNTER (OUTPATIENT)
Dept: INFUSION THERAPY | Age: 69
Discharge: HOME | End: 2019-04-19
Payer: MEDICARE

## 2019-01-01 ENCOUNTER — HOSPITAL ENCOUNTER (OUTPATIENT)
Dept: INFUSION THERAPY | Age: 69
Discharge: HOME | End: 2019-05-03
Payer: MEDICARE

## 2019-01-01 ENCOUNTER — HOSPITAL ENCOUNTER (OUTPATIENT)
Dept: INFUSION THERAPY | Age: 69
Discharge: HOME | End: 2019-03-08
Payer: MEDICARE

## 2019-01-01 ENCOUNTER — APPOINTMENT (OUTPATIENT)
Dept: CT IMAGING | Age: 69
End: 2019-01-01
Attending: PHYSICIAN ASSISTANT
Payer: MEDICARE

## 2019-01-01 ENCOUNTER — HOSPITAL ENCOUNTER (OUTPATIENT)
Dept: LAB | Age: 69
Discharge: HOME | End: 2019-02-20
Payer: MEDICARE

## 2019-01-01 ENCOUNTER — HOSPITAL ENCOUNTER (OUTPATIENT)
Dept: INFUSION THERAPY | Age: 69
Discharge: HOME | End: 2019-04-12
Payer: MEDICARE

## 2019-01-01 ENCOUNTER — APPOINTMENT (OUTPATIENT)
Dept: VASCULAR SURGERY | Age: 69
End: 2019-01-01
Attending: EMERGENCY MEDICINE
Payer: MEDICARE

## 2019-01-01 ENCOUNTER — HOME CARE VISIT (OUTPATIENT)
Dept: HOME HEALTH SERVICES | Facility: HOME HEALTH | Age: 69
End: 2019-01-01

## 2019-01-01 ENCOUNTER — HOME HEALTH ADMISSION (OUTPATIENT)
Dept: HOME HEALTH SERVICES | Facility: HOME HEALTH | Age: 69
End: 2019-01-01
Payer: MEDICARE

## 2019-01-01 ENCOUNTER — HOSPITAL ENCOUNTER (INPATIENT)
Age: 69
LOS: 5 days | Discharge: HOME HEALTH | DRG: 312 | End: 2019-01-13
Attending: EMERGENCY MEDICINE | Admitting: INTERNAL MEDICINE
Payer: MEDICARE

## 2019-01-01 ENCOUNTER — OFFICE VISIT (OUTPATIENT)
Dept: ORTHOPEDIC SURGERY | Age: 69
End: 2019-01-01

## 2019-01-01 ENCOUNTER — APPOINTMENT (OUTPATIENT)
Dept: GENERAL RADIOLOGY | Age: 69
End: 2019-01-01
Attending: PHYSICIAN ASSISTANT
Payer: MEDICARE

## 2019-01-01 ENCOUNTER — HOSPITAL ENCOUNTER (OUTPATIENT)
Dept: INFUSION THERAPY | Age: 69
Discharge: HOME | End: 2019-02-15
Payer: MEDICARE

## 2019-01-01 ENCOUNTER — HOSPITAL ENCOUNTER (OUTPATIENT)
Dept: INFUSION THERAPY | Age: 69
Discharge: HOME | End: 2019-08-30
Payer: MEDICARE

## 2019-01-01 ENCOUNTER — HOSPITAL ENCOUNTER (OUTPATIENT)
Dept: INFUSION THERAPY | Age: 69
Discharge: HOME | End: 2019-04-26
Payer: MEDICARE

## 2019-01-01 ENCOUNTER — HOSPITAL ENCOUNTER (OUTPATIENT)
Dept: INTERVENTIONAL RADIOLOGY/VASCULAR | Age: 69
Discharge: HOME | End: 2019-07-31
Attending: INTERNAL MEDICINE | Admitting: RADIOLOGY
Payer: MEDICARE

## 2019-01-01 ENCOUNTER — PATIENT OUTREACH (OUTPATIENT)
Dept: CASE MANAGEMENT | Age: 69
End: 2019-01-01

## 2019-01-01 ENCOUNTER — HOSPITAL ENCOUNTER (OUTPATIENT)
Dept: INFUSION THERAPY | Age: 69
Discharge: HOME | End: 2019-06-28
Payer: MEDICARE

## 2019-01-01 ENCOUNTER — HOSPITAL ENCOUNTER (OUTPATIENT)
Dept: INFUSION THERAPY | Age: 69
Discharge: HOME | End: 2019-03-22
Payer: MEDICARE

## 2019-01-01 ENCOUNTER — APPOINTMENT (OUTPATIENT)
Dept: NON INVASIVE DIAGNOSTICS | Age: 69
DRG: 312 | End: 2019-01-01
Attending: NURSE PRACTITIONER
Payer: MEDICARE

## 2019-01-01 ENCOUNTER — HOSPITAL ENCOUNTER (OUTPATIENT)
Dept: INFUSION THERAPY | Age: 69
Discharge: HOME | End: 2019-06-14
Payer: MEDICARE

## 2019-01-01 ENCOUNTER — APPOINTMENT (OUTPATIENT)
Dept: GENERAL RADIOLOGY | Age: 69
DRG: 312 | End: 2019-01-01
Attending: NURSE PRACTITIONER
Payer: MEDICARE

## 2019-01-01 ENCOUNTER — HOSPITAL ENCOUNTER (OUTPATIENT)
Dept: INFUSION THERAPY | Age: 69
End: 2019-01-01

## 2019-01-01 ENCOUNTER — HOSPITAL ENCOUNTER (OUTPATIENT)
Dept: INFUSION THERAPY | Age: 69
Discharge: HOME | End: 2019-03-29
Payer: MEDICARE

## 2019-01-01 ENCOUNTER — APPOINTMENT (OUTPATIENT)
Dept: CT IMAGING | Age: 69
DRG: 312 | End: 2019-01-01
Attending: EMERGENCY MEDICINE
Payer: MEDICARE

## 2019-01-01 ENCOUNTER — HOSPITAL ENCOUNTER (OUTPATIENT)
Dept: INTERVENTIONAL RADIOLOGY/VASCULAR | Age: 69
Discharge: HOME | End: 2019-06-27
Attending: NURSE PRACTITIONER
Payer: MEDICARE

## 2019-01-01 ENCOUNTER — HOSPITAL ENCOUNTER (OUTPATIENT)
Dept: INFUSION THERAPY | Age: 69
Discharge: HOME | End: 2019-07-22
Payer: MEDICARE

## 2019-01-01 ENCOUNTER — HOSPITAL ENCOUNTER (OUTPATIENT)
Dept: INFUSION THERAPY | Age: 69
Discharge: HOME | End: 2019-01-25
Payer: MEDICARE

## 2019-01-01 ENCOUNTER — HOSPITAL ENCOUNTER (OUTPATIENT)
Dept: INFUSION THERAPY | Age: 69
Discharge: HOME | End: 2019-07-12
Payer: MEDICARE

## 2019-01-01 ENCOUNTER — HOSPITAL ENCOUNTER (OUTPATIENT)
Dept: INFUSION THERAPY | Age: 69
Discharge: HOME | End: 2019-05-10
Payer: MEDICARE

## 2019-01-01 ENCOUNTER — HOSPITAL ENCOUNTER (OUTPATIENT)
Dept: INFUSION THERAPY | Age: 69
Discharge: HOME | End: 2019-03-01
Payer: MEDICARE

## 2019-01-01 ENCOUNTER — HOSPITAL ENCOUNTER (OUTPATIENT)
Dept: INFUSION THERAPY | Age: 69
Discharge: HOME | End: 2019-05-17
Payer: MEDICARE

## 2019-01-01 ENCOUNTER — DOCUMENTATION ONLY (OUTPATIENT)
Dept: ONCOLOGY | Age: 69
End: 2019-01-01

## 2019-01-01 ENCOUNTER — HOSPITAL ENCOUNTER (OUTPATIENT)
Dept: INFUSION THERAPY | Age: 69
Discharge: HOME | End: 2019-06-07
Payer: MEDICARE

## 2019-01-01 ENCOUNTER — HOSPITAL ENCOUNTER (OUTPATIENT)
Dept: INFUSION THERAPY | Age: 69
Discharge: HOME | End: 2019-02-22
Payer: MEDICARE

## 2019-01-01 ENCOUNTER — HOSPITAL ENCOUNTER (OUTPATIENT)
Dept: INFUSION THERAPY | Age: 69
Discharge: HOME | End: 2019-02-04
Payer: MEDICARE

## 2019-01-01 ENCOUNTER — HOSPITAL ENCOUNTER (OUTPATIENT)
Dept: INFUSION THERAPY | Age: 69
Discharge: HOME | End: 2019-09-20
Payer: MEDICARE

## 2019-01-01 ENCOUNTER — HOSPITAL ENCOUNTER (OUTPATIENT)
Dept: INFUSION THERAPY | Age: 69
Discharge: HOME | End: 2019-05-24
Payer: MEDICARE

## 2019-01-01 ENCOUNTER — TELEPHONE (OUTPATIENT)
Dept: ONCOLOGY | Age: 69
End: 2019-01-01

## 2019-01-01 ENCOUNTER — HOSPITAL ENCOUNTER (OUTPATIENT)
Dept: INFUSION THERAPY | Age: 69
Discharge: HOME | End: 2019-11-01
Payer: MEDICARE

## 2019-01-01 ENCOUNTER — HOSPITAL ENCOUNTER (OUTPATIENT)
Dept: INFUSION THERAPY | Age: 69
Discharge: HOME | End: 2019-08-02
Payer: MEDICARE

## 2019-01-01 ENCOUNTER — HOSPITAL ENCOUNTER (OUTPATIENT)
Dept: INFUSION THERAPY | Age: 69
Discharge: HOME | End: 2019-09-27
Payer: MEDICARE

## 2019-01-01 ENCOUNTER — APPOINTMENT (OUTPATIENT)
Dept: CT IMAGING | Age: 69
DRG: 312 | End: 2019-01-01
Attending: INTERNAL MEDICINE
Payer: MEDICARE

## 2019-01-01 VITALS
SYSTOLIC BLOOD PRESSURE: 128 MMHG | OXYGEN SATURATION: 98 % | RESPIRATION RATE: 20 BRPM | TEMPERATURE: 98.7 F | HEART RATE: 89 BPM | DIASTOLIC BLOOD PRESSURE: 72 MMHG

## 2019-01-01 VITALS
BODY MASS INDEX: 21.86 KG/M2 | HEART RATE: 55 BPM | HEIGHT: 66 IN | TEMPERATURE: 98.6 F | DIASTOLIC BLOOD PRESSURE: 79 MMHG | SYSTOLIC BLOOD PRESSURE: 118 MMHG | OXYGEN SATURATION: 98 % | RESPIRATION RATE: 18 BRPM | WEIGHT: 136 LBS

## 2019-01-01 VITALS
HEART RATE: 51 BPM | TEMPERATURE: 98.2 F | RESPIRATION RATE: 18 BRPM | DIASTOLIC BLOOD PRESSURE: 75 MMHG | SYSTOLIC BLOOD PRESSURE: 111 MMHG

## 2019-01-01 VITALS
TEMPERATURE: 98.7 F | DIASTOLIC BLOOD PRESSURE: 79 MMHG | HEART RATE: 84 BPM | OXYGEN SATURATION: 94 % | SYSTOLIC BLOOD PRESSURE: 107 MMHG

## 2019-01-01 VITALS
DIASTOLIC BLOOD PRESSURE: 62 MMHG | RESPIRATION RATE: 18 BRPM | SYSTOLIC BLOOD PRESSURE: 90 MMHG | TEMPERATURE: 98.3 F | HEART RATE: 84 BPM

## 2019-01-01 VITALS
RESPIRATION RATE: 18 BRPM | HEART RATE: 82 BPM | DIASTOLIC BLOOD PRESSURE: 72 MMHG | OXYGEN SATURATION: 98 % | TEMPERATURE: 97.5 F | SYSTOLIC BLOOD PRESSURE: 130 MMHG

## 2019-01-01 VITALS
OXYGEN SATURATION: 98 % | HEART RATE: 86 BPM | TEMPERATURE: 97.6 F | SYSTOLIC BLOOD PRESSURE: 120 MMHG | DIASTOLIC BLOOD PRESSURE: 74 MMHG | RESPIRATION RATE: 19 BRPM

## 2019-01-01 VITALS
RESPIRATION RATE: 18 BRPM | DIASTOLIC BLOOD PRESSURE: 61 MMHG | TEMPERATURE: 97.5 F | SYSTOLIC BLOOD PRESSURE: 94 MMHG | HEART RATE: 60 BPM

## 2019-01-01 VITALS — SYSTOLIC BLOOD PRESSURE: 118 MMHG | DIASTOLIC BLOOD PRESSURE: 77 MMHG | HEART RATE: 74 BPM | TEMPERATURE: 98.2 F

## 2019-01-01 VITALS
HEART RATE: 87 BPM | RESPIRATION RATE: 16 BRPM | TEMPERATURE: 98.6 F | DIASTOLIC BLOOD PRESSURE: 71 MMHG | SYSTOLIC BLOOD PRESSURE: 130 MMHG

## 2019-01-01 VITALS
RESPIRATION RATE: 14 BRPM | HEIGHT: 66 IN | SYSTOLIC BLOOD PRESSURE: 91 MMHG | TEMPERATURE: 99 F | OXYGEN SATURATION: 100 % | BODY MASS INDEX: 21.86 KG/M2 | WEIGHT: 136 LBS | HEART RATE: 90 BPM | DIASTOLIC BLOOD PRESSURE: 67 MMHG

## 2019-01-01 VITALS
HEART RATE: 70 BPM | TEMPERATURE: 97.7 F | DIASTOLIC BLOOD PRESSURE: 66 MMHG | RESPIRATION RATE: 20 BRPM | SYSTOLIC BLOOD PRESSURE: 105 MMHG | OXYGEN SATURATION: 100 %

## 2019-01-01 VITALS
HEIGHT: 66 IN | SYSTOLIC BLOOD PRESSURE: 92 MMHG | HEART RATE: 74 BPM | RESPIRATION RATE: 17 BRPM | BODY MASS INDEX: 21.69 KG/M2 | DIASTOLIC BLOOD PRESSURE: 63 MMHG | WEIGHT: 135 LBS | OXYGEN SATURATION: 98 %

## 2019-01-01 VITALS
TEMPERATURE: 96 F | RESPIRATION RATE: 10 BRPM | SYSTOLIC BLOOD PRESSURE: 90 MMHG | OXYGEN SATURATION: 92 % | DIASTOLIC BLOOD PRESSURE: 71 MMHG | WEIGHT: 145 LBS | HEIGHT: 66 IN | BODY MASS INDEX: 23.3 KG/M2 | HEART RATE: 77 BPM

## 2019-01-01 VITALS
SYSTOLIC BLOOD PRESSURE: 102 MMHG | HEART RATE: 86 BPM | OXYGEN SATURATION: 98 % | DIASTOLIC BLOOD PRESSURE: 72 MMHG | RESPIRATION RATE: 20 BRPM | TEMPERATURE: 98.2 F

## 2019-01-01 VITALS
DIASTOLIC BLOOD PRESSURE: 71 MMHG | HEART RATE: 72 BPM | RESPIRATION RATE: 18 BRPM | SYSTOLIC BLOOD PRESSURE: 100 MMHG | TEMPERATURE: 97.9 F

## 2019-01-01 VITALS
OXYGEN SATURATION: 98 % | TEMPERATURE: 97.4 F | DIASTOLIC BLOOD PRESSURE: 57 MMHG | HEART RATE: 95 BPM | RESPIRATION RATE: 18 BRPM | SYSTOLIC BLOOD PRESSURE: 84 MMHG

## 2019-01-01 VITALS
SYSTOLIC BLOOD PRESSURE: 103 MMHG | TEMPERATURE: 97.6 F | RESPIRATION RATE: 18 BRPM | BODY MASS INDEX: 24.09 KG/M2 | HEIGHT: 66 IN | WEIGHT: 149.91 LBS | HEART RATE: 63 BPM | DIASTOLIC BLOOD PRESSURE: 70 MMHG

## 2019-01-01 VITALS
TEMPERATURE: 97.6 F | SYSTOLIC BLOOD PRESSURE: 96 MMHG | RESPIRATION RATE: 18 BRPM | HEART RATE: 75 BPM | DIASTOLIC BLOOD PRESSURE: 63 MMHG

## 2019-01-01 VITALS
DIASTOLIC BLOOD PRESSURE: 70 MMHG | SYSTOLIC BLOOD PRESSURE: 93 MMHG | TEMPERATURE: 97.7 F | RESPIRATION RATE: 18 BRPM | HEART RATE: 91 BPM

## 2019-01-01 VITALS
SYSTOLIC BLOOD PRESSURE: 90 MMHG | RESPIRATION RATE: 18 BRPM | DIASTOLIC BLOOD PRESSURE: 65 MMHG | HEART RATE: 80 BPM | TEMPERATURE: 97.5 F

## 2019-01-01 VITALS
DIASTOLIC BLOOD PRESSURE: 67 MMHG | RESPIRATION RATE: 16 BRPM | SYSTOLIC BLOOD PRESSURE: 100 MMHG | HEART RATE: 81 BPM | TEMPERATURE: 97.5 F

## 2019-01-01 VITALS
HEIGHT: 66 IN | SYSTOLIC BLOOD PRESSURE: 108 MMHG | TEMPERATURE: 99 F | RESPIRATION RATE: 16 BRPM | WEIGHT: 138 LBS | BODY MASS INDEX: 22.18 KG/M2 | DIASTOLIC BLOOD PRESSURE: 79 MMHG | HEART RATE: 98 BPM | OXYGEN SATURATION: 100 %

## 2019-01-01 VITALS
TEMPERATURE: 97 F | HEIGHT: 66 IN | SYSTOLIC BLOOD PRESSURE: 91 MMHG | WEIGHT: 147 LBS | BODY MASS INDEX: 23.63 KG/M2 | DIASTOLIC BLOOD PRESSURE: 62 MMHG | HEART RATE: 73 BPM | RESPIRATION RATE: 18 BRPM

## 2019-01-01 VITALS
HEART RATE: 78 BPM | DIASTOLIC BLOOD PRESSURE: 67 MMHG | TEMPERATURE: 97.7 F | SYSTOLIC BLOOD PRESSURE: 100 MMHG | RESPIRATION RATE: 18 BRPM

## 2019-01-01 VITALS
RESPIRATION RATE: 18 BRPM | HEIGHT: 66 IN | BODY MASS INDEX: 24.11 KG/M2 | TEMPERATURE: 97.6 F | WEIGHT: 150 LBS | DIASTOLIC BLOOD PRESSURE: 68 MMHG | SYSTOLIC BLOOD PRESSURE: 134 MMHG | HEART RATE: 93 BPM

## 2019-01-01 VITALS
HEIGHT: 66 IN | HEART RATE: 76 BPM | SYSTOLIC BLOOD PRESSURE: 108 MMHG | DIASTOLIC BLOOD PRESSURE: 69 MMHG | TEMPERATURE: 97.9 F | BODY MASS INDEX: 23.3 KG/M2 | RESPIRATION RATE: 18 BRPM | WEIGHT: 145 LBS

## 2019-01-01 VITALS
RESPIRATION RATE: 18 BRPM | SYSTOLIC BLOOD PRESSURE: 91 MMHG | TEMPERATURE: 97.9 F | DIASTOLIC BLOOD PRESSURE: 59 MMHG | HEART RATE: 73 BPM

## 2019-01-01 VITALS — TEMPERATURE: 98.1 F | SYSTOLIC BLOOD PRESSURE: 90 MMHG | HEART RATE: 60 BPM | DIASTOLIC BLOOD PRESSURE: 64 MMHG

## 2019-01-01 VITALS
RESPIRATION RATE: 18 BRPM | SYSTOLIC BLOOD PRESSURE: 96 MMHG | HEART RATE: 88 BPM | TEMPERATURE: 97.6 F | DIASTOLIC BLOOD PRESSURE: 65 MMHG

## 2019-01-01 VITALS
SYSTOLIC BLOOD PRESSURE: 121 MMHG | RESPIRATION RATE: 18 BRPM | TEMPERATURE: 98.1 F | HEART RATE: 61 BPM | DIASTOLIC BLOOD PRESSURE: 79 MMHG

## 2019-01-01 VITALS
SYSTOLIC BLOOD PRESSURE: 100 MMHG | DIASTOLIC BLOOD PRESSURE: 60 MMHG | OXYGEN SATURATION: 100 % | TEMPERATURE: 97.6 F | HEART RATE: 77 BPM | RESPIRATION RATE: 16 BRPM

## 2019-01-01 VITALS
HEART RATE: 72 BPM | TEMPERATURE: 97.7 F | DIASTOLIC BLOOD PRESSURE: 65 MMHG | SYSTOLIC BLOOD PRESSURE: 94 MMHG | OXYGEN SATURATION: 98 % | RESPIRATION RATE: 18 BRPM

## 2019-01-01 VITALS — SYSTOLIC BLOOD PRESSURE: 100 MMHG | DIASTOLIC BLOOD PRESSURE: 68 MMHG

## 2019-01-01 VITALS
SYSTOLIC BLOOD PRESSURE: 118 MMHG | HEART RATE: 60 BPM | RESPIRATION RATE: 17 BRPM | DIASTOLIC BLOOD PRESSURE: 64 MMHG | TEMPERATURE: 96.7 F

## 2019-01-01 VITALS
SYSTOLIC BLOOD PRESSURE: 110 MMHG | TEMPERATURE: 97.6 F | RESPIRATION RATE: 18 BRPM | DIASTOLIC BLOOD PRESSURE: 71 MMHG | HEART RATE: 88 BPM | SYSTOLIC BLOOD PRESSURE: 114 MMHG | DIASTOLIC BLOOD PRESSURE: 76 MMHG | TEMPERATURE: 98.9 F | RESPIRATION RATE: 18 BRPM | HEART RATE: 96 BPM

## 2019-01-01 VITALS
DIASTOLIC BLOOD PRESSURE: 66 MMHG | HEART RATE: 85 BPM | TEMPERATURE: 97.5 F | SYSTOLIC BLOOD PRESSURE: 99 MMHG | RESPIRATION RATE: 18 BRPM

## 2019-01-01 VITALS
TEMPERATURE: 97.5 F | RESPIRATION RATE: 16 BRPM | HEART RATE: 69 BPM | DIASTOLIC BLOOD PRESSURE: 60 MMHG | SYSTOLIC BLOOD PRESSURE: 93 MMHG

## 2019-01-01 VITALS
TEMPERATURE: 97.3 F | HEART RATE: 70 BPM | RESPIRATION RATE: 18 BRPM | DIASTOLIC BLOOD PRESSURE: 60 MMHG | SYSTOLIC BLOOD PRESSURE: 110 MMHG | OXYGEN SATURATION: 98 %

## 2019-01-01 VITALS
HEART RATE: 86 BPM | SYSTOLIC BLOOD PRESSURE: 126 MMHG | OXYGEN SATURATION: 98 % | DIASTOLIC BLOOD PRESSURE: 74 MMHG | TEMPERATURE: 98.2 F | RESPIRATION RATE: 16 BRPM

## 2019-01-01 VITALS
RESPIRATION RATE: 16 BRPM | DIASTOLIC BLOOD PRESSURE: 79 MMHG | SYSTOLIC BLOOD PRESSURE: 123 MMHG | TEMPERATURE: 98.1 F | HEIGHT: 66 IN | HEART RATE: 100 BPM | WEIGHT: 173.72 LBS | OXYGEN SATURATION: 96 % | BODY MASS INDEX: 27.92 KG/M2

## 2019-01-01 VITALS
RESPIRATION RATE: 16 BRPM | DIASTOLIC BLOOD PRESSURE: 69 MMHG | HEART RATE: 68 BPM | TEMPERATURE: 97.7 F | SYSTOLIC BLOOD PRESSURE: 102 MMHG

## 2019-01-01 VITALS
TEMPERATURE: 97.5 F | RESPIRATION RATE: 18 BRPM | OXYGEN SATURATION: 99 % | SYSTOLIC BLOOD PRESSURE: 103 MMHG | HEART RATE: 79 BPM | DIASTOLIC BLOOD PRESSURE: 68 MMHG

## 2019-01-01 VITALS
DIASTOLIC BLOOD PRESSURE: 64 MMHG | TEMPERATURE: 98 F | HEART RATE: 51 BPM | SYSTOLIC BLOOD PRESSURE: 114 MMHG | RESPIRATION RATE: 18 BRPM

## 2019-01-01 VITALS
RESPIRATION RATE: 18 BRPM | HEART RATE: 55 BPM | DIASTOLIC BLOOD PRESSURE: 72 MMHG | TEMPERATURE: 97 F | SYSTOLIC BLOOD PRESSURE: 105 MMHG

## 2019-01-01 VITALS
TEMPERATURE: 97.6 F | RESPIRATION RATE: 16 BRPM | HEART RATE: 99 BPM | SYSTOLIC BLOOD PRESSURE: 96 MMHG | DIASTOLIC BLOOD PRESSURE: 60 MMHG | OXYGEN SATURATION: 100 %

## 2019-01-01 VITALS — TEMPERATURE: 98 F | SYSTOLIC BLOOD PRESSURE: 106 MMHG | DIASTOLIC BLOOD PRESSURE: 70 MMHG | HEART RATE: 72 BPM

## 2019-01-01 VITALS
TEMPERATURE: 97.9 F | SYSTOLIC BLOOD PRESSURE: 96 MMHG | DIASTOLIC BLOOD PRESSURE: 67 MMHG | HEART RATE: 72 BPM | RESPIRATION RATE: 19 BRPM

## 2019-01-01 VITALS
DIASTOLIC BLOOD PRESSURE: 74 MMHG | HEART RATE: 108 BPM | RESPIRATION RATE: 18 BRPM | TEMPERATURE: 97.4 F | SYSTOLIC BLOOD PRESSURE: 106 MMHG

## 2019-01-01 VITALS
HEART RATE: 60 BPM | TEMPERATURE: 97.6 F | RESPIRATION RATE: 18 BRPM | DIASTOLIC BLOOD PRESSURE: 65 MMHG | DIASTOLIC BLOOD PRESSURE: 65 MMHG | OXYGEN SATURATION: 100 % | HEART RATE: 91 BPM | TEMPERATURE: 97.4 F | SYSTOLIC BLOOD PRESSURE: 98 MMHG | SYSTOLIC BLOOD PRESSURE: 92 MMHG | RESPIRATION RATE: 18 BRPM

## 2019-01-01 VITALS — TEMPERATURE: 98.2 F | SYSTOLIC BLOOD PRESSURE: 98 MMHG | DIASTOLIC BLOOD PRESSURE: 60 MMHG

## 2019-01-01 VITALS
DIASTOLIC BLOOD PRESSURE: 66 MMHG | SYSTOLIC BLOOD PRESSURE: 93 MMHG | TEMPERATURE: 98.1 F | RESPIRATION RATE: 20 BRPM | HEART RATE: 88 BPM | OXYGEN SATURATION: 100 %

## 2019-01-01 VITALS
OXYGEN SATURATION: 98 % | RESPIRATION RATE: 18 BRPM | TEMPERATURE: 97.6 F | SYSTOLIC BLOOD PRESSURE: 101 MMHG | HEART RATE: 61 BPM | DIASTOLIC BLOOD PRESSURE: 66 MMHG

## 2019-01-01 VITALS — SYSTOLIC BLOOD PRESSURE: 110 MMHG | TEMPERATURE: 98.3 F | DIASTOLIC BLOOD PRESSURE: 68 MMHG

## 2019-01-01 VITALS — HEART RATE: 60 BPM | RESPIRATION RATE: 18 BRPM | TEMPERATURE: 97.3 F

## 2019-01-01 VITALS
TEMPERATURE: 97.7 F | RESPIRATION RATE: 18 BRPM | SYSTOLIC BLOOD PRESSURE: 98 MMHG | HEART RATE: 64 BPM | DIASTOLIC BLOOD PRESSURE: 65 MMHG

## 2019-01-01 VITALS — SYSTOLIC BLOOD PRESSURE: 108 MMHG | HEART RATE: 70 BPM | TEMPERATURE: 98.3 F | DIASTOLIC BLOOD PRESSURE: 70 MMHG

## 2019-01-01 VITALS — SYSTOLIC BLOOD PRESSURE: 117 MMHG | DIASTOLIC BLOOD PRESSURE: 74 MMHG

## 2019-01-01 DIAGNOSIS — C77.3 CARCINOMA OF RIGHT BREAST METASTATIC TO AXILLARY LYMPH NODE (HCC): ICD-10-CM

## 2019-01-01 DIAGNOSIS — E55.9 VITAMIN D DEFICIENCY: ICD-10-CM

## 2019-01-01 DIAGNOSIS — G89.3 CANCER ASSOCIATED PAIN: Primary | ICD-10-CM

## 2019-01-01 DIAGNOSIS — F51.01 PRIMARY INSOMNIA: ICD-10-CM

## 2019-01-01 DIAGNOSIS — M62.838 MUSCLE SPASMS OF NECK: ICD-10-CM

## 2019-01-01 DIAGNOSIS — R60.0 BILATERAL LOWER EXTREMITY EDEMA: ICD-10-CM

## 2019-01-01 DIAGNOSIS — G89.3 CANCER ASSOCIATED PAIN: ICD-10-CM

## 2019-01-01 DIAGNOSIS — C77.3 CARCINOMA OF RIGHT BREAST METASTATIC TO AXILLARY LYMPH NODE (HCC): Primary | ICD-10-CM

## 2019-01-01 DIAGNOSIS — D64.9 ANEMIA, UNSPECIFIED TYPE: ICD-10-CM

## 2019-01-01 DIAGNOSIS — D69.3 CHRONIC ITP (IDIOPATHIC THROMBOCYTOPENIA) (HCC): ICD-10-CM

## 2019-01-01 DIAGNOSIS — R29.898 MUSCULAR DECONDITIONING: ICD-10-CM

## 2019-01-01 DIAGNOSIS — M79.604 LEG PAIN, DIFFUSE, RIGHT: ICD-10-CM

## 2019-01-01 DIAGNOSIS — M79.604 LEG PAIN, ANTERIOR, RIGHT: ICD-10-CM

## 2019-01-01 DIAGNOSIS — L60.0 INGROWN NAIL OF SECOND TOE OF LEFT FOOT: ICD-10-CM

## 2019-01-01 DIAGNOSIS — M79.675 PAIN OF LEFT GREAT TOE: ICD-10-CM

## 2019-01-01 DIAGNOSIS — R53.1 WEAKNESS: ICD-10-CM

## 2019-01-01 DIAGNOSIS — C50.911 CARCINOMA OF RIGHT BREAST METASTATIC TO AXILLARY LYMPH NODE (HCC): Primary | ICD-10-CM

## 2019-01-01 DIAGNOSIS — M25.572 LEFT ANKLE PAIN, UNSPECIFIED CHRONICITY: ICD-10-CM

## 2019-01-01 DIAGNOSIS — C50.919 METASTATIC BREAST CANCER (HCC): ICD-10-CM

## 2019-01-01 DIAGNOSIS — C50.911 CARCINOMA OF RIGHT BREAST METASTATIC TO AXILLARY LYMPH NODE (HCC): ICD-10-CM

## 2019-01-01 DIAGNOSIS — Z98.890 POST-OPERATIVE STATE: ICD-10-CM

## 2019-01-01 DIAGNOSIS — N30.00 ACUTE CYSTITIS WITHOUT HEMATURIA: Primary | ICD-10-CM

## 2019-01-01 DIAGNOSIS — D50.8 IRON DEFICIENCY ANEMIA SECONDARY TO INADEQUATE DIETARY IRON INTAKE: ICD-10-CM

## 2019-01-01 DIAGNOSIS — N17.9 ACUTE RENAL FAILURE, UNSPECIFIED ACUTE RENAL FAILURE TYPE (HCC): Primary | ICD-10-CM

## 2019-01-01 DIAGNOSIS — M79.604 PAIN OF RIGHT LOWER EXTREMITY: Primary | ICD-10-CM

## 2019-01-01 DIAGNOSIS — F40.9 INSOMNIA DUE TO ANXIETY AND FEAR: ICD-10-CM

## 2019-01-01 DIAGNOSIS — M19.072 OSTEOARTHRITIS OF LEFT FOOT, UNSPECIFIED OSTEOARTHRITIS TYPE: ICD-10-CM

## 2019-01-01 DIAGNOSIS — D64.9 ANEMIA, UNSPECIFIED TYPE: Primary | ICD-10-CM

## 2019-01-01 DIAGNOSIS — E55.9 VITAMIN D DEFICIENCY: Primary | ICD-10-CM

## 2019-01-01 DIAGNOSIS — L60.0 INGROWN NAIL OF GREAT TOE OF LEFT FOOT: Primary | ICD-10-CM

## 2019-01-01 DIAGNOSIS — M54.2 NECK PAIN: ICD-10-CM

## 2019-01-01 DIAGNOSIS — F51.05 INSOMNIA DUE TO ANXIETY AND FEAR: ICD-10-CM

## 2019-01-01 DIAGNOSIS — G62.9 PERIPHERAL POLYNEUROPATHY: ICD-10-CM

## 2019-01-01 DIAGNOSIS — D69.3 CHRONIC ITP (IDIOPATHIC THROMBOCYTOPENIA) (HCC): Primary | ICD-10-CM

## 2019-01-01 LAB
25(OH)D3 SERPL-MCNC: 42 NG/ML (ref 30–100)
25(OH)D3+25(OH)D2 SERPL-MCNC: 29.4 NG/ML (ref 30–100)
ALBUMIN SERPL-MCNC: 2.6 G/DL (ref 3.4–5)
ALBUMIN SERPL-MCNC: 2.8 G/DL (ref 3.4–5)
ALBUMIN SERPL-MCNC: 3.1 G/DL (ref 3.4–5)
ALBUMIN SERPL-MCNC: 3.2 G/DL (ref 3.4–5)
ALBUMIN SERPL-MCNC: 3.3 G/DL (ref 3.4–5)
ALBUMIN SERPL-MCNC: 3.8 G/DL (ref 3.4–5)
ALBUMIN SERPL-MCNC: 3.8 G/DL (ref 3.4–5)
ALBUMIN SERPL-MCNC: 4.4 G/DL (ref 3.6–4.8)
ALBUMIN/GLOB SERPL: 0.7 {RATIO} (ref 0.8–1.7)
ALBUMIN/GLOB SERPL: 0.8 {RATIO} (ref 0.8–1.7)
ALBUMIN/GLOB SERPL: 0.9 {RATIO} (ref 0.8–1.7)
ALBUMIN/GLOB SERPL: 1.1 {RATIO} (ref 1.2–2.2)
ALP SERPL-CCNC: 63 U/L (ref 45–117)
ALP SERPL-CCNC: 64 IU/L (ref 39–117)
ALP SERPL-CCNC: 69 U/L (ref 45–117)
ALP SERPL-CCNC: 70 U/L (ref 45–117)
ALP SERPL-CCNC: 73 U/L (ref 45–117)
ALP SERPL-CCNC: 74 U/L (ref 45–117)
ALP SERPL-CCNC: 75 U/L (ref 45–117)
ALP SERPL-CCNC: 77 U/L (ref 45–117)
ALT SERPL-CCNC: 12 U/L (ref 13–56)
ALT SERPL-CCNC: 17 U/L (ref 13–56)
ALT SERPL-CCNC: 20 U/L (ref 13–56)
ALT SERPL-CCNC: 24 U/L (ref 13–56)
ALT SERPL-CCNC: 25 U/L (ref 13–56)
ALT SERPL-CCNC: 25 U/L (ref 13–56)
ALT SERPL-CCNC: 26 IU/L (ref 0–32)
ALT SERPL-CCNC: 34 U/L (ref 13–56)
ANION GAP SERPL CALC-SCNC: 10 MMOL/L (ref 3–18)
ANION GAP SERPL CALC-SCNC: 4 MMOL/L (ref 3–18)
ANION GAP SERPL CALC-SCNC: 5 MMOL/L (ref 3–18)
ANION GAP SERPL CALC-SCNC: 6 MMOL/L (ref 3–18)
ANION GAP SERPL CALC-SCNC: 6 MMOL/L (ref 3–18)
ANION GAP SERPL CALC-SCNC: 7 MMOL/L (ref 3–18)
ANION GAP SERPL CALC-SCNC: 8 MMOL/L (ref 3–18)
ANION GAP SERPL CALC-SCNC: 9 MMOL/L (ref 3–18)
APPEARANCE UR: CLEAR
APPEARANCE UR: CLEAR
APTT PPP: 34.3 SEC (ref 23–36.4)
APTT PPP: 45.1 SEC (ref 23–36.4)
AST SERPL-CCNC: 17 U/L (ref 15–37)
AST SERPL-CCNC: 27 U/L (ref 15–37)
AST SERPL-CCNC: 28 U/L (ref 15–37)
AST SERPL-CCNC: 29 U/L (ref 15–37)
AST SERPL-CCNC: 31 U/L (ref 15–37)
AST SERPL-CCNC: 39 U/L (ref 15–37)
AST SERPL-CCNC: 50 U/L (ref 10–38)
AST SERPL-CCNC: 65 IU/L (ref 0–40)
ATRIAL RATE: 159 BPM
ATRIAL RATE: 208 BPM
ATRIAL RATE: 258 BPM
ATRIAL RATE: 326 BPM
ATRIAL RATE: 357 BPM
ATRIAL RATE: 394 BPM
ATRIAL RATE: 394 BPM
ATRIAL RATE: 416 BPM
BACTERIA SPEC CULT: ABNORMAL
BACTERIA SPEC CULT: NORMAL
BACTERIA URNS QL MICRO: NEGATIVE /HPF
BASO+EOS+MONOS # BLD AUTO: 0.6 K/UL (ref 0–2.3)
BASO+EOS+MONOS # BLD AUTO: 0.7 K/UL (ref 0–2.3)
BASO+EOS+MONOS # BLD AUTO: 0.8 K/UL (ref 0–2.3)
BASO+EOS+MONOS # BLD AUTO: 0.9 K/UL (ref 0–2.3)
BASO+EOS+MONOS # BLD AUTO: 1.1 K/UL (ref 0–2.3)
BASO+EOS+MONOS # BLD AUTO: 1.2 K/UL (ref 0–2.3)
BASO+EOS+MONOS # BLD AUTO: 1.2 K/UL (ref 0–2.3)
BASO+EOS+MONOS # BLD AUTO: 1.3 K/UL (ref 0–2.3)
BASO+EOS+MONOS NFR BLD AUTO: 14 % (ref 0.1–17)
BASO+EOS+MONOS NFR BLD AUTO: 15 % (ref 0.1–17)
BASO+EOS+MONOS NFR BLD AUTO: 15 % (ref 0.1–17)
BASO+EOS+MONOS NFR BLD AUTO: 16 % (ref 0.1–17)
BASO+EOS+MONOS NFR BLD AUTO: 17 % (ref 0.1–17)
BASO+EOS+MONOS NFR BLD AUTO: 18 % (ref 0.1–17)
BASO+EOS+MONOS NFR BLD AUTO: 19 % (ref 0.1–17)
BASO+EOS+MONOS NFR BLD AUTO: 19 % (ref 0.1–17)
BASO+EOS+MONOS NFR BLD AUTO: 20 % (ref 0.1–17)
BASO+EOS+MONOS NFR BLD AUTO: 6 % (ref 0.1–17)
BASO+EOS+MONOS NFR BLD AUTO: 9 % (ref 0.1–17)
BASOPHILS # BLD: 0 K/UL (ref 0–0.06)
BASOPHILS # BLD: 0 K/UL (ref 0–0.1)
BASOPHILS # BLD: 0.1 K/UL (ref 0–0.06)
BASOPHILS NFR BLD: 0 % (ref 0–2)
BASOPHILS NFR BLD: 0 % (ref 0–3)
BASOPHILS NFR BLD: 1 % (ref 0–3)
BILIRUB SERPL-MCNC: 0.4 MG/DL (ref 0.2–1)
BILIRUB SERPL-MCNC: 0.5 MG/DL (ref 0.2–1)
BILIRUB SERPL-MCNC: 0.5 MG/DL (ref 0–1.2)
BILIRUB SERPL-MCNC: 0.7 MG/DL (ref 0.2–1)
BILIRUB UR QL: NEGATIVE
BILIRUB UR QL: NEGATIVE
BNP SERPL-MCNC: 2195 PG/ML (ref 0–900)
BUN SERPL-MCNC: 10 MG/DL (ref 7–18)
BUN SERPL-MCNC: 15 MG/DL (ref 7–18)
BUN SERPL-MCNC: 24 MG/DL (ref 7–18)
BUN SERPL-MCNC: 31 MG/DL (ref 7–18)
BUN SERPL-MCNC: 33 MG/DL (ref 7–18)
BUN SERPL-MCNC: 33 MG/DL (ref 7–18)
BUN SERPL-MCNC: 34 MG/DL (ref 7–18)
BUN SERPL-MCNC: 34 MG/DL (ref 8–27)
BUN SERPL-MCNC: 43 MG/DL (ref 7–18)
BUN SERPL-MCNC: 50 MG/DL (ref 7–18)
BUN SERPL-MCNC: 57 MG/DL (ref 7–18)
BUN SERPL-MCNC: 8 MG/DL (ref 7–18)
BUN SERPL-MCNC: 9 MG/DL (ref 7–18)
BUN/CREAT SERPL: 14 (ref 12–20)
BUN/CREAT SERPL: 18 (ref 12–20)
BUN/CREAT SERPL: 22 (ref 12–20)
BUN/CREAT SERPL: 22 (ref 12–20)
BUN/CREAT SERPL: 25 (ref 12–20)
BUN/CREAT SERPL: 26 (ref 12–20)
BUN/CREAT SERPL: 26 (ref 12–20)
BUN/CREAT SERPL: 27 (ref 12–20)
BUN/CREAT SERPL: 30 (ref 12–20)
BUN/CREAT SERPL: 31 (ref 12–28)
BUN/CREAT SERPL: 42 (ref 12–20)
CA-I SERPL-SCNC: 1.07 MMOL/L (ref 1.12–1.32)
CA-I SERPL-SCNC: 1.14 MMOL/L (ref 1.12–1.32)
CA-I SERPL-SCNC: 1.19 MMOL/L (ref 1.12–1.32)
CA-I SERPL-SCNC: 1.2 MMOL/L (ref 1.12–1.32)
CALCIUM SERPL-MCNC: 10.2 MG/DL (ref 8.7–10.3)
CALCIUM SERPL-MCNC: 7.9 MG/DL (ref 8.5–10.1)
CALCIUM SERPL-MCNC: 8 MG/DL (ref 8.5–10.1)
CALCIUM SERPL-MCNC: 8.1 MG/DL (ref 8.5–10.1)
CALCIUM SERPL-MCNC: 8.5 MG/DL (ref 8.5–10.1)
CALCIUM SERPL-MCNC: 8.5 MG/DL (ref 8.5–10.1)
CALCIUM SERPL-MCNC: 8.6 MG/DL (ref 8.5–10.1)
CALCIUM SERPL-MCNC: 8.7 MG/DL (ref 8.5–10.1)
CALCIUM SERPL-MCNC: 8.9 MG/DL (ref 8.5–10.1)
CALCIUM SERPL-MCNC: 9.4 MG/DL (ref 8.5–10.1)
CALCIUM SERPL-MCNC: 9.4 MG/DL (ref 8.5–10.1)
CALCIUM SERPL-MCNC: 9.5 MG/DL (ref 8.5–10.1)
CALCIUM SERPL-MCNC: 9.7 MG/DL (ref 8.5–10.1)
CALCULATED R AXIS, ECG10: -10 DEGREES
CALCULATED R AXIS, ECG10: -10 DEGREES
CALCULATED R AXIS, ECG10: -11 DEGREES
CALCULATED R AXIS, ECG10: -23 DEGREES
CALCULATED R AXIS, ECG10: -28 DEGREES
CALCULATED R AXIS, ECG10: -46 DEGREES
CALCULATED R AXIS, ECG10: 112 DEGREES
CALCULATED R AXIS, ECG10: 6 DEGREES
CALCULATED T AXIS, ECG11: -15 DEGREES
CALCULATED T AXIS, ECG11: -4 DEGREES
CALCULATED T AXIS, ECG11: 1 DEGREES
CALCULATED T AXIS, ECG11: 15 DEGREES
CALCULATED T AXIS, ECG11: 2 DEGREES
CALCULATED T AXIS, ECG11: 41 DEGREES
CALCULATED T AXIS, ECG11: 6 DEGREES
CALCULATED T AXIS, ECG11: 86 DEGREES
CANCER AG27-29 SERPL-ACNC: 23.9 U/ML (ref 0–38.6)
CANCER AG27-29 SERPL-ACNC: 27.4 U/ML (ref 0–38.6)
CHLORIDE SERPL-SCNC: 101 MMOL/L (ref 100–108)
CHLORIDE SERPL-SCNC: 102 MMOL/L (ref 100–108)
CHLORIDE SERPL-SCNC: 104 MMOL/L (ref 100–108)
CHLORIDE SERPL-SCNC: 105 MMOL/L (ref 100–108)
CHLORIDE SERPL-SCNC: 106 MMOL/L (ref 100–108)
CHLORIDE SERPL-SCNC: 106 MMOL/L (ref 100–108)
CHLORIDE SERPL-SCNC: 107 MMOL/L (ref 100–108)
CHLORIDE SERPL-SCNC: 85 MMOL/L (ref 96–106)
CHLORIDE SERPL-SCNC: 90 MMOL/L (ref 100–111)
CHLORIDE SERPL-SCNC: 94 MMOL/L (ref 100–108)
CHLORIDE SERPL-SCNC: 94 MMOL/L (ref 100–111)
CHLORIDE SERPL-SCNC: 99 MMOL/L (ref 100–108)
CHLORIDE SERPL-SCNC: 99 MMOL/L (ref 100–108)
CK MB CFR SERPL CALC: 0.6 % (ref 0–4)
CK MB CFR SERPL CALC: 0.8 % (ref 0–4)
CK MB CFR SERPL CALC: 0.9 % (ref 0–4)
CK MB CFR SERPL CALC: 0.9 % (ref 0–4)
CK MB SERPL-MCNC: 1.8 NG/ML (ref 5–25)
CK MB SERPL-MCNC: 2.2 NG/ML (ref 5–25)
CK MB SERPL-MCNC: 2.4 NG/ML (ref 5–25)
CK MB SERPL-MCNC: 2.7 NG/ML (ref 5–25)
CK SERPL-CCNC: 224 U/L (ref 26–192)
CK SERPL-CCNC: 236 U/L (ref 26–192)
CK SERPL-CCNC: 317 U/L (ref 26–192)
CK SERPL-CCNC: 406 U/L (ref 26–192)
CO2 SERPL-SCNC: 27 MMOL/L (ref 21–32)
CO2 SERPL-SCNC: 29 MMOL/L (ref 21–32)
CO2 SERPL-SCNC: 29 MMOL/L (ref 21–32)
CO2 SERPL-SCNC: 30 MMOL/L (ref 21–32)
CO2 SERPL-SCNC: 31 MMOL/L (ref 21–32)
CO2 SERPL-SCNC: 31 MMOL/L (ref 21–32)
CO2 SERPL-SCNC: 32 MMOL/L (ref 21–32)
CO2 SERPL-SCNC: 32 MMOL/L (ref 21–32)
CO2 SERPL-SCNC: 33 MMOL/L (ref 20–29)
CO2 SERPL-SCNC: 34 MMOL/L (ref 21–32)
CO2 SERPL-SCNC: 36 MMOL/L (ref 21–32)
CO2 SERPL-SCNC: 37 MMOL/L (ref 21–32)
CO2 SERPL-SCNC: 40 MMOL/L (ref 21–32)
COLOR UR: YELLOW
COLOR UR: YELLOW
CREAT SERPL-MCNC: 0.59 MG/DL (ref 0.6–1.3)
CREAT SERPL-MCNC: 0.63 MG/DL (ref 0.6–1.3)
CREAT SERPL-MCNC: 0.71 MG/DL (ref 0.6–1.3)
CREAT SERPL-MCNC: 0.83 MG/DL (ref 0.6–1.3)
CREAT SERPL-MCNC: 1.09 MG/DL (ref 0.6–1.3)
CREAT SERPL-MCNC: 1.11 MG/DL (ref 0.57–1)
CREAT SERPL-MCNC: 1.2 MG/DL (ref 0.6–1.3)
CREAT SERPL-MCNC: 1.24 MG/DL (ref 0.6–1.3)
CREAT SERPL-MCNC: 1.31 MG/DL (ref 0.6–1.3)
CREAT SERPL-MCNC: 1.36 MG/DL (ref 0.6–1.3)
CREAT SERPL-MCNC: 1.42 MG/DL (ref 0.6–1.3)
CREAT SERPL-MCNC: 1.47 MG/DL (ref 0.6–1.3)
CREAT SERPL-MCNC: 1.95 MG/DL (ref 0.6–1.3)
D DIMER PPP FEU-MCNC: 0.65 UG/ML(FEU)
DIAGNOSIS, 93000: NORMAL
DIFFERENTIAL METHOD BLD: ABNORMAL
DIGOXIN SERPL-MCNC: 0.1 NG/ML (ref 0.9–2)
ECHO LA AREA 4C: 27.7 CM2
ECHO LA VOL 2C: 127.62 ML (ref 22–52)
ECHO LA VOL 4C: 93.72 ML (ref 22–52)
ECHO LA VOL BP: 124.08 ML (ref 22–52)
ECHO LA VOL/BSA BIPLANE: 68.76 ML/M2 (ref 16–28)
ECHO LA VOLUME INDEX A2C: 70.72 ML/M2 (ref 16–28)
ECHO LA VOLUME INDEX A4C: 51.93 ML/M2 (ref 16–28)
ECHO LV INTERNAL DIMENSION DIASTOLIC: 5.27 CM (ref 3.9–5.3)
ECHO LV INTERNAL DIMENSION SYSTOLIC: 4.28 CM
ECHO LV IVSD: 0.97 CM (ref 0.6–0.9)
ECHO LV MASS 2D: 234.5 G (ref 67–162)
ECHO LV MASS INDEX 2D: 129.9 G/M2 (ref 43–95)
ECHO LV POSTERIOR WALL DIASTOLIC: 1.03 CM (ref 0.6–0.9)
ECHO MV AREA PISA: 0.1 CM2
ECHO MV EROA PISA: 0.1 CM2
ECHO MV REGURGITANT PEAK GRADIENT: 94.6 MMHG
ECHO MV REGURGITANT PEAK VELOCITY: 486.22 CM/S
ECHO MV REGURGITANT RADIUS PISA: 0.54 CM
ECHO MV REGURGITANT VOLUME: 21.15 CC
ECHO MV REGURGITANT VTIA: 152.09 CM
ECHO PULMONARY ARTERY SYSTOLIC PRESSURE (PASP): 50 MMHG
ECHO RV TAPSE: 1.36 CM (ref 1.5–2)
ECHO TV REGURGITANT MAX VELOCITY: 322.49 CM/S
ECHO TV REGURGITANT PEAK GRADIENT: 41.6 MMHG
EOSINOPHIL # BLD: 0.1 K/UL (ref 0–0.4)
EOSINOPHIL # BLD: 0.2 K/UL (ref 0–0.4)
EOSINOPHIL # BLD: 0.3 K/UL (ref 0–0.4)
EOSINOPHIL # BLD: 0.4 K/UL (ref 0–0.4)
EOSINOPHIL NFR BLD: 2 % (ref 0–5)
EOSINOPHIL NFR BLD: 3 % (ref 0–5)
EOSINOPHIL NFR BLD: 4 % (ref 0–5)
EOSINOPHIL NFR BLD: 5 % (ref 0–5)
EOSINOPHIL NFR BLD: 5 % (ref 0–5)
EPITH CASTS URNS QL MICRO: NORMAL /LPF (ref 0–5)
ERYTHROCYTE [DISTWIDTH] IN BLOOD BY AUTOMATED COUNT: 15.4 % (ref 11.6–14.5)
ERYTHROCYTE [DISTWIDTH] IN BLOOD BY AUTOMATED COUNT: 15.5 % (ref 11.6–14.5)
ERYTHROCYTE [DISTWIDTH] IN BLOOD BY AUTOMATED COUNT: 15.6 % (ref 11.6–14.5)
ERYTHROCYTE [DISTWIDTH] IN BLOOD BY AUTOMATED COUNT: 15.7 % (ref 11.5–14.5)
ERYTHROCYTE [DISTWIDTH] IN BLOOD BY AUTOMATED COUNT: 15.7 % (ref 11.6–14.5)
ERYTHROCYTE [DISTWIDTH] IN BLOOD BY AUTOMATED COUNT: 15.8 % (ref 11.6–14.5)
ERYTHROCYTE [DISTWIDTH] IN BLOOD BY AUTOMATED COUNT: 16.1 % (ref 11.6–14.5)
ERYTHROCYTE [DISTWIDTH] IN BLOOD BY AUTOMATED COUNT: 16.1 % (ref 11.6–14.5)
ERYTHROCYTE [DISTWIDTH] IN BLOOD BY AUTOMATED COUNT: 16.2 % (ref 11.6–14.5)
ERYTHROCYTE [DISTWIDTH] IN BLOOD BY AUTOMATED COUNT: 16.3 % (ref 11.6–14.5)
ERYTHROCYTE [DISTWIDTH] IN BLOOD BY AUTOMATED COUNT: 16.5 % (ref 11.5–14.5)
ERYTHROCYTE [DISTWIDTH] IN BLOOD BY AUTOMATED COUNT: 16.6 % (ref 11.5–14.5)
ERYTHROCYTE [DISTWIDTH] IN BLOOD BY AUTOMATED COUNT: 16.6 % (ref 11.5–14.5)
ERYTHROCYTE [DISTWIDTH] IN BLOOD BY AUTOMATED COUNT: 16.7 % (ref 11.5–14.5)
ERYTHROCYTE [DISTWIDTH] IN BLOOD BY AUTOMATED COUNT: 17.1 % (ref 11.6–14.5)
ERYTHROCYTE [DISTWIDTH] IN BLOOD BY AUTOMATED COUNT: 17.3 % (ref 11.6–14.5)
ERYTHROCYTE [DISTWIDTH] IN BLOOD BY AUTOMATED COUNT: 17.4 % (ref 11.5–14.5)
ERYTHROCYTE [DISTWIDTH] IN BLOOD BY AUTOMATED COUNT: 17.6 % (ref 11.5–14.5)
ERYTHROCYTE [DISTWIDTH] IN BLOOD BY AUTOMATED COUNT: 17.8 % (ref 11.5–14.5)
ERYTHROCYTE [DISTWIDTH] IN BLOOD BY AUTOMATED COUNT: 18.1 % (ref 11.5–14.5)
ERYTHROCYTE [DISTWIDTH] IN BLOOD BY AUTOMATED COUNT: 18.1 % (ref 11.6–14.5)
ERYTHROCYTE [DISTWIDTH] IN BLOOD BY AUTOMATED COUNT: 18.4 % (ref 11.5–14.5)
ERYTHROCYTE [DISTWIDTH] IN BLOOD BY AUTOMATED COUNT: 18.9 % (ref 11.5–14.5)
FERRITIN SERPL-MCNC: 439 NG/ML (ref 8–388)
FERRITIN SERPL-MCNC: 499 NG/ML (ref 15–150)
FLUAV RNA SPEC QL NAA+PROBE: NEGATIVE
FLUBV RNA SPEC QL NAA+PROBE: NEGATIVE
GLOBULIN SER CALC-MCNC: 3.5 G/DL (ref 2–4)
GLOBULIN SER CALC-MCNC: 3.9 G/DL (ref 1.5–4.5)
GLOBULIN SER CALC-MCNC: 4 G/DL (ref 2–4)
GLOBULIN SER CALC-MCNC: 4.1 G/DL (ref 2–4)
GLOBULIN SER CALC-MCNC: 4.3 G/DL (ref 2–4)
GLOBULIN SER CALC-MCNC: 4.4 G/DL (ref 2–4)
GLOBULIN SER CALC-MCNC: 4.7 G/DL (ref 2–4)
GLOBULIN SER CALC-MCNC: 5 G/DL (ref 2–4)
GLUCOSE BLD STRIP.AUTO-MCNC: 104 MG/DL (ref 70–110)
GLUCOSE BLD STRIP.AUTO-MCNC: 110 MG/DL (ref 70–110)
GLUCOSE BLD STRIP.AUTO-MCNC: 119 MG/DL (ref 70–110)
GLUCOSE BLD STRIP.AUTO-MCNC: 86 MG/DL (ref 70–110)
GLUCOSE SERPL-MCNC: 104 MG/DL (ref 74–99)
GLUCOSE SERPL-MCNC: 119 MG/DL (ref 74–99)
GLUCOSE SERPL-MCNC: 77 MG/DL (ref 74–99)
GLUCOSE SERPL-MCNC: 77 MG/DL (ref 74–99)
GLUCOSE SERPL-MCNC: 81 MG/DL (ref 74–99)
GLUCOSE SERPL-MCNC: 84 MG/DL (ref 74–99)
GLUCOSE SERPL-MCNC: 85 MG/DL (ref 74–99)
GLUCOSE SERPL-MCNC: 87 MG/DL (ref 74–99)
GLUCOSE SERPL-MCNC: 88 MG/DL (ref 74–99)
GLUCOSE SERPL-MCNC: 88 MG/DL (ref 74–99)
GLUCOSE SERPL-MCNC: 90 MG/DL (ref 65–99)
GLUCOSE SERPL-MCNC: 90 MG/DL (ref 74–99)
GLUCOSE SERPL-MCNC: 91 MG/DL (ref 74–99)
GLUCOSE UR STRIP.AUTO-MCNC: NEGATIVE MG/DL
GLUCOSE UR STRIP.AUTO-MCNC: NEGATIVE MG/DL
HCT VFR BLD AUTO: 25.5 % (ref 35–45)
HCT VFR BLD AUTO: 26.4 % (ref 35–45)
HCT VFR BLD AUTO: 27.7 % (ref 36–48)
HCT VFR BLD AUTO: 27.9 % (ref 35–45)
HCT VFR BLD AUTO: 28.1 % (ref 36–48)
HCT VFR BLD AUTO: 28.2 % (ref 36–48)
HCT VFR BLD AUTO: 28.7 % (ref 35–45)
HCT VFR BLD AUTO: 28.7 % (ref 35–45)
HCT VFR BLD AUTO: 28.8 % (ref 35–45)
HCT VFR BLD AUTO: 28.9 % (ref 36–48)
HCT VFR BLD AUTO: 29 % (ref 35–45)
HCT VFR BLD AUTO: 29.4 % (ref 36–48)
HCT VFR BLD AUTO: 29.5 % (ref 35–45)
HCT VFR BLD AUTO: 29.7 % (ref 35–45)
HCT VFR BLD AUTO: 30.1 % (ref 35–45)
HCT VFR BLD AUTO: 30.1 % (ref 35–45)
HCT VFR BLD AUTO: 30.4 % (ref 35–45)
HCT VFR BLD AUTO: 30.4 % (ref 36–48)
HCT VFR BLD AUTO: 30.4 % (ref 36–48)
HCT VFR BLD AUTO: 30.8 % (ref 35–45)
HCT VFR BLD AUTO: 31.2 % (ref 36–48)
HCT VFR BLD AUTO: 31.3 % (ref 35–45)
HCT VFR BLD AUTO: 32.8 % (ref 36–48)
HCT VFR BLD AUTO: 32.9 % (ref 35–45)
HCT VFR BLD AUTO: 33 % (ref 35–45)
HCT VFR BLD AUTO: 33.3 % (ref 36–48)
HCT VFR BLD AUTO: 36 % (ref 36–48)
HGB BLD-MCNC: 10 G/DL (ref 12–16)
HGB BLD-MCNC: 10 G/DL (ref 12–16)
HGB BLD-MCNC: 10.2 G/DL (ref 12–16)
HGB BLD-MCNC: 10.3 G/DL (ref 12–16)
HGB BLD-MCNC: 10.3 G/DL (ref 12–16)
HGB BLD-MCNC: 10.4 G/DL (ref 12–16)
HGB BLD-MCNC: 10.5 G/DL (ref 12–16)
HGB BLD-MCNC: 10.7 G/DL (ref 12–16)
HGB BLD-MCNC: 10.9 G/DL (ref 12–16)
HGB BLD-MCNC: 11 G/DL (ref 12–16)
HGB BLD-MCNC: 11.2 G/DL (ref 12–16)
HGB BLD-MCNC: 11.6 G/DL (ref 12–16)
HGB BLD-MCNC: 8.4 G/DL (ref 12–16)
HGB BLD-MCNC: 8.8 G/DL (ref 12–16)
HGB BLD-MCNC: 9.2 G/DL (ref 12–16)
HGB BLD-MCNC: 9.2 G/DL (ref 12–16)
HGB BLD-MCNC: 9.4 G/DL (ref 12–16)
HGB BLD-MCNC: 9.4 G/DL (ref 12–16)
HGB BLD-MCNC: 9.5 G/DL (ref 12–16)
HGB BLD-MCNC: 9.7 G/DL (ref 12–16)
HGB BLD-MCNC: 9.8 G/DL (ref 12–16)
HGB BLD-MCNC: 9.9 G/DL (ref 12–16)
HGB BLD-MCNC: 9.9 G/DL (ref 12–16)
HGB UR QL STRIP: NEGATIVE
HGB UR QL STRIP: NEGATIVE
INR PPP: 1.3 (ref 0.8–1.2)
INR PPP: 1.3 (ref 0.8–1.2)
INR PPP: 2.3 (ref 0.8–1.2)
IRON SATN MFR SERPL: 18 % (ref 15–55)
IRON SATN MFR SERPL: 25 %
IRON SERPL-MCNC: 49 UG/DL (ref 27–139)
IRON SERPL-MCNC: 55 UG/DL (ref 50–175)
KETONES UR QL STRIP.AUTO: NEGATIVE MG/DL
KETONES UR QL STRIP.AUTO: NEGATIVE MG/DL
LACTATE BLD-SCNC: 0.92 MMOL/L (ref 0.4–2)
LEUKOCYTE ESTERASE UR QL STRIP.AUTO: ABNORMAL
LEUKOCYTE ESTERASE UR QL STRIP.AUTO: NEGATIVE
LYMPHOCYTES # BLD: 1.1 K/UL (ref 0.8–3.5)
LYMPHOCYTES # BLD: 1.1 K/UL (ref 1.1–5.9)
LYMPHOCYTES # BLD: 1.3 K/UL (ref 0.9–3.6)
LYMPHOCYTES # BLD: 1.3 K/UL (ref 1.1–5.9)
LYMPHOCYTES # BLD: 1.4 K/UL (ref 0.9–3.6)
LYMPHOCYTES # BLD: 1.4 K/UL (ref 1.1–5.9)
LYMPHOCYTES # BLD: 1.4 K/UL (ref 1.1–5.9)
LYMPHOCYTES # BLD: 1.5 K/UL (ref 1.1–5.9)
LYMPHOCYTES # BLD: 1.6 K/UL (ref 0.8–3.5)
LYMPHOCYTES # BLD: 1.6 K/UL (ref 0.8–3.5)
LYMPHOCYTES # BLD: 1.6 K/UL (ref 0.9–3.6)
LYMPHOCYTES # BLD: 1.6 K/UL (ref 0.9–3.6)
LYMPHOCYTES # BLD: 1.7 K/UL (ref 0.8–3.5)
LYMPHOCYTES # BLD: 1.7 K/UL (ref 0.9–3.6)
LYMPHOCYTES # BLD: 1.7 K/UL (ref 1.1–5.9)
LYMPHOCYTES # BLD: 1.7 K/UL (ref 1.1–5.9)
LYMPHOCYTES # BLD: 1.8 K/UL (ref 0.9–3.6)
LYMPHOCYTES # BLD: 2.2 K/UL (ref 0.8–3.5)
LYMPHOCYTES # BLD: 2.2 K/UL (ref 0.8–3.5)
LYMPHOCYTES NFR BLD: 16 % (ref 14–44)
LYMPHOCYTES NFR BLD: 17 % (ref 14–44)
LYMPHOCYTES NFR BLD: 17 % (ref 20–51)
LYMPHOCYTES NFR BLD: 18 % (ref 21–52)
LYMPHOCYTES NFR BLD: 19 % (ref 14–44)
LYMPHOCYTES NFR BLD: 21 % (ref 14–44)
LYMPHOCYTES NFR BLD: 22 % (ref 21–52)
LYMPHOCYTES NFR BLD: 23 % (ref 21–52)
LYMPHOCYTES NFR BLD: 24 % (ref 21–52)
LYMPHOCYTES NFR BLD: 24 % (ref 21–52)
LYMPHOCYTES NFR BLD: 25 % (ref 14–44)
LYMPHOCYTES NFR BLD: 25 % (ref 21–52)
LYMPHOCYTES NFR BLD: 26 % (ref 21–52)
LYMPHOCYTES NFR BLD: 27 % (ref 21–52)
LYMPHOCYTES NFR BLD: 28 % (ref 14–44)
LYMPHOCYTES NFR BLD: 28 % (ref 20–51)
LYMPHOCYTES NFR BLD: 29 % (ref 14–44)
LYMPHOCYTES NFR BLD: 30 % (ref 14–44)
LYMPHOCYTES NFR BLD: 31 % (ref 20–51)
LYMPHOCYTES NFR BLD: 35 % (ref 20–51)
LYMPHOCYTES NFR BLD: 38 % (ref 14–44)
LYMPHOCYTES NFR BLD: 42 % (ref 14–44)
LYMPHOCYTES NFR BLD: 9 % (ref 14–44)
MAGNESIUM SERPL-MCNC: 1.4 MG/DL (ref 1.6–2.6)
MAGNESIUM SERPL-MCNC: 1.6 MG/DL (ref 1.6–2.6)
MAGNESIUM SERPL-MCNC: 1.7 MG/DL (ref 1.6–2.6)
MAGNESIUM SERPL-MCNC: 1.8 MG/DL (ref 1.6–2.6)
MAGNESIUM SERPL-MCNC: 1.8 MG/DL (ref 1.6–2.6)
MAGNESIUM SERPL-MCNC: 2 MG/DL (ref 1.6–2.6)
MAGNESIUM SERPL-MCNC: 2.1 MG/DL (ref 1.6–2.6)
MCH RBC QN AUTO: 25.9 PG (ref 24–34)
MCH RBC QN AUTO: 26 PG (ref 24–34)
MCH RBC QN AUTO: 26 PG (ref 24–34)
MCH RBC QN AUTO: 26.1 PG (ref 24–34)
MCH RBC QN AUTO: 26.2 PG (ref 24–34)
MCH RBC QN AUTO: 26.3 PG (ref 24–34)
MCH RBC QN AUTO: 26.3 PG (ref 24–34)
MCH RBC QN AUTO: 26.4 PG (ref 24–34)
MCH RBC QN AUTO: 26.4 PG (ref 24–34)
MCH RBC QN AUTO: 26.4 PG (ref 25–35)
MCH RBC QN AUTO: 26.4 PG (ref 25–35)
MCH RBC QN AUTO: 26.5 PG (ref 24–34)
MCH RBC QN AUTO: 26.5 PG (ref 24–34)
MCH RBC QN AUTO: 26.6 PG (ref 25–35)
MCH RBC QN AUTO: 26.7 PG (ref 24–34)
MCH RBC QN AUTO: 26.8 PG (ref 25–35)
MCH RBC QN AUTO: 26.8 PG (ref 25–35)
MCH RBC QN AUTO: 27 PG (ref 25–35)
MCH RBC QN AUTO: 27.1 PG (ref 25–35)
MCH RBC QN AUTO: 27.1 PG (ref 25–35)
MCH RBC QN AUTO: 27.4 PG (ref 25–35)
MCH RBC QN AUTO: 27.4 PG (ref 25–35)
MCH RBC QN AUTO: 27.5 PG (ref 24–34)
MCH RBC QN AUTO: 27.9 PG (ref 25–35)
MCHC RBC AUTO-ENTMCNC: 32.2 G/DL (ref 31–37)
MCHC RBC AUTO-ENTMCNC: 32.2 G/DL (ref 31–37)
MCHC RBC AUTO-ENTMCNC: 32.4 G/DL (ref 31–37)
MCHC RBC AUTO-ENTMCNC: 32.6 G/DL (ref 31–37)
MCHC RBC AUTO-ENTMCNC: 32.6 G/DL (ref 31–37)
MCHC RBC AUTO-ENTMCNC: 32.7 G/DL (ref 31–37)
MCHC RBC AUTO-ENTMCNC: 32.9 G/DL (ref 31–37)
MCHC RBC AUTO-ENTMCNC: 33 G/DL (ref 31–37)
MCHC RBC AUTO-ENTMCNC: 33 G/DL (ref 31–37)
MCHC RBC AUTO-ENTMCNC: 33.2 G/DL (ref 31–37)
MCHC RBC AUTO-ENTMCNC: 33.3 G/DL (ref 31–37)
MCHC RBC AUTO-ENTMCNC: 33.5 G/DL (ref 31–37)
MCHC RBC AUTO-ENTMCNC: 33.6 G/DL (ref 31–37)
MCHC RBC AUTO-ENTMCNC: 33.7 G/DL (ref 31–37)
MCHC RBC AUTO-ENTMCNC: 33.7 G/DL (ref 31–37)
MCHC RBC AUTO-ENTMCNC: 33.8 G/DL (ref 31–37)
MCHC RBC AUTO-ENTMCNC: 33.8 G/DL (ref 31–37)
MCHC RBC AUTO-ENTMCNC: 34 G/DL (ref 31–37)
MCHC RBC AUTO-ENTMCNC: 34.1 G/DL (ref 31–37)
MCHC RBC AUTO-ENTMCNC: 34.1 G/DL (ref 31–37)
MCHC RBC AUTO-ENTMCNC: 34.2 G/DL (ref 31–37)
MCHC RBC AUTO-ENTMCNC: 34.7 G/DL (ref 31–37)
MCV RBC AUTO: 76.3 FL (ref 74–97)
MCV RBC AUTO: 76.7 FL (ref 74–97)
MCV RBC AUTO: 76.7 FL (ref 74–97)
MCV RBC AUTO: 76.9 FL (ref 74–97)
MCV RBC AUTO: 77.1 FL (ref 74–97)
MCV RBC AUTO: 77.4 FL (ref 74–97)
MCV RBC AUTO: 77.6 FL (ref 74–97)
MCV RBC AUTO: 77.6 FL (ref 74–97)
MCV RBC AUTO: 77.7 FL (ref 74–97)
MCV RBC AUTO: 78.3 FL (ref 74–97)
MCV RBC AUTO: 78.9 FL (ref 74–97)
MCV RBC AUTO: 79 FL (ref 74–97)
MCV RBC AUTO: 79.2 FL (ref 74–97)
MCV RBC AUTO: 79.4 FL (ref 78–102)
MCV RBC AUTO: 80.6 FL (ref 78–102)
MCV RBC AUTO: 81.3 FL (ref 74–97)
MCV RBC AUTO: 81.4 FL (ref 78–102)
MCV RBC AUTO: 81.7 FL (ref 78–102)
MCV RBC AUTO: 81.9 FL (ref 78–102)
MCV RBC AUTO: 81.9 FL (ref 78–102)
MCV RBC AUTO: 82 FL (ref 78–102)
MCV RBC AUTO: 82.9 FL (ref 78–102)
MCV RBC AUTO: 83 FL (ref 78–102)
MCV RBC AUTO: 83.7 FL (ref 74–97)
MCV RBC AUTO: 84.5 FL (ref 78–102)
MCV RBC AUTO: 84.9 FL (ref 78–102)
MONOCYTES # BLD: 0.5 K/UL (ref 0.05–1.2)
MONOCYTES # BLD: 0.6 K/UL (ref 0–1)
MONOCYTES # BLD: 0.7 K/UL (ref 0.05–1.2)
MONOCYTES # BLD: 0.7 K/UL (ref 0.05–1.2)
MONOCYTES # BLD: 0.7 K/UL (ref 0–1)
MONOCYTES # BLD: 0.7 K/UL (ref 0–1)
MONOCYTES # BLD: 0.8 K/UL (ref 0–1)
MONOCYTES # BLD: 0.9 K/UL (ref 0.05–1.2)
MONOCYTES # BLD: 1 K/UL (ref 0.05–1.2)
MONOCYTES # BLD: 1.1 K/UL (ref 0.05–1.2)
MONOCYTES NFR BLD: 10 % (ref 2–9)
MONOCYTES NFR BLD: 10 % (ref 3–10)
MONOCYTES NFR BLD: 11 % (ref 2–9)
MONOCYTES NFR BLD: 12 % (ref 3–10)
MONOCYTES NFR BLD: 13 % (ref 3–10)
MONOCYTES NFR BLD: 13 % (ref 3–10)
MONOCYTES NFR BLD: 15 % (ref 2–9)
MONOCYTES NFR BLD: 15 % (ref 3–10)
MONOCYTES NFR BLD: 17 % (ref 3–10)
MONOCYTES NFR BLD: 8 % (ref 2–9)
MONOCYTES NFR BLD: 8 % (ref 3–10)
MONOCYTES NFR BLD: 9 % (ref 3–10)
NEUTS BAND NFR BLD MANUAL: 2 % (ref 0–5)
NEUTS BAND NFR BLD MANUAL: 2 % (ref 0–5)
NEUTS BAND NFR BLD MANUAL: 4 % (ref 0–5)
NEUTS SEG # BLD: 1.7 K/UL (ref 1.8–9.5)
NEUTS SEG # BLD: 1.8 K/UL (ref 1.8–9.5)
NEUTS SEG # BLD: 10.6 K/UL (ref 1.8–9.5)
NEUTS SEG # BLD: 2.4 K/UL (ref 1.8–9.5)
NEUTS SEG # BLD: 2.8 K/UL (ref 1.8–8)
NEUTS SEG # BLD: 2.8 K/UL (ref 1.8–9.5)
NEUTS SEG # BLD: 2.9 K/UL (ref 1.8–8)
NEUTS SEG # BLD: 3 K/UL (ref 1.8–8)
NEUTS SEG # BLD: 3 K/UL (ref 1.8–9.5)
NEUTS SEG # BLD: 3.2 K/UL (ref 1.8–8)
NEUTS SEG # BLD: 3.3 K/UL (ref 1.8–8)
NEUTS SEG # BLD: 3.4 K/UL (ref 1.8–9.5)
NEUTS SEG # BLD: 3.5 K/UL (ref 1.8–9.5)
NEUTS SEG # BLD: 3.8 K/UL (ref 1.8–8)
NEUTS SEG # BLD: 4 K/UL (ref 1.8–8)
NEUTS SEG # BLD: 4 K/UL (ref 1.8–8)
NEUTS SEG # BLD: 4.2 K/UL (ref 1.8–8)
NEUTS SEG # BLD: 4.5 K/UL (ref 1.8–8)
NEUTS SEG # BLD: 4.5 K/UL (ref 1.8–8)
NEUTS SEG # BLD: 4.7 K/UL (ref 1.8–8)
NEUTS SEG # BLD: 4.8 K/UL (ref 1.8–8)
NEUTS SEG # BLD: 4.8 K/UL (ref 1.8–8)
NEUTS SEG # BLD: 5.2 K/UL (ref 1.8–9.5)
NEUTS SEG # BLD: 5.7 K/UL (ref 1.8–9.5)
NEUTS SEG # BLD: 6.1 K/UL (ref 1.8–9.5)
NEUTS SEG NFR BLD: 40 % (ref 40–70)
NEUTS SEG NFR BLD: 46 % (ref 40–70)
NEUTS SEG NFR BLD: 48 % (ref 42–75)
NEUTS SEG NFR BLD: 49 % (ref 42–75)
NEUTS SEG NFR BLD: 52 % (ref 42–75)
NEUTS SEG NFR BLD: 53 % (ref 40–70)
NEUTS SEG NFR BLD: 54 % (ref 40–73)
NEUTS SEG NFR BLD: 55 % (ref 40–70)
NEUTS SEG NFR BLD: 55 % (ref 42–75)
NEUTS SEG NFR BLD: 56 % (ref 40–70)
NEUTS SEG NFR BLD: 56 % (ref 40–70)
NEUTS SEG NFR BLD: 57 % (ref 40–73)
NEUTS SEG NFR BLD: 57 % (ref 42–75)
NEUTS SEG NFR BLD: 59 % (ref 40–70)
NEUTS SEG NFR BLD: 61 % (ref 40–73)
NEUTS SEG NFR BLD: 61 % (ref 40–73)
NEUTS SEG NFR BLD: 62 % (ref 40–73)
NEUTS SEG NFR BLD: 64 % (ref 40–73)
NEUTS SEG NFR BLD: 65 % (ref 40–73)
NEUTS SEG NFR BLD: 66 % (ref 40–73)
NEUTS SEG NFR BLD: 68 % (ref 40–70)
NEUTS SEG NFR BLD: 69 % (ref 42–75)
NEUTS SEG NFR BLD: 70 % (ref 40–70)
NEUTS SEG NFR BLD: 72 % (ref 40–70)
NEUTS SEG NFR BLD: 86 % (ref 40–70)
NITRITE UR QL STRIP.AUTO: NEGATIVE
NITRITE UR QL STRIP.AUTO: NEGATIVE
PH UR STRIP: 6.5 [PH] (ref 5–8)
PH UR STRIP: 8 [PH] (ref 5–8)
PHOSPHATE SERPL-MCNC: 2 MG/DL (ref 2.5–4.9)
PHOSPHATE SERPL-MCNC: 2.1 MG/DL (ref 2.5–4.9)
PHOSPHATE SERPL-MCNC: 2.2 MG/DL (ref 2.5–4.9)
PHOSPHATE SERPL-MCNC: 2.4 MG/DL (ref 2.5–4.9)
PHOSPHATE SERPL-MCNC: 2.5 MG/DL (ref 2.5–4.9)
PHOSPHATE SERPL-MCNC: 2.8 MG/DL (ref 2.5–4.9)
PHOSPHATE SERPL-MCNC: 3.5 MG/DL (ref 2.5–4.9)
PLATELET # BLD AUTO: 103 K/UL (ref 135–420)
PLATELET # BLD AUTO: 103 K/UL (ref 135–420)
PLATELET # BLD AUTO: 105 K/UL (ref 135–420)
PLATELET # BLD AUTO: 106 K/UL (ref 135–420)
PLATELET # BLD AUTO: 109 K/UL (ref 135–420)
PLATELET # BLD AUTO: 110 K/UL (ref 135–420)
PLATELET # BLD AUTO: 117 K/UL (ref 135–420)
PLATELET # BLD AUTO: 117 K/UL (ref 135–420)
PLATELET # BLD AUTO: 125 K/UL (ref 135–420)
PLATELET # BLD AUTO: 126 K/UL (ref 135–420)
PLATELET # BLD AUTO: 127 K/UL (ref 135–420)
PLATELET # BLD AUTO: 135 K/UL (ref 135–420)
PLATELET # BLD AUTO: 146 K/UL (ref 135–420)
PLATELET # BLD AUTO: 152 K/UL (ref 135–420)
PLATELET # BLD AUTO: 156 K/UL (ref 135–420)
PLATELET # BLD AUTO: 165 K/UL (ref 135–420)
PLATELET # BLD AUTO: 188 K/UL (ref 135–420)
PLATELET # BLD AUTO: 199 K/UL (ref 135–420)
PLATELET # BLD AUTO: 224 K/UL (ref 135–420)
PLATELET # BLD AUTO: 248 K/UL (ref 135–420)
PLATELET # BLD AUTO: 261 K/UL (ref 135–420)
PLATELET # BLD AUTO: 263 K/UL (ref 135–420)
PLATELET # BLD AUTO: 53 K/UL (ref 135–420)
PLATELET # BLD AUTO: 63 K/UL (ref 135–420)
PLATELET # BLD AUTO: 84 K/UL (ref 135–420)
PLATELET # BLD AUTO: 84 K/UL (ref 135–420)
PLATELET COMMENTS,PCOM: ABNORMAL
POTASSIUM SERPL-SCNC: 2.6 MMOL/L (ref 3.5–5.5)
POTASSIUM SERPL-SCNC: 2.8 MMOL/L (ref 3.5–5.5)
POTASSIUM SERPL-SCNC: 2.9 MMOL/L (ref 3.5–5.5)
POTASSIUM SERPL-SCNC: 3.3 MMOL/L (ref 3.5–5.5)
POTASSIUM SERPL-SCNC: 3.3 MMOL/L (ref 3.5–5.5)
POTASSIUM SERPL-SCNC: 3.5 MMOL/L (ref 3.5–5.5)
POTASSIUM SERPL-SCNC: 3.6 MMOL/L (ref 3.5–5.2)
POTASSIUM SERPL-SCNC: 3.6 MMOL/L (ref 3.5–5.5)
POTASSIUM SERPL-SCNC: 3.7 MMOL/L (ref 3.5–5.5)
POTASSIUM SERPL-SCNC: 3.7 MMOL/L (ref 3.5–5.5)
POTASSIUM SERPL-SCNC: 3.8 MMOL/L (ref 3.5–5.5)
POTASSIUM SERPL-SCNC: 3.9 MMOL/L (ref 3.5–5.5)
POTASSIUM SERPL-SCNC: 4 MMOL/L (ref 3.5–5.5)
POTASSIUM SERPL-SCNC: 4.1 MMOL/L (ref 3.5–5.5)
PROT SERPL-MCNC: 6.1 G/DL (ref 6.4–8.2)
PROT SERPL-MCNC: 6.8 G/DL (ref 6.4–8.2)
PROT SERPL-MCNC: 7.5 G/DL (ref 6.4–8.2)
PROT SERPL-MCNC: 7.5 G/DL (ref 6.4–8.2)
PROT SERPL-MCNC: 7.9 G/DL (ref 6.4–8.2)
PROT SERPL-MCNC: 8.3 G/DL (ref 6.4–8.2)
PROT SERPL-MCNC: 8.3 G/DL (ref 6–8.5)
PROT SERPL-MCNC: 8.5 G/DL (ref 6.4–8.2)
PROT UR STRIP-MCNC: NEGATIVE MG/DL
PROT UR STRIP-MCNC: NEGATIVE MG/DL
PROTHROMBIN TIME: 15.9 SEC (ref 11.5–15.2)
PROTHROMBIN TIME: 16.4 SEC (ref 11.5–15.2)
PROTHROMBIN TIME: 25 SEC (ref 11.5–15.2)
Q-T INTERVAL, ECG07: 372 MS
Q-T INTERVAL, ECG07: 380 MS
Q-T INTERVAL, ECG07: 392 MS
Q-T INTERVAL, ECG07: 396 MS
Q-T INTERVAL, ECG07: 440 MS
Q-T INTERVAL, ECG07: 440 MS
Q-T INTERVAL, ECG07: 450 MS
Q-T INTERVAL, ECG07: 456 MS
QRS DURATION, ECG06: 102 MS
QRS DURATION, ECG06: 108 MS
QRS DURATION, ECG06: 108 MS
QRS DURATION, ECG06: 92 MS
QRS DURATION, ECG06: 92 MS
QRS DURATION, ECG06: 94 MS
QRS DURATION, ECG06: 96 MS
QRS DURATION, ECG06: 98 MS
QTC CALCULATION (BEZET), ECG08: 428 MS
QTC CALCULATION (BEZET), ECG08: 446 MS
QTC CALCULATION (BEZET), ECG08: 455 MS
QTC CALCULATION (BEZET), ECG08: 456 MS
QTC CALCULATION (BEZET), ECG08: 457 MS
QTC CALCULATION (BEZET), ECG08: 459 MS
QTC CALCULATION (BEZET), ECG08: 488 MS
QTC CALCULATION (BEZET), ECG08: 522 MS
RBC # BLD AUTO: 3.22 M/UL (ref 4.2–5.3)
RBC # BLD AUTO: 3.37 M/UL (ref 4.2–5.3)
RBC # BLD AUTO: 3.4 M/UL (ref 4.1–5.1)
RBC # BLD AUTO: 3.43 M/UL (ref 4.1–5.1)
RBC # BLD AUTO: 3.49 M/UL (ref 4.1–5.1)
RBC # BLD AUTO: 3.55 M/UL (ref 4.1–5.1)
RBC # BLD AUTO: 3.58 M/UL (ref 4.1–5.1)
RBC # BLD AUTO: 3.59 M/UL (ref 4.1–5.1)
RBC # BLD AUTO: 3.62 M/UL (ref 4.2–5.3)
RBC # BLD AUTO: 3.63 M/UL (ref 4.2–5.3)
RBC # BLD AUTO: 3.72 M/UL (ref 4.1–5.1)
RBC # BLD AUTO: 3.72 M/UL (ref 4.2–5.3)
RBC # BLD AUTO: 3.73 M/UL (ref 4.2–5.3)
RBC # BLD AUTO: 3.74 M/UL (ref 4.2–5.3)
RBC # BLD AUTO: 3.76 M/UL (ref 4.1–5.1)
RBC # BLD AUTO: 3.86 M/UL (ref 4.2–5.3)
RBC # BLD AUTO: 3.88 M/UL (ref 4.2–5.3)
RBC # BLD AUTO: 3.94 M/UL (ref 4.1–5.1)
RBC # BLD AUTO: 3.96 M/UL (ref 4.2–5.3)
RBC # BLD AUTO: 3.97 M/UL (ref 4.2–5.3)
RBC # BLD AUTO: 4.06 M/UL (ref 4.2–5.3)
RBC # BLD AUTO: 4.07 M/UL (ref 4.1–5.1)
RBC # BLD AUTO: 4.31 M/UL (ref 4.2–5.3)
RBC # BLD AUTO: 4.39 M/UL (ref 4.1–5.1)
RBC #/AREA URNS HPF: NORMAL /HPF (ref 0–5)
RBC MORPH BLD: ABNORMAL
SERVICE CMNT-IMP: ABNORMAL
SERVICE CMNT-IMP: NORMAL
SODIUM SERPL-SCNC: 138 MMOL/L (ref 136–145)
SODIUM SERPL-SCNC: 139 MMOL/L (ref 136–145)
SODIUM SERPL-SCNC: 140 MMOL/L (ref 136–145)
SODIUM SERPL-SCNC: 141 MMOL/L (ref 134–144)
SODIUM SERPL-SCNC: 141 MMOL/L (ref 136–145)
SODIUM SERPL-SCNC: 141 MMOL/L (ref 136–145)
SODIUM SERPL-SCNC: 143 MMOL/L (ref 136–145)
SP GR UR REFRACTOMETRY: 1.01 (ref 1–1.03)
SP GR UR REFRACTOMETRY: 1.01 (ref 1–1.03)
TIBC SERPL-MCNC: 219 UG/DL (ref 250–450)
TIBC SERPL-MCNC: 276 UG/DL (ref 250–450)
TROPONIN I SERPL-MCNC: 0.02 NG/ML (ref 0–0.04)
TROPONIN I SERPL-MCNC: 0.02 NG/ML (ref 0–0.04)
TROPONIN I SERPL-MCNC: 0.02 NG/ML (ref 0–0.06)
TROPONIN I SERPL-MCNC: 0.03 NG/ML (ref 0–0.04)
TROPONIN I SERPL-MCNC: 0.03 NG/ML (ref 0–0.04)
UIBC SERPL-MCNC: 227 UG/DL (ref 118–369)
UROBILINOGEN UR QL STRIP.AUTO: 0.2 EU/DL (ref 0.2–1)
UROBILINOGEN UR QL STRIP.AUTO: 1 EU/DL (ref 0.2–1)
VENTRICULAR RATE, ECG03: 57 BPM
VENTRICULAR RATE, ECG03: 61 BPM
VENTRICULAR RATE, ECG03: 74 BPM
VENTRICULAR RATE, ECG03: 78 BPM
VENTRICULAR RATE, ECG03: 80 BPM
VENTRICULAR RATE, ECG03: 81 BPM
VENTRICULAR RATE, ECG03: 87 BPM
VENTRICULAR RATE, ECG03: 90 BPM
WBC # BLD AUTO: 12.4 K/UL (ref 4.5–13)
WBC # BLD AUTO: 3.8 K/UL (ref 4.5–13)
WBC # BLD AUTO: 4.1 K/UL (ref 4.5–13)
WBC # BLD AUTO: 4.6 K/UL (ref 4.5–13)
WBC # BLD AUTO: 5 K/UL (ref 4.5–13)
WBC # BLD AUTO: 5.2 K/UL (ref 4.6–13.2)
WBC # BLD AUTO: 5.3 K/UL (ref 4.6–13.2)
WBC # BLD AUTO: 5.5 K/UL (ref 4.5–13)
WBC # BLD AUTO: 5.6 K/UL (ref 4.6–13.2)
WBC # BLD AUTO: 5.7 K/UL (ref 4.6–13.2)
WBC # BLD AUTO: 6 K/UL (ref 4.5–13)
WBC # BLD AUTO: 6 K/UL (ref 4.5–13)
WBC # BLD AUTO: 6.3 K/UL (ref 4.6–13.2)
WBC # BLD AUTO: 6.4 K/UL (ref 4.6–13.2)
WBC # BLD AUTO: 6.5 K/UL (ref 4.6–13.2)
WBC # BLD AUTO: 6.7 K/UL (ref 4.6–13.2)
WBC # BLD AUTO: 6.9 K/UL (ref 4.6–13.2)
WBC # BLD AUTO: 7.1 K/UL (ref 4.6–13.2)
WBC # BLD AUTO: 7.2 K/UL (ref 4.6–13.2)
WBC # BLD AUTO: 7.3 K/UL (ref 4.6–13.2)
WBC # BLD AUTO: 7.4 K/UL (ref 4.6–13.2)
WBC # BLD AUTO: 7.5 K/UL (ref 4.6–13.2)
WBC # BLD AUTO: 7.7 K/UL (ref 4.6–13.2)
WBC # BLD AUTO: 7.8 K/UL (ref 4.5–13)
WBC # BLD AUTO: 7.9 K/UL (ref 4.5–13)
WBC # BLD AUTO: 8.7 K/UL (ref 4.5–13)
WBC URNS QL MICRO: NORMAL /HPF (ref 0–4)

## 2019-01-01 PROCEDURE — 74011250636 HC RX REV CODE- 250/636: Performed by: NURSE PRACTITIONER

## 2019-01-01 PROCEDURE — 74011636320 HC RX REV CODE- 636/320: Performed by: EMERGENCY MEDICINE

## 2019-01-01 PROCEDURE — 74011000250 HC RX REV CODE- 250: Performed by: PHYSICIAN ASSISTANT

## 2019-01-01 PROCEDURE — 74011250636 HC RX REV CODE- 250/636: Performed by: INTERNAL MEDICINE

## 2019-01-01 PROCEDURE — 74011250637 HC RX REV CODE- 250/637: Performed by: PHYSICIAN ASSISTANT

## 2019-01-01 PROCEDURE — 82330 ASSAY OF CALCIUM: CPT

## 2019-01-01 PROCEDURE — 83735 ASSAY OF MAGNESIUM: CPT

## 2019-01-01 PROCEDURE — 74011250636 HC RX REV CODE- 250/636

## 2019-01-01 PROCEDURE — 3331090002 HH PPS REVENUE DEBIT

## 2019-01-01 PROCEDURE — 85025 COMPLETE CBC W/AUTO DIFF WBC: CPT

## 2019-01-01 PROCEDURE — 74011250636 HC RX REV CODE- 250/636: Performed by: PHYSICIAN ASSISTANT

## 2019-01-01 PROCEDURE — 77030012965 HC NDL HUBR BBMI -A

## 2019-01-01 PROCEDURE — 36415 COLL VENOUS BLD VENIPUNCTURE: CPT

## 2019-01-01 PROCEDURE — 94762 N-INVAS EAR/PLS OXIMTRY CONT: CPT

## 2019-01-01 PROCEDURE — 96375 TX/PRO/DX INJ NEW DRUG ADDON: CPT

## 2019-01-01 PROCEDURE — G0157 HHC PT ASSISTANT EA 15: HCPCS

## 2019-01-01 PROCEDURE — G0299 HHS/HOSPICE OF RN EA 15 MIN: HCPCS

## 2019-01-01 PROCEDURE — 74011250637 HC RX REV CODE- 250/637: Performed by: NURSE PRACTITIONER

## 2019-01-01 PROCEDURE — 96367 TX/PROPH/DG ADDL SEQ IV INF: CPT

## 2019-01-01 PROCEDURE — 74011250637 HC RX REV CODE- 250/637: Performed by: INTERNAL MEDICINE

## 2019-01-01 PROCEDURE — 74011000250 HC RX REV CODE- 250: Performed by: INTERNAL MEDICINE

## 2019-01-01 PROCEDURE — 97116 GAIT TRAINING THERAPY: CPT

## 2019-01-01 PROCEDURE — 3331090001 HH PPS REVENUE CREDIT

## 2019-01-01 PROCEDURE — 74011000250 HC RX REV CODE- 250: Performed by: EMERGENCY MEDICINE

## 2019-01-01 PROCEDURE — 96372 THER/PROPH/DIAG INJ SC/IM: CPT

## 2019-01-01 PROCEDURE — 80053 COMPREHEN METABOLIC PANEL: CPT

## 2019-01-01 PROCEDURE — 36591 DRAW BLOOD OFF VENOUS DEVICE: CPT

## 2019-01-01 PROCEDURE — G0158 HHC OT ASSISTANT EA 15: HCPCS

## 2019-01-01 PROCEDURE — 81003 URINALYSIS AUTO W/O SCOPE: CPT

## 2019-01-01 PROCEDURE — 84100 ASSAY OF PHOSPHORUS: CPT

## 2019-01-01 PROCEDURE — 82550 ASSAY OF CK (CPK): CPT

## 2019-01-01 PROCEDURE — 80048 BASIC METABOLIC PNL TOTAL CA: CPT

## 2019-01-01 PROCEDURE — 97162 PT EVAL MOD COMPLEX 30 MIN: CPT

## 2019-01-01 PROCEDURE — 72125 CT NECK SPINE W/O DYE: CPT

## 2019-01-01 PROCEDURE — 97140 MANUAL THERAPY 1/> REGIONS: CPT

## 2019-01-01 PROCEDURE — 74011000250 HC RX REV CODE- 250: Performed by: NURSE PRACTITIONER

## 2019-01-01 PROCEDURE — 93321 DOPPLER ECHO F-UP/LMTD STD: CPT

## 2019-01-01 PROCEDURE — 74011250636 HC RX REV CODE- 250/636: Performed by: HOSPITALIST

## 2019-01-01 PROCEDURE — 36592 COLLECT BLOOD FROM PICC: CPT

## 2019-01-01 PROCEDURE — 71045 X-RAY EXAM CHEST 1 VIEW: CPT

## 2019-01-01 PROCEDURE — 77030027138 HC INCENT SPIROMETER -A

## 2019-01-01 PROCEDURE — 83540 ASSAY OF IRON: CPT

## 2019-01-01 PROCEDURE — 77010033678 HC OXYGEN DAILY

## 2019-01-01 PROCEDURE — 85610 PROTHROMBIN TIME: CPT

## 2019-01-01 PROCEDURE — 93005 ELECTROCARDIOGRAM TRACING: CPT

## 2019-01-01 PROCEDURE — 74011636320 HC RX REV CODE- 636/320: Performed by: RADIOLOGY

## 2019-01-01 PROCEDURE — 96523 IRRIG DRUG DELIVERY DEVICE: CPT

## 2019-01-01 PROCEDURE — 85730 THROMBOPLASTIN TIME PARTIAL: CPT

## 2019-01-01 PROCEDURE — 70450 CT HEAD/BRAIN W/O DYE: CPT

## 2019-01-01 PROCEDURE — 96368 THER/DIAG CONCURRENT INF: CPT

## 2019-01-01 PROCEDURE — 84484 ASSAY OF TROPONIN QUANT: CPT

## 2019-01-01 PROCEDURE — 85049 AUTOMATED PLATELET COUNT: CPT

## 2019-01-01 PROCEDURE — 93308 TTE F-UP OR LMTD: CPT

## 2019-01-01 PROCEDURE — 74011250637 HC RX REV CODE- 250/637: Performed by: EMERGENCY MEDICINE

## 2019-01-01 PROCEDURE — 99211 OFF/OP EST MAY X REQ PHY/QHP: CPT

## 2019-01-01 PROCEDURE — 96360 HYDRATION IV INFUSION INIT: CPT

## 2019-01-01 PROCEDURE — 74011000250 HC RX REV CODE- 250

## 2019-01-01 PROCEDURE — 96365 THER/PROPH/DIAG IV INF INIT: CPT

## 2019-01-01 PROCEDURE — 74011000258 HC RX REV CODE- 258: Performed by: PHYSICIAN ASSISTANT

## 2019-01-01 PROCEDURE — 65270000029 HC RM PRIVATE

## 2019-01-01 PROCEDURE — 96366 THER/PROPH/DIAG IV INF ADDON: CPT

## 2019-01-01 PROCEDURE — 73610 X-RAY EXAM OF ANKLE: CPT

## 2019-01-01 PROCEDURE — 93971 EXTREMITY STUDY: CPT

## 2019-01-01 PROCEDURE — 87040 BLOOD CULTURE FOR BACTERIA: CPT

## 2019-01-01 PROCEDURE — 87502 INFLUENZA DNA AMP PROBE: CPT

## 2019-01-01 PROCEDURE — 74011000258 HC RX REV CODE- 258: Performed by: NURSE PRACTITIONER

## 2019-01-01 PROCEDURE — 87077 CULTURE AEROBIC IDENTIFY: CPT

## 2019-01-01 PROCEDURE — 82962 GLUCOSE BLOOD TEST: CPT

## 2019-01-01 PROCEDURE — 74011250636 HC RX REV CODE- 250/636: Performed by: EMERGENCY MEDICINE

## 2019-01-01 PROCEDURE — 85027 COMPLETE CBC AUTOMATED: CPT

## 2019-01-01 PROCEDURE — 84132 ASSAY OF SERUM POTASSIUM: CPT

## 2019-01-01 PROCEDURE — 74011000250 HC RX REV CODE- 250: Performed by: RADIOLOGY

## 2019-01-01 PROCEDURE — 77030022017 HC DRSG HEMO QCLOT ZMED -A

## 2019-01-01 PROCEDURE — 36598 INJ W/FLUOR EVAL CV DEVICE: CPT

## 2019-01-01 PROCEDURE — 65610000006 HC RM INTENSIVE CARE

## 2019-01-01 PROCEDURE — 85379 FIBRIN DEGRADATION QUANT: CPT

## 2019-01-01 PROCEDURE — 70492 CT SFT TSUE NCK W/O & W/DYE: CPT

## 2019-01-01 PROCEDURE — 83880 ASSAY OF NATRIURETIC PEPTIDE: CPT

## 2019-01-01 PROCEDURE — 87086 URINE CULTURE/COLONY COUNT: CPT

## 2019-01-01 PROCEDURE — 36590 REMOVAL TUNNELED CV CATH: CPT

## 2019-01-01 PROCEDURE — 74011250636 HC RX REV CODE- 250/636: Performed by: RADIOLOGY

## 2019-01-01 PROCEDURE — 77030013140 HC MSK NEB VYRM -A

## 2019-01-01 PROCEDURE — 87641 MR-STAPH DNA AMP PROBE: CPT

## 2019-01-01 PROCEDURE — G0151 HHCP-SERV OF PT,EA 15 MIN: HCPCS

## 2019-01-01 PROCEDURE — 77030005514 HC CATH URETH FOL14 BARD -A

## 2019-01-01 PROCEDURE — 99285 EMERGENCY DEPT VISIT HI MDM: CPT

## 2019-01-01 PROCEDURE — 85018 HEMOGLOBIN: CPT

## 2019-01-01 PROCEDURE — 83605 ASSAY OF LACTIC ACID: CPT

## 2019-01-01 PROCEDURE — 3331090003 HH PPS REVENUE ADJ

## 2019-01-01 PROCEDURE — 99282 EMERGENCY DEPT VISIT SF MDM: CPT

## 2019-01-01 PROCEDURE — P9045 ALBUMIN (HUMAN), 5%, 250 ML: HCPCS | Performed by: NURSE PRACTITIONER

## 2019-01-01 PROCEDURE — 81001 URINALYSIS AUTO W/SCOPE: CPT

## 2019-01-01 PROCEDURE — 82306 VITAMIN D 25 HYDROXY: CPT

## 2019-01-01 PROCEDURE — 77030031139 HC SUT VCRL2 J&J -A

## 2019-01-01 PROCEDURE — G0152 HHCP-SERV OF OT,EA 15 MIN: HCPCS

## 2019-01-01 PROCEDURE — 73590 X-RAY EXAM OF LOWER LEG: CPT

## 2019-01-01 PROCEDURE — 97165 OT EVAL LOW COMPLEX 30 MIN: CPT

## 2019-01-01 PROCEDURE — 70470 CT HEAD/BRAIN W/O & W/DYE: CPT

## 2019-01-01 PROCEDURE — 51702 INSERT TEMP BLADDER CATH: CPT

## 2019-01-01 PROCEDURE — 400013 HH SOC

## 2019-01-01 PROCEDURE — 80162 ASSAY OF DIGOXIN TOTAL: CPT

## 2019-01-01 PROCEDURE — 77030037878 HC DRSG MEPILEX >48IN BORD MOLN -B

## 2019-01-01 PROCEDURE — 93325 DOPPLER ECHO COLOR FLOW MAPG: CPT

## 2019-01-01 PROCEDURE — 86300 IMMUNOASSAY TUMOR CA 15-3: CPT

## 2019-01-01 PROCEDURE — 82728 ASSAY OF FERRITIN: CPT

## 2019-01-01 RX ORDER — MAGNESIUM SULFATE HEPTAHYDRATE 40 MG/ML
2 INJECTION, SOLUTION INTRAVENOUS ONCE
Status: COMPLETED | OUTPATIENT
Start: 2019-01-01 | End: 2019-01-01

## 2019-01-01 RX ORDER — WATER FOR INJECTION,STERILE
VIAL (ML) INJECTION ONCE
Status: COMPLETED | OUTPATIENT
Start: 2019-01-01 | End: 2019-01-01

## 2019-01-01 RX ORDER — ACETAMINOPHEN 325 MG/1
650 TABLET ORAL
Status: DISCONTINUED | OUTPATIENT
Start: 2019-01-01 | End: 2019-01-01 | Stop reason: HOSPADM

## 2019-01-01 RX ORDER — SODIUM CHLORIDE AND POTASSIUM CHLORIDE .9; .15 G/100ML; G/100ML
SOLUTION INTRAVENOUS CONTINUOUS
Status: DISCONTINUED | OUTPATIENT
Start: 2019-01-01 | End: 2019-01-01

## 2019-01-01 RX ORDER — OXYCODONE AND ACETAMINOPHEN 10; 325 MG/1; MG/1
1 TABLET ORAL
Qty: 120 TAB | Refills: 0 | Status: SHIPPED | OUTPATIENT
Start: 2019-01-01 | End: 2019-12-01

## 2019-01-01 RX ORDER — ALBUTEROL SULFATE 90 UG/1
2 AEROSOL, METERED RESPIRATORY (INHALATION)
Qty: 1 INHALER | Refills: 0 | Status: SHIPPED | OUTPATIENT
Start: 2019-01-01 | End: 2019-01-01 | Stop reason: SDUPTHER

## 2019-01-01 RX ORDER — HEPARIN SODIUM (PORCINE) LOCK FLUSH IV SOLN 100 UNIT/ML 100 UNIT/ML
1000 SOLUTION INTRAVENOUS AS NEEDED
Status: DISCONTINUED | OUTPATIENT
Start: 2019-01-01 | End: 2019-01-01 | Stop reason: HOSPADM

## 2019-01-01 RX ORDER — SODIUM CHLORIDE 9 MG/ML
250 INJECTION, SOLUTION INTRAVENOUS ONCE
Status: COMPLETED | OUTPATIENT
Start: 2019-01-01 | End: 2019-01-01

## 2019-01-01 RX ORDER — SODIUM CHLORIDE 0.9 % (FLUSH) 0.9 %
10-40 SYRINGE (ML) INJECTION AS NEEDED
Status: DISCONTINUED | OUTPATIENT
Start: 2019-01-01 | End: 2019-01-01 | Stop reason: HOSPADM

## 2019-01-01 RX ORDER — ZALEPLON 5 MG/1
5 CAPSULE ORAL
Qty: 30 CAP | Refills: 3 | Status: SHIPPED | OUTPATIENT
Start: 2019-01-01 | End: 2019-01-01

## 2019-01-01 RX ORDER — VANCOMYCIN/0.9 % SOD CHLORIDE 1.5G/250ML
1500 PLASTIC BAG, INJECTION (ML) INTRAVENOUS ONCE
Status: DISCONTINUED | OUTPATIENT
Start: 2019-01-01 | End: 2019-01-01

## 2019-01-01 RX ORDER — CALCIUM CARB/MAGNESIUM CARB 311-232MG
10 TABLET ORAL
Status: DISCONTINUED | OUTPATIENT
Start: 2019-01-01 | End: 2019-01-01

## 2019-01-01 RX ORDER — GABAPENTIN 100 MG/1
200 CAPSULE ORAL 3 TIMES DAILY
Status: DISCONTINUED | OUTPATIENT
Start: 2019-01-01 | End: 2019-01-01 | Stop reason: HOSPADM

## 2019-01-01 RX ORDER — HEPARIN 100 UNIT/ML
SYRINGE INTRAVENOUS
Status: DISPENSED
Start: 2019-01-01 | End: 2019-01-01

## 2019-01-01 RX ORDER — MIDODRINE HYDROCHLORIDE 5 MG/1
5 TABLET ORAL 2 TIMES DAILY WITH MEALS
Qty: 60 TAB | Refills: 0 | Status: SHIPPED | OUTPATIENT
Start: 2019-01-01 | End: 2019-01-01

## 2019-01-01 RX ORDER — WATER FOR INJECTION,STERILE
VIAL (ML) INJECTION
Status: DISPENSED
Start: 2019-01-01 | End: 2019-01-01

## 2019-01-01 RX ORDER — LANOLIN ALCOHOL/MO/W.PET/CERES
325 CREAM (GRAM) TOPICAL DAILY
Status: DISCONTINUED | OUTPATIENT
Start: 2019-01-01 | End: 2019-01-01 | Stop reason: HOSPADM

## 2019-01-01 RX ORDER — POTASSIUM CHLORIDE 20 MEQ/1
40 TABLET, EXTENDED RELEASE ORAL
Status: COMPLETED | OUTPATIENT
Start: 2019-01-01 | End: 2019-01-01

## 2019-01-01 RX ORDER — CYANOCOBALAMIN (VITAMIN B-12) 1000 MCG
1 TABLET, EXTENDED RELEASE ORAL DAILY
COMMUNITY

## 2019-01-01 RX ORDER — FENTANYL CITRATE 50 UG/ML
12.5-5 INJECTION, SOLUTION INTRAMUSCULAR; INTRAVENOUS
Status: DISCONTINUED | OUTPATIENT
Start: 2019-01-01 | End: 2019-01-01 | Stop reason: ALTCHOICE

## 2019-01-01 RX ORDER — SODIUM CHLORIDE 9 MG/ML
1000 INJECTION, SOLUTION INTRAVENOUS CONTINUOUS
Status: DISCONTINUED | OUTPATIENT
Start: 2019-01-01 | End: 2019-01-01

## 2019-01-01 RX ORDER — POTASSIUM CHLORIDE 7.45 MG/ML
10 INJECTION INTRAVENOUS
Status: COMPLETED | OUTPATIENT
Start: 2019-01-01 | End: 2019-01-01

## 2019-01-01 RX ORDER — WATER FOR INJECTION,STERILE
VIAL (ML) INJECTION
Status: COMPLETED
Start: 2019-01-01 | End: 2019-01-01

## 2019-01-01 RX ORDER — HEPARIN 100 UNIT/ML
500 SYRINGE INTRAVENOUS ONCE
Status: COMPLETED | OUTPATIENT
Start: 2019-01-01 | End: 2019-01-01

## 2019-01-01 RX ORDER — WATER FOR INJECTION,STERILE
VIAL (ML) INJECTION ONCE
Status: ACTIVE | OUTPATIENT
Start: 2019-01-01 | End: 2019-01-01

## 2019-01-01 RX ORDER — SODIUM CHLORIDE 9 MG/ML
20 INJECTION, SOLUTION INTRAVENOUS CONTINUOUS
Status: DISCONTINUED | OUTPATIENT
Start: 2019-01-01 | End: 2019-01-01 | Stop reason: HOSPADM

## 2019-01-01 RX ORDER — FUROSEMIDE 20 MG/1
20 TABLET ORAL DAILY
Status: DISCONTINUED | OUTPATIENT
Start: 2019-01-01 | End: 2019-01-01

## 2019-01-01 RX ORDER — WATER FOR INJECTION,STERILE
VIAL (ML) INJECTION ONCE
Status: DISPENSED | OUTPATIENT
Start: 2019-01-01 | End: 2019-01-01

## 2019-01-01 RX ORDER — HEPARIN SODIUM (PORCINE) LOCK FLUSH IV SOLN 100 UNIT/ML 100 UNIT/ML
500 SOLUTION INTRAVENOUS ONCE
Status: COMPLETED | OUTPATIENT
Start: 2019-01-01 | End: 2019-01-01

## 2019-01-01 RX ORDER — VANCOMYCIN/0.9 % SOD CHLORIDE 1 G/100 ML
1000 PLASTIC BAG, INJECTION (ML) INTRAVENOUS EVERY 24 HOURS
Status: DISCONTINUED | OUTPATIENT
Start: 2019-01-01 | End: 2019-01-01

## 2019-01-01 RX ORDER — MAGNESIUM SULFATE HEPTAHYDRATE 40 MG/ML
2 INJECTION, SOLUTION INTRAVENOUS ONCE
Status: DISCONTINUED | OUTPATIENT
Start: 2019-01-01 | End: 2019-01-01

## 2019-01-01 RX ORDER — HEPARIN 100 UNIT/ML
500 SYRINGE INTRAVENOUS AS NEEDED
Status: DISCONTINUED | OUTPATIENT
Start: 2019-01-01 | End: 2019-01-01 | Stop reason: HOSPADM

## 2019-01-01 RX ORDER — OXYCODONE AND ACETAMINOPHEN 10; 325 MG/1; MG/1
1 TABLET ORAL
Qty: 120 TAB | Refills: 0 | Status: SHIPPED | OUTPATIENT
Start: 2019-01-01 | End: 2019-01-01 | Stop reason: SDUPTHER

## 2019-01-01 RX ORDER — LANOLIN ALCOHOL/MO/W.PET/CERES
400 CREAM (GRAM) TOPICAL
Qty: 10 TAB | Refills: 0 | Status: SHIPPED | OUTPATIENT
Start: 2019-01-01

## 2019-01-01 RX ORDER — SODIUM,POTASSIUM PHOSPHATES 280-250MG
2 POWDER IN PACKET (EA) ORAL 2 TIMES DAILY
Status: COMPLETED | OUTPATIENT
Start: 2019-01-01 | End: 2019-01-01

## 2019-01-01 RX ORDER — METHOCARBAMOL 500 MG/1
500 TABLET, FILM COATED ORAL 3 TIMES DAILY
Qty: 20 TAB | Refills: 0 | Status: SHIPPED | OUTPATIENT
Start: 2019-01-01

## 2019-01-01 RX ORDER — ALBUMIN HUMAN 50 G/1000ML
25 SOLUTION INTRAVENOUS ONCE
Status: COMPLETED | OUTPATIENT
Start: 2019-01-01 | End: 2019-01-01

## 2019-01-01 RX ORDER — HEPARIN 100 UNIT/ML
SYRINGE INTRAVENOUS
Status: COMPLETED
Start: 2019-01-01 | End: 2019-01-01

## 2019-01-01 RX ORDER — CHOLECALCIFEROL (VITAMIN D3) 125 MCG
5 CAPSULE ORAL
Status: DISCONTINUED | OUTPATIENT
Start: 2019-01-01 | End: 2019-01-01 | Stop reason: HOSPADM

## 2019-01-01 RX ORDER — MAGNESIUM SULFATE HEPTAHYDRATE 40 MG/ML
2 INJECTION, SOLUTION INTRAVENOUS
Status: COMPLETED | OUTPATIENT
Start: 2019-01-01 | End: 2019-01-01

## 2019-01-01 RX ORDER — ZOLPIDEM TARTRATE 10 MG/1
5 TABLET ORAL
Qty: 30 TAB | Refills: 1 | OUTPATIENT
Start: 2019-01-01

## 2019-01-01 RX ORDER — NOREPINEPHRINE BITARTRATE/D5W 8 MG/250ML
2-16 PLASTIC BAG, INJECTION (ML) INTRAVENOUS
Status: DISCONTINUED | OUTPATIENT
Start: 2019-01-01 | End: 2019-01-01

## 2019-01-01 RX ORDER — MIDAZOLAM HYDROCHLORIDE 1 MG/ML
1 INJECTION, SOLUTION INTRAMUSCULAR; INTRAVENOUS
Status: DISCONTINUED | OUTPATIENT
Start: 2019-01-01 | End: 2019-01-01 | Stop reason: ALTCHOICE

## 2019-01-01 RX ORDER — ERGOCALCIFEROL 1.25 MG/1
50000 CAPSULE ORAL
Status: DISCONTINUED | OUTPATIENT
Start: 2019-01-01 | End: 2019-01-01 | Stop reason: HOSPADM

## 2019-01-01 RX ORDER — ZOLPIDEM TARTRATE 10 MG/1
10 TABLET ORAL
Qty: 30 TAB | Refills: 3 | Status: SHIPPED | OUTPATIENT
Start: 2019-01-01 | End: 2019-01-01 | Stop reason: SDUPTHER

## 2019-01-01 RX ORDER — NOREPINEPHRINE BITARTRATE/D5W 8 MG/250ML
2-16 PLASTIC BAG, INJECTION (ML) INTRAVENOUS
Status: DISCONTINUED | OUTPATIENT
Start: 2019-01-01 | End: 2019-01-01 | Stop reason: SDUPTHER

## 2019-01-01 RX ORDER — DIGOXIN 125 MCG
0.12 TABLET ORAL DAILY
Status: DISCONTINUED | OUTPATIENT
Start: 2019-01-01 | End: 2019-01-01 | Stop reason: HOSPADM

## 2019-01-01 RX ORDER — WATER FOR INJECTION,STERILE
VIAL (ML) INJECTION ONCE
Status: CANCELLED | OUTPATIENT
Start: 2019-01-01 | End: 2019-01-01

## 2019-01-01 RX ORDER — MIDODRINE HYDROCHLORIDE 5 MG/1
TABLET ORAL 3 TIMES DAILY
COMMUNITY

## 2019-01-01 RX ORDER — ONDANSETRON 2 MG/ML
4 INJECTION INTRAMUSCULAR; INTRAVENOUS
Status: DISCONTINUED | OUTPATIENT
Start: 2019-01-01 | End: 2019-01-01 | Stop reason: HOSPADM

## 2019-01-01 RX ORDER — SODIUM CHLORIDE 9 MG/ML
500 INJECTION, SOLUTION INTRAVENOUS ONCE
Status: COMPLETED | OUTPATIENT
Start: 2019-01-01 | End: 2019-01-01

## 2019-01-01 RX ORDER — ERGOCALCIFEROL 1.25 MG/1
50000 CAPSULE ORAL
Qty: 12 CAP | Refills: 0 | Status: SHIPPED | OUTPATIENT
Start: 2019-01-01 | End: 2019-01-01

## 2019-01-01 RX ORDER — WARFARIN 1 MG/1
TABLET ORAL DAILY
COMMUNITY

## 2019-01-01 RX ORDER — HYDROCODONE BITARTRATE AND ACETAMINOPHEN 5; 325 MG/1; MG/1
1 TABLET ORAL
Qty: 10 TAB | Refills: 0 | Status: SHIPPED | OUTPATIENT
Start: 2019-01-01 | End: 2019-01-01

## 2019-01-01 RX ORDER — DICLOFENAC SODIUM 10 MG/G
GEL TOPICAL 4 TIMES DAILY
Qty: 100 G | Refills: 1 | Status: SHIPPED | OUTPATIENT
Start: 2019-01-01 | End: 2019-01-01

## 2019-01-01 RX ORDER — SODIUM CHLORIDE 0.9 % (FLUSH) 0.9 %
5-40 SYRINGE (ML) INJECTION AS NEEDED
Status: DISCONTINUED | OUTPATIENT
Start: 2019-01-01 | End: 2019-01-01 | Stop reason: HOSPADM

## 2019-01-01 RX ORDER — ONDANSETRON 2 MG/ML
4 INJECTION INTRAMUSCULAR; INTRAVENOUS
Status: COMPLETED | OUTPATIENT
Start: 2019-01-01 | End: 2019-01-01

## 2019-01-01 RX ORDER — ZOLPIDEM TARTRATE 10 MG/1
10 TABLET ORAL
Qty: 30 TAB | Refills: 3 | Status: CANCELLED | OUTPATIENT
Start: 2019-01-01

## 2019-01-01 RX ORDER — NITROFURANTOIN MACROCRYSTALS 50 MG/1
100 CAPSULE ORAL ONCE
Status: COMPLETED | OUTPATIENT
Start: 2019-01-01 | End: 2019-01-01

## 2019-01-01 RX ORDER — OXYCODONE AND ACETAMINOPHEN 10; 325 MG/1; MG/1
1 TABLET ORAL
Qty: 120 TAB | Refills: 0 | Status: CANCELLED | OUTPATIENT
Start: 2019-01-01 | End: 2019-01-01

## 2019-01-01 RX ORDER — POTASSIUM CHLORIDE 20 MEQ/1
40 TABLET, EXTENDED RELEASE ORAL
Status: DISCONTINUED | OUTPATIENT
Start: 2019-01-01 | End: 2019-01-01 | Stop reason: SDUPTHER

## 2019-01-01 RX ORDER — SODIUM CHLORIDE 0.9 % (FLUSH) 0.9 %
5-10 SYRINGE (ML) INJECTION AS NEEDED
Status: DISCONTINUED | OUTPATIENT
Start: 2019-01-01 | End: 2019-01-01 | Stop reason: HOSPADM

## 2019-01-01 RX ORDER — ZOLPIDEM TARTRATE 10 MG/1
5 TABLET ORAL
Qty: 30 TAB | Refills: 1 | Status: SHIPPED | OUTPATIENT
Start: 2019-01-01 | End: 2019-01-01 | Stop reason: SDUPTHER

## 2019-01-01 RX ORDER — HEPARIN 100 UNIT/ML
SYRINGE INTRAVENOUS
Status: DISCONTINUED
Start: 2019-01-01 | End: 2019-01-01 | Stop reason: WASHOUT

## 2019-01-01 RX ORDER — VANCOMYCIN/0.9 % SOD CHLORIDE 1 G/100 ML
1000 PLASTIC BAG, INJECTION (ML) INTRAVENOUS ONCE
Status: DISCONTINUED | OUTPATIENT
Start: 2019-01-01 | End: 2019-01-01

## 2019-01-01 RX ORDER — VANCOMYCIN HYDROCHLORIDE
1250
Status: DISCONTINUED | OUTPATIENT
Start: 2019-01-01 | End: 2019-01-01

## 2019-01-01 RX ORDER — MIDODRINE HYDROCHLORIDE 5 MG/1
2.5 TABLET ORAL 2 TIMES DAILY WITH MEALS
Status: DISCONTINUED | OUTPATIENT
Start: 2019-01-01 | End: 2019-01-01

## 2019-01-01 RX ORDER — NOREPINEPHRINE BITARTRATE 1 MG/ML
INJECTION, SOLUTION INTRAVENOUS
Status: COMPLETED
Start: 2019-01-01 | End: 2019-01-01

## 2019-01-01 RX ORDER — DOCUSATE SODIUM 100 MG/1
100 CAPSULE, LIQUID FILLED ORAL DAILY
Status: DISCONTINUED | OUTPATIENT
Start: 2019-01-01 | End: 2019-01-01 | Stop reason: HOSPADM

## 2019-01-01 RX ORDER — POTASSIUM CHLORIDE 20 MEQ/1
20 TABLET, EXTENDED RELEASE ORAL
Status: COMPLETED | OUTPATIENT
Start: 2019-01-01 | End: 2019-01-01

## 2019-01-01 RX ORDER — LEVOFLOXACIN 5 MG/ML
750 INJECTION, SOLUTION INTRAVENOUS EVERY 24 HOURS
Status: DISCONTINUED | OUTPATIENT
Start: 2019-01-01 | End: 2019-01-01

## 2019-01-01 RX ORDER — FUROSEMIDE 20 MG/1
20 TABLET ORAL DAILY
Qty: 30 TAB | Refills: 0 | Status: SHIPPED | OUTPATIENT
Start: 2019-01-01

## 2019-01-01 RX ORDER — NITROFURANTOIN 25; 75 MG/1; MG/1
100 CAPSULE ORAL 2 TIMES DAILY
Qty: 14 CAP | Refills: 0 | Status: SHIPPED | OUTPATIENT
Start: 2019-01-01 | End: 2019-01-01

## 2019-01-01 RX ORDER — FUROSEMIDE 20 MG/1
20 TABLET ORAL
Qty: 15 TAB | Refills: 0 | Status: SHIPPED | OUTPATIENT
Start: 2019-01-01 | End: 2019-01-01

## 2019-01-01 RX ORDER — SENNOSIDES 8.6 MG/1
1 TABLET ORAL DAILY
Status: DISCONTINUED | OUTPATIENT
Start: 2019-01-01 | End: 2019-01-01 | Stop reason: HOSPADM

## 2019-01-01 RX ORDER — HYDROCODONE BITARTRATE AND ACETAMINOPHEN 5; 325 MG/1; MG/1
1 TABLET ORAL ONCE
Status: COMPLETED | OUTPATIENT
Start: 2019-01-01 | End: 2019-01-01

## 2019-01-01 RX ORDER — GABAPENTIN 300 MG/1
300 CAPSULE ORAL
Qty: 90 CAP | Refills: 1 | Status: SHIPPED | OUTPATIENT
Start: 2019-01-01

## 2019-01-01 RX ORDER — OXYCODONE AND ACETAMINOPHEN 10; 325 MG/1; MG/1
1 TABLET ORAL
Qty: 120 TAB | Refills: 0 | Status: SHIPPED | OUTPATIENT
Start: 2019-01-01 | End: 2019-01-01

## 2019-01-01 RX ORDER — POTASSIUM CHLORIDE 20 MEQ/1
20 TABLET, EXTENDED RELEASE ORAL ONCE
Status: DISCONTINUED | OUTPATIENT
Start: 2019-01-01 | End: 2019-01-01

## 2019-01-01 RX ORDER — ALBUTEROL SULFATE 90 UG/1
2 AEROSOL, METERED RESPIRATORY (INHALATION)
Qty: 1 INHALER | Refills: 1 | Status: SHIPPED | OUTPATIENT
Start: 2019-01-01

## 2019-01-01 RX ORDER — BISACODYL 5 MG
10 TABLET, DELAYED RELEASE (ENTERIC COATED) ORAL
Status: COMPLETED | OUTPATIENT
Start: 2019-01-01 | End: 2019-01-01

## 2019-01-01 RX ORDER — VANCOMYCIN/0.9 % SOD CHLORIDE 1 G/100 ML
1000 PLASTIC BAG, INJECTION (ML) INTRAVENOUS ONCE
Status: COMPLETED | OUTPATIENT
Start: 2019-01-01 | End: 2019-01-01

## 2019-01-01 RX ORDER — WATER FOR INJECTION,STERILE
VIAL (ML) INJECTION ONCE
Status: DISCONTINUED | OUTPATIENT
Start: 2019-01-01 | End: 2019-01-01

## 2019-01-01 RX ORDER — MAGNESIUM SULFATE 1 G/100ML
1 INJECTION INTRAVENOUS ONCE
Status: COMPLETED | OUTPATIENT
Start: 2019-01-01 | End: 2019-01-01

## 2019-01-01 RX ORDER — MIDODRINE HYDROCHLORIDE 5 MG/1
5 TABLET ORAL 2 TIMES DAILY WITH MEALS
Status: DISCONTINUED | OUTPATIENT
Start: 2019-01-01 | End: 2019-01-01 | Stop reason: HOSPADM

## 2019-01-01 RX ORDER — ALLOPURINOL 100 MG/1
100 TABLET ORAL DAILY
Status: DISCONTINUED | OUTPATIENT
Start: 2019-01-01 | End: 2019-01-01 | Stop reason: HOSPADM

## 2019-01-01 RX ORDER — POTASSIUM CHLORIDE 1.5 G/1.77G
20 POWDER, FOR SOLUTION ORAL
Status: COMPLETED | OUTPATIENT
Start: 2019-01-01 | End: 2019-01-01

## 2019-01-01 RX ORDER — LIDOCAINE AND PRILOCAINE 25; 25 MG/G; MG/G
CREAM TOPICAL
Qty: 30 G | Refills: 6 | Status: SHIPPED | OUTPATIENT
Start: 2019-01-01 | End: 2019-01-01

## 2019-01-01 RX ORDER — SODIUM,POTASSIUM PHOSPHATES 280-250MG
2 POWDER IN PACKET (EA) ORAL
Status: DISPENSED | OUTPATIENT
Start: 2019-01-01 | End: 2019-01-01

## 2019-01-01 RX ORDER — LIDOCAINE HYDROCHLORIDE 20 MG/ML
15 SOLUTION OROPHARYNGEAL ONCE
Status: COMPLETED | OUTPATIENT
Start: 2019-01-01 | End: 2019-01-01

## 2019-01-01 RX ORDER — LORAZEPAM 1 MG/1
1 TABLET ORAL
Status: CANCELLED | OUTPATIENT
Start: 2019-01-01

## 2019-01-01 RX ORDER — ZOLPIDEM TARTRATE 10 MG/1
5 TABLET ORAL
Qty: 30 TAB | Refills: 1 | Status: SHIPPED | OUTPATIENT
Start: 2019-01-01 | End: 2019-01-01

## 2019-01-01 RX ORDER — SPIRONOLACTONE 100 MG/1
TABLET, FILM COATED ORAL DAILY
COMMUNITY

## 2019-01-01 RX ORDER — FUROSEMIDE 20 MG/1
20 TABLET ORAL
Status: DISCONTINUED | OUTPATIENT
Start: 2019-01-01 | End: 2019-01-01 | Stop reason: HOSPADM

## 2019-01-01 RX ORDER — ZOLPIDEM TARTRATE 5 MG/1
5 TABLET ORAL
Qty: 30 TAB | Refills: 3 | Status: SHIPPED | OUTPATIENT
Start: 2019-01-01

## 2019-01-01 RX ADMIN — POTASSIUM CHLORIDE 10 MEQ: 7.46 INJECTION, SOLUTION INTRAVENOUS at 20:46

## 2019-01-01 RX ADMIN — CALCIUM GLUCONATE 1 G: 98 INJECTION, SOLUTION INTRAVENOUS at 09:00

## 2019-01-01 RX ADMIN — LIDOCAINE HYDROCHLORIDE 300 MG: 10; .005 INJECTION, SOLUTION EPIDURAL; INFILTRATION; INTRACAUDAL; PERINEURAL at 10:20

## 2019-01-01 RX ADMIN — DIGOXIN 0.12 MG: 125 TABLET ORAL at 16:37

## 2019-01-01 RX ADMIN — ROMIPLOSTIM 207 MCG: 250 INJECTION, POWDER, LYOPHILIZED, FOR SOLUTION SUBCUTANEOUS at 09:15

## 2019-01-01 RX ADMIN — ROMIPLOSTIM 138 MCG: 250 INJECTION, POWDER, LYOPHILIZED, FOR SOLUTION SUBCUTANEOUS at 10:11

## 2019-01-01 RX ADMIN — POTASSIUM CHLORIDE 40 MEQ: 20 TABLET, EXTENDED RELEASE ORAL at 11:48

## 2019-01-01 RX ADMIN — ROMIPLOSTIM 138 MCG: 250 INJECTION, POWDER, LYOPHILIZED, FOR SOLUTION SUBCUTANEOUS at 08:37

## 2019-01-01 RX ADMIN — ROMIPLOSTIM 138 MCG: 250 INJECTION, POWDER, LYOPHILIZED, FOR SOLUTION SUBCUTANEOUS at 09:35

## 2019-01-01 RX ADMIN — WATER: 1 INJECTION INTRAMUSCULAR; INTRAVENOUS; SUBCUTANEOUS at 09:07

## 2019-01-01 RX ADMIN — ACETAMINOPHEN 650 MG: 325 TABLET ORAL at 00:02

## 2019-01-01 RX ADMIN — Medication 20 ML: at 08:20

## 2019-01-01 RX ADMIN — BISACODYL 10 MG: 5 TABLET, COATED ORAL at 15:02

## 2019-01-01 RX ADMIN — Medication 2 MCG/MIN: at 05:50

## 2019-01-01 RX ADMIN — LIDOCAINE HYDROCHLORIDE 15 ML: 20 SOLUTION ORAL; TOPICAL at 11:43

## 2019-01-01 RX ADMIN — SODIUM CHLORIDE 250 ML: 900 INJECTION, SOLUTION INTRAVENOUS at 20:39

## 2019-01-01 RX ADMIN — MIDAZOLAM HYDROCHLORIDE 0.5 MG: 2 INJECTION, SOLUTION INTRAMUSCULAR; INTRAVENOUS at 10:25

## 2019-01-01 RX ADMIN — ROMIPLOSTIM 207 MCG: 250 INJECTION, POWDER, LYOPHILIZED, FOR SOLUTION SUBCUTANEOUS at 09:26

## 2019-01-01 RX ADMIN — MIDODRINE HYDROCHLORIDE 5 MG: 5 TABLET ORAL at 08:55

## 2019-01-01 RX ADMIN — IOHEXOL 20 ML: 300 INJECTION, SOLUTION INTRAVENOUS at 08:21

## 2019-01-01 RX ADMIN — ROMIPLOSTIM 138 MCG: 250 INJECTION, POWDER, LYOPHILIZED, FOR SOLUTION SUBCUTANEOUS at 10:07

## 2019-01-01 RX ADMIN — MIDAZOLAM HYDROCHLORIDE 0.5 MG: 2 INJECTION, SOLUTION INTRAMUSCULAR; INTRAVENOUS at 10:35

## 2019-01-01 RX ADMIN — WATER 0.72 ML: 1 INJECTION INTRAMUSCULAR; INTRAVENOUS; SUBCUTANEOUS at 09:32

## 2019-01-01 RX ADMIN — ROMIPLOSTIM 207 MCG: 250 INJECTION, POWDER, LYOPHILIZED, FOR SOLUTION SUBCUTANEOUS at 09:33

## 2019-01-01 RX ADMIN — MIDODRINE HYDROCHLORIDE 5 MG: 5 TABLET ORAL at 19:46

## 2019-01-01 RX ADMIN — RIVAROXABAN 20 MG: 20 TABLET, FILM COATED ORAL at 10:27

## 2019-01-01 RX ADMIN — RIVAROXABAN 10 MG: 10 TABLET, FILM COATED ORAL at 08:42

## 2019-01-01 RX ADMIN — FENTANYL CITRATE 25 MCG: 50 INJECTION INTRAMUSCULAR; INTRAVENOUS at 10:39

## 2019-01-01 RX ADMIN — WATER 0.72 ML: 1 INJECTION INTRAMUSCULAR; INTRAVENOUS; SUBCUTANEOUS at 08:35

## 2019-01-01 RX ADMIN — SODIUM CHLORIDE 980 ML: 900 INJECTION, SOLUTION INTRAVENOUS at 19:45

## 2019-01-01 RX ADMIN — SODIUM CHLORIDE, PRESERVATIVE FREE 500 UNITS: 5 INJECTION INTRAVENOUS at 10:11

## 2019-01-01 RX ADMIN — WATER: 1 INJECTION INTRAMUSCULAR; INTRAVENOUS; SUBCUTANEOUS at 08:32

## 2019-01-01 RX ADMIN — ACETAMINOPHEN 650 MG: 325 TABLET ORAL at 09:00

## 2019-01-01 RX ADMIN — POTASSIUM & SODIUM PHOSPHATES POWDER PACK 280-160-250 MG 2 PACKET: 280-160-250 PACK at 11:49

## 2019-01-01 RX ADMIN — ROMIPLOSTIM 207 MCG: 250 INJECTION, POWDER, LYOPHILIZED, FOR SOLUTION SUBCUTANEOUS at 08:39

## 2019-01-01 RX ADMIN — SODIUM CHLORIDE, SODIUM GLUCONATE, SODIUM ACETATE, POTASSIUM CHLORIDE AND MAGNESIUM CHLORIDE 1000 ML: 526; 502; 368; 37; 30 INJECTION, SOLUTION INTRAVENOUS at 11:15

## 2019-01-01 RX ADMIN — HEPARIN SODIUM (PORCINE) LOCK FLUSH IV SOLN 100 UNIT/ML 1000 UNITS: 100 SOLUTION at 08:21

## 2019-01-01 RX ADMIN — ROMIPLOSTIM 138 MCG: 250 INJECTION, POWDER, LYOPHILIZED, FOR SOLUTION SUBCUTANEOUS at 08:57

## 2019-01-01 RX ADMIN — MIDODRINE HYDROCHLORIDE 5 MG: 5 TABLET ORAL at 16:09

## 2019-01-01 RX ADMIN — ROMIPLOSTIM 207 MCG: 250 INJECTION, POWDER, LYOPHILIZED, FOR SOLUTION SUBCUTANEOUS at 09:23

## 2019-01-01 RX ADMIN — WATER: 1 INJECTION INTRAMUSCULAR; INTRAVENOUS; SUBCUTANEOUS at 12:25

## 2019-01-01 RX ADMIN — FERROUS SULFATE TAB 325 MG (65 MG ELEMENTAL FE) 325 MG: 325 (65 FE) TAB at 08:56

## 2019-01-01 RX ADMIN — HEPARIN 500 UNITS: 100 SYRINGE at 08:26

## 2019-01-01 RX ADMIN — SODIUM CHLORIDE AND POTASSIUM CHLORIDE: 9; 1.49 INJECTION, SOLUTION INTRAVENOUS at 06:26

## 2019-01-01 RX ADMIN — ACETAMINOPHEN 650 MG: 325 TABLET ORAL at 10:31

## 2019-01-01 RX ADMIN — VANCOMYCIN HYDROCHLORIDE 1000 MG: 10 INJECTION, POWDER, LYOPHILIZED, FOR SOLUTION INTRAVENOUS at 20:33

## 2019-01-01 RX ADMIN — POTASSIUM CHLORIDE 40 MEQ: 20 TABLET, EXTENDED RELEASE ORAL at 20:46

## 2019-01-01 RX ADMIN — WATER 0.72 ML: 1 INJECTION INTRAMUSCULAR; INTRAVENOUS; SUBCUTANEOUS at 09:08

## 2019-01-01 RX ADMIN — Medication 10 ML: at 08:57

## 2019-01-01 RX ADMIN — SODIUM CHLORIDE 1000 ML: 900 INJECTION, SOLUTION INTRAVENOUS at 23:10

## 2019-01-01 RX ADMIN — ROMIPLOSTIM 207 MCG: 250 INJECTION, POWDER, LYOPHILIZED, FOR SOLUTION SUBCUTANEOUS at 08:55

## 2019-01-01 RX ADMIN — ROMIPLOSTIM 138 MCG: 250 INJECTION, POWDER, LYOPHILIZED, FOR SOLUTION SUBCUTANEOUS at 09:33

## 2019-01-01 RX ADMIN — HEPARIN 1000 UNITS: 100 SYRINGE at 09:07

## 2019-01-01 RX ADMIN — GABAPENTIN 200 MG: 100 CAPSULE ORAL at 15:58

## 2019-01-01 RX ADMIN — POTASSIUM CHLORIDE 10 MEQ: 7.46 INJECTION, SOLUTION INTRAVENOUS at 10:23

## 2019-01-01 RX ADMIN — WATER: 1 INJECTION INTRAMUSCULAR; INTRAVENOUS; SUBCUTANEOUS at 08:58

## 2019-01-01 RX ADMIN — Medication 40 ML: at 08:35

## 2019-01-01 RX ADMIN — FUROSEMIDE 20 MG: 20 TABLET ORAL at 08:56

## 2019-01-01 RX ADMIN — ROMIPLOSTIM 207 MCG: 250 INJECTION, POWDER, LYOPHILIZED, FOR SOLUTION SUBCUTANEOUS at 08:25

## 2019-01-01 RX ADMIN — ROMIPLOSTIM 138 MCG: 250 INJECTION, POWDER, LYOPHILIZED, FOR SOLUTION SUBCUTANEOUS at 08:31

## 2019-01-01 RX ADMIN — ACETAMINOPHEN 650 MG: 325 TABLET ORAL at 19:50

## 2019-01-01 RX ADMIN — Medication 10 ML: at 08:23

## 2019-01-01 RX ADMIN — POTASSIUM CHLORIDE 10 MEQ: 7.46 INJECTION, SOLUTION INTRAVENOUS at 09:24

## 2019-01-01 RX ADMIN — PIPERACILLIN SODIUM,TAZOBACTAM SODIUM 3.38 G: 3; .375 INJECTION, POWDER, FOR SOLUTION INTRAVENOUS at 23:45

## 2019-01-01 RX ADMIN — WATER 0.72 ML: 1 INJECTION INTRAMUSCULAR; INTRAVENOUS; SUBCUTANEOUS at 10:30

## 2019-01-01 RX ADMIN — POTASSIUM CHLORIDE 20 MEQ: 20 TABLET, EXTENDED RELEASE ORAL at 08:21

## 2019-01-01 RX ADMIN — FERROUS SULFATE TAB 325 MG (65 MG ELEMENTAL FE) 325 MG: 325 (65 FE) TAB at 10:26

## 2019-01-01 RX ADMIN — RIVAROXABAN 10 MG: 10 TABLET, FILM COATED ORAL at 08:49

## 2019-01-01 RX ADMIN — ALLOPURINOL 100 MG: 100 TABLET ORAL at 10:27

## 2019-01-01 RX ADMIN — GABAPENTIN 200 MG: 100 CAPSULE ORAL at 16:37

## 2019-01-01 RX ADMIN — SODIUM CHLORIDE 15 MMOL: 900 INJECTION, SOLUTION INTRAVENOUS at 11:39

## 2019-01-01 RX ADMIN — HEPARIN 500 UNITS: 100 SYRINGE at 08:21

## 2019-01-01 RX ADMIN — POTASSIUM & SODIUM PHOSPHATES POWDER PACK 280-160-250 MG 2 PACKET: 280-160-250 PACK at 21:57

## 2019-01-01 RX ADMIN — RIVAROXABAN 10 MG: 10 TABLET, FILM COATED ORAL at 08:51

## 2019-01-01 RX ADMIN — GABAPENTIN 200 MG: 100 CAPSULE ORAL at 19:46

## 2019-01-01 RX ADMIN — POTASSIUM CHLORIDE 20 MEQ: 1.5 POWDER, FOR SOLUTION ORAL at 18:36

## 2019-01-01 RX ADMIN — SODIUM CHLORIDE 500 ML/HR: 900 INJECTION, SOLUTION INTRAVENOUS at 09:02

## 2019-01-01 RX ADMIN — HEPARIN 500 UNITS: 100 SYRINGE at 16:30

## 2019-01-01 RX ADMIN — ROMIPLOSTIM 138 MCG: 250 INJECTION, POWDER, LYOPHILIZED, FOR SOLUTION SUBCUTANEOUS at 09:32

## 2019-01-01 RX ADMIN — VANCOMYCIN HYDROCHLORIDE 1000 MG: 10 INJECTION, POWDER, LYOPHILIZED, FOR SOLUTION INTRAVENOUS at 10:15

## 2019-01-01 RX ADMIN — ROMIPLOSTIM 207 MCG: 250 INJECTION, POWDER, LYOPHILIZED, FOR SOLUTION SUBCUTANEOUS at 10:30

## 2019-01-01 RX ADMIN — HEPARIN 500 UNITS: 100 SYRINGE at 08:25

## 2019-01-01 RX ADMIN — WATER: 1 INJECTION INTRAMUSCULAR; INTRAVENOUS; SUBCUTANEOUS at 09:15

## 2019-01-01 RX ADMIN — MAGNESIUM SULFATE HEPTAHYDRATE 2 G: 40 INJECTION, SOLUTION INTRAVENOUS at 12:00

## 2019-01-01 RX ADMIN — FENTANYL CITRATE 25 MCG: 50 INJECTION INTRAMUSCULAR; INTRAVENOUS at 10:25

## 2019-01-01 RX ADMIN — SODIUM CHLORIDE, SODIUM GLUCONATE, SODIUM ACETATE, POTASSIUM CHLORIDE AND MAGNESIUM CHLORIDE 500 ML: 526; 502; 368; 37; 30 INJECTION, SOLUTION INTRAVENOUS at 03:59

## 2019-01-01 RX ADMIN — ROMIPLOSTIM 207 MCG: 250 INJECTION, POWDER, LYOPHILIZED, FOR SOLUTION SUBCUTANEOUS at 08:40

## 2019-01-01 RX ADMIN — Medication 3 MCG/MIN: at 06:29

## 2019-01-01 RX ADMIN — RIVAROXABAN 10 MG: 10 TABLET, FILM COATED ORAL at 10:00

## 2019-01-01 RX ADMIN — ROMIPLOSTIM 138 MCG: 250 INJECTION, POWDER, LYOPHILIZED, FOR SOLUTION SUBCUTANEOUS at 09:00

## 2019-01-01 RX ADMIN — Medication 70 ML: at 09:06

## 2019-01-01 RX ADMIN — PIPERACILLIN SODIUM,TAZOBACTAM SODIUM 3.38 G: 3; .375 INJECTION, POWDER, FOR SOLUTION INTRAVENOUS at 12:18

## 2019-01-01 RX ADMIN — HEPARIN 500 UNITS: 100 SYRINGE at 08:30

## 2019-01-01 RX ADMIN — PIPERACILLIN SODIUM,TAZOBACTAM SODIUM 3.38 G: 3; .375 INJECTION, POWDER, FOR SOLUTION INTRAVENOUS at 17:12

## 2019-01-01 RX ADMIN — WATER 0.72 ML: 1 INJECTION INTRAMUSCULAR; INTRAVENOUS; SUBCUTANEOUS at 08:40

## 2019-01-01 RX ADMIN — ROMIPLOSTIM 138 MCG: 250 INJECTION, POWDER, LYOPHILIZED, FOR SOLUTION SUBCUTANEOUS at 09:08

## 2019-01-01 RX ADMIN — NOREPINEPHRINE BITARTRATE 8 MG: 1 INJECTION INTRAVENOUS at 01:36

## 2019-01-01 RX ADMIN — PIPERACILLIN SODIUM,TAZOBACTAM SODIUM 3.38 G: 3; .375 INJECTION, POWDER, FOR SOLUTION INTRAVENOUS at 06:26

## 2019-01-01 RX ADMIN — ACETAMINOPHEN 650 MG: 325 TABLET ORAL at 21:56

## 2019-01-01 RX ADMIN — SODIUM CHLORIDE AND POTASSIUM CHLORIDE 75 ML/HR: 9; 1.49 INJECTION, SOLUTION INTRAVENOUS at 17:48

## 2019-01-01 RX ADMIN — MIDODRINE HYDROCHLORIDE 2.5 MG: 5 TABLET ORAL at 16:37

## 2019-01-01 RX ADMIN — PIPERACILLIN SODIUM,TAZOBACTAM SODIUM 3.38 G: 3; .375 INJECTION, POWDER, FOR SOLUTION INTRAVENOUS at 18:11

## 2019-01-01 RX ADMIN — POTASSIUM CHLORIDE 10 MEQ: 7.46 INJECTION, SOLUTION INTRAVENOUS at 08:14

## 2019-01-01 RX ADMIN — Medication 10 ML: at 09:02

## 2019-01-01 RX ADMIN — FUROSEMIDE 20 MG: 20 TABLET ORAL at 16:45

## 2019-01-01 RX ADMIN — WATER 10 ML: 1 INJECTION INTRAMUSCULAR; INTRAVENOUS; SUBCUTANEOUS at 08:57

## 2019-01-01 RX ADMIN — SODIUM CHLORIDE, SODIUM GLUCONATE, SODIUM ACETATE, POTASSIUM CHLORIDE AND MAGNESIUM CHLORIDE 75 ML/HR: 526; 502; 368; 37; 30 INJECTION, SOLUTION INTRAVENOUS at 21:04

## 2019-01-01 RX ADMIN — ROMIPLOSTIM 138 MCG: 250 INJECTION, POWDER, LYOPHILIZED, FOR SOLUTION SUBCUTANEOUS at 08:46

## 2019-01-01 RX ADMIN — FAMOTIDINE 20 MG: 10 INJECTION INTRAVENOUS at 08:24

## 2019-01-01 RX ADMIN — POTASSIUM & SODIUM PHOSPHATES POWDER PACK 280-160-250 MG 2 PACKET: 280-160-250 PACK at 23:18

## 2019-01-01 RX ADMIN — LEVOFLOXACIN 750 MG: 750 INJECTION, SOLUTION INTRAVENOUS at 20:06

## 2019-01-01 RX ADMIN — ROMIPLOSTIM 207 MCG: 250 INJECTION, POWDER, LYOPHILIZED, FOR SOLUTION SUBCUTANEOUS at 08:35

## 2019-01-01 RX ADMIN — HYDROCODONE BITARTRATE AND ACETAMINOPHEN 1 TABLET: 5; 325 TABLET ORAL at 16:30

## 2019-01-01 RX ADMIN — SODIUM CHLORIDE, SODIUM GLUCONATE, SODIUM ACETATE, POTASSIUM CHLORIDE AND MAGNESIUM CHLORIDE 75 ML/HR: 526; 502; 368; 37; 30 INJECTION, SOLUTION INTRAVENOUS at 13:25

## 2019-01-01 RX ADMIN — DIGOXIN 0.12 MG: 125 TABLET ORAL at 10:25

## 2019-01-01 RX ADMIN — ROMIPLOSTIM 138 MCG: 250 INJECTION, POWDER, LYOPHILIZED, FOR SOLUTION SUBCUTANEOUS at 16:30

## 2019-01-01 RX ADMIN — WATER 0.72 ML: 1 INJECTION INTRAMUSCULAR; INTRAVENOUS; SUBCUTANEOUS at 08:37

## 2019-01-01 RX ADMIN — DIGOXIN 0.12 MG: 125 TABLET ORAL at 08:55

## 2019-01-01 RX ADMIN — DOCUSATE SODIUM 100 MG: 100 CAPSULE, LIQUID FILLED ORAL at 15:02

## 2019-01-01 RX ADMIN — WATER 0.72 ML: 1 INJECTION INTRAMUSCULAR; INTRAVENOUS; SUBCUTANEOUS at 09:33

## 2019-01-01 RX ADMIN — MAGNESIUM SULFATE HEPTAHYDRATE 2 G: 40 INJECTION, SOLUTION INTRAVENOUS at 08:57

## 2019-01-01 RX ADMIN — MELATONIN TAB 5 MG 5 MG: 5 TAB at 02:34

## 2019-01-01 RX ADMIN — Medication 30 ML: at 08:25

## 2019-01-01 RX ADMIN — Medication 20 ML: at 08:29

## 2019-01-01 RX ADMIN — ONDANSETRON 4 MG: 2 INJECTION INTRAMUSCULAR; INTRAVENOUS at 23:08

## 2019-01-01 RX ADMIN — HEPARIN SODIUM (PORCINE) LOCK FLUSH IV SOLN 100 UNIT/ML 500 UNITS: 100 SOLUTION at 20:52

## 2019-01-01 RX ADMIN — MIDAZOLAM HYDROCHLORIDE 1 MG: 2 INJECTION, SOLUTION INTRAMUSCULAR; INTRAVENOUS at 10:15

## 2019-01-01 RX ADMIN — SODIUM CHLORIDE 1000 ML: 900 INJECTION, SOLUTION INTRAVENOUS at 19:45

## 2019-01-01 RX ADMIN — FENTANYL CITRATE 50 MCG: 50 INJECTION INTRAMUSCULAR; INTRAVENOUS at 10:15

## 2019-01-01 RX ADMIN — ROMIPLOSTIM 207 MCG: 250 INJECTION, POWDER, LYOPHILIZED, FOR SOLUTION SUBCUTANEOUS at 09:02

## 2019-01-01 RX ADMIN — GABAPENTIN 200 MG: 100 CAPSULE ORAL at 10:26

## 2019-01-01 RX ADMIN — Medication 2 MCG/MIN: at 01:14

## 2019-01-01 RX ADMIN — MIDODRINE HYDROCHLORIDE 2.5 MG: 5 TABLET ORAL at 10:26

## 2019-01-01 RX ADMIN — WATER 0.72 ML: 1 INJECTION INTRAMUSCULAR; INTRAVENOUS; SUBCUTANEOUS at 08:43

## 2019-01-01 RX ADMIN — PIPERACILLIN SODIUM,TAZOBACTAM SODIUM 3.38 G: 3; .375 INJECTION, POWDER, FOR SOLUTION INTRAVENOUS at 23:35

## 2019-01-01 RX ADMIN — PIPERACILLIN SODIUM,TAZOBACTAM SODIUM 3.38 G: 3; .375 INJECTION, POWDER, FOR SOLUTION INTRAVENOUS at 06:37

## 2019-01-01 RX ADMIN — RIVAROXABAN 20 MG: 20 TABLET, FILM COATED ORAL at 08:55

## 2019-01-01 RX ADMIN — ROMIPLOSTIM 138 MCG: 250 INJECTION, POWDER, LYOPHILIZED, FOR SOLUTION SUBCUTANEOUS at 08:43

## 2019-01-01 RX ADMIN — SODIUM CHLORIDE 1000 ML: 900 INJECTION, SOLUTION INTRAVENOUS at 20:39

## 2019-01-01 RX ADMIN — SODIUM CHLORIDE, SODIUM GLUCONATE, SODIUM ACETATE, POTASSIUM CHLORIDE AND MAGNESIUM CHLORIDE 500 ML: 526; 502; 368; 37; 30 INJECTION, SOLUTION INTRAVENOUS at 10:27

## 2019-01-01 RX ADMIN — WATER: 1 INJECTION INTRAMUSCULAR; INTRAVENOUS; SUBCUTANEOUS at 09:03

## 2019-01-01 RX ADMIN — ALUMINUM HYDROXIDE AND MAGNESIUM HYDROXIDE 15 ML: 200; 200 SUSPENSION ORAL at 11:42

## 2019-01-01 RX ADMIN — Medication 20 ML: at 16:29

## 2019-01-01 RX ADMIN — Medication 20 ML: at 09:45

## 2019-01-01 RX ADMIN — NITROFURANTOIN MACROCRYSTALS 100 MG: 50 CAPSULE ORAL at 18:36

## 2019-01-01 RX ADMIN — POTASSIUM CHLORIDE 40 MEQ: 20 TABLET, EXTENDED RELEASE ORAL at 23:02

## 2019-01-01 RX ADMIN — IOPAMIDOL 80 ML: 612 INJECTION, SOLUTION INTRAVENOUS at 18:06

## 2019-01-01 RX ADMIN — MAGNESIUM SULFATE HEPTAHYDRATE 1 G: 1 INJECTION, SOLUTION INTRAVENOUS at 08:49

## 2019-01-01 RX ADMIN — VANCOMYCIN HYDROCHLORIDE 1000 MG: 10 INJECTION, POWDER, LYOPHILIZED, FOR SOLUTION INTRAVENOUS at 21:45

## 2019-01-01 RX ADMIN — ROMIPLOSTIM 207 MCG: 250 INJECTION, POWDER, LYOPHILIZED, FOR SOLUTION SUBCUTANEOUS at 12:25

## 2019-01-01 RX ADMIN — WATER: 1 INJECTION INTRAMUSCULAR; INTRAVENOUS; SUBCUTANEOUS at 09:35

## 2019-01-01 RX ADMIN — ALLOPURINOL 100 MG: 100 TABLET ORAL at 08:55

## 2019-01-01 RX ADMIN — WATER 10 ML: 1 INJECTION INTRAMUSCULAR; INTRAVENOUS; SUBCUTANEOUS at 10:11

## 2019-01-01 RX ADMIN — HEPARIN 1000 UNITS: 100 SYRINGE at 08:41

## 2019-01-01 RX ADMIN — GABAPENTIN 200 MG: 100 CAPSULE ORAL at 21:35

## 2019-01-01 RX ADMIN — PIPERACILLIN SODIUM,TAZOBACTAM SODIUM 3.38 G: 3; .375 INJECTION, POWDER, FOR SOLUTION INTRAVENOUS at 06:00

## 2019-01-01 RX ADMIN — ROMIPLOSTIM 138 MCG: 250 INJECTION, POWDER, LYOPHILIZED, FOR SOLUTION SUBCUTANEOUS at 08:53

## 2019-01-01 RX ADMIN — Medication 10 ML: at 10:12

## 2019-01-01 RX ADMIN — PIPERACILLIN SODIUM,TAZOBACTAM SODIUM 3.38 G: 3; .375 INJECTION, POWDER, FOR SOLUTION INTRAVENOUS at 11:23

## 2019-01-01 RX ADMIN — ROMIPLOSTIM 138 MCG: 250 INJECTION, POWDER, LYOPHILIZED, FOR SOLUTION SUBCUTANEOUS at 08:39

## 2019-01-01 RX ADMIN — ROMIPLOSTIM 207 MCG: 250 INJECTION, POWDER, LYOPHILIZED, FOR SOLUTION SUBCUTANEOUS at 08:37

## 2019-01-01 RX ADMIN — POTASSIUM & SODIUM PHOSPHATES POWDER PACK 280-160-250 MG 2 PACKET: 280-160-250 PACK at 08:49

## 2019-01-01 RX ADMIN — SODIUM CHLORIDE, PRESERVATIVE FREE 1000 UNITS: 5 INJECTION INTRAVENOUS at 08:41

## 2019-01-01 RX ADMIN — POTASSIUM & SODIUM PHOSPHATES POWDER PACK 280-160-250 MG 2 PACKET: 280-160-250 PACK at 10:27

## 2019-01-01 RX ADMIN — ACETAMINOPHEN 650 MG: 325 TABLET ORAL at 21:36

## 2019-01-01 RX ADMIN — PIPERACILLIN SODIUM,TAZOBACTAM SODIUM 3.38 G: 3; .375 INJECTION, POWDER, FOR SOLUTION INTRAVENOUS at 12:06

## 2019-01-01 RX ADMIN — ROMIPLOSTIM 207 MCG: 250 INJECTION, POWDER, LYOPHILIZED, FOR SOLUTION SUBCUTANEOUS at 09:37

## 2019-01-01 RX ADMIN — LIDOCAINE HYDROCHLORIDE: 10 INJECTION, SOLUTION EPIDURAL; INFILTRATION; INTRACAUDAL; PERINEURAL at 02:29

## 2019-01-01 RX ADMIN — SODIUM CHLORIDE, SODIUM GLUCONATE, SODIUM ACETATE, POTASSIUM CHLORIDE AND MAGNESIUM CHLORIDE 75 ML/HR: 526; 502; 368; 37; 30 INJECTION, SOLUTION INTRAVENOUS at 23:51

## 2019-01-01 RX ADMIN — ROMIPLOSTIM 138 MCG: 250 INJECTION, POWDER, LYOPHILIZED, FOR SOLUTION SUBCUTANEOUS at 09:06

## 2019-01-01 RX ADMIN — ACETAMINOPHEN 650 MG: 325 TABLET ORAL at 08:49

## 2019-01-01 RX ADMIN — GABAPENTIN 200 MG: 100 CAPSULE ORAL at 08:55

## 2019-01-01 RX ADMIN — ALBUMIN (HUMAN) 25 G: 12.5 INJECTION, SOLUTION INTRAVENOUS at 10:26

## 2019-01-01 RX ADMIN — Medication 6 MCG/MIN: at 08:02

## 2019-01-01 RX ADMIN — WATER 10 ML: 1 INJECTION INTRAMUSCULAR; INTRAVENOUS; SUBCUTANEOUS at 10:07

## 2019-01-01 RX ADMIN — WATER: 1 INJECTION INTRAMUSCULAR; INTRAVENOUS; SUBCUTANEOUS at 09:33

## 2019-01-01 RX ADMIN — LIDOCAINE HYDROCHLORIDE: 10 INJECTION, SOLUTION EPIDURAL; INFILTRATION; INTRACAUDAL; PERINEURAL at 00:56

## 2019-01-01 RX ADMIN — ROMIPLOSTIM 138 MCG: 250 INJECTION, POWDER, LYOPHILIZED, FOR SOLUTION SUBCUTANEOUS at 08:58

## 2019-01-01 RX ADMIN — GABAPENTIN 200 MG: 100 CAPSULE ORAL at 22:36

## 2019-01-08 PROBLEM — N18.9 RENAL FAILURE (ARF), ACUTE ON CHRONIC (HCC): Status: ACTIVE | Noted: 2019-01-01

## 2019-01-08 PROBLEM — R57.9 SHOCK (HCC): Status: ACTIVE | Noted: 2019-01-01

## 2019-01-08 PROBLEM — N17.9 RENAL FAILURE (ARF), ACUTE ON CHRONIC (HCC): Status: ACTIVE | Noted: 2019-01-01

## 2019-01-08 NOTE — ED NOTES
TRANSFER - OUT REPORT: 
 
Telephone Verbal report given to [facility]) on [facility]  being transferred to (unit) for routine progression of care Report consisted of patients Situation, Background, Assessment and  
Recommendations(SBAR). Information from the following report(s) SBAR, Kardex, ED Summary, Procedure Summary, Intake/Output, MAR, Accordion, Recent Results and Med Rec Status was reviewed with the receiving nurse. Opportunity for questions and clarification was provided. Patient transported with: 
 Monitor [address] Transport [place]

## 2019-01-08 NOTE — ED NOTES
IV fluid boluses completed, pt blood pressure remain at the low 80-70 at systole, MD [place] made aware.

## 2019-01-08 NOTE — PROGRESS NOTES
NUTRITION Nursing Referral: [facility] 
  
RECOMMENDATIONS / PLAN:  
 
- Continue current nutrition interventions. - Monitor po intake and diet tolerance. - Continue RD inpatient monitoring and evaluation. NUTRITION INTERVENTIONS & DIAGNOSIS:  
 
[x] Meals/snacks: modified composition 
[x] Vitamin and mineral supplement therapy: KCl replacement 
[x] Collaboration and referral of nutrition care: interdisciplinary rounds Nutrition Diagnosis: Altered nutrition-related laboratory value, potassium related to acute renal failure as evidenced by pt with hypokalemia on admission, potassium of 2.8 mmol/L. ASSESSMENT:  
 
Pt reports usual good appetite, eating well PTA but has not eaten since admitted and hungry today. Denies nausea/vomiting, no difficulty swallowing and tolerating po medications per RN. Receiving electrolyte replacement, acute renal failure on admission- Crea improved today. Average po intake adequate to meet patients estimated nutritional needs:   [] Yes     [] No   [x] Unable to determine at this time Diet: DIET CARDIAC Regular Food Allergies: NKFA Current Appetite:   [] Good     [x] Fair     [] Poor     [] Other: 
Appetite/meal intake prior to admission:   [x] Good     [] Fair     [] Poor     [] Other: 
Feeding Limitations:  [] Swallowing difficulty    [] Chewing difficulty    [] Other: 
Current Meal Intake: No data found. BM: PTA Skin Integrity: WDL Edema:   [x] No     [] Yes Pertinent Medications: Reviewed: levophed, 1 gm Ca, 2 gm Mg, 30 mEq KCl + 20 mEq Recent Labs 01/08/19 
0530 01/07/19 
1948  139  
K 2.9* 2.8*  
CL 99* 94* CO2 31 37* GLU 91 90 BUN 43* 50* CREA 1.42* 1.95* CA 8.0* 9.5 MG 1.8  --   
PHOS 3.5  --   
ALB 3.1* 3.8 SGOT 28 39* ALT 17 25 Intake/Output Summary (Last 24 hours) at 1/8/2019 7206 Last data filed at 1/8/2019 3538 Gross per 24 hour Intake 791.56 ml Output 1325 ml Net -533.44 ml Anthropometrics: Ht Readings from Last 1 Encounters:  
01/07/19 5' 6\" (1.676 m) Last 3 Recorded Weights in this Encounter 01/07/19 1926 01/08/19 0400 Weight: 66 kg (145 lb 8 oz) 71.5 kg (157 lb 10.1 oz) Body mass index is 25.44 kg/m². Weight History: patient denies change in weight PTA and reports usual weight of 145-150 lb Weight Metrics 1/8/2019 12/31/2018 11/30/2018 11/27/2018 11/14/2018 11/9/2018 11/2/2018 Weight 157 lb 10.1 oz 150 lb 158 lb 157 lb 8 oz 160 lb 169 lb 11.2 oz -  
BMI 25.44 kg/m2 24.21 kg/m2 25.5 kg/m2 25.42 kg/m2 25.82 kg/m2 - 27.39 kg/m2 Admitting Diagnosis: Renal failure (ARF), acute on chronic (HCC) Shock ([facility] Pertinent PMHx: breast cancer, cardiomyopathy, CHF, DM, esophageal cancer, atrial fibrillation Education Needs:        [x] None identified  [] Identified - Not appropriate at this time  []  Identified and addressed - refer to education log Learning Limitations:   [x] None identified  [] Identified Cultural, Alevism & ethnic food preferences:  [x] None identified    [] Identified and addressed ESTIMATED NUTRITION NEEDS:  
 
Calories: 1795-6221 kcal (MSJx1.2-1.4) based on  [x] Actual BW 72 kg     [] IBW Protein: 58-86 gm (0.8-1.2 gm/kg) based on  [x] Actual BW      [] IBW Fluid: 1 mL/kcal 
  
MONITORING & EVALUATION:  
 
Nutrition Goal(s): 1. Po intake of meals will meet >75% of patient estimated nutritional needs within the next 7 days. Outcome:  [] Met/Ongoing    []  Not Met    [x] New/Initial Goal  
 
Monitoring:   [x] Food and beverage intake   [x] Diet order   [x] Nutrition-focused physical findings   [x] Treatment/therapy   [] Weight   [] Enteral nutrition intake Previous Recommendations (for follow-up assessments only):     []   Implemented       []   Not Implemented (RD to address)      [] No Longer Appropriate     [] No Recommendation Made Discharge Planning: cardiac, diabetic diet [x] Participated in care planning, discharge planning, & interdisciplinary rounds as appropriate Debra Bowling, JUAN, [address]  Pager: [phone]

## 2019-01-08 NOTE — ROUTINE PROCESS
Bedside and Verbal shift change report given to [address] (oncoming nurse) by Alondra Mckenna RN (offgoing nurse). Report included the following information SBAR, Kardex, MAR and Recent Results. SITUATION:  
? Code Status: Full Code 
? Reason for Admission: Renal failure (ARF), acute on chronic (HCC) ? Shock ([address] ? Hospital day: 0 
? Problem List:  
   
Hospital Problems  Date Reviewed: 11/30/2018 Codes Class Noted POA Renal failure (ARF), acute on chronic (HCC) ICD-10-CM: N17.9, N18.9 ICD-9-CM: 584.9, 585.9  1/8/2019 Unknown Shock ([address] ICD-10-CM: R57.9 ICD-9-CM: 785.50  1/8/2019 Unknown BACKGROUND:  
 Past Medical History:  
Past Medical History:  
Diagnosis Date  Antineoplastic chemotherapy induced anemia  Arrhythmia Atrial Fib  Arthritis  Breast cancer ([address]  Cancer ([facility] 1999 Breast  
 Cardiomyopathy ([facility] [address]  Chronic ITP (idiopathic thrombocytopenia) ([facility]) 10/31/2016 Secondary to Anemia from Chemo  Congestive heart failure ([address]  Diabetes ([address] borderline, no meds  Esophageal cancer ([address] 2005  
 treated with chemo  Heart failure ([facility] [address]  SOB (shortness of breath) Patient taking anticoagulants 0  
 
ASSESSMENT:  
? Changes in Assessment Throughout Shift: Patient  continued on Levophed gtt for hypotension. ? Patient has Central Line: no Reasons if yes: no 
? Patient has Yanes Cath: yes Reasons if yes: strict I & O  
 
? Last Vitals: 
  
Vitals:  
 01/08/19 0615 01/08/19 0630 01/08/19 0645 01/08/19 0700 BP: 90/60 97/57 92/63 98/58 Pulse: 60 62 63 (!) 58 Resp: 11 14 16 13 Temp:      
SpO2: 100% 100% 100% 100% Weight:      
Height:      
 
 
? IV and DRAINS (will only show if present) Venous Access Device Mediport Upper chest (subclavicular area, right-Site Assessment: Clean, dry, & intact Peripheral IV 01/07/19 Right Antecubital-Site Assessment: Clean, dry, & intact ? WOUND (if present) Wound Type:  none Dressing present Dressing Present : Yes, Intact, not due to be changed Wound Concerns/Notes:  none ? PAIN Pain Assessment Pain Intensity 1: 0 (01/08/19 0400) Patient Stated Pain Goal: 0 
o Interventions for Pain:  None. Pt states no complaints of pain  
o Intervention effective: yes 
o Time of last intervention: 0400  
o Reassessment Completed: yes ? Last 3 Weights: 
Last 3 Recorded Weights in this Encounter 01/07/19 1926 01/08/19 0400 Weight: 66 kg (145 lb 8 oz) 71.5 kg (157 lb 10.1 oz) Weight change: ? INTAKE/OUPUT Current Shift: 01/08 0701 - 01/08 1900 In: -  
Out: [address] Last three shifts: 01/06 1901 - 01/08 0700 In: 791.6 [I.V.:791.6] Out: 650 [Urine:650] ? LAB RESULTS Recent Labs 01/08/19 0530 01/07/19 1948 WBC 6.7 7.7 HGB 10.4* 10.7* HCT 31.3* 33.0*  
* 103* Recent Labs 01/08/19 0530 01/07/19 1948  139  
K 2.9* 2.8*  
GLU 91 90 BUN 43* 50* CREA 1.42* 1.95* CA 8.0* 9.5 MG 1.8  --   
INR  --  2.3*  
 
 
RECOMMENDATIONS AND DISCHARGE PLANNING 1. Pending tests/procedures/ Plan of Care or Other Needs: 2D echo 2. Discharge plan for patient and Needs/Barriers: pending 3. Estimated Discharge Date: 01/15/2019 Posted on Whiteboard in [facility] Room: yes 4. The patient's care plan was reviewed with the oncoming nurse. \"HEALS\" SAFETY CHECK Fall Risk Total Score: 3 Safety Measures: Safety Measures: Bed/Chair-Wheels locked, Bed in low position, Call light within reach, Emergency bedside equipment, Fall prevention (comment), Gripper socks, Side rails X 3 A safety check occurred in the patient's room between off going nurse and oncoming nurse listed above. The safety check included the below items Area Items H High Alert Medications ? Verify all high alert medication drips (heparin, PCA, etc.) E Equipment ? Suction is set up for ALL patients (with rusty) ? Red plugs utilized for all equipment (IV pumps, etc.) ? WOWs wiped down at end of shift. ? Room stocked with oxygen, suction, and other unit-specific supplies A Alarms ? Bed alarm is set for fall risk patients ? Ensure chair alarm is in place and activated if patient is up in a chair L Lines ? Check IV for any infiltration ? Yanes bag is empty if patient has a Yanes ? Tubing and IV bags are labeled Storm Dimmer Safety ? Room is clean, patient is clean, and equipment is clean. ? Hallways are clear from equipment besides carts. ? Fall bracelet on for fall risk patients ? Ensure room is clear and free of clutter ? Suction is set up for ALL patients (with rusty) ? Hallways are clear from equipment besides carts. ? Isolation precautions followed, supplies available outside room, sign posted Ani Carbajal RN

## 2019-01-08 NOTE — ROUTINE PROCESS
TRANSFER - IN REPORT: 
 
Verbal report received from Migdalia RN(name) on Ahsan Rm  being received from [place] RN(unit) for routine progression of care Report consisted of patients Situation, Background, Assessment and  
Recommendations(SBAR). Information from the following report(s) SBAR, Kardex, Intake/Output and MAR was reviewed with the receiving nurse. Opportunity for questions and clarification was provided. Assessment completed upon patients arrival to unit and care assumed.

## 2019-01-08 NOTE — PROGRESS NOTES
Reason for Admission:  Renal failure, acute on chronic, shock RRAT Score:    27 Resources/supports as identified by patient/family: P ayaz has [employer] Patient Assistance, Medicare for insurance. She is , independent with ADL's and lives with her daughter, son, and grandson. Family provides transportation. Top Challenges facing patient (as identified by patient/family and CM): Finances/Medication cost?  As above Transportation? Family provides Support system or lack thereof? Multiple family members in area that are involved in her care Living arrangements? Pt lives with her daughterMarisela Oliver and son Carmen Long, and grandson. Self-care/ADLs/Cognition? A/O x4, independent with ADL's Current Advanced Directive/Advance Care Plan:  Not on file Plan for utilizing home health:  Unable to determine at this time. Pt in ICU setting Likelihood of readmission: red/high Transition of Care Plan: Interviewed pt who is alert and oriented x 4. Verified all face sheet information. Pt states that her daughter Shanel Parham ([phone]) is her primary contact. Pt relates to CM that she lives with her daughter, son, and grandson in a one story home which has 3 steps to enter. She is independent with ADL's at baseline and has a cane at home which she uses when she is short of breath, but otherwise no other DME. She denies having any issues with obtaining her meds from the pharmacy, or taking meds on her own. Patient has designated _daughter_______________________ to participate in his/her discharge plan and to receive any needed information. Name: Shanel Parham Address: 
Phone number: [phone] Care Management Interventions PCP Verified by CM: Yes Last Visit to PCP: 12/07/18 Mode of Transport at Discharge: Self Transition of Care Consult (CM Consult): Discharge Planning Discharge Durable Medical Equipment: No 
Physical Therapy Consult: No 
Occupational Therapy Consult: No 
Speech Therapy Consult: No 
Current Support Network: Relative's Home Confirm Follow Up Transport: Family Plan discussed with Pt/Family/Caregiver: Yes Discharge Location Discharge Placement: Unable to determine at this time Citlalli Celaya  [phone]

## 2019-01-08 NOTE — CONSULTS
Consult Note Assessment: · JUAN. Etio- volume depletion due to use of fairly high dose of lasix in a patient with preserved EF and preserved gfr. Hypokalemia and met alkalosis also point towards overdiuresis. Renal function is improving, doubt progression to ischemic atn. · Hypokalemia. · Met alkalosis due to diuresis. · Hypotension due to volume depletion, less likely due to sepsis. · HFpEF. · Chronic a.fib. Rate is controlled. Recommendations:  
· Continue NS. · Wean off lephobed. · Continue K replacement. · Will need medications reconsiliation before d/c. Possibly is taking more then one diuretic (was on metolazone in the past). Probably won't nee loop diuretic. · Avoid NSAID's, IV dye. · Avoid Gadolinium due to its association with nephrogenic systemic fibrosis in a patients with severe ARF and ESRD. · Avoid fleets enemas due to concern for acute phosphate nephropathy. · Please dose all medications for approximate creatinine clearance 45-30. Thank you. Consult requested by: Gaby Lainez MD 
 
ADMIT DATE: 1/7/2019  CONSULT DATE: January 8, 2019 Admission diagnosis: hypotension. Reason for Nephrology Consultation: JUAN. HPI: Edyta Rondon is a 79 y.o. female [address] with h/o of lt breast cancer (in remission), ITP, htn, , chronic a.fib, HFpEF and similar admission in November of 2018 with hypotension and juan who states she was feeling well until yesterday afternoon when she noticed dizziness and lightheadedness. Patient checked her bp and systolic was in the 59'Y. Off note bp was normal earlier the same morning. She is on lasix 40 mg twice a day, although usually takes it once a day. In the ED patient was hypotensive, she was started on ivf, pressors, admitted to icu. Patient feels better. Scr was 1.92 yesterday, down to 1.42 today. Past Medical History:  
Diagnosis Date  Antineoplastic chemotherapy induced anemia  Arrhythmia Atrial Fib  Arthritis  Breast cancer ([facility] [address]  Cancer ([facility] [address] 1999 Breast  
 Cardiomyopathy ([facility] [address]  Chronic ITP (idiopathic thrombocytopenia) (HCC) 10/31/2016 Secondary to Anemia from Chemo  Congestive heart failure ([facility] [address]  Diabetes ([facility] [address] borderline, no meds  Esophageal cancer ([facility] [address] 2005  
 treated with chemo  Heart failure ([facility] [address]  SOB (shortness of breath) Past Surgical History:  
Procedure Laterality Date  BREAST SURGERY PROCEDURE UNLISTED Left 1990s  
 lumpectomy  COLONOSCOPY N/A 7/27/2016 COLONOSCOPY performed by Марина Collazo MD at [facility] ENDOSCOPY  
 HX APPENDECTOMY  HX HEENT Tonsillectomy  HX ORTHOPAEDIC    
 HX TUBAL LIGATION    
 HX VASCULAR ACCESS    
 NEUROLOGICAL PROCEDURE UNLISTED  01/08/2018 Lumbar fusion Social History Socioeconomic History  Marital status:  Spouse name: Not on file  Number of children: Not on file  Years of education: Not on file  Highest education level: Not on file Social Needs  Financial resource strain: Not on file  Food insecurity - worry: Not on file  Food insecurity - inability: Not on file  Transportation needs - medical: Not on file  Transportation needs - non-medical: Not on file Occupational History  Not on file Tobacco Use  Smoking status: Never Smoker  Smokeless tobacco: Never Used Substance and Sexual Activity  Alcohol use: No  
 Drug use: No  
 Sexual activity: Not on file Other Topics Concern  Not on file Social History Narrative  Not on file History reviewed. No pertinent family history. Allergies Allergen Reactions  Aspirin Swelling Pt states her whole body swells when she takes aspirin.  Adhesive Unable to Obtain  Nickel Rash  Pcn [Penicillins] Rash Home Medications:  
 
Medications Prior to Admission Medication Sig  
  lidocaine-prilocaine (EMLA) topical cream APPLY OVER PORT 1 HOUR PRIOR TO CHEMOTHERAPY THAN COVER WITH SARAN WRAP  
 oxyCODONE-acetaminophen (PERCOCET 10)  mg per tablet Take 1 Tab by mouth every six (6) hours as needed for Pain. Max Daily Amount: 4 Tabs.  LORazepam (ATIVAN) 1 mg tablet Take 1 Tab by mouth nightly as needed for Anxiety. Max Daily Amount: 1 mg.  allopurinol (ZYLOPRIM) 100 mg tablet Take 1 Tab by mouth daily.  colchicine (MITIGARE) 0.6 mg capsule Take 1 Cap by mouth daily.  diclofenac (VOLTAREN) 1 % gel Apply 2 g to affected area three (3) times daily as needed for Pain. Gel should be measured and applied using the supplied dosing card. Apply dose (2 gm) to each location. If treatment site is the hands, patients should wait at least one (1) hour to wash their hands. APPLY TO ankle and knees.  albuterol (PROAIR HFA) 90 mcg/actuation inhaler 1-2 puffs every 4-6 hrs. (Patient taking differently: Take 2 Puffs by inhalation every four (4) hours as needed for Shortness of Breath or Wheezing.)  gabapentin (NEURONTIN) 300 mg capsule Take 1 Cap by mouth three (3) times daily.  ergocalciferol (VITAMIN D2) 50,000 unit capsule Take 1 Cap by mouth every seven (7) days.  topiramate (TOPAMAX) 50 mg tablet Take 50 mg by mouth daily.  LINZESS 145 mcg cap capsule TAKE 1 CAPSULE BY MOUTH DAILY 30 MINUTES BEFORE BREAKFAST  loratadine 10 mg cap Take 10 mg by mouth daily.  digoxin (LANOXIN) 0.125 mg tablet Take 0.125 mg by mouth daily.  rivaroxaban (XARELTO) 10 mg tablet Take 10 mg by mouth daily.  aluminum-magnesium hydroxide 200-200 mg/5 mL susp 5 mL, diphenhydrAMINE 12.5 mg/5 mL liqd 12.5 mg, lidocaine 2 % soln 5 mL 5 mL by Swish and Spit route two (2) times a day. Magic mouth wash Maalox Lidocaine 2% viscous Diphenhydramine oral solution Pharmacy to mix equal portions of ingredients to a total volume as indicated in the dispense amount.  senna (SENNA) 8.6 mg tablet Take 1 Tab by mouth daily.  ondansetron hcl (ZOFRAN) 8 mg tablet Take 1 Tab by mouth every eight (8) hours as needed for Nausea.  IRON AG&FUM/C/FA/MV CMB11/CA-T (PRUVATE 21-7 PO) Take 325 mg by mouth daily. Indications: iron deficiency Current Inpatient Medications:  
 
Current Facility-Administered Medications Medication Dose Route Frequency  NOREPINephrine (LEVOPHED) 8 mg in 5% dextrose 250mL infusion  2-16 mcg/min IntraVENous TITRATE  sodium chloride (NS) flush 5-40 mL  5-40 mL IntraVENous PRN  
 acetaminophen (TYLENOL) tablet 650 mg  650 mg Oral Q6H PRN  
 ondansetron (ZOFRAN) injection 4 mg  4 mg IntraVENous Q4H PRN  piperacillin-tazobactam (ZOSYN) 3.375 g in 0.9% sodium chloride (MBP/ADV) 100 mL MBP  3.375 g IntraVENous Q6H  
 vancomycin (VANCOCIN) 1000 mg in  ml infusion  1,000 mg IntraVENous Q24H  
 rivaroxaban (XARELTO) tablet 10 mg  10 mg Oral DAILY WITH BREAKFAST  sodium chloride (NS) flush 5-10 mL  5-10 mL IntraVENous PRN Review of Systems: No fever or chills. No sore throat. No cough or hemoptysis. No shortness of breath or chest pain. No orthopnea or paroxysmal nocturnal dyspnea. Good appetite. No nausea, vomiting, abdominal pain, melena or hematochezia. No constipation or diarrhea. No dysuria, no gross hematuria of voiding difficulties. No ankle swelling, no joint paints. No muscle aches. No skin changes. No headaches. Physical Assessment:  
 
Vitals:  
 01/08/19 1530 01/08/19 1545 01/08/19 1600 01/08/19 1617 BP: 91/51 95/54 101/50 95/43 Pulse: 74 75 74 83 Resp: 20 17 22 20 Temp:   98.1 °F (36.7 °C) SpO2: 100% 100% 100% 100% Weight:      
Height:      
 
Last 3 Recorded Weights in this Encounter 01/07/19 1926 01/08/19 0400 01/08/19 1226 Weight: 66 kg (145 lb 8 oz) 71.5 kg (157 lb 10.1 oz) 71.2 kg (157 lb) Admission weight: Weight: 66 kg (145 lb 8 oz) (01/07/19 1926) Intake/Output Summary (Last 24 hours) at 1/8/2019 1635 Last data filed at 1/8/2019 1600 Gross per 24 hour Intake 917.89 ml Output 2200 ml Net -1282.11 ml Patient is in no apparent distress. HEENT: Head is normocephalic and atraumatic. Pupils are round, equal, reactive to light. Sclerae are anicteric. Oropharynx clear. Neck: no cervical lymphadenopathy or thyromegaly. Lungs: good air entry, clear to auscultation bilaterally. Trachea at the midline. Cardiovascular system: S1, S2, regular rate and rhythm. Soft syst murmur, no gallops or rubs. No jvd. Carotid upstroke 2 + bilaterally. Rt ij mediport is in remission Abdomen: soft, non tender, non distended. Positive bowel sounds. No hepatosplenomegaly. No abdominal bruits. Extremities: no clubbing, cyanosis or edema. Strong dorsalis pedis pulses. Brisk capillary refill on the toes bilaterally. Integumentary: skin is grossly intact. Neurologic: Alert, oriented time three. Cooperative and appropriate. No gross motor or sensory deficits. Data Review: 
 
Labs: Results:  
   
Chemistry Recent Labs 01/08/19 
0530 01/07/19 1948 GLU 91 90  139  
K 2.9* 2.8*  
CL 99* 94* CO2 31 37* BUN 43* 50* CREA 1.42* 1.95* CA 8.0* 9.5 AGAP 9 8 BUCR 30* 26* AP 70 73  
TP 7.5 8.5* ALB 3.1* 3.8 GLOB 4.4* 4.7* AGRAT 0.7* 0.8 PHOS 3.5  --   
  
  
CBC w/Diff Recent Labs 01/08/19 
0530 01/07/19 1948 WBC 6.7 7.7 RBC 3.96* 4.06* HGB 10.4* 10.7* HCT 31.3* 33.0*  
* 103* GRANS 57 62 LYMPH 26 22 EOS 4 3 Iron/Ferritin No results for input(s): IRON in the last 72 hours. No lab exists for component: TIBCCALC  
PTH/VIT D No results for input(s): PTH in the last 72 hours. No lab exists for component: VITD Rosalba Cummings M.D [facility] Office [phone] Pager [phone] January 8, 2019

## 2019-01-08 NOTE — ED NOTES
I performed a brief evaluation, including history and physical, of the patient here in triage and I have determined that pt will need further treatment and evaluation from the main side ER physician. I have placed initial orders to help in expediting patients care. January 07, 2019 at 7:29 PM - Derek Dutta Visit Vitals BP (!) 78/46 (BP 1 Location: Left arm, BP Patient Position: At rest) Pulse 83 Temp 97.5 °F (36.4 °C) Resp 18 Ht 5' 6\" (1.676 m) Wt 66 kg (145 lb 8 oz) BMI 23.48 kg/m²

## 2019-01-08 NOTE — H&P
[facility] Pulmonary, Critical Care, and Sleep Medicine Name: Adrián Flores MRN: [id number] : 1950 Hospital: [address] [place] [address] Date: 2019 Critical Care History and Physical 
 
 
IMPRESSION:  
· Shock-Questionable etiology. Hypovolemic shock vs Septic shock w/ unknown source less likely PE-no s/sx of hypoxia, SOB, or dyspnea · Nonspecific Dizziness and headaches most likely due to hypotension-2/2 Shock of unknown origin. Less likely positional vertigo vs postural presyncope plus negative Head CT · JUAN prerenal d/t hypotension vs postrenal d/t possible cancer mets? Crt of 1.42 
· Electrolyte derangements-K+ of 2.9, Mg+ of 1.8, ionized Ca+ 1.07 
· CHFpEF: NT pro- · Valvular heart disease: Moderate mitral regurg · Pulmonary hypertension, seen on echo today: Etiology due to WHO group 2-given patient's valvular heart disease with mitral regurg as well as CHFpEF Patient Active Problem List  
Diagnosis Code  Hypokalemia E87.6  Breast cancer metastasized to axillary lymph node ([address] C50.919, C77.3  Anemia D64.9  
 Cachexia ([address] R64  Weakness R53.1  Vitamin D deficiency E55.9  Iron deficiency anemia D50.9  Chronic anemia D64.9  Back pain of thoracolumbar region M54.5, M54.6  Thrombocytopenia ([address] D69.6  Chronic ITP (idiopathic thrombocytopenia) (HCC) D69.3  Muscular deconditioning R29.898  Insomnia G47.00  Bilateral lower extremity edema R60.0  Vertigo R42  Spinal stenosis of lumbar region with neurogenic claudication M48.062  Spondylisthesis M43.10  Lumbar stenosis M48.061  Lumbar stenosis with neurogenic claudication M48.062  
 S/P lumbar fusion Z98.1  Cancer associated pain G89.3  Oropharyngeal bleeding J39.2  Peripheral neuropathy G62.9  Sepsis ([address] A41.9  Pneumonia J18.9  Hypotension I95.9  Metastatic breast cancer ([address] C50.919  
  Renal failure (ARF), acute on chronic (HCC) N17.9, N18.9 RECOMMENDATIONS:  
· Neuro: PRN pain medications, no sedation. · Pulm: Room Air, Supplemental O2 via NC as needed for spO2 >94%. Keep HOB > 30' at all times. Aspiration Precautions. · CVS EKG & Troponins q6hrs, Actively titrate Norepi aim for SBP of 90 or > or MAP >65mmHg. Will substitute Norepi for alternative Inotrope if needed. Refrain from IVF at this time, Received 3L at [facility]. Repeat ECHO planned for today. · GI: SUP, Zofran PRN for N/V  
· Renal: Koehler to BSD, Trend Renal indices. Nephrology consulted, recommended continue IVF · Hem/Onc: Aleukocytosis, H/H, and platelets are stable, trend CBC. · I/D:Sepsis bundle performed in previous ED. Blood, Sputum, and Urine cultures drawn and will be followed. Lactic acid ordered. Antibiotics: Vanc & Zosyn · Endocrine: Glycemic control, q6 BS for SSI · Musc/Skin: no acute issues · Code Status: Full Code Best practice : 
Glycemic control IHI ICU bundles: Koehler Bundle Followed [facility] Vent patients- VAP bundle, aim to keep peak plateau pressure 51-23EA H2O Stress ulcer prophylaxis. Pepcid IVP  
DVT prophylaxis. Heparin SC/ SCD's Need for Lines, koehler assessed. Restraints not needed. Palliative care evaluation not required Subjective/History:  
01/08/19 Pt is 69y/o F with PMHx of Breast ca + mets to axillary lymph nodes and esophagus, CHF with preserved EF, CMO, Atrial Fibrillation, & ITP is present to [facility] for Shock. She was at home and suddenly developed a headache, dizziness, and blurred vision. She checked her BP every 30mins for 2hrs and her SBP went from 70's to 60's. She was admitted to [address] [place] [address] and was given 3L NS Bolus and was started on Levo. Of note, her potassium of 2.8 and was replaced by 40meq IV and 20po which was not tolerated and was vomited out by patient.  She also was seen to have JUAN w/ a Crt of 1.95 from her baseline of 0.89 in November 2018. She is coming to [facility] for management of Cardiogenic Shock vs Septic shock vs Combined. Pertinent background information include that patient was last on chemo in 2014 (R Mediport implanted), and as a result of chemo, she developed CHF, Pulmonary HTN, and CMO. She is seeing Dr. Trinity Arredondo w/ [facility]. Upon my assessment, patient denies any fevers, chills, or even nausea despite vomiting earlier at [facility]. She denies being around anyone that has been ill, and she denies any shortness of breath, chest pain, or dyspnea. Past Medical History:  
Diagnosis Date  Antineoplastic chemotherapy induced anemia  Arrhythmia Atrial Fib  Arthritis  Breast cancer ([address]  Cancer ([address] 1999 Breast  
 Cardiomyopathy ([address]  Chronic ITP (idiopathic thrombocytopenia) (HCC) 10/31/2016 Secondary to Anemia from Chemo  Congestive heart failure ([address]  Diabetes ([address] borderline, no meds  Esophageal cancer ([address] 2005  
 treated with chemo  Heart failure ([address]  SOB (shortness of breath) Past Surgical History:  
Procedure Laterality Date  BREAST SURGERY PROCEDURE UNLISTED Left 1990s  
 lumpectomy  COLONOSCOPY N/A 7/27/2016 COLONOSCOPY performed by Deidre Lyles MD at [facility] ENDOSCOPY  
 HX APPENDECTOMY  HX HEENT Tonsillectomy  HX ORTHOPAEDIC    
 HX TUBAL LIGATION    
 HX VASCULAR ACCESS    
 NEUROLOGICAL PROCEDURE UNLISTED  01/08/2018 Lumbar fusion Prior to Admission medications Medication Sig Start Date End Date Taking? Authorizing Provider  
lidocaine-prilocaine (EMLA) topical cream APPLY OVER PORT 1 HOUR PRIOR TO CHEMOTHERAPY THAN COVER WITH SARAN WRAP 1/2/19   Iker Malone NP  
oxyCODONE-acetaminophen (PERCOCET 10)  mg per tablet Take 1 Tab by mouth every six (6) hours as needed for Pain. Max Daily Amount: 4 Tabs. 11/30/18   Isabela Rowan MD  
LORazepam (ATIVAN) 1 mg tablet Take 1 Tab by mouth nightly as needed for Anxiety. Max Daily Amount: 1 mg. 11/30/18   Isabela Rowan MD  
allopurinol (ZYLOPRIM) 100 mg tablet Take 1 Tab by mouth daily. 11/10/18   Horacio Harp MD  
colchicine (MITIGARE) 0.6 mg capsule Take 1 Cap by mouth daily. 11/10/18   Horacio Harp MD  
diclofenac (VOLTAREN) 1 % gel Apply 2 g to affected area three (3) times daily as needed for Pain. Gel should be measured and applied using the supplied dosing card. Apply dose (2 gm) to each location. If treatment site is the hands, patients should wait at least one (1) hour to wash their hands. APPLY TO ankle and knees. 11/9/18   Horacio Harp MD  
albuterol (PROAIR HFA) 90 mcg/actuation inhaler 1-2 puffs every 4-6 hrs. Patient taking differently: Take 2 Puffs by inhalation every four (4) hours as needed for Shortness of Breath or Wheezing. 10/5/18   Carlos GAN NP  
gabapentin (NEURONTIN) 300 mg capsule Take 1 Cap by mouth three (3) times daily. 9/17/18   Isabela Rowan MD  
ergocalciferol (VITAMIN D2) 50,000 unit capsule Take 1 Cap by mouth every seven (7) days. 3/19/18   Isabela Rowan MD  
topiramate (TOPAMAX) 50 mg tablet Take 50 mg by mouth daily. 2/15/18   Provider, Historical  
LINZESS 145 mcg cap capsule TAKE 1 CAPSULE BY MOUTH DAILY 30 MINUTES BEFORE BREAKFAST 2/16/18   Provider, Historical  
loratadine 10 mg cap Take 10 mg by mouth daily. Provider, Historical  
digoxin (LANOXIN) 0.125 mg tablet Take 0.125 mg by mouth daily. Provider, Historical  
rivaroxaban (XARELTO) 10 mg tablet Take 10 mg by mouth daily. Provider, Historical  
aluminum-magnesium hydroxide 200-200 mg/5 mL susp 5 mL, diphenhydrAMINE 12.5 mg/5 mL liqd 12.5 mg, lidocaine 2 % soln 5 mL 5 mL by Swish and Spit route two (2) times a day. Magic mouth wash Maalox Lidocaine 2% viscous Diphenhydramine oral solution Pharmacy to mix equal portions of ingredients to a total volume as indicated in the dispense amount. 2/29/16   Sandy Scales NP  
senna (SENNA) 8.6 mg tablet Take 1 Tab by mouth daily. Provider, Historical  
ondansetron hcl (ZOFRAN) 8 mg tablet Take 1 Tab by mouth every eight (8) hours as needed for Nausea. 6/3/13   Yara Price MD  
IRON AG&FUM/C/FA/MV CMB11/CA-T (PRUVATE 21-7 PO) Take 325 mg by mouth daily. Indications: iron deficiency    Provider, Historical  
 
 
Current Facility-Administered Medications Medication Dose Route Frequency  NOREPINephrine (LEVOPHED) 8 mg in 5% dextrose 250mL infusion  2-16 mcg/min IntraVENous TITRATE  levoFLOXacin (LEVAQUIN) 750 mg in D5W IVPB  750 mg IntraVENous Q24H  
 vancomycin (VANCOCIN) 1250 mg in  ml infusion  1,250 mg IntraVENous Q18H  
 0.9% sodium chloride infusion 1,000 mL  1,000 mL IntraVENous CONTINUOUS Allergies Allergen Reactions  Aspirin Swelling Pt states her whole body swells when she takes aspirin.  Adhesive Unable to Obtain  Nickel Rash  Pcn [Penicillins] Rash Social History Tobacco Use  Smoking status: Never Smoker  Smokeless tobacco: Never Used Substance Use Topics  Alcohol use: No  
  
 
History reviewed. No pertinent family history. Review of Systems: 
Review of Systems Constitutional: Negative. HENT: Negative. Eyes: Positive for visual disturbance. Respiratory: Positive for shortness of breath. Cardiovascular: Negative. Negative for pitting edema. Gastrointestinal: Negative for n/v/c/d Endocrine: Negative. Genitourinary: Negative. Musculoskeletal: Negative. Skin: Negative. Allergic/Immunologic: Negative. Neurological: Positive for dizziness. Hematological: Negative. Psychiatric/Behavioral: The patient is nervous/anxious. All other systems reviewed and are negative. Objective: 
Vital Signs:   
Visit Vitals BP 90/61 Pulse 66  
 Temp 97.8 °F (36.6 °C) Resp 9 Ht 5' 6\" (1.676 m) Wt 66 kg (145 lb 8 oz) SpO2 100% BMI 23.48 kg/m² O2 Device: Room air Temp (24hrs), Av.6 °F (36.4 °C), Min:97.5 °F (36.4 °C), Max:97.8 °F (36.6 °C) Intake/Output:  
Last shift:       1901 -  0700 In: 750 [I.V.:750] Out: 650 [Urine:650] Last 3 shifts: No intake/output data recorded. Intake/Output Summary (Last 24 hours) at 2019 5287 Last data filed at 2019 0145 Gross per 24 hour Intake 750 ml Output 650 ml Net 100 ml Ventilator Settings: 
Mode Rate Tidal Volume Pressure FiO2 PEEP Peak airway pressure:     
Minute ventilation:     
 
ARDS network Guidelines:  
Lung protective strategy and Plateau  Pressure goal < 30 cm H2O goals Oxygenation Goals PaO2 55-80 mm Hg or SaO2 88-95% PH goal 7.30-7.45 [facility] Physical Exam:  
 
General:  Alert, cooperative, no distress. Head:  Normocephalic, without obvious abnormality, atraumatic. Eyes:  Conjunctivae/corneas clear. PERRL, Nose: Nares normal. Septum midline. Mucosa normal. No drainage or sinus tenderness. Throat: Lips, mucosa, and tongue normal. Teeth and gums normal.  
Neck: Supple, symmetrical, trachea midline, no adenopathy, thyroid: no enlargment/tenderness/nodules, no carotid bruit and no JVD. Lungs:   Clear to auscultation bilaterally. Chest wall:  No tenderness or deformity. Abdomen:   Soft, non-tender. Bowel sounds normal. No masses,  No organomegaly. Extremities: Extremities edematous w/ non-pitting edema. Pulses: 2+ and symmetric all extremities. Skin: Skin color, texture, turgor normal. No rashes or lesions Lymph nodes:  Cervical, supraclavicular, and axillary nodes normal.  
Neurologic: Alert and oriented x 4, good historian, able to communicate needs Devices: 
· ETT: None · OGT: None · Lines: Mediport · Drains: None · Yanes: Yes 
 
Data:  
 
Recent Results (from the past 24 hour(s)) EKG, 12 LEAD, INITIAL Collection Time: 01/07/19  7:37 PM  
Result Value Ref Range Ventricular Rate 81 BPM  
 Atrial Rate 159 BPM  
 QRS Duration 108 ms Q-T Interval 450 ms QTC Calculation (Bezet) 522 ms Calculated R Axis -28 degrees Calculated T Axis 6 degrees Diagnosis Atrial fibrillation Incomplete right bundle branch block Septal infarct (cited on or before 07-JAN-2019) Prolonged QT Abnormal ECG When compared with ECG of 04-NOV-2018 06:09, 
Questionable change in initial forces of Septal leads QT has lengthened CBC WITH AUTOMATED DIFF Collection Time: 01/07/19  7:48 PM  
Result Value Ref Range WBC 7.7 4.6 - 13.2 K/uL  
 RBC 4.06 (L) 4.20 - 5.30 M/uL  
 HGB 10.7 (L) 12.0 - 16.0 g/dL HCT 33.0 (L) 35.0 - 45.0 % MCV 81.3 74.0 - 97.0 FL  
 MCH 26.4 24.0 - 34.0 PG  
 MCHC 32.4 31.0 - 37.0 g/dL  
 RDW 15.4 (H) 11.6 - 14.5 % PLATELET 131 (L) 878 - 420 K/uL NEUTROPHILS 62 40 - 73 % LYMPHOCYTES 22 21 - 52 % MONOCYTES 13 (H) 3 - 10 % EOSINOPHILS 3 0 - 5 % BASOPHILS 0 0 - 2 %  
 ABS. NEUTROPHILS 4.8 1.8 - 8.0 K/UL  
 ABS. LYMPHOCYTES 1.7 0.9 - 3.6 K/UL  
 ABS. MONOCYTES 1.0 0.05 - 1.2 K/UL  
 ABS. EOSINOPHILS 0.2 0.0 - 0.4 K/UL  
 ABS. BASOPHILS 0.0 0.0 - 0.1 K/UL  
 DF AUTOMATED PLATELET COMMENTS DECREASED PLATELETS    
 RBC COMMENTS NORMOCYTIC, NORMOCHROMIC METABOLIC PANEL, COMPREHENSIVE Collection Time: 01/07/19  7:48 PM  
Result Value Ref Range Sodium 139 136 - 145 mmol/L Potassium 2.8 (LL) 3.5 - 5.5 mmol/L Chloride 94 (L) 100 - 108 mmol/L  
 CO2 37 (H) 21 - 32 mmol/L Anion gap 8 3.0 - 18 mmol/L Glucose 90 74 - 99 mg/dL BUN 50 (H) 7.0 - 18 MG/DL Creatinine 1.95 (H) 0.6 - 1.3 MG/DL  
 BUN/Creatinine ratio 26 (H) 12 - 20 GFR est AA 31 (L) >60 ml/min/1.73m2 GFR est non-AA 26 (L) >60 ml/min/1.73m2 Calcium 9.5 8.5 - 10.1 MG/DL  Bilirubin, total 0.4 0.2 - 1.0 MG/DL  
 ALT (SGPT) 25 13 - 56 U/L  
 AST (SGOT) 39 (H) 15 - 37 U/L Alk. phosphatase 73 45 - 117 U/L Protein, total 8.5 (H) 6.4 - 8.2 g/dL Albumin 3.8 3.4 - 5.0 g/dL Globulin 4.7 (H) 2.0 - 4.0 g/dL A-G Ratio 0.8 0.8 - 1.7 CARDIAC PANEL,(CK, CKMB & TROPONIN) Collection Time: 01/07/19  7:48 PM  
Result Value Ref Range  (H) 26 - 192 U/L  
 CK - MB 2.4 <3.6 ng/ml CK-MB Index 0.6 0.0 - 4.0 % Troponin-I, QT 0.02 0.00 - 0.06 NG/ML  
PTT Collection Time: 01/07/19  7:48 PM  
Result Value Ref Range aPTT 45.1 (H) 23.0 - 36.4 SEC PROTHROMBIN TIME + INR Collection Time: 01/07/19  7:48 PM  
Result Value Ref Range Prothrombin time 25.0 (H) 11.5 - 15.2 sec INR 2.3 (H) 0.8 - 1.2 DIGOXIN Collection Time: 01/07/19  7:48 PM  
Result Value Ref Range Digoxin level 0.1 (L) 0.9 - 2.0 NG/ML  
POC LACTIC ACID Collection Time: 01/07/19  7:51 PM  
Result Value Ref Range Lactic Acid (POC) 0.92 0.40 - 2.00 mmol/L No results for input(s): FIO2I, IFO2, HCO3I, IHCO3, HCOPOC, PCO2I, PCOPOC, IPHI, PHI, PHPOC, PO2I, PO2POC in the last 72 hours. No lab exists for component: IPOC2 Telemetry:AFIB, + Bradycardia Imaging: 
I have personally reviewed the patients radiographs and have reviewed the reports: XR Results (most recent): 
Results from Hospital Encounter encounter on 01/07/19 XR CHEST PORT Narrative EXAM: PORTABLE CHEST 2021 hours CLINICAL HISTORY/INDICATION: meets SIRS criteria , dizziness, vision change, 
shortness of breath, and nervousness onset prior to arrival, hypotensive COMPARISON: Chest x-ray May 2, 2018. TECHNIQUE: Single AP view FINDINGS:  
 
There is lack of penetration through the left heart MID and laterally with 
obscuration of the hemidiaphragm. Right is clear with sharp costophrenic angle. Pulmonary vascularity is normal. The heart is top normal in size to minimally 
enlarged.  No change in the right jugular approach MediPort terminating at the 
 midsuperior vena cava. Lesly Peaks Impression IMPRESSION: 
 
Decreased penetration to the left heart consistent with left lower lobe 
infiltrate or atelectasis. Top normal cardiac size to mild cardiomegaly. CT Results (most recent): 
Results from Hospital Encounter encounter on 01/07/19 CT HEAD WO CONT Narrative CT brain without enhancement CPT code: 81610 Indication: Weakness. Technique: Unenhanced 5 mm collimation axial images obtained from the skull base 
through the vertex. Dose reduction techniques used: Automated exposure control, adjustment of the 
mAs and/or kVp according to patient size, standardized low-dose protocol, and/or 
iterative reconstruction technique. Comparison: None. Findings: There is no intracranial hemorrhage. There is no evidence for mass. The gray-white matter differentiation is normal.  No extra-axial fluid 
collections. The ventricles are symmetric and midline in position. Vascular 
structures are symmetric in attenuation. Basilar cisterns are patent. Sinuses: Clear. Orbits: Normal. 
Calvarium:Normal. 
  
 Impression Impression: 1. No acute intracranial pathology. Rosa Espinoza NP-BC Pulmonary, Critical Care Medicine [facility] I saw and evaluated the patient, performing the key elements of the service. I discussed the findings, assessment and plan with the NP and agree with the NP's findings and plan as documented in the NP's note. All edits and changes made above or are mentioned in my addendum. Total of 76 min critical care time spent at bedside during the course of care providing evaluation,management and care decisions and ordering appropriate treatment related to critical care problems exclusive of procedures.  
The reason for providing this level of medical care for this critically ill patient was due a critical illness that impaired one or more vital organ systems such that there was a high probability of imminent or life threatening deterioration in the patients condition. This care involved high complexity decision making to assess, manipulate, and support vital system functions, to treat this degree vital organ system failure and to prevent further life threatening deterioration of the patients condition. 61-year-old female with a history of recurrent breast cancer in remission, CHFpEF, valvular heart disease with moderate mitral regurg, chronic A. fib, presented to [facility] with complaints of lightheadedness and dizziness. Patient reports she has had these symptoms for the last few days. She checked her blood pressure at home and noted that she was hypotensive with a BP in the 70s, so she presented to the [place] ER. While in the ER patient was found to be hypotensive, refractory to IVF. Patient was found to have creatinine of 1.92, elevated from normal baseline. Due to refractory shock patient was started on levophed via her port, which we have attempted to wean off during the daytime today and patient still requires. Nephrology was consulted for the acute kidney injury and they recommend additional fluids given that the patient's shock is likely due to hypovolemia in the setting of over aggressive diuresis. It is unclear if the patient has multifactorial shock, so pt was started on broad spectrum ABx with vanc/zosyn for septic shock. Pan cultures are no growth to date. Even with hypotension patient's mental status has remained good, which is reassuring. We will continue repletion of fluids with IVF as well as vasopressors and monitor closely. We will likely de-escalate antibiotics if they remain no growth tomorrow and vasopressor requirement has decreased. Hailey Boyer MD/MPH Pulmonary, Critical Care Medicine [facility]

## 2019-01-08 NOTE — ED NOTES
Noted pt persistently hypotensive; Dr Beltran Arboleda notified. Portable chest x ray at bedside.   Pt provided blanket per request.

## 2019-01-08 NOTE — PROGRESS NOTES
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 Patients Primary Language is: [place]. According to the patients EMR Jewish Affiliation is: [place]. The reason the Patient came to the hospital is:  
Patient Active Problem List  
 Diagnosis Date Noted  Renal failure (ARF), acute on chronic (HCC) 01/08/2019  Shock ([address] 01/08/2019  Pneumonia 11/03/2018  Hypotension 11/03/2018  Metastatic breast cancer ([address] 11/03/2018  Sepsis ([address] 11/02/2018  Peripheral neuropathy 09/17/2018  Oropharyngeal bleeding 04/16/2018  Cancer associated pain 03/19/2018  S/P lumbar fusion 01/22/2018  Lumbar stenosis with neurogenic claudication 01/09/2018  Spondylisthesis 01/08/2018  Lumbar stenosis 01/08/2018  Spinal stenosis of lumbar region with neurogenic claudication 12/08/2017  Vertigo 08/02/2017  Bilateral lower extremity edema 04/19/2017  Insomnia 02/08/2017  Chronic ITP (idiopathic thrombocytopenia) ([facility]) 10/31/2016  Muscular deconditioning 10/31/2016  Thrombocytopenia ([address] 06/14/2016  Chronic anemia 06/13/2016  Back pain of thoracolumbar region 06/13/2016  Iron deficiency anemia 12/14/2015  Vitamin D deficiency 03/04/2015  Cachexia ([address] 05/05/2014  Weakness 05/05/2014  Anemia 11/22/2013  Breast cancer metastasized to axillary lymph node ([address] 06/03/2013  Hypokalemia 05/07/2013 Plan: 
Chaplains will continue to follow and will provide pastoral care on an as needed/requested basis.  recommends bedside caregivers page  on duty if patient shows signs of acute spiritual or emotional distress. [address] Certified [employer] [facility]  
[phone]

## 2019-01-09 NOTE — ROUTINE PROCESS
Bedside and Verbal shift change report given to Angela Echevarria RN (oncoming nurse) by Roya Tejeda 
 (offgoing nurse). Report included the following information SBAR, Kardex, ED Summary, Intake/Output, MAR and Recent Results. Visit Vitals BP 97/61 Pulse 76 Temp 98.1 °F (36.7 °C) Resp 17 Ht 5' 6\" (1.676 m) Wt 71.2 kg (157 lb) SpO2 100% Breastfeeding? No  
BMI 25.34 kg/m²

## 2019-01-09 NOTE — ROUTINE PROCESS
Patient still C/O abd pain. Dr Jonnie Birmingham in at bedside. New orders received. Last BM on 1/5/19. Bowl regimen ordered.

## 2019-01-09 NOTE — PROGRESS NOTES
[employer] [facility]. Pulmonary, Critical Care, and Sleep Medicine Name: Shi Tadeo MRN: [id number] : 1950 Hospital: [address] [place] [address] Dr Date: 2019  Admission Date: 2019 Chart and notes reviewed. Data reviewed. I have evaluated all findings. [x]I have reviewed the flowsheet and previous days notes. 19 
 
-Patient c/o new chest pain  
-BP dropped on low dose Levo, Levo titrated back up to 6mcgs/hr from 2 
-Stat EKG, BMP, Mg+, Phos, & Troponins were obtained 
-EKG w/ no difference from prior EKG indicating A-fib 
-Pt placed on 2L NC.  
-She expressed signs of sx relief 
 
           
ROS:. 
Constitutional: Negative.   
HENT: Negative.   
Eyes: Positive for visual disturbance. Respiratory: Positive for shortness of breath.   
Cardiovascular: Negative.  Negative for pitting edema. Gastrointestinal: Negative for n/v/c/d   
Endocrine: Negative.   
Genitourinary: Negative.   
Musculoskeletal: Negative.   
Skin: Negative.   
Allergic/Immunologic: Negative.   
Neurological: Negative for dizziness. Hematological: Negative.   
Psychiatric/Behavioral: The patient is nervous/anxious.   
All other systems reviewed and are negative. Events and notes from last 24 hours reviewed. Care plan discussed on multidisciplinary rounds. Patient Active Problem List  
Diagnosis Code  Hypokalemia E87.6  Breast cancer metastasized to axillary lymph node ([facility] C50.919, C77.3  Anemia D64.9  
 Cachexia ([address] R64  Weakness R53.1  Vitamin D deficiency E55.9  Iron deficiency anemia D50.9  Chronic anemia D64.9  Back pain of thoracolumbar region M54.5, M54.6  Thrombocytopenia ([address] D69.6  Chronic ITP (idiopathic thrombocytopenia) (HCC) D69.3  Muscular deconditioning R29.898  Insomnia G47.00  Bilateral lower extremity edema R60.0  Vertigo R42  Spinal stenosis of lumbar region with neurogenic claudication M48.062  
  Spondylisthesis M43.10  Lumbar stenosis M48.061  Lumbar stenosis with neurogenic claudication M48.062  
 S/P lumbar fusion Z98.1  Cancer associated pain G89.3  Oropharyngeal bleeding J39.2  Peripheral neuropathy G62.9  Sepsis ([facility] A41.9  Pneumonia J18.9  Hypotension I95.9  Metastatic breast cancer ([facility] C50.919  Renal failure (ARF), acute on chronic (HCC) N17.9, N18.9  Shock ([facility] R57.9 Vital Signs: 
Visit Vitals /72 Pulse 69 Temp 97.6 °F (36.4 °C) Resp 21 Ht 5' 6\" (1.676 m) Wt 71.2 kg (157 lb) SpO2 100% Breastfeeding? No  
BMI 25.34 kg/m² O2 Device: Nasal cannula O2 Flow Rate (L/min): 2 l/min Temp (24hrs), Av °F (36.7 °C), Min:97.6 °F (36.4 °C), Max:98.2 °F (36.8 °C) Intake/Output:  
Last shift:      No intake/output data recorded. Last 3 shifts:  1901 -  0700 In: 1704.2 [P.O.:60; I.V.:1644.2] Out: 3400 [VYNOK:7127] Intake/Output Summary (Last 24 hours) at 2019 7112 Last data filed at 2019 0400 Gross per 24 hour Intake 841.38 ml Output 2075 ml Net -1233.62 ml Ventilator Settings: 
  
  
  
  
 
Current Facility-Administered Medications Medication Dose Route Frequency  potassium phosphate 15 mmol in 0.9% sodium chloride 250 mL infusion  15 mmol IntraVENous ONCE  
 NOREPINephrine (LEVOPHED) 8 mg in 5% dextrose 250mL infusion  2-16 mcg/min IntraVENous TITRATE  piperacillin-tazobactam (ZOSYN) 3.375 g in 0.9% sodium chloride (MBP/ADV) 100 mL MBP  3.375 g IntraVENous Q6H  
 vancomycin (VANCOCIN) 1000 mg in  ml infusion  1,000 mg IntraVENous Q24H  
 rivaroxaban (XARELTO) tablet 10 mg  10 mg Oral DAILY WITH BREAKFAST  0.9% sodium chloride with KCl 20 mEq/L infusion   IntraVENous CONTINUOUS Telemetry:  
 
Physical Exam:  
  
General:  Alert, cooperative, no distress. Head:  Normocephalic, without obvious abnormality, atraumatic. Eyes:  Conjunctivae/corneas clear.  PERRL,  
 Nose: Nares normal. Septum midline. Mucosa normal. No drainage or sinus tenderness. Throat: Lips, mucosa, and tongue normal. Teeth and gums normal.  
Neck: Supple, symmetrical, trachea midline, no adenopathy, thyroid: no enlargment/tenderness/nodules, no carotid bruit and no JVD. Lungs:   Clear to auscultation bilaterally. Chest wall:  No tenderness or deformity. Abdomen:   Soft, non-tender. Bowel sounds normal. No masses,  No organomegaly. Extremities: Extremities edematous w/ non-pitting edema. Pulses: 2+ and symmetric all extremities. Skin: Skin color, texture, turgor normal. No rashes or lesions Lymph nodes:  Cervical, supraclavicular, and axillary nodes normal.  
Neurologic: Alert and oriented x 4, good historian, able to communicate needs DATA: 
MAR reviewed and pertinent medications noted or modified as needed Labs: 
Recent Labs 01/09/19 
0640 01/08/19 
0530 01/07/19 1948 WBC 7.4 6.7 7.7 HGB 9.9* 10.4* 10.7* HCT 29.5* 31.3* 33.0*  
 109* 103* Recent Labs 01/09/19 
0640 01/08/19 
2115 01/08/19 
1705 01/08/19 
0530 01/07/19 1948  140 139 139 139  
K 3.8 3.3* 3.5 2.9* 2.8*  
 102 101 99* 94* CO2 32 32 31 31 37* * 119* 85 91 90 BUN 24* 33* 33* 43* 50* CREA 1.09 1.47* 1.31* 1.42* 1.95* CA 8.5 8.6 8.7 8.0* 9.5 MG 2.0 2.1  --  1.8  --   
PHOS 2.0* 2.2*  --  3.5  --   
ALB 3.2*  --   --  3.1* 3.8 SGOT 29  --   --  28 39* ALT 20  --   --  17 25 INR  --   --   --   --  2.3* No results for input(s): PH, PCO2, PO2, HCO3, FIO2 in the last 72 hours. No results for input(s): FIO2I, IFO2, HCO3I, IHCO3, HCOPOC, PCO2I, PCOPOC, IPHI, PHI, PHPOC, PO2I, PO2POC in the last 72 hours. No lab exists for component: IPOC2 Imaging: 
[x]   I have personally reviewed the patients radiographs and reports XR Results (most recent): 
Results from Hospital Encounter encounter on 01/07/19 XR CHEST PORT Narrative HISTORY: Sepsis. EXAM: Chest. 
 
TECHNIQUE: Single view AP portable chest  
 
COMPARISON: 1/7/2019. FINDINGS: Unchanged aeration of bilateral hemithoraces is noted with unchanged 
left lung base airspace opacity without pneumo thorax or pleural effusions. Port 
access with McPherson needle is seen. Heart and mediastinal structures are 
unchanged. Heart is enlarged. . Visualized bony thorax and soft tissues are 
within normal limits. Impression  IMPRESSION: 
 
1. No change. CT Results (most recent): 
Results from Hospital Encounter encounter on 01/07/19 CT HEAD WO CONT Narrative CT brain without enhancement CPT code: 89409 Indication: Weakness. Technique: Unenhanced 5 mm collimation axial images obtained from the skull base 
through the vertex. Dose reduction techniques used: Automated exposure control, adjustment of the 
mAs and/or kVp according to patient size, standardized low-dose protocol, and/or 
iterative reconstruction technique. Comparison: None. Findings: There is no intracranial hemorrhage. There is no evidence for mass. The gray-white matter differentiation is normal.  No extra-axial fluid 
collections. The ventricles are symmetric and midline in position. Vascular 
structures are symmetric in attenuation. Basilar cisterns are patent. Sinuses: Clear. Orbits: Normal. 
Calvarium:Normal. 
  
 Impression Impression: 1. No acute intracranial pathology. IMPRESSION:  
· Shock-Questionable etiology. Cardiogenic shock vs Septic shock w/ unknown source less likely PE-no s/sx of hypoxia, SOB, or dyspnea · Nonspecific Dizziness and headaches most likely due to hypotension-2/2 Shock of unknown origin. Less likely positional vertigo vs postural presyncope plus negative Head CT · JUAN- Etio likely due to volume depletion due to high dose of lasix in patient with preserved EF and preserved gfr per Nephro.-Resolving · Hypokalemia-Resolving · HFpEF: EF 51-55% (01/08/2019) ·   
 
Patient Active Problem List  
Diagnosis Code  Hypokalemia E87.6  Breast cancer metastasized to axillary lymph node ([facility] C50.919, C77.3  Anemia D64.9  
 Cachexia ([facility] R64  Weakness R53.1  Vitamin D deficiency E55.9  Iron deficiency anemia D50.9  Chronic anemia D64.9  Back pain of thoracolumbar region M54.5, M54.6  Thrombocytopenia ([facility] D69.6  Chronic ITP (idiopathic thrombocytopenia) ([facility]) D69.3  Muscular deconditioning R29.898  Insomnia G47.00  Bilateral lower extremity edema R60.0  Vertigo R42  Spinal stenosis of lumbar region with neurogenic claudication M48.062  Spondylisthesis M43.10  Lumbar stenosis M48.061  Lumbar stenosis with neurogenic claudication M48.062  
 S/P lumbar fusion Z98.1  Cancer associated pain G89.3  Oropharyngeal bleeding J39.2  Peripheral neuropathy G62.9  Sepsis ([facility] A41.9  Pneumonia J18.9  Hypotension I95.9  Metastatic breast cancer ([facility] C50.919  Renal failure (ARF), acute on chronic (HCC) N17.9, N18.9  Shock ([facility] R57.9 RECOMMENDATIONS:  
· Neuro: PRN pain medications, no sedation. · Pulm:  Supplemental O2 via NC for spO2 >94%. Keep HOB > 30' at all times. Aspiration Precautions. · CVS EKG & Troponins q6hrs, Actively titrate Norepi aim for SBP of 90 or > or MAP >65mmHg. Will substitute Norepi for alternative Inotrope if needed. Refrain from IVF at this time, Received  Repeat ECHO  
· GI: Cardiac Diet, SUP, Zofran PRN for N/V  
· Renal: Yanes to BSD, Trend Renal indices · Hem/Onc: Aleukocytosis, H/H, and platelets are stable, trend CBC. · I/D:. Blood, Sputum, and Urine cultures drawn and will be followed. Lactic acid ordered. Antibiotics: Vanc & Zosyn · Endocrine: Glycemic control, q6 BS for SSI · Musc/Skin: no acute issues · Code Status: Full Code Best practice : 
 
Glycemic control IHI ICU bundles: Yanes Bundle Followed Sress ulcer prophylaxis. DVT prophylaxis. Need for Lines, koehler assessed. High complexity decision making was performed during this consultation and evaluation. [x]       Pt is at high risk for further organ failure and dysfunction. Rosa Espinoza NP-BC Pulmonary, Critical Care Medicine [facility] I saw and evaluated the patient, performing the key elements of the service. I discussed the findings, assessment and plan with the NP and agree with the NP's findings and plan as documented in the NP's note. All edits and changes made above or are mentioned in my addendum. Total of 38 min critical care time spent at bedside during the course of care providing evaluation,management and care decisions and ordering appropriate treatment related to critical care problems exclusive of procedures. The reason for providing this level of medical care for this critically ill patient was due a critical illness that impaired one or more vital organ systems such that there was a high probability of imminent or life threatening deterioration in the patients condition. This care involved high complexity decision making to assess, manipulate, and support vital system functions, to treat this degree vital organ system failure and to prevent further life threatening deterioration of the patients condition. 61-year-old female with a history of recurrent breast cancer in remission, CHFpEF, valvular heart disease with moderate mitral regurg, chronic A. fib, presented to [facility] with complaints of lightheadedness and dizziness. Patient reports she has had these symptoms for the last few days. She checked her blood pressure at home and noted that she was hypotensive with a BP in the 70s, so she presented to the [facility] ER. While in the ER patient was found to be hypotensive, refractory to IVF.   Patient was found to have creatinine of 1.92, elevated from normal baseline. Due to refractory shock patient was started on levophed via her port, which we have attempted to wean off but patient continues to require it. Nephrology was consulted for the acute kidney injury and they recommend additional fluids, for which the creatinine did improve with IV fluid. However the patient still requires levo fed despite volume. I believe the shock is multifactorial and there is a component of hypovolemia in the setting of over aggressive diuresis. It is unclear if the patient has multifactorial shock, so pt was started on broad spectrum ABx with vanc/zosyn for septic shock. Pan cultures are no growth to date. Even with hypotension patient's mental status has remained good, which is reassuring. If the patient continues to be hypotensive tomorrow, we may also start the patient on midodrine. We will continue broad-spectrum antibiotics with vancomycin and Zosyn, will de-escalate if the patient continues to have negative cultures. David Dawson MD/MPH Pulmonary, Critical Care Medicine [facility]

## 2019-01-09 NOTE — PROGRESS NOTES
attended the interdisciplinary rounds for Annemarie Oh, who is a 79 y.o.,female. Patients Primary Language is: [place]. According to the patients EMR [employer] Affiliation is: Harpreet Cote. The reason the Patient came to the hospital is:  
Patient Active Problem List  
 Diagnosis Date Noted  Renal failure (ARF), acute on chronic (HCC) 01/08/2019  Shock ([address] 01/08/2019  Pneumonia 11/03/2018  Hypotension 11/03/2018  Metastatic breast cancer ([address] 11/03/2018  Sepsis ([address] 11/02/2018  Peripheral neuropathy 09/17/2018  Oropharyngeal bleeding 04/16/2018  Cancer associated pain 03/19/2018  S/P lumbar fusion 01/22/2018  Lumbar stenosis with neurogenic claudication 01/09/2018  Spondylisthesis 01/08/2018  Lumbar stenosis 01/08/2018  Spinal stenosis of lumbar region with neurogenic claudication 12/08/2017  Vertigo 08/02/2017  Bilateral lower extremity edema 04/19/2017  Insomnia 02/08/2017  Chronic ITP (idiopathic thrombocytopenia) ([facility]) 10/31/2016  Muscular deconditioning 10/31/2016  Thrombocytopenia ([address] 06/14/2016  Chronic anemia 06/13/2016  Back pain of thoracolumbar region 06/13/2016  Iron deficiency anemia 12/14/2015  Vitamin D deficiency 03/04/2015  Cachexia ([address] 05/05/2014  Weakness 05/05/2014  Anemia 11/22/2013  Breast cancer metastasized to axillary lymph node ([address] 06/03/2013  Hypokalemia 05/07/2013 Plan: 
Chaplains will continue to follow and will provide pastoral care on an as needed/requested basis.  recommends bedside caregivers page  on duty if patient shows signs of acute spiritual or emotional distress. [address] [employer] Certified [employer] [facility]  
[phone]

## 2019-01-09 NOTE — PROGRESS NOTES
NUTRITION Nursing Referral: [facility] 
  
RECOMMENDATIONS / PLAN:  
 
- Add supplements: Ensure Enlive BID. 
- Continue RD inpatient monitoring and evaluation. NUTRITION INTERVENTIONS & DIAGNOSIS:  
 
[x] Meals/snacks: modified composition 
[x] Medical food supplement therapy: initiate [x] Collaboration and referral of nutrition care: interdisciplinary rounds Nutrition Diagnosis: Inadequate oral intake related to decreased appetite as evidenced by pt consuming 50% or less of recent meals. ASSESSMENT:  
 
1/9: Poor appetite and variable, fair to poor intake of recent meals. Hypokalemia resolved and phos replacement ordered today. Remains on levophed and IV fluids to change from NS with KCl to plasmalyte per MD.  
1/8: Pt reports usual good appetite, eating well PTA but has not eaten since admitted and hungry today. Denies nausea/vomiting, no difficulty swallowing and tolerating po medications per RN. Receiving electrolyte replacement, acute renal failure on admission- Crea improved today. Average po intake adequate to meet patients estimated nutritional needs:   [] Yes     [x] No   [] Unable to determine at this time Diet: DIET CARDIAC Regular Food Allergies: NKFA Current Appetite:   [] Good     [x] Fair     [x] Poor     [] Other: 
Appetite/meal intake prior to admission:   [x] Good     [] Fair     [] Poor     [] Other: 
Feeding Limitations:  [] Swallowing difficulty    [] Chewing difficulty    [] Other: 
Current Meal Intake: No data found. BM: PTA Skin Integrity: WDL Edema:   [x] No     [] Yes Pertinent Medications: Reviewed: levophed, plasmalyte IV fluid at 75 mL/hr, zofran, 15 mmol K Phos Recent Labs 01/09/19 
0640 01/08/19 
2115 01/08/19 
1705 01/08/19 
0530 01/07/19 
1948  140 139 139 139  
K 3.8 3.3* 3.5 2.9* 2.8*  
 102 101 99* 94* CO2 32 32 31 31 37* * 119* 85 91 90 BUN 24* 33* 33* 43* 50* CREA 1.09 1.47* 1.31* 1.42* 1.95* CA 8.5 8.6 8.7 8.0* 9.5 MG 2.0 2.1  --  1.8  --   
PHOS 2.0* 2.2*  --  3.5  --   
ALB 3.2*  --   --  3.1* 3.8 SGOT 29  --   --  28 39* ALT 20  --   --  17 25 Intake/Output Summary (Last 24 hours) at 1/9/2019 1472 Last data filed at 1/9/2019 6680 Gross per 24 hour Intake 1167.67 ml Output 2525 ml Net -1357.33 ml Anthropometrics: 
Ht Readings from Last 1 Encounters:  
01/08/19 5' 6\" (1.676 m) Last 3 Recorded Weights in this Encounter 01/07/19 1926 01/08/19 0400 01/08/19 1226 Weight: 66 kg (145 lb 8 oz) 71.5 kg (157 lb 10.1 oz) 71.2 kg (157 lb) Body mass index is 25.34 kg/m². Weight History: patient denies change in weight PTA and reports usual weight of 145-150 lb Weight Metrics 1/8/2019 12/31/2018 11/30/2018 11/27/2018 11/14/2018 11/9/2018 11/2/2018 Weight 157 lb 150 lb 158 lb 157 lb 8 oz 160 lb 169 lb 11.2 oz -  
BMI 25.34 kg/m2 24.21 kg/m2 25.5 kg/m2 25.42 kg/m2 25.82 kg/m2 - 27.39 kg/m2 Admitting Diagnosis: Renal failure (ARF), acute on chronic (HCC) Shock ([facility] Pertinent PMHx: breast cancer, cardiomyopathy, CHF, DM, esophageal cancer, atrial fibrillation Education Needs:        [x] None identified  [] Identified - Not appropriate at this time  []  Identified and addressed - refer to education log Learning Limitations:   [x] None identified  [] Identified Cultural, Nondenominational & ethnic food preferences:  [x] None identified    [] Identified and addressed ESTIMATED NUTRITION NEEDS:  
 
Calories: 7615-4262 kcal (MSJx1.2-1.4) based on  [x] Actual BW 72 kg     [] IBW Protein: 58-86 gm (0.8-1.2 gm/kg) based on  [x] Actual BW      [] IBW Fluid: 1 mL/kcal 
  
MONITORING & EVALUATION:  
 
Nutrition Goal(s): 1. Po intake of meals will meet >75% of patient estimated nutritional needs within the next 7 days.   Outcome:  [] Met/Ongoing    [x]  Not Met/Progressing    [] New/Initial Goal  
 
Monitoring:   [x] Food and beverage intake   [x] Diet order   [x] Nutrition-focused physical findings   [x] Treatment/therapy   [] Weight   [] Enteral nutrition intake Previous Recommendations (for follow-up assessments only):     []   Implemented       []   Not Implemented (RD to address)      [] No Longer Appropriate     [x] No Recommendation Made Discharge Planning: cardiac, diabetic diet [x] Participated in care planning, discharge planning, & interdisciplinary rounds as appropriate Matilde Altamirano RD, [address] [place]  Pager: [phone]

## 2019-01-09 NOTE — ROUTINE PROCESS
Bedside shift change report given to [place] (oncoming nurse) by Caitlin Soriano (offgoing nurse). Report included the following information Intake/Output and MAR.

## 2019-01-09 NOTE — PROGRESS NOTES
In Patient Progress note Admit Date: 1/7/2019 Impression: 1. JUAN in setting of prerenal azotemia in setting of shock , etiology Of hypotension still unclear , creatinine briskly improved with hydration And pressors, excellent urine output ECHO reviewed, preserved EF but does have ? Diastolic dysfunction and Moderate to severe TR and mod pulm HTN Does have some JVD and increased CVP , but overall volume status looks ok Continue to support BP , keep MAP > 65  
2. Hypokalemia. resolved 3. Met alkalosis due to volume depletion , Stable 4. HFpEF. Diastolic dysfunction and Moderate to severe TR and mod pulm HTN  
5. Chronic a.fib. Rate is controlled. Please call with questions Cezar Argueta MD FASN Cell [phone] Pager: [phone] 
  
 
 
 
Subjective:  
 
Feels better still lethargic Current Facility-Administered Medications:  
  potassium phosphate 15 mmol in 0.9% sodium chloride 250 mL infusion, 15 mmol, IntraVENous, ONCE, Rosa Espinoza, NP 
  electrolyte-A (PLASMA-LYTE A) bolus infusion 1,000 mL, 1,000 mL, IntraVENous, ONCE, Kirill Zurita MD 
  electrolyte-A (PLASMA-LYTE A) bolus infusion, 75 mL/hr, IntraVENous, CONTINUOUS, Esther Serna PA-C 
  electrolyte-A (PLASMA-LYTE A) bolus infusion 1,000 mL, 1,000 mL, IntraVENous, ONCE, Esther Serna PA-C 
  NOREPINephrine (LEVOPHED) 8 mg in 5% dextrose 250mL infusion, 2-16 mcg/min, IntraVENous, TITRATE, Reza Mendoza MD, Last Rate: 9.4 mL/hr at 01/09/19 0850, 5 mcg/min at 01/09/19 0850   sodium chloride (NS) flush 5-40 mL, 5-40 mL, IntraVENous, PRN, Ogbolu, Rosa I, NP 
  acetaminophen (TYLENOL) tablet 650 mg, 650 mg, Oral, Q6H PRN, Ogbolu, Rosa I, NP 
  ondansetron (ZOFRAN) injection 4 mg, 4 mg, IntraVENous, Q4H PRN, Ogbolu, Rosa I, NP 
  piperacillin-tazobactam (ZOSYN) 3.375 g in 0.9% sodium chloride (MBP/ADV) 100 mL MBP, 3.375 g, IntraVENous, Q6H, Ogbolu, Rosa I, NP, Last Rate: 200 mL/hr at 01/09/19 0626, 3.375 g at 01/09/19 8423   rivaroxaban (XARELTO) tablet 10 mg, 10 mg, Oral, DAILY WITH BREAKFAST, Ogoy, January-Fabiana V, PA-C, 10 mg at 01/09/19 9136   sodium chloride (NS) flush 5-10 mL, 5-10 mL, IntraVENous, PRN, Scissom, Shayna YAN 
 
 
 
Objective:  
 
Visit Vitals /76 Pulse 77 Temp 97.6 °F (36.4 °C) Resp 18 Ht 5' 6\" (1.676 m) Wt 71.2 kg (157 lb) SpO2 100% Breastfeeding? No  
BMI 25.34 kg/m² Intake/Output Summary (Last 24 hours) at 1/9/2019 1037 Last data filed at 1/9/2019 5819 Gross per 24 hour Intake 1157.37 ml Output 2525 ml Net -1367.63 ml Physical Exam:  
 
gen NAD HENT mmm RS AEBE clear CVS s1 s2 wnl no JVD 
GI soft BS + Ext no edema Data Review: 
 
Recent Labs 01/09/19 
3555 WBC 7.4  
RBC 3.74* HCT 29.5* MCV 78.9 MCH 26.5 MCHC 33.6 RDW 15.7* Recent Labs 01/09/19 
0640 01/08/19 
2115 01/08/19 
1705 01/08/19 
0530 01/07/19 
1948 BUN 24* 33* 33* 43* 50* CREA 1.09 1.47* 1.31* 1.42* 1.95* CA 8.5 8.6 8.7 8.0* 9.5 ALB 3.2*  --   --  3.1* 3.8  
K 3.8 3.3* 3.5 2.9* 2.8*  140 139 139 139  102 101 99* 94* CO2 32 32 31 31 37* PHOS 2.0* 2.2*  --  3.5  --   
* 119* 85 91 90 Jim Parisi MD

## 2019-01-09 NOTE — ROUTINE PROCESS
Patient C/O mid to upper epigatric pain as a 6 out of 10 after eating breakfast. Onion, bellpeppers were in hash brown potatoes. Dr Diane Carlos made aware. New oders received. Plasmasol bolus started as order.

## 2019-01-10 PROBLEM — Z79.01 CHRONIC ANTICOAGULATION: Status: ACTIVE | Noted: 2019-01-01

## 2019-01-10 PROBLEM — I48.0 PAROXYSMAL ATRIAL FIBRILLATION (HCC): Status: ACTIVE | Noted: 2019-01-01

## 2019-01-10 NOTE — PROGRESS NOTES
attended the interdisciplinary rounds for Bridget Ricks, who is a 79 y.o.,female. Patients Primary Language is: [place]. According to the patients EMR Anabaptist Affiliation is: [place]. The reason the Patient came to the hospital is:  
Patient Active Problem List  
 Diagnosis Date Noted  Renal failure (ARF), acute on chronic (HCC) 01/08/2019  Shock ([address] 01/08/2019  Pneumonia 11/03/2018  Hypotension 11/03/2018  Metastatic breast cancer ([address] 11/03/2018  Sepsis ([address] 11/02/2018  Peripheral neuropathy 09/17/2018  Oropharyngeal bleeding 04/16/2018  Cancer associated pain 03/19/2018  S/P lumbar fusion 01/22/2018  Lumbar stenosis with neurogenic claudication 01/09/2018  Spondylisthesis 01/08/2018  Lumbar stenosis 01/08/2018  Spinal stenosis of lumbar region with neurogenic claudication 12/08/2017  Vertigo 08/02/2017  Bilateral lower extremity edema 04/19/2017  Insomnia 02/08/2017  Chronic ITP (idiopathic thrombocytopenia) ([facility]) 10/31/2016  Muscular deconditioning 10/31/2016  Thrombocytopenia ([address] 06/14/2016  Chronic anemia 06/13/2016  Back pain of thoracolumbar region 06/13/2016  Iron deficiency anemia 12/14/2015  Vitamin D deficiency 03/04/2015  Cachexia ([address] 05/05/2014  Weakness 05/05/2014  Anemia 11/22/2013  Breast cancer metastasized to axillary lymph node ([address] 06/03/2013  Hypokalemia 05/07/2013 Plan: 
Chaplains will continue to follow and will provide pastoral care on an as needed/requested basis.  recommends bedside caregivers page  on duty if patient shows signs of acute spiritual or emotional distress. [address] [facility]  
[phone]

## 2019-01-10 NOTE — ROUTINE PROCESS
Bedside and Verbal shift change report given to Bianca Adan RN (oncoming nurse) by Zuri Oviedo RN (offgoing nurse). Report included the following information SBAR, Intake/Output, MAR, Recent Results, Cardiac Rhythm A- Fib and Quality Measures.

## 2019-01-10 NOTE — PROGRESS NOTES
NUTRITION Nursing Referral: [facility] 
  
RECOMMENDATIONS / PLAN:  
 
- Change supplements to Magic Cup once daily. 
- Add preferences to diet. - Continue RD inpatient monitoring and evaluation. NUTRITION INTERVENTIONS & DIAGNOSIS:  
 
[x] Meals/snacks: modified composition 
[x] Medical food supplement therapy: Ensure Enlive BID [x] Collaboration and referral of nutrition care: interdisciplinary rounds Nutrition Diagnosis: Inadequate oral intake related to decreased appetite as evidenced by pt consuming 50% or less of recent meals. ASSESSMENT:  
 
1/10: Pt with many food preferences, fair meal intake. Only likes cappuccino flavvor Ensure supplements, will modify supplements. 1/9: Poor appetite and variable, fair to poor intake of recent meals. Hypokalemia resolved and phos replacement ordered today. Remains on levophed and IV fluids to change from NS with KCl to plasmalyte per MD.  
1/8: Pt reports usual good appetite, eating well PTA but has not eaten since admitted and hungry today. Denies nausea/vomiting, no difficulty swallowing and tolerating po medications per RN. Receiving electrolyte replacement, acute renal failure on admission- Crea improved today. Average po intake adequate to meet patients estimated nutritional needs:   [] Yes     [x] No   [] Unable to determine at this time Diet: DIET CARDIAC Regular DIET NUTRITIONAL SUPPLEMENTS Dinner, Breakfast; Kamranethan Alvarez Food Allergies: NKFA Current Appetite:   [] Good     [x] Fair     [] Poor     [] Other: 
Appetite/meal intake prior to admission:   [x] Good     [] Fair     [] Poor     [] Other: 
Feeding Limitations:  [] Swallowing difficulty    [] Chewing difficulty    [] Other: 
Current Meal Intake: No data found. BM: 1/9 Skin Integrity: WDL Edema:   [x] No     [] Yes Pertinent Medications: Reviewed: plasmalyte IV fluid at 75 mL/hr, zofran, colace, 1 gm Mg, 20 mEq KCl Recent Labs  
  01/10/19 
0400 01/09/19 2010 01/09/19 0640 01/08/19 2115 01/08/19 
0530   --  140 140   < > 139  
K 4.0 3.6 3.8 3.3*   < > 2.9*  
  --  104 102   < > 99* CO2 29  --  32 32   < > 31  
GLU 77  --  104* 119*   < > 91 BUN 15  --  24* 33*   < > 43* CREA 0.83  --  1.09 1.47*   < > 1.42* CA 7.9*  --  8.5 8.6   < > 8.0*  
MG 1.8  --  2.0 2.1  --  1.8 PHOS 2.1* 2.5 2.0* 2.2*  --  3.5 ALB 2.6*  --  3.2*  --   --  3.1*  
SGOT 31  --  29  --   --  28 ALT 25  --  20  --   --  17  
 < > = values in this interval not displayed. Intake/Output Summary (Last 24 hours) at 1/10/2019 0945 Last data filed at 1/10/2019 0600 Gross per 24 hour Intake 2636.15 ml Output 2250 ml Net 386.15 ml Anthropometrics: 
Ht Readings from Last 1 Encounters:  
01/08/19 5' 6\" (1.676 m) Last 3 Recorded Weights in this Encounter 01/07/19 1926 01/08/19 0400 01/08/19 1226 Weight: 66 kg (145 lb 8 oz) 71.5 kg (157 lb 10.1 oz) 71.2 kg (157 lb) Body mass index is 25.34 kg/m². Weight History: patient denies change in weight PTA and reports usual weight of 145-150 lb Weight Metrics 1/8/2019 12/31/2018 11/30/2018 11/27/2018 11/14/2018 11/9/2018 11/2/2018 Weight 157 lb 150 lb 158 lb 157 lb 8 oz 160 lb 169 lb 11.2 oz -  
BMI 25.34 kg/m2 24.21 kg/m2 25.5 kg/m2 25.42 kg/m2 25.82 kg/m2 - 27.39 kg/m2 Admitting Diagnosis: Renal failure (ARF), acute on chronic (HCC) Shock ([address] Pertinent PMHx: breast cancer, cardiomyopathy, CHF, DM, esophageal cancer, atrial fibrillation Education Needs:        [x] None identified  [] Identified - Not appropriate at this time  []  Identified and addressed - refer to education log Learning Limitations:   [x] None identified  [] Identified Cultural, Scientology & ethnic food preferences:  [x] None identified    [] Identified and addressed ESTIMATED NUTRITION NEEDS:  
 
Calories: 1656-8463 kcal (MSJx1.2-1.4) based on  [x] Actual BW 72 kg     [] IBW Protein: 58-86 gm (0.8-1.2 gm/kg) based on  [x] Actual BW      [] IBW Fluid: 1 mL/kcal 
  
MONITORING & EVALUATION:  
 
Nutrition Goal(s): 1. Po intake of meals will meet >75% of patient estimated nutritional needs within the next 7 days. Outcome:  [] Met/Ongoing    [x]  Not Met/Progressing    [] New/Initial Goal  
 
Monitoring:   [x] Food and beverage intake   [x] Diet order   [x] Nutrition-focused physical findings   [x] Treatment/therapy   [] Weight   [] Enteral nutrition intake Previous Recommendations (for follow-up assessments only):     [x]   Implemented       []   Not Implemented (RD to address)      [] No Longer Appropriate     [] No Recommendation Made Discharge Planning: cardiac, diabetic diet [x] Participated in care planning, discharge planning, & interdisciplinary rounds as appropriate Lorenzo Martinez, RD, [address]  Pager: [phone]

## 2019-01-10 NOTE — PROGRESS NOTES
TRANSFER - OUT REPORT: 
 
Verbal report given to ICU (name) on Helene Espinoza  being transferred to  (unit) for routine progression of care. Report consisted of patients Situation, Background, Assessment and  
Recommendations(SBAR). Information from the following report(s) SBAR and Kardex was reviewed with the receiving nurse. Lines:  
Venous Access Device medi-port Upper chest (subclavicular area, right (Active) Venous Access Device Mediport Upper chest (subclavicular area, right (Active) Central Line Being Utilized Yes 1/10/2019 12:00 PM  
Criteria for Appropriate Use Hemodynamically unstable, requiring monitoring lines, vasopressors, or volume resuscitation 1/10/2019 12:00 PM  
Site Assessment Clean, dry, & intact 1/10/2019 12:00 PM  
Date of Last Dressing Change 01/08/19 1/10/2019 12:00 PM  
Dressing Status Clean, dry, & intact 1/10/2019 12:00 PM  
Dressing Type Disk with Chlorhexadine gluconate (CHG) 1/10/2019 12:00 PM  
Action Taken Open ports on tubing capped 1/10/2019 12:00 PM  
Date Accessed (Medial Site) 01/08/19 1/10/2019 12:00 PM  
Positive Blood Return (Medial Site) Yes 1/10/2019 12:00 PM  
Action Taken (Medial Site) Flushed 1/10/2019 12:00 PM  
Alcohol Cap Used Yes 1/8/2019  8:00 PM  
   
Peripheral IV 01/07/19 Right Antecubital (Active) Site Assessment Clean, dry, & intact 1/10/2019 12:00 PM  
Phlebitis Assessment 0 1/10/2019 12:00 PM  
Infiltration Assessment 0 1/10/2019 12:00 PM  
Dressing Status Clean, dry, & intact 1/10/2019 12:00 PM  
Dressing Type Transparent 1/10/2019  8:00 AM  
Hub Color/Line Status Pink 1/10/2019  8:00 AM  
Action Taken Open ports on tubing capped 1/10/2019  8:00 AM  
Alcohol Cap Used Yes 1/10/2019  8:00 AM  
  
 
Opportunity for questions and clarification was provided. Patient transported with: 
 Monitor

## 2019-01-10 NOTE — PROGRESS NOTES
[facility]. Pulmonary, Critical Care, and Sleep Medicine Name: Carmen Sommer MRN: [id number] : 1950 Hospital: [address] [place] [address] Dr Date: 1/10/2019  Admission Date: 2019 Chart and notes reviewed. Data reviewed. I have evaluated all findings. [x]I have reviewed the flowsheet and previous days notes. 01/10/19 
 
-Overnight patient was initially off of Levo gtt w/ MAP  Between 60/65 
-She requested for Tylenol for headache and Ambien to help w/ sleep 
-Ambien declined; Melatonin ordered 
-Patient received a total of 1L Plasmalyte bolus overnight. -MAP did not respond to 1L fluid bolus 
-Levo restarted 
-No c/o chest pain 
 
 
           
ROS:. 
Constitutional: Negative.   
HENT: Negative.   
Eyes: Positive for visual disturbance. Respiratory: Positive for shortness of breath.   
Cardiovascular: Negative.  Negative for pitting edema. Gastrointestinal: Negative for n/v/c/d   
Endocrine: Negative.   
Genitourinary: Negative.   
Musculoskeletal: Negative.   
Skin: Negative.   
Allergic/Immunologic: Negative.   
Neurological: Negative for dizziness. Hematological: Negative.   
Psychiatric/Behavioral: The patient is nervous/anxious.   
All other systems reviewed and are negative. Events and notes from last 24 hours reviewed. Care plan discussed on multidisciplinary rounds. Patient Active Problem List  
Diagnosis Code  Hypokalemia E87.6  Breast cancer metastasized to axillary lymph node ([address] C50.919, C77.3  Anemia D64.9  
 Cachexia ([address] R64  Weakness R53.1  Vitamin D deficiency E55.9  Iron deficiency anemia D50.9  Chronic anemia D64.9  Back pain of thoracolumbar region M54.5, M54.6  Thrombocytopenia ([address] D69.6  Chronic ITP (idiopathic thrombocytopenia) (HCC) D69.3  Muscular deconditioning R29.898  Insomnia G47.00  Bilateral lower extremity edema R60.0  Vertigo R42  Spinal stenosis of lumbar region with neurogenic claudication M48.062  Spondylisthesis M43.10  Lumbar stenosis M48.061  Lumbar stenosis with neurogenic claudication M48.062  
 S/P lumbar fusion Z98.1  Cancer associated pain G89.3  Oropharyngeal bleeding J39.2  Peripheral neuropathy G62.9  Sepsis ([facility] A41.9  Pneumonia J18.9  Hypotension I95.9  Metastatic breast cancer ([facility] C50.919  Renal failure (ARF), acute on chronic (HCC) N17.9, N18.9  Shock ([facility] R57.9 Vital Signs: 
Visit Vitals /57 Pulse 75 Temp 98.6 °F (37 °C) Resp 21 Ht 5' 6\" (1.676 m) Wt 71.2 kg (157 lb) SpO2 100% Breastfeeding? No  
BMI 25.34 kg/m² O2 Device: Nasal cannula O2 Flow Rate (L/min): 2 l/min Temp (24hrs), Av.9 °F (36.6 °C), Min:97.5 °F (36.4 °C), Max:98.6 °F (37 °C) Intake/Output:  
Last shift:      No intake/output data recorded. Last 3 shifts:  1901 - 01/10 0700 In: 1030.1 [I.V.:1030.1] Out: 3600 [WPWWF:[id number]] Intake/Output Summary (Last 24 hours) at 1/10/2019 5002 Last data filed at 1/10/2019 0600 Gross per 24 hour Intake 0 ml Output 2450 ml Net -2450 ml Ventilator Settings: 
  
  
  
  
 
Current Facility-Administered Medications Medication Dose Route Frequency  magnesium sulfate 1 g/100 ml IVPB (premix or compounded)  1 g IntraVENous ONCE  
 electrolyte-A (PLASMA-LYTE A) bolus infusion  75 mL/hr IntraVENous CONTINUOUS  
 docusate sodium (COLACE) capsule 100 mg  100 mg Oral DAILY  NOREPINephrine (LEVOPHED) 8 mg in 5% dextrose 250mL infusion  2-16 mcg/min IntraVENous TITRATE  piperacillin-tazobactam (ZOSYN) 3.375 g in 0.9% sodium chloride (MBP/ADV) 100 mL MBP  3.375 g IntraVENous Q6H  
 rivaroxaban (XARELTO) tablet 10 mg  10 mg Oral DAILY WITH BREAKFAST Telemetry:  
 
Physical Exam:  
  
General:  Alert, cooperative, no distress. Head:  eNormocephalic, without obvious abnormality, atraumatic. Eyes:  Conjunctivae/corneas clear. PERRL, Nose: Nares normal. Septum midline. Mucosa normal. No drainage or sinus tenderness. Throat: Lips, mucosa, and tongue normal. Teeth and gums normal.  
Neck: Supple, symmetrical, trachea midline, no adenopathy, thyroid: no enlargment/tenderness/nodules, no carotid bruit and no JVD. Lungs:   Clear to auscultation bilaterally. Chest wall:  No tenderness or deformity. Abdomen:   Soft, non-tender. Bowel sounds normal. No masses,  No organomegaly. Extremities: Extremities edematous w/ non-pitting edema. Pulses: 2+ and symmetric all extremities. Skin: Skin color, texture, turgor normal. No rashes or lesions Lymph nodes:  Cervical, supraclavicular, and axillary nodes normal.  
Neurologic: Alert and oriented x 4, good historian, able to communicate needs DATA: 
MAR reviewed and pertinent medications noted or modified as needed Labs: 
Recent Labs  
  01/10/19 
0400 01/09/19 
0640 01/08/19 
0530 WBC 6.3 7.4 6.7 HGB 8.4* 9.9* 10.4* HCT 25.5* 29.5* 31.3*  
* 135 109* Recent Labs  
  01/10/19 
0400 01/09/19 2010 01/09/19 
0640 01/08/19 2115  01/08/19 
0530  01/07/19 1948   --  140 140   < > 139  --  139  
K 4.0 3.6 3.8 3.3*   < > 2.9*  --  2.8*  
  --  104 102   < > 99*  --  94* CO2 29  --  32 32   < > 31  --  37* GLU 77  --  104* 119*   < > 91  --  90 BUN 15  --  24* 33*   < > 43*  --  50* CREA 0.83  --  1.09 1.47*   < > 1.42*  --  1.95* CA 7.9*  --  8.5 8.6   < > 8.0*  --  9.5 MG 1.8  --  2.0 2.1  --  1.8   < >  --   
PHOS 2.1* 2.5 2.0* 2.2*  --  3.5   < >  --   
ALB 2.6*  --  3.2*  --   --  3.1*  --  3.8 SGOT 31  --  29  --   --  28  --  39* ALT 25  --  20  --   --  17  --  25 INR  --   --   --   --   --   --   --  2.3*  
 < > = values in this interval not displayed. No results for input(s): PH, PCO2, PO2, HCO3, FIO2 in the last 72 hours. No results for input(s): FIO2I, IFO2, HCO3I, IHCO3, HCOPOC, PCO2I, PCOPOC, IPHI, PHI, PHPOC, PO2I, PO2POC in the last 72 hours. No lab exists for component: IPOC2 Imaging: 
[x]   I have personally reviewed the patients radiographs and reports XR Results (most recent): 
Results from Hospital Encounter encounter on 01/07/19 XR CHEST PORT Narrative HISTORY: Sepsis. EXAM: Chest. 
 
TECHNIQUE: Single view AP portable chest  
 
COMPARISON: 1/7/2019. FINDINGS: Unchanged aeration of bilateral hemithoraces is noted with unchanged 
left lung base airspace opacity without pneumo thorax or pleural effusions. Port 
access with Hodgeman needle is seen. Heart and mediastinal structures are 
unchanged. Heart is enlarged. . Visualized bony thorax and soft tissues are 
within normal limits. Impression  IMPRESSION: 
 
1. No change. CT Results (most recent): 
Results from Hospital Encounter encounter on 01/07/19 CT HEAD WO CONT Narrative CT brain without enhancement CPT code: 00361 Indication: Weakness. Technique: Unenhanced 5 mm collimation axial images obtained from the skull base 
through the vertex. Dose reduction techniques used: Automated exposure control, adjustment of the 
mAs and/or kVp according to patient size, standardized low-dose protocol, and/or 
iterative reconstruction technique. Comparison: None. Findings: There is no intracranial hemorrhage. There is no evidence for mass. The gray-white matter differentiation is normal.  No extra-axial fluid 
collections. The ventricles are symmetric and midline in position. Vascular 
structures are symmetric in attenuation. Basilar cisterns are patent. Sinuses: Clear. Orbits: Normal. 
Calvarium:Normal. 
  
 Impression Impression: 1. No acute intracranial pathology. IMPRESSION:  
· Shock-Questionable etiology.  Cardiogenic shock vs Septic shock w/ unknown source less likely PE-no s/sx of hypoxia, SOB, or dyspnea · Nonspecific Dizziness and headaches most likely due to hypotension-2/2 Shock of unknown origin. Less likely positional vertigo vs postural presyncope plus negative Head CT · JUAN- Etio likely due to volume depletion due to high dose of lasix in patient with preserved EF and preserved gfr per Nephro.-Resolving · Hypokalemia-Resolving · CHFpEF: NT pro-BNP 2,195 · Valvular heart disease: Moderate mitral regurg · Pulmonary hypertension, seen on echo today: Etiology due to WHO group 2-given patient's valvular heart disease with mitral regurg as well as CHFpEF Patient Active Problem List  
Diagnosis Code  Hypokalemia E87.6  Breast cancer metastasized to axillary lymph node ([address] C50.919, C77.3  Anemia D64.9  
 Cachexia ([address] R64  Weakness R53.1  Vitamin D deficiency E55.9  Iron deficiency anemia D50.9  Chronic anemia D64.9  Back pain of thoracolumbar region M54.5, M54.6  Thrombocytopenia ([address] D69.6  Chronic ITP (idiopathic thrombocytopenia) ([facility]) D69.3  Muscular deconditioning R29.898  Insomnia G47.00  Bilateral lower extremity edema R60.0  Vertigo R42  Spinal stenosis of lumbar region with neurogenic claudication M48.062  Spondylisthesis M43.10  Lumbar stenosis M48.061  Lumbar stenosis with neurogenic claudication M48.062  
 S/P lumbar fusion Z98.1  Cancer associated pain G89.3  Oropharyngeal bleeding J39.2  Peripheral neuropathy G62.9  Sepsis ([address] A41.9  Pneumonia J18.9  Hypotension I95.9  Metastatic breast cancer ([address] C50.919  Renal failure (ARF), acute on chronic (HCC) N17.9, N18.9  Shock ([address] R57.9 RECOMMENDATIONS:  
· Neuro: PRN pain medications,Tylenol as needed for h/a, no sedation. · Pulm:  Supplemental O2 via NC for spO2 >94%. Keep HOB > 30' at all times. Aspiration Precautions. · CVS 1L Fluid bolus given. Continue NS. Titrate Norepi aim for SBP of 90 or > or MAP >65mmHg. · GI: Cardiac Diet, SUP, Zofran PRN for N/V  
· Renal: Koehler to BSD, Trend Renal indices; Nephrology following · Hem/Onc: Aleukocytosis, H/H, and platelets are stable, trend CBC. · I/D:. Blood, Sputum, and Urine cultures drawn and will be followed. Lactic acid ordered. Antibiotics: Zosyn · Endocrine: Glycemic control, q6 BS for SSI · Musc/Skin: no acute issues · Code Status: Full Code Best practice : 
 
Glycemic control IHI ICU bundles: Koehler Bundle Followed Sress ulcer prophylaxis. DVT prophylaxis. Need for Lines, koehler assessed. Rosa Espinoza NP-BC Pulmonary, Critical Care Medicine [facility] I saw and evaluated the patient, performing the key elements of the service. I discussed the findings, assessment and plan with the NP and agree with the NP's findings and plan as documented in the NP's note. All edits and changes made above or are mentioned in my addendum. Total of 32 min critical care time spent at bedside during the course of care providing evaluation,management and care decisions and ordering appropriate treatment related to critical care problems exclusive of procedures. The reason for providing this level of medical care for this critically ill patient was due a critical illness that impaired one or more vital organ systems such that there was a high probability of imminent or life threatening deterioration in the patients condition. This care involved high complexity decision making to assess, manipulate, and support vital system functions, to treat this degree vital organ system failure and to prevent further life threatening deterioration of the patients condition. 80-year-old female with a history of recurrent breast cancer in remission, CHFpEF, valvular heart disease with moderate mitral regurg, chronic A. caitlin, presented to [facility] with complaints of lightheadedness and dizziness. Patient reports she has had these symptoms for the last few days. She checked her blood pressure at home and noted that she was hypotensive with a BP in the 70s, so she presented to the [facility] ER. While in the ER patient was found to be hypotensive, refractory to IVF. Patient was found to have creatinine of 1.92, elevated from normal baseline. Due to refractory shock patient was started on levophed via her port, which we have attempted to wean off but patient continued to require it until overnight. Nephrology was consulted for the acute kidney injury and they recommend additional fluids, for which the creatinine did improve with IV fluid. Levophed was weaned off yesterday evening, restarted briefly overnight and then discontinued after a couple of hours. I believe the shock is multifactorial and there is a component of hypovolemia in the setting of over aggressive diuresis. It is unclear if the patient has multifactorial shock, so pt was started on broad spectrum ABx with vanc/zosyn for septic shock initially. Pan cultures are no growth to date. Even with hypotension patient's mental status has remained good, which is reassuring. Patient did not need midodrine, but this may be an option given her low normal blood pressure. Antibiotics were discontinued today as all cultures were no growth to date and patient's pressure improved  PT/OT/out of bed, also discussed case with hospitalist for the transfer to telemetry placed. Luis Dueñas MD/MPH Pulmonary, Critical Care Medicine [facility]

## 2019-01-10 NOTE — H&P
History & Physical 
Patient: Franchesca Maya MRN: [id number]  CSN: [id number] YOB: 1950  Age: 79 y.o. Sex: female DOA: 1/7/2019 Chief Complaint:  
Chief Complaint Patient presents with  Dizziness  Vision Change  Hypotension HPI:  
 
Franchesca Maya is a 79 y.o.  female who has PMH of breast cancer in remission since 2014 with perma Cath Rt Chest since then and not on active treatment since. Also has Hx of CHF with recent echo that showed EF of 55-60% but was dHF considering MR and P. HTN. Pt also has Hx of a-fib on Xarelto, Pt was admitted to ICU for dizziness episode and Hypotension of unknown etiology. Pt was preparing dinner and bent down to open the stove when she felt dizzy. Pt tried to stand up again but felt worse and measured her BP which was in 49-94'S range systolic. Pt was brought to ER where she received IVF then placed on small dose of Levophed to maintain BP. Pt BP improved and she was weaned off pressors and currently stable to be transferred to step down as her BP is still border line low. Seen and examined. denies any sx at this time and not feeling ill. Has no fever. Cough and her renal function are back to normal. 
 
 
Past Medical History:  
Diagnosis Date  Antineoplastic chemotherapy induced anemia  Arrhythmia Atrial Fib  Arthritis  Breast cancer ([address]  Cancer ([address] 1999 Breast  
 Cardiomyopathy ([address]  Chronic ITP (idiopathic thrombocytopenia) (HCC) 10/31/2016 Secondary to Anemia from Chemo  Congestive heart failure ([address]  Diabetes ([address] borderline, no meds  Esophageal cancer ([address] 2005  
 treated with chemo  Heart failure ([address]  SOB (shortness of breath) Past Surgical History:  
Procedure Laterality Date  BREAST SURGERY PROCEDURE UNLISTED Left 1990s  
 lumpectomy  COLONOSCOPY N/A 7/27/2016 COLONOSCOPY performed by Grayson Rosa MD at [facility] ENDOSCOPY  HX APPENDECTOMY  HX HEENT Tonsillectomy  HX ORTHOPAEDIC    
 HX TUBAL LIGATION    
 HX VASCULAR ACCESS    
 NEUROLOGICAL PROCEDURE UNLISTED  01/08/2018 Lumbar fusion History reviewed. No pertinent family history. Social History Socioeconomic History  Marital status:  Spouse name: Not on file  Number of children: Not on file  Years of education: Not on file  Highest education level: Not on file Tobacco Use  Smoking status: Never Smoker  Smokeless tobacco: Never Used Substance and Sexual Activity  Alcohol use: No  
 Drug use: No  
 
 
Prior to Admission medications Medication Sig Start Date End Date Taking? Authorizing Provider  
lidocaine-prilocaine (EMLA) topical cream APPLY OVER PORT 1 HOUR PRIOR TO CHEMOTHERAPY THAN COVER WITH SARAN WRAP 1/2/19   Daniel GAN NP  
oxyCODONE-acetaminophen (PERCOCET 10)  mg per tablet Take 1 Tab by mouth every six (6) hours as needed for Pain. Max Daily Amount: 4 Tabs. 11/30/18   Mena Cruz MD  
LORazepam (ATIVAN) 1 mg tablet Take 1 Tab by mouth nightly as needed for Anxiety. Max Daily Amount: 1 mg. 11/30/18   Mena Cruz MD  
allopurinol (ZYLOPRIM) 100 mg tablet Take 1 Tab by mouth daily. 11/10/18   Myrna Mercado MD  
colchicine (MITIGARE) 0.6 mg capsule Take 1 Cap by mouth daily. 11/10/18   Myrna Mercado MD  
diclofenac (VOLTAREN) 1 % gel Apply 2 g to affected area three (3) times daily as needed for Pain. Gel should be measured and applied using the supplied dosing card. Apply dose (2 gm) to each location. If treatment site is the hands, patients should wait at least one (1) hour to wash their hands. APPLY TO ankle and knees. 11/9/18   Myrna Mercado MD  
albuterol (PROAIR HFA) 90 mcg/actuation inhaler 1-2 puffs every 4-6 hrs. Patient taking differently: Take 2 Puffs by inhalation every four (4) hours as needed for Shortness of Breath or Wheezing.  10/5/18   Sanajy, Yenni GAN NP  
gabapentin (NEURONTIN) 300 mg capsule Take 1 Cap by mouth three (3) times daily. 9/17/18   Samara Aparicio MD  
ergocalciferol (VITAMIN D2) 50,000 unit capsule Take 1 Cap by mouth every seven (7) days. 3/19/18   Samara Aparicio MD  
topiramate (TOPAMAX) 50 mg tablet Take 50 mg by mouth daily. 2/15/18   Provider, Historical  
LINZESS 145 mcg cap capsule TAKE 1 CAPSULE BY MOUTH DAILY 30 MINUTES BEFORE BREAKFAST 2/16/18   Provider, Historical  
loratadine 10 mg cap Take 10 mg by mouth daily. Provider, Historical  
digoxin (LANOXIN) 0.125 mg tablet Take 0.125 mg by mouth daily. Provider, Historical  
rivaroxaban (XARELTO) 10 mg tablet Take 10 mg by mouth daily. Provider, Historical  
aluminum-magnesium hydroxide 200-200 mg/5 mL susp 5 mL, diphenhydrAMINE 12.5 mg/5 mL liqd 12.5 mg, lidocaine 2 % soln 5 mL 5 mL by Swish and Spit route two (2) times a day. Magic mouth wash Maalox Lidocaine 2% viscous Diphenhydramine oral solution Pharmacy to mix equal portions of ingredients to a total volume as indicated in the dispense amount. 2/29/16   Minna Garduno NP  
senna (SENNA) 8.6 mg tablet Take 1 Tab by mouth daily. Provider, Historical  
ondansetron hcl (ZOFRAN) 8 mg tablet Take 1 Tab by mouth every eight (8) hours as needed for Nausea. 6/3/13   Samara Aparicio MD  
IRON AG&FUM/C/FA/MV CMB11/CA-T (PRUVATE 21-7 PO) Take 325 mg by mouth daily. Indications: iron deficiency    Provider, Historical  
 
 
Allergies Allergen Reactions  Aspirin Swelling Pt states her whole body swells when she takes aspirin.  Adhesive Unable to Obtain  Nickel Rash  Pcn [Penicillins] Rash Review of Systems GENERAL: Patient alert, awake and oriented times 3, able to communicate full sentences and not in distress. HEENT: No change in vision, no earache, tinnitus, sore throat or sinus congestion. NECK: No pain or stiffness. PULMONARY: No shortness of breath, cough or wheeze. Cardiovascular: no pnd / orthopnea, no CP GASTROINTESTINAL: No abdominal pain, nausea, vomiting or diarrhea, melena or bright red blood per rectum. GENITOURINARY: No urinary frequency, urgency, hesitancy or dysuria. MUSCULOSKELETAL: No joint or muscle pain, no back pain, no recent trauma. DERMATOLOGIC: No rash, no itching, no lesions. ENDOCRINE: No polyuria, polydipsia, no heat or cold intolerance. No recent change in weight. HEMATOLOGICAL: No anemia or easy bruising or bleeding. NEUROLOGIC: No headache, seizures, numbness, tingling or weakness. Physical Exam:  
 
Physical Exam: 
Visit Vitals /57 Pulse 75 Temp 98.2 °F (36.8 °C) Resp 21 Ht 5' 6\" (1.676 m) Wt 71.2 kg (157 lb) SpO2 100% Breastfeeding? No  
BMI 25.34 kg/m² O2 Flow Rate (L/min): 2 l/min O2 Device: Nasal cannula Temp (24hrs), Av.1 °F (36.7 °C), Min:97.5 °F (36.4 °C), Max:98.6 °F (37 °C) No intake/output data recorded.  1901 - 01/10 0700 In: 3666.2 [I.V.:3666.2] Out: 3600 [QMZLV:2615] General:  Alert, cooperative, no distress, appears stated age. Head: Normocephalic, without obvious abnormality, atraumatic. Eyes:  Conjunctivae/corneas clear. PERRL, EOMs intact. Nose: Nares normal. No drainage or sinus tenderness. Neck: Supple, symmetrical, trachea midline, no adenopathy, thyroid: no enlargement, no carotid bruit and no JVD. Lungs:   Clear to auscultation bilaterally. Heart:  Regular rate and rhythm, S1, S2 normal.  
  Abdomen: Soft, non-tender. Bowel sounds normal.   
Extremities: Extremities normal, atraumatic, no cyanosis or edema. Pulses: 2+ and symmetric all extremities. Skin:  No rashes or lesions Neurologic: AAOx3, No focal motor or sensory deficit. Labs Reviewed: All lab results for the last 24 hours reviewed. CXR and EKG Procedures/imaging: see electronic medical records for all procedures/Xrays and details which were not copied into this note but were reviewed prior to creation of Plan Assessment/Plan Active Problems: 
  Chronic ITP (idiopathic thrombocytopenia) (HCC) (10/31/2016) Renal failure (ARF), acute on chronic (HCC) (1/8/2019) Shock ([facility] (1/8/2019) Paroxysmal atrial fibrillation ([facility] (1/10/2019) Chronic anticoagulation (1/10/2019) Pt was admitted to ICU for lethargy, hypotension and Acute renal failure Hx of A-fib with RVR Unknown etiology for Hypotension A-fib Vs dehydration Vs infection yet Pt has no WC, fever and does not feel sick Pt responded well to IVF and small dose of Levophed Will accept Pt to step down unit Hold Abx and monitor Cx Continue to hold  Digoxin and continue Xarelto. Digoxin Level is very low and rate is currently controlled Will hold gabapentin and Lorazepam  
 
Hx of chronic ITP >> currently stable platelet count DVT/GI Prophylaxis: Xarelto Plan of care is discussed in details with Patient/Family at bedside and agreed upon Musa Franks MD 
1/10/2019 12:45 PM

## 2019-01-10 NOTE — PROGRESS NOTES
In Patient Progress note Admit Date: 1/7/2019 Impression: 1. JUAN in setting of prerenal azotemia in setting of shock , creatinine briskly improved to baseline with hydration and pressors, excellent urine output ECHO reviewed, preserved EF but does have diastolic dysfunction and Moderate to severe TR and mod pulm HTN. Continue to support BP , keep MAP > 65 
2. Hypokalemia. Resolved 3. Met alkalosis due to volume depletion , improved 4. HFpEF. Diastolic dysfunction 5. Moderate to severe TR and mod pulm HTN 
6. Chronic a.fib. Rate is controlled. Please call with questions, 
 
Harlan Bacon MD FASN Cell [phone] Pager: [phone] 
  
 
 
 
Subjective:  
 
Feels better still lethargic Current Facility-Administered Medications:  
  melatonin tablet 5 mg, 5 mg, Oral, QHS PRN, Arturo Caldera MD, 5 mg at 01/10/19 0234   electrolyte-A (PLASMA-LYTE A) bolus infusion, 75 mL/hr, IntraVENous, CONTINUOUS, Esther Serna PA-C, Last Rate: 75 mL/hr at 01/09/19 2351, 75 mL/hr at 01/09/19 2351   docusate sodium (COLACE) capsule 100 mg, 100 mg, Oral, DAILY, Arturo Caldera MD, Stopped at 01/10/19 0900 
  sodium chloride (NS) flush 5-40 mL, 5-40 mL, IntraVENous, PRN, Ogbolu, Rosa I, NP 
  acetaminophen (TYLENOL) tablet 650 mg, 650 mg, Oral, Q6H PRN, Ogbolu, Rosa I, NP, 650 mg at 01/10/19 0002   ondansetron (ZOFRAN) injection 4 mg, 4 mg, IntraVENous, Q4H PRN, Ogbolu, Rosa I, NP 
  rivaroxaban (XARELTO) tablet 10 mg, 10 mg, Oral, DAILY WITH BREAKFAST, Maxi Melendez PA-C, 10 mg at 01/10/19 2020   sodium chloride (NS) flush 5-10 mL, 5-10 mL, IntraVENous, PRN, Shayna Dutta 
 
 
 
Objective:  
 
Visit Vitals /75 Pulse 68 Temp 97.7 °F (36.5 °C) Resp 19 Ht 5' 6\" (1.676 m) Wt 71.2 kg (157 lb) SpO2 95% Breastfeeding? No  
BMI 25.34 kg/m² Intake/Output Summary (Last 24 hours) at 1/10/2019 1650 Last data filed at 1/10/2019 1300 Gross per 24 hour Intake 3768.68 ml Output 2000 ml Net 1768.68 ml Physical Exam:  
 
gen NAD HENT mmm RS AEBE clear CVS s1 s2 wnl no JVD 
GI soft BS + Ext no edema Data Review: 
 
Recent Labs  
  01/10/19 
0400 WBC 6.3  
RBC 3.22* HCT 25.5* MCV 79.2 MCH 26.1 MCHC 32.9 RDW 15.7* Recent Labs  
  01/10/19 
0400 01/09/19 2010 01/09/19 
0640 01/08/19 
2115 01/08/19 
1705 01/08/19 
0530 01/07/19 
1948 BUN 15  --  24* 33* 33* 43* 50* CREA 0.83  --  1.09 1.47* 1.31* 1.42* 1.95* CA 7.9*  --  8.5 8.6 8.7 8.0* 9.5 ALB 2.6*  --  3.2*  --   --  3.1* 3.8  
K 4.0 3.6 3.8 3.3* 3.5 2.9* 2.8*   --  140 140 139 139 139   --  104 102 101 99* 94* CO2 29  --  32 32 31 31 37* PHOS 2.1* 2.5 2.0* 2.2*  --  3.5  --   
GLU 77  --  104* 119* 85 91 90 Gerry Guzman MD

## 2019-01-10 NOTE — PROGRESS NOTES
Problem: Pressure Injury - Risk of 
Goal: *Prevention of pressure injury Document Reggie Scale and appropriate interventions in the flowsheet. Outcome: Progressing Towards Goal 
Pressure Injury Interventions: 
Sensory Interventions: Assess changes in LOC, Assess need for specialty bed, Check visual cues for pain, Float heels, Keep linens dry and wrinkle-free, Maintain/enhance activity level, Monitor skin under medical devices, Pressure redistribution bed/mattress (bed type), Turn and reposition approx. every two hours (pillows and wedges if needed), Use 30-degree side-lying position Moisture Interventions: Absorbent underpads, Assess need for specialty bed, Check for incontinence Q2 hours and as needed, Internal/External urinary devices, Minimize layers, Moisture barrier, Offer toileting Q_hr Activity Interventions: Assess need for specialty bed, Increase time out of bed, Pressure redistribution bed/mattress(bed type), PT/OT evaluation Mobility Interventions: Assess need for specialty bed, Float heels, Pressure redistribution bed/mattress (bed type), PT/OT evaluation, Turn and reposition approx. every two hours(pillow and wedges) Nutrition Interventions: Document food/fluid/supplement intake, Discuss nutritional consult with provider, Offer support with meals,snacks and hydration Friction and Shear Interventions: Apply protective barrier, creams and emollients, Foam dressings/transparent film/skin sealants, HOB 30 degrees or less, Lift team/patient mobility team, Minimize layers

## 2019-01-11 NOTE — ROUTINE PROCESS
Bedside and Verbal shift change report given to MARLENE Vasquez (oncoming nurse) by Leela Whitfield RN (offgoing nurse). Report included the following information SBAR, Kardex, MAR and Recent Results. SITUATION: 
Code Status: Full Code Reason for Admission: Renal failure (ARF), acute on chronic (HCC) Shock ([facility] Hospital day: 3 Problem List:  
   
Hospital Problems  Date Reviewed: 11/30/2018 Codes Class Noted POA Paroxysmal atrial fibrillation (HCC) ICD-10-CM: I48.0 ICD-9-CM: 427.31  1/10/2019 Unknown Chronic anticoagulation ICD-10-CM: Z79.01 
ICD-9-CM: V58.61  1/10/2019 Unknown * (Principal) Renal failure (ARF), acute on chronic (HCC) ICD-10-CM: N17.9, N18.9 ICD-9-CM: 584.9, 585.9  1/8/2019 Unknown Shock ([facility] ICD-10-CM: R57.9 ICD-9-CM: 785.50  1/8/2019 Unknown Chronic ITP (idiopathic thrombocytopenia) ([facility]) ICD-10-CM: D69.3 ICD-9-CM: 287.31  10/31/2016 Yes BACKGROUND: 
 Past Medical History:  
Past Medical History:  
Diagnosis Date  Antineoplastic chemotherapy induced anemia  Arrhythmia Atrial Fib  Arthritis  Breast cancer ([facility]  Cancer ([facility] 1999 Breast  
 Cardiomyopathy ([facility]  Chronic ITP (idiopathic thrombocytopenia) ([facility]) 10/31/2016 Secondary to Anemia from Chemo  Congestive heart failure ([facility]  Diabetes ([facility] borderline, no meds  Esophageal cancer ([facility] 2005  
 treated with chemo  Heart failure ([facility]  SOB (shortness of breath) Patient taking anticoagulants no Patient has a defibrillator: no  
 History of shots YES for example, flu, pneumonia, tetanus Isolation History NO for example, MRSA, CDiff ASSESSMENT: 
Changes in Assessment Throughout Shift: None Significant Changes in 24 hours (for example, RR/code, fall) Patient has Central Line: yes Reasons if yes: Irritant/vesicant Patient has Yanes Cath: no Reasons if yes: N/A Mobility Issues PT 
IV Patency OR Checklist 
 Pending Tests Last Vitals: 
Vitals w/ MEWS Score (last day) Date/Time MEWS Score Pulse Resp Temp BP Level of Consciousness SpO2  
 01/11/19 0751  2  104  (Abnormal)   18  99.6 °F (37.6 °C)  112/78  Alert  100 % 01/11/19 0406  1  81  18  99 °F (37.2 °C)  120/71  Alert  100 % 01/10/19 2348  1  100  18  98.8 °F (37.1 °C)  122/82  Alert  98 % 01/10/19 1946  1  91  20  97.8 °F (36.6 °C)  120/80  Alert  98 % 01/10/19 1546  1  68  19  97.7 °F (36.5 °C)  121/75  Alert  95 % 01/10/19 1300    80  20    107/69      
 01/10/19 1230    83  20    106/59      
 01/10/19 1200  2  83  21  98.1 °F (36.7 °C)  101/68  Alert    
 01/10/19 1130    71  14          
 01/10/19 1100    93  24    107/69      
 01/10/19 1030    76  16    93/55      
 01/10/19 1000    85  14    90/42    100 % 01/10/19 0930    95  18    105/66      
 01/10/19 0900    83  21    133/91  (Abnormal)     100 % 01/10/19 0830    90  23    134/77    100 % 01/10/19 0800  3  77  33  (Abnormal)   98.2 °F (36.8 °C)  128/73  Alert  100 % 01/10/19 0730    84  21    118/69    100 % 01/10/19 0700    72  15    111/79    100 % 01/10/19 0630    75  21    105/57    100 % 01/10/19 0600    74  15    109/65    100 % 01/10/19 0500    81  16    86/50  (Abnormal)     100 % 01/10/19 0400  2  78  19  98.6 °F (37 °C)  96/56  Alert  100 % 01/10/19 0300    86  19    82/43  (Abnormal)     100 % 01/10/19 0200    81  16    83/52  (Abnormal)     100 % 01/10/19 0100    91  20    92/49    100 % 01/10/19 0000          111/56     PAIN Pain Assessment Pain Intensity 1: 0 (01/11/19 0406) Pain Location 1: Abdomen Pain Intervention(s) 1: Other (comment)(MD seen patient laxitives were ordered) Patient Stated Pain Goal: 0 Intervention effective: N/A Time of last intervention: N/A Reassessment Completed: N/A Other actions taken for pain: N/A 
 
 Last 3 Weights: 
Last 3 Recorded Weights in this Encounter 01/07/19 1926 01/08/19 0400 01/08/19 1226 Weight: 66 kg (145 lb 8 oz) 71.5 kg (157 lb 10.1 oz) 71.2 kg (157 lb) Weight change: INTAKE/OUPUT Current Shift: 01/11 0701 - 01/11 1900 In: -  
Out: 400 [Urine:400] Last three shifts: 01/09 1901 - 01/11 0700 In: 2558.2 [I.V.:2558.2] Out: 2150 [Urine:2150] RECOMMENDATIONS AND DISCHARGE PLANNING Patient needs and requests: None Pending tests/procedures: PT/OT Discharge plan for patient: TBD Discharge planning Needs or Barriers: TBD Estimated Discharge Date: TBD Posted on Whiteboard in Patients Room: no   
  
\"HEALS\" SAFETY CHECK A safety check occurred in the patient's room between off going nurse and oncoming nurse listed above. The safety check included the below items: 
 
H High Alert Medications Verify all high alert medication drips (heparin, PCA, etc.) E Equipment Suction is set up for ALL patients (with rusty) Red plugs utilized for all equipment (IV pumps, etc.) WOWs wiped down at end of shift. Room stocked with oxygen, suction, and other unit-specific supplies A Alarms Bed alarm is set for fall risk patients Ensure chair alarm is in place and activated if patient is up in a chair L Lines Check IV for any infiltration Yanes bag is empty if patient has a Yanes Tubing and IV bags are labeled Shaynalea Celiso Safety Room is clean, patient is clean, and equipment is clean. Hallways are clear from equipment besides carts. Fall bracelet on for fall risk patients Ensure room is clear and free of clutter Suction is set up for ALL patients (with rusty) Hallways are clear from equipment besides carts. Isolation precautions followed, supplies available outside room, sign posted Analisa Constantino RN

## 2019-01-11 NOTE — PROGRESS NOTES
SUBJECTIVE: 
 
Feeling better. No chest or abdominal pain. No SOB or cough. No nausea or vomiting. OBJECTIVE: 
 
/78 (BP 1 Location: Right arm, BP Patient Position: At rest)   Pulse (!) 104   Temp 99.6 °F (37.6 °C)   Resp 18   Ht 5' 6\" (1.676 m)   Wt 71.2 kg (157 lb)   SpO2 100%   Breastfeeding? No   BMI 25.34 kg/m² CVS: iRRR 
RS: CTA Bilaterally, no wheezes GI: NT, BS + Extremities: + pedal edema CNS: moves all extremities well General: NAD, Awake ASSESSMENT: 
 
1. Hypotension secondary to orthostatic hypotension and dehydration caused by diuretic, improved. 2.  Dehydration due to diuretic, resolved 3. Acute renal failure due to dehydration, resolved. 4.  Chronic combined systolic and diastolic congestive heart failure with EF of 55%, compensated. 5.  Chronic atrial fibrillation, rate controlled. 6.  History of lung and breast cancer. 7.  History of ITP, stable platelet here. 8.  Peripheral neuropathy. 9.  Degenerative joint disease. 10. Low phos 11. Orthostatic hypotension PLAN: 
 
Cont current management PT/OT ordered Add Midodrine and a low dose of lasix Restart home medications Possible discharge home tomorrow BMP:  
Lab Results Component Value Date/Time  01/11/2019 04:30 AM  
 K 3.7 01/11/2019 04:30 AM  
  01/11/2019 04:30 AM  
 CO2 30 01/11/2019 04:30 AM  
 AGAP 5 01/11/2019 04:30 AM  
 GLU 77 01/11/2019 04:30 AM  
 BUN 8 01/11/2019 04:30 AM  
 CREA 0.59 (L) 01/11/2019 04:30 AM  
 GFRAA >60 01/11/2019 04:30 AM  
 GFRNA >60 01/11/2019 04:30 AM  
 
CBC:  
Lab Results Component Value Date/Time  WBC 7.2 01/11/2019 04:30 AM  
 HGB 8.8 (L) 01/11/2019 04:30 AM  
 HCT 26.4 (L) 01/11/2019 04:30 AM  
  01/11/2019 04:30 AM

## 2019-01-11 NOTE — PROGRESS NOTES
In Patient Progress note Admit Date: 1/7/2019 Impression: 1. JUAN in setting of prerenal azotemia in setting of shock , creatinine briskly improved to baseline with hydration , excellent urine output ECHO reviewed, preserved EF but does have diastolic dysfunction and Moderate to severe TR and mod pulm HTN. 2. Hypokalemia. Resolved 3. Met alkalosis due to volume depletion , improved 4. HFpEF. Diastolic dysfunction 5. Moderate to severe TR and mod pulm HTN 
6. Chronic a.fib. Rate is controlled. 7. hypophos , replace with kphos , recheck in AM 
 
Nephrology will sign off Please call with questions, 
 
Cezar Argueta MD FASN Cell [phone] Pager: [phone] 
  
 
 
 
Subjective:  
 
Feels better Current Facility-Administered Medications:  
  melatonin tablet 5 mg, 5 mg, Oral, QHS PRN, Kirill Zurita MD, 5 mg at 01/10/19 0234   potassium, sodium phosphates (NEUTRA-PHOS) packet 2 Packet, 2 Packet, Oral, BID, Arabella Olviarez MD, 2 Packet at 01/11/19 4135   electrolyte-A (PLASMA-LYTE A) bolus infusion, 75 mL/hr, IntraVENous, CONTINUOUS, Esther Serna PA-C, Last Rate: 75 mL/hr at 01/11/19 0853, 75 mL/hr at 01/11/19 5023   docusate sodium (COLACE) capsule 100 mg, 100 mg, Oral, DAILY, Kirill Zurita MD, Stopped at 01/10/19 0900 
  sodium chloride (NS) flush 5-40 mL, 5-40 mL, IntraVENous, PRN, Ogbolu, Rosa I, NP 
  acetaminophen (TYLENOL) tablet 650 mg, 650 mg, Oral, Q6H PRN, Ogbolu, Rosa I, NP, 650 mg at 01/11/19 0849 
  ondansetron (ZOFRAN) injection 4 mg, 4 mg, IntraVENous, Q4H PRN, Ogbolu, Rosa I, NP 
  rivaroxaban (XARELTO) tablet 10 mg, 10 mg, Oral, DAILY WITH BREAKFAST, Al, JanuaryKallie PEPPER PA-C, 10 mg at 01/11/19 0849 
  sodium chloride (NS) flush 5-10 mL, 5-10 mL, IntraVENous, PRN, Shayna Dutta 
 
 
 
Objective:  
 
Visit Vitals /78 (BP 1 Location: Right arm, BP Patient Position: At rest) Pulse (!) 104 Temp 99.6 °F (37.6 °C) Resp 18 Ht 5' 6\" (1.676 m) Wt 71.2 kg (157 lb) SpO2 100% Breastfeeding? No  
BMI 25.34 kg/m² Intake/Output Summary (Last 24 hours) at 1/11/2019 1136 Last data filed at 1/11/2019 1004 Gross per 24 hour Intake 969 ml Output 1100 ml Net -131 ml Physical Exam:  
 
gen NAD HENT mmm RS AEBE clear CVS s1 s2 wnl no JVD 
GI soft BS + Ext no edema Data Review: 
 
Recent Labs  
  01/11/19 0430 WBC 7.2 RBC 3.37* HCT 26.4*  
MCV 78.3 MCH 26.1 MCHC 33.3 RDW 15.8* Recent Labs  
  01/11/19 
0430 01/10/19 
0400 01/09/19 2010 01/09/19 
0640 01/08/19 2115 01/08/19 
1705 BUN 8 15  --  24* 33* 33* CREA 0.59* 0.83  --  1.09 1.47* 1.31* CA 8.1* 7.9*  --  8.5 8.6 8.7 ALB 2.8* 2.6*  --  3.2*  --   --   
K 3.7 4.0 3.6 3.8 3.3* 3.5  141  --  140 140 139  106  --  104 102 101 CO2 30 29  --  32 32 31 PHOS 2.4* 2.1* 2.5 2.0* 2.2*  --   
GLU 77 77  --  104* 119* 85  
 
 
Angel Douglas MD

## 2019-01-12 NOTE — ROUTINE PROCESS
Bedside and Verbal shift change report given to Kristal Lopez RN (oncoming nurse) by Mookie Mcnulty RN (offgoing nurse). Report included the following information SBAR, Kardex, MAR and Recent Results.

## 2019-01-12 NOTE — PROGRESS NOTES
Problem: Mobility Impaired (Adult and Pediatric) Goal: *Acute Goals and Plan of Care (Insert Text) Physical Therapy Goals Initiated 1/12/2019 and to be accomplished within 7 day(s) 1. Patient will move from supine to sit and sit to supine , scoot up and down and roll side to side in bed with supervision/set-up. 2.  Patient will transfer from bed to chair and chair to bed with supervision/set-up using the least restrictive device. 3.  Patient will perform sit to stand with supervision/set-up. 4.  Patient will ambulate with supervision/set-up for 100 feet with the least restrictive device. 5.  Patient will ascend/descend 3 stairs with 1-2 handrail(s) with minimal assistance/contact guard assist. 
 
Outcome: Progressing Towards Goal 
physical Therapy EVALUATION Patient: Bridget Queen (77 y.o. female) Date: 1/12/2019 Primary Diagnosis: Renal failure (ARF), acute on chronic (HCC) Shock ([facility] [address] Precautions:   Fall ASSESSMENT : 
PT orders received and patient cleared by nursing to participate with therapy. Patient is a 79 y.o. female admitted to the hospital due to shock and dizziness. Patient consents to PT evaluation and treatment. Pt supine in bed upon arrival. Supine to sit with HOB raised min A for LE. BP in supine 91/63 and in sitting 107/76. Sit to stands CGA/min A for increased safety and balance. Gait in room 15 feet min A with RW. Pt has difficulty initiating gait requiring verbal, tactile, and visual cues for stepping and larger steps. Pt has very narrow CARLIN with gait and requires cues to increase CARLIN and safety. Pt fatigues very quickly with activities. Pt SOB during session with 2L of O2 donned. SaO2 not reading due to finger nails at this time. Took more rest breaks and cued for breathing technique as well as educated on energy conservation. Pt will benefit at this time for SNF for increased safety.  Pt states she would like to return home but pt will need 24 hour assistance/supervision at this time. Recommend pt on Move with Me to increase OOB for meals while in the hospital. Pt ended therapy sitting in recliner with all needs met. Patient will benefit from skilled intervention to address the above impairments and increase functional independence. Patients rehabilitation potential is considered to be Good Factors which may influence rehabilitation potential include:  
[]         None noted 
[]         Mental ability/status []         Medical condition 
[x]         Home/family situation and support systems 
[]         Safety awareness 
[]         Pain tolerance/management 
[]         Other: PLAN : 
Recommendations and Planned Interventions: 
[x]           Bed Mobility Training             [x]    Neuromuscular Re-Education 
[x]           Transfer Training                   []    Orthotic/Prosthetic Training 
[x]           Gait Training                          []    Modalities [x]           Therapeutic Exercises          []    Edema Management/Control 
[x]           Therapeutic Activities            [x]    Patient and Family Training/Education 
[]           Other (comment): Frequency/Duration: Patient will be followed by physical therapy 1-2 times per day to address goals. Discharge Recommendations: Best at this time SNF but if pt refuses then home with home health and 24 assistance/supervision Further Equipment Recommendations for Discharge: rolling walker SUBJECTIVE:  
Patient stated I'm short winded.  OBJECTIVE DATA SUMMARY:  
 
Past Medical History:  
Diagnosis Date  Antineoplastic chemotherapy induced anemia  Arrhythmia Atrial Fib  Arthritis  Breast cancer ([facility] [address]  Cancer ([facility] [address] 1999 Breast  
 Cardiomyopathy ([facility] [address]  Chronic ITP (idiopathic thrombocytopenia) (HCC) 10/31/2016 Secondary to Anemia from Chemo  Congestive heart failure ([address]  Diabetes ([facility] [address] borderline, no meds  Esophageal cancer ([facility] [address] 2005  
 treated with chemo  Heart failure ([facility] [address]  SOB (shortness of breath) Past Surgical History:  
Procedure Laterality Date  BREAST SURGERY PROCEDURE UNLISTED Left 1990s  
 lumpectomy  COLONOSCOPY N/A 7/27/2016 COLONOSCOPY performed by Vicente Campbell MD at [facility] ENDOSCOPY  
 HX APPENDECTOMY  HX HEENT Tonsillectomy  HX ORTHOPAEDIC    
 HX TUBAL LIGATION    
 HX VASCULAR ACCESS    
 NEUROLOGICAL PROCEDURE UNLISTED  01/08/2018 Lumbar fusion Barriers to Learning/Limitations: None Compensate with: N/A Prior Level of Function/Home Situation: Independent with mobility including gait using a cane at times. Home Situation Home Environment: Patient room # Steps to Enter: 3 Rails to Enter: Yes Hand Rails : Bilateral 
Wheelchair Ramp: No 
One/Two Story Residence: One story Living Alone: No 
Support Systems: (lives with son) Patient Expects to be Discharged to[de-identified] Private residence Current DME Used/Available at Home: Cane, straight, Walker, rolling, Grab barsCritical Behavior: 
Neurologic State: Alert Psychosocial 
Patient Behaviors: Calm; CooperativeStrength:   
Strength: Generally decreased, functional(B LE) Tone & Sensation:  
Tone: Normal(B LE) Sensation: Intact(B LE) Range Of Motion: 
AROM: Generally decreased, functional(B LE) Functional Mobility: 
Bed Mobility: 
Supine to Sit: Minimum assistance Scooting: Minimum assistance Transfers: 
Sit to Stand: Contact guard assistance;Minimum assistance Stand to Sit: Contact guard assistance Balance:  
Sitting: Impaired; With support Sitting - Static: Good (unsupported) Sitting - Dynamic: Fair (occasional) Standing: Impaired; With support Standing - Static: Fair Standing - Dynamic : FairAmbulation/Gait Training: 
Distance (ft): 15 Feet (ft) Assistive Device: Walker, rolling Ambulation - Level of Assistance: Minimal assistance Base of Support: Narrowed Speed/Gege: Slow;Shuffled Therapeutic Exercises:  
Reviewed ankle pumps Pain: 
Pre: 6-7/10 head/neck/back Post: 6-7/10 head/neck/back Activity Tolerance:  
Fair- 
Please refer to the flowsheet for vital signs taken during this treatment. After treatment:  
[x] Patient left in no apparent distress sitting up in chair 
[] Patient left sitting on EOB [] Patient left in no apparent distress in bed 
[] Patient declined to be OOB at this time due to   
[x] Call bell left within reach [x] Nursing notified(Deya) [] Caregiver present 
[] Bed alarm activated 
[x] Personal items in reach COMMUNICATION/EDUCATION:  
[x]         Fall prevention education was provided and the patient/caregiver indicated understanding. [x]         Patient/family have participated as able in goal setting and plan of care. [x]         Patient/family agree to work toward stated goals and plan of care. []         Patient understands intent and goals of therapy, but is neutral about his/her participation. []         Patient is unable to participate in goal setting and plan of care. Thank you for this referral. 
Tyrone Howard, PT, DPT Time Calculation: 28 mins Mobility [id number] Current  CJ= 20-39%   Goal  CI= 1-19%. The severity rating is based on the Level of Assistance required for Functional Mobility and ADLs.  
 
Eval Complexity: History: MEDIUM  Complexity : 1-2 comorbidities / personal factors will impact the outcome/ POC Exam:HIGH Complexity : 4+ Standardized tests and measures addressing body structure, function, activity limitation and / or participation in recreation  Presentation: MEDIUM Complexity : Evolving with changing characteristics  Clinical Decision Making:Medium Complexity   Overall Complexity:MEDIUM

## 2019-01-12 NOTE — PROGRESS NOTES
Problem: Self Care Deficits Care Plan (Adult) Goal: *Acute Goals and Plan of Care (Insert Text) Occupational Therapy Goals Initiated 1/12/2019 within 7 day(s). 1.  Patient will demonstrate no edema in her right hand in preparation for her efficient functional use during her self care routine 2. Patient will demonstrate independent return demonstration of her right hand home program 
3. Patient will demonstrate modified independence LB dressing 4. Patient will demonstrate modified independence LB bathing Occupational Therapy EVALUATION Patient: Shi Tadeo (89 y.o. female) Date: 1/12/2019 Primary Diagnosis: Renal failure (ARF), acute on chronic (HCC) Shock ([facility] [address] Precautions:  Fall ASSESSMENT : 
Based on the objective data described below, the patient presents with edema/decreased sensation/decreased functional use of her right hand, decreased ability to bend and reach her lower body and decreased functional mobility which limits her independence with her self care routine. Nacasandra Broccoli Patient will benefit from skilled intervention to address the above impairments. Patients rehabilitation potential is considered to be Good Factors which may influence rehabilitation potential include:  
[]             None noted []             Mental ability/status []             Medical condition []             Home/family situation and support systems []             Safety awareness []             Pain tolerance/management 
[]             Other: PLAN : 
Recommendations and Planned Interventions: 
[]               Self Care Training                  []        Therapeutic Activities []               Functional Mobility Training    []        Cognitive Retraining 
[]               Therapeutic Exercises           []        Endurance Activities []               Balance Training                   []        Neuromuscular Re-Education []               Visual/Perceptual Training     []   Home Safety Training []               Patient Education                 []        Family Training/Education []               Other (comment): Frequency/Duration: Patient will be followed by occupational therapy 1-2 times per day/4-7 days per week to address goals. Discharge Recommendations: Home Health Further Equipment Recommendations for Discharge: N/A Barriers to Learning/Limitations: None PATIENT COMPLEXITY Eval Complexity: History: LOW Complexity : Brief history review ; Examination: LOW Complexity : 1-3 performance deficits relating to physical, cognitive , or psychosocial skils that result in activity limitations and / or participation restrictions ; Decision Making:LOW Complexity : No comorbidities that affect functional and no verbal or physical assistance needed to complete eval tasks  Assessment: LOW Complexity G-CODES:  
 
 
SUBJECTIVE:  
Patient stated I just can't use my right hand so much.  OBJECTIVE DATA SUMMARY:  
 
Past Medical History:  
Diagnosis Date  Antineoplastic chemotherapy induced anemia  Arrhythmia Atrial Fib  Arthritis  Breast cancer ([address]  Cancer ([facility] [address] 1999 Breast  
 Cardiomyopathy ([facility] [address]  Chronic ITP (idiopathic thrombocytopenia) (HCC) 10/31/2016 Secondary to Anemia from Chemo  Congestive heart failure ([address]  Diabetes ([address] borderline, no meds  Esophageal cancer ([address] 2005  
 treated with chemo  Heart failure ([address]  SOB (shortness of breath) Past Surgical History:  
Procedure Laterality Date  BREAST SURGERY PROCEDURE UNLISTED Left 1990s  
 lumpectomy  COLONOSCOPY N/A 7/27/2016 COLONOSCOPY performed by Ed Franco MD at [facility] ENDOSCOPY  
 HX APPENDECTOMY  HX HEENT Tonsillectomy  HX ORTHOPAEDIC    
 HX TUBAL LIGATION    
 HX VASCULAR ACCESS    
 NEUROLOGICAL PROCEDURE UNLISTED  01/08/2018 Lumbar fusion Prior Level of Function/Home Situation:  
Home Situation Home Environment: Patient room # Steps to Enter: 3 Rails to Enter: Yes Hand Rails : Bilateral 
Wheelchair Ramp: No 
One/Two Story Residence: One story Living Alone: No 
Support Systems: (lives with son) Patient Expects to be Discharged to[de-identified] Private residence Current DME Used/Available at Home: Cane, straight, Walker, rolling, Grab bars 
[x]  Right hand dominant   []  Left hand dominantCognitive/Behavioral Status: 
Neurologic State: Alert Orientation Level: Oriented X4 Skin: no skin integrity issues noted during OT evaluation Edema: right hand presents with 1+ pitting edema, resolves following treatment to no edema Vision/Perceptual:   
  tracking is [facility] Coordination: 
Minimal decreased coordination in right hand, she stopped feeding herself with her right hand; following treatment of edema management and sensory stimulation, she increased to being able to feed herself independently and efficiently and her movements in her right hand improved from minimally slowed to [facility]; she is pleased with this progress and she reports she will follow through with her program thus far Balance: 
Sitting: Impaired; With support Sitting - Static: Good (unsupported) Sitting - Dynamic: Fair (occasional) Standing: Impaired; With support Standing - Static: Fair Standing - Dynamic : Fair Strength: LUE is 4/5 RUE is 3+/5 Tone & Sensation: WFL BLE's 
 Range of Motion: 
BUE's AROM is [facility] Functional Mobility and Transfers for ADLs: 
Bed Mobility: per PT as she is up in the chair upon arrival 
Supine to Sit: Minimum assistance Scooting: Minimum assistance Transfers: 
Sit to Stand: Contact guard assistance;Minimum assistance ADL Assessment: 
 self feeding: set up and occasional min assist with LUE, set up using RUE Grooming: set up and min assist using LUE, set up and occasional min assist using RUE (simulated) UB bathing and dressing: Modified independent LB bathing and dressing: min to mod assist (she can't reach past her ankles Therapeutic Exercise: As noted above, edmea management and sensory stim program 
Pain: 
Pt reports 0/10 pain or discomfort prior to treatment.   
Pt reports 0/10 pain or discomfort post treatment. Activity Tolerance:  
No SOB noted; she is reporting generalized fatigue Please refer to the flowsheet for vital signs taken during this treatment. After treatment:  
[x] Patient left in no apparent distress sitting up in chair 
[] Patient left in no apparent distress in bed 
[x] Call bell left within reach 
[] Nursing notified 
[x] visitor is  present 
[] Bed alarm activated COMMUNICATION/EDUCATION:  
[] Home safety education was provided and the patient/caregiver indicated understanding. [] Patient/family have participated as able in goal setting and plan of care. [x] Patient/family agree to work toward stated goals and plan of care. [] Patient understands intent and goals of therapy, but is neutral about his/her participation. [] Patient is unable to participate in goal setting and plan of care. Thank you for this referral. 
Jes Del Toro, OTR/L Time Calculation: 24 mins

## 2019-01-12 NOTE — ROUTINE PROCESS
Bedside shift change report given to Renato RN (oncoming nurse) by Presley Gillespie RN (offgoing nurse). Report included the following information SBAR, Kardex, Intake/Output, MAR and Recent Results.

## 2019-01-12 NOTE — PROGRESS NOTES
SUBJECTIVE: 
 
Feeling better. Sitting up in chair. No chest or abdominal pain. No SOB or cough currently. No nausea or vomiting. States she will have to talk to her family before making a final decision about going home with Tamela  vs SNF and family is in [place] today to attend family .  
 
OBJECTIVE: 
 
/73   Pulse 79   Temp 98.9 °F (37.2 °C)   Resp 20   Ht 5' 6\" (1.676 m)   Wt 78.8 kg (173 lb 11.2 oz)   SpO2 98%   Breastfeeding? No   BMI 28.04 kg/m² CVS: iRRR 
RS: CTA Bilaterally, no wheezes GI: NT, BS + Extremities: + pedal edema CNS: moves all extremities well General: NAD, Awake ASSESSMENT: 
 
1. Hypotension secondary to orthostatic hypotension and dehydration caused by diuretic, improved. 2.  Dehydration due to diuretic, resolved 3. Acute renal failure due to dehydration, resolved. 4.  Chronic combined systolic and diastolic congestive heart failure with EF of 55%, compensated. 5.  Chronic atrial fibrillation, rate controlled. 6.  History of lung and breast cancer. 7.  History of ITP, stable platelet here. 8.  Peripheral neuropathy. 9.  Degenerative joint disease. 10. Low phos 11. Orthostatic hypotension PLAN: 
 
Cont current management 
spo2 is 98% on RA Possible discharge home tomorrow BMP:  
Lab Results Component Value Date/Time  2019 06:19 AM  
 K 3.9 2019 06:19 AM  
  2019 06:19 AM  
 CO2 27 2019 06:19 AM  
 AGAP 7 2019 06:19 AM  
 GLU 84 2019 06:19 AM  
 BUN 9 2019 06:19 AM  
 CREA 0.63 2019 06:19 AM  
 GFRAA >60 2019 06:19 AM  
 GFRNA >60 2019 06:19 AM  
 
CBC:  
Lab Results Component Value Date/Time  HGB 10.0 (L) 2019 06:19 AM  
 HCT 30.1 (L) 2019 06:19 AM

## 2019-01-12 NOTE — PROGRESS NOTES
Problem: Falls - Risk of 
Goal: *Absence of Falls Document Nayely Echeverria Fall Risk and appropriate interventions in the flowsheet. Outcome: Progressing Towards Goal 
Fall Risk Interventions: 
Mobility Interventions: Patient to call before getting OOB Medication Interventions: Patient to call before getting OOB Elimination Interventions: Call light in reach Problem: Pressure Injury - Risk of 
Goal: *Prevention of pressure injury Document Reggie Scale and appropriate interventions in the flowsheet. Outcome: Progressing Towards Goal 
Pressure Injury Interventions: 
Sensory Interventions: Assess changes in LOC Moisture Interventions: Absorbent underpads Activity Interventions: Increase time out of bed Mobility Interventions: Pressure redistribution bed/mattress (bed type) Nutrition Interventions: Document food/fluid/supplement intake Friction and Shear Interventions: Apply protective barrier, creams and emollients, Foam dressings/transparent film/skin sealants, HOB 30 degrees or less, Lift team/patient mobility team, Minimize layers

## 2019-01-13 NOTE — DISCHARGE SUMMARY
PATIENT DISCHARGE INSTRUCTIONS PATIENT DISCHARGE INSTRUCTIONS Franchesca Maya / [id number] : 1950 Admitted 2019 Discharged: 2019 Dictated # [id number] · It is important that you take the medication exactly as they are prescribed. · Keep your medication in the bottles provided by the pharmacist and keep a list of the medication names, dosages, and times to be taken in your wallet. · Do not take other medications without consulting your doctor. What to do at [facility] Recommended Diet: Cardiac Diet Recommended Activity: PT/OT per Home Health, elastic stocking to both LEs while ambulating [patient states she has them at home]. Current Discharge Medication List  
  
START taking these medications Details  
midodrine (PROAMITINE) 5 mg tablet Take 1 Tab by mouth two (2) times daily (with meals) for 30 days. Qty: 60 Tab, Refills: 0  
  
furosemide (LASIX) 20 mg tablet Take 1 Tab by mouth daily. Qty: 30 Tab, Refills: 0  
  
magnesium oxide (MAG-OX) 400 mg tablet Take 1 Tab by mouth every fourty-eight (48) hours. Qty: 10 Tab, Refills: 0  
  
!! albuterol (PROVENTIL HFA, VENTOLIN HFA, PROAIR HFA) 90 mcg/actuation inhaler Take 2 Puffs by inhalation every four (4) hours as needed for Wheezing. Qty: 1 Inhaler, Refills: 0  
  
 !! - Potential duplicate medications found. Please discuss with provider. CONTINUE these medications which have CHANGED Details  
gabapentin (NEURONTIN) 300 mg capsule Take 1 Cap by mouth nightly. Qty: 90 Cap, Refills: 1 CONTINUE these medications which have NOT CHANGED Details  
lidocaine-prilocaine (EMLA) topical cream APPLY OVER PORT 1 HOUR PRIOR TO CHEMOTHERAPY THAN COVER WITH [place] WRAP Qty: 30 g, Refills: 6 Associated Diagnoses: Carcinoma of right breast metastatic to axillary lymph node (HCC)  
  
allopurinol (ZYLOPRIM) 100 mg tablet Take 1 Tab by mouth daily. Qty: 30 Tab, Refills: 0 diclofenac (VOLTAREN) 1 % gel Apply 2 g to affected area three (3) times daily as needed for Pain. Gel should be measured and applied using the supplied dosing card. Apply dose (2 gm) to each location. If treatment site is the hands, patients should wait at least one (1) hour to wash their hands. APPLY TO ankle and knees. Qty: 100 g, Refills: 0  
  
!! albuterol (PROAIR HFA) 90 mcg/actuation inhaler 1-2 puffs every 4-6 hrs. Qty: 1 Inhaler, Refills: 1  
  
ergocalciferol (VITAMIN D2) 50,000 unit capsule Take 1 Cap by mouth every seven (7) days. Qty: 12 Cap, Refills: 1  
  
topiramate (TOPAMAX) 50 mg tablet Take 50 mg by mouth daily. Refills: 5 LINZESS 145 mcg cap capsule TAKE 1 CAPSULE BY MOUTH DAILY 30 MINUTES BEFORE BREAKFAST Refills: 0  
  
loratadine 10 mg cap Take 10 mg by mouth daily. digoxin (LANOXIN) 0.125 mg tablet Take 0.125 mg by mouth daily. rivaroxaban (XARELTO) 10 mg tablet Take 10 mg by mouth daily. aluminum-magnesium hydroxide 200-200 mg/5 mL susp 5 mL, diphenhydrAMINE 12.5 mg/5 mL liqd 12.5 mg, lidocaine 2 % soln 5 mL 5 mL by Swish and Spit route two (2) times a day. Magic mouth wash Maalox Lidocaine 2% viscous Diphenhydramine oral solution Pharmacy to mix equal portions of ingredients to a total volume as indicated in the dispense amount. Qty: 240 mL, Refills: 1  
  
senna (SENNA) 8.6 mg tablet Take 1 Tab by mouth daily. ondansetron hcl (ZOFRAN) 8 mg tablet Take 1 Tab by mouth every eight (8) hours as needed for Nausea. Qty: 30 Tab, Refills: 3 IRON AG&FUM/C/FA/MV CMB11/CA-T (PRUVATE 21-7 PO) Take 325 mg by mouth daily. Indications: iron deficiency ! ! - Potential duplicate medications found. Please discuss with provider. STOP taking these medications  
  
 oxyCODONE-acetaminophen (PERCOCET 10)  mg per tablet Comments:  
Reason for Stopping: LORazepam (ATIVAN) 1 mg tablet Comments:  
Reason for Stopping: Follow-up Appointments Procedures  FOLLOW UP VISIT Appointment in: Other (Specify) 1. With PCP in 5 days 2. With dr. Matthias Eli [cardiologist] in 10 days 1. With PCP in 5 days 2. With dr. Matthias Eli [cardiologist] in 10 days Standing Status:   Standing Number of Occurrences:   1 Order Specific Question:   Appointment in Answer: Other (Specify) Signed By: Ramesh Jimenez MD   
 January 13, 2019

## 2019-01-13 NOTE — PROGRESS NOTES
Problem: Falls - Risk of 
Goal: *Absence of Falls Document Edelmira Bernstein Fall Risk and appropriate interventions in the flowsheet. Outcome: Progressing Towards Goal 
Fall Risk Interventions: 
Mobility Interventions: Bed/chair exit alarm Medication Interventions: Bed/chair exit alarm, Patient to call before getting OOB Elimination Interventions: Bed/chair exit alarm, Call light in reach Problem: Pressure Injury - Risk of 
Goal: *Prevention of pressure injury Document Reggie Scale and appropriate interventions in the flowsheet. Outcome: Progressing Towards Goal 
Pressure Injury Interventions: 
Sensory Interventions: Assess changes in LOC Moisture Interventions: Absorbent underpads Activity Interventions: Increase time out of bed Mobility Interventions: Pressure redistribution bed/mattress (bed type) Nutrition Interventions: Document food/fluid/supplement intake Friction and Shear Interventions: Foam dressings/transparent film/skin sealants

## 2019-01-13 NOTE — DISCHARGE INSTRUCTIONS
Patient Education        Atrial Fibrillation: Care Instructions  Your Care Instructions    Atrial fibrillation is an irregular and often fast heartbeat. Treating this condition is important for several reasons. It can cause blood clots, which can travel from your heart to your brain and cause a stroke. If you have a fast heartbeat, you may feel lightheaded, dizzy, and weak. An irregular heartbeat can also increase your risk for heart failure. Atrial fibrillation is often the result of another heart condition, such as high blood pressure or coronary artery disease. Making changes to improve your heart condition will help you stay healthy and active. Follow-up care is a key part of your treatment and safety. Be sure to make and go to all appointments, and call your doctor if you are having problems. It's also a good idea to know your test results and keep a list of the medicines you take. How can you care for yourself at home? Medicines    · Take your medicines exactly as prescribed. Call your doctor if you think you are having a problem with your medicine. You will get more details on the specific medicines your doctor prescribes.     · If your doctor has given you a blood thinner to prevent a stroke, be sure you get instructions about how to take your medicine safely. Blood thinners can cause serious bleeding problems.     · Do not take any vitamins, over-the-counter drugs, or herbal products without talking to your doctor first.    Lifestyle changes    · Do not smoke. Smoking can increase your chance of a stroke and heart attack. If you need help quitting, talk to your doctor about stop-smoking programs and medicines. These can increase your chances of quitting for good.     · Eat a heart-healthy diet.     · Stay at a healthy weight. Lose weight if you need to.     · Limit alcohol to 2 drinks a day for men and 1 drink a day for women. Too much alcohol can cause health problems.     · Avoid colds and flu.  Get a pneumococcal vaccine shot. If you have had one before, ask your doctor whether you need another dose. Get a flu shot every year. If you must be around people with colds or flu, wash your hands often. Activity    · If your doctor recommends it, get more exercise. Walking is a good choice. Bit by bit, increase the amount you walk every day. Try for at least 30 minutes on most days of the week. You also may want to swim, bike, or do other activities. Your doctor may suggest that you join a cardiac rehabilitation program so that you can have help increasing your physical activity safely.     · Start light exercise if your doctor says it is okay. Even a small amount will help you get stronger, have more energy, and manage stress. Walking is an easy way to get exercise. Start out by walking a little more than you did in the hospital. Gradually increase the amount you walk.     · When you exercise, watch for signs that your heart is working too hard. You are pushing too hard if you cannot talk while you are exercising. If you become short of breath or dizzy or have chest pain, sit down and rest immediately.     · Check your pulse regularly. Place two fingers on the artery at the palm side of your wrist, in line with your thumb. If your heartbeat seems uneven or fast, talk to your doctor. When should you call for help? Call 911 anytime you think you may need emergency care. For example, call if:    · You have symptoms of a heart attack. These may include:  ? Chest pain or pressure, or a strange feeling in the chest.  ? Sweating. ? Shortness of breath. ? Nausea or vomiting. ? Pain, pressure, or a strange feeling in the back, neck, jaw, or upper belly or in one or both shoulders or arms. ? Lightheadedness or sudden weakness. ? A fast or irregular heartbeat. After you call 911, the  may tell you to chew 1 adult-strength or 2 to 4 low-dose aspirin. Wait for an ambulance.  Do not try to drive yourself.     · You have symptoms of a stroke. These may include:  ? Sudden numbness, tingling, weakness, or loss of movement in your face, arm, or leg, especially on only one side of your body. ? Sudden vision changes. ? Sudden trouble speaking. ? Sudden confusion or trouble understanding simple statements. ? Sudden problems with walking or balance. ? A sudden, severe headache that is different from past headaches.     · You passed out (lost consciousness).    Call your doctor now or seek immediate medical care if:    · You have new or increased shortness of breath.     · You feel dizzy or lightheaded, or you feel like you may faint.     · Your heart rate becomes irregular.     · You can feel your heart flutter in your chest or skip heartbeats. Tell your doctor if these symptoms are new or worse.    Watch closely for changes in your health, and be sure to contact your doctor if you have any problems. Where can you learn more? Go to http://leon-edinson.info/. Enter U020 in the search box to learn more about \"Atrial Fibrillation: Care Instructions. \"  Current as of: December 6, 2017  Content Version: 11.8  © 4851-0551 [employer]. Care instructions adapted under license by [employer] (which disclaims liability or warranty for this information). If you have questions about a medical condition or this instruction, always ask your healthcare professional. [address] any warranty or liability for your use of this information. Patient Education        Low Sodium Diet (2,000 Milligram): Care Instructions  Your Care Instructions    Too much sodium causes your body to hold on to extra water. This can raise your blood pressure and force your heart and kidneys to work harder. In very serious cases, this could cause you to be put in the hospital. It might even be life-threatening.  By limiting sodium, you will feel better and lower your risk of serious problems. The most common source of sodium is salt. People get most of the salt in their diet from canned, prepared, and packaged foods. Fast food and restaurant meals also are very high in sodium. Your doctor will probably limit your sodium to less than 2,000 milligrams (mg) a day. This limit counts all the sodium in prepared and packaged foods and any salt you add to your food. Follow-up care is a key part of your treatment and safety. Be sure to make and go to all appointments, and call your doctor if you are having problems. It's also a good idea to know your test results and keep a list of the medicines you take. How can you care for yourself at home? Read food labels  · Read labels on cans and food packages. The labels tell you how much sodium is in each serving. Make sure that you look at the serving size. If you eat more than the serving size, you have eaten more sodium. · Food labels also tell you the Percent Daily Value for sodium. Choose products with low Percent Daily Values for sodium. · Be aware that sodium can come in forms other than salt, including monosodium glutamate (MSG), sodium citrate, and sodium bicarbonate (baking soda). MSG is often added to Asian food. When you eat out, you can sometimes ask for food without MSG or added salt. Buy low-sodium foods  · Buy foods that are labeled \"unsalted\" (no salt added), \"sodium-free\" (less than 5 mg of sodium per serving), or \"low-sodium\" (less than 140 mg of sodium per serving). Foods labeled \"reduced-sodium\" and \"light sodium\" may still have too much sodium. Be sure to read the label to see how much sodium you are getting. · Buy fresh vegetables, or frozen vegetables without added sauces. Buy low-sodium versions of canned vegetables, soups, and other canned goods. Prepare low-sodium meals  · Cut back on the amount of salt you use in cooking. This will help you adjust to the taste. Do not add salt after cooking.  One teaspoon of salt has about 2,300 mg of sodium. · Take the salt shaker off the table. · Flavor your food with garlic, lemon juice, onion, vinegar, herbs, and spices. Do not use soy sauce, lite soy sauce, steak sauce, onion salt, garlic salt, celery salt, mustard, or ketchup on your food. · Use low-sodium salad dressings, sauces, and ketchup. Or make your own salad dressings and sauces without adding salt. · Use less salt (or none) when recipes call for it. You can often use half the salt a recipe calls for without losing flavor. Other foods such as rice, pasta, and grains do not need added salt. · Rinse canned vegetables, and cook them in fresh water. This removes some--but not all--of the salt. · Avoid water that is naturally high in sodium or that has been treated with water softeners, which add sodium. Call your local water company to find out the sodium content of your water supply. If you buy bottled water, read the label and choose a sodium-free brand. Avoid high-sodium foods  · Avoid eating:  ? Smoked, cured, salted, and canned meat, fish, and poultry. ? Ham, sandoval, hot dogs, and luncheon meats. ? Regular, hard, and processed cheese and regular peanut butter. ? Crackers with salted tops, and other salted snack foods such as pretzels, chips, and salted popcorn. ? Frozen prepared meals, unless labeled low-sodium. ? Canned and dried soups, broths, and bouillon, unless labeled sodium-free or low-sodium. ? Canned vegetables, unless labeled sodium-free or low-sodium. ? Western [employer]ini fries, pizza, tacos, and other fast foods. ? Pickles, olives, ketchup, and other condiments, especially soy sauce, unless labeled sodium-free or low-sodium. Where can you learn more? Go to http://leon-edinson.info/. Enter S993 in the search box to learn more about \"Low Sodium Diet (2,000 Milligram): Care Instructions. \"  Current as of: March 29, 2018  Content Version: 11.8  © 7419-8291 [employer].  Care instructions adapted under license by [employer] (which disclaims liability or warranty for this information). If you have questions about a medical condition or this instruction, always ask your healthcare professional. [address] any warranty or liability for your use of this information. Patient Education        Chronic Kidney Disease: Care Instructions  Your Care Instructions    Chronic kidney disease happens when your kidneys don't work as well as they should. Your kidneys have a few important jobs. They remove waste from your blood. This waste leaves your body in your urine. They also balance your body's fluids and chemicals. When your kidneys don't work well, extra waste and fluid can build up. This can poison the body and sometimes cause death. The most common causes of this disease are diabetes and high blood pressure. In some cases, the disease develops in 2 to 3 months. But it usually develops over many years. If you take medicine and make healthy changes to your lifestyle, you may be able to prevent the disease from getting worse. But if your kidney damage gets worse, you may need dialysis or a kidney transplant. Dialysis uses a machine to filter waste from the blood. A transplant is surgery to give you a healthy kidney from another person. Follow-up care is a key part of your treatment and safety. Be sure to make and go to all appointments, and call your doctor if you are having problems. It's also a good idea to know your test results and keep a list of the medicines you take. How can you care for yourself at home?   Treatments and appointments    · Be safe with medicines. Take your medicines exactly as prescribed. Call your doctor if you have any problems with your medicine. You also may take medicine to control your blood pressure or to treat diabetes.  Many people who have diabetes take blood pressure medicine.     · If you have diabetes, do your best to keep your blood sugar in your target range. You may do this by eating healthy food and exercising. You may also take medicines.     · Go to your dialysis appointments if you have this treatment.     · Do not take ibuprofen (Advil, Motrin), naproxen (Aleve), or similar medicines, unless your doctor tells you to. These may make the disease worse.     · Do not take any vitamins, over-the-counter medicines, or herbal products without talking to your doctor first.     · Do not smoke or use other tobacco products. Smoking can reduce blood flow to the kidneys. If you need help quitting, talk to your doctor about stop-smoking programs and medicines. These can increase your chances of quitting for good.     · Do not drink alcohol or use illegal drugs.     · Talk to your doctor about an exercise plan. Exercise helps lower your blood pressure. It also makes you feel better.     · If you have an advance directive, let your doctor know. It may include a living will and a durable power of  for health care. If you don't have one, you may want to prepare one. It lets your doctor and loved ones know your health care wishes if you become unable to speak for yourself. Diet    · Talk to a registered dietitian. He or she can help you make a meal plan that is right for you. Most people with kidney disease need to limit salt (sodium), fluids, and protein. Some also have to limit potassium and phosphorus.     · You may have to give up many foods you like. But try to focus on the fact that this will help you stay healthy for as long as possible.     · If you have a hard time eating enough, talk to your doctor or dietitian about ways to add calories to your diet.     · Your diet may change as your disease changes. See your doctor for regular testing. And work with a dietitian to change your diet as needed. When should you call for help? Call 911 anytime you think you may need emergency care.  For example, call if:    · You passed out (lost consciousness).    Call your doctor now or seek immediate medical care if:    · You have less urine than normal or no urine.     · You have trouble urinating or can urinate only very small amounts.     · You are confused or have trouble thinking clearly.     · You feel weaker or more tired than usual.     · You are very thirsty, lightheaded, or dizzy.     · You have nausea and vomiting.     · You have new swelling of your arms or feet, or your swelling is worse.     · You have blood in your urine.     · You have new or worse trouble breathing.    Watch closely for changes in your health, and be sure to contact your doctor if:    · You have any problems with your medicine or other treatment. Where can you learn more? Go to http://leon-edinson.info/. Enter N276 in the search box to learn more about \"Chronic Kidney Disease: Care Instructions. \"  Current as of: March 15, 2018  Content Version: 11.8  © 3497-9813 [employer]. Care instructions adapted under license by [employer] (which disclaims liability or warranty for this information). If you have questions about a medical condition or this instruction, always ask your healthcare professional. [place] [address] any warranty or liability for your use of this information. Patient Education        Acute Kidney Injury: Care Instructions  Your Care Instructions    Acute kidney injury (JUAN) is a sudden decrease in kidney function. This can happen over a period of hours, days or, in some cases, weeks. JUAN used to be called acute renal failure, but kidney failure doesn't always happen with JUAN. Common causes of JUAN are dehydration, blood loss, and medicines. When JUAN happens, the kidneys have trouble removing waste and excess fluids from the body. The waste and fluids build up and become harmful. Kidney function may return to normal if the cause of JUAN is treated quickly.  Your chance of a full recovery depends on what caused the problem, how quickly the cause was treated, and what other medical problems you have. A machine may be used to help your kidneys remove waste and fluids for a short period of time. This is called dialysis. Follow-up care is a key part of your treatment and safety. Be sure to make and go to all appointments, and call your doctor if you are having problems. It's also a good idea to know your test results and keep a list of the medicines you take. How can you care for yourself at home? · Talk to your doctor about how much fluid you should drink. · Eat a balanced diet. Talk to your doctor or a dietitian about what type of diet may be best for you. You may need to limit sodium, potassium, and phosphorus. · If you need dialysis, follow the instructions and schedule for dialysis that your doctor gives you. · Do not smoke. Smoking can make your condition worse. If you need help quitting, talk to your doctor about stop-smoking programs and medicines. These can increase your chances of quitting for good. · Do not drink alcohol. · Review all of your medicines with your doctor. Do not take any medicines, including nonsteroidal anti-inflammatory drugs (NSAIDs) such as ibuprofen (Advil, Motrin) or naproxen (Aleve), unless your doctor says it is safe for you to do so. · Make sure that anyone treating you for any health problem knows that you have had JUAN. When should you call for help? Call 911 anytime you think you may need emergency care.  For example, call if:    · You passed out (lost consciousness).    Call your doctor now or seek immediate medical care if:    · You have new or worse nausea and vomiting.     · You have much less urine than normal, or you have no urine.     · You are feeling confused or cannot think clearly.     · You have new or more blood in your urine.     · You have new swelling.     · You are dizzy or lightheaded, or feel like you may faint.    Watch closely for changes in your health, and be sure to contact your doctor if:    · You do not get better as expected. Where can you learn more? Go to http://leon-edinson.info/. Enter V265 in the search box to learn more about \"Acute Kidney Injury: Care Instructions. \"  Current as of: March 15, 2018  Content Version: 11.8  © 7059-3139 [employer]. Care instructions adapted under license by [employer] (which disclaims liability or warranty for this information). If you have questions about a medical condition or this instruction, always ask your healthcare professional. [place] [address] any warranty or liability for your use of this information. Patient Education        Idiopathic Thrombocytopenic Purpura: Care Instructions  Your Care Instructions    Idiopathic thrombocytopenic purpura, or ITP, is a blood problem. It happens when your immune system does not work as it should and destroys platelets in your blood. Platelets are a kind of cell in your blood. They have a sticky surface that helps them form clots to stop bleeding. Your blood can't clot if you don't have enough platelets. This causes abnormal bleeding. Bleeding can get worse over time, or it can come on fast.  To treat ITP, you may need to take medicines to help stop the destruction of platelets. You may need platelets added to your blood. Or you may need surgery to remove your spleen. Your spleen's job is to remove platelets from your body. So taking out the spleen helps increase your platelet count. Follow-up care is a key part of your treatment and safety. Be sure to make and go to all appointments, and call your doctor if you are having problems. It's also a good idea to know your test results and keep a list of the medicines you take. How can you care for yourself at home? · Be safe with medicines. Take your medicines exactly as prescribed.  Call your doctor if you think you are having a problem with your medicine. · Before you start any new over-the-counter or prescribed medicine, tell your doctor if:  ? You have had a bad reaction to any medicines in the past.  ? You take other medicines, such as over-the-counter medicines, vitamins, or herbal supplements. ? You have other health problems. ? You are or could be pregnant. · Do not take aspirin or other anti-inflammatory medicines (such as ibuprofen or naproxen) without first talking to your doctor. Ask your doctor if it is okay to use acetaminophen (Tylenol). · Do not take two or more pain medicines at the same time unless the doctor told you to. Many pain medicines have acetaminophen, which is Tylenol. Too much acetaminophen (Tylenol) can be harmful. · Get plenty of rest.  · \"Think safety\" and protect yourself from injury. Do not lift anything heavy. · Do not donate blood. · Do not drink alcohol or use illegal drugs. When should you call for help? Call 911 anytime you think you may need emergency care. For example, call if:    · You passed out (lost consciousness).     · You have signs of severe bleeding, which includes:  ? You have a severe headache that is different from past headaches. ? You vomit blood or what looks like coffee grounds. ? Your stools are maroon or very bloody.    Call your doctor now or seek immediate medical care if:    · You are dizzy or lightheaded, or you feel like you may faint.     · You have abnormal bleeding, such as:  ? Your stools are black and look like tar, or they have streaks of blood. ? You have blood in your urine. ? You have joint pain. ? You have bruises or blood spots under your skin.    Watch closely for changes in your health, and be sure to contact your doctor if:    · You do not get better as expected. Where can you learn more? Go to http://leon-edinson.info/.   Enter I334 in the search box to learn more about \"Idiopathic Thrombocytopenic Purpura: Care Instructions. \"  Current as of: May 7, 2018  Content Version: 11.8  © 1290-9373 [employer] Care instructions adapted under license by [employer] (which disclaims liability or warranty for this information). If you have questions about a medical condition or this instruction, always ask your healthcare professional. [address] any warranty or liability for your use of this information. Altavian Activation    Thank you for requesting access to [employer]. Please follow the instructions below to securely access and download your online medical record. Altavian allows you to send messages to your doctor, view your test results, renew your prescriptions, schedule appointments, and more. How Do I Sign Up? 1. In your internet browser, go to [email or web address]  2. Click on the First Time User? Click Here link in the Sign In box. You will be redirect to the New Member Sign Up page. 3. Enter your Altavian Access Code exactly as it appears below. You will not need to use this code after youve completed the sign-up process. If you do not sign up before the expiration date, you must request a new code. Altavian Access Code: [email or web address]  Expires: 3/30/2019  3:32 PM (This is the date your Altavian access code will )    4. Enter the last four digits of your Social Security Number (xxxx) and Date of Birth (mm/dd/yyyy) as indicated and click Submit. You will be taken to the next sign-up page. 5. Create a Altavian ID. This will be your Altavian login ID and cannot be changed, so think of one that is secure and easy to remember. 6. Create a Altavian password. You can change your password at any time. 7. Enter your Password Reset Question and Answer. This can be used at a later time if you forget your password. 8. Enter your e-mail address. You will receive e-mail notification when new information is available in [address] 9. Click Sign Up.  You can now view and download portions of your medical record. 10. Click the Download Summary menu link to download a portable copy of your medical information. Additional Information    If you have questions, please visit the Frequently Asked Questions section of the [email or web address] website at [email or web address] Remember, MyChart is NOT to be used for urgent needs. For medical emergencies, dial 911. Patient armband removed and shredded  DISCHARGE SUMMARY from Nurse    PATIENT INSTRUCTIONS:    After general anesthesia or intravenous sedation, for 24 hours or while taking prescription Narcotics:  · Limit your activities  · Do not drive and operate hazardous machinery  · Do not make important personal or business decisions  · Do  not drink alcoholic beverages  · If you have not urinated within 8 hours after discharge, please contact your surgeon on call. Report the following to your surgeon:  · Excessive pain, swelling, redness or odor of or around the surgical area  · Temperature over 100.5  · Nausea and vomiting lasting longer than 4 hours or if unable to take medications  · Any signs of decreased circulation or nerve impairment to extremity: change in color, persistent  numbness, tingling, coldness or increase pain  · Any questions    What to do at Home:  Recommended activity: PT/OT per Home Health. If you experience any of the following symptoms shortness of breath, chest pain, fever greater than 101.5, please follow up with primary care doctor. .    *  Please give a list of your current medications to your Primary Care Provider. *  Please update this list whenever your medications are discontinued, doses are      changed, or new medications (including over-the-counter products) are added. *  Please carry medication information at all times in case of emergency situations.     These are general instructions for a healthy lifestyle:    No smoking/ No tobacco products/ Avoid exposure to second hand smoke  Surgeon General's Warning:  Quitting smoking now greatly reduces serious risk to your health. Obesity, smoking, and sedentary lifestyle greatly increases your risk for illness    A healthy diet, regular physical exercise & weight monitoring are important for maintaining a healthy lifestyle    You may be retaining fluid if you have a history of heart failure or if you experience any of the following symptoms:  Weight gain of 3 pounds or more overnight or 5 pounds in a week, increased swelling in our hands or feet or shortness of breath while lying flat in bed. Please call your doctor as soon as you notice any of these symptoms; do not wait until your next office visit. Recognize signs and symptoms of STROKE:    F-face looks uneven    A-arms unable to move or move unevenly    S-speech slurred or non-existent    T-time-call 911 as soon as signs and symptoms begin-DO NOT go       Back to bed or wait to see if you get better-TIME IS BRAIN. Warning Signs of HEART ATTACK     Call 911 if you have these symptoms:   Chest discomfort. Most heart attacks involve discomfort in the center of the chest that lasts more than a few minutes, or that goes away and comes back. It can feel like uncomfortable pressure, squeezing, fullness, or pain.  Discomfort in other areas of the upper body. Symptoms can include pain or discomfort in one or both arms, the back, neck, jaw, or stomach.  Shortness of breath with or without chest discomfort.  Other signs may include breaking out in a cold sweat, nausea, or lightheadedness. Don't wait more than five minutes to call 911 - MINUTES MATTER! Fast action can save your life. Calling 911 is almost always the fastest way to get lifesaving treatment. Emergency Medical Services staff can begin treatment when they arrive -- up to an hour sooner than if someone gets to the hospital by car. The discharge information has been reviewed with the patient.   The patient verbalized understanding. Discharge medications reviewed with the patient and appropriate educational materials and side effects teaching were provided.   ___________________________________________________________________________________________________________________________________

## 2019-01-13 NOTE — PROGRESS NOTES
SUBJECTIVE: 
 
Feeling better. C/o occipital headaches and some neck pain since she has been here per patient. No chest or abdominal pain. No fall at home. SOB better. No nausea or vomiting. OBJECTIVE: 
 
/71 (BP 1 Location: Right arm, BP Patient Position: At rest)   Pulse 93   Temp 96.6 °F (35.9 °C)   Resp 18   Ht 5' 6\" (1.676 m)   Wt 78.8 kg (173 lb 11.6 oz)   SpO2 97%   Breastfeeding? No   BMI 28.04 kg/m² CVS: iRRR 
RS: CTA Bilaterally, no wheezes GI: NT, BS + Extremities: + pedal edema CNS: moves all extremities well General: NAD, Awake ASSESSMENT: 
 
1. Hypotension secondary to orthostatic hypotension and dehydration caused by diuretic, improved. 2.  Dehydration due to diuretic, resolved 3. Acute renal failure due to dehydration, resolved. 4.  Chronic combined systolic and diastolic congestive heart failure with EF of 55%, compensated. 5.  Chronic atrial fibrillation, rate controlled. 6.  History of lung and breast cancer. 7.  History of ITP, stable platelet here. 8.  Peripheral neuropathy. 9.  Degenerative joint disease. 10. Low phos 11. Orthostatic hypotension PLAN: 
 
Cont current management Replete Mag Check BMP now Possible discharge home with Tamela Mcfarland later today if CT spine is okay a 
 
BMP:  
No results found for: NA, K, CL, CO2, AGAP, GLU, BUN, CREA, GFRAA, GFRNA 
CBC:  
No results found for: WBC, HGB, HGBEXT, HCT, HCTEXT, PLT, PLTEXT, HGBEXT, HCTEXT, PLTEXT

## 2019-01-13 NOTE — PROGRESS NOTES
Problem: Falls - Risk of 
Goal: *Absence of Falls Document Lizet Wen Fall Risk and appropriate interventions in the flowsheet. Outcome: Progressing Towards Goal 
Fall Risk Interventions: 
Mobility Interventions: Bed/chair exit alarm Medication Interventions: Bed/chair exit alarm, Patient to call before getting OOB Elimination Interventions: Bed/chair exit alarm, Call light in reach Problem: Pressure Injury - Risk of 
Goal: *Prevention of pressure injury Document Reggie Scale and appropriate interventions in the flowsheet. Outcome: Progressing Towards Goal 
Pressure Injury Interventions: 
Sensory Interventions: Assess changes in LOC Moisture Interventions: Absorbent underpads Activity Interventions: Increase time out of bed Mobility Interventions: Pressure redistribution bed/mattress (bed type) Nutrition Interventions: Document food/fluid/supplement intake Friction and Shear Interventions: Foam dressings/transparent film/skin sealants

## 2019-01-13 NOTE — PROGRESS NOTES
Discharge: 
 
Patient will be discharged home today (1/13/2019). Patient's home health has been arranged through [facility]. Patient's family will transport home at the time of discharge. There are no other care manager concerns for this discharge. This writer will continue to closely monitor for discharge planning to ensure a safe discharge from [facility]. Norris Calvert. [facility] Care Manager [phone]

## 2019-01-13 NOTE — DISCHARGE SUMMARY
[facility] [place] - INPATIENT Face to Face Encounter Patients Name: Ahsan Tay    YOB: 1950 Primary Diagnosis: CHF, Electrolytes issues, recurrent admissions Date of Face to Face:   1/13/19 Face to Face Encounter findings are related to primary reason for home care:   yes 1. I certify that the patient needs intermittent skilled nursing care, physical therapy and/or speech therapy. I will not be following this patient in the Community and Dr. Bambi Whitfield MD 
 will be responsible for signing the [address] of Care. 2. Initial Orders for Care: Must be completed only if Face to Face MD will not be signing the [address] Skilled Nursing, Physical Therapy and Occupational Therapy 3. I certify that this patient is homebound for the following reason(s): Requires considerable and taxing effort to leave the home , Requires the assistance of 1 or more persons to leave the home  and Only leaves the home for medical reasons or Oriental orthodox services and are infrequent and of short duration for other reasons 4. I certify that this patient is under my care and that I, or a nurse practitioner or [phone] working with me, had a Face-to-Face Encounter that meets the physician Face-to-Face Encounter requirements. Document the physical findings from the Face-to-Face Encounter that support the need for skilled services: Has wound that requires skilled nursing assessment and treatment , Has new diagnosis that requires skilled nursing teaching and intervention  and Has new medications that requires skilled nursing teaching and monitoring for understanding and compliance John Lora MD 
1/13/2019

## 2019-01-13 NOTE — ROUTINE PROCESS
Bedside and Verbal shift change report given to Rosemary Manning RN (oncoming nurse) by Cynthia Haro RN (offgoing nurse). Report included the following information SBAR, Kardex, MAR and Recent Results.

## 2019-01-14 NOTE — ROUTINE PROCESS
Received patient awake in bed, alert and oriented, no acute distress. 2100- Discharge instructions provided to patient. 2200- Patient discharged, son provided transportation. Patient left unit alert and oriented, no distress.

## 2019-01-14 NOTE — ROUTINE PROCESS
Bedside and Verbal shift change report given to MARLENE Gross (oncoming nurse) by Diaz Nieves RN (offgoing nurse). Report included the following information SBAR, Kardex, ED Summary, Intake/Output, MAR and Recent Results.

## 2019-01-14 NOTE — DISCHARGE SUMMARY
[address] [place] [address] Dr DISCHARGE SUMMARY Alexa Soler 
MR#: [id number] : 1950 ACCOUNT #: [de-identified] ADMIT DATE: 2019 DISCHARGE DATE: 2019 DISPOSITION:  Discharged to home with home health care for PT, OT, DME as needed and skilled nursing. DISCHARGE CONDITION:  Stable. DISCHARGE DIAGNOSES: 
1. Hypotension secondary to orthostatic hypotension and dehydration caused by diuretic, now resolved. 2.  Orthostatic hypotension, most likely due to chemo-induced peripheral neuropathy. 3.  Dehydration due to diuretics, now resolved. 4.  Acute renal failure, resolved. 5.  Chronic combined systolic and diastolic heart failure with ejection fraction of 55%. 6.  Chronic atrial fibrillation. 7.  History of lung and breast cancer. 8.  History of idiopathic thrombocytic purpura. 9.  Degenerative joint disease. 10.  Low phosphorus. 11.  Orthostatic hypotension. DISCHARGE MEDICATIONS: 
1.  Midodrine 5 mg twice a day. 2.  Lasix 20 mg daily. 3.  Magnesium oxide 40 mg every other day. 4.  Albuterol inhaler 2 puffs q.4 hours p.r.n. for shortness of breath. 5.  Gabapentin 300 mg at bedtime. 6.  Emla as needed. 7.  Allopurinol 100 mg daily. 8.  Voltaren cream as needed. 9.  Vitamin D2 1 capsule of 50,000 units a week. 10.  Topamax 50 mg daily. 11.  Linzess 145 mg daily. 12.  Loratadine 10 mg daily. 13.  Digoxin 0.125 mg daily. 14.  Xarelto 10 mg daily. 15.  Senna 1 capsule daily. 16.  Zofran p.r.n. 17.  Ferrous sulfate 325 mg daily. IMAGING AND PROCEDURES: 
1.  CT scan of the C-spine was done, showed multilevel degenerative disk disease. No acute osseous abnormality. 2.  Chest x-ray was done, reported vascular congestion. 3.  CT scan of the head done at the time of admission, negative for acute finding. 4.  Chest x-ray done at the time of admission showed bibasilar atelectasis. 5.  Echocardiogram was done, showed ejection fraction of 51-55%. 6.  Blood cultures x2 remain negative. 7.  Urine culture x1 negative. 8.  Influenza A, B negative. CONSULTANTS:   
1. Nephrology with Dr. Ruby Asif. 
2.  Pulmonary with Dr. Isabel Cornelius. HOSPITAL COURSE:  The patient was admitted to the hospital on 01/08/2019 with a complaint of not feeling well and having dizziness. Please refer to hospital admission H and P for further detail. The patient was found out to have low blood pressure. The patient was diagnosed with possible septic shock initially, started on a low dose of IV pressors, admitted to the ICU by ICU team.  The patient had a CT head done, which was negative. After initial treatment in ICU, the patient was subsequently transferred to the floor and we resumed care at that point. Patient was initially on antibiotic, which was stopped after the patient had negative blood culture and urine culture. We know this patient very well. The patient has history of cancer, was getting chemotherapy, and history of peripheral neuropathy. She has a history of orthostatic hypotension. We stopped all the diuretics and continued 20 mg Lasix. We also started her on midodrine 5 mg twice a day with improvement of blood pressure and she was maintained on Lasix 20 mg without any new issues. Her renal function remained stable. Her electrolytes remained stable afterwards. She had some low magnesium, which will be repleted prior to the discharge and will be given magnesium supplement as an outpatient. Patient was seen by nephrologist and signed off as renal function stabilized afterwards. Patient had an echocardiogram which showed an EF of around 51-55 percentage. The patient is being followed by Dr. Pau Enciso, cardiologist, outpatient and will be following this patient as an outpatient with the above-mentioned medication regimen. If patient continues to have problems, then I would recommend to stop her Claritin as well as Topamax.   The patient has been on Topamax for off-and-on headache, was taken off and she started having some occipital headache. However, CT head and CT C-spine did not show any acute finding. Patient will be restarted back on Topamax and can be followed by primary care physicians so she can be tapered off of Topamax. Patient also stated she has elastic stocking for both lower extremities at home and was advised to continue using it, especially during ambulation. Patient was seen by PT, OT, who recommended nursing home placement versus home health care with 24-hour supervision. Patient did not want to go to a nursing home. She talked to her family and told me that her family wants to take her home and they will keep an eye on her. Patient will be discharged home with home health care as mentioned above. DISCHARGE INSTRUCTIONS: 
1. Diet:  Cardiac diet. 2.  Activity:  As tolerated. PT, OT to follow. 3.  Elastic stocking to both lower extremities while ambulation. 4.  Follow with primary care physician in 5 days. 5.  Follow up with Dr. Bennie Peres in 10 days. 4.  BMP after 5 days from now. Total time greater than 35 minutes. MD PARADISE Lozano/ 
D: 01/13/2019 12:56    
T: 01/13/2019 19:26 
JOB #: [id number] CC: Jean Claude Echavarria MD 
CC: Kenyatta Betancourt MD 
CC: Bonita Barajas MD 
CC: _____ Bennie Peres

## 2019-01-18 NOTE — PROGRESS NOTES
[facility] Progress Note Date: 2019 Name: Hermila Napier MRN: [id number] : 1950 Nplate Ms. Lillian Perry arrived to [facility] at [address] [facility] ambulatory. Complained of generalized pain /10. Took pain medication today and will resume when due on return home. Ms. Lillian Perry was assessed and education was provided. Blood sample for cbc obtained by na-puncture on the 1st stick from [facility] 
 
Ms. Pagan's vitals were reviewed. Visit Vitals /76 (BP 1 Location: Right arm, BP Patient Position: Sitting) Pulse 88 Temp 97.6 °F (36.4 °C) Resp 18 Breastfeeding? No  
 
   
CBC WITH 3 PART DIFF Collection Time: 19  8:15 AM  
Result Value Ref Range WBC 4.6 4.5 - 13.0 K/uL  
 RBC 3.43 (L) 4.10 - 5.10 M/uL HGB 9.4 (L) 12.0 - 16 g/dL HCT 28.1 (L) 36 - 48 % MCV 81.9 78 - 102 FL  
 MCH 27.4 25.0 - 35.0 PG  
 MCHC 33.5 31 - 37 g/dL  
 RDW 16.6 (H) 11.5 - 14.5 % NEUTROPHILS 53 40 - 70 % MIXED CELLS 19 (H) 0.1 - 17 % LYMPHOCYTES 28 14 - 44 % ABS. NEUTROPHILS 2.4 1.8 - 9.5 K/UL  
 ABS. MIXED CELLS 0.9 0.0 - 2.3 K/uL  
 ABS. LYMPHOCYTES 1.3 1.1 - 5.9 K/UL  
 DF AUTOMATED Pt was observed for 5 minutes after obtaining vital signs prior to initiating treatment. Preliminary platelet VKHQX=472 k Patient to receive 2 mcg/kg based on initial weight of 69 kg. Nplate 023 mcg (8.34BM) administered as ordered SQ in patient's right upper arm. No redness or bleeding noted. Ms. Lillian Perry was discharged from [place] [address] in stable condition at [id number]. She is to return on 19 at 0800 for her next appointment. Storm Bills RN 
2019  
[id number]

## 2019-01-25 NOTE — PROGRESS NOTES
[facility] Progress Note Date: 2019 Name: Brooke Fatima MRN: [id number] : 1950 Nplate Ms. Camilo Almeida arrived to [facility] at 0800 ambulatory. Complained of generalized pain 8/10. Took pain medication today and will resume when due on return home. Ms. Camilo Almeida was assessed and education was provided. Blood sample for cbc obtained by na-puncture on the 1st stick from right AC. Ms. Jacinto Mijares vitals were reviewed. Visit Vitals /70 (BP 1 Location: Right arm, BP Patient Position: Sitting) Pulse 63 Temp 97.6 °F (36.4 °C) Resp 18 Ht 5' 6\" (1.676 m) Wt 68 kg (149 lb 14.6 oz) BMI 24.20 kg/m² CBC WITH 3 PART DIFF Collection Time: 19  8:15 AM  
Result Value Ref Range WBC 3.8 (L) 4.5 - 13.0 K/uL  
 RBC 3.59 (L) 4.10 - 5.10 M/uL HGB 9.7 (L) 12.0 - 16 g/dL HCT 29.4 (L) 36 - 48 % MCV 81.9 78 - 102 FL  
 MCH 27.0 25.0 - 35.0 PG  
 MCHC 33.0 31 - 37 g/dL  
 RDW 16.6 (H) 11.5 - 14.5 % NEUTROPHILS 46 40 - 70 % MIXED CELLS 17 0.1 - 17 % LYMPHOCYTES 38 14 - 44 % ABS. NEUTROPHILS 1.8 1.8 - 9.5 K/UL  
 ABS. MIXED CELLS 0.6 0.0 - 2.3 K/uL  
 ABS. LYMPHOCYTES 1.4 1.1 - 5.9 K/UL  
 DF AUTOMATED Pt was observed for 5 minutes after obtaining vital signs prior to initiating treatment. Preliminary platelet HNVLA=816 k Patient to receive 2 mcg/kg based on initial weight of 69 kg. Nplate 981 mcg (3.26DY) administered as ordered SQ in patient's right upper arm. No redness or bleeding noted. Ms. Camilo Almeida was discharged from [place] [address] in stable condition at [place] [address] [facility]. She is to return on 19 at 0830 for her next appointment. Alan Mcclendon RN 
2019  
[place]

## 2019-02-04 NOTE — PROGRESS NOTES
[facility] Progress Note Date: 2019 Name: Ashley Fernandes MRN: [id number] : 1950 Nplate/Port flush Ms. Tracy Garcia arrived to [facility] at 80 ambulatory. Ms. Tracy Garcia was assessed and education was provided. Ms. Kaycee Jaeger vitals were reviewed. Visit Vitals /74 (BP 1 Location: Right arm, BP Patient Position: Sitting) Pulse (!) 108 Temp 97.4 °F (36.3 °C) Resp 18 Right chest double lumen mediport accessed with 20 G 1 inch haas needles using sterile technique. Both lumens flushed easily and had brisk blood return. Flushed with NS and 500 units of heparin then de accessed. Patient to receive 2 mcg/kg based on initial weight of 69 kg. Nplate 870 mcg (6.43ZG) administered as ordered SQ in patient's right upper arm. No redness or bleeding noted. Ms. Tracy Garcia was discharged from [place] [address] in stable condition at 1640. She is to return on 19 at 0830 for her next appointment. Doris Hill RN 2019

## 2019-02-20 NOTE — PROGRESS NOTES
Hematology/Oncology  Progress Note    Name: Talon Flores  Date: 2019  : 1950    PCP: Radha Ochoa MD     Ms. Sofia Figueroa is a 79 y.o. female who was seen for management of her slowly progressive ITP and metastatic breast cancer with associated iron deficiency anemia. Current therapy: Active surveillance regarding breast cancer: N-plate as needed as a primary treatment for ITP  Subjective:     Ms. Sofia Figueroa is a 16-year-old -American woman with history of metastatic breast cancer. The patient reports that she has been doing reasonably well. She has gained about 30 pounds weight over the past few months, due to the steroid that she has to take for her ITP. She is now actively trying to lose some weight. She denies having any unexplained bruising or bleeding. She continues to have arthritic discomfort but she is ambulating without the use of a cane or walker. The patient informed me that she is currently seeing a spine specialist and may need to get steroid injections or subdural injection to control the pain. She is continuing to take the n-plate as the primary treatment modality for her ITP. The patient states that she has noticed that her platelet count rebounded over the past several weeks. Today she is complaining of the neuropathic discomfort is more pronounced. She has no additional complaints or concerns to report. Past medical history, family history, and social history: these were reviewed and remains unchanged.     Past Medical History:   Diagnosis Date    Antineoplastic chemotherapy induced anemia     Arrhythmia     Atrial Fib    Arthritis     Breast cancer ([address]     Cancer ([address]     Breast    Cardiomyopathy ([address]     Chronic ITP (idiopathic thrombocytopenia) (HCC) 10/31/2016    Secondary to Anemia from Chemo    Congestive heart failure (HCC)     Diabetes ([address]     borderline, no meds    Esophageal cancer ([address]     treated with chemo    Heart failure ([address]     SOB (shortness of breath)      Past Surgical History:   Procedure Laterality Date    BREAST SURGERY PROCEDURE UNLISTED Left 1990s    lumpectomy    COLONOSCOPY N/A 7/27/2016    COLONOSCOPY performed by Dru [facility]brahim [facility] at [facility] ENDOSCOPY    HX APPENDECTOMY      HX HEENT      Tonsillectomy    HX ORTHOPAEDIC      HX TUBAL LIGATION      HX VASCULAR ACCESS      NEUROLOGICAL PROCEDURE UNLISTED  01/08/2018    Lumbar fusion     Social History     Socioeconomic History    Marital status:      Spouse name: Not on file    Number of children: Not on file    Years of education: Not on file    Highest education level: Not on file   Social Needs    Financial resource strain: Not on file    Food insecurity - worry: Not on file    Food insecurity - inability: Not on file   [employer] needs - medical: Not on file   [employer] needs - non-medical: Not on file   Occupational History    Not on file   Tobacco Use    Smoking status: Never Smoker    Smokeless tobacco: Never Used   Substance and Sexual Activity    Alcohol use: No    Drug use: No    Sexual activity: Not on file   Other Topics Concern    Not on file   Social History Narrative    Not on file     History reviewed. No pertinent family history. Current Outpatient Medications   Medication Sig Dispense Refill    oxyCODONE-acetaminophen (PERCOCET 10)  mg per tablet Take 1 Tab by mouth every six (6) hours as needed for Pain. Max Daily Amount: 4 Tabs. 120 Tab 0    zolpidem (AMBIEN) 10 mg tablet Take 0.5 Tabs by mouth nightly as needed for Sleep. Max Daily Amount: 5 mg. 30 Tab 1    gabapentin (NEURONTIN) 300 mg capsule Take 1 Cap by mouth nightly. 90 Cap 1    furosemide (LASIX) 20 mg tablet Take 1 Tab by mouth daily. 30 Tab 0    magnesium oxide (MAG-OX) 400 mg tablet Take 1 Tab by mouth every fourty-eight (48) hours.  10 Tab 0    albuterol (PROVENTIL HFA, VENTOLIN HFA, PROAIR HFA) 90 mcg/actuation inhaler Take 2 Puffs by inhalation every four (4) hours as needed for Wheezing. 1 Inhaler 0    lidocaine-prilocaine (EMLA) topical cream APPLY OVER PORT 1 HOUR PRIOR TO CHEMOTHERAPY THAN COVER WITH SARAN WRAP 30 g 6    allopurinol (ZYLOPRIM) 100 mg tablet Take 1 Tab by mouth daily. 30 Tab 0    diclofenac (VOLTAREN) 1 % gel Apply 2 g to affected area three (3) times daily as needed for Pain. Gel should be measured and applied using the supplied dosing card. Apply dose (2 gm) to each location. If treatment site is the hands, patients should wait at least one (1) hour to wash their hands. APPLY TO ankle and knees. 100 g 0    ergocalciferol (VITAMIN D2) 50,000 unit capsule Take 1 Cap by mouth every seven (7) days. 12 Cap 1    topiramate (TOPAMAX) 50 mg tablet Take 50 mg by mouth daily. 5    LINZESS 145 mcg cap capsule TAKE 1 CAPSULE BY MOUTH DAILY 30 MINUTES BEFORE BREAKFAST  0    loratadine 10 mg cap Take 10 mg by mouth daily.  digoxin (LANOXIN) 0.125 mg tablet Take 0.125 mg by mouth daily.  rivaroxaban (XARELTO) 10 mg tablet Take 10 mg by mouth daily.  aluminum-magnesium hydroxide 200-200 mg/5 mL susp 5 mL, diphenhydrAMINE 12.5 mg/5 mL liqd 12.5 mg, lidocaine 2 % soln 5 mL 5 mL by Swish and Spit route two (2) times a day. Magic mouth wash   Maalox  Lidocaine 2% viscous   Diphenhydramine oral solution     Pharmacy to mix equal portions of ingredients to a total volume as indicated in the dispense amount. 240 mL 1    senna (SENNA) 8.6 mg tablet Take 1 Tab by mouth daily.  ondansetron hcl (ZOFRAN) 8 mg tablet Take 1 Tab by mouth every eight (8) hours as needed for Nausea. 30 Tab 3    IRON AG&FUM/C/FA/MV CMB11/CA-T (PRUVATE 21-7 PO) Take 325 mg by mouth daily. Indications: iron deficiency         Review of Systems  Constitutional: The patient has no acute distress or discomfort.   HEENT: The patient denies recent head trauma, eye pain, blurred vision,  hearing deficit, oropharyngeal mucosal pain or lesions, and the patient denies throat pain or discomfort. Lymphatics: The patient denies palpable peripheral lymphadenopathy. Hematologic: The patient denies having bruising, bleeding, or progressive fatigue. Respiratory: Patient denies having shortness of breath, cough, sputum production, fever, or dyspnea on exertion. Cardiovascular: The patient denies having leg pain, leg swelling, heart palpitations, chest permit, chest pain, or lightheadedness. The patient denies having dyspnea on exertion. Gastrointestinal: The patient denies having nausea, emesis, or diarrhea. The patient denies having any hematemesis or blood in the stool. Genitourinary: Patient denies having urinary urgency, frequency, or dysuria. The patient denies having blood in the urine. Psychological: The patient denies having symptoms of nervousness, anxiety, depression, or thoughts of harming self. Skin: Patient denies having skin rashes, skin, ulcerations, or unexplained itching or pruritus. Musculoskeletal: The patient used to complain of arthritic discomfort in her joints particularly the knees, hips, and shoulders. The patient reports that she has a large bruise on her right lower extremity. Objective:     Visit Vitals  /69   Pulse 76   Temp 97.9 °F (36.6 °C) (Oral)   Resp 18   Ht 5' 6\" (1.676 m)   Wt 65.8 kg (145 lb)   BMI 23.40 kg/m²     ECOG PS=1, pain score=0/10  Physical Exam:   Gen. Appearance: The patient is in no acute distress. Skin: There is no bruise or rash. HEENT: The exam is unremarkable. Neck: Supple without lymphadenopathy or thyromegaly. Lungs: Clear to auscultation and percussion; there are no wheezes or rhonchi. Heart: Regular rate and rhythm; there are no murmurs, gallops, or rubs. Abdomen: Bowel sounds are present and normal.  There is no guarding, tenderness, or hepatosplenomegaly. Extremities: There is no clubbing, cyanosis, or edema. There is a 6 cm area of bruising on the right anterior lateral calf.   There is no evidence of skin breakdown or ulceration. Neurologic: There are no focal neurologic deficits. Lymphatics: There is no palpable peripheral lymphadenopathy. Musculoskeletal: The patient has full range of motion at all joints. There is no evidence of joint deformity or effusions. There is no focal joint tenderness. Psychological/psychiatric: There is no clinical evidence of anxiety, depression, or melancholy. Lab data:      Results for orders placed or performed during the hospital encounter of 02/20/19   CBC WITH 3 PART DIFF     Status: Abnormal   Result Value Ref Range Status    WBC 5.0 4.5 - 13.0 K/uL Final    RBC 3.72 (L) 4.10 - 5.10 M/uL Final    HGB 9.9 (L) 12.0 - 16 g/dL Final    HCT 30.4 (L) 36 - 48 % Final    MCV 81.7 78 - 102 FL Final    MCH 26.6 25.0 - 35.0 PG Final    MCHC 32.6 31 - 37 g/dL Final    RDW 16.5 (H) 11.5 - 14.5 % Final    NEUTROPHILS 56 40 - 70 % Final    MIXED CELLS 15 0.1 - 17 % Final    LYMPHOCYTES 29 14 - 44 % Final    ABS. NEUTROPHILS 2.8 1.8 - 9.5 K/UL Final    ABS. MIXED CELLS 0.7 0.0 - 2.3 K/uL Final    ABS. LYMPHOCYTES 1.5 1.1 - 5.9 K/UL Final     Comment: Test performed at [address] [place] [address] or Outpatient Infusion Center Location. Reviewed by Medical Director. DF AUTOMATED   Final      I have explained to the patient that the preliminary platelet count today is 289,000      Assessment:     1. Anemia, unspecified type    2. Carcinoma of right breast metastatic to axillary lymph node ([address]    3. Vitamin D deficiency    4. Chronic ITP (idiopathic thrombocytopenia) (HCC)    5. Cancer associated pain    6. Bilateral lower extremity edema    7. Metastatic breast cancer ([facility]          Plan:   Chronic ITP/thrombocytopenia (progression of disease): I have informed the patient that her CBC today shows that her preliminary platelet count is 315,323. At this time I am recommending we continue the n-Plate at a dose of 2 mcg/kg subcutaneous weekly.   At this time I will recheck her platelet level will plan to see her back in clinic in about 8 weeks. Vitamin D deficiency: I will recheck her vitamin D levels at this time. If the vitamin D level is low she will be offered vitamin D 50,000 units weekly for 12 weeks. Iron deficiency anemia/chronic anemia: The current CBC shows a WBC count of 5, hemoglobin is 9.9 g/dL, hematocrit is 30.4%, and the platelet count is 319,984. I have recommended that the patient continue taking the ferrous sulfate 1 tablet twice daily. At this time I will recheck her iron profile and ferritin levels. Bilateral lower extremity edema: The leg edema has significantly improved. Metastatic breast cancer, left breast metastasized to lymph nodes and other sites: At this time I will check a CA-27-29 level. The most recent CA-35-27 level was normal.  Clinically the patient has no evidence of disease progression. .    Cachexia, weakness, and muscular deconditioning: I am recommending that she continue physical therapy 4 times weekly. She will continue to use a walker or cane as needed for mobility support. However today she is ambulating without the use of a cane or walker. Primary insomnia: The patient was instructed to continue to use the Ativan 1 mg at bedtime. A new prescription was provided. Chronic pain/neuropathy/spondylolisthesis of the lumbar region/neuropathy: I have instructed the patient to begin using the Neurontin 300 mg 3 times daily. I will see her back in clinic for a complete assessment again in 12 weeks. Orders Placed This Encounter    COMPLETE CBC & AUTO DIFF WBC    InHouse CBC ([employer])     Standing Status:   Future     Number of Occurrences:   1     Standing Expiration Date:   2/27/2019       Allyssa Cassidy MD  11/30/2018      Please note: This document has been produced using voice recognition software. Unrecognized errors in transcription may be present.

## 2019-02-22 NOTE — PROGRESS NOTES
[facility] Progress Note Date: 2019 Name: Irineo Plascencia MRN: [id number] : 1950 Nplate Ms. Willim Brunner arrived to [facility] at 9595 ambulatory. Complained of generalized pain 9/10. Took pain medication today and will resume when due on return home. Ms. Willim Brunner was assessed and education was provided. Ms. Lashae Truong vitals were reviewed. Visit Vitals BP 96/67 (BP 1 Location: Right arm) Pulse 72 Temp 97.9 °F (36.6 °C) Resp 19 Patient Vitals for the past 12 hrs: 
 Temp Pulse Resp BP  
19 1012  72  96/67  
19 0846 97.9 °F (36.6 °C) (!) 50 19 90/62 Patient BP 90/62 upon admission and patient states she does feel a little lightheaded. [address] notified and orders received to give patient 500 mL NS over 1 hour. Right chest later lumen of mediport accessed with a 20G 1 inch haas needle using sterile technique. Flushes without difficulty and sluggish blood return. 500 mL NS given over one hour. BP increased slightly and patient states she feels a little better. Mediport flushed with 10 mL NS and 500 units of heparin before de-accessing. Patient to receive 2 mcg/kg based on initial weight of 69 kg. Nplate 691 mcg (2.78CQ) administered as ordered SQ in patient's right upper arm. No redness or bleeding noted. Ms. Willim Brunner was discharged from [place] [address] in stable condition at 1015. She is to return on 3/01/19 at 0830 for her next appointment. Solitario Aguirre RN 2019

## 2019-03-01 NOTE — PROGRESS NOTES
[facility] Progress Note Date: 2019 Name: Aayush Reyes MRN: [id number] : 1950 Nplate Ms. Harry Ibarra arrived to [facility] at 4303 ambulatory. Complained of generalized pain 8/10. Ms. Harry Ibarra was assessed and education was provided. Next cbc due on 3/22/19. Dose for today will remain at 138 mcg Ms. Pagan's vitals were reviewed. Visit Vitals BP 90/64 (BP 1 Location: Right arm, BP Patient Position: At rest;Sitting) Pulse 60 Temp 98.1 °F (36.7 °C) Breastfeeding? No  
 
 
Amadeo Del Valle NP aware of BP reading and that patient is asymptomatic. Patient also instructed to drink more fluids. No other order received. Patient states midodrine has increased to 3 times a day on Tuesday. Patient to receive 2 mcg/kg based on initial weight of 69 kg. Nplate 749 mcg (4.62QL) administered as ordered SQ in patient's right upper arm. No redness or bleeding noted. Ms. Harry Ibarra was discharged from [place] [address] in stable condition at [address] [place] [address]. She is to return on 3/8/19 at 0830 for her next appointment. Shara Hansen RN 
2019  
1036

## 2019-03-13 NOTE — PROGRESS NOTES
1. Have you been to the ER, urgent care clinic since your last visit? Hospitalized since your last visit? No    2. Have you seen or consulted any other health care providers outside of the [address] [place] [address] since your last visit? Include any pap smears or colon screening.  No

## 2019-03-13 NOTE — PATIENT INSTRUCTIONS
Order provided for custom graphite insert with Mortons extension, left  Continue activity as tolerated  Wear compression stocking or Tubigrip for swelling  Rx provided for Mobic and Voltaren gel, use as directed  Follow up in 5 weeks or sooner as needed         Foot Pain: Care Instructions  Your Care Instructions  Foot injuries that cause pain and swelling are fairly common. Almost all sports or home repair projects can cause a misstep that ends up as foot pain. Normal wear and tear, especially as you get older, also can cause foot pain. Most minor foot injuries will heal on their own, and home treatment is usually all you need to do. If you have a severe injury, you may need tests and treatment. Follow-up care is a key part of your treatment and safety. Be sure to make and go to all appointments, and call your doctor if you are having problems. It's also a good idea to know your test results and keep a list of the medicines you take. How can you care for yourself at home? · Take pain medicines exactly as directed. ? If the doctor gave you a prescription medicine for pain, take it as prescribed. ? If you are not taking a prescription pain medicine, ask your doctor if you can take an over-the-counter medicine. · Rest and protect your foot. Take a break from any activity that may cause pain. · Put ice or a cold pack on your foot for 10 to 20 minutes at a time. Put a thin cloth between the ice and your skin. · Prop up the sore foot on a pillow when you ice it or anytime you sit or lie down during the next 3 days. Try to keep it above the level of your heart. This will help reduce swelling. · Your doctor may recommend that you wrap your foot with an elastic bandage. Keep your foot wrapped for as long as your doctor advises. · If your doctor recommends crutches, use them as directed. · Wear roomy footwear. · As soon as pain and swelling end, begin gentle exercises of your foot.  Your doctor can tell you which exercises will help. When should you call for help? Call 911 anytime you think you may need emergency care. For example, call if:    · Your foot turns pale, white, blue, or cold.    Call your doctor now or seek immediate medical care if:    · You cannot move or stand on your foot.     · Your foot looks twisted or out of its normal position.     · Your foot is not stable when you step down.     · You have signs of infection, such as:  ? Increased pain, swelling, warmth, or redness. ? Red streaks leading from the sore area. ? Pus draining from a place on your foot. ? A fever.     · Your foot is numb or tingly.    Watch closely for changes in your health, and be sure to contact your doctor if:    · You do not get better as expected.     · You have bruises from an injury that last longer than 2 weeks. Where can you learn more? Go to http://leon-edinson.info/. Enter Q342 in the search box to learn more about \"Foot Pain: Care Instructions. \"  Current as of: September 20, 2018  Content Version: 11.9  © 4524-1242 [employer]. Care instructions adapted under license by [employer] (which disclaims liability or warranty for this information). If you have questions about a medical condition or this instruction, always ask your healthcare professional. [place] [address] any warranty or liability for your use of this information.

## 2019-03-13 NOTE — PROGRESS NOTES
Patient: Talon Flores                MRN: [id number]       SSN: [id number]  YOB: 1950              AGE: 79 y.o. SEX: female    Jerome Garrett MD    POST OP OFFICE NOTE  DOS: 6/1/18    Chief Complaint:   Chief Complaint   Patient presents with    Foot Pain     LEFT FOOT PAIN       HPI:     The patient is a 79 y.o. female who presents today for follow up 9+ months s/p     PROCEDURES PERFORMED:    1. Left great toenail plate removal with matrix excision. 2.  Left #2 toenail plate removal with matrix excision.     Patient has been WB to the left lower extremity. Patient reports new problem of pain to the left great toe MTP, #2 toe DIP swelling and left ankle pain. Patient states that the toenail pain has resolved, but she has a new complaint of pain to great to MTP and second toe DIP, some swelling along lateral ankle focal, with no warmth. Pain radiates up and some pain around ankle. She denies any history of illness or injury. She presents to the office today wearing [place] sneakers. She wears compression stockings sometimes but has not noticed improvement. She has taken OTC NSAIDs on occasion. She states that the scabbing has completely resolved. Patient reports a prior right bunionectomy that she is not pleased with as her great toe sticks up but there is no pain. She has left bunion that hurts but she does not want to do anything about it unitl after her trip in the late august or early September time frame when her brother is in town. PHYSICAL EXAM:     Visit Vitals  BP 90/71   Pulse 77   Temp 96 °F (35.6 °C) (Oral)   Resp 10   Ht 5' 6\" (1.676 m)   Wt 145 lb (65.8 kg)   SpO2 92%   BMI 23.40 kg/m²     Pain Scale: 8/10    GEN:  Alert, well developed, well nourished, well appearing 79 y.o. female in no acute distress. PSYCH:  Normal affect, mood, and conduct.  alert, oriented x 3 alert, oriented x 3, no dementia  M/S EXAMINATION OF: LLE  SKIN: mild edema to #2 DIP , no erythema, no ecchymosis, no warmth  TENDERNESS:  mild tenderness to palpation with manipulation of great toe MTP and #2 DIP, mild TTP along medial and lateral ankle  NEUROVASCULAR:  grossly intact. Positive distal pulses and capillary refill. DVT ASSESSMENT:  The calf is not tender to palpation. No evidence of DVT seen on physical exam.  ROM: WNL       RADIOGRAPHS & DIAGNOSTIC STUDIES     No results found for any visits on 03/13/19. X-rays of the left foot and ankle reveal bunion deformity and degenerative changes to great toe MTP region and ankle. Right foot reveals prior bunionectomy procedure performed elsewhere. Mineralization suggests osteopenia. No fracture, dislocation or subluxation noted. No osteolytic or osteoblastic lesions noted. IMPRESSION:     Encounter Diagnoses     ICD-10-CM ICD-9-CM   1. Ingrown nail of great toe of left foot L60.0 703.0   2. Ingrown nail of second toe of left foot L60.0 703.0   3. Post-operative state Z98.890 V45.89   4. Pain of left great toe M79.675 729.5   5. Left ankle pain, unspecified chronicity M25.572 719.47   6.  Osteoarthritis of left foot, unspecified osteoarthritis type M19.072 715.97       PLAN:         Orders Placed This Encounter    Generic Supply Order    [44481] Foot Min 3V    [52645] Ankle 2V    diclofenac (VOLTAREN) 1 % gel           Order provided for custom graphite insert with Mortons extension, left  Continue activity as tolerated  Wear compression stocking or Tubigrip for swelling  Rx provided for Mobic and Voltaren gel, use as directed  Follow up in 5 weeks or sooner as needed        REVIEW OF SYSTEMS:     Otherwise as noted in HPI      PAST MEDICAL HISTORY:     Past Medical History:   Diagnosis Date    Antineoplastic chemotherapy induced anemia     Arrhythmia     Atrial Fib    Arthritis     [facility]     Cancer ([facility] 1999    Breast    Cardiomyopathy ([facility]     Chronic ITP (idiopathic thrombocytopenia) ([facility] 10/31/2016 Secondary to Anemia from Chemo    Congestive heart failure (HCC)     Diabetes ([facility]     borderline, no meds    Esophageal cancer ([facility] 2005    treated with chemo    Heart failure (HCC)     SOB (shortness of breath)        MEDICATIONS:     Current Outpatient Medications   Medication Sig    diclofenac (VOLTAREN) 1 % gel Apply  to affected area four (4) times daily. USE AS DIRECTED    midodrine (PROAMITINE) 5 mg tablet Take  by mouth three (3) times daily.  oxyCODONE-acetaminophen (PERCOCET 10)  mg per tablet Take 1 Tab by mouth every six (6) hours as needed for Pain. Max Daily Amount: 4 Tabs.  zolpidem (AMBIEN) 10 mg tablet Take 0.5 Tabs by mouth nightly as needed for Sleep. Max Daily Amount: 5 mg.  gabapentin (NEURONTIN) 300 mg capsule Take 1 Cap by mouth nightly.  furosemide (LASIX) 20 mg tablet Take 1 Tab by mouth daily.  magnesium oxide (MAG-OX) 400 mg tablet Take 1 Tab by mouth every fourty-eight (48) hours.  albuterol (PROVENTIL HFA, VENTOLIN HFA, PROAIR HFA) 90 mcg/actuation inhaler Take 2 Puffs by inhalation every four (4) hours as needed for Wheezing.  lidocaine-prilocaine (EMLA) topical cream APPLY OVER PORT 1 HOUR PRIOR TO CHEMOTHERAPY THAN COVER WITH SARAN WRAP    allopurinol (ZYLOPRIM) 100 mg tablet Take 1 Tab by mouth daily.  diclofenac (VOLTAREN) 1 % gel Apply 2 g to affected area three (3) times daily as needed for Pain. Gel should be measured and applied using the supplied dosing card. Apply dose (2 gm) to each location. If treatment site is the hands, patients should wait at least one (1) hour to wash their hands. APPLY TO ankle and knees.  ergocalciferol (VITAMIN D2) 50,000 unit capsule Take 1 Cap by mouth every seven (7) days.  topiramate (TOPAMAX) 50 mg tablet Take 50 mg by mouth daily.  LINZESS 145 mcg cap capsule TAKE 1 CAPSULE BY MOUTH DAILY 30 MINUTES BEFORE BREAKFAST    loratadine 10 mg cap Take 10 mg by mouth daily.     digoxin (LANOXIN) 0.125 mg tablet Take 0.125 mg by mouth daily.  rivaroxaban (XARELTO) 10 mg tablet Take 10 mg by mouth daily.  aluminum-magnesium hydroxide 200-200 mg/5 mL susp 5 mL, diphenhydrAMINE 12.5 mg/5 mL liqd 12.5 mg, lidocaine 2 % soln 5 mL 5 mL by Swish and Spit route two (2) times a day. Magic mouth wash   Maalox  Lidocaine 2% viscous   Diphenhydramine oral solution     Pharmacy to mix equal portions of ingredients to a total volume as indicated in the dispense amount.  senna (SENNA) 8.6 mg tablet Take 1 Tab by mouth daily.  ondansetron hcl (ZOFRAN) 8 mg tablet Take 1 Tab by mouth every eight (8) hours as needed for Nausea.  IRON AG&FUM/C/FA/MV CMB11/CA-T (PRUVATE 21-7 PO) Take 325 mg by mouth daily. Indications: iron deficiency     No current facility-administered medications for this visit. ALLERGIES:     Allergies   Allergen Reactions    Aspirin Swelling     Pt states her whole body swells when she takes aspirin.     Adhesive Unable to Obtain    Nickel Rash    Pcn [Penicillins] Rash         PAST SURGICAL HISTORY:     Past Surgical History:   Procedure Laterality Date    BREAST SURGERY PROCEDURE UNLISTED Left 1990s    lumpectomy    COLONOSCOPY N/A 7/27/2016    COLONOSCOPY performed by Regla Galindo MD at [facility] ENDOSCOPY    HX APPENDECTOMY      HX HEENT      Tonsillectomy    HX ORTHOPAEDIC      HX TUBAL LIGATION      HX VASCULAR ACCESS      NEUROLOGICAL PROCEDURE UNLISTED  01/08/2018    Lumbar fusion       SOCIAL HISTORY:     Social History     Socioeconomic History    Marital status:      Spouse name: Not on file    Number of children: Not on file    Years of education: Not on file    Highest education level: Not on file   Social Needs    Financial resource strain: Not on file    Food insecurity - worry: Not on file    Food insecurity - inability: Not on file   [place] needs - medical: Not on file   [place] needs - non-medical: Not on file   Occupational History  Not on file   Tobacco Use    Smoking status: Never Smoker    Smokeless tobacco: Never Used   Substance and Sexual Activity    Alcohol use: No    Drug use: No    Sexual activity: Not on file   Other Topics Concern    Not on file   Social History Narrative    Not on file       FAMILY HISTORY:     History reviewed. No pertinent family history.         Robert Bansal PA-C  3/13/2019

## 2019-03-14 NOTE — TELEPHONE ENCOUNTER
[place] pharmacist Jori Rodriguez  need the gram dosage for the diclofenac 1% gel sent to them yesterday   [phone]

## 2019-03-15 NOTE — PROGRESS NOTES
[facility] Progress Note    Date: March 15, 2019    Name: Jose Padron    MRN: [id number]         : 1950      Nplate    Ms. Mark Mcclendon arrived to [facility] at 0900 ambulatory. Complained of generalized pain 8/10. Ms. Mark Mcclendon was assessed and education was provided. Next cbc due on 3/22/19. Dose for today will remain at 138 mcg. Ms. Yudy Calderon vitals were reviewed. Visit Vitals  BP 96/63 (BP 1 Location: Right arm, BP Patient Position: Sitting)   Pulse 75   Temp 97.6 °F (36.4 °C)   Resp 18       Patient to receive 2 mcg/kg based on initial weight of 69 kg. Nplate 197 mcg (3.75OU) administered as ordered SQ in patient's right upper arm. No redness or bleeding noted. Ms. Mark Mcclendon was discharged from [place] [address] in stable condition at [address] [place] [address]. She is to return on 3/22/19 at 0900 for her next appointment.     Liam Richard RN  March 15, 2019   0915

## 2019-03-22 NOTE — PROGRESS NOTES
[facility] Progress Note Date: 2019 Name: Caprice Jackson MRN: [id number] : 1950 Nplate/Port flush Ms. Aster Castro arrived to [facility] at 2191 ambulatory. Ms. Aster Castro was assessed and education was provided. Ms. Roosevelt Valle vitals were reviewed. Visit Vitals BP 93/66 (BP 1 Location: Right arm, BP Patient Position: Sitting) Pulse 88 Temp 98.1 °F (36.7 °C) Resp 20 SpO2 100% Right chest double lumen mediport accessed with 20 G 1 inch haas needles using sterile technique. Both lumens flushed easily and had brisk blood return. Flushed with NS and 500 units of heparin then de accessed. Recent Results (from the past 12 hour(s)) CBC WITH AUTOMATED DIFF Collection Time: 19  9:00 AM  
Result Value Ref Range WBC 5.2 4.6 - 13.2 K/uL  
 RBC 3.62 (L) 4.20 - 5.30 M/uL HGB 9.4 (L) 12.0 - 16.0 g/dL HCT 27.9 (L) 35.0 - 45.0 % MCV 77.1 74.0 - 97.0 FL  
 MCH 26.0 24.0 - 34.0 PG  
 MCHC 33.7 31.0 - 37.0 g/dL  
 RDW 16.1 (H) 11.6 - 14.5 % PLATELET 84 (L) 771 - 420 K/uL NEUTROPHILS 54 40 - 73 % LYMPHOCYTES 27 21 - 52 % MONOCYTES 17 (H) 3 - 10 % EOSINOPHILS 2 0 - 5 % BASOPHILS 0 0 - 2 %  
 ABS. NEUTROPHILS 2.9 1.8 - 8.0 K/UL  
 ABS. LYMPHOCYTES 1.4 0.9 - 3.6 K/UL  
 ABS. MONOCYTES 0.9 0.05 - 1.2 K/UL  
 ABS. EOSINOPHILS 0.1 0.0 - 0.4 K/UL  
 ABS. BASOPHILS 0.0 0.0 - 0.1 K/UL  
 DF AUTOMATED Patient to receive 2 mcg/kg based on initial weight of 69 kg. Nplate 072 mcg (4.11XB) administered as ordered SQ in patient's right upper arm. No redness or bleeding noted. Ms. Aster Castro was discharged from [place] [address] in stable condition at [address] [place] [address]. She is to return on 3/29/19 at 0830 for her next appointment. Ian Hirsch RN 
2019

## 2019-03-29 NOTE — PROGRESS NOTES
[facility] Progress Note Date: 2019 Name: Jose Padron MRN: [id number] : 1950 Nplate Ms. Mark Mcclendon arrived to [facility] at 1827 ambulatory. Complained of generalized pain 8/10. Took pain medication today and will resume when due on return home. Ms. Mark Mcclendon was assessed and education was provided. Next cbc due on 19. Dose for today will remain at 138 mcg Ms. Pagan's vitals were reviewed. Visit Vitals BP 94/65 (BP 1 Location: Right arm, BP Patient Position: Sitting) Pulse 72 Temp 97.7 °F (36.5 °C) Resp 18 SpO2 98% Last week's platelet count= 32,824 Patient to remain on 2 mcg/kg based on initial weight of 69 kg. Nplate 695 mcg (5.07RS) administered as ordered SQ in patient's right upper arm. No redness or bleeding noted. Declined bandaid Ms. Mark Mcclendon was discharged from [place] [address] in stable condition at 46. She is to return on 19 at 0830 for her next appointment. Billie Merino RN 
2019  
[phone]

## 2019-04-05 NOTE — PROGRESS NOTES
[facility] Progress Note Date: 2019 Name: Ward Larson MRN: [id number] : 1950 Nplate Ms. Susanna Orourke arrived to [facility] at [address] [facility]. Complained of generalized pain 8/10. Took pain medication today and will resume when due on return home. Ms. Susanna Orourke was assessed and education was provided. Dose for today will remain at 138 mcg Ms. Pagan's vitals were reviewed. Visit Vitals /71 (BP 1 Location: Right arm, BP Patient Position: Sitting) Pulse 72 Temp 97.9 °F (36.6 °C) Resp 18 Breastfeeding? No  
 
3/22/19 platelet count= 76,204 Patient to remain on 2 mcg/kg based on initial weight of 69 kg. Nplate 375 mcg (9.65JO) administered as ordered SQ in patient's right upper arm. No redness or bleeding noted. Declined bandaid Ms. Susanna Orourke was discharged from [place] [address] in stable condition at 0900. She is to return on 19 at 0800 for her next appointment. [facility]get 2019  
[phone]

## 2019-04-12 NOTE — PROGRESS NOTES
[facility] Progress Note Date: 2019 Name: Aayush Reyes MRN: [id number] : 1950 Nplate Ms. Harry Ibarra arrived to [facility] at 4853 ambulatory. Complained of generalized pain 7/10. Took pain medication today and will resume when due on return home. Ms. Harry Ibarra was assessed and education was provided. Dose for today will increase to 207 mcg. Ms. Belita Curling vitals were reviewed. Visit Vitals /75 (BP 1 Location: Right arm, BP Patient Position: Sitting) Pulse (!) 51 Temp 98.2 °F (36.8 °C) Resp 18  
 
19 platelet DSIIK=36,845 Patient to increase to 3 mcg/kg based on initial weight of 69 kg. Nplate 083 mcg (7.88DL) administered as ordered SQ in patient's right upper arm. No redness or bleeding noted. Declined bandaid. Ms. Harry Ibarra was discharged from [place] [address] in stable condition at 0930. She is to return on 19 at 0800 for her next appointment. Srikanth Ram RN 2019  
[phone]

## 2019-04-19 NOTE — PROGRESS NOTES
[facility] Progress Note Date: 2019 Name: Ashley Fernandes MRN: [id number] : 1950 Nplate Ms. Tracy Garcia arrived to [facility] at 65 ambulatory. Complained of generalized pain /10. Took pain medication today and will resume when due on return home. Declined port flush today due to not putting on cream this morning. Ms. Tracy Garcia was assessed and education was provided. CBC drawn from right AC x 1 attempt. Ms. Kaycee Jaeger vitals were reviewed. Visit Vitals BP 96/65 (BP 1 Location: Right arm, BP Patient Position: At rest;Sitting) Pulse 88 Temp 97.6 °F (36.4 °C) Resp 18 Recent Results (from the past 12 hour(s)) CBC WITH AUTOMATED DIFF Collection Time: 19  8:36 AM  
Result Value Ref Range WBC 7.1 4.6 - 13.2 K/uL  
 RBC 3.73 (L) 4.20 - 5.30 M/uL HGB 9.8 (L) 12.0 - 16.0 g/dL HCT 29.0 (L) 35.0 - 45.0 % MCV 77.7 74.0 - 97.0 FL  
 MCH 26.3 24.0 - 34.0 PG  
 MCHC 33.8 31.0 - 37.0 g/dL  
 RDW 16.3 (H) 11.6 - 14.5 % PLATELET 110 (L) 897 - 420 K/uL NEUTROPHILS PENDING % LYMPHOCYTES PENDING % MONOCYTES PENDING % EOSINOPHILS PENDING % BASOPHILS PENDING %  
 ABS. NEUTROPHILS PENDING K/UL  
 ABS. LYMPHOCYTES PENDING K/UL  
 ABS. MONOCYTES PENDING K/UL  
 ABS. EOSINOPHILS PENDING K/UL  
 ABS. BASOPHILS PENDING K/UL  
 DF PENDING Patient to remain at 3 mcg/kg based on initial weight of 69 kg. Nplate 587 mcg (2.80MQ) administered as ordered SQ in patient's right upper arm. No redness or bleeding noted. Declined bandaid. Ms. Tracy Garcia was discharged from [place] [address] in stable condition at 0900. She is to return on 19 at 0800 for her next appointment to include port flush. Sommer Rodriguez RN 2019  
[phone]

## 2019-04-26 NOTE — PROGRESS NOTES
[facility] Progress Note Date: 2019 Name: Luis Alberto Marrero MRN: [id number] : 1950 Nplate/Port Flush Ms. Rk Reeder arrived to [facility] at [address] ambulatory. Generalized pain and joint pain 8/10. Took meds prior opic admit. Positioned for comfort and reassured Ms. Rk Reeder was assessed and education was provided. Ms. Griselda Ma vitals were reviewed. Visit Vitals /68 (BP 1 Location: Right arm, BP Patient Position: Sitting) Pulse 93 Temp 97.6 °F (36.4 °C) Resp 18 Ht 5' 6\" (1.676 m) Wt 68 kg (150 lb) BMI 24.21 kg/m² Pt was observed for 5 minutes after obtaining vital signs prior to initiating treatment. Right chest double lumen mediport accessed with a 20 gauge haas needle using sterile technique. Lateral port accessed. Brisk flush/blood returns noted. Unable to get enough blood for cbc. Port flushed with 30 ml NS and 500 units of Heparin then de-accessed. Site s signs/symptoms. Gauze/tegaderm applied   
   
medial port accessed, flushed with some difficulty, but brisk blood returns noted. Unable to get enough blood for cbc. Port was flushed with 20 ml NS and 500 units of Heparin then de-accessed. Site s signs/symptoms. Gauze and Tegaderm applied to the site. Blood sample obtained from right AC via na-puncture x 1 stick for cbc. Lab results obtained and reviewed. (Preliminary results scanned into media tab. ) Preliminary platelet count= 490Z. Preliminary results scanned into the media tab. Cbc sent out for further testing Per Nplate protocol, since pt's platelet count is 767,659 today, pt's dose will remain at  207 mcg= 3mcg/kg Nplate 193TGH (4.11ZE) administered as ordered SQ in patient's right upper arm. No redness or bleeding noted. Ms. Rk Reeder was discharged from [place] [address] in stable condition at 0930. She is to return on 5/3/19 at 0800 for her next appointment. Ramesh Alonzo RN 2019

## 2019-05-03 NOTE — PROGRESS NOTES
[facility] Progress Note Date: May 3, 2019 Name: Caprice Jackson MRN: [id number] : 1950 Nplate/Port Flush Ms. Aster Castro arrived to [facility] at [address] [facility]. Generalized pain and joint pain /10. Took meds prior opic admit. Positioned for comfort and reassured Ms. Aster Castro was assessed and education was provided. Ms. Roosevelt Valle vitals were reviewed. Visit Vitals /68 (BP 1 Location: Right arm, BP Patient Position: Sitting) Pulse 79 Temp 97.5 °F (36.4 °C) Resp 18 SpO2 99% Breastfeeding? No  
 
 
Pt was observed for 5 minutes after obtaining vital signs prior to initiating treatment. Blood sample obtained from right AC via na-puncture x 1 stick for cbc. Lab results obtained and reviewed. (Preliminary results scanned into media tab. ) Preliminary platelet count= 64C. Preliminary results scanned into the media tab. Cbc sent out for further testing Per Nplate protocol, since pt's platelet count is 88,283 today, pt's dose will remain at  207 mcg= 3mcg/kg Nplate 670NBF (8.19NE) administered as ordered SQ in patient's right upper arm. No redness or bleeding noted. Ms. Aster Castro was discharged from [place] [address] in stable condition at 010. She is to return on 5/10/19 at 0800 for her next appointment. Luzmaria Nieto RN May 3, 2019  
0910

## 2019-05-10 NOTE — PROGRESS NOTES
[facility] Progress Note Date: May 10, 2019 Name: Jaci Gomes MRN: [id number] : 1950 Nplate/Port Flush Ms. Tamia Gómez arrived to [facility] at 6384 ambulatory. Generalized pain and joint pain 10/10. Took meds prior opic admit. Positioned for comfort and reassured. Ms. Tamia Gómez was assessed and education was provided. Ms. Nafisa Stinson vitals were reviewed. Visit Vitals /64 (BP 1 Location: Right arm, BP Patient Position: At rest;Sitting) Pulse (!) 51 Temp 98 °F (36.7 °C) Resp 18 Pt was observed for 5 minutes after obtaining vital signs prior to initiating treatment. CBC x2 atempts to right AC. Lab results obtained and reviewed. (Preliminary results scanned into media tab. ) Preliminary platelet count= 379X. Preliminary results scanned into the media tab. CBC sent out for further testing Per Nplate protocol, since pt's platelet count is 561,470 today, pt's dose will remain at  207 mcg= 3mcg/kg Nplate 459IWU (3.27VT) administered as ordered SQ in patient's right upper arm. No redness or bleeding noted. Ms. Tamia Gómez was discharged from [place] [address] in stable condition at [address] [place] [address]. She is to return on 19 at 0800 for her next appointment. Cheyenne Gregory RN May 10, 2019  
0883

## 2019-05-17 NOTE — PROGRESS NOTES
[address] [facility] Date: May 17, 2019 Name: Jude Kilpatrick MRN: [id number] : 1950 Nplate/Port Flush Ms. Yesica Alvarado arrived to [facility] at 7886 ambulatory. Generalized pain and joint pain 8/10. Took meds prior opic admit. Positioned for comfort and reassured. Ms. Yesica Alvarado was assessed and education was provided. Ms. Aidan Awan vitals were reviewed. Visit Vitals BP 94/61 (BP 1 Location: Right arm, BP Patient Position: Sitting) Pulse 60 Temp 97.5 °F (36.4 °C) Resp 18 Pt was observed for 5 minutes after obtaining vital signs prior to initiating treatment. Lab results obtained and reviewed from 5/10/19. Platelet count= 426. Per Nplate protocol, pt's dose will remain at  207 mcg= 3mcg/kg Nplate 112SHE (6.52FO) administered as ordered SQ in patient's right upper arm. No redness or bleeding noted. Ms. Yesica Alvarado was discharged from [place] [address] in stable condition at 6550. She is to return on 19 at 0900 for her next appointment. Rebecca Leal RN May 17, 2019  
[phone]

## 2019-05-18 NOTE — ED PROVIDER NOTES
61-year-old female with history of cardiomyopathy, atrial fibrillation, diabetes, congestive heart failure, on anticoagulation with Xarelto who presents for evaluation of atraumatic pain in the distal right lower extremity. Onset of symptoms was overnight. She has swelling to the distal lower leg predominantly inferior to the lateral malleolus of the ankle. She denies that she twisted or turned it. She also has pain in the calf which prevented her from sleeping. She states the leg is swollen but she has dependent edema to bilateral lower extremities that is unchanged. There is been no recent travel, immobility, surgical procedures, or prior history of DVT/PE. Patient is not short of breath. Past Medical History:  
Diagnosis Date  Antineoplastic chemotherapy induced anemia  Arrhythmia Atrial Fib  Arthritis  Breast cancer ([address]  Cancer ([address] 1999 Breast  
 Cardiomyopathy ([address]  Chronic ITP (idiopathic thrombocytopenia) (HCC) 10/31/2016 Secondary to Anemia from Chemo  Congestive heart failure ([address]  Diabetes ([address] borderline, no meds  Esophageal cancer ([address] 2005  
 treated with chemo  Heart failure ([address]  SOB (shortness of breath) Past Surgical History:  
Procedure Laterality Date  BREAST SURGERY PROCEDURE UNLISTED Left 1990s  
 lumpectomy  COLONOSCOPY N/A 7/27/2016 COLONOSCOPY performed by Conrado Lockett MD at [facility] ENDOSCOPY  
 HX APPENDECTOMY  HX HEENT Tonsillectomy  HX ORTHOPAEDIC    
 HX TUBAL LIGATION    
 HX VASCULAR ACCESS    
 NEUROLOGICAL PROCEDURE UNLISTED  01/08/2018 Lumbar fusion History reviewed. No pertinent family history. Social History Socioeconomic History  Marital status:  Spouse name: Not on file  Number of children: Not on file  Years of education: Not on file  Highest education level: Not on file Occupational History  Not on file Social Needs  Financial resource strain: Not on file  Food insecurity:  
  Worry: Not on file Inability: Not on file  Transportation needs:  
  Medical: Not on file Non-medical: Not on file Tobacco Use  Smoking status: Never Smoker  Smokeless tobacco: Never Used Substance and Sexual Activity  Alcohol use: No  
 Drug use: No  
 Sexual activity: Not on file Lifestyle  Physical activity:  
  Days per week: Not on file Minutes per session: Not on file  Stress: Not on file Relationships  Social connections:  
  Talks on phone: Not on file Gets together: Not on file Attends [employer] service: Not on file Active member of club or organization: Not on file Attends meetings of clubs or organizations: Not on file Relationship status: Not on file  Intimate partner violence:  
  Fear of current or ex partner: Not on file Emotionally abused: Not on file Physically abused: Not on file Forced sexual activity: Not on file Other Topics Concern  Not on file Social History Narrative  Not on file ALLERGIES: Aspirin; Adhesive; Nickel; and Pcn [penicillins] Review of Systems Constitutional: Negative for fever. Respiratory: Negative for shortness of breath. Cardiovascular: Negative for chest pain. Musculoskeletal:  
     R calf and lower leg pain. All other systems reviewed and are negative. Vitals:  
 05/18/19 7358 BP: 93/69 Pulse: 90 Resp: 14 Temp: 99 °F (37.2 °C) SpO2: 99% Weight: 61.7 kg (136 lb) Height: 5' 6\" (1.676 m) Physical Exam  
Constitutional: She is oriented to person, place, and time. She appears well-developed and well-nourished. No distress. HENT:  
Head: Normocephalic and atraumatic. Eyes: No scleral icterus. Cardiovascular: Normal rate. Irregularly irregular. 2/6 systolic murmur at the lower left sternal border. Pulmonary/Chest: Effort normal.  
Musculoskeletal: Patient exhibits tenderness to the posterior right calf extending laterally down into the right ankle with local swelling inferior to the lateral malleolus. The entire lower leg is tender to palpation. There is no erythema associated with this. DP and PT pulses are 2+ easily palpable and symmetric. There is no tenderness about the knee joint and no knee effusion is appreciated. Patient reports calf pain with John's maneuver. Neurological: She is alert and oriented to person, place, and time. Skin: Skin is warm and dry. Psychiatric: She has a normal mood and affect. Nursing note and vitals reviewed. No results found for this or any previous visit (from the past 12 hour(s)). XR ANKLE RT MIN 3 V Final Result IMPRESSION: Atherosclerotic disease. Otherwise negative. Preliminary report, RLE US: 
Right Lower Venous The common femoral, proximal femoral, mid femoral, distal femoral, popliteal, posterior tibial, peroneal and saphenous femoral junction vein(s) were imaged in the transverse and longitudinal planes. The vessels showed normal color filling and compressibility. Doppler interrogation of the veins showed phasic and spontaneous flow. The right posterior tibial artery has triphasic waveforms. Left Lower Venous For comparison purposes, the left common femoral vein was briefly interrogated. These vein demonstrates normal color filing and compressibility. Doppler flow was phasic and spontaneous. MDM Number of Diagnoses or Management Options Pain of right lower extremity:  
Diagnosis management comments: Impression: Distal right lower extremity pain of unclear origin. The leg does not appear appreciably swollen relative to the contralateral leg although there is localized swelling inferior to the malleolus that is consistent with an ankle sprain. Patient denies any history of trauma.   Arterial pulses are equal and symmetric. It would be unlikely for the patient to have suffered a DVT while on Xarelto but this will need to be ruled out, and a Doppler ultrasound study has been ordered. X-ray of the right ankle also ordered. 12:35 PM 
Preliminary reading of the ultrasound study is negative for deep vein thrombosis. X-ray study negative for fracture. Given patient's current use of anticoagulants (Xarelto), use of nonsteroidal medications is contraindicated. She will be prescribed a short course of Norco for pain management. Precise etiology of her pain is uncertain at this time. Procedures Diagnosis: 1. Pain of right lower extremity 1. Preliminary reading of the ultrasound study of the leg is negative for deep vein clot. 2.  No fracture is noted on the x-ray of the ankle. 3.  Precise source for your pain is uncertain at this time. 4.  Manage pain with Norco 1 tablet every 6 hours as needed. 5.  Ace wrap to the ankle and lower leg may provide some symptomatic relief and support. 6.  Follow-up with your primary care physician for recheck next week if not improving. 7.  Return to the emergency department for severe swelling, severe pain, high fever, or other acute medical emergencies. Disposition: home Follow-up Information Follow up With Specialties Details Why Contact Info Chin Pittman MD Family Practice Schedule an appointment as soon as possible for a visit As needed, If symptoms persist [address] [facility] [address] [place] [address] 
[phone] [id number] [facility] EMERGENCY DEPT Emergency Medicine  If symptoms worsen [id number] [facility] [place] [id number] [phone] Patient's Medications Start Taking HYDROCODONE-ACETAMINOPHEN (NORCO) 5-325 MG PER TABLET    Take 1 Tab by mouth every six (6) hours as needed for Pain for up to 3 days. Max Daily Amount: 4 Tabs. Continue Taking  ALBUTEROL (PROVENTIL HFA, VENTOLIN HFA, PROAIR HFA) 90 MCG/ACTUATION INHALER    Take 2 Puffs by inhalation every four (4) hours as needed for Wheezing. ALLOPURINOL (ZYLOPRIM) 100 MG TABLET    Take 1 Tab by mouth daily. ALUMINUM-MAGNESIUM HYDROXIDE 200-200 MG/5 ML SUSP 5 ML, DIPHENHYDRAMINE 12.5 MG/5 ML LIQD 12.5 MG, LIDOCAINE 2 % SOLN 5 ML    5 mL by Swish and Spit route two (2) times a day. Magic mouth wash Maalox Lidocaine 2% viscous Diphenhydramine oral solution Pharmacy to mix equal portions of ingredients to a total volume as indicated in the dispense amount. DICLOFENAC (VOLTAREN) 1 % GEL    Apply 2 g to affected area three (3) times daily as needed for Pain. Gel should be measured and applied using the supplied dosing card. Apply dose (2 gm) to each location. If treatment site is the hands, patients should wait at least one (1) hour to wash their hands. APPLY TO ankle and knees. DICLOFENAC (VOLTAREN) 1 % GEL    Apply  to affected area four (4) times daily. USE AS DIRECTED  
 DIGOXIN (LANOXIN) 0.125 MG TABLET    Take 0.125 mg by mouth daily. ERGOCALCIFEROL (VITAMIN D2) 50,000 UNIT CAPSULE    Take 1 Cap by mouth every seven (7) days. FUROSEMIDE (LASIX) 20 MG TABLET    Take 1 Tab by mouth daily. GABAPENTIN (NEURONTIN) 300 MG CAPSULE    Take 1 Cap by mouth nightly. IRON AG&FUM/C/FA/MV CMB11/CA-T (PRUVATE 21-7 PO)    Take 325 mg by mouth daily. Indications: iron deficiency LIDOCAINE-PRILOCAINE (EMLA) TOPICAL CREAM    APPLY OVER PORT 1 HOUR PRIOR TO CHEMOTHERAPY THAN COVER WITH [place] WRAP LINZESS 145 MCG CAP CAPSULE    TAKE 1 CAPSULE BY MOUTH DAILY 30 MINUTES BEFORE BREAKFAST  
 LORATADINE 10 MG CAP    Take 10 mg by mouth daily. MAGNESIUM OXIDE (MAG-OX) 400 MG TABLET    Take 1 Tab by mouth every fourty-eight (48) hours. MIDODRINE (PROAMITINE) 5 MG TABLET    Take  by mouth three (3) times daily. ONDANSETRON HCL (ZOFRAN) 8 MG TABLET    Take 1 Tab by mouth every eight (8) hours as needed for Nausea. OXYCODONE-ACETAMINOPHEN (PERCOCET 10)  MG PER TABLET    Take 1 Tab by mouth every six (6) hours as needed for Pain for up to 30 days. Max Daily Amount: 4 Tabs. RIVAROXABAN (XARELTO) 10 MG TABLET    Take 10 mg by mouth daily. SENNA (SENNA) 8.6 MG TABLET    Take 1 Tab by mouth daily. TOPIRAMATE (TOPAMAX) 50 MG TABLET    Take 50 mg by mouth daily. ZOLPIDEM (AMBIEN) 10 MG TABLET    Take 0.5 Tabs by mouth nightly as needed for Sleep. Max Daily Amount: 5 mg. These Medications have changed No medications on file Stop Taking No medications on file

## 2019-05-18 NOTE — ED NOTES
Yefri Castro is a 81 y.o. female that was discharged in good condition. The patients diagnosis, condition and treatment were explained to  patient and aftercare instructions were given. The patient verbalized understanding. Patient armband removed and shredded.

## 2019-05-20 NOTE — PATIENT INSTRUCTIONS
Idiopathic Thrombocytopenic Purpura: Care Instructions  Your Care Instructions    Idiopathic thrombocytopenic purpura, or ITP, is a blood problem. It happens when your immune system does not work as it should and destroys platelets in your blood. Platelets are a kind of cell in your blood. They have a sticky surface that helps them form clots to stop bleeding. Your blood can't clot if you don't have enough platelets. This causes abnormal bleeding. Bleeding can get worse over time, or it can come on fast.  To treat ITP, you may need to take medicines to help stop the destruction of platelets. You may need platelets added to your blood. Or you may need surgery to remove your spleen. Your spleen's job is to remove platelets from your body. So taking out the spleen helps increase your platelet count. Follow-up care is a key part of your treatment and safety. Be sure to make and go to all appointments, and call your doctor if you are having problems. It's also a good idea to know your test results and keep a list of the medicines you take. How can you care for yourself at home? · Be safe with medicines. Take your medicines exactly as prescribed. Call your doctor if you think you are having a problem with your medicine. · Before you start any new over-the-counter or prescribed medicine, tell your doctor if:  ? You have had a bad reaction to any medicines in the past.  ? You take other medicines, such as over-the-counter medicines, vitamins, or herbal supplements. ? You have other health problems. ? You are or could be pregnant. · Do not take aspirin or other anti-inflammatory medicines (such as ibuprofen or naproxen) without first talking to your doctor. Ask your doctor if it is okay to use acetaminophen (Tylenol). · Do not take two or more pain medicines at the same time unless the doctor told you to. Many pain medicines have acetaminophen, which is Tylenol.  Too much acetaminophen (Tylenol) can be harmful. · Get plenty of rest.  · \"Think safety\" and protect yourself from injury. Do not lift anything heavy. · Do not donate blood. · Do not drink alcohol or use illegal drugs. When should you call for help? Call 911 anytime you think you may need emergency care. For example, call if:    · You passed out (lost consciousness).     · You have signs of severe bleeding, which includes:  ? You have a severe headache that is different from past headaches. ? You vomit blood or what looks like coffee grounds. ? Your stools are maroon or very bloody.    Call your doctor now or seek immediate medical care if:    · You are dizzy or lightheaded, or you feel like you may faint.     · You have abnormal bleeding, such as:  ? A nosebleed that you can't easily stop. ? Your stools are black and look like tar, or they have streaks of blood. ? You have blood in your urine. ? You have joint pain. ? You have bruises or blood spots under your skin.    Watch closely for changes in your health, and be sure to contact your doctor if:    · You do not get better as expected. Where can you learn more? Go to http://leon-edinson.info/. Enter I671 in the search box to learn more about \"Idiopathic Thrombocytopenic Purpura: Care Instructions. \"  Current as of: May 6, 2018  Content Version: 11.9  © 5049-4524 [employer]. Care instructions adapted under license by [employer] (which disclaims liability or warranty for this information). If you have questions about a medical condition or this instruction, always ask your healthcare professional. [place] [address] any warranty or liability for your use of this information.

## 2019-05-20 NOTE — PROGRESS NOTES
Hematology/Oncology  Progress Note    Name: Reyna Allen  Date: 2019  : 1950    PCP: Hilda Holter, MD     Ms. Kylie Morgan is a 81 y.o. female who was seen for management of her slowly progressive ITP and metastatic breast cancer with associated iron deficiency anemia. Current therapy: Active surveillance regarding breast cancer: N-plate as needed as a primary treatment for ITP  Subjective:     Ms. Kylie Morgan is a 53-year-old -American woman with history of metastatic breast cancer. The patient reports that she has been doing reasonably well. She has gained about 30 pounds weight over the past few months, due to the steroid that she has to take for her ITP. She is now actively trying to lose some weight. She denies having any unexplained bruising or bleeding. She continues to have arthritic discomfort but she is ambulating without the use of a cane or walker. The patient informed me that she is currently seeing a spine specialist and may need to get steroid injections or subdural injection to control the pain. She is continuing to take the n-plate as the primary treatment modality for her ITP. The patient states that she has noticed that her platelet count rebounded over the past several weeks. Today she is complaining of the neuropathic discomfort is more pronounced. The patient reports that she woke up a couple of days ago with some pain and discomfort in her right leg. She went to the emergency room and Doppler tests were done. There was no evidence of blood clot. An Ace bandage was placed on her right leg extending from the knee down to the ankle. She is continued to have some leg pain particularly in the posterior aspects of the right leg and ankle. Past medical history, family history, and social history: these were reviewed and remains unchanged.     Past Medical History:   Diagnosis Date    Antineoplastic chemotherapy induced anemia     Arrhythmia     Atrial Fib    Arthritis  [facility]     Cancer ([facility] 1999    Breast    Cardiomyopathy ([facility]     Chronic ITP (idiopathic thrombocytopenia) (HCC) 10/31/2016    Secondary to Anemia from Chemo    Congestive heart failure (HCC)     Diabetes (HCC)     borderline, no meds    Esophageal cancer ([facility] 2005    treated with chemo    Heart failure ([facility]     SOB (shortness of breath)      Past Surgical History:   Procedure Laterality Date    BREAST SURGERY PROCEDURE UNLISTED Left 1990s    lumpectomy    COLONOSCOPY N/A 7/27/2016    COLON[facility] Wally Medina [facility] at [facility] ENDOSCOPY    HX APPENDECTOMY      HX HEENT      Tonsillectomy    HX ORTHOPAEDIC      HX TUBAL LIGATION      HX VASCULAR ACCESS      NEUROLOGICAL PROCEDURE UNLISTED  01/08/2018    Lumbar fusion     Social History     Socioeconomic History    Marital status:      Spouse name: Not on file    Number of children: Not on file    Years of education: Not on file    Highest education level: Not on file   Occupational History    Not on file   Social Needs    Financial resource strain: Not on file    Food insecurity:     Worry: Not on file     Inability: Not on file    Transportation needs:     Medical: Not on file     Non-medical: Not on file   Tobacco Use    Smoking status: Never Smoker    Smokeless tobacco: Never Used   Substance and Sexual Activity    Alcohol use: No    Drug use: No    Sexual activity: Not on file   Lifestyle    Physical activity:     Days per week: Not on file     Minutes per session: Not on file    Stress: Not on file   Relationships    Social connections:     Talks on phone: Not on file     Gets together: Not on file     Attends Church service: Not on file     Active member of club or organization: Not on file     Attends meetings of clubs or organizations: Not on file     Relationship status: Not on file    Intimate partner violence:     Fear of current or ex partner: Not on file     Emotionally abused: Not on file     Physically abused: Not on file     Forced sexual activity: Not on file   Other Topics Concern    Not on file   Social History Narrative    Not on file     History reviewed. No pertinent family history. Current Outpatient Medications   Medication Sig Dispense Refill    HYDROcodone-acetaminophen (NORCO) 5-325 mg per tablet Take 1 Tab by mouth every six (6) hours as needed for Pain for up to 3 days. Max Daily Amount: 4 Tabs. 10 Tab 0    albuterol (PROVENTIL HFA, VENTOLIN HFA, PROAIR HFA) 90 mcg/actuation inhaler Take 2 Puffs by inhalation every four (4) hours as needed for Wheezing. 1 Inhaler 1    diclofenac (VOLTAREN) 1 % gel Apply  to affected area four (4) times daily. USE AS DIRECTED 100 g 1    midodrine (PROAMITINE) 5 mg tablet Take  by mouth three (3) times daily.  zolpidem (AMBIEN) 10 mg tablet Take 0.5 Tabs by mouth nightly as needed for Sleep. Max Daily Amount: 5 mg. 30 Tab 1    gabapentin (NEURONTIN) 300 mg capsule Take 1 Cap by mouth nightly. 90 Cap 1    furosemide (LASIX) 20 mg tablet Take 1 Tab by mouth daily. 30 Tab 0    magnesium oxide (MAG-OX) 400 mg tablet Take 1 Tab by mouth every fourty-eight (48) hours. 10 Tab 0    lidocaine-prilocaine (EMLA) topical cream APPLY OVER PORT 1 HOUR PRIOR TO CHEMOTHERAPY THAN COVER WITH SARAN WRAP 30 g 6    allopurinol (ZYLOPRIM) 100 mg tablet Take 1 Tab by mouth daily. 30 Tab 0    diclofenac (VOLTAREN) 1 % gel Apply 2 g to affected area three (3) times daily as needed for Pain. Gel should be measured and applied using the supplied dosing card. Apply dose (2 gm) to each location. If treatment site is the hands, patients should wait at least one (1) hour to wash their hands. APPLY TO ankle and knees. 100 g 0    ergocalciferol (VITAMIN D2) 50,000 unit capsule Take 1 Cap by mouth every seven (7) days. 12 Cap 1    topiramate (TOPAMAX) 50 mg tablet Take 50 mg by mouth daily.   5    LINZESS 145 mcg cap capsule TAKE 1 CAPSULE BY MOUTH DAILY 30 MINUTES BEFORE BREAKFAST  0    loratadine 10 mg cap Take 10 mg by mouth daily.  digoxin (LANOXIN) 0.125 mg tablet Take 0.125 mg by mouth daily.  rivaroxaban (XARELTO) 10 mg tablet Take 10 mg by mouth daily.  aluminum-magnesium hydroxide 200-200 mg/5 mL susp 5 mL, diphenhydrAMINE 12.5 mg/5 mL liqd 12.5 mg, lidocaine 2 % soln 5 mL 5 mL by Swish and Spit route two (2) times a day. Magic mouth wash   Maalox  Lidocaine 2% viscous   Diphenhydramine oral solution     Pharmacy to mix equal portions of ingredients to a total volume as indicated in the dispense amount. 240 mL 1    senna (SENNA) 8.6 mg tablet Take 1 Tab by mouth daily.  ondansetron hcl (ZOFRAN) 8 mg tablet Take 1 Tab by mouth every eight (8) hours as needed for Nausea. 30 Tab 3    IRON AG&FUM/C/FA/MV CMB11/CA-T (PRUVATE 21-7 PO) Take 325 mg by mouth daily. Indications: iron deficiency         Review of Systems  Constitutional: The patient has no acute distress or discomfort. HEENT: The patient denies recent head trauma, eye pain, blurred vision,  hearing deficit, oropharyngeal mucosal pain or lesions, and the patient denies throat pain or discomfort. Lymphatics: The patient denies palpable peripheral lymphadenopathy. Hematologic: The patient denies having bruising, bleeding, or progressive fatigue. Respiratory: Patient denies having shortness of breath, cough, sputum production, fever, or dyspnea on exertion. Cardiovascular: The patient denies having leg pain, leg swelling, heart palpitations, chest permit, chest pain, or lightheadedness. The patient denies having dyspnea on exertion. Gastrointestinal: The patient denies having nausea, emesis, or diarrhea. The patient denies having any hematemesis or blood in the stool. Genitourinary: Patient denies having urinary urgency, frequency, or dysuria. The patient denies having blood in the urine.   Psychological: The patient denies having symptoms of nervousness, anxiety, depression, or thoughts of harming self. Skin: Patient denies having skin rashes, skin, ulcerations, or unexplained itching or pruritus. Musculoskeletal: The patient used to complain of arthritic discomfort in her joints particularly the knees, hips, and shoulders. The patient reports that she has a large bruise on her right lower extremity. Objective:     Visit Vitals  /79   Pulse (!) 55   Temp 98.6 °F (37 °C) (Oral)   Resp 18   Ht 5' 6\" (1.676 m)   Wt 61.7 kg (136 lb)   SpO2 98%   BMI 21.95 kg/m²     ECOG PS=1, pain score=0/10  Physical Exam:   Gen. Appearance: The patient is in no acute distress. Skin: There is no bruise or rash. HEENT: The exam is unremarkable. Neck: Supple without lymphadenopathy or thyromegaly. Lungs: Clear to auscultation and percussion; there are no wheezes or rhonchi. Heart: Regular rate and rhythm; there are no murmurs, gallops, or rubs. Abdomen: Bowel sounds are present and normal.  There is no guarding, tenderness, or hepatosplenomegaly. Extremities: There is no clubbing, cyanosis, or edema. There is a 6 cm area of bruising on the right anterior lateral calf. There is no evidence of skin breakdown or ulceration. Neurologic: There are no focal neurologic deficits. Lymphatics: There is no palpable peripheral lymphadenopathy. Musculoskeletal: The patient has full range of motion at all joints. There is no evidence of joint deformity or effusions. There is no focal joint tenderness. Psychological/psychiatric: There is no clinical evidence of anxiety, depression, or melancholy. Lab data:      Results for orders placed or performed during the hospital encounter of 05/20/19   CBC WITH 3 PART DIFF     Status: Abnormal   Result Value Ref Range Status    WBC 8.7 4.5 - 13.0 K/uL Final    RBC 4.39 4. 10 - 5.10 M/uL Final    HGB 11.6 (L) 12.0 - 16 g/dL Final    HCT 36.0 36 - 48 % Final    MCV 82.0 78 - 102 FL Final    MCH 26.4 25.0 - 35.0 PG Final    MCHC 32.2 31 - 37 g/dL Final RDW 15.7 (H) 11.5 - 14.5 % Final    NEUTROPHILS 70 40 - 70 % Final    MIXED CELLS 14 0.1 - 17 % Final    LYMPHOCYTES 17 14 - 44 % Final    ABS. NEUTROPHILS 6.1 1.8 - 9.5 K/UL Final    ABS. MIXED CELLS 1.2 0.0 - 2.3 K/uL Final    ABS. LYMPHOCYTES 1.4 1.1 - 5.9 K/UL Final     Comment: Test performed at [address] [place] [address] or Outpatient [facility]fusion [facility] Location. Reviewed by Medical Director. DF AUTOMATED   Final      I have explained to the patient that the preliminary platelet count today is 120,000      Assessment:     1. Anemia, unspecified type          Plan:   Chronic ITP/thrombocytopenia (progression of disease): I have informed the patient that her CBC today shows that her preliminary platelet count is 112,696. At this time I am recommending we continue the n-Plate at a dose of 2 mcg/kg subcutaneous weekly. At this time I will recheck her platelet level will plan to see her back in clinic in about 8 weeks. Vitamin D deficiency: I will recheck her vitamin D levels at this time. If the vitamin D level is low she will be offered vitamin D 50,000 units weekly for 12 weeks. Iron deficiency anemia/chronic anemia: The current CBC shows a WBC count 8.7, hemoglobin is 11.6 g/dL, hematocrit is 36%, and the platelet count is 699,903. I have recommended that the patient continue taking the ferrous sulfate 1 tablet twice daily. At this time I will recheck her iron profile and ferritin levels. Bilateral lower extremity edema: The leg edema has significantly improved. Metastatic breast cancer, left breast metastasized to lymph nodes and other sites: At this time I will check a CA-27-29 level. The most recent CA-35-27 level was normal.  Clinically the patient has no evidence of disease progression. .    Cachexia, weakness, and muscular deconditioning: I am recommending that she continue physical therapy 4 times weekly.   She will continue to use a walker or cane as needed for mobility support. However today she is ambulating without the use of a cane or walker. Primary insomnia: The patient has requested a different medication for her insomnia. Therefore I will change her to Sonata 5 mg at bedtime. A 30-day supply with 3 refills was provided. Chronic pain/neuropathy/spondylolisthesis of the lumbar region/neuropathy: I have instructed the patient to begin using the Neurontin 300 mg 3 times daily. Right leg pain (new problem): I will send her to the orthopedic surgeon for an assessment since she has previously seen him. I am concerned that this may be tendinitis/Achilles tendinitis. I will see her back in clinic for a complete assessment again in 12 weeks. Orders Placed This Encounter    COMPLETE CBC & AUTO DIFF WBC    InHouse CBC ([place])     Standing Status:   Future     Number of Occurrences:   1     Standing Expiration Date:   5/27/2019       Estefanía Leon MD  11/30/2018      Please note: This document has been produced using voice recognition software. Unrecognized errors in transcription may be present.

## 2019-05-21 NOTE — TELEPHONE ENCOUNTER
Patient was seen 05/20/2019 prescribed Zaleplon 5 mg. [facility] only has 10 mg so needs new prescription with 10 mg.

## 2019-05-24 NOTE — PROGRESS NOTES
[facility] Progress Note Date: May 24, 2019 Name: Ward Larson MRN: [id number] : 1950 Nplate/Port Flush Ms. Susanna Orourke arrived to [facility] at 8039 ambulatory. Generalized pain and joint pain 8/10. Took meds prior opic admit. Positioned for comfort and reassured. Ms. Susanna Orourke was assessed and education was provided. Ms. Crystal Bills vitals were reviewed. Visit Vitals /67 (BP 1 Location: Right arm, BP Patient Position: Post activity) Pulse 78 Temp 97.7 °F (36.5 °C) Resp 18 Pt was observed for 5 minutes after obtaining vital signs prior to initiating treatment. Lab results obtained and reviewed from 19. Platelet count= 504. Per Nplate protocol, pt's dose will remain at  207 mcg= 3mcg/kg Nplate 610QAV (7.32BD) administered as ordered SQ in patient's right upper arm. No redness or bleeding noted. Ms. Susanna Orourke was discharged from [place] [address] in stable condition at [address] She is to return on 19 at 0800 for her next appointment. Zee Echeverria RN May 24, 2019  
0139

## 2019-05-31 NOTE — PROGRESS NOTES
[facility] Progress Note    Date: May 31, 2019    Name: Jerica Gómez    MRN: [id number]         : 1950      Nplate/Port Flush     Ms. Griffiths arrived to [facility] at 65 ambulatory. Generalized pain and joint pain /10. Took meds prior opic admit. Positioned for comfort and reassured. Ms. Griffiths was assessed and education was provided. Ms. Sk[place] High vitals were reviewed. Visit Vitals  BP 91/59 (BP 1 Location: Right arm, BP Patient Position: Post activity)   Pulse 73   Temp 97.9 °F (36.6 °C)   Resp 18   Breastfeeding? No     Patient due for port flush today. Patient declined to have port flush done. States she forgot to put on her numbing cream. She will have port flush done next Friday. Pt was observed for 5 minutes after obtaining vital signs prior to initiating treatment. Lab results obtained and reviewed from 19. Platelet count= 866. Per Nplate protocol, pt's dose will remain at  207 mcg= 3mcg/kg    Nplate 613UZJ (9.55VL) administered as ordered SQ in patient's right upper arm. No redness or bleeding noted. Ms. Griffiths was discharged from [place] [address] in stable condition at [address] [place] [address]. She is to return on 19 at 0800 for her next appointment.     Susanna Fisher RN  May 31, 2019   0968

## 2019-06-07 NOTE — PROGRESS NOTES
[facility] Progress Note Date: 2019 Name: Laurita Fuchs MRN: [id number] : 1950 Nplate/Port Flush Ms. Valentina Lyles arrived to [address] [place] [address] at 2907 ambulatory. Generalized pain and joint pain 8/10. Took meds prior opic admit. Positioned for comfort and reassured Ms. Valentina Lyles was assessed and education was provided. Ms. Araceli Wright vitals were reviewed. Visit Vitals BP 98/65 (BP 1 Location: Right arm, BP Patient Position: At rest;Sitting) Pulse 60 Temp 97.4 °F (36.3 °C) Resp 18 Breastfeeding? No  
 
 
 
 
Right chest double lumen mediport accessed with a 20 gauge haas needle using sterile technique. Lateral port accessed. Brisk flush/blood returns noted. Port flushed with 20 ml NS and 500 units of Heparin then de-accessed. Site s signs/symptoms. Medial port accessed, flushed with some difficulty, but brisk blood returns noted. Port was flushed with 20 ml NS and 500 units of Heparin then de-accessed. Site s signs/symptoms. Gauze and Tegaderm applied to the site. NP Janet Farfan made aware for difficulty with medial lumen and hard hard it is to flush, will order a port study. CBC done on 2019 reviewed. Preliminary platelet TVBIY=634 K. Per Nplate protocol, since pt's platelet count is 543,271 today, pt's dose will remain at  207 mcg= 3mcg/kg Nplate 773INB (8.29WJ) administered as ordered SQ in patient's right upper arm. No redness or bleeding noted. Ms. Valentina Lyles was discharged from [place] [address] in stable condition at [place] [address] [facility]. She is to return on 19 at 0900 for her next appointment. Daphne Robin RN 
2019  
[address] [facility]

## 2019-06-14 NOTE — PROGRESS NOTES
[facility] Progress Note Date: 2019 Name: Maritza Fink MRN: [id number] : 1950 Nplate Ms. Norah Oliveira arrived to [facility] [place] ambulatory. Generalized pain and joint pain 9/10. Took meds prior opic admit. Positioned for comfort and reassured. Ms. Norah Oliveira was assessed and education was provided. Ms. Orr Postal vitals were reviewed. Visit Vitals /72 (BP 1 Location: Right arm, BP Patient Position: Sitting) Pulse (!) 55 Temp 97 °F (36.1 °C) Resp 18 CBC done on 2019 reviewed. Preliminary platelet DGKCV=571 K from 19. Per Rosi Blas [facility], monthly CBC is ok. Pt's dose will remain at  207 mcg= 3mcg/kg Nplate 126MEP (2.47ED) administered as ordered SQ in patient's right upper arm. No redness or bleeding noted. Ms. Norah Oliveira was discharged from [place] [address] in stable condition at [place] [address] [facility]. She is to return on 19 at 0900 for her next appointment for CBC and Retacrit. Derek Herbert RN 
2019  
[id number]

## 2019-06-21 NOTE — PROGRESS NOTES
[facility] Progress Note Date: 2019 Name: Noa Ortega MRN: [id number] : 1950 Nplate Ms. Matthew Green arrived to [facility] at [address] ambulatory. Generalized pain and joint pain 8/10. Took meds prior opic admit. Positioned for comfort and reassured. Ms. Matthew Green was assessed and education was provided. Ms. Eulalio Bernal vitals were reviewed. Visit Vitals BP 99/66 (BP 1 Location: Right arm, BP Patient Position: At rest;Sitting) Pulse 85 Temp 97.5 °F (36.4 °C) Resp 18 CBC [id number] to right AC. Preliminary platelet PYBSC=109 K from. Pt's dose will remain at  207 mcg= 3mcg/kg Nplate 281LLC (7.34NE) administered as ordered SQ in patient's right upper arm. No redness or bleeding noted. Ms. Matthew Green was discharged from [place] [address] in stable condition at [address]. She is to return on 19 at 0900 for her next appointment for CBC and Retacrit. Mignon Duckworth RN 
2019  
1232

## 2019-06-28 NOTE — PROGRESS NOTES
[address] [facility] Progress Note Date: 2019 Name: Maritza Fink MRN: [id number] : 1950 Nplate Ms. Norah Oliveira arrived to [facility] at 12 ambulatory. Assessment completed. Ms. Norah Oliveira was assessed and education was provided. Ms. Orr Postal vitals were reviewed. Visit Vitals /66 (BP 1 Location: Right arm, BP Patient Position: Sitting) Pulse 70 Temp 97.7 °F (36.5 °C) Resp 20 SpO2 100% Recent Results (from the past 12 hour(s)) CBC WITH 3 PART DIFF Collection Time: 19 10:19 AM  
Result Value Ref Range WBC 7.9 4.5 - 13.0 K/uL  
 RBC 3.49 (L) 4.10 - 5.10 M/uL HGB 9.2 (L) 12.0 - 16 g/dL HCT 27.7 (L) 36 - 48 % MCV 79.4 78 - 102 FL  
 MCH 26.4 25.0 - 35.0 PG  
 MCHC 33.2 31 - 37 g/dL  
 RDW 17.4 (H) 11.5 - 14.5 % NEUTROPHILS 72 (H) 40 - 70 % MIXED CELLS 9 0.1 - 17 % LYMPHOCYTES 19 14 - 44 % ABS. NEUTROPHILS 5.7 1.8 - 9.5 K/UL  
 ABS. MIXED CELLS 0.7 0.0 - 2.3 K/uL  
 ABS. LYMPHOCYTES 1.5 1.1 - 5.9 K/UL  
 DF AUTOMATED    
 
 
CBC [id number] to right AC. Preliminary platelet UTZDF=678 K Pt's dose will remain at  207 mcg= 3mcg/kg Nplate 472TYX (4.94PV) administered as ordered SQ in patient's right upper arm. No redness or bleeding noted. Ms. Norah Oliveira was discharged from [place] [address] in stable condition at 1035. She is to return on 19 at 0800 for her next appointment for CBC and Retacrit. Anthony Hadley RN 
2019

## 2019-07-05 NOTE — PROGRESS NOTES
[facility] Progress Note Date: 2019 Name: Dennis Burnette MRN: [id number] : 1950 Nplate Ms. Boby Martinez arrived to [facility] at 3829 ambulatory. Assessment completed. Ms. Boby Martinez was assessed and education was provided. Ms. David Brown vitals were reviewed. Visit Vitals /66 (BP 1 Location: Right arm, BP Patient Position: Sitting) Pulse 61 Temp 97.6 °F (36.4 °C) Resp 18 SpO2 98% Breastfeeding? No  
 
Per [facility], no labs needed today. Next CBC due 19. Preliminary platelet EYGWG=967 K om 19. Pt's dose will remain at  207 mcg= 3mcg/kg Nplate 518GMN (3.46KD) administered as ordered SQ in patient's right upper arm. No redness or bleeding noted. Ms. Boby Martinez was discharged from [place] [address] in stable condition at 5121. She is to return on 19 at 0800 for her next appointment for Retacrit. Tia Quintanilla RN 
2019

## 2019-07-12 NOTE — PROGRESS NOTES
[facility] Progress Note Date: 2019 Name: Geovanny Coleman MRN: [id number] : 1950 Nplate/Port Flush Ms. Angella Silva arrived to [place] at [address] ambulatory. Generalized pain and joint pain 7/10. Took meds prior opic admit. Positioned for comfort and reassured Ms. Angella Silva was assessed and education was provided. Ms. Bouchra Whitt vitals were reviewed. Visit Vitals BP 98/65 (BP 1 Location: Right arm, BP Patient Position: Post activity) Pulse 64 Temp 97.7 °F (36.5 °C) Resp 18 Pt was observed for 5 minutes after obtaining vital signs prior to initiating treatment. Right chest double lumen mediport accessed with a 20 gauge haas needle using sterile technique. Lateral port accessed. Brisk flush/blood returns noted . Port flushed with 20 ml NS and 500 units of Heparin then de-accessed. Site s signs/symptoms. Gauze/tegaderm applied   
   
medial port accessed, flushed with much difficulty and without blood returns. Port was flushed with 10 ml NS and 500 units of Heparin then de-accessed. Site s signs/symptoms. Gauze and Tegaderm applied to the site. Patient states she had port check done, and was told that she will require a new port. She stated she will discuss same with Dr Amanda Deleon when she see him for her office visit. Lab results obtained and reviewed. Done on 19 Preliminary platelet count= 438W. Per Nplate protocol, pt's dose will remain at  207 mcg= 3mcg/kg Nplate 780ICA (2.38HK) administered as ordered SQ in patient's right upper arm. No redness or bleeding noted. Ms. Angella Silva was discharged from [place] [address] in stable condition at [address] [place] [address]. She is to return on 19 at 0800 for her next appointment. Jose Medel RN 
2019

## 2019-07-22 NOTE — PROGRESS NOTES
[facility] Progress Note    Date: 2019    Name: Charmian Duane    MRN: [id number]         : 1950      Ms. Albertina Epps arrived in the [facility] today at 1150, in stable condition, here for Weekly Nplate Injection. She was assessed and education was provided. Ms. Dee Mock vitals were reviewed. Visit Vitals  BP 96/60 (BP 1 Location: Left arm, BP Patient Position: Sitting)   Pulse 99   Temp 97.6 °F (36.4 °C)   Resp 16   SpO2 100%         Her last recorded CBC result was noted to be from 19 (199,000). New CBC due today. Blood for a CBC was drawn from her right AC at 1203, per order, and without incident. Lab results were obtained and reviewed, and the preliminary Platelet Count was noted to be 213,000. Recent Results (from the past 12 hour(s))   CBC WITH 3 PART DIFF    Collection Time: 19 12:03 PM   Result Value Ref Range    WBC 6.0 4.5 - 13.0 K/uL    RBC 3.76 (L) 4.10 - 5.10 M/uL    HGB 10.2 (L) 12.0 - 16 g/dL    HCT 31.2 (L) 36 - 48 %    MCV 83.0 78 - 102 FL    MCH 27.1 25.0 - 35.0 PG    MCHC 32.7 31 - 37 g/dL    RDW 18.1 (H) 11.5 - 14.5 %    NEUTROPHILS 59 40 - 70 %    MIXED CELLS 20 (H) 0.1 - 17 %    LYMPHOCYTES 21 14 - 44 %    ABS. NEUTROPHILS 3.5 1.8 - 9.5 K/UL    ABS. MIXED CELLS 1.2 0.0 - 2.3 K/uL    ABS. LYMPHOCYTES 1.3 1.1 - 5.9 K/UL    DF AUTOMATED           Initial Dosing Weight = 69 kg. Last Dose Nplate given on Friday, 19, was 207 mcg (3 mcg/kg)    Platelet count from today, reviewed with Pharmacy, and same dose Nplate to continue today. Nplate 756 mcg (3 mcg/kg) (0.41 ml), was administered SQ, in her right arm at 1225, per order, and without incident. Ms. Albertina Epps tolerated well, and had no complaints. Ms. Albertina Epps was discharged from [place] [address] in stable condition at 1230. Corby Madison She is to return on Friday, 19, for her next appointment, for her next  CBC/Nplate Injection.  (She requested )    Gerhard Cleaning RN  , 2019  12:13 PM

## 2019-07-26 NOTE — PROGRESS NOTES
Received lynlg from Leyda Hallman at [facility] patient called her about prescription. She stated talked to Hillary Peoples requesting. I left voice message letting patient know prescription was submitted to NP and takes 24-48 hours would not be ready until Monday.

## 2019-07-31 NOTE — PROCEDURES
Interventional Radiology Brief Procedure Note    Patient: Katrin Huerta MRN: [id number]  SSN: [id number]    YOB: 1950  Age: 81 y.o. Sex: female      Date of Procedure: 7/31/2019    Procedure(s): Mediport removal    Performed By: JESSICA Ortiz     none    Anesthesia: Lidocaine and Moderate Sedation    Estimated Blood Loss: Less than 10ml    Specimens: None    Findings:     - Written and verbal informed consent was obtained. - Time Out was performed. - Permanent image storage performed on PACS. - Image-guided right chest dual-lumen Mediport removal performed using maximum barrier precautions and sterile technique. - Please refer to report on PACS for full details. Follow Up: To be seen in follow-up with IR in 7-10 days for Mediport removal site check.      Signed By: JESSICA Ortiz     July 31, 2019

## 2019-07-31 NOTE — DISCHARGE INSTRUCTIONS
[facility] DRAIN/PORT/CATHETER REMOVAL  DISCHARGE INSTRUCTIONS    General Information:     Your doctor has ordered for us to remove your drain, port, or catheter. This could be that you do not need it anymore, it is not doing its job, your physician has decided on another plan for your treatment and/or it may need replacing. Home Care Instructions: You can resume your regular diet and medication regimen. Do not drink alcohol, drive, or make any important legal decisions in the next 24 hours. Do not lift anything heavier than a gallon of milk, or do anything strenuous for the next 24 hours. You will notice a dressing over the site of the removal. This dressing should stay in place until the site is healed. The dressing should be changed at least every 48 hours. You should change the dressing sooner if it becomes soiled or wet. The nurse who discharges you to home should review with you any wound care instructions. Resume your normal level of activity slowly. You may shower after 24 hours, but do not take a bath, swim or immerse yourself in water until the site has healed, and keep the dressing dry with plastic wrap while showering. The site may ooze for a couple of days. This drainage should lessen with each passing day. Call If:     You should call your Physician and/or the Radiology Nurse if you have any bleeding other than a small spot on your bandage. Call if you have any signs of infection, fever, or increased pain at the site of the tube. Call if the oozing increases, if it changes color, or begins to have an odor. Follow-Up Instructions: Please see your ordering doctor as he/she has requested.        DISCHARGE SUMMARY from Nurse    PATIENT INSTRUCTIONS:    After general anesthesia or intravenous sedation, for 24 hours or while taking prescription Narcotics:  · Limit your activities  · Do not drive and operate hazardous machinery  · Do not make important personal or business decisions  · Do  not drink alcoholic beverages  · If you have not urinated within 8 hours after discharge, please contact your surgeon on call. Report the following to your surgeon:  · Excessive pain, swelling, redness or odor of or around the surgical area  · Temperature over 100.5  · Nausea and vomiting lasting longer than 4 hours or if unable to take medications  · Any signs of decreased circulation or nerve impairment to extremity: change in color, persistent  numbness, tingling, coldness or increase pain  · Any questions    What to do at Home:    *  Please give a list of your current medications to your Primary Care Provider. *  Please update this list whenever your medications are discontinued, doses are      changed, or new medications (including over-the-counter products) are added. *  Please carry medication information at all times in case of emergency situations. These are general instructions for a healthy lifestyle:    No smoking/ No tobacco products/ Avoid exposure to second hand smoke  Surgeon General's Warning:  Quitting smoking now greatly reduces serious risk to your health. Obesity, smoking, and sedentary lifestyle greatly increases your risk for illness    A healthy diet, regular physical exercise & weight monitoring are important for maintaining a healthy lifestyle    You may be retaining fluid if you have a history of heart failure or if you experience any of the following symptoms:  Weight gain of 3 pounds or more overnight or 5 pounds in a week, increased swelling in our hands or feet or shortness of breath while lying flat in bed. Please call your doctor as soon as you notice any of these symptoms; do not wait until your next office visit. The discharge information has been reviewed with the patient and caregiver. The patient and caregiver verbalized understanding.   Discharge medications reviewed with the patient and caregiver and appropriate educational materials and side effects teaching were provided.   ___________________________________________________________________________________________________________________________________    To Reach Us:        Patient Signature:  Date: 7/31/2019  Discharging Nurse: Nedra Kaplan RN

## 2019-07-31 NOTE — H&P
OUTPATIENT HISTORY AND PHYSICAL      Today 7/31/2019     Indication/Symptoms:   Keyla Mercer is a 81 y.o. female with a history of right breast cancer s/p chemotherapy via mediport who presents for an image-guided mediport removal. Patient has been NPO since midnight and last took Xarelto on Monday. Current Meds:    Prior to Admission medications    Medication Sig Start Date End Date Taking? Authorizing Provider   oxyCODONE-acetaminophen (PERCOCET 10)  mg per tablet Take 1 Tab by mouth every six (6) hours as needed for Pain for up to 30 days. Max Daily Amount: 4 Tabs. 7/29/19 8/28/19 Yes Yenni Grace NP   ergocalciferol (ERGOCALCIFEROL) 50,000 unit capsule Take 1 Cap by mouth every seven (7) days. 5/23/19  Yes Rahat Grace NP   zaleplon (SONATA) 5 mg capsule Take 1 Cap by mouth nightly as needed for Sleep. Max Daily Amount: 5 mg. 5/20/19  Yes Yovany Hollingsworth MD   midodrine (PROAMITINE) 5 mg tablet Take  by mouth three (3) times daily. Yes Provider, Historical   zolpidem (AMBIEN) 10 mg tablet Take 0.5 Tabs by mouth nightly as needed for Sleep. Max Daily Amount: 5 mg. 2/20/19  Yes Yovany Hollingsworth MD   gabapentin (NEURONTIN) 300 mg capsule Take 1 Cap by mouth nightly. 1/13/19  Yes Baldev Valencia MD   furosemide (LASIX) 20 mg tablet Take 1 Tab by mouth daily. 1/13/19  Yes Baldev Valencia MD   magnesium oxide (MAG-OX) 400 mg tablet Take 1 Tab by mouth every fourty-eight (48) hours. 1/13/19  Yes Baldev Valencia MD   lidocaine-prilocaine (EMLA) topical cream APPLY OVER PORT 1 HOUR PRIOR TO CHEMOTHERAPY THAN COVER WITH SARAN WRAP 1/2/19  Yes Luzmaria GAN NP   ergocalciferol (VITAMIN D2) 50,000 unit capsule Take 1 Cap by mouth every seven (7) days. 3/19/18  Yes Yovany Hollingsworth MD   topiramate (TOPAMAX) 50 mg tablet Take 50 mg by mouth daily.  2/15/18  Yes Provider, Historical   LINZESS 145 mcg cap capsule TAKE 1 CAPSULE BY MOUTH DAILY 30 MINUTES BEFORE BREAKFAST 2/16/18  Yes Provider, Historical   loratadine 10 mg cap Take 10 mg by mouth daily. Yes Provider, Historical   senna (SENNA) 8.6 mg tablet Take 1 Tab by mouth daily. Yes Provider, Historical   ondansetron hcl (ZOFRAN) 8 mg tablet Take 1 Tab by mouth every eight (8) hours as needed for Nausea. 6/3/13  Yes Nova Garcia MD   albuterol (PROVENTIL HFA, VENTOLIN HFA, PROAIR HFA) 90 mcg/actuation inhaler Take 2 Puffs by inhalation every four (4) hours as needed for Wheezing. 4/19/19   Nicky GAN NP   diclofenac (VOLTAREN) 1 % gel Apply  to affected area four (4) times daily. USE AS DIRECTED 3/13/19   Nafisa Gonzales PA-C   diclofenac (VOLTAREN) 1 % gel Apply 2 g to affected area three (3) times daily as needed for Pain. Gel should be measured and applied using the supplied dosing card. Apply dose (2 gm) to each location. If treatment site is the hands, patients should wait at least one (1) hour to wash their hands. APPLY TO ankle and knees. 11/9/18   Dallas Garsia MD   digoxin (LANOXIN) 0.125 mg tablet Take 0.125 mg by mouth daily. Provider, Historical   rivaroxaban (XARELTO) 10 mg tablet Take 10 mg by mouth daily. Provider, Historical   aluminum-magnesium hydroxide 200-200 mg/5 mL susp 5 mL, diphenhydrAMINE 12.5 mg/5 mL liqd 12.5 mg, lidocaine 2 % soln 5 mL 5 mL by Swish and Spit route two (2) times a day. Magic mouth wash   Maalox  Lidocaine 2% viscous   Diphenhydramine oral solution     Pharmacy to mix equal portions of ingredients to a total volume as indicated in the dispense amount. 2/29/16   Vicenta Self NP   IRON AG&FUM/C/FA/MV CMB11/CA-T (PRUVATE 21-7 PO) Take 325 mg by mouth daily. Indications: iron deficiency    Provider, Historical       Allergies: Allergies   Allergen Reactions    Aspirin Swelling     Pt states her whole body swells when she takes aspirin.     Adhesive Unable to Obtain    Nickel Rash    Pcn [Penicillins] Rash       Comorbid Conditions:    Past Medical History:   Diagnosis Date  Antineoplastic chemotherapy induced anemia     Arrhythmia     Atrial Fib    Arthritis     Breast cancer (HCC)     Cancer (HCC) 1999    Breast    Cardiomyopathy [facility] [address]     Chronic ITP (idiopathic thrombocytopenia) (HCC) 10/31/2016    Secondary to Anemia from Chemo    Congestive heart failure (HCC)     Diabetes ([facility] [address]     borderline, no meds    Esophageal cancer ([facility] [address] 2005    treated with chemo    Heart failure ([facility] [address]     SOB (shortness of breath)           Past Surgical History:   Procedure Laterality Date    BREAST SURGERY PROCEDURE UNLISTED Left 1990s    lumpectomy    COLONOSCOPY N/A 7/27/2016    COLONOSCOPY performed by Gerard Hager [facility] at [facility] ENDOSCOPY    HX APPENDECTOMY      HX HEENT      Tonsillectomy    HX ORTHOPAEDIC      HX TUBAL LIGATION      HX VASCULAR ACCESS      NEUROLOGICAL PROCEDURE UNLISTED  01/08/2018    Lumbar fusion     Data:    Visit Vitals  BP 98/65   Pulse 68   Ht 5' 6\" (1.676 m)   Wt 61.2 kg (135 lb)   SpO2 99%   Breastfeeding? No   BMI 21.79 kg/m²   :  Recent Labs     07/31/19  0917   PLT PENDING     Recent Labs     07/31/19  0917   INR 1.3*   APTT 34.3       The H & P and/or progress notes and any available imaging were reviewed. The risks, indications and possible alternatives to the procedure, including doing nothing, were discussed and informed consent was obtained. Critical result of K+ 2.6 was relayed to Dr. Nora Lizarraga. Will orally replace after procedure and carefully monitor EKG during procedure. Physical Exam:      Mental status:   Alert and oriented. Examination specific to the procedure proposed to be performed and any co morbid conditions:   Mallampati classification 2 ,  ASA 2   Heart:   Regular rate. Lungs:   Normal respiratory effort. Symmetrical rise and fall of chest    The patient is an appropriate candidate to undergo the planned procedure and sedation.     Norm Slade

## 2019-07-31 NOTE — PROGRESS NOTES
Cath holding summary    Patient escorted to cath holding from waiting area ambulatory, alert and oriented x 4, voicing no complaints. Changed into gown and placed on monitor. NPO since MN. Lab results, med rec and H&P reviewed on chart. PIV x 1 inserted without difficulty. Family to bedside. 1100 patient arrived to cath holding awake and alert, vital signs stable, right upper chest site clean dry and intact with no hematoma present no C/O pain will continue to monitor.      1250 patient discharged home with family, vital signs stable, right upper chest site clean dry and intact with no hematoma present no C/O pain

## 2019-07-31 NOTE — PROGRESS NOTES
TRANSFER - OUT REPORT:    Verbal report given to GLORIA COLLAZO RN(name) on Aayush Newby  being transferred to [facility](unit) for routine post - op       Report consisted of patients Situation, Background, Assessment and   Recommendations(SBAR). Information from the following report(s) SBAR, Kardex, Procedure Summary, Intake/Output and MAR was reviewed with the receiving nurse. Lines:   Venous Access Device Mediport Upper chest (subclavicular area, right (Active)        Opportunity for questions and clarification was provided.       Patient transported with:   [employer]

## 2019-07-31 NOTE — PROGRESS NOTES
Received call from lab. Pt potassium 2.6. Brenna Bacon, [address] Eric[address] Martel notified. No new orders received at this time.

## 2019-08-02 NOTE — PROGRESS NOTES
[address] [facility] Progress Note Date: 2019 Name: Jude Kilpatrick MRN: [id number] : 1950 Nplate Ms. Yesica Alvarado arrived to [facility] at 0915, ambulatory w/ cane. Ms. Yesica Alvarado was assessed and education was provided. Ms. Aidan Awan vitals were reviewed. Visit Vitals /69 (BP 1 Location: Right arm, BP Patient Position: Sitting) Pulse 68 Temp 97.7 °F (36.5 °C) Resp 16 Breastfeeding? No  
 
 
Pt was observed for 5 minutes after obtaining vital signs prior to initiating treatment. CBC x1 attempt to right AC. Per N-plate dosing protocol and orders, pt's dose will be decreased today from 3mcg/kg to 2mcg/kg due to platelet count >[id number] for 2 consecutive weeks. Nplate 606CTD (9.14DW) administered as ordered SQ in patient's right upper arm. No redness or bleeding noted. Ms. Yesica Alvarado was discharged from [place] [address] in stable condition at 1010. She is to return to [facility] on 19 at 0930 for her next appointment. Roxane Robertson, MARLENE 2019

## 2019-08-09 NOTE — PROGRESS NOTES
[facility] Progress Note Date: 2019 Name: Maritza Fink MRN: [id number] : 1950 Nplate Ms. Norah Oliveira arrived to [facility] at 0830, ambulatory w/ cane. Ms. Norah Oliveira was assessed and education was provided. Ms. Orr Postal vitals were reviewed. Visit Vitals BP 93/60 (BP 1 Location: Right arm, BP Patient Position: Sitting) Pulse 69 Temp 97.5 °F (36.4 °C) Resp 16 Pt was observed for 5 minutes after obtaining vital signs prior to initiating treatment. CBC x1 attempt to right AC. Recent Results (from the past 12 hour(s)) CBC WITH 3 PART DIFF Collection Time: 19  8:40 AM  
Result Value Ref Range WBC 6.0 4.5 - 13.0 K/uL  
 RBC 3.55 (L) 4.10 - 5.10 M/uL HGB 9.5 (L) 12.0 - 16 g/dL HCT 28.9 (L) 36 - 48 % MCV 81.4 78 - 102 FL  
 MCH 26.8 25.0 - 35.0 PG  
 MCHC 32.9 31 - 37 g/dL  
 RDW 17.8 (H) 11.5 - 14.5 % NEUTROPHILS 56 40 - 70 % MIXED CELLS 19 (H) 0.1 - 17 % LYMPHOCYTES 25 14 - 44 % ABS. NEUTROPHILS 3.4 1.8 - 9.5 K/UL  
 ABS. MIXED CELLS 1.1 0.0 - 2.3 K/uL  
 ABS. LYMPHOCYTES 1.5 1.1 - 5.9 K/UL  
 DF AUTOMATED Preliminary platelet count 617. Per N-plate dosing protocol and orders, pt's dose will be maintained at the current dose of 2mcg/kg. Nplate 223YYA (8.54PC) administered as ordered SQ in patient's right upper arm. No redness or bleeding noted. Ms. Norah Oliveira was discharged from [place] [address] in stable condition at 0900. She is to return to [facility] on 19 at 0930 for her next appointment (pt is going out of town). Amna Gipson RN 2019

## 2019-08-19 NOTE — PROGRESS NOTES
[facility] Progress Note    Date: 2019    Name: Caprice Jackson    MRN: [id number]         : 1950      Nplate    Ms. Aster Castro arrived to [facility] at [id number], ambulatory w/ cane. General pain 10/10. States she did not take pain medication today, and will take on return home. Ms. Aster Castro was assessed and education was provided. Ms. Roosevelt Valle vitals were reviewed. Visit Vitals  BP 93/70 (BP 1 Location: Right arm, BP Patient Position: Sitting)   Pulse 91   Temp 97.7 °F (36.5 °C)   Resp 18       Pt was observed for 5 minutes after obtaining vital signs prior to initiating treatment. CBC x1 attempt to right AC. Recent Results (from the past 12 hour(s))   CBC WITH 3 PART DIFF    Collection Time: 19  9:15 AM   Result Value Ref Range    WBC 5.5 4.5 - 13.0 K/uL    RBC 3.40 (L) 4.10 - 5.10 M/uL    HGB 9.2 (L) 12.0 - 16 g/dL    HCT 28.2 (L) 36 - 48 %    MCV 82.9 78 - 102 FL    MCH 27.1 25.0 - 35.0 PG    MCHC 32.6 31 - 37 g/dL    RDW 18.4 (H) 11.5 - 14.5 %    NEUTROPHILS 55 40 - 70 %    MIXED CELLS 15 0.1 - 17 %    LYMPHOCYTES 30 14 - 44 %    ABS. NEUTROPHILS 3.0 1.8 - 9.5 K/UL    ABS. MIXED CELLS 0.8 0.0 - 2.3 K/uL    ABS. LYMPHOCYTES 1.7 1.1 - 5.9 K/UL    DF AUTOMATED         Preliminary platelet count 97    Per N-plate dosing protocol and orders, pt's dose will be maintained at the current dose of 2mcg/kg. Nplate 151BRF (6.79CB) administered as ordered SQ in patient's right upper arm. No redness or bleeding noted. Declined bandaid.      Ms. Aster Castro was discharged from [place] [address] in stable condition at 36 She is to return to [facility] on 19 at 0800 for her next appointment (per patient request)  Michele Peraza RN  2019

## 2019-08-26 NOTE — PROGRESS NOTES
Hematology/Oncology  Progress Note    Name: Arthalee Lines  Date: 2019  : 1950    PCP: Ricardo Somers MD     Ms. Geovanna Rodriguez is a 81 y.o. female who was seen for management of her slowly progressive ITP and metastatic breast cancer with associated iron deficiency anemia. Current therapy: Active surveillance regarding breast cancer: N-plate as needed as a primary treatment for ITP  Subjective:     Ms. Geovanna Rodriguez is a 70-year-old -American woman with history of metastatic breast cancer. The patient reports that she has been doing reasonably well. She has gained about 30 pounds weight over the past few months, due to the steroid that she has to take for her ITP. She is now actively trying to lose some weight. She denies having any unexplained bruising or bleeding. She continues to have arthritic discomfort but she is ambulating without the use of a cane or walker. The patient informed me that she is currently seeing a spine specialist and may need to get steroid injections or subdural injection to control the pain. She is continuing to take the n-plate as the primary treatment modality for her ITP. The patient states that she has noticed that her platelet count rebounded over the past several weeks. Today, the patient informed me that she took a road trip from [place] to [place]. She did have some significant edema/leg swelling after she returned. Her edema is slowly improving back to the baseline. Today she is requesting a new prescription for her Ambien and Percocet. Past medical history, family history, and social history: these were reviewed and remains unchanged.     Past Medical History:   Diagnosis Date    Antineoplastic chemotherapy induced anemia     Arrhythmia     Atrial Fib    Arthritis     Breast cancer (HCC)     Cancer (HCC)     Breast    Cardiomyopathy [facility] [address]     Chronic ITP (idiopathic thrombocytopenia) (HCC) 10/31/2016    Secondary to Anemia from Chemo    Congestive heart failure (HCC)     Diabetes ([facility] [address]     borderline, no meds    Esophageal cancer ([facility] [address] 2005    treated with chemo    Heart failure (HCC)     SOB (shortness of breath)      Past Surgical History:   Procedure Laterality Date    BREAST SURGERY PROCEDURE UNLISTED Left 1990s    lumpectomy    COLONOSCOPY N/A 7/27/2016    COL[facility] performed by [facility] at [facility] ENDOSCOPY    HX APPENDECTOMY      HX HEENT      Tonsillectomy    HX ORTHOPAEDIC      HX TUBAL LIGATION      HX VASCULAR ACCESS      IR REMOVE TUNL CVAD W PORT/PUMP  7/31/2019    NEUROLOGICAL PROCEDURE UNLISTED  01/08/2018    Lumbar fusion     Social History     Socioeconomic History    Marital status:      Spouse name: Not on file    Number of children: Not on file    Years of education: Not on file    Highest education level: Not on file   Occupational History    Not on file   Social Needs    Financial resource strain: Not on file    Food insecurity:     Worry: Not on file     Inability: Not on file    Transportation needs:     Medical: Not on file     Non-medical: Not on file   Tobacco Use    Smoking status: Never Smoker    Smokeless tobacco: Never Used   Substance and Sexual Activity    Alcohol use: No    Drug use: No    Sexual activity: Not on file   Lifestyle    Physical activity:     Days per week: Not on file     Minutes per session: Not on file    Stress: Not on file   Relationships    Social connections:     Talks on phone: Not on file     Gets together: Not on file     Attends [employer] service: Not on file     Active member of club or organization: Not on file     Attends meetings of clubs or organizations: Not on file     Relationship status: Not on file    Intimate partner violence:     Fear of current or ex partner: Not on file     Emotionally abused: Not on file     Physically abused: Not on file     Forced sexual activity: Not on file   Other Topics Concern    Not on file   Social History Narrative  Not on file     History reviewed. No pertinent family history. Current Outpatient Medications   Medication Sig Dispense Refill    oxyCODONE-acetaminophen (PERCOCET 10)  mg per tablet Take 1 Tab by mouth every six (6) hours as needed for Pain for up to 30 days. Max Daily Amount: 4 Tabs. 120 Tab 0    zolpidem (AMBIEN) 10 mg tablet Take 1 Tab by mouth nightly as needed for Sleep. Max Daily Amount: 10 mg. 30 Tab 3    ergocalciferol (ERGOCALCIFEROL) 50,000 unit capsule Take 1 Cap by mouth every seven (7) days. 12 Cap 0    zaleplon (SONATA) 5 mg capsule Take 1 Cap by mouth nightly as needed for Sleep. Max Daily Amount: 5 mg. 30 Cap 3    albuterol (PROVENTIL HFA, VENTOLIN HFA, PROAIR HFA) 90 mcg/actuation inhaler Take 2 Puffs by inhalation every four (4) hours as needed for Wheezing. 1 Inhaler 1    diclofenac (VOLTAREN) 1 % gel Apply  to affected area four (4) times daily. USE AS DIRECTED 100 g 1    midodrine (PROAMITINE) 5 mg tablet Take  by mouth three (3) times daily.  zolpidem (AMBIEN) 10 mg tablet Take 0.5 Tabs by mouth nightly as needed for Sleep. Max Daily Amount: 5 mg. 30 Tab 1    gabapentin (NEURONTIN) 300 mg capsule Take 1 Cap by mouth nightly. 90 Cap 1    furosemide (LASIX) 20 mg tablet Take 1 Tab by mouth daily. 30 Tab 0    magnesium oxide (MAG-OX) 400 mg tablet Take 1 Tab by mouth every fourty-eight (48) hours. 10 Tab 0    lidocaine-prilocaine (EMLA) topical cream APPLY OVER PORT 1 HOUR PRIOR TO CHEMOTHERAPY THAN COVER WITH SARAN WRAP 30 g 6    diclofenac (VOLTAREN) 1 % gel Apply 2 g to affected area three (3) times daily as needed for Pain. Gel should be measured and applied using the supplied dosing card. Apply dose (2 gm) to each location. If treatment site is the hands, patients should wait at least one (1) hour to wash their hands. APPLY TO ankle and knees. 100 g 0    ergocalciferol (VITAMIN D2) 50,000 unit capsule Take 1 Cap by mouth every seven (7) days.  12 Cap 1    topiramate (TOPAMAX) 50 mg tablet Take 50 mg by mouth daily. 5    LINZESS 145 mcg cap capsule TAKE 1 CAPSULE BY MOUTH DAILY 30 MINUTES BEFORE BREAKFAST  0    loratadine 10 mg cap Take 10 mg by mouth daily.  digoxin (LANOXIN) 0.125 mg tablet Take 0.125 mg by mouth daily.  rivaroxaban (XARELTO) 10 mg tablet Take 10 mg by mouth daily.  aluminum-magnesium hydroxide 200-200 mg/5 mL susp 5 mL, diphenhydrAMINE 12.5 mg/5 mL liqd 12.5 mg, lidocaine 2 % soln 5 mL 5 mL by Swish and Spit route two (2) times a day. Magic mouth wash   Maalox  Lidocaine 2% viscous   Diphenhydramine oral solution     Pharmacy to mix equal portions of ingredients to a total volume as indicated in the dispense amount. 240 mL 1    senna (SENNA) 8.6 mg tablet Take 1 Tab by mouth daily.  ondansetron hcl (ZOFRAN) 8 mg tablet Take 1 Tab by mouth every eight (8) hours as needed for Nausea. 30 Tab 3    IRON AG&FUM/C/FA/MV CMB11/CA-T (PRUVATE 21-7 PO) Take 325 mg by mouth daily. Indications: iron deficiency         Review of Systems  Constitutional: The patient has no acute distress or discomfort. HEENT: The patient denies recent head trauma, eye pain, blurred vision,  hearing deficit, oropharyngeal mucosal pain or lesions, and the patient denies throat pain or discomfort. Lymphatics: The patient denies palpable peripheral lymphadenopathy. Hematologic: The patient denies having bruising, bleeding, or progressive fatigue. Respiratory: Patient denies having shortness of breath, cough, sputum production, fever, or dyspnea on exertion. Cardiovascular: The patient denies having leg pain, leg swelling, heart palpitations, chest permit, chest pain, or lightheadedness. The patient denies having dyspnea on exertion. Gastrointestinal: The patient denies having nausea, emesis, or diarrhea. The patient denies having any hematemesis or blood in the stool. Genitourinary: Patient denies having urinary urgency, frequency, or dysuria.   The patient denies having blood in the urine. Psychological: The patient denies having symptoms of nervousness, anxiety, depression, or thoughts of harming self. Skin: Patient denies having skin rashes, skin, ulcerations, or unexplained itching or pruritus. Musculoskeletal: The patient used to complain of arthritic discomfort in her joints particularly the knees, hips, and shoulders. The patient reports that she has a large bruise on her right lower extremity. Objective:     Visit Vitals  BP 91/62   Pulse 73   Temp 97 °F (36.1 °C) (Oral)   Resp 18   Ht 5' 6\" (1.676 m)   Wt 66.7 kg (147 lb)   BMI 23.73 kg/m²     ECOG PS=1, pain score=0/10  Physical Exam:   Gen. Appearance: The patient is in no acute distress. Skin: There is no bruise or rash. HEENT: The exam is unremarkable. Neck: Supple without lymphadenopathy or thyromegaly. Lungs: Clear to auscultation and percussion; there are no wheezes or rhonchi. Heart: Regular rate and rhythm; there are no murmurs, gallops, or rubs. Abdomen: Bowel sounds are present and normal.  There is no guarding, tenderness, or hepatosplenomegaly. Extremities: There is no clubbing, cyanosis, or edema. There is a 6 cm area of bruising on the right anterior lateral calf. There is no evidence of skin breakdown or ulceration. Neurologic: There are no focal neurologic deficits. Lymphatics: There is no palpable peripheral lymphadenopathy. Musculoskeletal: The patient has full range of motion at all joints. There is no evidence of joint deformity or effusions. There is no focal joint tenderness. Psychological/psychiatric: There is no clinical evidence of anxiety, depression, or melancholy.     Lab data:      Results for orders placed or performed during the hospital encounter of 08/19/19   CBC WITH 3 PART DIFF     Status: Abnormal   Result Value Ref Range Status    WBC 5.5 4.5 - 13.0 K/uL Final    RBC 3.40 (L) 4.10 - 5.10 M/uL Final    HGB 9.2 (L) 12.0 - 16 g/dL Final    HCT 28.2 (L) 36 - 48 % Final    MCV 82.9 78 - 102 FL Final    MCH 27.1 25.0 - 35.0 PG Final    MCHC 32.6 31 - 37 g/dL Final    RDW 18.4 (H) 11.5 - 14.5 % Final    NEUTROPHILS 55 40 - 70 % Final    MIXED CELLS 15 0.1 - 17 % Final    LYMPHOCYTES 30 14 - 44 % Final    ABS. NEUTROPHILS 3.0 1.8 - 9.5 K/UL Final    ABS. MIXED CELLS 0.8 0.0 - 2.3 K/uL Final    ABS. LYMPHOCYTES 1.7 1.1 - 5.9 K/UL Final     Comment: Test performed at [address] [place] [address] or Outpatient Infusion Center Location. Reviewed by Medical Director. DF AUTOMATED   Final      I have explained to the patient that the preliminary platelet count today is 120,000      Assessment:     1. Carcinoma of right breast metastatic to axillary lymph node ([address]    2. Cancer associated pain    3. Primary insomnia    4. Vitamin D deficiency    5. Chronic ITP (idiopathic thrombocytopenia) (HCC)    6. Bilateral lower extremity edema    7. Iron deficiency anemia secondary to inadequate dietary iron intake    8. Peripheral polyneuropathy    9. Muscular deconditioning          Plan:   Chronic ITP/thrombocytopenia (progression of disease): I have informed the patient that her CBC from 8/19/2018 showed that her preliminary platelet count is 505,253. At this time I am recommending we continue the n-Plate at a dose of 2 mcg/kg subcutaneous weekly. At this time I will recheck her platelet level will plan to see her back in clinic in about 8 weeks. Vitamin D deficiency: I will recheck her vitamin D levels at this time. If the vitamin D level is low she will be offered vitamin D 50,000 units weekly for 12 weeks. Iron deficiency anemia/chronic anemia: The current recent CBC from 8/19/2019 a WBC count 5.5, the hemoglobin was 9.2 g/dL, hematocrit 28.2%, and the platelet count was 210,850. I have recommended that the patient continue taking the ferrous sulfate 1 tablet twice daily.   At this time I will recheck her iron profile and ferritin levels. Bilateral lower extremity edema: The leg edema has significantly improved. Metastatic breast cancer, left breast metastasized to lymph nodes and other sites: At this time I will check a CA-27-29 level. The most recent CA-35-27 level was normal.  Clinically the patient has no evidence of disease progression. .    Cachexia, weakness, and muscular deconditioning: I am recommending that she continue physical therapy 4 times weekly. She will continue to use a walker or cane as needed for mobility support. However today she is ambulating without the use of a cane or walker. Primary insomnia: The patient has requested to go back on therapy with the Ambien 10 mg tablets. Prescription was provided per her request.    Chronic pain/neuropathy/spondylolisthesis of the lumbar region/neuropathy/right leg pain: I have instructed the patient to begin using the Neurontin 300 mg 3 times daily. I will see her back in clinic for a complete assessment again in 12 weeks. Orders Placed This Encounter    CBC WITH AUTOMATED DIFF     Standing Status:   Future     Standing Expiration Date:   0/89/0387    METABOLIC PANEL, COMPREHENSIVE     Standing Status:   Future     Standing Expiration Date:   8/26/2020    IRON PROFILE     Standing Status:   Future     Standing Expiration Date:   8/26/2020    FERRITIN     Standing Status:   Future     Standing Expiration Date:   8/26/2020    CA 27.29     Standing Status:   Future     Standing Expiration Date:   8/26/2020    VITAMIN D, 25 HYDROXY     Standing Status:   Future     Standing Expiration Date:   8/26/2020    oxyCODONE-acetaminophen (PERCOCET 10)  mg per tablet     Sig: Take 1 Tab by mouth every six (6) hours as needed for Pain for up to 30 days. Max Daily Amount: 4 Tabs. Dispense:  120 Tab     Refill:  0    zolpidem (AMBIEN) 10 mg tablet     Sig: Take 1 Tab by mouth nightly as needed for Sleep. Max Daily Amount: 10 mg.      Dispense:  30 Tab     Refill: [address], MD  8/26/2019      Please note: This document has been produced using voice recognition software. Unrecognized errors in transcription may be present.

## 2019-08-26 NOTE — PATIENT INSTRUCTIONS

## 2019-08-30 NOTE — PROGRESS NOTES
[facility] Progress Note    Date: 2019    Name: Gwen Sumner    MRN: [id number]         : 1950      Nplate    Ms. Douglas Hale arrived to [facility] at [address] [facility], ambulatory w/ cane. General pain 10/10. States she did not take pain medication today, and will take on return home. Ms. Douglas Hale was assessed and education was provided. Ms. Fisher Organ vitals were reviewed. Visit Vitals  /71 (BP 1 Location: Right arm, BP Patient Position: Sitting)   Pulse 96   Temp 98.9 °F (37.2 °C)   Resp 18       Pt was observed for 5 minutes after obtaining vital signs prior to initiating treatment. CBC x1 attempt to right AC. Recent Results (from the past 12 hour(s))   CBC WITH 3 PART DIFF    Collection Time: 19  8:23 AM   Result Value Ref Range    WBC 7.8 4.5 - 13.0 K/uL    RBC 3.58 (L) 4.10 - 5.10 M/uL    HGB 9.8 (L) 12.0 - 16 g/dL    HCT 30.4 (L) 36 - 48 %    MCV 84.9 78 - 102 FL    MCH 27.4 25.0 - 35.0 PG    MCHC 32.2 31 - 37 g/dL    RDW 18.9 (H) 11.5 - 14.5 %    NEUTROPHILS 68 40 - 70 %    MIXED CELLS 16 0.1 - 17 %    LYMPHOCYTES 16 14 - 44 %    ABS. NEUTROPHILS 5.2 1.8 - 9.5 K/UL    ABS. MIXED CELLS 1.3 0.0 - 2.3 K/uL    ABS. LYMPHOCYTES 1.3 1.1 - 5.9 K/UL    DF AUTOMATED             Per N-plate dosing protocol and orders, pt's dose will be maintained at the current dose of 2mcg/kg. Nplate 142OWV (9.39NQ) administered as ordered SQ in patient's right upper arm. No redness or bleeding noted. Declined bandaid. Ms. Douglas Hale was discharged from [place] [address] in stable condition a She is to return to [facility] on 19 at 0830 for her next appointment.   Ashley Botello RN  2019

## 2019-09-02 NOTE — DISCHARGE INSTRUCTIONS
Patient Education        Learning About Relief for Back Pain  What is back tension and strain? Back strain happens when you overstretch, or pull, a muscle in your back. You may hurt your back in an accident or when you exercise or lift something. Most back pain will get better with rest and time. You can take care of yourself at home to help your back heal.  What can you do first to relieve back pain? When you first feel back pain, try these steps:  · Walk. Take a short walk (10 to 20 minutes) on a level surface (no slopes, hills, or stairs) every 2 to 3 hours. Walk only distances you can manage without pain, especially leg pain. · Relax. Find a comfortable position for rest. Some people are comfortable on the floor or a medium-firm bed with a small pillow under their head and another under their knees. Some people prefer to lie on their side with a pillow between their knees. Don't stay in one position for too long. · Try heat or ice. Try using a heating pad on a low or medium setting, or take a warm shower, for 15 to 20 minutes every 2 to 3 hours. Or you can buy single-use heat wraps that last up to 8 hours. You can also try an ice pack for 10 to 15 minutes every 2 to 3 hours. You can use an ice pack or a bag of frozen vegetables wrapped in a thin towel. There is not strong evidence that either heat or ice will help, but you can try them to see if they help. You may also want to try switching between heat and cold. · Take pain medicine exactly as directed. ? If the doctor gave you a prescription medicine for pain, take it as prescribed. ? If you are not taking a prescription pain medicine, ask your doctor if you can take an over-the-counter medicine. What else can you do? · Stretch and exercise. Exercises that increase flexibility may relieve your pain and make it easier for your muscles to keep your spine in a good, neutral position. And don't forget to keep walking. · Do self-massage.  You can use self-massage to unwind after work or school or to energize yourself in the morning. You can easily massage your feet, hands, or neck. Self-massage works best if you are in comfortable clothes and are sitting or lying in a comfortable position. Use oil or lotion to massage bare skin. · Reduce stress. Back pain can lead to a vicious [place]: Distress about the pain tenses the muscles in your back, which in turn causes more pain. Learn how to relax your mind and your muscles to lower your stress. Where can you learn more? Go to http://leon-edisnon.info/. Enter M972 in the search box to learn more about \"Learning About Relief for Back Pain. \"  Current as of: September 20, 2018  Content Version: 12.1  © 9166-3618 [employer]. Care instructions adapted under license by [employer] (which disclaims liability or warranty for this information). If you have questions about a medical condition or this instruction, always ask your healthcare professional. [address] any warranty or liability for your use of this information.

## 2019-09-02 NOTE — ED PROVIDER NOTES
EMERGENCY DEPARTMENT HISTORY AND PHYSICAL EXAM    Date: 9/2/2019  Patient Name: Teresita Mauricio    History of Presenting Illness     Chief Complaint   Patient presents with    Back Pain    Headache    Leg Problem             History Provided By: Patient    Chief Complaint:   Chief Complaint   Patient presents with    Back Pain    Headache    Leg Problem     Duration: 3 Days  Timing:  Worsening  Location: head, neck, right leg  Quality: Aching  Severity: 10 out of 10  Modifying Factors: none  Associated Symptoms: headache, neck pain and right lower extremity pain      Additional History (Context): Teresita Mauricio is a 81 y.o. female withITP, Breast CA, anemia who presents with posterior headache and neck pain for the past 3 days. In addition she has had problems ambulating due to pain in her right lower extremity. She has a knot on the pretibial area midshaft down on her right lower extremity. In addition she has had a history of breast cancer approximately 20 years ago. She has had recent chemotherapy for she reports a spot on her neck. Right now she is taking Xarelto. At this time she has not had any falls excessive bending turning twisting and lifting. In addition she does note some shortness of breath she had this morning but that is improved. She denies any chest pain. PCP: Gayle Padilla MD    Current Outpatient Medications   Medication Sig Dispense Refill    nitrofurantoin, macrocrystal-monohydrate, (MACROBID) 100 mg capsule Take 1 Cap by mouth two (2) times a day for 7 days. 14 Cap 0    lidocaine 1.8 % ptmd 1 Patch by Apply Externally route daily. 12 hours on and 12 hours off. 1 Box 0    methocarbamol (ROBAXIN) 500 mg tablet Take 1 Tab by mouth three (3) times daily. 20 Tab 0    oxyCODONE-acetaminophen (PERCOCET 10)  mg per tablet Take 1 Tab by mouth every six (6) hours as needed for Pain for up to 30 days. Max Daily Amount: 4 Tabs.  120 Tab 0    zolpidem (AMBIEN) 10 mg tablet Take 1 Tab by mouth nightly as needed for Sleep. Max Daily Amount: 10 mg. 30 Tab 3    albuterol (PROVENTIL HFA, VENTOLIN HFA, PROAIR HFA) 90 mcg/actuation inhaler Take 2 Puffs by inhalation every four (4) hours as needed for Wheezing. 1 Inhaler 1    midodrine (PROAMITINE) 5 mg tablet Take  by mouth three (3) times daily.  gabapentin (NEURONTIN) 300 mg capsule Take 1 Cap by mouth nightly. 90 Cap 1    furosemide (LASIX) 20 mg tablet Take 1 Tab by mouth daily. 30 Tab 0    ergocalciferol (VITAMIN D2) 50,000 unit capsule Take 1 Cap by mouth every seven (7) days. 12 Cap 1    topiramate (TOPAMAX) 50 mg tablet Take 50 mg by mouth daily. 5    LINZESS 145 mcg cap capsule TAKE 1 CAPSULE BY MOUTH DAILY 30 MINUTES BEFORE BREAKFAST  0    digoxin (LANOXIN) 0.125 mg tablet Take 0.125 mg by mouth daily.  rivaroxaban (XARELTO) 10 mg tablet Take 10 mg by mouth daily.  aluminum-magnesium hydroxide 200-200 mg/5 mL susp 5 mL, diphenhydrAMINE 12.5 mg/5 mL liqd 12.5 mg, lidocaine 2 % soln 5 mL 5 mL by Swish and Spit route two (2) times a day. Magic mouth wash   Maalox  Lidocaine 2% viscous   Diphenhydramine oral solution     Pharmacy to mix equal portions of ingredients to a total volume as indicated in the dispense amount. 240 mL 1    ondansetron hcl (ZOFRAN) 8 mg tablet Take 1 Tab by mouth every eight (8) hours as needed for Nausea. 30 Tab 3    magnesium oxide (MAG-OX) 400 mg tablet Take 1 Tab by mouth every fourty-eight (48) hours.  10 Tab 0       Past History     Past Medical History:  Past Medical History:   Diagnosis Date    Abnormal liver enzymes     Acute renal failure (ARF) (HCC)     Anemia     Antineoplastic chemotherapy induced anemia     Arrhythmia     Atrial Fib    Arthritis     Atrial flutter (HCC)     Bilateral lower extremity edema     Breast cancer (HCC)     Cancer (HCC) 1999    Breast    Cardiomyopathy (HCC)     Cardiomyopathy (HCC)     Chest pain     Chronic ITP (idiopathic thrombocytopenia) ([facility] 10/31/2016    Secondary to Anemia from Chemo    Congestive heart failure (HCC)     Constipation     Diabetes (HCC)     borderline, no meds    Ejection fraction < 50%     [facility]    treated with chemo    Heart failure (HCC)     Hx antineoplastic chemotherapy     Nail fungus     PAF (paroxysmal atrial fibrillation) (HCC)     Primary insomnia     SOB (shortness of breath)     Spondylisthesis     Vitamin D deficiency        Past Surgical History:  Past Surgical History:   Procedure Laterality Date    BREAST SURGERY PROCEDURE UNLISTED Left 1990s    lumpectomy    COLONOSCOPY N/A 7/27/2016    COLON[facility] ENDOSCOPY    HX APPENDECTOMY      HX HEENT      Tonsillectomy    HX ORTHOPAEDIC      HX TUBAL LIGATION      HX VASCULAR ACCESS      IR REMOVE TUNL CVAD W PORT/PUMP  7/31/2019    NEUROLOGICAL PROCEDURE UNLISTED  01/08/2018    Lumbar fusion       Family History:  History reviewed. No pertinent family history. Social History:  Social History     Tobacco Use    Smoking status: Never Smoker    Smokeless tobacco: Never Used   Substance Use Topics    Alcohol use: No    Drug use: No       Allergies: Allergies   Allergen Reactions    Aspirin Swelling     Pt states her whole body swells when she takes aspirin.  Adhesive Unable to Obtain    Nickel Rash    Pcn [Penicillins] Rash         Review of Systems   Review of Systems   Constitutional: Positive for chills. HENT: Negative. Respiratory: Positive for shortness of breath. Negative for cough and chest tightness. Gastrointestinal: Negative. Genitourinary: Negative. Musculoskeletal: Positive for myalgias, neck pain and neck stiffness. Skin: Negative. Neurological: Positive for weakness and headaches.      All Other Systems Negative  Physical Exam     Vitals:    09/02/19 1609   BP: 110/77   Pulse: (!) 104   Resp: 18   Temp: 99.2 °F (37.3 °C)   SpO2: 100%   Weight: 62.6 kg (138 lb)   Height: 5' 6\" (1.676 m)     Physical Exam   Constitutional: She appears well-developed and well-nourished. HENT:   Head: Normocephalic and atraumatic. Right Ear: External ear normal.   Left Ear: External ear normal.   Nose: Nose normal.   Mouth/Throat: Oropharynx is clear and moist.   Eyes: Pupils are equal, round, and reactive to light. Conjunctivae and EOM are normal.   Neck: Trachea normal and phonation normal. Neck supple. Spinous process tenderness and muscular tenderness present. Decreased range of motion present. No Brudzinski's sign and no Kernig's sign noted. Cardiovascular: Normal rate, regular rhythm and intact distal pulses. Occasional extrasystoles are present. Murmur heard. Systolic murmur is present with a grade of 3/6. Pulses:       Dorsalis pedis pulses are 2+ on the right side, and 2+ on the left side. Pulmonary/Chest: Effort normal and breath sounds normal.   Abdominal: Soft. Bowel sounds are normal.   Musculoskeletal: She exhibits tenderness. Patient has tenderness the and anterior tibial midshaft on the right lower extremity. There is to be a hematoma in that area. She has tenderness to palpation surrounding that area. She does not have any posterior calf pain and has no pain with Homans maneuver. Neurological: She is alert. She has normal strength. No cranial nerve deficit or sensory deficit. GCS eye subscore is 4. GCS verbal subscore is 5. GCS motor subscore is 6. Skin: Skin is warm and dry. Psychiatric: She has a normal mood and affect. Her behavior is normal.   Nursing note and vitals reviewed.          Diagnostic Study Results     Labs -     Recent Results (from the past 12 hour(s))   METABOLIC PANEL, COMPREHENSIVE    Collection Time: 09/02/19  4:21 PM   Result Value Ref Range    Sodium 140 136 - 145 mmol/L    Potassium 3.3 (L) 3.5 - 5.5 mmol/L    Chloride 94 (L) 100 - 111 mmol/L    CO2 36 (H) 21 - 32 mmol/L    Anion gap 10 3.0 - 18 mmol/L    Glucose 87 74 - 99 mg/dL    BUN 34 (H) 7.0 - 18 MG/DL    Creatinine 1.24 0.6 - 1.3 MG/DL    BUN/Creatinine ratio 27 (H) 12 - 20      GFR est AA 52 (L) >60 ml/min/1.73m2    GFR est non-AA 43 (L) >60 ml/min/1.73m2    Calcium 9.4 8.5 - 10.1 MG/DL    Bilirubin, total 0.7 0.2 - 1.0 MG/DL    ALT (SGPT) 34 13 - 56 U/L    AST (SGOT) 50 (H) 10 - 38 U/L    Alk. phosphatase 77 45 - 117 U/L    Protein, total 8.3 (H) 6.4 - 8.2 g/dL    Albumin 3.3 (L) 3.4 - 5.0 g/dL    Globulin 5.0 (H) 2.0 - 4.0 g/dL    A-G Ratio 0.7 (L) 0.8 - 1.7     CBC WITH AUTOMATED DIFF    Collection Time: 09/02/19  4:21 PM   Result Value Ref Range    WBC 7.3 4.6 - 13.2 K/uL    RBC 3.63 (L) 4.20 - 5.30 M/uL    HGB 10.0 (L) 12.0 - 16.0 g/dL    HCT 30.4 (L) 35.0 - 45.0 %    MCV 83.7 74.0 - 97.0 FL    MCH 27.5 24.0 - 34.0 PG    MCHC 32.9 31.0 - 37.0 g/dL    RDW 18.1 (H) 11.6 - 14.5 %    PLATELET 63 (L) 306 - 420 K/uL    NEUTROPHILS 65 40 - 73 %    LYMPHOCYTES 18 (L) 21 - 52 %    MONOCYTES 15 (H) 3 - 10 %    EOSINOPHILS 2 0 - 5 %    BASOPHILS 0 0 - 2 %    ABS. NEUTROPHILS 4.8 1.8 - 8.0 K/UL    ABS. LYMPHOCYTES 1.3 0.9 - 3.6 K/UL    ABS. MONOCYTES 1.1 0.05 - 1.2 K/UL    ABS. EOSINOPHILS 0.1 0.0 - 0.4 K/UL    ABS.  BASOPHILS 0.0 0.0 - 0.1 K/UL    DF AUTOMATED      PLATELET COMMENTS DECREASED PLATELETS      RBC COMMENTS ANISOCYTOSIS  1+       TROPONIN I    Collection Time: 09/02/19  4:21 PM   Result Value Ref Range    Troponin-I, QT 0.02 0.0 - 0.045 NG/ML   D DIMER    Collection Time: 09/02/19  4:21 PM   Result Value Ref Range    D DIMER 0.65 (H) <0.46 ug/ml(FEU)   EKG, 12 LEAD, INITIAL    Collection Time: 09/02/19  4:31 PM   Result Value Ref Range    Ventricular Rate 90 BPM    Atrial Rate 357 BPM    QRS Duration 92 ms    Q-T Interval 372 ms    QTC Calculation (Bezet) 455 ms    Calculated R Axis -46 degrees    Calculated T Axis 41 degrees    Diagnosis       Atrial fibrillation with premature ventricular or aberrantly conducted   complexes  Left axis deviation  Incomplete right bundle branch block  Anteroseptal infarct (cited on or before 07-JAN-2019)  Abnormal ECG  When compared with ECG of 10-ELSA-2019 13:27,  Incomplete right bundle branch block has replaced RSR' pattern in V1  Nonspecific T wave abnormality no longer evident in Inferior leads     URINALYSIS W/ RFLX MICROSCOPIC    Collection Time: 09/02/19  5:30 PM   Result Value Ref Range    Color YELLOW      Appearance CLEAR      Specific gravity 1.011 1.005 - 1.030      pH (UA) 8.0 5.0 - 8.0      Protein NEGATIVE  NEG mg/dL    Glucose NEGATIVE  NEG mg/dL    Ketone NEGATIVE  NEG mg/dL    Bilirubin NEGATIVE  NEG      Blood NEGATIVE  NEG      Urobilinogen 1.0 0.2 - 1.0 EU/dL    Nitrites NEGATIVE  NEG      Leukocyte Esterase TRACE (A) NEG     URINE MICROSCOPIC ONLY    Collection Time: 09/02/19  5:30 PM   Result Value Ref Range    WBC 0 to 3 0 - 4 /hpf    RBC NONE 0 - 5 /hpf    Epithelial cells 1+ 0 - 5 /lpf    Bacteria NEGATIVE  NEG /hpf       Radiologic Studies -   CT HEAD W WO CONT   Final Result   IMPRESSION:      No findings of metastatic disease to the brain or calvarium. Brain appears normal.      CT NECK SOFT TISSUE W WO CONT   Final Result   IMPRESSION:      1. No definite masses are identified in the neck. No definite findings of   metastatic breast cancer in the neck. 2. Lobulated soft tissue at the base of tongue and vallecula. Appears to be   prominent lobulated lingual tonsillar tissue, no definite invasive mass lesion   present. The volume of tissue is atypical for patient's age and this would be   better evaluated with direct visualization. 3. A prominent number of nonpathologically enlarged lymph nodes in the bilateral   cervical chains. None with central necrosis or malignant morphology, but the   number of lymph nodes is atypical. These appear to be reactive lymph nodes. 4. A few punctate calcifications within both parotid glands. Nonspecific. Could   be related to chronic parotiditis. Sjogren's within the differential diagnosis. 5. Moderate amount of degenerative changes present in the cervical spine,   similar to the CT from 1/2019. No acute fracture. No CT findings suggestive of   significant central spinal canal stenosis. 6. A small area of linear opacity in the posterior left upper lobe may be   scarring, similar to the prior CT cervical spine. Thank you for this referral.      XR CHEST PORT    (Results Pending)   XR TIB/FIB RT    (Results Pending)     CT Results  (Last 48 hours)               09/02/19 1805  CT HEAD W WO CONT Final result    Impression:  IMPRESSION:       No findings of metastatic disease to the brain or calvarium. Brain appears normal.       Narrative:  CT Of The Head With And Without Contrast       CPT CODE: 33594       HISTORY: History of breast cancer. Patient complains of headaches. COMPARISON: Noncontrast head CT 1/7/2019. TECHNIQUE: Axial CT images of the head before and after intravenous contrast. CT   dose reduction was achieved through use of a standardized protocol tailored for   this examination and automatic exposure control for dose modulation. CONTRAST: 80 mL Isovue 300 IV. FINDINGS:        Noncontrast CT images show no acute intracranial hemorrhage. No abnormal   parenchymal calcifications. No acute or chronic infarction. Brain attenuations   are normal throughout. Normal brain volumes. No hydrocephalus. Postcontrast images show no abnormal parenchymal or meningeal enhancement. No   masses. No destructive bone lesions. Included sinuses are clear. No mastoid effusion. 09/02/19 1805  CT NECK SOFT TISSUE W WO CONT Final result    Impression:  IMPRESSION:       1. No definite masses are identified in the neck. No definite findings of   metastatic breast cancer in the neck. 2. Lobulated soft tissue at the base of tongue and vallecula.  Appears to be   prominent lobulated lingual tonsillar tissue, no definite invasive mass lesion   present. The volume of tissue is atypical for patient's age and this would be   better evaluated with direct visualization. 3. A prominent number of nonpathologically enlarged lymph nodes in the bilateral   cervical chains. None with central necrosis or malignant morphology, but the   number of lymph nodes is atypical. These appear to be reactive lymph nodes. 4. A few punctate calcifications within both parotid glands. Nonspecific. Could   be related to chronic parotiditis. Sjogren's within the differential diagnosis. 5. Moderate amount of degenerative changes present in the cervical spine,   similar to the CT from 1/2019. No acute fracture. No CT findings suggestive of   significant central spinal canal stenosis. 6. A small area of linear opacity in the posterior left upper lobe may be   scarring, similar to the prior CT cervical spine. Thank you for this referral.       Narrative:  Neck CT With And Without Contrast       PROVIDED REASON FOR EXAM: \"Possible mets\". HISTORY AND SYMPTOMS FROM THE CHART: Patient with the history of breast cancer. Presents with upper back and neck pain, headaches. COMPARISON: No prior CT soft tissue neck available for comparison at some   reference to the CT cervical spine from 1/13/2019. FINDINGS:        Both before and after the intravenous administration of iodinated IV contrast, a   scan was obtained from low head down to upper chest.  Patient received 80 cc   Isovue 300 IV contrast. CT dose reduction was achieved through use of a   standardized protocol tailored for this examination and automatic exposure   control for dose modulation. Imaging through the lower brain shows no enhancing mass lesion. Mucosa of the upper aerodigestive tract:  From the nasopharynx to the subglottis   no definite mucosal mass lesion.  There is some lobulated soft tissue at the base   of tongue and vallecula that is more pronounced to the right of midline. The   volume of lobulated soft tissue is greater than typical for lingual tonsillar   tissue, but no well-defined mass lesion visible. The vocal cords are opposed, likely closed glottis related to breath holding for   the exam.   There is no definite mass lesion seen in the oral cavity or oral tongue. Some   loss of detail from dental artifacts. Incidental note is made of a torus   palatinus. Lymph Nodes: There is a relatively prominent number of nonpathologically   enlarged cervical chain lymph nodes bilaterally. None with central necrosis or   definite malignant features. Major salivary glands:  Submandibular glands appear normal. No visible mass. There are a few punctate calcifications within both parotid glands, parotid   glands appear otherwise normal.       Upper mediastinum: Imaging through the upper mediastinum shows a few   nonpathologically enlarged lymph nodes. The top of the aortic arch appears   grossly normal.       Thyroid: Not enlarged, no visible mass       Visualized lung apices: Some ill-defined linear opacities in the posterior left   apex appears similar to the CT cervical spine from 1/2019. Not completely imaged   on this exam. No pulmonary nodules in the lung apices       Bones: Degenerative changes in the cervical spine. Grade 1 anterolisthesis of C3 on C4 with grade 1 retrolisthesis of C4 on C5. Complete loss of disc height at C4/5, C5/6, and C6/7. Associated small posterior   endplate osteophytes. Does not appear to be any critical central spinal canal   stenosis. No destructive bone lesions. CXR Results  (Last 48 hours)    None            Medical Decision Making   I am the first provider for this patient. I reviewed the vital signs, available nursing notes, past medical history, past surgical history, family history and social history. Vital Signs-Reviewed the patient's vital signs.       Records Reviewed: Nursing Notes and Old Medical Records      Provider Notes (Medical Decision Making): This x-ray demonstrates some left infiltrate versus atelectasis. I looked back through several chest x-rays and they all looked very similar. History of a CT a to her chest does show some pulmonary fibrosis the left side greater than the right. The patient is not likely to have a blood clot. In addition she is also taking Xarelto so I find it unlikely. Her right lower extremity x-rays were negative for fracture. 1725 I noticed the patient's K was 3.3 so I replaced her with 20 mg of K-Dur p.o. Of note the patient has just had a Percocet prescription filled 420 pills by  on 8/25. Did look up the patient on the [facility] aware. She has had more than 5 prescribers for opioids and sedatives over the past 2 years. 1846 CT of the brain does not show any metastasis nor does the CT neck. MED RECONCILIATION:  No current facility-administered medications for this encounter. Current Outpatient Medications   Medication Sig    nitrofurantoin, macrocrystal-monohydrate, (MACROBID) 100 mg capsule Take 1 Cap by mouth two (2) times a day for 7 days.  lidocaine 1.8 % ptmd 1 Patch by Apply Externally route daily. 12 hours on and 12 hours off.  methocarbamol (ROBAXIN) 500 mg tablet Take 1 Tab by mouth three (3) times daily.  oxyCODONE-acetaminophen (PERCOCET 10)  mg per tablet Take 1 Tab by mouth every six (6) hours as needed for Pain for up to 30 days. Max Daily Amount: 4 Tabs.  zolpidem (AMBIEN) 10 mg tablet Take 1 Tab by mouth nightly as needed for Sleep. Max Daily Amount: 10 mg.    albuterol (PROVENTIL HFA, VENTOLIN HFA, PROAIR HFA) 90 mcg/actuation inhaler Take 2 Puffs by inhalation every four (4) hours as needed for Wheezing.  midodrine (PROAMITINE) 5 mg tablet Take  by mouth three (3) times daily.  gabapentin (NEURONTIN) 300 mg capsule Take 1 Cap by mouth nightly.     furosemide (LASIX) 20 mg tablet Take 1 Tab by mouth daily.  ergocalciferol (VITAMIN D2) 50,000 unit capsule Take 1 Cap by mouth every seven (7) days.  topiramate (TOPAMAX) 50 mg tablet Take 50 mg by mouth daily.  LINZESS 145 mcg cap capsule TAKE 1 CAPSULE BY MOUTH DAILY 30 MINUTES BEFORE BREAKFAST    digoxin (LANOXIN) 0.125 mg tablet Take 0.125 mg by mouth daily.  rivaroxaban (XARELTO) 10 mg tablet Take 10 mg by mouth daily.  aluminum-magnesium hydroxide 200-200 mg/5 mL susp 5 mL, diphenhydrAMINE 12.5 mg/5 mL liqd 12.5 mg, lidocaine 2 % soln 5 mL 5 mL by Swish and Spit route two (2) times a day. Magic mouth wash   Maalox  Lidocaine 2% viscous   Diphenhydramine oral solution     Pharmacy to mix equal portions of ingredients to a total volume as indicated in the dispense amount.  ondansetron hcl (ZOFRAN) 8 mg tablet Take 1 Tab by mouth every eight (8) hours as needed for Nausea.  magnesium oxide (MAG-OX) 400 mg tablet Take 1 Tab by mouth every fourty-eight (48) hours. Disposition:  Home    DISCHARGE NOTE:     Pt has been reexamined. Patient has no new complaints, changes, or physical findings. Care plan outlined and precautions discussed. Results of CT and Labs were reviewed with the patient. All medications were reviewed with the patient; will d/c home with Robaxin, Lidoderm and Macrobid. All of pt's questions and concerns were addressed. Patient was instructed and agrees to follow up with PCM, as well as to return to the ED upon further deterioration. Patient is ready to go home.     Follow-up Information     Follow up With Specialties Details [facility] Emergency Medicine  If symptoms worsen [address] [facility]  [phone]    Jhony Corral MD [facility] In 2 days  [address] [facility]  [address]  [phone]            Current Discharge Medication List      START taking these medications    Details   nitrofurantoin, macrocrystal-monohydrate, (MACROBID) 100 mg capsule Take 1 Cap by mouth two (2) times a day for 7 days. Qty: 14 Cap, Refills: 0      lidocaine 1.8 % ptmd 1 Patch by Apply Externally route daily. 12 hours on and 12 hours off. Qty: 1 Box, Refills: 0      methocarbamol (ROBAXIN) 500 mg tablet Take 1 Tab by mouth three (3) times daily. Qty: 20 Tab, Refills: 0         CONTINUE these medications which have NOT CHANGED    Details   oxyCODONE-acetaminophen (PERCOCET 10)  mg per tablet Take 1 Tab by mouth every six (6) hours as needed for Pain for up to 30 days. Max Daily Amount: 4 Tabs. Qty: 120 Tab, Refills: 0    Associated Diagnoses: Cancer associated pain      zolpidem (AMBIEN) 10 mg tablet Take 1 Tab by mouth nightly as needed for Sleep. Max Daily Amount: 10 mg.  Qty: 30 Tab, Refills: 3    Associated Diagnoses: Primary insomnia      albuterol (PROVENTIL HFA, VENTOLIN HFA, PROAIR HFA) 90 mcg/actuation inhaler Take 2 Puffs by inhalation every four (4) hours as needed for Wheezing. Qty: 1 Inhaler, Refills: 1      midodrine (PROAMITINE) 5 mg tablet Take  by mouth three (3) times daily. gabapentin (NEURONTIN) 300 mg capsule Take 1 Cap by mouth nightly. Qty: 90 Cap, Refills: 1      furosemide (LASIX) 20 mg tablet Take 1 Tab by mouth daily. Qty: 30 Tab, Refills: 0      ergocalciferol (VITAMIN D2) 50,000 unit capsule Take 1 Cap by mouth every seven (7) days. Qty: 12 Cap, Refills: 1      topiramate (TOPAMAX) 50 mg tablet Take 50 mg by mouth daily. Refills: 5      LINZESS 145 mcg cap capsule TAKE 1 CAPSULE BY MOUTH DAILY 30 MINUTES BEFORE BREAKFAST  Refills: 0      digoxin (LANOXIN) 0.125 mg tablet Take 0.125 mg by mouth daily. rivaroxaban (XARELTO) 10 mg tablet Take 10 mg by mouth daily. aluminum-magnesium hydroxide 200-200 mg/5 mL susp 5 mL, diphenhydrAMINE 12.5 mg/5 mL liqd 12.5 mg, lidocaine 2 % soln 5 mL 5 mL by Swish and Spit route two (2) times a day.  Magic mouth wash   Maalox  Lidocaine 2% viscous   Diphenhydramine oral solution     Pharmacy to mix equal portions of ingredients to a total volume as indicated in the dispense amount. Qty: 240 mL, Refills: 1      ondansetron hcl (ZOFRAN) 8 mg tablet Take 1 Tab by mouth every eight (8) hours as needed for Nausea. Qty: 30 Tab, Refills: 3      magnesium oxide (MAG-OX) 400 mg tablet Take 1 Tab by mouth every fourty-eight (48) hours. Qty: 10 Tab, Refills: 0               Diagnosis     Clinical Impression:   1. Acute cystitis without hematuria    2. Leg pain, anterior, right    3. Cancer associated pain    4. Neck pain    5.  Muscle spasms of neck

## 2019-09-02 NOTE — ED TRIAGE NOTES
Patient c/o upper back pain, neck pain and headache since Saturday. C/o knots to right lower leg. Denies injury.   States having chills

## 2019-09-13 NOTE — PROGRESS NOTES
[facility] Progress Note    Date: 2019    Name: Maik Jones    MRN: [id number]         : 1950      Nplate    Ms. Venkat Curtis arrived to [facility] at [address] [place] [address], [facility] w/ cane. Ms. Venkat Curtis was assessed and education was provided. Ms. Keith Alexander vitals were reviewed. Visit Vitals  BP 90/62 (BP 1 Location: Right arm, BP Patient Position: Standing)   Pulse 84   Temp 98.3 °F (36.8 °C)   Resp 18   Breastfeeding? No       Pt was observed for 5 minutes after obtaining vital signs prior to initiating treatment    Per N-plate dosing protocol and orders patient has had a stable PLT count for 4 weeks so pt's dose will be maintained at the current dose of 2mcg/kg. Nplate 033HGB (7.27XQ) administered as ordered SQ in patient's right upper arm. No redness or bleeding noted. Declined bandaid. Ms. Venkat Curtis was discharged from [place] [address] in stable condition at 2524. She is to return to [facility] on 19 at 0830 for her next appointment.     Julien You RN  2019

## 2019-09-20 NOTE — PROGRESS NOTES
[facility] Progress Note Date: 2019 Name: Mariama Kessler MRN: [id number] : 1950 Nplate Ms. Jose Pemberton arrived to [facility] at 8892, ambulatory w/ cane. Ms. Jose Pemberton was assessed and education was provided. Ms. Renee Nance vitals were reviewed. Visit Vitals BP 90/65 (BP 1 Location: Right arm, BP Patient Position: At rest;Sitting) Pulse 80 Temp 97.5 °F (36.4 °C) Resp 18 Pt was observed for 5 minutes after obtaining vital signs prior to initiating treatment Per N-plate dosing protocol and orders patient has had a stable PLT count for 4 weeks so pt's dose will be maintained at the current dose of 2mcg/kg. Nplate 915XLM (5.00ZU) administered as ordered SQ in patient's right upper arm. No redness or bleeding noted. Declined bandaid. Ms. Jose Pemberton was discharged from [place] [address] in stable condition at 12. She is to return to [facility] on 19 at 0830 for her next appointment. Marcia Rebolledo RN 2019  
 
1014

## 2019-09-27 NOTE — PROGRESS NOTES
[facility] Progress Note Date: 2019 Name: Brooke Fatima MRN: [id number] : 1950 Nplate Ms. Camilo Almeida arrived to [facility] at 0920, ambulatory w/ cane. Ms. Camilo Almeida was assessed and education was provided. Ms. Jacinto Mijares vitals were reviewed. Visit Vitals BP 92/65 (BP 1 Location: Right arm, BP Patient Position: Sitting) Pulse 91 Temp 97.6 °F (36.4 °C) Resp 18 SpO2 100% Pt was observed for 5 minutes after obtaining vital signs prior to initiating treatment. CBC x1 attempt to right AC. Recent Results (from the past 12 hour(s)) CBC WITH 3 PART DIFF Collection Time: 19  9:24 AM  
Result Value Ref Range WBC 4.1 (L) 4.5 - 13.0 K/uL  
 RBC 3.94 (L) 4.10 - 5.10 M/uL  
 HGB 11.0 (L) 12.0 - 16 g/dL HCT 33.3 (L) 36 - 48 % MCV 84.5 78 - 102 FL  
 MCH 27.9 25.0 - 35.0 PG  
 MCHC 33.0 31 - 37 g/dL  
 RDW 16.7 (H) 11.5 - 14.5 % NEUTROPHILS 40 40 - 70 % MIXED CELLS 18 (H) 0.1 - 17 % LYMPHOCYTES 42 14 - 44 % ABS. NEUTROPHILS 1.7 (L) 1.8 - 9.5 K/UL  
 ABS. MIXED CELLS 0.7 0.0 - 2.3 K/uL  
 ABS. LYMPHOCYTES 1.7 1.1 - 5.9 K/UL  
 DF AUTOMATED PLATELET COUNT Collection Time: 19  9:25 AM  
Result Value Ref Range PLATELET 359 (L) 406 - 420 K/uL Per N-plate dosing protocol and orders, pt's dose will be maintained at the current dose of 2mcg/kg. Nplate 033COG (4.81MF) administered as ordered SQ in patient's right upper arm. No redness or bleeding noted. Ms. Camilo Almeida was discharged from [place] [address] in stable condition at [address]. She is to return to [facility] on 10/4/19 at 0830 for her next appointment. Sarita Vargas RN 2019

## 2019-10-25 NOTE — PROGRESS NOTES
Hospital Discharge Follow-Up Date/Time:  10/25/2019 11:10 AM 
 
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 The physician discharge summary was available at the time of outreach. Patient was contacted within 2 business days of discharge. Top Challenges reviewed with the provider Advance Care Planning:  
Does patient have an Advance Directive:  copy of AMD from [facility] has been faxed to PCP office Method of communication with provider :phone Inpatient RRAT score: not calculated Was this a readmission? no  
Patient stated reason for the readmission: N/A Care Transition Nurse (CTN) contacted the patient by telephone to perform post hospital discharge assessment. Verified name and  with patient as identifiers. Provided introduction to self, and explanation of the CTN role. Patient verbalized home health came out yesterday for infusion and will be teaching patient/family today how to change bag. Weight today 125 lbs. Educated patient on the importance of daily weights. Instructed patient to weigh at the same time of day; preferably in the morning after urinating but before eating, wearing the same amount of clothing. Patient  instructed to write down the date, time and weight each time he/she weighs. Instructed to report a weight gain of 3 lbs or greater in 24 hours/1 day or 5 lbs or more in one week. Also instructed patient  to monitor for SOB while lying flat, swelling to legs/feet, fatigue with doing normal daily activities or swelling/fullness to abdomen. Informed patient of appt with [facility] and provided sushant Larsen with location and contact info for clinic. Patient received hospital discharge instructions. CTN reviewed discharge instructions and red flags with patient who verbalized understanding. Patient given an opportunity to ask questions and does not have any further questions or concerns at this time. The patient agrees to contact the PCP office for questions related to their healthcare. CTN provided contact information for future reference. Disease Specific:   CHF Patients top risk factors for readmission:  transportation; possible barrier Home Health orders at discharge: PT, OT, tele monitoring [address]: [place] [address] Date of initial visit: 10/24/19 Durable Medical Equipment ordered at discharge: home infusion pump, walker, 3 in 1 commode [facility] [facility]: [employer] (walker and commode) Durable Medical Equipment received: all DME received Medication(s):  
New Medications at Discharge: Dobutamine, Feosol, Aldactone, Spiriva, Coumadin Changed Medications at Discharge: Midodrine Discontinued Medications at Discharge: amiodarone, Lisinopril, Zaroxolyn, Xarelto Medication reconciliation was performed with patient, who verbalizes understanding of administration of home medications. There were no barriers to obtaining medications identified at this time. Referral to Pharm D needed: no  
 
Current Outpatient Medications Medication Sig  
 albuterol (PROVENTIL HFA, VENTOLIN HFA, PROAIR HFA) 90 mcg/actuation inhaler Take 2 Puffs by inhalation every four (4) hours as needed for Wheezing.  gabapentin (NEURONTIN) 300 mg capsule Take 1 Cap by mouth nightly.  oxyCODONE-acetaminophen (PERCOCET 10)  mg per tablet Take 1 Tab by mouth every six (6) hours as needed for Pain for up to 30 days. Max Daily Amount: 4 Tabs.  lidocaine 1.8 % ptmd 1 Patch by Apply Externally route daily. 12 hours on and 12 hours off.  methocarbamol (ROBAXIN) 500 mg tablet Take 1 Tab by mouth three (3) times daily.  zolpidem (AMBIEN) 10 mg tablet Take 1 Tab by mouth nightly as needed for Sleep. Max Daily Amount: 10 mg.  midodrine (PROAMITINE) 5 mg tablet Take  by mouth three (3) times daily.  furosemide (LASIX) 20 mg tablet Take 1 Tab by mouth daily.  magnesium oxide (MAG-OX) 400 mg tablet Take 1 Tab by mouth every fourty-eight (48) hours.  ergocalciferol (VITAMIN D2) 50,000 unit capsule Take 1 Cap by mouth every seven (7) days.  topiramate (TOPAMAX) 50 mg tablet Take 50 mg by mouth daily.  LINZESS 145 mcg cap capsule TAKE 1 CAPSULE BY MOUTH DAILY 30 MINUTES BEFORE BREAKFAST  digoxin (LANOXIN) 0.125 mg tablet Take 0.125 mg by mouth daily.  rivaroxaban (XARELTO) 10 mg tablet Take 10 mg by mouth daily.  ondansetron hcl (ZOFRAN) 8 mg tablet Take 1 Tab by mouth every eight (8) hours as needed for Nausea. No current facility-administered medications for this visit. Medications Discontinued During This Encounter Medication Reason  aluminum-magnesium hydroxide 200-200 mg/5 mL susp 5 mL, diphenhydrAMINE 12.5 mg/5 mL liqd 12.5 mg, lidocaine 2 % soln 5 mL Not A Current Medication [facility] follow up appointment(s):  
Future Appointments Date Time Provider Nick Dixon 11/1/2019  8:30 AM [facility] FAST TRACK NURSE [facility]  
11/8/2019  9:00 AM [facility] FAST TRACK NURSE [facility]  
11/15/2019  8:30 AM [facility] FAST TRACK NURSE [facility]  
11/22/2019  9:00 AM HBV FAST TRACK NURSE [facility]  
11/25/2019 10:30 AM Prinecss Buitrago MD [facility] 69 Non-[facility] follow up appointment(s): [facility] 10/28/19 at 1030 AM 
[facility] at [facility] at 945 AM on 12/4/19 [facility]:  out of service area Contacted [facility] heart failure clinic to verify location. Returned call to patient and provide grandson with info as well as their contact number for future reference.  Writer also contacted [facility] to verify if they will be administration of Nplate injection as d/c summary indicated they would be administering an injection. Left voicemail including introduction , 2 patient identifiers, reason for call and direct contact info. Goals  Attends follow-up appointments as directed. Ensure provider appt is scheduled within 7 days post-discharge; 10/25: Verified patient has PCP appt on 10/29/19 at 10 AM  
Confirm patient attended post-discharge provider appt Complete post-visit call to confirm attendance and update care needs  Prevent complications post hospitalization. Plan of Care: CTN will monitor x 4 weeks Review/educate common or potential \"red flags\" of condition worsening Evaluate adherence to medications and priority barriers to resolve Instruct on adherence to medications as ordered and assess for therapeutic response and side-effects Monitor lab results and symptoms, escalate to provider Ensure DME in place and used as instructed; Review the Home Visit or Home Health documentation for coordinating care and goals Communicate visits and goals between multiple providers and services Assess for health risk behaviors and educate patient/caregivers on reducing risk Instruct and encourage spirometry if ordered Coordinate plan to treat at home when symptoms arise Observe for trends in symptoms and measures, provide direction to patient, and notify provider as needed Discuss and provide resources for ACP Discuss and evaluate ADL performance. Provide recommendations on energy conservation, particularly related to transition home from an inpatient admission. Spoke with [facility]. Call transferred to IV team. Spoke with Debra Negrete. Provided 2 patient identifiers. Debra Negrete verbalized she does not believe they carry that drug. Call transferred to pharmacy. Byron Roche, pharmacy tech. Byron Roche verbalized they do not carry Nplate. Patient made aware she needs to go to infusion center for injection. Patient voiced understanding. Also provided patient with contact number for [facility] [phone] for future reference.

## 2019-11-01 NOTE — PROGRESS NOTES
[facility] Progress Note Date: 2019 Name: Jose Padron MRN: [id number] : 1950 Weekly CBC and Nplate injection Ms. Mark Mcclendon was assessed and education was provided. Patient was discharged from [facility] on 10/23/2019 and sent home with [facility] on Dobutamine infusion. Last Platelet count was 838 on 10/23/2019. Patient arrived in w/c, c/o joint pain, especially in her hands. She is unable to tolerate inflation of BP cuff due to pain. Ms. Rudy Mcginnis is sitting in w/c, hunched over, withdraws from touch due to pain. [address] daughter brought patient  To day and states that it is very hard on the patient to get in and out of the care. Patient has not received any Nplate since 6654, when given 138 mcg for platelet count of 086,740. Platelet count on  was 215,000 Platelet count on  was 272,000 Ms. Pagan's vitals were reviewed and patient was observed for 5 minutes prior to treatment. Visit Vitals Pulse 60 Temp 97.3 °F (36.3 °C) Resp 18 Breastfeeding? No  
 
PICC line noted at right upper arm. Blue lumen connected to Dobutamine infusion via CADD pump. CBC drawn from red lumen, after 7 ml discard, followed with 20 ml NS flush. Lab results were obtained and reviewed. Recent Results (from the past 12 hour(s)) CBC WITH 3 PART DIFF Collection Time: 19  9:45 AM  
Result Value Ref Range WBC 12.4 4.5 - 13.0 K/uL  
 RBC 4.07 (L) 4.10 - 5.10 M/uL  
 HGB 10.9 (L) 12.0 - 16 g/dL HCT 32.8 (L) 36 - 48 % MCV 80.6 78 - 102 FL  
 MCH 26.8 25.0 - 35.0 PG  
 MCHC 33.2 31 - 37 g/dL  
 RDW 17.6 (H) 11.5 - 14.5 % NEUTROPHILS 86 (H) 40 - 70 % MIXED CELLS 6 0.1 - 17 % LYMPHOCYTES 9 (L) 14 - 44 % ABS. NEUTROPHILS 10.6 (H) 1.8 - 9.5 K/UL  
 ABS. MIXED CELLS 0.7 0.0 - 2.3 K/uL  
 ABS. LYMPHOCYTES 1.1 1.1 - 5.9 K/UL  
 DF AUTOMATED PLATELET COUNT Collection Time: 19  9:55 AM  
Result Value Ref Range PLATELET 749 196 - 283 K/uL Discussed results with Alize Rosenthal NP and received order to hold the dose today. Also, notified her of [facility] drawing labs on Wednesdays, wondering if CBC could be drawn by them to determine need for patient to come to [place] for Nplate injection. Patient instructed to call [facility] on Thursdays to see if she was to come in for injection. Also, encouraged her to make an appointmentwith Dr. Marychuy Mims. Ms. Latricia Luis tolerated the infusion, and had no complaints. Patient armband removed and shredded. Ms. Latricia Luis was discharged from [place] [address] in stable condition at 1010. She is to return on 11/8/2019 at 0900 for her next appointment for possible CBC and Nplate injection. Keith Cardozo RN November 1, 2019 
3:37 PM

## 2019-11-01 NOTE — TELEPHONE ENCOUNTER
Patient aslo needs refill on oxyCODONE-acetaminophen (PERCOCET 10)  mg per tablet [[id number]]  ENDED     Order Details   Dose: 1 Tab Route: Oral Frequency: EVERY 6 HOURS AS NEEDED for Pain   Dispense Quantity: 120 Tab Refills: 0 Fills remaining: --           Sig: Take 1 Tab by mouth every six (6) hours as needed for Pain for up to 30 days. Max Daily Amount: 4 Tabs.          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 Max Daily Amount: 4 Tabs.  - Oral    Class: Print    Earliest Fill Date: 9/27/2019    Outpatient Morphine Equivalent Daily Dose (MEDD)     9/27/19 - 10/27/19   60 mg MEDD   Order Name Dose Route Frequency Maximum MEDD    oxyCODONE-acetaminophen (PERCOCET 10)  mg per tablet 1 Tab Oral EVERY 6 HOURS AS NEEDED 60 mg MEDD   Total Potential Daily Morphine Equivalence 60 mg MEDD   Calculation Information                10/28/19 and after   None   Priority and Order Details     Priority Class    Routine Print    Warnings History     No Interaction Warnings Rohith Marino [address]   Patient also needs refill on oxyCODONE-acetaminophen (PERCOCET 10)  mg per tablet     Not listed to select for refill.

## 2019-11-07 ENCOUNTER — PATIENT OUTREACH (OUTPATIENT)
Dept: CASE MANAGEMENT | Age: 69
End: 2019-11-07

## 2019-11-08 ENCOUNTER — HOSPITAL ENCOUNTER (OUTPATIENT)
Dept: INFUSION THERAPY | Age: 69
End: 2019-11-08
Payer: MEDICARE

## 2019-11-08 DIAGNOSIS — D69.6 THROMBOCYTOPENIA (HCC): ICD-10-CM

## 2019-11-15 ENCOUNTER — APPOINTMENT (OUTPATIENT)
Dept: INFUSION THERAPY | Age: 69
End: 2019-11-15
Payer: MEDICARE

## 2019-11-22 ENCOUNTER — APPOINTMENT (OUTPATIENT)
Dept: INFUSION THERAPY | Age: 69
End: 2019-11-22
Payer: MEDICARE

## 2020-01-01 NOTE — H&P
History & Physical 
Patient: Baldemar Comer MRN: [id number]  CSN: [id number] YOB: 1950  Age: 79 y.o. Sex: female DOA: 11/2/2018 Chief Complaint:  
Chief Complaint Patient presents with  Hypotension HPI:  
 
Baldemar Comer is a 79 y.o.  female who has hx of MBC s/p over 14 cycles of chemo now in remission On chronic platlet therapy. Present to infusion clinic for injection with bp 76/43 Given fluids and unable to bring up Sent to [facility] where she was started on sepsis bundle and given fluids For possible retrocardiac infiltrate Hypotension slow to improve with fluids but improved to 99 systolic post injectio of Cortif ; patient has been off of chronic steroids for over one year now . Cortisol levels obtained and pending from [facility] Patient states for last week running low blood pressure ; feeling weak tired and dizzy and finally had to give in and use can. Also with cough and dyspnea with little exertion Past Medical History:  
Diagnosis Date  Antineoplastic chemotherapy induced anemia  Arrhythmia Atrial Fib  Arthritis  Breast cancer ([address]  Cancer ([address] 1999 Breast  
 Cardiomyopathy ([address]  Chronic ITP (idiopathic thrombocytopenia) (HCC) 10/31/2016 Secondary to Anemia from Chemo  Congestive heart failure ([address]  Diabetes ([address] borderline, no meds  Esophageal cancer ([address] 2005  
 treated with chemo  Heart failure ([address]  SOB (shortness of breath) Past Surgical History:  
Procedure Laterality Date  BREAST SURGERY PROCEDURE UNLISTED Left 1990s  
 lumpectomy  HX APPENDECTOMY  HX HEENT Tonsillectomy  HX ORTHOPAEDIC    
 HX TUBAL LIGATION    
 HX VASCULAR ACCESS    
 NEUROLOGICAL PROCEDURE UNLISTED  01/08/2018 Lumbar fusion History reviewed. No pertinent family history. Social History Socioeconomic History  Marital status:  Spouse name: Not on file  Number of children: Not on file  Years of education: Not on file  Highest education level: Not on file Social Needs  Financial resource strain: Not on file  Food insecurity - worry: Not on file  Food insecurity - inability: Not on file  Transportation needs - medical: Not on file  Transportation needs - non-medical: Not on file Occupational History  Not on file Tobacco Use  Smoking status: Never Smoker  Smokeless tobacco: Never Used Substance and Sexual Activity  Alcohol use: No  
 Drug use: No  
 Sexual activity: Not on file Other Topics Concern  Not on file Social History Narrative  Not on file Prior to Admission medications Medication Sig Start Date End Date Taking? Authorizing Provider  
oxyCODONE-acetaminophen (PERCOCET)  mg per tablet Take 1 Tab by mouth every six (6) hours as needed for Pain. Max Daily Amount: 4 Tabs. 10/19/18   Oma Issa NP  
magic mouthwash solution Take 5 mL by mouth three (3) times daily as needed for Stomatitis. Magic mouth wash Maalox Lidocaine 2% viscous Diphenhydramine oral solution Pharmacy to mix equal portions of ingredients to a total volume as indicated in the dispense amount. 10/19/18   Oma Issa NP  
albuterol (PROAIR HFA) 90 mcg/actuation inhaler 1-2 puffs every 4-6 hrs. 10/5/18   Bia GAN NP  
gabapentin (NEURONTIN) 300 mg capsule Take 1 Cap by mouth three (3) times daily. 9/17/18   Ángel Mustafa MD  
zolpidem (AMBIEN) 10 mg tablet TAKE 1 TABLET BY MOUTH NIGHTLY AS NEEDED FOR SLEEP. MAX DAILY AMOUNT 10 MG 9/17/18   Ángel Mustafa MD  
promethazine (PHENERGAN) 25 mg tablet Take 1 Tab by mouth every six (6) hours as needed for Nausea.  5/21/18   Favian Rodriguez PA-C  
lidocaine-prilocaine (EMLA) topical cream APPLY OVER PORT 1 HOUR PRIOR TO CHEMOTHERAPY THAN COVER WITH SARAN WRAP 4/27/18   Oma Issa NP  
 ergocalciferol (VITAMIN D2) 50,000 unit capsule Take 1 Cap by mouth every seven (7) days. 3/19/18   Elisa Youssef MD  
topiramate (TOPAMAX) 50 mg tablet  2/15/18   Provider, Historical  
temazepam (RESTORIL) 30 mg capsule  2/2/18   Provider, Historical  
LINZESS 145 mcg cap capsule TAKE 1 CAPSULE BY MOUTH DAILY 30 MINUTES BEFORE BREAKFAST 2/16/18   Provider, Historical  
lisinopril (PRINIVIL, ZESTRIL) 10 mg tablet Take 10 mg by mouth daily. Indications: hypertension    Provider, Historical  
loratadine 10 mg cap Take 10 mg by mouth daily. Provider, Historical  
digoxin (LANOXIN) 0.125 mg tablet Take 0.125 mg by mouth daily. Provider, Historical  
carvedilol (COREG) 3.125 mg tablet Take 3.125 mg by mouth two (2) times daily (with meals). Provider, Historical  
meloxicam (MOBIC) 7.5 mg tablet Take 7.5 mg by mouth daily. Provider, Historical  
amiodarone (CORDARONE) 200 mg tablet Take 200 mg by mouth daily. Indications: PREVENTION OF VENTRICULAR FIBRILLATION    Provider, Historical  
rivaroxaban (XARELTO) 10 mg tablet Take 10 mg by mouth daily. Provider, Historical  
furosemide (LASIX) 40 mg tablet Take 40 mg by mouth daily. Indications: Edema    Provider, Historical  
dronabinol (MARINOL) 5 mg capsule Take 1 Cap by mouth two (2) times a day. 7/26/16   Hamzah Niño NP  
KLOR-CON M20 20 mEq tablet TAKE ONE TABLET BY MOUTH ONCE A DAY 4/7/16   Hamzah Niño NP  
aluminum-magnesium hydroxide 200-200 mg/5 mL susp 5 mL, diphenhydrAMINE 12.5 mg/5 mL liqd 12.5 mg, lidocaine 2 % soln 5 mL 5 mL by Swish and Spit route two (2) times a day. Magic mouth wash Maalox Lidocaine 2% viscous Diphenhydramine oral solution Pharmacy to mix equal portions of ingredients to a total volume as indicated in the dispense amount. 2/29/16   Dinah Duverney, NP  
potassium chloride (K-DUR, KLOR-CON) 20 mEq tablet Take 2 Tabs by mouth daily.  1/28/16   Dinah Duverney, NP  
 senna (SENNA) 8.6 mg tablet Take 1 Tab by mouth daily. Provider, Historical  
nabumetone (RELAFEN) 750 mg tablet Take 750 mg by mouth daily. Indications: OSTEOARTHRITIS 9/9/13   Provider, Historical  
ondansetron hcl (ZOFRAN) 8 mg tablet Take 1 Tab by mouth every eight (8) hours as needed for Nausea. 6/3/13   Hayder Hadley MD  
IRON AG&FUM/C/FA/MV CMB11/CA-T (PRUVATE 21-7 PO) Take 325 mg by mouth daily. Indications: iron deficiency    Provider, Historical  
 
 
Allergies Allergen Reactions  Aspirin Swelling Pt states her whole body swells when she takes aspirin.  Adhesive Unable to Obtain  Nickel Rash  Pcn [Penicillins] Rash Review of Systems GENERAL: Patient alert, awake and oriented times 3, able to communicate full sentences and not in distress. HEENT: No change in vision, no earache, tinnitus, sore throat or sinus congestion. NECK: No pain or stiffness. PULMONARY: + shortness of breath,+cough nor wheeze. Cardiovascular: no pnd or orthopnea, no CP chronic afib on xarelto digoxin, coreg, lisinopril GASTROINTESTINAL: No abdominal pain, nausea, vomiting or diarrhea, melena or bright red blood per rectum. GENITOURINARY: No urinary frequency, urgency, hesitancy or dysuria. MUSCULOSKELETAL: No joint or muscle pain, no back pain, no recent trauma. DERMATOLOGIC: No rash, no itching, no lesions. ENDOCRINE: No polyuria, polydipsia, no heat or cold intolerance. No recent change in weight. HEMATOLOGICAL: No anemia or easy bruising or bleeding. NEUROLOGIC: No headache, seizures, numbness, tingling or weakness. Physical Exam:  
 
Physical Exam: 
Visit Vitals BP 99/65 (BP 1 Location: Left arm, BP Patient Position: At rest) Pulse 62 Temp 97.9 °F (36.6 °C) Resp 16 Ht 5' 6\" (1.676 m) Wt 81 kg (178 lb 9.2 oz) SpO2 100% Breastfeeding? No  
BMI 28.82 kg/m² O2 Flow Rate (L/min): 1 l/min O2 Device: Nasal cannula Temp (24hrs), Av °F (36.1 °C), Min:95.5 °F (35.3 °C), Max:97.9 °F (36.6 °C) 
   190 -  0700 In: 300 [I.V.:300] Out: -    No intake/output data recorded. General:  Alert, cooperative, no distress, appears stated age. Head: Normocephalic, without obvious abnormality, atraumatic. Eyes:  Conjunctivae/corneas clear. PERRL, EOMs intact. Nose: Nares normal. No drainage or sinus tenderness. Neck: Supple, symmetrical, trachea midline, no adenopathy, thyroid: no enlargement, no carotid bruit and no JVD. Lungs:   Clear to auscultation bilaterally. diminished breath sounds with cough with inspiration Heart:  Irregularly irregular rate and rhythm, S1, S2 normal.  
  Abdomen: Soft, non-tender. Bowel sounds normal.   
Extremities: Extremities normal, atraumatic, no cyanosis or edema. Pulses: 2+ and symmetric all extremities. Skin:  No rashes or lesions Neurologic: AAOx3, No focal motor or sensory deficit. Labs Reviewed: All lab results for the last 24 hours reviewed. Recent Results (from the past 24 hour(s)) EKG, 12 LEAD, INITIAL Collection Time: 18  2:54 PM  
Result Value Ref Range Ventricular Rate 54 BPM  
 Atrial Rate 227 BPM  
 QRS Duration 104 ms Q-T Interval 476 ms QTC Calculation (Bezet) 451 ms Calculated R Axis -21 degrees Calculated T Axis 16 degrees Diagnosis Atrial fibrillation with slow ventricular response RSR' or QR pattern in V1 suggests right ventricular conduction delay Septal infarct (cited on or before 28-DEC-2017) Abnormal ECG Confirmed by Dawna Trammell MD, [place] ([id number]) on 2018 6:58:71 PM 
  
METABOLIC PANEL, COMPREHENSIVE Collection Time: 18  3:21 PM  
Result Value Ref Range Sodium 141 136 - 145 mmol/L Potassium 3.4 (L) 3.5 - 5.5 mmol/L Chloride 98 (L) 100 - 108 mmol/L  
 CO2 33 (H) 21 - 32 mmol/L Anion gap 10 3.0 - 18 mmol/L Glucose 81 74 - 99 mg/dL  BUN 61 (H) 7.0 - 18 MG/DL  
 Creatinine 2.57 (H) 0.6 - 1.3 MG/DL  
 BUN/Creatinine ratio 24 (H) 12 - 20 GFR est AA 23 (L) >60 ml/min/1.73m2 GFR est non-AA 19 (L) >60 ml/min/1.73m2 Calcium 8.5 8.5 - 10.1 MG/DL Bilirubin, total 0.2 0.2 - 1.0 MG/DL  
 ALT (SGPT) 21 13 - 56 U/L  
 AST (SGOT) 24 15 - 37 U/L Alk. phosphatase 87 45 - 117 U/L Protein, total 7.4 6.4 - 8.2 g/dL Albumin 3.4 3.4 - 5.0 g/dL Globulin 4.0 2.0 - 4.0 g/dL A-G Ratio 0.9 0.8 - 1.7    
CBC WITH AUTOMATED DIFF Collection Time: 11/02/18  3:21 PM  
Result Value Ref Range WBC 6.6 4.6 - 13.2 K/uL  
 RBC 3.62 (L) 4.20 - 5.30 M/uL HGB 9.7 (L) 12.0 - 16.0 g/dL HCT 29.3 (L) 35.0 - 45.0 % MCV 80.9 74.0 - 97.0 FL  
 MCH 26.8 24.0 - 34.0 PG  
 MCHC 33.1 31.0 - 37.0 g/dL  
 RDW 15.3 (H) 11.6 - 14.5 % PLATELET 435 693 - 752 K/uL NEUTROPHILS 54 40 - 73 % LYMPHOCYTES 27 21 - 52 % MONOCYTES 14 (H) 3 - 10 % EOSINOPHILS 5 0 - 5 % BASOPHILS 0 0 - 2 %  
 ABS. NEUTROPHILS 3.6 1.8 - 8.0 K/UL  
 ABS. LYMPHOCYTES 1.8 0.9 - 3.6 K/UL  
 ABS. MONOCYTES 0.9 0.05 - 1.2 K/UL  
 ABS. EOSINOPHILS 0.3 0.0 - 0.4 K/UL  
 ABS. BASOPHILS 0.0 0.0 - 0.1 K/UL  
 DF SMEAR SCANNED    
 PLATELET COMMENTS ADEQUATE PLATELETS    
 RBC COMMENTS NORMOCYTIC, NORMOCHROMIC    
TSH 3RD GENERATION Collection Time: 11/02/18  3:21 PM  
Result Value Ref Range TSH 0.54 0.36 - 3.74 uIU/mL T4, FREE Collection Time: 11/02/18  3:21 PM  
Result Value Ref Range T4, Free 1.3 0.7 - 1.5 NG/DL  
POC LACTIC ACID Collection Time: 11/02/18  3:26 PM  
Result Value Ref Range Lactic Acid (POC) 0.51 0.40 - 2.00 mmol/L  
URINALYSIS W/ RFLX MICROSCOPIC Collection Time: 11/02/18  5:40 PM  
Result Value Ref Range Color YELLOW Appearance CLEAR Specific gravity 1.007 1.005 - 1.030    
 pH (UA) 7.0 5.0 - 8.0 Protein NEGATIVE  NEG mg/dL Glucose NEGATIVE  NEG mg/dL Ketone NEGATIVE  NEG mg/dL Bilirubin NEGATIVE  NEG  Blood NEGATIVE  NEG    
 Urobilinogen 1.0 0.2 - 1.0 EU/dL Nitrites NEGATIVE  NEG Leukocyte Esterase NEGATIVE  NEG    
 and EKG Procedures/imaging: see electronic medical records for all procedures/Xrays and details which were not copied into this note but were reviewed prior to creation of Plan Assessment/Plan Active Problems: 
  Sepsis ([facility] (11/2/2018) Fluids per protocol Retrocardiac Infiltrate start broad abx Azrenonam /levaquin/ Vanc Note patient received 2 doses of zosyn in [facility]  
 
 MBC in remission ITP on implant infusion every Friday JUAN Hold diuretics for now Low dose fluids Hold lisinopril/hctz Chronic pain Resume oxycodone Morphine prn Aftib rate control Hold coreg Check digoxin level Continue Xarelto DVT/GI Prophylaxis: xarelto Discussed with patient at bedside about hospital admission and my plan care, who understood and agree with my plan care.  
 
Anne Goss MD 
11/3/2018 3:34 AM 
 [id number]

## 2020-01-13 NOTE — PROGRESS NOTES
PT DAILY TREATMENT NOTE - [facility]     Patient Name: Annemarie Oh  Date:2018  : 1950  [x]  Patient  Verified  Payor: [place] MEDICARE / Plan: [place] MEDICARE PART A & B / Product Type: Medicare /    In time:11:11  Out time:12:05  Total Treatment Time (min):  44  Total Timed Codes (min): 44  1:1 Treatment Time ( [address] only): 25   Visit #: 4 of 12    Treatment Area: Right leg weakness [R29.898]    SUBJECTIVE  Pain Level (0-10 scale): 5/10  Any medication changes, allergies to medications, adverse drug reactions, diagnosis change, or new procedure performed?: [x] No    [] Yes (see summary sheet for update)  Subjective functional status/changes:   [] No changes reported  \"I feel better. \"    OBJECTIVE    34 min Therapeutic Exercise:  [] See flow sheet :   Rationale: increase ROM and increase strength to improve the patients ability to perform ADLs without difficulty. 10 min Manual Therapy: STM/TPR to B lumbar paraspinals, upper glutes, and QLs,    Rationale: decrease pain, increase ROM, increase tissue extensibility and decrease trigger points to perform ADls without difficulty. With   [] TE   [] TA   [] neuro   [] other: Patient Education: [x] Review HEP    [] Progressed/Changed HEP based on:   [] positioning   [] body mechanics   [] transfers   [] heat/ice application    [] other:      Other Objective/Functional Measures:      Pain Level (0-10 scale) post treatment: 4.5/10    ASSESSMENT/Changes in Function: Continued with ex. Per flow sheet. Pt reported a slight decrease in pain after therapy today. Patient will continue to benefit from skilled PT services to modify and progress therapeutic interventions, address functional mobility deficits, address ROM deficits, address strength deficits and analyze and address soft tissue restrictions to attain remaining goals.      []  See Plan of Care  []  See progress note/recertification  []  See Discharge Summary         Progress towards goals / Updated [[email or web address]] : Persistent complaints after concussion including vertigo, cognitive impairment and headaches. Headaches do not exhibit features of migraine. Neurological exam was normal.  goals:  Short Term Goals: To be accomplished in 2 weeks:  1. Pt will be independent and compliant w/ HEP to progress gains in PT. At PN:  reported partial compliance 3/6/2018  2. Pt will report < or = to 3/10 pain prior to and following session to demo improve pain management at home. At PN:  prior pain 5.5. 3/26/18   Long Term Goals: To be accomplished in 6 weeks:  1. Pt will improve FOTO to > or = to 51 to demo improved function. At PN: FOTO = 38, 3/20/2018  Current: Progressing: FOTO = 41, increased by 5 since [facility]. 4/4/18  2. Pt will increase MMT right hip flex, ext, and abd to > or = to 4/5 for ease w/ improved standing tolerance. At PN:  Remains, MMT (R) hip abd: 3-/5 (3/22/18)   3. Pt will increase core activation as demo'd by supine bridge w/o increased pain x10 for ease w/ bed mobility. At PN:  Ability to achieve 50% of available AROM with pain 5-6/10, 3/20/2018  Current: Not met: Supine brdiges 50% ROM with 5/10 pain. 4/9/18  4. Pt will increase balance as demo'd by SLS B for > or = to 15 seconds w/o HHA for decreased fall risk. A PN: , Left SLS 5 sec, Right SLS 5 sec, 3/29/2018  5. Pt will ambulate w/ correct heel toe gait pattern for > or = to 500ft w/ LRAD for improved community navigation.    At PN: SPC with R UE with slowed sherrell and diminished terminal knee extension bilaterally during stance phase of gait, 3/14/2018    PLAN  [x]  Upgrade activities as tolerated     [x]  Continue plan of care  []  Update interventions per flow sheet       []  Discharge due to:_  []  Other:_      Deb Cross PTA 4/11/2018  11:41 AM    Future Appointments  Date Time Provider Nick Dixon   4/12/2018 9:30 AM Tha Flaherty, PT [facility]   4/13/2018 8:30 AM [facility] FAST TRACK NURSE [facility]   4/16/2018 9:30 AM Herson Aragon PTA [facility]   4/19/2018 9:30 AM Angela Gonzalez PT [facility]   4/20/2018 8:30 AM [facility] FAST TRACK NURSE [facility]   4/23/2018 9:30 AM Herson Aragon, PTA [facility] 4/25/2018 9:30 AM Viveca Old, PT [facility]   4/27/2018 8:30 AM [facility] FAST TRACK NURSE [facility]   5/4/2018 8:30 AM [facility] FAST TRACK NURSE [facility]   5/22/2018 10:20 AM Norma Ricci  [address]   7/23/2018 10:00 AM Erika Petit MD [facility] 69

## 2021-01-09 NOTE — DISCHARGE INSTRUCTIONS
1.  Preliminary reading of the ultrasound study of the leg is negative for deep vein clot. 2.  No fracture is noted on the x-ray of the ankle. 3.  Precise source for your pain is uncertain at this time. 4.  Manage pain with Norco 1 tablet every 6 hours as needed. 5.  Ace wrap to the ankle and lower leg may provide some symptomatic relief and support. 6.  Follow-up with your primary care physician for recheck next week if not improving. 7.  Return to the emergency department for severe swelling, severe pain, high fever, or other acute medical emergencies. Patient Education        Leg Pain: Care Instructions  Your Care Instructions  Many things can cause leg pain. Too much exercise or overuse can cause a muscle cramp (or charley horse). You can get leg cramps from not eating a balanced diet that has enough potassium, calcium, and other minerals. If you do not drink enough fluids or are taking certain medicines, you may develop leg cramps. Other causes of leg pain include injuries, blood flow problems, nerve damage, and twisted and enlarged veins (varicose veins). You can usually ease pain with self-care. Your doctor may recommend that you rest your leg and keep it elevated. Follow-up care is a key part of your treatment and safety. Be sure to make and go to all appointments, and call your doctor if you are having problems. It's also a good idea to know your test results and keep a list of the medicines you take. How can you care for yourself at home? · Take pain medicines exactly as directed. ? If the doctor gave you a prescription medicine for pain, take it as prescribed. ? If you are not taking a prescription pain medicine, ask your doctor if you can take an over-the-counter medicine. · Take any other medicines exactly as prescribed. Call your doctor if you think you are having a problem with your medicine. · Rest your leg while you have pain, and avoid standing for long periods of time.   · Prop Medical Necessity Information: It is in your best interest to select a reason for this procedure from the list below. All of these items fulfill various CMS LCD requirements except lesion extends to a margin. Include Z78.9 (Other Specified Conditions Influencing Health Status) As An Associated Diagnosis?: No up your leg at or above the level of your heart when possible. · Make sure you are eating a balanced diet that is rich in calcium, potassium, and magnesium, especially if you are pregnant. · If directed by your doctor, put ice or a cold pack on the area for 10 to 20 minutes at a time. Put a thin cloth between the ice and your skin. · Your leg may be in a splint, a brace, or an elastic bandage, and you may have crutches to help you walk. Follow your doctor's directions about how long to wear supports and how to use the crutches. When should you call for help? Call 911 anytime you think you may need emergency care. For example, call if:    · You have sudden chest pain and shortness of breath, or you cough up blood.     · Your leg is cool or pale or changes color.    Call your doctor now or seek immediate medical care if:    · You have increasing or severe pain.     · Your leg suddenly feels weak and you cannot move it.     · You have signs of a blood clot, such as:  ? Pain in your calf, back of the knee, thigh, or groin. ? Redness and swelling in your leg or groin.     · You have signs of infection, such as:  ? Increased pain, swelling, warmth, or redness. ? Red streaks leading from the sore area. ? Pus draining from a place on your leg. ? A fever.     · You cannot bear weight on your leg.    Watch closely for changes in your health, and be sure to contact your doctor if:    · You do not get better as expected. Where can you learn more? Go to http://leon-edinson.info/. Enter M459 in the search box to learn more about \"Leg Pain: Care Instructions. \"  Current as of: September 23, 2018  Content Version: 11.9  © 1005-1566 [employer]. Care instructions adapted under license by [employer] (which disclaims liability or warranty for this information).  If you have questions about a medical condition or this instruction, always ask your healthcare professional. [employer] disclaims any warranty or liability for your use of this information. Medical Necessity Clause: This procedure was medically necessary because the lesion that was treated was: Lab: 6 Lab Facility: 3 Referring Physician (Optional): Dr. Angelia Echavarria MD Surgeon Performing Repair (Optional): Dr. Angelia Echavarria MD Biopsy Photograph Reviewed: Yes Size Of Lesion In Cm: 1 X Size Of Lesion In Cm (Optional): 0 Size Of Margin In Cm: 0.2 Excision Method: Elliptical Repair Type: Intermediate Suturegard Retention Suture: 2-0 Nylon Retention Suture Bite Size: 3 mm Length To Time In Minutes Device Was In Place: 10 Intermediate / Complex Repair - Final Wound Length In Cm: 2 Undermining Type: Entire Wound Debridement Text: The wound edges were debrided prior to proceeding with the closure to facilitate wound healing. Helical Rim Text: The closure involved the helical rim. Vermilion Border Text: The closure involved the vermilion border. Nostril Rim Text: The closure involved the nostril rim. Retention Suture Text: Retention sutures were placed to support the closure and prevent dehiscence. Suture Removal: 14 days Epidermal Closure Graft Donor Site (Optional): simple interrupted Graft Donor Site Bandage (Optional-Leave Blank If You Don't Want In Note): Steri-strips and a pressure bandage were applied to the donor site. Detail Level: Detailed Excision Depth: adipose tissue Scalpel Size: 15 blade Anesthesia Type: 1% lidocaine with epinephrine Hemostasis: Pressure and Electrodesiccation Estimated Blood Loss (Cc): minimal Undermining Location (Optional): in the superficial subcutaneous fat Deep Sutures: 5-0 Vicryl Number Of Deep Sutures (Optional): 4 Epidermal Sutures: 4-0 Prolene Wound Care: Petrolatum Dressing: pressure dressing with telfa Suturegard Intro: Intraoperative tissue expansion was performed, utilizing the SUTUREGARD device, in order to reduce wound tension. Suturegard Body: The suture ends were repeatedly re-tightened and re-clamped to achieve the desired tissue expansion. Hemigard Intro: Due to skin fragility and wound tension, it was decided to use HEMIGARD adhesive retention suture devices to permit a linear closure. The skin was cleaned and dried for a 6cm distance away from the wound. Excessive hair, if present, was removed to allow for adhesion. Hemigard Postcare Instructions: The HEMIGARD strips are to remain completely dry for at least 5-7 days. Complex Repair Preamble Text (Leave Blank If You Do Not Want): Extensive wide undermining was performed. Intermediate Repair Preamble Text (Leave Blank If You Do Not Want): Undermining was performed with blunt dissection. Fusiform Excision Additional Text (Leave Blank If You Do Not Want): The margin was drawn around the clinically apparent lesion.  A fusiform shape was then drawn on the skin incorporating the lesion and margins.  Incisions were then made along these lines to the appropriate tissue plane and the lesion was extirpated. Eliptical Excision Additional Text (Leave Blank If You Do Not Want): The margin was drawn around the clinically apparent lesion.  An elliptical shape was then drawn on the skin incorporating the lesion and margins.  Incisions were then made along these lines to the appropriate tissue plane and the lesion was extirpated. Saucerization Excision Additional Text (Leave Blank If You Do Not Want): The margin was drawn around the clinically apparent lesion.  Incisions were then made along these lines, in a tangential fashion, to the appropriate tissue plane and the lesion was extirpated. Slit Excision Additional Text (Leave Blank If You Do Not Want): A linear line was drawn on the skin overlying the lesion. An incision was made slowly until the lesion was visualized.  Once visualized, the lesion was removed with blunt dissection. Excisional Biopsy Additional Text (Leave Blank If You Do Not Want): The margin was drawn around the clinically apparent lesion. An elliptical shape was then drawn on the skin incorporating the lesion and margins.  Incisions were then made along these lines to the appropriate tissue plane and the lesion was extirpated. Perilesional Excision Additional Text (Leave Blank If You Do Not Want): The margin was drawn around the clinically apparent lesion. Incisions were then made along these lines to the appropriate tissue plane and the lesion was extirpated. Repair Performed By Another Provider Text (Leave Blank If You Do Not Want): After the tissue was excised the defect was repaired by another provider. No Repair - Repaired With Adjacent Surgical Defect Text (Leave Blank If You Do Not Want): After the excision the defect was repaired concurrently with another surgical defect which was in close approximation. Advancement Flap (Single) Text: The defect edges were debeveled with a #15 scalpel blade.  Given the location of the defect and the proximity to free margins a single advancement flap was deemed most appropriate.  Using a sterile surgical marker, an appropriate advancement flap was drawn incorporating the defect and placing the expected incisions within the relaxed skin tension lines where possible.    The area thus outlined was incised deep to adipose tissue with a #15 scalpel blade.  The skin margins were undermined to an appropriate distance in all directions utilizing iris scissors. Advancement Flap (Double) Text: The defect edges were debeveled with a #15 scalpel blade.  Given the location of the defect and the proximity to free margins a double advancement flap was deemed most appropriate.  Using a sterile surgical marker, the appropriate advancement flaps were drawn incorporating the defect and placing the expected incisions within the relaxed skin tension lines where possible.    The area thus outlined was incised deep to adipose tissue with a #15 scalpel blade.  The skin margins were undermined to an appropriate distance in all directions utilizing iris scissors. Burow's Advancement Flap Text: The defect edges were debeveled with a #15 scalpel blade.  Given the location of the defect and the proximity to free margins a Burow's advancement flap was deemed most appropriate.  Using a sterile surgical marker, the appropriate advancement flap was drawn incorporating the defect and placing the expected incisions within the relaxed skin tension lines where possible.    The area thus outlined was incised deep to adipose tissue with a #15 scalpel blade.  The skin margins were undermined to an appropriate distance in all directions utilizing iris scissors. Chonodrocutaneous Helical Advancement Flap Text: The defect edges were debeveled with a #15 scalpel blade.  Given the location of the defect and the proximity to free margins a chondrocutaneous helical advancement flap was deemed most appropriate.  Using a sterile surgical marker, the appropriate advancement flap was drawn incorporating the defect and placing the expected incisions within the relaxed skin tension lines where possible.    The area thus outlined was incised deep to adipose tissue with a #15 scalpel blade.  The skin margins were undermined to an appropriate distance in all directions utilizing iris scissors. Crescentic Advancement Flap Text: The defect edges were debeveled with a #15 scalpel blade.  Given the location of the defect and the proximity to free margins a crescentic advancement flap was deemed most appropriate.  Using a sterile surgical marker, the appropriate advancement flap was drawn incorporating the defect and placing the expected incisions within the relaxed skin tension lines where possible.    The area thus outlined was incised deep to adipose tissue with a #15 scalpel blade.  The skin margins were undermined to an appropriate distance in all directions utilizing iris scissors. A-T Advancement Flap Text: The defect edges were debeveled with a #15 scalpel blade.  Given the location of the defect, shape of the defect and the proximity to free margins an A-T advancement flap was deemed most appropriate.  Using a sterile surgical marker, an appropriate advancement flap was drawn incorporating the defect and placing the expected incisions within the relaxed skin tension lines where possible.    The area thus outlined was incised deep to adipose tissue with a #15 scalpel blade.  The skin margins were undermined to an appropriate distance in all directions utilizing iris scissors. O-T Advancement Flap Text: The defect edges were debeveled with a #15 scalpel blade.  Given the location of the defect, shape of the defect and the proximity to free margins an O-T advancement flap was deemed most appropriate.  Using a sterile surgical marker, an appropriate advancement flap was drawn incorporating the defect and placing the expected incisions within the relaxed skin tension lines where possible.    The area thus outlined was incised deep to adipose tissue with a #15 scalpel blade.  The skin margins were undermined to an appropriate distance in all directions utilizing iris scissors. O-L Flap Text: The defect edges were debeveled with a #15 scalpel blade.  Given the location of the defect, shape of the defect and the proximity to free margins an O-L flap was deemed most appropriate.  Using a sterile surgical marker, an appropriate advancement flap was drawn incorporating the defect and placing the expected incisions within the relaxed skin tension lines where possible.    The area thus outlined was incised deep to adipose tissue with a #15 scalpel blade.  The skin margins were undermined to an appropriate distance in all directions utilizing iris scissors. O-Z Flap Text: The defect edges were debeveled with a #15 scalpel blade.  Given the location of the defect, shape of the defect and the proximity to free margins an O-Z flap was deemed most appropriate.  Using a sterile surgical marker, an appropriate transposition flap was drawn incorporating the defect and placing the expected incisions within the relaxed skin tension lines where possible. The area thus outlined was incised deep to adipose tissue with a #15 scalpel blade.  The skin margins were undermined to an appropriate distance in all directions utilizing iris scissors. Double O-Z Flap Text: The defect edges were debeveled with a #15 scalpel blade.  Given the location of the defect, shape of the defect and the proximity to free margins a Double O-Z flap was deemed most appropriate.  Using a sterile surgical marker, an appropriate transposition flap was drawn incorporating the defect and placing the expected incisions within the relaxed skin tension lines where possible. The area thus outlined was incised deep to adipose tissue with a #15 scalpel blade.  The skin margins were undermined to an appropriate distance in all directions utilizing iris scissors. V-Y Flap Text: The defect edges were debeveled with a #15 scalpel blade.  Given the location of the defect, shape of the defect and the proximity to free margins a V-Y flap was deemed most appropriate.  Using a sterile surgical marker, an appropriate advancement flap was drawn incorporating the defect and placing the expected incisions within the relaxed skin tension lines where possible.    The area thus outlined was incised deep to adipose tissue with a #15 scalpel blade.  The skin margins were undermined to an appropriate distance in all directions utilizing iris scissors. Mercedes Flap Text: The defect edges were debeveled with a #15 scalpel blade.  Given the location of the defect, shape of the defect and the proximity to free margins a Mercedes flap was deemed most appropriate.  Using a sterile surgical marker, an appropriate advancement flap was drawn incorporating the defect and placing the expected incisions within the relaxed skin tension lines where possible. The area thus outlined was incised deep to adipose tissue with a #15 scalpel blade.  The skin margins were undermined to an appropriate distance in all directions utilizing iris scissors. Modified Advancement Flap Text: The defect edges were debeveled with a #15 scalpel blade.  Given the location of the defect, shape of the defect and the proximity to free margins a modified advancement flap was deemed most appropriate.  Using a sterile surgical marker, an appropriate advancement flap was drawn incorporating the defect and placing the expected incisions within the relaxed skin tension lines where possible.    The area thus outlined was incised deep to adipose tissue with a #15 scalpel blade.  The skin margins were undermined to an appropriate distance in all directions utilizing iris scissors. Mucosal Advancement Flap Text: Given the location of the defect, shape of the defect and the proximity to free margins a mucosal advancement flap was deemed most appropriate. Incisions were made with a 15 blade scalpel in the appropriate fashion along the cutaneous vermillion border and the mucosal lip. The remaining actinically damaged mucosal tissue was excised.  The mucosal advancement flap was then elevated to the gingival sulcus with care taken to preserve the neurovascular structures and advanced into the primary defect. Care was taken to ensure that precise realignment of the vermilion border was achieved. Hatchet Flap Text: The defect edges were debeveled with a #15 scalpel blade.  Given the location of the defect, shape of the defect and the proximity to free margins a hatchet flap was deemed most appropriate.  Using a sterile surgical marker, an appropriate hatchet flap was drawn incorporating the defect and placing the expected incisions within the relaxed skin tension lines where possible.    The area thus outlined was incised deep to adipose tissue with a #15 scalpel blade.  The skin margins were undermined to an appropriate distance in all directions utilizing iris scissors. Rotation Flap Text: The defect edges were debeveled with a #15 scalpel blade.  Given the location of the defect, shape of the defect and the proximity to free margins a rotation flap was deemed most appropriate.  Using a sterile surgical marker, an appropriate rotation flap was drawn incorporating the defect and placing the expected incisions within the relaxed skin tension lines where possible.    The area thus outlined was incised deep to adipose tissue with a #15 scalpel blade.  The skin margins were undermined to an appropriate distance in all directions utilizing iris scissors. Spiral Flap Text: The defect edges were debeveled with a #15 scalpel blade.  Given the location of the defect, shape of the defect and the proximity to free margins a spiral flap was deemed most appropriate.  Using a sterile surgical marker, an appropriate rotation flap was drawn incorporating the defect and placing the expected incisions within the relaxed skin tension lines where possible. The area thus outlined was incised deep to adipose tissue with a #15 scalpel blade.  The skin margins were undermined to an appropriate distance in all directions utilizing iris scissors. Star Wedge Flap Text: The defect edges were debeveled with a #15 scalpel blade.  Given the location of the defect, shape of the defect and the proximity to free margins a star wedge flap was deemed most appropriate.  Using a sterile surgical marker, an appropriate rotation flap was drawn incorporating the defect and placing the expected incisions within the relaxed skin tension lines where possible. The area thus outlined was incised deep to adipose tissue with a #15 scalpel blade.  The skin margins were undermined to an appropriate distance in all directions utilizing iris scissors. Transposition Flap Text: The defect edges were debeveled with a #15 scalpel blade.  Given the location of the defect and the proximity to free margins a transposition flap was deemed most appropriate.  Using a sterile surgical marker, an appropriate transposition flap was drawn incorporating the defect.    The area thus outlined was incised deep to adipose tissue with a #15 scalpel blade.  The skin margins were undermined to an appropriate distance in all directions utilizing iris scissors. Muscle Hinge Flap Text: The defect edges were debeveled with a #15 scalpel blade.  Given the size, depth and location of the defect and the proximity to free margins a muscle hinge flap was deemed most appropriate.  Using a sterile surgical marker, an appropriate hinge flap was drawn incorporating the defect. The area thus outlined was incised with a #15 scalpel blade.  The skin margins were undermined to an appropriate distance in all directions utilizing iris scissors. Nasal Turnover Hinge Flap Text: The defect edges were debeveled with a #15 scalpel blade.  Given the size, depth, location of the defect and the defect being full thickness a nasal turnover hinge flap was deemed most appropriate.  Using a sterile surgical marker, an appropriate hinge flap was drawn incorporating the defect. The area thus outlined was incised with a #15 scalpel blade. The flap was designed to recreate the nasal mucosal lining and the alar rim. The skin margins were undermined to an appropriate distance in all directions utilizing iris scissors. Nasalis-Muscle-Based Myocutaneous Island Pedicle Flap Text: Using a #15 blade, an incision was made around the donor flap to the level of the nasalis muscle. Wide lateral undermining was then performed in both the subcutaneous plane above the nasalis muscle, and in a submuscular plane just above periosteum. This allowed the formation of a free nasalis muscle axial pedicle (based on the angular artery) which was still attached to the actual cutaneous flap, increasing its mobility and vascular viability. Hemostasis was obtained with pinpoint electrocoagulation. The flap was mobilized into position and the pivotal anchor points positioned and stabilized with buried interrupted sutures. Subcutaneous and dermal tissues were closed in a multilayered fashion with sutures. Tissue redundancies were excised, and the epidermal edges were apposed without significant tension and sutured with sutures. Orbicularis Oris Muscle Flap Text: The defect edges were debeveled with a #15 scalpel blade.  Given that the defect affected the competency of the oral sphincter an obicularis oris muscle flap was deemed most appropriate to restore this competency and normal muscle function.  Using a sterile surgical marker, an appropriate flap was drawn incorporating the defect. The area thus outlined was incised with a #15 scalpel blade. Melolabial Transposition Flap Text: The defect edges were debeveled with a #15 scalpel blade.  Given the location of the defect and the proximity to free margins a melolabial flap was deemed most appropriate.  Using a sterile surgical marker, an appropriate melolabial transposition flap was drawn incorporating the defect.    The area thus outlined was incised deep to adipose tissue with a #15 scalpel blade.  The skin margins were undermined to an appropriate distance in all directions utilizing iris scissors. Rhombic Flap Text: The defect edges were debeveled with a #15 scalpel blade.  Given the location of the defect and the proximity to free margins a rhombic flap was deemed most appropriate.  Using a sterile surgical marker, an appropriate rhombic flap was drawn incorporating the defect.    The area thus outlined was incised deep to adipose tissue with a #15 scalpel blade.  The skin margins were undermined to an appropriate distance in all directions utilizing iris scissors. Rhomboid Transposition Flap Text: The defect edges were debeveled with a #15 scalpel blade.  Given the location of the defect and the proximity to free margins a rhomboid transposition flap was deemed most appropriate.  Using a sterile surgical marker, an appropriate rhomboid flap was drawn incorporating the defect.    The area thus outlined was incised deep to adipose tissue with a #15 scalpel blade.  The skin margins were undermined to an appropriate distance in all directions utilizing iris scissors. Bi-Rhombic Flap Text: The defect edges were debeveled with a #15 scalpel blade.  Given the location of the defect and the proximity to free margins a bi-rhombic flap was deemed most appropriate.  Using a sterile surgical marker, an appropriate rhombic flap was drawn incorporating the defect. The area thus outlined was incised deep to adipose tissue with a #15 scalpel blade.  The skin margins were undermined to an appropriate distance in all directions utilizing iris scissors. Helical Rim Advancement Flap Text: The defect edges were debeveled with a #15 blade scalpel.  Given the location of the defect and the proximity to free margins (helical rim) a double helical rim advancement flap was deemed most appropriate.  Using a sterile surgical marker, the appropriate advancement flaps were drawn incorporating the defect and placing the expected incisions between the helical rim and antihelix where possible.  The area thus outlined was incised through and through with a #15 scalpel blade.  With a skin hook and iris scissors, the flaps were gently and sharply undermined and freed up. Bilateral Helical Rim Advancement Flap Text: The defect edges were debeveled with a #15 blade scalpel.  Given the location of the defect and the proximity to free margins (helical rim) a bilateral helical rim advancement flap was deemed most appropriate.  Using a sterile surgical marker, the appropriate advancement flaps were drawn incorporating the defect and placing the expected incisions between the helical rim and antihelix where possible.  The area thus outlined was incised through and through with a #15 scalpel blade.  With a skin hook and iris scissors, the flaps were gently and sharply undermined and freed up. Ear Star Wedge Flap Text: The defect edges were debeveled with a #15 blade scalpel.  Given the location of the defect and the proximity to free margins (helical rim) an ear star wedge flap was deemed most appropriate.  Using a sterile surgical marker, the appropriate flap was drawn incorporating the defect and placing the expected incisions between the helical rim and antihelix where possible.  The area thus outlined was incised through and through with a #15 scalpel blade. Banner Transposition Flap Text: The defect edges were debeveled with a #15 scalpel blade.  Given the location of the defect and the proximity to free margins a Banner transposition flap was deemed most appropriate.  Using a sterile surgical marker, an appropriate flap drawn around the defect. The area thus outlined was incised deep to adipose tissue with a #15 scalpel blade.  The skin margins were undermined to an appropriate distance in all directions utilizing iris scissors. Bilobed Flap Text: The defect edges were debeveled with a #15 scalpel blade.  Given the location of the defect and the proximity to free margins a bilobe flap was deemed most appropriate.  Using a sterile surgical marker, an appropriate bilobe flap drawn around the defect.    The area thus outlined was incised deep to adipose tissue with a #15 scalpel blade.  The skin margins were undermined to an appropriate distance in all directions utilizing iris scissors. Bilobed Transposition Flap Text: The defect edges were debeveled with a #15 scalpel blade.  Given the location of the defect and the proximity to free margins a bilobed transposition flap was deemed most appropriate.  Using a sterile surgical marker, an appropriate bilobe flap drawn around the defect.    The area thus outlined was incised deep to adipose tissue with a #15 scalpel blade.  The skin margins were undermined to an appropriate distance in all directions utilizing iris scissors. Trilobed Flap Text: The defect edges were debeveled with a #15 scalpel blade.  Given the location of the defect and the proximity to free margins a trilobed flap was deemed most appropriate.  Using a sterile surgical marker, an appropriate trilobed flap drawn around the defect.    The area thus outlined was incised deep to adipose tissue with a #15 scalpel blade.  The skin margins were undermined to an appropriate distance in all directions utilizing iris scissors. Dorsal Nasal Flap Text: The defect edges were debeveled with a #15 scalpel blade.  Given the location of the defect and the proximity to free margins a dorsal nasal flap was deemed most appropriate.  Using a sterile surgical marker, an appropriate dorsal nasal flap was drawn around the defect.    The area thus outlined was incised deep to adipose tissue with a #15 scalpel blade.  The skin margins were undermined to an appropriate distance in all directions utilizing iris scissors. Island Pedicle Flap Text: The defect edges were debeveled with a #15 scalpel blade.  Given the location of the defect, shape of the defect and the proximity to free margins an island pedicle advancement flap was deemed most appropriate.  Using a sterile surgical marker, an appropriate advancement flap was drawn incorporating the defect, outlining the appropriate donor tissue and placing the expected incisions within the relaxed skin tension lines where possible.    The area thus outlined was incised deep to adipose tissue with a #15 scalpel blade.  The skin margins were undermined to an appropriate distance in all directions around the primary defect and laterally outward around the island pedicle utilizing iris scissors.  There was minimal undermining beneath the pedicle flap. Island Pedicle Flap With Canthal Suspension Text: The defect edges were debeveled with a #15 scalpel blade.  Given the location of the defect, shape of the defect and the proximity to free margins an island pedicle advancement flap was deemed most appropriate.  Using a sterile surgical marker, an appropriate advancement flap was drawn incorporating the defect, outlining the appropriate donor tissue and placing the expected incisions within the relaxed skin tension lines where possible. The area thus outlined was incised deep to adipose tissue with a #15 scalpel blade.  The skin margins were undermined to an appropriate distance in all directions around the primary defect and laterally outward around the island pedicle utilizing iris scissors.  There was minimal undermining beneath the pedicle flap. A suspension suture was placed in the canthal tendon to prevent tension and prevent ectropion. Alar Island Pedicle Flap Text: The defect edges were debeveled with a #15 scalpel blade.  Given the location of the defect, shape of the defect and the proximity to the alar rim an island pedicle advancement flap was deemed most appropriate.  Using a sterile surgical marker, an appropriate advancement flap was drawn incorporating the defect, outlining the appropriate donor tissue and placing the expected incisions within the nasal ala running parallel to the alar rim. The area thus outlined was incised with a #15 scalpel blade.  The skin margins were undermined minimally to an appropriate distance in all directions around the primary defect and laterally outward around the island pedicle utilizing iris scissors.  There was minimal undermining beneath the pedicle flap. Double Island Pedicle Flap Text: The defect edges were debeveled with a #15 scalpel blade.  Given the location of the defect, shape of the defect and the proximity to free margins a double island pedicle advancement flap was deemed most appropriate.  Using a sterile surgical marker, an appropriate advancement flap was drawn incorporating the defect, outlining the appropriate donor tissue and placing the expected incisions within the relaxed skin tension lines where possible.    The area thus outlined was incised deep to adipose tissue with a #15 scalpel blade.  The skin margins were undermined to an appropriate distance in all directions around the primary defect and laterally outward around the island pedicle utilizing iris scissors.  There was minimal undermining beneath the pedicle flap. Island Pedicle Flap-Requiring Vessel Identification Text: The defect edges were debeveled with a #15 scalpel blade.  Given the location of the defect, shape of the defect and the proximity to free margins an island pedicle advancement flap was deemed most appropriate.  Using a sterile surgical marker, an appropriate advancement flap was drawn, based on the axial vessel mentioned above, incorporating the defect, outlining the appropriate donor tissue and placing the expected incisions within the relaxed skin tension lines where possible.    The area thus outlined was incised deep to adipose tissue with a #15 scalpel blade.  The skin margins were undermined to an appropriate distance in all directions around the primary defect and laterally outward around the island pedicle utilizing iris scissors.  There was minimal undermining beneath the pedicle flap. Keystone Flap Text: The defect edges were debeveled with a #15 scalpel blade.  Given the location of the defect, shape of the defect a keystone flap was deemed most appropriate.  Using a sterile surgical marker, an appropriate keystone flap was drawn incorporating the defect, outlining the appropriate donor tissue and placing the expected incisions within the relaxed skin tension lines where possible. The area thus outlined was incised deep to adipose tissue with a #15 scalpel blade.  The skin margins were undermined to an appropriate distance in all directions around the primary defect and laterally outward around the flap utilizing iris scissors. O-T Plasty Text: The defect edges were debeveled with a #15 scalpel blade.  Given the location of the defect, shape of the defect and the proximity to free margins an O-T plasty was deemed most appropriate.  Using a sterile surgical marker, an appropriate O-T plasty was drawn incorporating the defect and placing the expected incisions within the relaxed skin tension lines where possible.    The area thus outlined was incised deep to adipose tissue with a #15 scalpel blade.  The skin margins were undermined to an appropriate distance in all directions utilizing iris scissors. O-Z Plasty Text: The defect edges were debeveled with a #15 scalpel blade.  Given the location of the defect, shape of the defect and the proximity to free margins an O-Z plasty (double transposition flap) was deemed most appropriate.  Using a sterile surgical marker, the appropriate transposition flaps were drawn incorporating the defect and placing the expected incisions within the relaxed skin tension lines where possible.    The area thus outlined was incised deep to adipose tissue with a #15 scalpel blade.  The skin margins were undermined to an appropriate distance in all directions utilizing iris scissors.  Hemostasis was achieved with electrocautery.  The flaps were then transposed into place, one clockwise and the other counterclockwise, and anchored with interrupted buried subcutaneous sutures. Double O-Z Plasty Text: The defect edges were debeveled with a #15 scalpel blade.  Given the location of the defect, shape of the defect and the proximity to free margins a Double O-Z plasty (double transposition flap) was deemed most appropriate.  Using a sterile surgical marker, the appropriate transposition flaps were drawn incorporating the defect and placing the expected incisions within the relaxed skin tension lines where possible. The area thus outlined was incised deep to adipose tissue with a #15 scalpel blade.  The skin margins were undermined to an appropriate distance in all directions utilizing iris scissors.  Hemostasis was achieved with electrocautery.  The flaps were then transposed into place, one clockwise and the other counterclockwise, and anchored with interrupted buried subcutaneous sutures. V-Y Plasty Text: The defect edges were debeveled with a #15 scalpel blade.  Given the location of the defect, shape of the defect and the proximity to free margins an V-Y advancement flap was deemed most appropriate.  Using a sterile surgical marker, an appropriate advancement flap was drawn incorporating the defect and placing the expected incisions within the relaxed skin tension lines where possible.    The area thus outlined was incised deep to adipose tissue with a #15 scalpel blade.  The skin margins were undermined to an appropriate distance in all directions utilizing iris scissors. H Plasty Text: Given the location of the defect, shape of the defect and the proximity to free margins a H-plasty was deemed most appropriate for repair.  Using a sterile surgical marker, the appropriate advancement arms of the H-plasty were drawn incorporating the defect and placing the expected incisions within the relaxed skin tension lines where possible. The area thus outlined was incised deep to adipose tissue with a #15 scalpel blade. The skin margins were undermined to an appropriate distance in all directions utilizing iris scissors.  The opposing advancement arms were then advanced into place in opposite direction and anchored with interrupted buried subcutaneous sutures. W Plasty Text: The lesion was extirpated to the level of the fat with a #15 scalpel blade.  Given the location of the defect, shape of the defect and the proximity to free margins a W-plasty was deemed most appropriate for repair.  Using a sterile surgical marker, the appropriate transposition arms of the W-plasty were drawn incorporating the defect and placing the expected incisions within the relaxed skin tension lines where possible.    The area thus outlined was incised deep to adipose tissue with a #15 scalpel blade.  The skin margins were undermined to an appropriate distance in all directions utilizing iris scissors.  The opposing transposition arms were then transposed into place in opposite direction and anchored with interrupted buried subcutaneous sutures. Z Plasty Text: The lesion was extirpated to the level of the fat with a #15 scalpel blade.  Given the location of the defect, shape of the defect and the proximity to free margins a Z-plasty was deemed most appropriate for repair.  Using a sterile surgical marker, the appropriate transposition arms of the Z-plasty were drawn incorporating the defect and placing the expected incisions within the relaxed skin tension lines where possible.    The area thus outlined was incised deep to adipose tissue with a #15 scalpel blade.  The skin margins were undermined to an appropriate distance in all directions utilizing iris scissors.  The opposing transposition arms were then transposed into place in opposite direction and anchored with interrupted buried subcutaneous sutures. Zygomaticofacial Flap Text: Given the location of the defect, shape of the defect and the proximity to free margins a zygomaticofacial flap was deemed most appropriate for repair.  Using a sterile surgical marker, the appropriate flap was drawn incorporating the defect and placing the expected incisions within the relaxed skin tension lines where possible. The area thus outlined was incised deep to adipose tissue with a #15 scalpel blade with preservation of a vascular pedicle.  The skin margins were undermined to an appropriate distance in all directions utilizing iris scissors.  The flap was then placed into the defect and anchored with interrupted buried subcutaneous sutures. Cheek Interpolation Flap Text: A decision was made to reconstruct the defect utilizing an interpolation axial flap and a staged reconstruction.  A telfa template was made of the defect.  This telfa template was then used to outline the Cheek Interpolation flap.  The donor area for the pedicle flap was then injected with anesthesia.  The flap was excised through the skin and subcutaneous tissue down to the layer of the underlying musculature.  The interpolation flap was carefully excised within this deep plane to maintain its blood supply.  The edges of the donor site were undermined.   The donor site was closed in a primary fashion.  The pedicle was then rotated into position and sutured.  Once the tube was sutured into place, adequate blood supply was confirmed with blanching and refill.  The pedicle was then wrapped with xeroform gauze and dressed appropriately with a telfa and gauze bandage to ensure continued blood supply and protect the attached pedicle. Cheek-To-Nose Interpolation Flap Text: A decision was made to reconstruct the defect utilizing an interpolation axial flap and a staged reconstruction.  A telfa template was made of the defect.  This telfa template was then used to outline the Cheek-To-Nose Interpolation flap.  The donor area for the pedicle flap was then injected with anesthesia.  The flap was excised through the skin and subcutaneous tissue down to the layer of the underlying musculature.  The interpolation flap was carefully excised within this deep plane to maintain its blood supply.  The edges of the donor site were undermined.   The donor site was closed in a primary fashion.  The pedicle was then rotated into position and sutured.  Once the tube was sutured into place, adequate blood supply was confirmed with blanching and refill.  The pedicle was then wrapped with xeroform gauze and dressed appropriately with a telfa and gauze bandage to ensure continued blood supply and protect the attached pedicle. Interpolation Flap Text: A decision was made to reconstruct the defect utilizing an interpolation axial flap and a staged reconstruction.  A telfa template was made of the defect.  This telfa template was then used to outline the interpolation flap.  The donor area for the pedicle flap was then injected with anesthesia.  The flap was excised through the skin and subcutaneous tissue down to the layer of the underlying musculature.  The interpolation flap was carefully excised within this deep plane to maintain its blood supply.  The edges of the donor site were undermined.   The donor site was closed in a primary fashion.  The pedicle was then rotated into position and sutured.  Once the tube was sutured into place, adequate blood supply was confirmed with blanching and refill.  The pedicle was then wrapped with xeroform gauze and dressed appropriately with a telfa and gauze bandage to ensure continued blood supply and protect the attached pedicle. Melolabial Interpolation Flap Text: A decision was made to reconstruct the defect utilizing an interpolation axial flap and a staged reconstruction.  A telfa template was made of the defect.  This telfa template was then used to outline the melolabial interpolation flap.  The donor area for the pedicle flap was then injected with anesthesia.  The flap was excised through the skin and subcutaneous tissue down to the layer of the underlying musculature.  The pedicle flap was carefully excised within this deep plane to maintain its blood supply.  The edges of the donor site were undermined.   The donor site was closed in a primary fashion.  The pedicle was then rotated into position and sutured.  Once the tube was sutured into place, adequate blood supply was confirmed with blanching and refill.  The pedicle was then wrapped with xeroform gauze and dressed appropriately with a telfa and gauze bandage to ensure continued blood supply and protect the attached pedicle. Mastoid Interpolation Flap Text: A decision was made to reconstruct the defect utilizing an interpolation axial flap and a staged reconstruction.  A telfa template was made of the defect.  This telfa template was then used to outline the mastoid interpolation flap.  The donor area for the pedicle flap was then injected with anesthesia.  The flap was excised through the skin and subcutaneous tissue down to the layer of the underlying musculature.  The pedicle flap was carefully excised within this deep plane to maintain its blood supply.  The edges of the donor site were undermined.   The donor site was closed in a primary fashion.  The pedicle was then rotated into position and sutured.  Once the tube was sutured into place, adequate blood supply was confirmed with blanching and refill.  The pedicle was then wrapped with xeroform gauze and dressed appropriately with a telfa and gauze bandage to ensure continued blood supply and protect the attached pedicle. Posterior Auricular Interpolation Flap Text: A decision was made to reconstruct the defect utilizing an interpolation axial flap and a staged reconstruction.  A telfa template was made of the defect.  This telfa template was then used to outline the posterior auricular interpolation flap.  The donor area for the pedicle flap was then injected with anesthesia.  The flap was excised through the skin and subcutaneous tissue down to the layer of the underlying musculature.  The pedicle flap was carefully excised within this deep plane to maintain its blood supply.  The edges of the donor site were undermined.   The donor site was closed in a primary fashion.  The pedicle was then rotated into position and sutured.  Once the tube was sutured into place, adequate blood supply was confirmed with blanching and refill.  The pedicle was then wrapped with xeroform gauze and dressed appropriately with a telfa and gauze bandage to ensure continued blood supply and protect the attached pedicle. Paramedian Forehead Flap Text: A decision was made to reconstruct the defect utilizing an interpolation axial flap and a staged reconstruction.  A telfa template was made of the defect.  This telfa template was then used to outline the paramedian forehead pedicle flap.  The donor area for the pedicle flap was then injected with anesthesia.  The flap was excised through the skin and subcutaneous tissue down to the layer of the underlying musculature.  The pedicle flap was carefully excised within this deep plane to maintain its blood supply.  The edges of the donor site were undermined.   The donor site was closed in a primary fashion.  The pedicle was then rotated into position and sutured.  Once the tube was sutured into place, adequate blood supply was confirmed with blanching and refill.  The pedicle was then wrapped with xeroform gauze and dressed appropriately with a telfa and gauze bandage to ensure continued blood supply and protect the attached pedicle. Lip Wedge Excision Repair Text: Given the location of the defect and the proximity to free margins a full thickness wedge repair was deemed most appropriate.  Using a sterile surgical marker, the appropriate repair was drawn incorporating the defect and placing the expected incisions perpendicular to the vermilion border.  The vermilion border was also meticulously outlined to ensure appropriate reapproximation during the repair.  The area thus outlined was incised through and through with a #15 scalpel blade.  The muscularis and dermis were reaproximated with deep sutures following hemostasis. Care was taken to realign the vermilion border before proceeding with the superficial closure.  Once the vermilion was realigned the superfical and mucosal closure was finished. Ftsg Text: The defect edges were debeveled with a #15 scalpel blade.  Given the location of the defect, shape of the defect and the proximity to free margins a full thickness skin graft was deemed most appropriate.  Using a sterile surgical marker, the primary defect shape was transferred to the donor site. The area thus outlined was incised deep to adipose tissue with a #15 scalpel blade.  The harvested graft was then trimmed of adipose tissue until only dermis and epidermis was left.  The skin margins of the secondary defect were undermined to an appropriate distance in all directions utilizing iris scissors.  The secondary defect was closed with interrupted buried subcutaneous sutures.  The skin edges were then re-apposed with running  sutures.  The skin graft was then placed in the primary defect and oriented appropriately. Split-Thickness Skin Graft Text: The defect edges were debeveled with a #15 scalpel blade.  Given the location of the defect, shape of the defect and the proximity to free margins a split thickness skin graft was deemed most appropriate.  Using a sterile surgical marker, the primary defect shape was transferred to the donor site. The split thickness graft was then harvested.  The skin graft was then placed in the primary defect and oriented appropriately. Burow's Graft Text: The defect edges were debeveled with a #15 scalpel blade.  Given the location of the defect, shape of the defect, the proximity to free margins and the presence of a standing cone deformity a Burow's skin graft was deemed most appropriate. The standing cone was removed and this tissue was then trimmed to the shape of the primary defect. The adipose tissue was also removed until only dermis and epidermis were left.  The skin margins of the secondary defect were undermined to an appropriate distance in all directions utilizing iris scissors.  The secondary defect was closed with interrupted buried subcutaneous sutures.  The skin edges were then re-apposed with running  sutures.  The skin graft was then placed in the primary defect and oriented appropriately. Cartilage Graft Text: The defect edges were debeveled with a #15 scalpel blade.  Given the location of the defect, shape of the defect, the fact the defect involved a full thickness cartilage defect a cartilage graft was deemed most appropriate.  An appropriate donor site was identified, cleansed, and anesthetized. The cartilage graft was then harvested and transferred to the recipient site, oriented appropriately and then sutured into place.  The secondary defect was then repaired using a primary closure. Composite Graft Text: The defect edges were debeveled with a #15 scalpel blade.  Given the location of the defect, shape of the defect, the proximity to free margins and the fact the defect was full thickness a composite graft was deemed most appropriate.  The defect was outline and then transferred to the donor site.  A full thickness graft was then excised from the donor site. The graft was then placed in the primary defect, oriented appropriately and then sutured into place.  The secondary defect was then repaired using a primary closure. Epidermal Autograft Text: The defect edges were debeveled with a #15 scalpel blade.  Given the location of the defect, shape of the defect and the proximity to free margins an epidermal autograft was deemed most appropriate.  Using a sterile surgical marker, the primary defect shape was transferred to the donor site. The epidermal graft was then harvested.  The skin graft was then placed in the primary defect and oriented appropriately. Dermal Autograft Text: The defect edges were debeveled with a #15 scalpel blade.  Given the location of the defect, shape of the defect and the proximity to free margins a dermal autograft was deemed most appropriate.  Using a sterile surgical marker, the primary defect shape was transferred to the donor site. The area thus outlined was incised deep to adipose tissue with a #15 scalpel blade.  The harvested graft was then trimmed of adipose and epidermal tissue until only dermis was left.  The skin graft was then placed in the primary defect and oriented appropriately. Skin Substitute Text: The defect edges were debeveled with a #15 scalpel blade.  Given the location of the defect, shape of the defect and the proximity to free margins a skin substitute graft was deemed most appropriate.  The graft material was trimmed to fit the size of the defect. The graft was then placed in the primary defect and oriented appropriately. Tissue Cultured Epidermal Autograft Text: The defect edges were debeveled with a #15 scalpel blade.  Given the location of the defect, shape of the defect and the proximity to free margins a tissue cultured epidermal autograft was deemed most appropriate.  The graft was then trimmed to fit the size of the defect.  The graft was then placed in the primary defect and oriented appropriately. Xenograft Text: The defect edges were debeveled with a #15 scalpel blade.  Given the location of the defect, shape of the defect and the proximity to free margins a xenograft was deemed most appropriate.  The graft was then trimmed to fit the size of the defect.  The graft was then placed in the primary defect and oriented appropriately. Purse String (Intermediate) Text: Given the location of the defect and the characteristics of the surrounding skin a purse string intermediate closure was deemed most appropriate.  Undermining was performed circumferentially around the surgical defect.  A purse string suture was then placed and tightened. Purse String (Simple) Text: Given the location of the defect and the characteristics of the surrounding skin a purse string simple closure was deemed most appropriate.  Undermining was performed circumferentially around the surgical defect.  A purse string suture was then placed and tightened. Complex Repair And Single Advancement Flap Text: The defect edges were debeveled with a #15 scalpel blade.  The primary defect was closed partially with a complex linear closure.  Given the location of the remaining defect, shape of the defect and the proximity to free margins a single advancement flap was deemed most appropriate for complete closure of the defect.  Using a sterile surgical marker, an appropriate advancement flap was drawn incorporating the defect and placing the expected incisions within the relaxed skin tension lines where possible.    The area thus outlined was incised deep to adipose tissue with a #15 scalpel blade.  The skin margins were undermined to an appropriate distance in all directions utilizing iris scissors. Complex Repair And Double Advancement Flap Text: The defect edges were debeveled with a #15 scalpel blade.  The primary defect was closed partially with a complex linear closure.  Given the location of the remaining defect, shape of the defect and the proximity to free margins a double advancement flap was deemed most appropriate for complete closure of the defect.  Using a sterile surgical marker, an appropriate advancement flap was drawn incorporating the defect and placing the expected incisions within the relaxed skin tension lines where possible.    The area thus outlined was incised deep to adipose tissue with a #15 scalpel blade.  The skin margins were undermined to an appropriate distance in all directions utilizing iris scissors. Complex Repair And Modified Advancement Flap Text: The defect edges were debeveled with a #15 scalpel blade.  The primary defect was closed partially with a complex linear closure.  Given the location of the remaining defect, shape of the defect and the proximity to free margins a modified advancement flap was deemed most appropriate for complete closure of the defect.  Using a sterile surgical marker, an appropriate advancement flap was drawn incorporating the defect and placing the expected incisions within the relaxed skin tension lines where possible.    The area thus outlined was incised deep to adipose tissue with a #15 scalpel blade.  The skin margins were undermined to an appropriate distance in all directions utilizing iris scissors. Complex Repair And A-T Advancement Flap Text: The defect edges were debeveled with a #15 scalpel blade.  The primary defect was closed partially with a complex linear closure.  Given the location of the remaining defect, shape of the defect and the proximity to free margins an A-T advancement flap was deemed most appropriate for complete closure of the defect.  Using a sterile surgical marker, an appropriate advancement flap was drawn incorporating the defect and placing the expected incisions within the relaxed skin tension lines where possible.    The area thus outlined was incised deep to adipose tissue with a #15 scalpel blade.  The skin margins were undermined to an appropriate distance in all directions utilizing iris scissors. Complex Repair And O-T Advancement Flap Text: The defect edges were debeveled with a #15 scalpel blade.  The primary defect was closed partially with a complex linear closure.  Given the location of the remaining defect, shape of the defect and the proximity to free margins an O-T advancement flap was deemed most appropriate for complete closure of the defect.  Using a sterile surgical marker, an appropriate advancement flap was drawn incorporating the defect and placing the expected incisions within the relaxed skin tension lines where possible.    The area thus outlined was incised deep to adipose tissue with a #15 scalpel blade.  The skin margins were undermined to an appropriate distance in all directions utilizing iris scissors. Complex Repair And O-L Flap Text: The defect edges were debeveled with a #15 scalpel blade.  The primary defect was closed partially with a complex linear closure.  Given the location of the remaining defect, shape of the defect and the proximity to free margins an O-L flap was deemed most appropriate for complete closure of the defect.  Using a sterile surgical marker, an appropriate flap was drawn incorporating the defect and placing the expected incisions within the relaxed skin tension lines where possible.    The area thus outlined was incised deep to adipose tissue with a #15 scalpel blade.  The skin margins were undermined to an appropriate distance in all directions utilizing iris scissors. Complex Repair And Bilobe Flap Text: The defect edges were debeveled with a #15 scalpel blade.  The primary defect was closed partially with a complex linear closure.  Given the location of the remaining defect, shape of the defect and the proximity to free margins a bilobe flap was deemed most appropriate for complete closure of the defect.  Using a sterile surgical marker, an appropriate advancement flap was drawn incorporating the defect and placing the expected incisions within the relaxed skin tension lines where possible.    The area thus outlined was incised deep to adipose tissue with a #15 scalpel blade.  The skin margins were undermined to an appropriate distance in all directions utilizing iris scissors. Complex Repair And Melolabial Flap Text: The defect edges were debeveled with a #15 scalpel blade.  The primary defect was closed partially with a complex linear closure.  Given the location of the remaining defect, shape of the defect and the proximity to free margins a melolabial flap was deemed most appropriate for complete closure of the defect.  Using a sterile surgical marker, an appropriate advancement flap was drawn incorporating the defect and placing the expected incisions within the relaxed skin tension lines where possible.    The area thus outlined was incised deep to adipose tissue with a #15 scalpel blade.  The skin margins were undermined to an appropriate distance in all directions utilizing iris scissors. Complex Repair And Rotation Flap Text: The defect edges were debeveled with a #15 scalpel blade.  The primary defect was closed partially with a complex linear closure.  Given the location of the remaining defect, shape of the defect and the proximity to free margins a rotation flap was deemed most appropriate for complete closure of the defect.  Using a sterile surgical marker, an appropriate advancement flap was drawn incorporating the defect and placing the expected incisions within the relaxed skin tension lines where possible.    The area thus outlined was incised deep to adipose tissue with a #15 scalpel blade.  The skin margins were undermined to an appropriate distance in all directions utilizing iris scissors. Complex Repair And Rhombic Flap Text: The defect edges were debeveled with a #15 scalpel blade.  The primary defect was closed partially with a complex linear closure.  Given the location of the remaining defect, shape of the defect and the proximity to free margins a rhombic flap was deemed most appropriate for complete closure of the defect.  Using a sterile surgical marker, an appropriate advancement flap was drawn incorporating the defect and placing the expected incisions within the relaxed skin tension lines where possible.    The area thus outlined was incised deep to adipose tissue with a #15 scalpel blade.  The skin margins were undermined to an appropriate distance in all directions utilizing iris scissors. Complex Repair And Transposition Flap Text: The defect edges were debeveled with a #15 scalpel blade.  The primary defect was closed partially with a complex linear closure.  Given the location of the remaining defect, shape of the defect and the proximity to free margins a transposition flap was deemed most appropriate for complete closure of the defect.  Using a sterile surgical marker, an appropriate advancement flap was drawn incorporating the defect and placing the expected incisions within the relaxed skin tension lines where possible.    The area thus outlined was incised deep to adipose tissue with a #15 scalpel blade.  The skin margins were undermined to an appropriate distance in all directions utilizing iris scissors. Complex Repair And V-Y Plasty Text: The defect edges were debeveled with a #15 scalpel blade.  The primary defect was closed partially with a complex linear closure.  Given the location of the remaining defect, shape of the defect and the proximity to free margins a V-Y plasty was deemed most appropriate for complete closure of the defect.  Using a sterile surgical marker, an appropriate advancement flap was drawn incorporating the defect and placing the expected incisions within the relaxed skin tension lines where possible.    The area thus outlined was incised deep to adipose tissue with a #15 scalpel blade.  The skin margins were undermined to an appropriate distance in all directions utilizing iris scissors. Complex Repair And M Plasty Text: The defect edges were debeveled with a #15 scalpel blade.  The primary defect was closed partially with a complex linear closure.  Given the location of the remaining defect, shape of the defect and the proximity to free margins an M plasty was deemed most appropriate for complete closure of the defect.  Using a sterile surgical marker, an appropriate advancement flap was drawn incorporating the defect and placing the expected incisions within the relaxed skin tension lines where possible.    The area thus outlined was incised deep to adipose tissue with a #15 scalpel blade.  The skin margins were undermined to an appropriate distance in all directions utilizing iris scissors. Complex Repair And Double M Plasty Text: The defect edges were debeveled with a #15 scalpel blade.  The primary defect was closed partially with a complex linear closure.  Given the location of the remaining defect, shape of the defect and the proximity to free margins a double M plasty was deemed most appropriate for complete closure of the defect.  Using a sterile surgical marker, an appropriate advancement flap was drawn incorporating the defect and placing the expected incisions within the relaxed skin tension lines where possible.    The area thus outlined was incised deep to adipose tissue with a #15 scalpel blade.  The skin margins were undermined to an appropriate distance in all directions utilizing iris scissors. Complex Repair And W Plasty Text: The defect edges were debeveled with a #15 scalpel blade.  The primary defect was closed partially with a complex linear closure.  Given the location of the remaining defect, shape of the defect and the proximity to free margins a W plasty was deemed most appropriate for complete closure of the defect.  Using a sterile surgical marker, an appropriate advancement flap was drawn incorporating the defect and placing the expected incisions within the relaxed skin tension lines where possible.    The area thus outlined was incised deep to adipose tissue with a #15 scalpel blade.  The skin margins were undermined to an appropriate distance in all directions utilizing iris scissors. Complex Repair And Z Plasty Text: The defect edges were debeveled with a #15 scalpel blade.  The primary defect was closed partially with a complex linear closure.  Given the location of the remaining defect, shape of the defect and the proximity to free margins a Z plasty was deemed most appropriate for complete closure of the defect.  Using a sterile surgical marker, an appropriate advancement flap was drawn incorporating the defect and placing the expected incisions within the relaxed skin tension lines where possible.    The area thus outlined was incised deep to adipose tissue with a #15 scalpel blade.  The skin margins were undermined to an appropriate distance in all directions utilizing iris scissors. Complex Repair And Dorsal Nasal Flap Text: The defect edges were debeveled with a #15 scalpel blade.  The primary defect was closed partially with a complex linear closure.  Given the location of the remaining defect, shape of the defect and the proximity to free margins a dorsal nasal flap was deemed most appropriate for complete closure of the defect.  Using a sterile surgical marker, an appropriate flap was drawn incorporating the defect and placing the expected incisions within the relaxed skin tension lines where possible.    The area thus outlined was incised deep to adipose tissue with a #15 scalpel blade.  The skin margins were undermined to an appropriate distance in all directions utilizing iris scissors. Complex Repair And Ftsg Text: The defect edges were debeveled with a #15 scalpel blade.  The primary defect was closed partially with a complex linear closure.  Given the location of the defect, shape of the defect and the proximity to free margins a full thickness skin graft was deemed most appropriate to repair the remaining defect.  The graft was trimmed to fit the size of the remaining defect.  The graft was then placed in the primary defect, oriented appropriately, and sutured into place. Complex Repair And Burow's Graft Text: The defect edges were debeveled with a #15 scalpel blade.  The primary defect was closed partially with a complex linear closure.  Given the location of the defect, shape of the defect, the proximity to free margins and the presence of a standing cone deformity a Burow's graft was deemed most appropriate to repair the remaining defect.  The graft was trimmed to fit the size of the remaining defect.  The graft was then placed in the primary defect, oriented appropriately, and sutured into place. Complex Repair And Split-Thickness Skin Graft Text: The defect edges were debeveled with a #15 scalpel blade.  The primary defect was closed partially with a complex linear closure.  Given the location of the defect, shape of the defect and the proximity to free margins a split thickness skin graft was deemed most appropriate to repair the remaining defect.  The graft was trimmed to fit the size of the remaining defect.  The graft was then placed in the primary defect, oriented appropriately, and sutured into place. Complex Repair And Epidermal Autograft Text: The defect edges were debeveled with a #15 scalpel blade.  The primary defect was closed partially with a complex linear closure.  Given the location of the defect, shape of the defect and the proximity to free margins an epidermal autograft was deemed most appropriate to repair the remaining defect.  The graft was trimmed to fit the size of the remaining defect.  The graft was then placed in the primary defect, oriented appropriately, and sutured into place. Complex Repair And Dermal Autograft Text: The defect edges were debeveled with a #15 scalpel blade.  The primary defect was closed partially with a complex linear closure.  Given the location of the defect, shape of the defect and the proximity to free margins an dermal autograft was deemed most appropriate to repair the remaining defect.  The graft was trimmed to fit the size of the remaining defect.  The graft was then placed in the primary defect, oriented appropriately, and sutured into place. Complex Repair And Tissue Cultured Epidermal Autograft Text: The defect edges were debeveled with a #15 scalpel blade.  The primary defect was closed partially with a complex linear closure.  Given the location of the defect, shape of the defect and the proximity to free margins an tissue cultured epidermal autograft was deemed most appropriate to repair the remaining defect.  The graft was trimmed to fit the size of the remaining defect.  The graft was then placed in the primary defect, oriented appropriately, and sutured into place. Complex Repair And Xenograft Text: The defect edges were debeveled with a #15 scalpel blade.  The primary defect was closed partially with a complex linear closure.  Given the location of the defect, shape of the defect and the proximity to free margins a xenograft was deemed most appropriate to repair the remaining defect.  The graft was trimmed to fit the size of the remaining defect.  The graft was then placed in the primary defect, oriented appropriately, and sutured into place. Complex Repair And Skin Substitute Graft Text: The defect edges were debeveled with a #15 scalpel blade.  The primary defect was closed partially with a complex linear closure.  Given the location of the remaining defect, shape of the defect and the proximity to free margins a skin substitute graft was deemed most appropriate to repair the remaining defect.  The graft was trimmed to fit the size of the remaining defect.  The graft was then placed in the primary defect, oriented appropriately, and sutured into place. Path Notes (To The Dermatopathologist): Please check margins. Consent was obtained from the patient. The risks and benefits to therapy were discussed in detail. Specifically, the risks of infection, scarring, bleeding, prolonged wound healing, incomplete removal, allergy to anesthesia, nerve injury and recurrence were addressed. Prior to the procedure, the treatment site was clearly identified and confirmed by the patient. All components of Universal Protocol/PAUSE Rule completed. Post-Care Instructions: I reviewed with the patient in detail post-care instructions. Patient is not to engage in any heavy lifting, exercise, or swimming for the next 14 days. Should the patient develop any fevers, chills, bleeding, severe pain patient will contact the office immediately. Home Suture Removal Text: Patient was provided a home suture removal kit and will remove their sutures at home.  If they have any questions or difficulties they will call the office. Where Do You Want The Question To Include Opioid Counseling Located?: Case Summary Tab Billing Type: Third-Party Bill Information: Selecting Yes will display possible errors in your note based on the variables you have selected. This validation is only offered as a suggestion for you. PLEASE NOTE THAT THE VALIDATION TEXT WILL BE REMOVED WHEN YOU FINALIZE YOUR NOTE. IF YOU WANT TO FAX A PRELIMINARY NOTE YOU WILL NEED TO TOGGLE THIS TO 'NO' IF YOU DO NOT WANT IT IN YOUR FAXED NOTE.

## 2021-04-23 NOTE — ANESTHESIA PREPROCEDURE EVALUATION
Anesthetic History   No history of anesthetic complications            Review of Systems / Medical History  Patient summary reviewed and pertinent labs reviewed    Pulmonary  Within defined limits                 Neuro/Psych   Within defined limits           Cardiovascular            Dysrhythmias            GI/Hepatic/Renal                Endo/Other    Diabetes: type 2    Arthritis     Other Findings   Comments: Documentation of current medication  Current medications obtained, documented and obtained? YES      Risk Factors for Postoperative nausea/vomiting:       History of postoperative nausea/vomiting? NO       Female? YES       Motion sickness? NO       Intended opioid administration for postoperative analgesia? YES      Smoking Abstinence:  Current Smoker? NO  Elective Surgery? YES  Seen preoperatively by anesthesiologist or proxy prior to day of surgery? YES  Pt abstained from smoking 24 hours prior to anesthesia?  N/A    Preventive care/screening for High Blood Pressure:  Aged 18 years and older: YES  Screened for high blood pressure: YES  Patients with high blood pressure referred to primary care provider   for BP management: YES                 Physical Exam    Airway  Mallampati: III  TM Distance: 4 - 6 cm  Neck ROM: decreased range of motion   Mouth opening: Diminished (comment)     Cardiovascular    Rhythm: regular  Rate: normal         Dental    Dentition: Poor dentition     Pulmonary  Breath sounds clear to auscultation               Abdominal  GI exam deferred       Other Findings            Anesthetic Plan    ASA: 2  Anesthesia type: general          Induction: Intravenous  Anesthetic plan and risks discussed with: Patient Department of Anesthesiology  Preprocedure Note       Name:  Kirti Guajardo   Age:  38 y.o.  :  1978                                          MRN:  [id number]         Date:  2021      Surgeon: Bhupinder Hannah):  Jan Concepcion DO    Procedure: Procedure(s):  CLOSED REDUCTION PERCUTANOUS FIXATION PELVIC RING, SYNTHES, PREOP NERVE BLOCK, FLAT BRIT, C-ARM, PERC.  PELVIS TRAY, SYNTHES 6.5MM, 7.3MM CANNULATED SCREWS, 4.5MM NONSELF TAPPING SCREWS, SKELETAL TRACTION SET, AO DISTRACTOR IN RM NOT OPENED, SYNTHES LARGE EX FIX IN ROOM NOT OPENED    Medications prior to admission:   Prior to Admission medications    Not on File       Current medications:    Current Facility-Administered Medications   Medication Dose Route Frequency Provider Last Rate Last Admin    ketorolac (TORADOL) injection 15 mg  15 mg Intravenous Q6H Bharati Whittington APRN - CNP   15 mg at 21 0335    ceFAZolin (ANCEF) 2000 mg in dextrose 5 % 50 mL IVPB  2,000 mg Intravenous On Call to [address], DO        gabapentin (NEURONTIN) capsule 300 mg  300 mg Oral Q8H Za Ballesteros MD   300 mg at 21 0184    methocarbamol (ROBAXIN) tablet 750 mg  750 mg Oral Q6H Za Ballesteros MD   750 mg at 21 0630    enoxaparin (LOVENOX) injection 30 mg  30 mg Subcutaneous BID Zach Purvis APRN - CNP   30 mg at 21    nicotine (NICODERM CQ) 14 MG/24HR 1 patch  1 patch Transdermal Daily Zach Purvis APRN - CNP   1 patch at 21 0430    acetaminophen (TYLENOL) tablet 1,000 mg  1,000 mg Oral 3 times per day Odalys d'Ivoire, DO   1,000 mg at 21 3675    sodium chloride flush 0.9 % injection 5-40 mL  5-40 mL Intravenous 2 times per day Corinne Daily, APRN - CNP   10 mL at 21 2019    sodium chloride flush 0.9 % injection 10 mL  10 mL Intravenous PRN Corinne Daily, APRN - CNP        0.9 % sodium chloride infusion  25 mL Intravenous PRN Corinne Daily, APRN - CNP        ondansetron [facility] BP Readings from Last 3 Encounters:   04/23/21 (!) 152/95   04/20/21 100/78   11/01/16 126/78       NPO Status:                                                                                 BMI:   Wt Readings from Last 3 Encounters:   04/22/21 156 lb 4.9 oz (70.9 kg)   11/01/16 152 lb 9.6 oz (69.2 kg)     Body mass index is 24.48 kg/m².     CBC:   Lab Results   Component Value Date    WBC 9.4 04/23/2021    RBC 2.91 04/23/2021    HGB 8.8 04/23/2021    HCT 26.8 04/23/2021    MCV 92.1 04/23/2021    RDW 13.0 04/23/2021     04/23/2021       CMP:   Lab Results   Component Value Date     04/23/2021    K 3.9 04/23/2021     04/23/2021    CO2 24 04/23/2021    BUN 6 04/23/2021    CREATININE 0.43 04/23/2021    GFRAA >60 04/23/2021    LABGLOM >60 04/23/2021    GLUCOSE 102 04/23/2021    CALCIUM 7.9 04/23/2021       POC Tests:   Recent Labs     04/20/21  1722   POCGLU 122*       Coags:   Lab Results   Component Value Date    PROTIME 11.4 04/19/2021    INR 1.1 04/19/2021    APTT 23.8 04/19/2021       HCG (If Applicable): No results found for: PREGTESTUR, PREGSERUM, HCG, HCGQUANT     ABGs: No results found for: PHART, PO2ART, WIK9VLJ, XTI5XDR, BEART, L5XTITUW     Type & Screen (If Applicable):  No results found for: LABABO, LABRH    Drug/Infectious Status (If Applicable):  No results found for: HIV, HEPCAB    COVID-19 Screening (If Applicable):   Lab Results   Component Value Date    COVID19 Not Detected 04/19/2021           Anesthesia Evaluation  Patient summary reviewed and Nursing notes reviewed no history of anesthetic complications:   Airway: Mallampati: II  TM distance: >3 FB   Neck ROM: full  Mouth opening: > = 3 FB Dental: normal exam         Pulmonary:   (+) decreased breath sounds,                            ROS comment: pulm contusions   Bilateral chest tubes    Cardiovascular:Negative CV ROS  Exercise tolerance: good (>4 METS),       (-) past MI, CAD and CABG/stent      Rhythm: regular  Rate: normal           Beta Blocker:  Not on Beta Blocker         Neuro/Psych:                ROS comment: Drug abuse  GI/Hepatic/Renal:             Endo/Other:    (+) blood dyscrasia: anemia:., .                 Abdominal:           Vascular:                                      Anesthesia Plan      general     ASA 3       Induction: intravenous. MIPS: Postoperative opioids intended and Prophylactic antiemetics administered. Anesthetic plan and risks discussed with patient. Use of blood products discussed with patient whom consented to blood products.    Plan discussed with CRNA and surgical team.                  Alex Hoff MD   4/23/2021

## 2022-02-23 NOTE — PROGRESS NOTES
Assessment & Plan     Other specified hypothyroidism  I informed patient that a lot of her symptoms are suggestive of a hyperthyroid state based on her recent lab work.  We discussed that at this point the best option is probably for her to stay off of her thyroid replacement hormone.  She states that she stopped it about a week or so ago.  I have informed her that we probably should check her thyroid function tests in about 2 weeks to see where she is at and then continue to watch these values closely.  She is agreeable and future lab orders have been placed.  - TSH with free T4 reflex; Future  - T3, total; Future    Benign essential hypertension  Stable on therapy continue with current medical management    Episode of recurrent major depressive disorder, unspecified depression episode severity (H)  Patient has a new mental health provider and she is following up accordingly.       See Patient Instructions    Return in about 2 weeks (around 3/10/2022) for Lab Work appointment.    Skyler Álvarez MD  [facility]    Percy Long is a 69 year old who presents for the following health issues     Patient has been holding thyroid medication.    Patient informs me she has been going to an integrative medicine clinic at which time and for quite some time she has been taking some thyroid hormone replacement.  She states that she was informed in the past that her thyroid tests were underactive.  She states that in addition to her levothyroxine she had some Cytomel added at some point in the past also.  She is unclear exact dates nor how long she has been on the medication although I do see refills being granted.  She apparently had some lab work in November of last year which demonstrated a hyperthyroid panel.  She reports to me that apparently no distinct changes were made in her medication dosing at that time per her report.  Since then she has noted issues of loose stool, weight  [facility] Progress Note    Date: 2018    Name: Mariama Kessler    MRN: [id number]         : 1950      Nplate Injection     Ms. Jose Pemberton arrived to [facility] at 8396. Ms. Jose Pemberton was assessed and education was provided. Ms. Renee Nance vitals were reviewed. Visit Vitals    BP 93/61 (BP 1 Location: Right arm, BP Patient Position: At rest;Sitting)    Pulse (!) 57    Temp 97.6 °F (36.4 °C)    Resp 18    SpO2 100%    Breastfeeding No       Pt was observed for 5 minutes after obtaining vital signs prior to initiating treatment. Per Dr. Ann Marie Bautista order standard dose of NPlate to be given weekly 207mcg. Nplate 245RAG (8.08AB) administered as ordered SQ in the back of the patient's right upper arm. No redness or bleeding noted. Ms. Jose Pemberton was discharged from [place] [address] in stable condition at [phone].   She is to return on 18 at 0830 for her next appointment [facility] flush/adolfo/kim Rebolledo RN  2018   [id number] "loss, itchy skin, mild agitation and feeling warm.    History of Present Illness       Hypothyroidism:     Since last visit, patient describes the following symptoms::  Anxiety, Depression, Dry skin, Fatigue and Weight loss    Weight loss::  16-20 lbs.    She eats 2-3 servings of fruits and vegetables daily.She consumes 0 sweetened beverage(s) daily.She exercises with enough effort to increase her heart rate 10 to 19 minutes per day.  She exercises with enough effort to increase her heart rate 5 days per week.   She is taking medications regularly.     Review of Systems :    INTEGUMENTARY/SKIN: NEGATIVE for worrisome rashes, moles or lesions  ENT/MOUTH: NEGATIVE for ear, mouth and throat problems  RESP: NEGATIVE for significant cough or SOB  CV: NEGATIVE for chest pain, palpitations or peripheral edema  GI: NEGATIVE for nausea, abdominal pain, heartburn, or change in bowel habits  : NEGATIVE for frequency, dysuria, or hematuria  MUSCULOSKELETAL: NEGATIVE for significant arthralgias or myalgia  HEME: NEGATIVE for bleeding problems      Objective    /72   Pulse 83   Temp 97  F (36.1  C) (Temporal)   Resp 15   Ht 1.651 m (5' 5\")   Wt 88.9 kg (196 lb)   LMP 03/11/2010   SpO2 96%   BMI 32.62 kg/m    Body mass index is 32.62 kg/m .  Physical Exam   GENERAL: healthy, alert and no distress  EYES: Eyes grossly normal to inspection, PERRL and conjunctivae and sclerae normal  HENT: ear canals and TM's normal, nose and mouth without ulcers or lesions  NECK: no adenopathy, no asymmetry, masses, or scars and thyroid normal to palpation  RESP: lungs clear to auscultation - no rales, rhonchi or wheezes  CV: regular rate and rhythm, normal S1 S2, no S3 or S4, click or rub  NEURO: Normal strength and tone, mentation intact and speech normal  PSYCH: mentation appears normal, affect normal/bright    TSH   Date Value Ref Range Status   02/09/2022 <0.01 (L) 0.40 - 4.00 mU/L Final   10/16/2020 0.61 0.40 - 4.00 mU/L Final "     Component      Latest Ref Rng & Units 2/9/2022   TSH      0.40 - 4.00 mU/L <0.01 (L)   T4 Free      0.76 - 1.46 ng/dL 2.45 (H)   Free T3      2.3 - 4.2 pg/mL 7.8 (H)

## 2024-05-15 NOTE — ED NOTES
How Severe Are Your Spot(S)?: mild TRANSFER - OUT REPORT: 
 
Verbal report given to Tim Vieyra (name) on [place]arm[place]ne Ferry  [facility] routine progression of care Report consisted of patients Situation, Background, Assessment and  
Recommendations(SBAR). Information from the following report(s) SBAR, Kardex, MAR, Recent Results and Cardiac Rhythm Controlled A-Fib. was reviewed with the receiving nurse. Lines:  
Venous Access Device medi-port Upper chest (subclavicular area, right (Active) Peripheral IV 11/02/18 Right Hand (Active) Opportunity for questions and clarification was provided. Patient transported with: 
 Monitor What Type Of Note Output Would You Prefer (Optional)?: Standard Output What Is The Reason For Today's Visit?: Full Body Skin Examination What Is The Reason For Today's Visit? (Being Monitored For X): concerning skin lesions on an annual basis

## 2024-08-17 NOTE — ED PROVIDER NOTES
EMERGENCY DEPARTMENT HISTORY AND PHYSICAL EXAM 
 
7:54 PM 
 
 
Date: 1/7/2019 Patient Name: Bira Hart History of Presenting Illness Chief Complaint Patient presents with  Dizziness  Vision Change  Hypotension History Provided By: Patient Chief Complaint: dizziness Duration:  Today Timing:  Acute Location: generalized Severity: 8 out of 10 Associated Symptoms: visual changes Additional History (Context): Bria Hart is a 79 y.o. female with diabetes and CHF, cancer,  who presents with acute 8/10 generalized dizziness that started today PTA. Associated sx are visual changes, SOB, and nervousness. She took her BP and says her systolic was in the 16'L. [facility] to [place] on [facility]. Denies leg swelling. Pt does not drink or smoke. PCP: Angelika Clay MD 
 
Current Facility-Administered Medications Medication Dose Route Frequency Provider Last Rate Last Dose  NOREPINephrine (LEVOPHED) 8 mg in 5% dextrose 250mL infusion  2-16 mcg/min IntraVENous TITRATE Fatuma Mon MD      
 NOREPINephrine (LEVOPHED) 8 mg in 5% dextrose 250mL infusion  2-16 mcg/min IntraVENous TITRATE Fatuma Mon MD      
 sodium chloride (NS) flush 5-10 mL  5-10 mL IntraVENous PRN Shayna Dutta      
 levoFLOXacin (LEVAQUIN) 750 mg in D5W IVPB  750 mg IntraVENous Q24H Shayna Dutta   Stopped at 01/07/19 2145  
 vancomycin (VANCOCIN) 1250 mg in  ml infusion  1,250 mg IntraVENous Q18H Fatuma Mon MD      
 0.9% sodium chloride infusion 1,000 mL  1,000 mL IntraVENous CONTINUOUS Fatuma Mon  mL/hr at 01/07/19 2310 1,000 mL at 01/07/19 2310 Current Outpatient Medications Medication Sig Dispense Refill  lidocaine-prilocaine (EMLA) topical cream APPLY OVER PORT 1 HOUR PRIOR TO CHEMOTHERAPY THAN COVER WITH SARAN WRAP 30 g 6  
 [facility]    Medicine Progress Note - Hospitalist Service, GOLD TEAM 9    Date of Admission:  8/15/2024    Assessment & Plan      Rachel A Gerhardt is a 51 year old female admitted on 8/15/2024. She has a history of advanced cervical and ovarian cancer (dx 2015) s/p chemotherapy and radiation, radiation enteritis, recurrent small bowel obstructions secondary to adhesions, s/p ex-lap with 20-30cm small bowel resection with primary anastomosis (2017), ileo-ileal anastomosis dilation (2018), and small bowel resection with anastomosis (2019), substance use disorder, and chronic abdominal pain managed with chronic opiate use (percocet 4 times daily) and is admitted for partial small bowel obstruction.    #Partial Small Bowel Obstruction  The patient reports having a 3 day history of cramping abdominal pain that is similar to the pain she has had with previous small bowel obstructions. She has been able to pass gas and her last bowel movement was on 8/15/24. In the ED vital signs were stable. CT abdomen revealed wall thickening of multiple small bowel loops in the lower abdomen and pelvis with few of these loops mildly dilated and wall thickening of the rectosigmoid colon consistent with either a partial small bowel obstruction or nonspecific colitis. General surgery saw the patient while in the ED and recommended medical management, but will continue to follow.   - general surgery consulted, signed off   - passed gastrograffin challenge  - CLD, can advance as tolerated  - Pain:   - tylenol   - toradol X3 doses   - pta oxycodone with dilaudid for breakthrough   - heat packs    #pyuria  The patient denies any urinary symptoms. UA in the ED with small blood, moderate leukocyte esterase, and 18WBC. Urine culture with <10,000 urogenital hank. Antibiotics discontinued after 2 doses.              Diet: Clear Liquid Diet    DVT Prophylaxis: Pneumatic Compression Devices  Godwin  Catheter: Not present  Lines: None     Cardiac Monitoring: None  Code Status: Full Code      Clinically Significant Risk Factors                                             Disposition Plan     Medically Ready for Discharge: anticipate 1-2 days             Kalli Pavon MD  Hospitalist Service, GOLD TEAM 9  [facility]  Securely message with [employer] (more info)  Text page via [employer] Paging/Directory   See signed in provider for up to date coverage information  ______________________________________________________________________    Interval History   Jenni states that she is in significant pain which is located under her belly button and worse on the L side. She rates the pain as 8/10. Long discussion about effects of opiates on the gut and why it is best to limit them. Patient is afraid to eat because she states the pain worsened after she had chicken broth. Notes she can feel her bowels moving.     Physical Exam   Vital Signs: Temp: 98.3  F (36.8  C) Temp src: Oral BP: 106/69 Pulse: 73   Resp: 16 SpO2: 97 % O2 Device: None (Room air)    Weight: 0 lbs 0 oz    General: laying on her side in bed, alert, appears in pain  Resp: breathing comfortably on room air  Abd: soft, non-distended, generalized tenderness worse in LLQ    Medical Decision Making       40 MINUTES SPENT BY ME on the date of service doing chart review, history, exam, documentation & further activities per the note.      Data         Imaging results reviewed over the past 24 hrs:   Recent Results (from the past 24 hour(s))   XR Gastrografin Challenge    Narrative    EXAMINATION:  XR GASTROGRAFIN CHALLENGE 8/16/2024 5:39 PM     COMPARISON: CT abdomen and pelvis 8/15/2024.    HISTORY: Small Bowel Obstruction    TECHNIQUE: Frontal view of the abdomen 8 hours after administration of  oral contrast.    FINDINGS: Contrast opacification of the large bowel and rectum. No  significantly dilated air-filled bowel   oxyCODONE-acetaminophen (PERCOCET 10)  mg per tablet Take 1 Tab by mouth every six (6) hours as needed for Pain. Max Daily Amount: 4 Tabs. 120 Tab 0  
 LORazepam (ATIVAN) 1 mg tablet Take 1 Tab by mouth nightly as needed for Anxiety. Max Daily Amount: 1 mg. 30 Tab 3  
 allopurinol (ZYLOPRIM) 100 mg tablet Take 1 Tab by mouth daily. 30 Tab 0  
 colchicine (MITIGARE) 0.6 mg capsule Take 1 Cap by mouth daily. 14 Cap 0  
 diclofenac (VOLTAREN) 1 % gel Apply 2 g to affected area three (3) times daily as needed for Pain. Gel should be measured and applied using the supplied dosing card. Apply dose (2 gm) to each location. If treatment site is the hands, patients should wait at least one (1) hour to wash their hands. APPLY TO ankle and knees. 100 g 0  
 albuterol (PROAIR HFA) 90 mcg/actuation inhaler 1-2 puffs every 4-6 hrs. (Patient taking differently: Take 2 Puffs by inhalation every four (4) hours as needed for Shortness of Breath or Wheezing.) 1 Inhaler 1  
 gabapentin (NEURONTIN) 300 mg capsule Take 1 Cap by mouth three (3) times daily. 90 Cap 1  
 ergocalciferol (VITAMIN D2) 50,000 unit capsule Take 1 Cap by mouth every seven (7) days. 12 Cap 1  
 topiramate (TOPAMAX) 50 mg tablet Take 50 mg by mouth daily. 5  
 LINZESS 145 mcg cap capsule TAKE 1 CAPSULE BY MOUTH DAILY 30 MINUTES BEFORE BREAKFAST  0  
 loratadine 10 mg cap Take 10 mg by mouth daily.  digoxin (LANOXIN) 0.125 mg tablet Take 0.125 mg by mouth daily.  rivaroxaban (XARELTO) 10 mg tablet Take 10 mg by mouth daily.  aluminum-magnesium hydroxide 200-200 mg/5 mL susp 5 mL, diphenhydrAMINE 12.5 mg/5 mL liqd 12.5 mg, lidocaine 2 % soln 5 mL 5 mL by Swish and Spit route two (2) times a day. Magic mouth wash Maalox Lidocaine 2% viscous Diphenhydramine oral solution Pharmacy to mix equal portions of ingredients to a total volume as indicated in the dispense amount. 240 mL 1 loop. No pneumatosis or portal  venous gas       Impression    IMPRESSION:     On this delayed film post Gastrografin administration, contrast  opacification of large bowel and rectum consistent with absence of  complete/high-grade bowel obstruction.    I have personally reviewed the examination and initial interpretation  and I agree with the findings.    KARINA SANDS MD         SYSTEM ID:  [id number]       senna (SENNA) 8.6 mg tablet Take 1 Tab by mouth daily.  ondansetron hcl (ZOFRAN) 8 mg tablet Take 1 Tab by mouth every eight (8) hours as needed for Nausea. 30 Tab 3  
 IRON AG&FUM/C/FA/MV CMB11/CA-T (PRUVATE 21-7 PO) Take 325 mg by mouth daily. Indications: iron deficiency Past History Past Medical History: 
Past Medical History:  
Diagnosis Date  Antineoplastic chemotherapy induced anemia  Arrhythmia Atrial Fib  Arthritis  Breast cancer ([facility] [address]  Cancer ([facility] [address] 1999 Breast  
 Cardiomyopathy ([facility] [address]  Chronic ITP (idiopathic thrombocytopenia) (HCC) 10/31/2016 Secondary to Anemia from Chemo  Congestive heart failure ([facility] [address]  Diabetes ([facility] [address] borderline, no meds  Esophageal cancer ([facility] [address] 2005  
 treated with chemo  Heart failure ([facility] [address]  SOB (shortness of breath) Past Surgical History: 
Past Surgical History:  
Procedure Laterality Date  BREAST SURGERY PROCEDURE UNLISTED Left 1990s  
 lumpectomy  COLONOSCOPY N/A 7/27/2016 COLONOSCOPY performed by Kevin Taylor MD at [facility] ENDOSCOPY  
 HX APPENDECTOMY  HX HEENT Tonsillectomy  HX ORTHOPAEDIC    
 HX TUBAL LIGATION    
 HX VASCULAR ACCESS    
 NEUROLOGICAL PROCEDURE UNLISTED  01/08/2018 Lumbar fusion Family History: 
History reviewed. No pertinent family history. Social History: 
Social History Tobacco Use  Smoking status: Never Smoker  Smokeless tobacco: Never Used Substance Use Topics  Alcohol use: No  
 Drug use: No  
 
 
Allergies: Allergies Allergen Reactions  Aspirin Swelling Pt states her whole body swells when she takes aspirin.  Adhesive Unable to Obtain  Nickel Rash  Pcn [Penicillins] Rash Review of Systems Review of Systems Constitutional: Negative. HENT: Negative. Eyes: Positive for visual disturbance. Respiratory: Positive for shortness of breath. Cardiovascular: Negative. Negative for leg swelling. Gastrointestinal: Negative. Endocrine: Negative. Genitourinary: Negative. Musculoskeletal: Negative. Skin: Negative. Allergic/Immunologic: Negative. Neurological: Positive for dizziness. Hematological: Negative. Psychiatric/Behavioral: The patient is nervous/anxious. All other systems reviewed and are negative. Physical Exam  
 
Visit Vitals BP (!) 78/39 Pulse 64 Temp 97.5 °F (36.4 °C) Resp 11 Ht 5' 6\" (1.676 m) Wt 66 kg (145 lb 8 oz) SpO2 100% BMI 23.48 kg/m² Physical Exam  
Constitutional: She is oriented to person, place, and time. She appears well-developed and well-nourished. No distress. HENT:  
Head: Normocephalic. Right Ear: External ear normal.  
Left Ear: External ear normal.  
Mouth/Throat: No oropharyngeal exudate. Eyes: Conjunctivae and EOM are normal. Pupils are equal, round, and reactive to light. Right eye exhibits no discharge. Left eye exhibits no discharge. No scleral icterus. Neck: Normal range of motion. Neck supple. No JVD present. No tracheal deviation present. No thyromegaly present. Cardiovascular: Normal rate, normal heart sounds and intact distal pulses. An irregular rhythm present. Exam reveals no gallop and no friction rub. No murmur heard. Pulmonary/Chest: Effort normal and breath sounds normal. No stridor. No respiratory distress. She has no wheezes. She has no rales. She exhibits no tenderness. Abdominal: Soft. Bowel sounds are normal. She exhibits no distension and no mass. There is no tenderness. There is no rebound and no guarding. Musculoskeletal: Normal range of motion. She exhibits no edema or tenderness. Lymphadenopathy:  
  She has no cervical adenopathy. Neurological: She is alert and oriented to person, place, and time. She displays normal reflexes. No cranial nerve deficit. She exhibits normal muscle tone.  Coordination normal.  
 Skin: Skin is warm and dry. No rash noted. She is not diaphoretic. No erythema. No pallor. Nursing note and vitals reviewed. Diagnostic Study Results Labs - Recent Results (from the past 12 hour(s)) EKG, 12 LEAD, INITIAL Collection Time: 01/07/19  7:37 PM  
Result Value Ref Range Ventricular Rate 81 BPM  
 Atrial Rate 159 BPM  
 QRS Duration 108 ms Q-T Interval 450 ms QTC Calculation (Bezet) 522 ms Calculated R Axis -28 degrees Calculated T Axis 6 degrees Diagnosis Atrial fibrillation Incomplete right bundle branch block Septal infarct (cited on or before 07-JAN-2019) Prolonged QT Abnormal ECG When compared with ECG of 04-NOV-2018 06:09, 
Questionable change in initial forces of Septal leads QT has lengthened CBC WITH AUTOMATED DIFF Collection Time: 01/07/19  7:48 PM  
Result Value Ref Range WBC 7.7 4.6 - 13.2 K/uL  
 RBC 4.06 (L) 4.20 - 5.30 M/uL  
 HGB 10.7 (L) 12.0 - 16.0 g/dL HCT 33.0 (L) 35.0 - 45.0 % MCV 81.3 74.0 - 97.0 FL  
 MCH 26.4 24.0 - 34.0 PG  
 MCHC 32.4 31.0 - 37.0 g/dL  
 RDW 15.4 (H) 11.6 - 14.5 % PLATELET 455 (L) 865 - 420 K/uL NEUTROPHILS 62 40 - 73 % LYMPHOCYTES 22 21 - 52 % MONOCYTES 13 (H) 3 - 10 % EOSINOPHILS 3 0 - 5 % BASOPHILS 0 0 - 2 %  
 ABS. NEUTROPHILS 4.8 1.8 - 8.0 K/UL  
 ABS. LYMPHOCYTES 1.7 0.9 - 3.6 K/UL  
 ABS. MONOCYTES 1.0 0.05 - 1.2 K/UL  
 ABS. EOSINOPHILS 0.2 0.0 - 0.4 K/UL  
 ABS. BASOPHILS 0.0 0.0 - 0.1 K/UL  
 DF AUTOMATED PLATELET COMMENTS DECREASED PLATELETS    
 RBC COMMENTS NORMOCYTIC, NORMOCHROMIC METABOLIC PANEL, COMPREHENSIVE Collection Time: 01/07/19  7:48 PM  
Result Value Ref Range Sodium 139 136 - 145 mmol/L Potassium 2.8 (LL) 3.5 - 5.5 mmol/L Chloride 94 (L) 100 - 108 mmol/L  
 CO2 37 (H) 21 - 32 mmol/L Anion gap 8 3.0 - 18 mmol/L Glucose 90 74 - 99 mg/dL BUN 50 (H) 7.0 - 18 MG/DL  Creatinine 1.95 (H) 0.6 - 1.3 MG/DL  
 BUN/Creatinine ratio 26 (H) 12 - 20 GFR est AA 31 (L) >60 ml/min/1.73m2 GFR est non-AA 26 (L) >60 ml/min/1.73m2 Calcium 9.5 8.5 - 10.1 MG/DL Bilirubin, total 0.4 0.2 - 1.0 MG/DL  
 ALT (SGPT) 25 13 - 56 U/L  
 AST (SGOT) 39 (H) 15 - 37 U/L Alk. phosphatase 73 45 - 117 U/L Protein, total 8.5 (H) 6.4 - 8.2 g/dL Albumin 3.8 3.4 - 5.0 g/dL Globulin 4.7 (H) 2.0 - 4.0 g/dL A-G Ratio 0.8 0.8 - 1.7 CARDIAC PANEL,(CK, CKMB & TROPONIN) Collection Time: 01/07/19  7:48 PM  
Result Value Ref Range  (H) 26 - 192 U/L  
 CK - MB 2.4 <3.6 ng/ml CK-MB Index 0.6 0.0 - 4.0 % Troponin-I, QT 0.02 0.00 - 0.06 NG/ML  
PTT Collection Time: 01/07/19  7:48 PM  
Result Value Ref Range aPTT 45.1 (H) 23.0 - 36.4 SEC PROTHROMBIN TIME + INR Collection Time: 01/07/19  7:48 PM  
Result Value Ref Range Prothrombin time 25.0 (H) 11.5 - 15.2 sec INR 2.3 (H) 0.8 - 1.2 POC LACTIC ACID Collection Time: 01/07/19  7:51 PM  
Result Value Ref Range Lactic Acid (POC) 0.92 0.40 - 2.00 mmol/L Radiologic Studies -  
CT HEAD WO CONT Final Result Impression: 1. No acute intracranial pathology. XR CHEST PORT Final Result IMPRESSION:  
  
Decreased penetration to the left heart consistent with left lower lobe  
infiltrate or atelectasis. Top normal cardiac size to mild cardiomegaly. Medical Decision Making I am the first provider for this patient. I reviewed the vital signs, available nursing notes, past medical history, past surgical history, family history and social history. Vital Signs-Reviewed the patient's vital signs. Pulse Oximetry Analysis -  98% on room air Records Reviewed: Nursing Notes (Time of Review: 7:54 PM) 
 
ED Course: Progress Notes, Reevaluation, and Consults: 
12:48 AM Consult: I discussed care with Dr. Charity Mcdaniel (Hospitalist).   It was a standard discussion including patient history, chief complaint, available diagnostic results, and predicted treatment course. Consult ICU doctor to admit to unit. 12:57 AM Consult: I discussed care with Dr. Kimberli Justin (ICU). It was a standard discussion including patient history, chief complaint, available diagnostic results, and predicted treatment course. Accepts admission. Provider Notes (Medical Decision Making): Dehydration, acute renal failure, sepsis, PNA, UTI, viral syndrome For Hospitalized Patients: 
 
1. Hospitalization Decision Time: The decision to hospitalize the patient was made by Dr. Shaun Paige at 12:57 AM on 1/7/2019 2. Aspirin: Aspirin was not given because the patient did not present with a stroke at the time of their Emergency Department evaluation Diagnosis Clinical Impression: 1. Acute renal failure, unspecified acute renal failure type ([facility] [address] Disposition: ADMIT Medication List  
  
ASK your doctor about these medications   
albuterol 90 mcg/actuation inhaler Commonly known as:  PROAIR HFA 
1-2 puffs every 4-6 hrs. allopurinol 100 mg tablet Commonly known as:  [place] Take 1 Tab by mouth daily. aluminum-magnesium hydroxide 200-200 mg/5 mL susp 5 mL, diphenhydrAMINE 12.5 mg/5 mL liqd 12.5 mg, lidocaine 2 % soln 5 mL 
5 mL by Swish and Spit route two (2) times a day. Magic mouth wash Maalox Lidocaine 2% viscous Diphenhydramine oral solution Pharmacy to mix equal portions of ingredients to a total volume as indicated in the dispense amount. colchicine 0.6 mg capsule Commonly known as:  Korey Byers Take 1 Cap by mouth daily. diclofenac 1 % Gel Commonly known as:  VOLTAREN Apply 2 g to affected area three (3) times daily as needed for Pain. Gel should be measured and applied using the supplied dosing card. Apply dose (2 gm) to each location. If treatment site is the hands, patients should wait at least one (1) hour to wash their hands. APPLY TO ankle and knees. digoxin 0.125 mg tablet Commonly known as:  LANOXIN 
  
ergocalciferol 50,000 unit capsule Commonly known as:  VITAMIN D2 Take 1 Cap by mouth every seven (7) days. gabapentin 300 mg capsule Commonly known as:  NEURONTIN Take 1 Cap by mouth three (3) times daily. lidocaine-prilocaine topical cream 
Commonly known as:  EMLA 
APPLY OVER PORT 1 HOUR PRIOR TO CHEMOTHERAPY THAN COVER WITH [place] WRAP LINZESS 145 mcg Cap capsule Generic drug:  linaclotide 
  
loratadine 10 mg Cap LORazepam 1 mg tablet Commonly known as:  ATIVAN Take 1 Tab by mouth nightly as needed for Anxiety. Max Daily Amount: 1 mg. 
  
ondansetron hcl 8 mg tablet Commonly known as:  Euell Diones Take 1 Tab by mouth every eight (8) hours as needed for Nausea. oxyCODONE-acetaminophen  mg per tablet Commonly known as:  PERCOCET 10 Take 1 Tab by mouth every six (6) hours as needed for Pain. Max Daily Amount: 4 Tabs. PRUVATE 21-7 PO Senna 8.6 mg tablet Generic drug:  senna 
  
topiramate 50 mg tablet Commonly known as:  TOPAMAX XARELTO 10 mg tablet Generic drug:  rivaroxaban 
  
  
 
_______________________________ Scribe Attestation Tasia Riding acting as a scribe for and in the presence of Georgia Jenkins MD     
January 08, 2019 at 12:52 AM 
    
Provider Attestation:     
I personally performed the services described in the documentation, reviewed the documentation, as recorded by the scribe in my presence, and it accurately and completely records my words and actions. January 08, 2019 at 12:52 AM - Georgia Jenkins MD   
 
 
_______________________________
